# Patient Record
Sex: MALE | Race: WHITE | NOT HISPANIC OR LATINO | Employment: OTHER | URBAN - NONMETROPOLITAN AREA
[De-identification: names, ages, dates, MRNs, and addresses within clinical notes are randomized per-mention and may not be internally consistent; named-entity substitution may affect disease eponyms.]

---

## 2017-05-16 ENCOUNTER — OFFICE VISIT (OUTPATIENT)
Dept: CARDIOLOGY | Facility: CLINIC | Age: 82
End: 2017-05-16

## 2017-05-16 VITALS
WEIGHT: 201.2 LBS | HEIGHT: 69 IN | BODY MASS INDEX: 29.8 KG/M2 | HEART RATE: 62 BPM | DIASTOLIC BLOOD PRESSURE: 88 MMHG | SYSTOLIC BLOOD PRESSURE: 140 MMHG

## 2017-05-16 DIAGNOSIS — R01.1 MURMUR, CARDIAC: ICD-10-CM

## 2017-05-16 DIAGNOSIS — R07.9 CHEST PAIN, UNSPECIFIED TYPE: ICD-10-CM

## 2017-05-16 DIAGNOSIS — I25.119 CORONARY ARTERY DISEASE INVOLVING NATIVE CORONARY ARTERY OF NATIVE HEART WITH ANGINA PECTORIS (HCC): Primary | ICD-10-CM

## 2017-05-16 DIAGNOSIS — I10 ESSENTIAL HYPERTENSION: ICD-10-CM

## 2017-05-16 DIAGNOSIS — E78.2 MIXED HYPERLIPIDEMIA: ICD-10-CM

## 2017-05-16 PROCEDURE — 99214 OFFICE O/P EST MOD 30 MIN: CPT | Performed by: NURSE PRACTITIONER

## 2017-05-16 PROCEDURE — 93000 ELECTROCARDIOGRAM COMPLETE: CPT | Performed by: NURSE PRACTITIONER

## 2017-05-16 RX ORDER — ATORVASTATIN CALCIUM 40 MG/1
80 TABLET, FILM COATED ORAL DAILY
COMMUNITY
End: 2018-04-11 | Stop reason: DRUGHIGH

## 2017-05-24 ENCOUNTER — HOSPITAL ENCOUNTER (OUTPATIENT)
Dept: CARDIOLOGY | Facility: HOSPITAL | Age: 82
Discharge: HOME | End: 2017-05-24
Admitting: NURSE PRACTITIONER

## 2017-05-24 ENCOUNTER — APPOINTMENT (OUTPATIENT)
Dept: CARDIOLOGY | Facility: HOSPITAL | Age: 82
End: 2017-05-24

## 2017-05-24 VITALS
BODY MASS INDEX: 29.77 KG/M2 | DIASTOLIC BLOOD PRESSURE: 67 MMHG | WEIGHT: 201 LBS | SYSTOLIC BLOOD PRESSURE: 122 MMHG | HEIGHT: 69 IN

## 2017-05-24 DIAGNOSIS — I25.119 CORONARY ARTERY DISEASE INVOLVING NATIVE CORONARY ARTERY OF NATIVE HEART WITH ANGINA PECTORIS (HCC): ICD-10-CM

## 2017-05-24 DIAGNOSIS — R07.9 CHEST PAIN, UNSPECIFIED TYPE: ICD-10-CM

## 2017-05-24 PROCEDURE — 93306 TTE W/DOPPLER COMPLETE: CPT

## 2017-05-24 PROCEDURE — 93306 TTE W/DOPPLER COMPLETE: CPT | Performed by: INTERNAL MEDICINE

## 2017-05-25 LAB
BH CV ECHO MEAS - AO MAX PG (FULL): 24.5 MMHG
BH CV ECHO MEAS - AO MAX PG: 28.5 MMHG
BH CV ECHO MEAS - AO MEAN PG (FULL): 13 MMHG
BH CV ECHO MEAS - AO MEAN PG: 15 MMHG
BH CV ECHO MEAS - AO ROOT AREA: 8.6 CM^2
BH CV ECHO MEAS - AO ROOT DIAM: 3.3 CM
BH CV ECHO MEAS - AO V2 MAX: 267 CM/SEC
BH CV ECHO MEAS - AO V2 MEAN: 176 CM/SEC
BH CV ECHO MEAS - AO V2 VTI: 59.2 CM
BH CV ECHO MEAS - AVA(I,A): 1.2 CM^2
BH CV ECHO MEAS - AVA(I,D): 1.2 CM^2
BH CV ECHO MEAS - AVA(V,A): 1.2 CM^2
BH CV ECHO MEAS - AVA(V,D): 1.2 CM^2
BH CV ECHO MEAS - CONTRAST EF 4CH: 60.2 ML/M^2
BH CV ECHO MEAS - EDV(CUBED): 126.5 ML
BH CV ECHO MEAS - EDV(MOD-SP4): 86.2 ML
BH CV ECHO MEAS - EDV(TEICH): 119.3 ML
BH CV ECHO MEAS - EF(CUBED): 74.1 %
BH CV ECHO MEAS - EF(MOD-SP4): 60.2 %
BH CV ECHO MEAS - EF(TEICH): 65.7 %
BH CV ECHO MEAS - ESV(CUBED): 32.8 ML
BH CV ECHO MEAS - ESV(MOD-SP4): 34.3 ML
BH CV ECHO MEAS - ESV(TEICH): 41 ML
BH CV ECHO MEAS - FS: 36.3 %
BH CV ECHO MEAS - IVS/LVPW: 0.77
BH CV ECHO MEAS - IVSD: 0.96 CM
BH CV ECHO MEAS - LA DIMENSION: 4.1 CM
BH CV ECHO MEAS - LA/AO: 1.2
BH CV ECHO MEAS - LAT PEAK E' VEL: 4.8 CM/SEC
BH CV ECHO MEAS - LV MASS(C)D: 209.7 GRAMS
BH CV ECHO MEAS - LV MAX PG: 4 MMHG
BH CV ECHO MEAS - LV MEAN PG: 2 MMHG
BH CV ECHO MEAS - LV V1 MAX: 99.8 CM/SEC
BH CV ECHO MEAS - LV V1 MEAN: 63.8 CM/SEC
BH CV ECHO MEAS - LV V1 VTI: 21.7 CM
BH CV ECHO MEAS - LVIDD: 5 CM
BH CV ECHO MEAS - LVIDS: 3.2 CM
BH CV ECHO MEAS - LVLD AP4: 6.9 CM
BH CV ECHO MEAS - LVLS AP4: 5.9 CM
BH CV ECHO MEAS - LVOT AREA (M): 3.1 CM^2
BH CV ECHO MEAS - LVOT AREA: 3.1 CM^2
BH CV ECHO MEAS - LVOT DIAM: 2 CM
BH CV ECHO MEAS - LVPWD: 1.3 CM
BH CV ECHO MEAS - MV A MAX VEL: 107 CM/SEC
BH CV ECHO MEAS - MV DEC TIME: 0.27 SEC
BH CV ECHO MEAS - MV E MAX VEL: 117 CM/SEC
BH CV ECHO MEAS - MV E/A: 1.1
BH CV ECHO MEAS - RAP SYSTOLE: 5 MMHG
BH CV ECHO MEAS - RVSP: 33.5 MMHG
BH CV ECHO MEAS - SV(AO): 506.3 ML
BH CV ECHO MEAS - SV(CUBED): 93.7 ML
BH CV ECHO MEAS - SV(LVOT): 68.2 ML
BH CV ECHO MEAS - SV(MOD-SP4): 51.9 ML
BH CV ECHO MEAS - SV(TEICH): 78.4 ML
BH CV ECHO MEAS - TR MAX VEL: 267 CM/SEC
E/E' RATIO: 24
LEFT ATRIUM VOLUME: 131 CM3

## 2017-05-26 ENCOUNTER — TELEPHONE (OUTPATIENT)
Dept: CARDIOLOGY | Facility: CLINIC | Age: 82
End: 2017-05-26

## 2017-06-13 LAB
ALBUMIN SERPL-MCNC: 4.4 G/DL (ref 3.5–5.2)
ALP BLD-CCNC: 74 U/L (ref 40–130)
ALT SERPL-CCNC: 14 U/L (ref 5–41)
ANION GAP SERPL CALCULATED.3IONS-SCNC: 17 MMOL/L (ref 7–19)
AST SERPL-CCNC: 20 U/L (ref 5–40)
BACTERIA: NEGATIVE /HPF
BASOPHILS ABSOLUTE: 0 K/UL (ref 0–0.2)
BASOPHILS RELATIVE PERCENT: 0.5 % (ref 0–1)
BILIRUB SERPL-MCNC: 0.7 MG/DL (ref 0.2–1.2)
BILIRUBIN URINE: NEGATIVE
BLOOD, URINE: ABNORMAL
BUN BLDV-MCNC: 23 MG/DL (ref 8–23)
CALCIUM SERPL-MCNC: 9.5 MG/DL (ref 8.8–10.2)
CHLORIDE BLD-SCNC: 103 MMOL/L (ref 98–111)
CHOLESTEROL, TOTAL: 200 MG/DL (ref 160–199)
CLARITY: CLEAR
CO2: 23 MMOL/L (ref 22–29)
COLOR: YELLOW
CREAT SERPL-MCNC: 1.7 MG/DL (ref 0.5–1.2)
EOSINOPHILS ABSOLUTE: 0.2 K/UL (ref 0–0.6)
EOSINOPHILS RELATIVE PERCENT: 3.9 % (ref 0–5)
EPITHELIAL CELLS, UA: 0 /HPF (ref 0–5)
GFR NON-AFRICAN AMERICAN: 39
GLUCOSE BLD-MCNC: 90 MG/DL (ref 74–109)
GLUCOSE URINE: NEGATIVE MG/DL
HCT VFR BLD CALC: 45 % (ref 42–52)
HDLC SERPL-MCNC: 33 MG/DL (ref 55–121)
HEMOGLOBIN: 14.8 G/DL (ref 14–18)
HYALINE CASTS: 0 /HPF (ref 0–8)
KETONES, URINE: NEGATIVE MG/DL
LDL CHOLESTEROL CALCULATED: 127 MG/DL
LEUKOCYTE ESTERASE, URINE: NEGATIVE
LYMPHOCYTES ABSOLUTE: 2.6 K/UL (ref 1.1–4.5)
LYMPHOCYTES RELATIVE PERCENT: 42.2 % (ref 20–40)
MCH RBC QN AUTO: 29.7 PG (ref 27–31)
MCHC RBC AUTO-ENTMCNC: 32.9 G/DL (ref 33–37)
MCV RBC AUTO: 90.4 FL (ref 80–94)
MONOCYTES ABSOLUTE: 0.7 K/UL (ref 0–0.9)
MONOCYTES RELATIVE PERCENT: 10.9 % (ref 0–10)
NEUTROPHILS ABSOLUTE: 2.6 K/UL (ref 1.5–7.5)
NEUTROPHILS RELATIVE PERCENT: 42.3 % (ref 50–65)
NITRITE, URINE: NEGATIVE
PDW BLD-RTO: 13.2 % (ref 11.5–14.5)
PH UA: 5.5
PLATELET # BLD: 175 K/UL (ref 130–400)
PMV BLD AUTO: 10.5 FL (ref 9.4–12.4)
POTASSIUM SERPL-SCNC: 4.3 MMOL/L (ref 3.5–5)
PROSTATE SPECIFIC ANTIGEN: 1.45 NG/ML (ref 0–4)
PROTEIN UA: 30 MG/DL
RBC # BLD: 4.98 M/UL (ref 4.7–6.1)
RBC UA: 2 /HPF (ref 0–4)
SODIUM BLD-SCNC: 143 MMOL/L (ref 136–145)
SPECIFIC GRAVITY UA: 1.01
TOTAL PROTEIN: 7.7 G/DL (ref 6.6–8.7)
TRIGL SERPL-MCNC: 202 MG/DL (ref 150–199)
TSH SERPL DL<=0.05 MIU/L-ACNC: 2.6 UIU/ML (ref 0.27–4.2)
UROBILINOGEN, URINE: 0.2 E.U./DL
WBC # BLD: 6.1 K/UL (ref 4.8–10.8)
WBC UA: 0 /HPF (ref 0–5)

## 2017-06-13 RX ORDER — ATORVASTATIN CALCIUM 40 MG/1
40 TABLET, FILM COATED ORAL DAILY
COMMUNITY
End: 2017-06-14 | Stop reason: SDUPTHER

## 2017-06-13 RX ORDER — KRILL/OM-3/DHA/EPA/PHOSPHO/AST 1000-230MG
1 CAPSULE ORAL DAILY
COMMUNITY
End: 2018-01-22

## 2017-06-13 RX ORDER — ASCORBIC ACID 1000 MG
1 TABLET ORAL DAILY
COMMUNITY

## 2017-06-13 RX ORDER — ATENOLOL AND CHLORTHALIDONE TABLET 100; 25 MG/1; MG/1
0.5 TABLET ORAL DAILY
COMMUNITY
End: 2017-06-21 | Stop reason: SDUPTHER

## 2017-06-13 RX ORDER — CLOPIDOGREL BISULFATE 75 MG/1
75 TABLET ORAL DAILY
COMMUNITY
End: 2018-01-22

## 2017-06-13 RX ORDER — IRBESARTAN 300 MG/1
300 TABLET ORAL NIGHTLY
COMMUNITY
End: 2018-05-22 | Stop reason: SDUPTHER

## 2017-06-13 RX ORDER — NITROGLYCERIN 0.4 MG/1
1 TABLET SUBLINGUAL
COMMUNITY

## 2017-06-14 ENCOUNTER — OFFICE VISIT (OUTPATIENT)
Dept: INTERNAL MEDICINE | Age: 82
End: 2017-06-14
Payer: MEDICARE

## 2017-06-14 VITALS
WEIGHT: 197 LBS | DIASTOLIC BLOOD PRESSURE: 84 MMHG | HEART RATE: 59 BPM | BODY MASS INDEX: 29.18 KG/M2 | SYSTOLIC BLOOD PRESSURE: 120 MMHG | HEIGHT: 69 IN | OXYGEN SATURATION: 97 %

## 2017-06-14 DIAGNOSIS — E78.00 PURE HYPERCHOLESTEROLEMIA: ICD-10-CM

## 2017-06-14 DIAGNOSIS — Z23 NEED FOR SHINGLES VACCINE: ICD-10-CM

## 2017-06-14 DIAGNOSIS — I10 ESSENTIAL HYPERTENSION: Primary | ICD-10-CM

## 2017-06-14 DIAGNOSIS — N18.30 CHRONIC KIDNEY DISEASE, STAGE 3 (MODERATE): ICD-10-CM

## 2017-06-14 DIAGNOSIS — M17.0 PRIMARY OSTEOARTHRITIS OF BOTH KNEES: ICD-10-CM

## 2017-06-14 PROBLEM — N18.9 CHRONIC KIDNEY DISEASE: Status: ACTIVE | Noted: 2017-06-14

## 2017-06-14 PROCEDURE — 99214 OFFICE O/P EST MOD 30 MIN: CPT | Performed by: INTERNAL MEDICINE

## 2017-06-14 PROCEDURE — 1036F TOBACCO NON-USER: CPT | Performed by: INTERNAL MEDICINE

## 2017-06-14 PROCEDURE — 1123F ACP DISCUSS/DSCN MKR DOCD: CPT | Performed by: INTERNAL MEDICINE

## 2017-06-14 PROCEDURE — 4040F PNEUMOC VAC/ADMIN/RCVD: CPT | Performed by: INTERNAL MEDICINE

## 2017-06-14 PROCEDURE — G8427 DOCREV CUR MEDS BY ELIG CLIN: HCPCS | Performed by: INTERNAL MEDICINE

## 2017-06-14 PROCEDURE — G8419 CALC BMI OUT NRM PARAM NOF/U: HCPCS | Performed by: INTERNAL MEDICINE

## 2017-06-14 RX ORDER — ATORVASTATIN CALCIUM 80 MG/1
80 TABLET, FILM COATED ORAL DAILY
Qty: 90 TABLET | Refills: 3 | Status: SHIPPED | OUTPATIENT
Start: 2017-06-14 | End: 2018-05-22 | Stop reason: SDUPTHER

## 2017-06-14 ASSESSMENT — LIFESTYLE VARIABLES: HOW OFTEN DO YOU HAVE A DRINK CONTAINING ALCOHOL: 0

## 2017-06-14 ASSESSMENT — ENCOUNTER SYMPTOMS
RHINORRHEA: 0
TROUBLE SWALLOWING: 0
COUGH: 0
SHORTNESS OF BREATH: 0
SORE THROAT: 0
ABDOMINAL PAIN: 0
NAUSEA: 0

## 2017-06-14 ASSESSMENT — ANXIETY QUESTIONNAIRES: GAD7 TOTAL SCORE: 0

## 2017-06-14 ASSESSMENT — PATIENT HEALTH QUESTIONNAIRE - PHQ9: SUM OF ALL RESPONSES TO PHQ QUESTIONS 1-9: 0

## 2017-06-21 RX ORDER — ATENOLOL AND CHLORTHALIDONE TABLET 100; 25 MG/1; MG/1
0.5 TABLET ORAL DAILY
Qty: 30 TABLET | Refills: 2 | Status: SHIPPED | OUTPATIENT
Start: 2017-06-21 | End: 2018-01-22

## 2017-07-13 DIAGNOSIS — N18.30 CHRONIC KIDNEY DISEASE, STAGE 3 (MODERATE): ICD-10-CM

## 2017-07-13 DIAGNOSIS — E78.00 PURE HYPERCHOLESTEROLEMIA: ICD-10-CM

## 2017-07-13 LAB
ALBUMIN SERPL-MCNC: 4.3 G/DL (ref 3.5–5.2)
ALP BLD-CCNC: 65 U/L (ref 40–130)
ALT SERPL-CCNC: 17 U/L (ref 5–41)
ANION GAP SERPL CALCULATED.3IONS-SCNC: 18 MMOL/L (ref 7–19)
AST SERPL-CCNC: 24 U/L (ref 5–40)
BILIRUB SERPL-MCNC: 0.9 MG/DL (ref 0.2–1.2)
BUN BLDV-MCNC: 28 MG/DL (ref 8–23)
CALCIUM SERPL-MCNC: 9.4 MG/DL (ref 8.8–10.2)
CHLORIDE BLD-SCNC: 103 MMOL/L (ref 98–111)
CO2: 21 MMOL/L (ref 22–29)
CREAT SERPL-MCNC: 1.8 MG/DL (ref 0.5–1.2)
GFR NON-AFRICAN AMERICAN: 37
GLUCOSE BLD-MCNC: 94 MG/DL (ref 74–109)
HCT VFR BLD CALC: 41.8 % (ref 42–52)
HEMOGLOBIN: 13.9 G/DL (ref 14–18)
LDL CHOLESTEROL DIRECT: 94 MG/DL
MCH RBC QN AUTO: 30.4 PG (ref 27–31)
MCHC RBC AUTO-ENTMCNC: 33.3 G/DL (ref 33–37)
MCV RBC AUTO: 91.5 FL (ref 80–94)
PDW BLD-RTO: 13.7 % (ref 11.5–14.5)
PLATELET # BLD: 173 K/UL (ref 130–400)
PMV BLD AUTO: 10.8 FL (ref 9.4–12.4)
POTASSIUM SERPL-SCNC: 4.1 MMOL/L (ref 3.5–5)
RBC # BLD: 4.57 M/UL (ref 4.7–6.1)
SODIUM BLD-SCNC: 142 MMOL/L (ref 136–145)
TOTAL PROTEIN: 7.4 G/DL (ref 6.6–8.7)
WBC # BLD: 5.7 K/UL (ref 4.8–10.8)

## 2017-07-19 ENCOUNTER — OFFICE VISIT (OUTPATIENT)
Dept: INTERNAL MEDICINE | Age: 82
End: 2017-07-19
Payer: MEDICARE

## 2017-07-19 VITALS
BODY MASS INDEX: 29.62 KG/M2 | HEART RATE: 56 BPM | SYSTOLIC BLOOD PRESSURE: 136 MMHG | DIASTOLIC BLOOD PRESSURE: 84 MMHG | HEIGHT: 69 IN | WEIGHT: 200 LBS | TEMPERATURE: 98 F | OXYGEN SATURATION: 97 % | RESPIRATION RATE: 18 BRPM

## 2017-07-19 DIAGNOSIS — N18.30 CKD (CHRONIC KIDNEY DISEASE) STAGE 3, GFR 30-59 ML/MIN (HCC): Primary | ICD-10-CM

## 2017-07-19 DIAGNOSIS — E78.2 MIXED HYPERLIPIDEMIA: ICD-10-CM

## 2017-07-19 PROCEDURE — 4040F PNEUMOC VAC/ADMIN/RCVD: CPT | Performed by: NURSE PRACTITIONER

## 2017-07-19 PROCEDURE — G8419 CALC BMI OUT NRM PARAM NOF/U: HCPCS | Performed by: NURSE PRACTITIONER

## 2017-07-19 PROCEDURE — G8427 DOCREV CUR MEDS BY ELIG CLIN: HCPCS | Performed by: NURSE PRACTITIONER

## 2017-07-19 PROCEDURE — 1036F TOBACCO NON-USER: CPT | Performed by: NURSE PRACTITIONER

## 2017-07-19 PROCEDURE — 99213 OFFICE O/P EST LOW 20 MIN: CPT | Performed by: NURSE PRACTITIONER

## 2017-07-19 PROCEDURE — 1123F ACP DISCUSS/DSCN MKR DOCD: CPT | Performed by: NURSE PRACTITIONER

## 2017-07-19 ASSESSMENT — ENCOUNTER SYMPTOMS
WHEEZING: 0
COUGH: 0
CONSTIPATION: 0
ABDOMINAL DISTENTION: 0
EYE DISCHARGE: 0
VOMITING: 0
DIARRHEA: 0
TROUBLE SWALLOWING: 0
SHORTNESS OF BREATH: 0
COLOR CHANGE: 0
ROS SKIN COMMENTS: HISTORY OF MELANOMA
NAUSEA: 0
SORE THROAT: 0
ABDOMINAL PAIN: 0
BLOOD IN STOOL: 0
STRIDOR: 0
EYE ITCHING: 0
CHOKING: 0

## 2017-08-04 ENCOUNTER — HOSPITAL ENCOUNTER (OUTPATIENT)
Dept: ULTRASOUND IMAGING | Age: 82
Discharge: HOME | End: 2017-08-04
Payer: MEDICARE

## 2017-08-04 DIAGNOSIS — N18.30 CHRONIC KIDNEY DISEASE, STAGE III (MODERATE) (HCC): ICD-10-CM

## 2017-08-04 PROCEDURE — 76770 US EXAM ABDO BACK WALL COMP: CPT

## 2017-12-15 ENCOUNTER — OFFICE VISIT (OUTPATIENT)
Dept: INTERNAL MEDICINE | Age: 82
End: 2017-12-15
Payer: MEDICARE

## 2017-12-15 ENCOUNTER — TRANSCRIBE ORDERS (OUTPATIENT)
Dept: ADMINISTRATIVE | Facility: HOSPITAL | Age: 82
End: 2017-12-15

## 2017-12-15 VITALS
HEIGHT: 69 IN | HEART RATE: 58 BPM | DIASTOLIC BLOOD PRESSURE: 70 MMHG | SYSTOLIC BLOOD PRESSURE: 130 MMHG | TEMPERATURE: 97 F | OXYGEN SATURATION: 97 % | WEIGHT: 198 LBS | BODY MASS INDEX: 29.33 KG/M2

## 2017-12-15 DIAGNOSIS — R55 SYNCOPE, UNSPECIFIED SYNCOPE TYPE: Primary | ICD-10-CM

## 2017-12-15 DIAGNOSIS — R55 SYNCOPE AND COLLAPSE: Primary | ICD-10-CM

## 2017-12-15 DIAGNOSIS — I10 ESSENTIAL HYPERTENSION: ICD-10-CM

## 2017-12-15 DIAGNOSIS — R42 DIZZINESS: ICD-10-CM

## 2017-12-15 PROBLEM — R01.1 MURMUR, CARDIAC: Status: ACTIVE | Noted: 2017-05-16

## 2017-12-15 PROCEDURE — G8427 DOCREV CUR MEDS BY ELIG CLIN: HCPCS | Performed by: PHYSICIAN ASSISTANT

## 2017-12-15 PROCEDURE — 1123F ACP DISCUSS/DSCN MKR DOCD: CPT | Performed by: PHYSICIAN ASSISTANT

## 2017-12-15 PROCEDURE — 99213 OFFICE O/P EST LOW 20 MIN: CPT | Performed by: PHYSICIAN ASSISTANT

## 2017-12-15 PROCEDURE — 4040F PNEUMOC VAC/ADMIN/RCVD: CPT | Performed by: PHYSICIAN ASSISTANT

## 2017-12-15 PROCEDURE — G8484 FLU IMMUNIZE NO ADMIN: HCPCS | Performed by: PHYSICIAN ASSISTANT

## 2017-12-15 PROCEDURE — G8419 CALC BMI OUT NRM PARAM NOF/U: HCPCS | Performed by: PHYSICIAN ASSISTANT

## 2017-12-15 PROCEDURE — 1036F TOBACCO NON-USER: CPT | Performed by: PHYSICIAN ASSISTANT

## 2017-12-15 RX ORDER — ALLOPURINOL 100 MG/1
100 TABLET ORAL DAILY
COMMUNITY
Start: 2017-09-12 | End: 2022-06-01

## 2017-12-15 ASSESSMENT — ENCOUNTER SYMPTOMS
CONSTIPATION: 0
BACK PAIN: 0
EYE REDNESS: 0
RHINORRHEA: 0
ABDOMINAL PAIN: 0
DIARRHEA: 0
SINUS PRESSURE: 0
SORE THROAT: 0
PHOTOPHOBIA: 0
COLOR CHANGE: 0
EYE PAIN: 0
VOMITING: 0
NAUSEA: 0
WHEEZING: 0
SHORTNESS OF BREATH: 0
COUGH: 0
CHEST TIGHTNESS: 0

## 2017-12-15 NOTE — PROGRESS NOTES
Negative for speech difficulty, weakness, numbness and headaches. Hematological: Negative for adenopathy. Does not bruise/bleed easily. Psychiatric/Behavioral: Negative for confusion. The patient is not nervous/anxious. Current Outpatient Prescriptions   Medication Sig Dispense Refill    allopurinol (ZYLOPRIM) 100 MG tablet Take 100 mg by mouth daily      atenolol-chlorthalidone (TENORETIC) 100-25 MG per tablet Take 0.5 tablets by mouth daily 30 tablet 2    atorvastatin (LIPITOR) 80 MG tablet Take 1 tablet by mouth daily 90 tablet 3    aspirin 81 MG tablet Take 81 mg by mouth daily      clopidogrel (PLAVIX) 75 MG tablet Take 75 mg by mouth daily      Coenzyme Q10 (CO Q 10) 10 MG CAPS Take 1 capsule by mouth daily      irbesartan (AVAPRO) 300 MG tablet Take 300 mg by mouth nightly      NITROGLYCERIN SL Place 1 tablet under the tongue One under tongue as needed for chest pain.  Omega-3 Krill Oil 1000 MG CAPS Take 1 capsule by mouth daily       No current facility-administered medications for this visit. /70   Pulse 58   Temp 97 °F (36.1 °C)   Ht 5' 9\" (1.753 m)   Wt 198 lb (89.8 kg)   SpO2 97%   BMI 29.24 kg/m²     PHYSICAL EXAM  Physical Exam   Constitutional: He is oriented to person, place, and time. Vital signs are normal. He appears well-developed and well-nourished. HENT:   Head: Normocephalic and atraumatic. Right Ear: External ear normal.   Left Ear: External ear normal.   Nose: Nose normal.   Mouth/Throat: Oropharynx is clear and moist. No oropharyngeal exudate. Eyes: Pupils are equal, round, and reactive to light. Right eye exhibits no discharge. Left eye exhibits no discharge. Neck: Normal range of motion. No tracheal deviation present. Cardiovascular: Normal rate, regular rhythm and normal heart sounds. Exam reveals no gallop and no friction rub. No murmur heard. Pulmonary/Chest: Effort normal and breath sounds normal. No respiratory distress.  He has no wheezes. He has no rales. He exhibits no tenderness. Abdominal: Soft. There is no tenderness. There is no rebound and no guarding. Musculoskeletal: Normal range of motion. He exhibits no tenderness or deformity. Lymphadenopathy:     He has no cervical adenopathy. Neurological: He is alert and oriented to person, place, and time. He has normal reflexes. No cranial nerve deficit. Skin: Skin is warm, dry and intact. No lesion and no rash noted. No erythema. Psychiatric: He has a normal mood and affect. His mood appears not anxious. He does not exhibit a depressed mood. Nursing note and vitals reviewed. EKG: .  IL equals 104/254 MS,  MS, QT/QTc 532/518 MS, P/QRS/T axis 69/1/44°, heart rate 57 bpm.  No chest pain, no shortness of breath patient does have a first-degree a the block sinus rhythm compared to the EKG he had done on 16 May 2017 at [facility] he has deepening S waves in V4 and V5 and V6. Dr. Rachel Mendiola reviewed EKG also and we decided that we will do a Holter monitor and a stress test on the patient.     Adult Transthoracic Echo Complete5/24/2017  [facility]  Component Name Value Ref Range   IVSd 0.96 cm   LVIDd 5.0 cm   LVIDs 3.2 cm   LVPWd 1.3 cm   IVS/LVPW 0.77     FS 36.3 %   EDV(Teich) 119.3 ml   ESV(Teich) 41.0 ml   EF(Teich) 65.7 %   EDV(cubed) 126.5 ml   ESV(cubed) 32.8 ml   EF(cubed) 74.1 %   LV mass(C)d 209.7 grams    SV(Teich) 78.4 ml   SV(cubed) 93.7 ml   Ao root diam 3.3 cm   Ao root area 8.6 cm^2    LA dimension 4.1 cm   LA/Ao 1.2     LVOT diam 2.0 cm   LVOT area 3.1 cm^2    LVOT area(traced) 3.1 cm^2    LVLd ap4 6.9 cm   EDV(MOD-sp4) 86.2 ml   LVLs ap4 5.9 cm   ESV(MOD-sp4) 34.3 ml   EF(MOD-sp4) 60.2 %   SV(MOD-sp4) 51.9 ml   CONTRAST EF 4CH 60.2 ml/m^2    MV E max esther 117.0 cm/sec    MV A max esther 107.0 cm/sec    MV E/A 1.1     MV dec time 0.27 sec    Ao pk esther 267.0 cm/sec    Ao max PG 28.5 mmHg    Ao max PG (full) 24.5 mmHg    Ao V2 mean 176.0 cm/sec    Ao mean PG 15.0 mmHg    Ao mean PG (full) 13.0 mmHg    Ao V2 VTI 59.2 cm   EDOUARD(I,A) 1.2 cm^2    EDOUARD(I,D) 1.2 cm^2    EDOUARD(V,A) 1.2 cm^2    EDOUARD(V,D) 1.2 cm^2    LV V1 max PG 4.0 mmHg    LV V1 mean PG 2.0 mmHg    LV V1 max 99.8 cm/sec    LV V1 mean 63.8 cm/sec    LV V1 VTI 21.7 cm   SV(Ao) 506.3 ml   SV(LVOT) 68.2 ml   TR max gonzalo 267.0 cm/sec    RVSP(TR) 33.5 mmHg    RAP systole 5.0 mmHg    LA volume 131.0 cm3   E/E' ratio 24.0     Lat Peak E' Gonzalo 4.8 cm/sec    Result Narrative   · Normal LVEF (>55%). · Mild to moderate aortic valve stenosis is present. · Left ventricular wall thickness is consistent with borderline concentric   hypertrophy. · Mild mitral valve regurgitation is present  · Left ventricular diastolic dysfunction (grade II) consistent with   pseudonormalization. · Left atrial cavity size is severely dilated. · Normal size and function of RV. LEXISCAN MYOCARDIAL SPECT-    DATE OF TESTING- 5/11/2016    DATE OF ANALYSIS AND INTERPRETATION- 5/12/2016    ORDERING PROVIDER- Jayy Murray    INDICATION FOR TESTING- Coronary artery disease, chest pain. SUMMARY-  The patient underwent Lexiscan myocardial SPECT per protocol. Resting   dose 10.6 mCi of Technetium 99m Cardiolite and stress dose of 33.0 mCi   were administered. Resting heart rate of 64 bpm increased to 84 bpm   upon Lexiscan infusion. Resting heart rate of 146/86 mmHg increased to   167/89 mmHg. The patient did not experience any pain with Lexiscan   infusion. Baseline EKG demonstrated normal sinus rhythm. There is no   ischemic EKG changes with Lexiscan infusion. Raw images were reviewed   and did not demonstrate any areas of artifact or attenuation. There is   no significant movement. Perfusion images show normal myocardial   perfusion with no evidence of ischemia.  Gated images demonstrate normal   left ventricular systolic function with calculated ejection fraction of   greater than 65%.    CONCLUSION-  1. Normal myocardial perfusion with no evidence of ischemia. 2. Normal left ventricular systolic function with calculated ejection   fraction of greater than 65%.      Reading Radiologist- Laura Jennings       Releasing Radiologist- Laura Jennings       Released Date Time- 05/13/16 1004       - ABHIJIT MOLINA ASSESSMENT      ICD-10-CM ICD-9-CM    1. Syncope, unspecified syncope type R55 780.2 Echocardiogram stress test      Holter monitor   2. Essential hypertension I10 401.9    3. Dizziness R42 780.4        PLAN  Patient complaining of new onset dizziness and episodes of syncope over the last week and a half. Patient states his blood pressure has not been elevated and is been well controlled with current medications. With the EKG changes and this new onset syncope we will go ahead and try to get patient an echo stress test.  Patient states that he thinks his knee is good enough that he could do a exercise stress test now. We'll also do a Holter monitor to make sure he is not having arrhythmias that could be causing the syncope. Patient also has an appointment in 2 weeks at [facility] with cardiology. Discussed with patient if he has any more episodes of syncope or chest pain or shortness of breath that he needs to go to the emergency room. I think while he is having a syncopal episodes it would be good idea for him not to drive. Patient states that we find his wife is driving him around now. Patient will follow-up after he completes the Holter monitor and echocardiogram or sooner as needed. Orders Placed This Encounter   Procedures    Holter monitor    Echocardiogram stress test        Return if symptoms worsen or fail to improve. All questions answered. Patient voices understanding and agrees to plans along with risks and benefits of plan. Patient is instructed to continue prior medications, diet, and exercise plans as instructed.  Patient agrees to follow up as

## 2017-12-20 ENCOUNTER — HOSPITAL ENCOUNTER (OUTPATIENT)
Dept: CARDIOLOGY | Facility: HOSPITAL | Age: 82
Discharge: HOME | End: 2017-12-20
Admitting: PHYSICIAN ASSISTANT

## 2017-12-20 DIAGNOSIS — R55 SYNCOPE AND COLLAPSE: ICD-10-CM

## 2017-12-20 PROCEDURE — 93225 XTRNL ECG REC<48 HRS REC: CPT

## 2017-12-20 PROCEDURE — 93226 XTRNL ECG REC<48 HR SCAN A/R: CPT

## 2017-12-27 PROCEDURE — 93227 XTRNL ECG REC<48 HR R&I: CPT | Performed by: INTERNAL MEDICINE

## 2017-12-28 ENCOUNTER — OFFICE VISIT (OUTPATIENT)
Dept: CARDIOLOGY | Facility: CLINIC | Age: 82
End: 2017-12-28

## 2017-12-28 VITALS
DIASTOLIC BLOOD PRESSURE: 80 MMHG | BODY MASS INDEX: 29.92 KG/M2 | SYSTOLIC BLOOD PRESSURE: 142 MMHG | HEIGHT: 69 IN | HEART RATE: 72 BPM | WEIGHT: 202 LBS

## 2017-12-28 DIAGNOSIS — I25.700 ATHEROSCLEROSIS OF CABG W UNSTABLE ANGINA PECTORIS (HCC): ICD-10-CM

## 2017-12-28 DIAGNOSIS — R55 SYNCOPE, UNSPECIFIED SYNCOPE TYPE: ICD-10-CM

## 2017-12-28 DIAGNOSIS — I44.1 SECOND DEGREE HEART BLOCK: Primary | ICD-10-CM

## 2017-12-28 DIAGNOSIS — I35.0 NONRHEUMATIC AORTIC VALVE STENOSIS: ICD-10-CM

## 2017-12-28 DIAGNOSIS — I10 ESSENTIAL HYPERTENSION: ICD-10-CM

## 2017-12-28 PROCEDURE — 99215 OFFICE O/P EST HI 40 MIN: CPT | Performed by: INTERNAL MEDICINE

## 2017-12-28 PROCEDURE — 93000 ELECTROCARDIOGRAM COMPLETE: CPT | Performed by: INTERNAL MEDICINE

## 2017-12-28 RX ORDER — ALLOPURINOL 100 MG/1
100 TABLET ORAL DAILY
COMMUNITY
End: 2018-11-08

## 2017-12-28 NOTE — ASSESSMENT & PLAN NOTE
Surveillance echocardiography every 1-2 years, or sooner if symptoms change.  This would not be severe enough at this point to cause syncope.

## 2017-12-28 NOTE — ASSESSMENT & PLAN NOTE
Symptomatic in terms of presyncope and syncope.  I suspect this is purely an electrical phenomenon, and will need a pacemaker, though this could possible be ischemia-driven given his known CAD.  Given the extent of his coronary disease, and some symptoms concerning for anginal equivalent (exertional dyspnea), I would like to evaluate for ischemia first as a possible cause with a stress test.  If stress test is abnormal, we will proceed with cardiac catheterization.  If it is normal, we will proceed with pacemaker implantation.

## 2017-12-28 NOTE — PROGRESS NOTES
Subjective:     Encounter Date:12/28/2017      Patient ID: Mireille Magallanes is a 82 y.o. male.    Chief Complaint: light-headedness and syncope  History of Present Illness  Mr. Magallanes is a very pleasant 82-year-old male with extensive history of coronary artery disease, and including four-vessel CABG in 2001 with most recent PCI being a stent to the LAD in 2014, who comes in today complaining of lightheadedness and syncope.  For 2 months (duration), he has been having intermittent episodes of light-headedness. Associated with presyncope and syncope (x2).  Occurs almost every day.  No aggravating factors or alleviating factors identified. No associated palpitations. Recently wore Holter (results below).    ROS notable for exertional dyspnea - did not get stress test in May due to inability to exercise. Has not required NTG, and has had no chest pain since being seen here in May.   He does get out of breath going up and [place] a hill at his property, and this resolves within a few minutes of rest.    The following portions of the patient's history were reviewed and updated as appropriate: allergies, current medications, past family history, past medical history, past social history, past surgical history and problem list.    Review of Systems   Constitution: Negative for malaise/fatigue.   Cardiovascular: Positive for dyspnea on exertion, near-syncope and syncope. Negative for chest pain, claudication, leg swelling, orthopnea, palpitations and paroxysmal nocturnal dyspnea.   Respiratory: Negative for shortness of breath.    Hematologic/Lymphatic: Does not bruise/bleed easily.   Neurological: Positive for light-headedness.          Objective:      Vitals:    12/28/17 0923   BP: 142/80   Pulse: 72     Physical Exam   Constitutional: He is oriented to person, place, and time. He appears well-developed and well-nourished.   Neck: No JVD present.   Cardiovascular: Normal rate, regular rhythm and intact distal pulses.     Murmur heard.   Harsh midsystolic murmur is present with a grade of 2/6  at the upper right sternal border radiating to the neck  High-pitched blowing holosystolic murmur of grade 2/6 is also present at the apex.  Pulmonary/Chest: Effort normal and breath sounds normal.   Musculoskeletal: He exhibits no edema.   Neurological: He is alert and oriented to person, place, and time.   Skin: Skin is warm and dry.       Lab Review:         ECG 12 Lead  Date/Time: 12/28/2017 9:42 AM  Performed by: EDSON BEDOYA  Authorized by: EDSON BEDOYA   Rhythm: sinus rhythm  Conduction: 1st degree and non-specific intraventricular conduction delay  QRS axis: right  Clinical impression: abnormal ECG                Results for orders placed during the hospital encounter of 05/24/17   Adult Transthoracic Echo Complete    Narrative · Normal LVEF (>55%).  · Mild to moderate aortic valve stenosis is present.  · Left ventricular wall thickness is consistent with borderline concentric   hypertrophy.  · Mild mitral valve regurgitation is present  · Left ventricular diastolic dysfunction (grade II) consistent with   pseudonormalization.  · Left atrial cavity size is severely dilated.  · Normal size and function of RV.        OLD RECORDS REVIEWED (TO LEARN HIS CORONARY AND BYPASS GRAFT ANATOMY):  (FROM 2014 HOSPITALIZATION):  BRIEF HOSPITAL COURSE:  The patient is a 78-year-old white male with a history  of previous coronary artery disease, previous coronary bypass grafting in 2001.   He recently began to experience some chest pain, decreased exercise tolerance,  fatigue, and dyspnea.  He underwent a stress test that was abnormal and felt to  be significant for ischemia.  He underwent elective cardiac catheterization and  graft angiography on 04/11/2014 was found to have a right dominant system with  mild irregularities in the left main with 70% in the mid left anterior  descending artery and 40% segmental in the distal with occlusion of  the  diagonal.  The third had severe proximal stenosis and total occlusion after a  very small OM.  The right had mild irregularities.  His bypass graft  angiography revealed a sequential vein graft to the first and second obtuse  marginal that was patent and satisfactorily functioning.  A saphenous vein  graft to the diagonal that was also a patent and satisfactorily functioning,  but his LIMA to the LAD was nonfunctional.     He underwent a 3.0 x 12 mm primary drug-eluting stent to the mid left anterior  descending artery resulting in 0% final stenosis of his 70% lesion.      Assessment/Plan:     Problem List Items Addressed This Visit  (all new to me)       Cardiovascular and Mediastinum    Essential hypertension    Current Assessment & Plan     Hypertension is well controlled.  Continue current treatment regimen.  Blood pressure will be reassessed at the next regular appointment.         Nonrheumatic aortic valve stenosis    Overview     Mild-moderate on echo May '17         Current Assessment & Plan     Surveillance echocardiography every 1-2 years, or sooner if symptoms change.  This would not be severe enough at this point to cause syncope.         Syncope    Current Assessment & Plan     Advised patient not to operate a motor vehicle or any other equipment until this is resolved         Second degree heart block - Primary    Overview     Mobitz II (high grade AV block). Symptomatic episodes captured on Holter monitor         Current Assessment & Plan     Symptomatic in terms of presyncope and syncope.  I suspect this is purely an electrical phenomenon, and will need a pacemaker, though this could possible be ischemia-driven given his known CAD.  Given the extent of his coronary disease, and some symptoms concerning for anginal equivalent (exertional dyspnea), I would like to evaluate for ischemia first as a possible cause with a stress test.  If stress test is abnormal, we will proceed with cardiac  catheterization.  If it is normal, we will proceed with pacemaker implantation.         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  Further evaluation per orders - nuclear stress test with lexiscan.  Cardiac status will be reassessed on basis of stress test results.         Relevant Orders    Stress Test With Myocardial Perfusion (1 Day)        F/u TBD on basis of above tests/plans    MDM: high complexity (on basis of problem 'points' and risk level of conditions)    Kaushik Toledo MD  12/28/2017  11:35 AM

## 2017-12-28 NOTE — ASSESSMENT & PLAN NOTE
Coronary artery disease is potentially worsening, with exertional dyspnea (new) concerning for an anginal equivalent.  Further evaluation per orders - nuclear stress test with lexiscan.  Cardiac status will be reassessed on basis of stress test results.

## 2018-01-05 ENCOUNTER — HOSPITAL ENCOUNTER (OUTPATIENT)
Dept: CARDIOLOGY | Facility: HOSPITAL | Age: 83
Discharge: HOME | End: 2018-01-05
Attending: INTERNAL MEDICINE

## 2018-01-05 ENCOUNTER — HOSPITAL ENCOUNTER (INPATIENT)
Facility: HOSPITAL | Age: 83
LOS: 4 days | Discharge: HOME | End: 2018-01-09
Attending: INTERNAL MEDICINE | Admitting: INTERNAL MEDICINE

## 2018-01-05 VITALS
SYSTOLIC BLOOD PRESSURE: 157 MMHG | BODY MASS INDEX: 29.92 KG/M2 | DIASTOLIC BLOOD PRESSURE: 93 MMHG | HEART RATE: 48 BPM | HEIGHT: 69 IN | WEIGHT: 202 LBS

## 2018-01-05 DIAGNOSIS — I25.700 ATHEROSCLEROSIS OF CABG W UNSTABLE ANGINA PECTORIS (HCC): ICD-10-CM

## 2018-01-05 DIAGNOSIS — N18.9 CHRONIC KIDNEY DISEASE, UNSPECIFIED CKD STAGE: Primary | ICD-10-CM

## 2018-01-05 PROBLEM — I45.9 HEART BLOCK: Status: ACTIVE | Noted: 2018-01-05

## 2018-01-05 LAB
BH CV NUCLEAR PRIOR STUDY: 1
BH CV STRESS BP STAGE 1: NORMAL
BH CV STRESS COMMENTS STAGE 1: NORMAL
BH CV STRESS DOSE REGADENOSON STAGE 1: 0.4
BH CV STRESS DURATION MIN STAGE 1: 0
BH CV STRESS DURATION SEC STAGE 1: 10
BH CV STRESS HR STAGE 1: 36
BH CV STRESS PROTOCOL 1: NORMAL
BH CV STRESS RECOVERY BP: NORMAL MMHG
BH CV STRESS RECOVERY HR: 41 BPM
BH CV STRESS STAGE 1: 1
LV EF NUC BP: 59 %
MAXIMAL PREDICTED HEART RATE: 140 BPM
PERCENT MAX PREDICTED HR: 25.71 %
STRESS BASELINE BP: NORMAL MMHG
STRESS BASELINE HR: 48 BPM
STRESS PERCENT HR: 30 %
STRESS POST EXERCISE DUR SEC: 10 SEC
STRESS POST PEAK BP: NORMAL MMHG
STRESS POST PEAK HR: 36 BPM
STRESS TARGET HR: 119 BPM

## 2018-01-05 PROCEDURE — C1894 INTRO/SHEATH, NON-LASER: HCPCS | Performed by: INTERNAL MEDICINE

## 2018-01-05 PROCEDURE — 0 TECHNETIUM SESTAMIBI: Performed by: INTERNAL MEDICINE

## 2018-01-05 PROCEDURE — 33210 INSERT ELECTRD/PM CATH SNGL: CPT | Performed by: INTERNAL MEDICINE

## 2018-01-05 PROCEDURE — 76937 US GUIDE VASCULAR ACCESS: CPT | Performed by: INTERNAL MEDICINE

## 2018-01-05 PROCEDURE — A9500 TC99M SESTAMIBI: HCPCS | Performed by: INTERNAL MEDICINE

## 2018-01-05 PROCEDURE — 25010000002 REGADENOSON 0.4 MG/5ML SOLUTION: Performed by: INTERNAL MEDICINE

## 2018-01-05 PROCEDURE — 99223 1ST HOSP IP/OBS HIGH 75: CPT | Performed by: INTERNAL MEDICINE

## 2018-01-05 PROCEDURE — 78452 HT MUSCLE IMAGE SPECT MULT: CPT | Performed by: INTERNAL MEDICINE

## 2018-01-05 PROCEDURE — 93018 CV STRESS TEST I&R ONLY: CPT | Performed by: INTERNAL MEDICINE

## 2018-01-05 PROCEDURE — 93017 CV STRESS TEST TRACING ONLY: CPT

## 2018-01-05 PROCEDURE — 25010000002 ENOXAPARIN PER 10 MG: Performed by: INTERNAL MEDICINE

## 2018-01-05 PROCEDURE — 78452 HT MUSCLE IMAGE SPECT MULT: CPT

## 2018-01-05 RX ORDER — ACETAMINOPHEN 325 MG/1
650 TABLET ORAL EVERY 4 HOURS PRN
Status: DISCONTINUED | OUTPATIENT
Start: 2018-01-05 | End: 2018-01-09 | Stop reason: HOSPADM

## 2018-01-05 RX ORDER — IRBESARTAN 150 MG/1
300 TABLET ORAL DAILY
Status: DISCONTINUED | OUTPATIENT
Start: 2018-01-05 | End: 2018-01-09 | Stop reason: HOSPADM

## 2018-01-05 RX ORDER — SODIUM CHLORIDE 0.9 % (FLUSH) 0.9 %
1-10 SYRINGE (ML) INJECTION AS NEEDED
Status: DISCONTINUED | OUTPATIENT
Start: 2018-01-05 | End: 2018-01-05

## 2018-01-05 RX ORDER — NITROGLYCERIN 0.4 MG/1
0.4 TABLET SUBLINGUAL
Status: DISCONTINUED | OUTPATIENT
Start: 2018-01-05 | End: 2018-01-09 | Stop reason: HOSPADM

## 2018-01-05 RX ORDER — CLOPIDOGREL BISULFATE 75 MG/1
75 TABLET ORAL DAILY
Status: DISCONTINUED | OUTPATIENT
Start: 2018-01-05 | End: 2018-01-06

## 2018-01-05 RX ORDER — ALLOPURINOL 100 MG/1
100 TABLET ORAL DAILY
Status: DISCONTINUED | OUTPATIENT
Start: 2018-01-05 | End: 2018-01-09 | Stop reason: HOSPADM

## 2018-01-05 RX ORDER — LIDOCAINE HYDROCHLORIDE 20 MG/ML
INJECTION, SOLUTION INFILTRATION; PERINEURAL AS NEEDED
Status: DISCONTINUED | OUTPATIENT
Start: 2018-01-05 | End: 2018-01-05 | Stop reason: HOSPADM

## 2018-01-05 RX ORDER — ATORVASTATIN CALCIUM 40 MG/1
40 TABLET, FILM COATED ORAL NIGHTLY
Status: DISCONTINUED | OUTPATIENT
Start: 2018-01-05 | End: 2018-01-09 | Stop reason: HOSPADM

## 2018-01-05 RX ORDER — SODIUM CHLORIDE 0.9 % (FLUSH) 0.9 %
1-10 SYRINGE (ML) INJECTION AS NEEDED
Status: DISCONTINUED | OUTPATIENT
Start: 2018-01-05 | End: 2018-01-09 | Stop reason: HOSPADM

## 2018-01-05 RX ORDER — ASPIRIN 81 MG/1
81 TABLET ORAL DAILY
Status: DISCONTINUED | OUTPATIENT
Start: 2018-01-05 | End: 2018-01-09 | Stop reason: HOSPADM

## 2018-01-05 RX ADMIN — TECHNETIUM TC 99M SESTAMIBI 1 DOSE: 1 INJECTION INTRAVENOUS at 10:15

## 2018-01-05 RX ADMIN — ENOXAPARIN SODIUM 40 MG: 40 INJECTION SUBCUTANEOUS at 23:55

## 2018-01-05 RX ADMIN — TECHNETIUM TC 99M SESTAMIBI 1 DOSE: 1 INJECTION INTRAVENOUS at 08:47

## 2018-01-05 RX ADMIN — ALLOPURINOL 100 MG: 100 TABLET ORAL at 23:50

## 2018-01-05 RX ADMIN — REGADENOSON 0.4 MG: 0.08 INJECTION, SOLUTION INTRAVENOUS at 09:48

## 2018-01-05 RX ADMIN — IRBESARTAN 300 MG: 150 TABLET ORAL at 23:50

## 2018-01-06 LAB
ALBUMIN SERPL-MCNC: 4.1 G/DL (ref 3.5–5)
ALBUMIN/GLOB SERPL: 1.5 G/DL (ref 1.1–2.5)
ALP SERPL-CCNC: 66 U/L (ref 24–120)
ALT SERPL W P-5'-P-CCNC: 36 U/L (ref 0–54)
ANION GAP SERPL CALCULATED.3IONS-SCNC: 15 MMOL/L (ref 4–13)
AST SERPL-CCNC: 35 U/L (ref 7–45)
BILIRUB SERPL-MCNC: 1.2 MG/DL (ref 0.1–1)
BUN BLD-MCNC: 26 MG/DL (ref 5–21)
BUN/CREAT SERPL: 15.7 (ref 7–25)
CALCIUM SPEC-SCNC: 9.7 MG/DL (ref 8.4–10.4)
CHLORIDE SERPL-SCNC: 103 MMOL/L (ref 98–110)
CO2 SERPL-SCNC: 24 MMOL/L (ref 24–31)
CREAT BLD-MCNC: 1.66 MG/DL (ref 0.5–1.4)
DEPRECATED RDW RBC AUTO: 40.4 FL (ref 40–54)
ERYTHROCYTE [DISTWIDTH] IN BLOOD BY AUTOMATED COUNT: 13.1 % (ref 12–15)
GFR SERPL CREATININE-BSD FRML MDRD: 40 ML/MIN/1.73
GLOBULIN UR ELPH-MCNC: 2.8 GM/DL
GLUCOSE BLD-MCNC: 102 MG/DL (ref 70–100)
HCT VFR BLD AUTO: 43.5 % (ref 40–52)
HGB BLD-MCNC: 15.2 G/DL (ref 14–18)
INR PPP: 1.07 (ref 0.91–1.09)
MAGNESIUM SERPL-MCNC: 1.6 MG/DL (ref 1.4–2.2)
MCH RBC QN AUTO: 29.8 PG (ref 28–32)
MCHC RBC AUTO-ENTMCNC: 34.9 G/DL (ref 33–36)
MCV RBC AUTO: 85.3 FL (ref 82–95)
PLATELET # BLD AUTO: 173 10*3/MM3 (ref 130–400)
PMV BLD AUTO: 10.4 FL (ref 6–12)
POTASSIUM BLD-SCNC: 4.1 MMOL/L (ref 3.5–5.3)
PROT SERPL-MCNC: 6.9 G/DL (ref 6.3–8.7)
PROTHROMBIN TIME: 14.2 SECONDS (ref 11.9–14.6)
RBC # BLD AUTO: 5.1 10*6/MM3 (ref 4.8–5.9)
SODIUM BLD-SCNC: 142 MMOL/L (ref 135–145)
TSH SERPL DL<=0.05 MIU/L-ACNC: 1.51 MIU/ML (ref 0.47–4.68)
WBC NRBC COR # BLD: 7.56 10*3/MM3 (ref 4.8–10.8)

## 2018-01-06 PROCEDURE — 85027 COMPLETE CBC AUTOMATED: CPT | Performed by: INTERNAL MEDICINE

## 2018-01-06 PROCEDURE — 25010000002 ENOXAPARIN PER 10 MG: Performed by: INTERNAL MEDICINE

## 2018-01-06 PROCEDURE — 85610 PROTHROMBIN TIME: CPT | Performed by: INTERNAL MEDICINE

## 2018-01-06 PROCEDURE — 99232 SBSQ HOSP IP/OBS MODERATE 35: CPT | Performed by: INTERNAL MEDICINE

## 2018-01-06 PROCEDURE — 83735 ASSAY OF MAGNESIUM: CPT | Performed by: INTERNAL MEDICINE

## 2018-01-06 PROCEDURE — 84443 ASSAY THYROID STIM HORMONE: CPT | Performed by: INTERNAL MEDICINE

## 2018-01-06 PROCEDURE — 80053 COMPREHEN METABOLIC PANEL: CPT | Performed by: INTERNAL MEDICINE

## 2018-01-06 RX ADMIN — Medication 81 MG: at 08:30

## 2018-01-06 RX ADMIN — ENOXAPARIN SODIUM 40 MG: 40 INJECTION SUBCUTANEOUS at 20:21

## 2018-01-06 RX ADMIN — IRBESARTAN 300 MG: 150 TABLET ORAL at 08:29

## 2018-01-06 RX ADMIN — ALLOPURINOL 100 MG: 100 TABLET ORAL at 08:30

## 2018-01-06 RX ADMIN — DESMOPRESSIN ACETATE 40 MG: 0.2 TABLET ORAL at 20:21

## 2018-01-06 NOTE — PLAN OF CARE
Problem: Patient Care Overview (Adult)  Goal: Plan of Care Review  Outcome: Ongoing (interventions implemented as appropriate)   01/06/18 0531   Coping/Psychosocial Response Interventions   Plan Of Care Reviewed With patient;spouse;family   Patient Care Overview   Progress improving   Outcome Evaluation   Outcome Summary/Follow up Plan Pt sent to ICU from floor due to 6 second pauses, pt has been having episodes of syncope for a while due to this, was sent to cath lab shortly after arrival to ICU, temp pacer placed through L IJ, pacer rate 80, MA 10, no issues from pacer, SBP 110s-150s, pt has been laying flat, possible permanent pacer on Monday     Goal: Adult Individualization and Mutuality  Outcome: Ongoing (interventions implemented as appropriate)    Goal: Discharge Needs Assessment  Outcome: Ongoing (interventions implemented as appropriate)      Problem: Arrhythmia/Dysrhythmia (Symptomatic) (Adult)  Goal: Signs and Symptoms of Listed Potential Problems Will be Absent or Manageable (Arrhythmia/Dysrhythmia)  Outcome: Ongoing (interventions implemented as appropriate)      Problem: Cardiac Rhythm Management Device (Adult)  Goal: Signs and Symptoms of Listed Potential Problems Will be Absent or Manageable (Cardiac Rhythm Management Device)  Outcome: Ongoing (interventions implemented as appropriate)      Problem: Fall Risk (Adult)  Goal: Identify Related Risk Factors and Signs and Symptoms  Outcome: Outcome(s) achieved Date Met: 01/06/18    Goal: Absence of Falls  Outcome: Ongoing (interventions implemented as appropriate)

## 2018-01-06 NOTE — PLAN OF CARE
Problem: Patient Care Overview (Adult)  Goal: Plan of Care Review  Outcome: Ongoing (interventions implemented as appropriate)   01/06/18 1548   Coping/Psychosocial Response Interventions   Plan Of Care Reviewed With patient;spouse   Patient Care Overview   Progress no change   Outcome Evaluation   Outcome Summary/Follow up Plan pt remains 100% V-paced by venous pacer placed last night; rate of 80 MA of 10 and sensitivity of 0.2; plans are to still have permanent pacer on Monday; limitied movement due to irritation of the heart causing dysrhytmias; voiding well, adequate intake; no c/o pain no syncope or pauses noted     Goal: Adult Individualization and Mutuality  Outcome: Ongoing (interventions implemented as appropriate)    Goal: Discharge Needs Assessment  Outcome: Ongoing (interventions implemented as appropriate)      Problem: Arrhythmia/Dysrhythmia (Symptomatic) (Adult)  Goal: Signs and Symptoms of Listed Potential Problems Will be Absent or Manageable (Arrhythmia/Dysrhythmia)  Outcome: Ongoing (interventions implemented as appropriate)      Problem: Cardiac Rhythm Management Device (Adult)  Goal: Signs and Symptoms of Listed Potential Problems Will be Absent or Manageable (Cardiac Rhythm Management Device)  Outcome: Ongoing (interventions implemented as appropriate)      Problem: Fall Risk (Adult)  Goal: Absence of Falls  Outcome: Ongoing (interventions implemented as appropriate)      Problem: Skin Integrity Impairment, Risk/Actual (Adult)  Goal: Skin Integrity/Wound Healing  Outcome: Ongoing (interventions implemented as appropriate)

## 2018-01-06 NOTE — PROGRESS NOTES
"Chief Complaint: Presyncope/syncope    S: No acute events after temporary pacer last night.  No further presyncope or syncope.    Medications: Reviewed    Review of Systems: All pertinent negative and positives as noted above.  Otherwise, all systems reviewed and found to be negative.    Telemetry: Pacing spikes noted.  Occasional PVCs    O:  /98  Pulse 80  Temp 97.5 °F (36.4 °C) (Oral)   Resp 24  Ht 175.3 cm (69.02\")  Wt 88.9 kg (196 lb)  SpO2 94%  BMI 28.93 kg/m2    Temp:  [97.5 °F (36.4 °C)-97.7 °F (36.5 °C)] 97.5 °F (36.4 °C)  Heart Rate:  [48-85] 80  Resp:  [24] 24  BP: (111-164)/(80-99) 128/98    Gen: NAD, lying flat in bed  Neck: L IJ temp wire access c/d/i  CV: RRR  Pulm: CTAB  GI: s, nt, nd, +bs  Ext: no edema    Diagnostic Data:    Lab Results   Component Value Date    WBC 7.56 01/06/2018    HGB 15.2 01/06/2018    HCT 43.5 01/06/2018    MCV 85.3 01/06/2018     01/06/2018     Lab Results   Component Value Date    GLUCOSE 102 (H) 01/06/2018    CALCIUM 9.7 01/06/2018     01/06/2018    K 4.1 01/06/2018    CO2 24.0 01/06/2018     01/06/2018    BUN 26 (H) 01/06/2018    CREATININE 1.66 (H) 01/06/2018    EGFRIFNONA 40 (L) 01/06/2018    BCR 15.7 01/06/2018    ANIONGAP 15.0 (H) 01/06/2018     TSH 1.51  Mg 1.6    ASSESSMENT/PLAN:     1.  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  Syncope and presyncope related to problem #1  3.  Coronary artery disease, native artery, native heart, no angina  4.  Essential hypertension  5.  Mixed hyperlipidemia  6.  Nonrheumatic aortic valve stenosis     - Leave temp wire in place at this time. Still with pacing spike when pacer turned down to 40 bpm, indicating still with advanced AV block and need for pacing.  - Try to elevated head of bed slightly today and monitor rhythm.  - Otherwise, continue current medications.  - Plan for permanent pacemaker placement on Monday per Dr. Toledo.            "

## 2018-01-06 NOTE — H&P
Chief Complaint: Syncope    HPI: This is an 82-year-old white male with a history of coronary artery disease, status post previous coronary artery bypass grafting in 2001 and PCI in 2014, hypertension, hyperlipidemia, recently diagnosed symptomatic Mobitz 2 AV block with associated symptoms of presyncope and syncope who now presents on direct admit from home with recurrent presyncope/syncope.  The patient has recently been followed closely by Dr. Toledo, and the patient underwent a nuclear stress test today which showed no evidence of ischemia.  The patient then went home and called back saying that he was continuing to have presyncope/syncope therefore was recommended to be directly admitted.  Upon arrival, the patient was noted to have pauses in excess of 6 seconds with associated presyncopal symptoms.  Therefore, the patient was transferred to the ICU for further care.    Past Medical History:   Diagnosis Date   • Acute viral hepatitis A    • Carotid artery stenosis    • CKD (chronic kidney disease)    • Coagulopathy     Plavix   • Coagulopathy    • Coronary arteriosclerosis     stent ,? 4/2104, on plavix now.   • Diarrhea    • Dysphagia     Unspecified   • Family history of colon cancer    • Hematochezia     differential diagnosis discussed   • Hemorrhoids     Unspecified   • History of colonic polyps    • Hypercholesterolemia    • Hyperlipidemia    • Hypertension    • Left ventricular hypertrophy    • Melanoma of back    • Obesity    • Osteoarthritis    • Peripheral neuropathy    • Polyp of colon    • Stroke    • TIA (transient ischemic attack)    • Tobacco non-user      Past Surgical History:   Procedure Laterality Date   • APPENDECTOMY     • CARDIAC CATHETERIZATION     • CAROTID STENT     • COLON SURGERY  1999    for Ischemic Colitis   • COLONOSCOPY  05/07/2008    tubular adenoma ascending colon polyp - 3 yr   • COLONOSCOPY  08/19/2003    colon polyp @ 24 cm-see path - 5 yr   • COLONOSCOPY  08/11/2000    small  interanl hemorrhoid - 3-5 yr   • COLONOSCOPY  06/22/1998    (8)small colon polyps removed-see path, internal anal skin tag - 2-3 yr   • CORONARY ARTERY BYPASS GRAFT     • CORONARY STENT PLACEMENT      x1 2014 dr crane    • OTHER SURGICAL HISTORY      Lithotripsy   • OTHER SURGICAL HISTORY  06/2011    recall 3 yr.    • VASECTOMY       Current Facility-Administered Medications on File Prior to Encounter   Medication Dose Route Frequency Provider Last Rate Last Dose   • [COMPLETED] regadenoson (LEXISCAN) injection 0.4 mg  0.4 mg Intravenous Once Kaushik Toledo MD   0.4 mg at 01/05/18 0948   • [COMPLETED] technetium sestamibi (CARDIOLITE) injection 1 dose  1 dose Intravenous Once in imaging Kaushik Toledo MD   1 dose at 01/05/18 0847   • [COMPLETED] technetium sestamibi (CARDIOLITE) injection 1 dose  1 dose Intravenous Once in imaging Kaushik Toledo MD   1 dose at 01/05/18 1015     Current Outpatient Prescriptions on File Prior to Encounter   Medication Sig Dispense Refill   • allopurinol (ZYLOPRIM) 100 MG tablet Take 100 mg by mouth Daily.     • aspirin 81 MG EC tablet Take 81 mg by mouth daily.     • atenolol-chlorthalidone (TENORETIC) 100-25 MG per tablet Take 1 tablet by mouth daily. 1/2 oral daily     • atorvastatin (LIPITOR) 40 MG tablet Take 80 mg by mouth Daily.     • clopidogrel (PLAVIX) 75 MG tablet Take 75 mg by mouth daily.     • Coenzyme Q10 (COQ-10) 100 MG capsule Take  by mouth daily.     • irbesartan (AVAPRO) 300 MG tablet Take 300 mg by mouth Every Night.     • nitroglycerin (NITROSTAT) 0.4 MG SL tablet Place 0.4 mg under the tongue as needed.     • Omega-3 Fatty Acids (OMEGA 3 PO) Take  by mouth.       Allergies   Allergen Reactions   • Sulfa Antibiotics      Sulfa Drugs     Social History   Substance Use Topics   • Smoking status: Former Smoker     Quit date: 1999   • Smokeless tobacco: Never Used   • Alcohol use No     Family History   Problem Relation Age of Onset   • Colon cancer Father       "Diagnosed at 72 YO   • Other Sister      Polyps/Colon   • Other Brother      Polyps/Colon   • Stroke Mother      Review of Systems   Constitution: Positive for malaise/fatigue. Negative for chills, fever, night sweats and weight loss.   HENT: Negative for congestion and hearing loss.    Eyes: Negative for blurred vision and pain.   Cardiovascular: Positive for near-syncope and syncope. Negative for chest pain, claudication, dyspnea on exertion, irregular heartbeat, leg swelling, orthopnea, palpitations and paroxysmal nocturnal dyspnea.   Respiratory: Negative for cough, hemoptysis, shortness of breath and wheezing.    Endocrine: Negative for cold intolerance, heat intolerance, polydipsia and polyuria.   Hematologic/Lymphatic: Negative for adenopathy and bleeding problem. Does not bruise/bleed easily.   Skin: Negative for color change, poor wound healing and rash.   Musculoskeletal: Negative for arthritis, back pain, joint pain, joint swelling, myalgias and neck pain.   Gastrointestinal: Negative for abdominal pain, change in bowel habit, constipation, diarrhea, heartburn, hematochezia, melena, nausea and vomiting.   Genitourinary: Negative for bladder incontinence, dysuria, frequency, hematuria and nocturia.   Neurological: Positive for dizziness and light-headedness. Negative for focal weakness, headaches, loss of balance, numbness and seizures.   Psychiatric/Behavioral: Negative for altered mental status, memory loss and substance abuse.   Allergic/Immunologic: Negative for hives and persistent infections.     Physical Exam:    /88 (BP Location: Right arm, Patient Position: Sitting)  Pulse 85  Temp 97.7 °F (36.5 °C) (Temporal Artery )   Resp 24  Ht 175.3 cm (69.02\")  Wt 88.9 kg (196 lb)  SpO2 98%  BMI 28.93 kg/m2    Physical Exam   Constitutional: He is oriented to person, place, and time. Vital signs are normal. He appears well-developed and well-nourished. He is cooperative.  Non-toxic appearance. " No distress.   HENT:   Head: Normocephalic and atraumatic.   Right Ear: External ear normal.   Left Ear: External ear normal.   Nose: Nose normal.   Mouth/Throat: Uvula is midline, oropharynx is clear and moist and mucous membranes are normal. Mucous membranes are not pale, not dry and not cyanotic. No oropharyngeal exudate.   Eyes: EOM and lids are normal. Pupils are equal, round, and reactive to light.   Neck: Normal range of motion. Neck supple. No hepatojugular reflux and no JVD present. Carotid bruit is not present. No tracheal deviation and no edema present. No thyroid mass and no thyromegaly present.   Cardiovascular: Normal rate, regular rhythm, S1 normal, S2 normal, normal heart sounds, intact distal pulses and normal pulses.   No extrasystoles are present. PMI is not displaced.  Exam reveals no gallop and no friction rub.    No murmur heard.  Pulses:       Radial pulses are 2+ on the right side, and 2+ on the left side.        Femoral pulses are 2+ on the right side, and 2+ on the left side.       Dorsalis pedis pulses are 2+ on the right side, and 2+ on the left side.        Posterior tibial pulses are 2+ on the right side, and 2+ on the left side.   Pulmonary/Chest: Effort normal and breath sounds normal. No accessory muscle usage. No respiratory distress. He has no wheezes. He has no rales. He exhibits no tenderness.   Abdominal: Soft. Normal appearance and bowel sounds are normal. He exhibits no distension, no abdominal bruit and no pulsatile midline mass. There is no hepatosplenomegaly. There is no tenderness.   Musculoskeletal: Normal range of motion. He exhibits no edema, tenderness or deformity.   Lymphadenopathy:     He has no cervical adenopathy.   Neurological: He is oriented to person, place, and time. He has normal strength. No cranial nerve deficit.   Skin: Skin is warm, dry and intact. No rash noted. He is not diaphoretic. No cyanosis or erythema. Nails show no clubbing.   Psychiatric: He  has a normal mood and affect. His speech is normal and behavior is normal. Thought content normal.   Vitals reviewed.    Diagnostic Data:    No labs at this time    ASSESSMENT/PLAN:    1. 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 Syncope related to problem #1  3.  Coronary artery disease, native artery, native heart, no angina  4.  Essential hypertension  5.  Mixed hyperlipidemia  6.  Nonrheumatic aortic valve stenosis    - At this time, we will go ahead and place a temporary transvenous pacemaker wire as the patient has continued to have intermittent high degree AV block with associated presyncopal symptoms since his arrival here.  - Following this, the patient will be placed in the critical care unit for further monitoring.  - Hold beta blocker therapy.  - Otherwise, continue home medications with the exception of beta blocker therapy.  - Plan to make nothing by mouth after midnight on Sunday night for permit pacemaker insertion by Dr. Toledo on Monday morning.

## 2018-01-07 LAB
ANION GAP SERPL CALCULATED.3IONS-SCNC: 16 MMOL/L (ref 4–13)
BUN BLD-MCNC: 30 MG/DL (ref 5–21)
BUN/CREAT SERPL: 16.1 (ref 7–25)
CALCIUM SPEC-SCNC: 9.5 MG/DL (ref 8.4–10.4)
CHLORIDE SERPL-SCNC: 102 MMOL/L (ref 98–110)
CO2 SERPL-SCNC: 24 MMOL/L (ref 24–31)
CREAT BLD-MCNC: 1.86 MG/DL (ref 0.5–1.4)
GFR SERPL CREATININE-BSD FRML MDRD: 35 ML/MIN/1.73
GLUCOSE BLD-MCNC: 107 MG/DL (ref 70–100)
POTASSIUM BLD-SCNC: 4.6 MMOL/L (ref 3.5–5.3)
SODIUM BLD-SCNC: 142 MMOL/L (ref 135–145)

## 2018-01-07 PROCEDURE — 99232 SBSQ HOSP IP/OBS MODERATE 35: CPT | Performed by: INTERNAL MEDICINE

## 2018-01-07 PROCEDURE — 80048 BASIC METABOLIC PNL TOTAL CA: CPT | Performed by: INTERNAL MEDICINE

## 2018-01-07 RX ORDER — SODIUM CHLORIDE 9 MG/ML
75 INJECTION, SOLUTION INTRAVENOUS CONTINUOUS
Status: DISCONTINUED | OUTPATIENT
Start: 2018-01-07 | End: 2018-01-09 | Stop reason: HOSPADM

## 2018-01-07 RX ADMIN — Medication 81 MG: at 09:28

## 2018-01-07 RX ADMIN — SODIUM CHLORIDE 75 ML/HR: 9 INJECTION, SOLUTION INTRAVENOUS at 21:29

## 2018-01-07 RX ADMIN — DESMOPRESSIN ACETATE 40 MG: 0.2 TABLET ORAL at 21:28

## 2018-01-07 RX ADMIN — ALLOPURINOL 100 MG: 100 TABLET ORAL at 09:28

## 2018-01-07 RX ADMIN — IRBESARTAN 300 MG: 150 TABLET ORAL at 09:28

## 2018-01-07 RX ADMIN — SODIUM CHLORIDE 75 ML/HR: 9 INJECTION, SOLUTION INTRAVENOUS at 09:28

## 2018-01-07 NOTE — PLAN OF CARE
Problem: Patient Care Overview (Adult)  Goal: Plan of Care Review  Outcome: Ongoing (interventions implemented as appropriate)   01/07/18 0504   Coping/Psychosocial Response Interventions   Plan Of Care Reviewed With patient   Patient Care Overview   Progress no change   Outcome Evaluation   Outcome Summary/Follow up Plan remains 100% paced. no complaints of pain or syncope. movement limited d/t irritation of heart leading to dysrythmias. appears to have rested well overnight. will continue to monitor.     Goal: Adult Individualization and Mutuality  Outcome: Ongoing (interventions implemented as appropriate)    Goal: Discharge Needs Assessment  Outcome: Ongoing (interventions implemented as appropriate)      Problem: Arrhythmia/Dysrhythmia (Symptomatic) (Adult)  Goal: Signs and Symptoms of Listed Potential Problems Will be Absent or Manageable (Arrhythmia/Dysrhythmia)  Outcome: Ongoing (interventions implemented as appropriate)      Problem: Cardiac Rhythm Management Device (Adult)  Goal: Signs and Symptoms of Listed Potential Problems Will be Absent or Manageable (Cardiac Rhythm Management Device)  Outcome: Ongoing (interventions implemented as appropriate)      Problem: Fall Risk (Adult)  Goal: Absence of Falls  Outcome: Ongoing (interventions implemented as appropriate)      Problem: Skin Integrity Impairment, Risk/Actual (Adult)  Goal: Identify Related Risk Factors and Signs and Symptoms  Outcome: Ongoing (interventions implemented as appropriate)    Goal: Skin Integrity/Wound Healing  Outcome: Ongoing (interventions implemented as appropriate)

## 2018-01-07 NOTE — PLAN OF CARE
Problem: Patient Care Overview (Adult)  Goal: Plan of Care Review  Outcome: Ongoing (interventions implemented as appropriate)    Goal: Adult Individualization and Mutuality  Outcome: Ongoing (interventions implemented as appropriate)    Goal: Discharge Needs Assessment  Outcome: Ongoing (interventions implemented as appropriate)      Problem: Arrhythmia/Dysrhythmia (Symptomatic) (Adult)  Goal: Signs and Symptoms of Listed Potential Problems Will be Absent or Manageable (Arrhythmia/Dysrhythmia)  Outcome: Ongoing (interventions implemented as appropriate)  100% paced. No arrythmias noted    Problem: Cardiac Rhythm Management Device (Adult)  Goal: Signs and Symptoms of Listed Potential Problems Will be Absent or Manageable (Cardiac Rhythm Management Device)  Outcome: Ongoing (interventions implemented as appropriate)  Remains 100% paced at 80 bpm. No arrythmias noted this shift. Sitting with head up in bed with no ectopy noted. bp remains wdl   01/07/18 1744   Cardiac Rhythm Management Device   Problems Assessed (Cardiac Rhythm Management Device) all       Problem: Fall Risk (Adult)  Goal: Absence of Falls  Outcome: Ongoing (interventions implemented as appropriate)      Problem: Skin Integrity Impairment, Risk/Actual (Adult)  Goal: Identify Related Risk Factors and Signs and Symptoms  Outcome: Ongoing (interventions implemented as appropriate)    Goal: Skin Integrity/Wound Healing  Outcome: Ongoing (interventions implemented as appropriate)

## 2018-01-07 NOTE — PROGRESS NOTES
"Chief Complaint: Presyncope/syncope     S:  no problems noted throughout the day yesterday are overnight.  No further presyncope or syncope.  The patient is still requiring his temporary pacemaker, however even with pacer rate turned down to 30 bpm, the patient is still having pacing spikes and evidence of advanced AV block.     Medications: Reviewed     Review of Systems: All pertinent negative and positives as noted above.  Otherwise, all systems reviewed and found to be negative.     Telemetry:  ventricular paced at 80 bpm with occasional PVCs.     O:  /84  Pulse 80  Temp 98.2 °F (36.8 °C) (Axillary)   Resp 20  Ht 175.3 cm (69.02\")  Wt 89.7 kg (197 lb 11.2 oz)  SpO2 94%  BMI 29.18 kg/m2    Temp:  [98.2 °F (36.8 °C)-98.7 °F (37.1 °C)] 98.2 °F (36.8 °C)  Heart Rate:  [80-84] 80  Resp:  [14-20] 20  BP: ()/(68-99) 143/84     Gen: NAD, lying flat in bed  Neck: L IJ temp wire access c/d/i  CV: RRR  Pulm: CTAB  GI: s, nt, nd, +bs  Ext: no edema     Diagnostic Data:     Lab Results   Component Value Date    GLUCOSE 107 (H) 01/07/2018    CALCIUM 9.5 01/07/2018     01/07/2018    K 4.6 01/07/2018    CO2 24.0 01/07/2018     01/07/2018    BUN 30 (H) 01/07/2018    CREATININE 1.86 (H) 01/07/2018    EGFRIFNONA 35 (L) 01/07/2018    BCR 16.1 01/07/2018    ANIONGAP 16.0 (H) 01/07/2018     ASSESSMENT/PLAN:      1.  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  Syncope and presyncope related to problem #1  3.  Coronary artery disease, native artery, native heart, no angina  4.  Essential hypertension  5.  Mixed hyperlipidemia  6.  Nonrheumatic aortic valve stenosis      - Continue ICU level care with temporary pacemaker in place.    - It should be noted that even when the pacer was turned down to 30 bpm, this patient still required pacing and had evidence of Mobitz 2 AV block.  Therefore, would suspect that this patient is definitely in need of permanent " pacemaker placement.  This will be planned tomorrow by Dr. Toledo.    - We will leave the patient nothing by mouth after midnight.    - Recheck morning labs including CBC and BMP.   -  I will administer maintenance IV fluids as the patient's creatinine has increased somewhat over night and the patient has not had very good by mouth intake.    - Otherwise, continue current medications.

## 2018-01-08 ENCOUNTER — APPOINTMENT (OUTPATIENT)
Dept: GENERAL RADIOLOGY | Facility: HOSPITAL | Age: 83
End: 2018-01-08

## 2018-01-08 LAB
ANION GAP SERPL CALCULATED.3IONS-SCNC: 14 MMOL/L (ref 4–13)
BUN BLD-MCNC: 31 MG/DL (ref 5–21)
BUN/CREAT SERPL: 16.1 (ref 7–25)
CALCIUM SPEC-SCNC: 9.4 MG/DL (ref 8.4–10.4)
CHLORIDE SERPL-SCNC: 105 MMOL/L (ref 98–110)
CO2 SERPL-SCNC: 24 MMOL/L (ref 24–31)
CREAT BLD-MCNC: 1.92 MG/DL (ref 0.5–1.4)
DEPRECATED RDW RBC AUTO: 43 FL (ref 40–54)
ERYTHROCYTE [DISTWIDTH] IN BLOOD BY AUTOMATED COUNT: 13.3 % (ref 12–15)
GFR SERPL CREATININE-BSD FRML MDRD: 34 ML/MIN/1.73
GLUCOSE BLD-MCNC: 103 MG/DL (ref 70–100)
HCT VFR BLD AUTO: 45.1 % (ref 40–52)
HGB BLD-MCNC: 15.5 G/DL (ref 14–18)
MCH RBC QN AUTO: 30.2 PG (ref 28–32)
MCHC RBC AUTO-ENTMCNC: 34.4 G/DL (ref 33–36)
MCV RBC AUTO: 87.7 FL (ref 82–95)
PLATELET # BLD AUTO: 149 10*3/MM3 (ref 130–400)
PMV BLD AUTO: 11.6 FL (ref 6–12)
POTASSIUM BLD-SCNC: 4.6 MMOL/L (ref 3.5–5.3)
RBC # BLD AUTO: 5.14 10*6/MM3 (ref 4.8–5.9)
SODIUM BLD-SCNC: 143 MMOL/L (ref 135–145)
WBC NRBC COR # BLD: 9.41 10*3/MM3 (ref 4.8–10.8)

## 2018-01-08 PROCEDURE — C1785 PMKR, DUAL, RATE-RESP: HCPCS | Performed by: INTERNAL MEDICINE

## 2018-01-08 PROCEDURE — 0JH606Z INSERTION OF PACEMAKER, DUAL CHAMBER INTO CHEST SUBCUTANEOUS TISSUE AND FASCIA, OPEN APPROACH: ICD-10-PCS | Performed by: INTERNAL MEDICINE

## 2018-01-08 PROCEDURE — 02HK3JZ INSERTION OF PACEMAKER LEAD INTO RIGHT VENTRICLE, PERCUTANEOUS APPROACH: ICD-10-PCS | Performed by: INTERNAL MEDICINE

## 2018-01-08 PROCEDURE — 25010000002 FENTANYL CITRATE (PF) 100 MCG/2ML SOLUTION: Performed by: INTERNAL MEDICINE

## 2018-01-08 PROCEDURE — C1892 INTRO/SHEATH,FIXED,PEEL-AWAY: HCPCS | Performed by: INTERNAL MEDICINE

## 2018-01-08 PROCEDURE — S0260 H&P FOR SURGERY: HCPCS | Performed by: INTERNAL MEDICINE

## 2018-01-08 PROCEDURE — 36005 INJECTION EXT VENOGRAPHY: CPT | Performed by: INTERNAL MEDICINE

## 2018-01-08 PROCEDURE — 71045 X-RAY EXAM CHEST 1 VIEW: CPT

## 2018-01-08 PROCEDURE — C1898 LEAD, PMKR, OTHER THAN TRANS: HCPCS | Performed by: INTERNAL MEDICINE

## 2018-01-08 PROCEDURE — 33208 INSRT HEART PM ATRIAL & VENT: CPT | Performed by: INTERNAL MEDICINE

## 2018-01-08 PROCEDURE — 75820 VEIN X-RAY ARM/LEG: CPT | Performed by: INTERNAL MEDICINE

## 2018-01-08 PROCEDURE — 85027 COMPLETE CBC AUTOMATED: CPT | Performed by: INTERNAL MEDICINE

## 2018-01-08 PROCEDURE — 02H63JZ INSERTION OF PACEMAKER LEAD INTO RIGHT ATRIUM, PERCUTANEOUS APPROACH: ICD-10-PCS | Performed by: INTERNAL MEDICINE

## 2018-01-08 PROCEDURE — 25010000002 MIDAZOLAM PER 1 MG: Performed by: INTERNAL MEDICINE

## 2018-01-08 PROCEDURE — 25010000002 VANCOMYCIN 10 G RECONSTITUTED SOLUTION: Performed by: INTERNAL MEDICINE

## 2018-01-08 PROCEDURE — 85610 PROTHROMBIN TIME: CPT | Performed by: INTERNAL MEDICINE

## 2018-01-08 PROCEDURE — 0 IOPAMIDOL PER 1 ML: Performed by: INTERNAL MEDICINE

## 2018-01-08 PROCEDURE — 80048 BASIC METABOLIC PNL TOTAL CA: CPT | Performed by: INTERNAL MEDICINE

## 2018-01-08 DEVICE — IMPLANTABLE DEVICE: Type: IMPLANTABLE DEVICE | Status: FUNCTIONAL

## 2018-01-08 DEVICE — PACEMAKER
Type: IMPLANTABLE DEVICE | Status: FUNCTIONAL
Brand: ESSENTIO™ MRI EL DR

## 2018-01-08 RX ORDER — MIDAZOLAM HYDROCHLORIDE 1 MG/ML
INJECTION INTRAMUSCULAR; INTRAVENOUS AS NEEDED
Status: DISCONTINUED | OUTPATIENT
Start: 2018-01-08 | End: 2018-01-08 | Stop reason: HOSPADM

## 2018-01-08 RX ORDER — LIDOCAINE HYDROCHLORIDE 20 MG/ML
INJECTION, SOLUTION INFILTRATION; PERINEURAL AS NEEDED
Status: DISCONTINUED | OUTPATIENT
Start: 2018-01-08 | End: 2018-01-08 | Stop reason: HOSPADM

## 2018-01-08 RX ORDER — FENTANYL CITRATE 50 UG/ML
INJECTION, SOLUTION INTRAMUSCULAR; INTRAVENOUS AS NEEDED
Status: DISCONTINUED | OUTPATIENT
Start: 2018-01-08 | End: 2018-01-08 | Stop reason: HOSPADM

## 2018-01-08 RX ORDER — SODIUM CHLORIDE 0.9 % (FLUSH) 0.9 %
1-10 SYRINGE (ML) INJECTION AS NEEDED
Status: DISCONTINUED | OUTPATIENT
Start: 2018-01-08 | End: 2018-01-09 | Stop reason: HOSPADM

## 2018-01-08 RX ORDER — NEOMYCIN AND POLYMYXIN B SULFATES 40; 200000 MG/ML; [USP'U]/ML
SOLUTION IRRIGATION AS NEEDED
Status: DISCONTINUED | OUTPATIENT
Start: 2018-01-08 | End: 2018-01-08 | Stop reason: HOSPADM

## 2018-01-08 RX ADMIN — DESMOPRESSIN ACETATE 40 MG: 0.2 TABLET ORAL at 20:42

## 2018-01-08 RX ADMIN — SODIUM CHLORIDE 75 ML/HR: 9 INJECTION, SOLUTION INTRAVENOUS at 08:51

## 2018-01-08 RX ADMIN — SODIUM CHLORIDE 75 ML/HR: 9 INJECTION, SOLUTION INTRAVENOUS at 20:42

## 2018-01-08 RX ADMIN — VANCOMYCIN HYDROCHLORIDE 1500 MG: 100 INJECTION, POWDER, LYOPHILIZED, FOR SOLUTION INTRAVENOUS at 08:49

## 2018-01-08 NOTE — INTERVAL H&P NOTE
H&P updated. The patient was examined and the following changes are noted:  Patient remains pacer-dependent; when V rate on temporary wire turned down to 40 bpm, sinus rate greater than that is observed without any AV conduction (i.e. still 100% V-pacing)     Will proceed with permanent pacemaker. All risk/benefits/alternatives discussed with patient and family.

## 2018-01-08 NOTE — PAYOR COMM NOTE
"FROM: TORRES RAND  PHONE: [phone]  FAX: [phone]    [id number]    Koffi Dickey (82 y.o. Male)     Date of Birth Social Security Number Address Home Phone MRN    1935  [address]   [facility] [id number] [phone] [id number]    Yarsani Marital Status          Mandaeism        Admission Date Admission Type Admitting Provider Attending Provider Department, Room/Bed    18 Urgent Kaushik Toledo MD Rains, Martin G, MD [facility] INTENSIVE CARE, I004/    Discharge Date Discharge Disposition Discharge Destination                      Attending Provider: Kaushik Toledo MD     Allergies:  Sulfa Antibiotics    Isolation:  None   Infection:  None   Code Status:  FULL    Ht:  175.3 cm (69.02\")   Wt:  89.7 kg (197 lb 11.2 oz)    Admission Cmt:  None   Principal Problem:  None                Active Insurance as of 2018     Primary Coverage     Payor Plan Insurance Group Employer/Plan Group    MEDICARE MEDICARE A & B      Payor Plan Address Payor Plan Phone Number Effective From Effective To    [address] [phone] 10/1/2002     [place] [address]       Subscriber Name Subscriber Birth Date Member ID       KOFFI DICKEY 1935 [id number]           Secondary Coverage     Payor Plan Insurance Group Employer/Plan Group    [employer] COMMERCIAL AETNA  [facility] [id number]     Payor Plan Address Payor Plan Phone Number Effective From Effective To    [address] [phone] 2013     [place] [address]       Subscriber Name Subscriber Birth Date Member ID       KOFFI DICKEY 1935 [id number]                 Emergency Contacts      (Rel.) Home Phone Work Phone Mobile Phone    Sonya Dickey (Spouse) [phone] -- --               History & Physical      Luis E Courtney MD at 2018  7:16 PM          Chief Complaint: Syncope    HPI: This is an 82-year-old white male with a history of coronary artery disease, status post " previous coronary artery bypass grafting in 2001 and PCI in 2014, hypertension, hyperlipidemia, recently diagnosed symptomatic Mobitz 2 AV block with associated symptoms of presyncope and syncope who now presents on direct admit from home with recurrent presyncope/syncope.  The patient has recently been followed closely by Dr. Toledo, and the patient underwent a nuclear stress test today which showed no evidence of ischemia.  The patient then went home and called back saying that he was continuing to have presyncope/syncope therefore was recommended to be directly admitted.  Upon arrival, the patient was noted to have pauses in excess of 6 seconds with associated presyncopal symptoms.  Therefore, the patient was transferred to the ICU for further care.    Past Medical History:   Diagnosis Date   • Acute viral hepatitis A    • Carotid artery stenosis    • CKD (chronic kidney disease)    • Coagulopathy     Plavix   • Coagulopathy    • Coronary arteriosclerosis     stent ,? 4/2104, on plavix now.   • Diarrhea    • Dysphagia     Unspecified   • Family history of colon cancer    • Hematochezia     differential diagnosis discussed   • Hemorrhoids     Unspecified   • History of colonic polyps    • Hypercholesterolemia    • Hyperlipidemia    • Hypertension    • Left ventricular hypertrophy    • Melanoma of back    • Obesity    • Osteoarthritis    • Peripheral neuropathy    • Polyp of colon    • Stroke    • TIA (transient ischemic attack)    • Tobacco non-user      Past Surgical History:   Procedure Laterality Date   • APPENDECTOMY     • CARDIAC CATHETERIZATION     • CAROTID STENT     • COLON SURGERY  1999    for Ischemic Colitis   • COLONOSCOPY  05/07/2008    tubular adenoma ascending colon polyp - 3 yr   • COLONOSCOPY  08/19/2003    colon polyp @ 24 cm-see path - 5 yr   • COLONOSCOPY  08/11/2000    small interanl hemorrhoid - 3-5 yr   • COLONOSCOPY  06/22/1998    (8)small colon polyps removed-see path, internal anal skin  tag - 2-3 yr   • CORONARY ARTERY BYPASS GRAFT     • CORONARY STENT PLACEMENT      x1 2014 dr crane    • OTHER SURGICAL HISTORY      Lithotripsy   • OTHER SURGICAL HISTORY  06/2011    recall 3 yr.    • VASECTOMY       Current Facility-Administered Medications on File Prior to Encounter   Medication Dose Route Frequency Provider Last Rate Last Dose   • [COMPLETED] regadenoson (LEXISCAN) injection 0.4 mg  0.4 mg Intravenous Once Kaushik Toledo MD   0.4 mg at 01/05/18 0948   • [COMPLETED] technetium sestamibi (CARDIOLITE) injection 1 dose  1 dose Intravenous Once in imaging Kaushik Toledo MD   1 dose at 01/05/18 0847   • [COMPLETED] technetium sestamibi (CARDIOLITE) injection 1 dose  1 dose Intravenous Once in imaging Kaushik Toledo MD   1 dose at 01/05/18 1015     Current Outpatient Prescriptions on File Prior to Encounter   Medication Sig Dispense Refill   • allopurinol (ZYLOPRIM) 100 MG tablet Take 100 mg by mouth Daily.     • aspirin 81 MG EC tablet Take 81 mg by mouth daily.     • atenolol-chlorthalidone (TENORETIC) 100-25 MG per tablet Take 1 tablet by mouth daily. 1/2 oral daily     • atorvastatin (LIPITOR) 40 MG tablet Take 80 mg by mouth Daily.     • clopidogrel (PLAVIX) 75 MG tablet Take 75 mg by mouth daily.     • Coenzyme Q10 (COQ-10) 100 MG capsule Take  by mouth daily.     • irbesartan (AVAPRO) 300 MG tablet Take 300 mg by mouth Every Night.     • nitroglycerin (NITROSTAT) 0.4 MG SL tablet Place 0.4 mg under the tongue as needed.     • Omega-3 Fatty Acids (OMEGA 3 PO) Take  by mouth.       Allergies   Allergen Reactions   • Sulfa Antibiotics      Sulfa Drugs     Social History   Substance Use Topics   • Smoking status: Former Smoker     Quit date: 1999   • Smokeless tobacco: Never Used   • Alcohol use No     Family History   Problem Relation Age of Onset   • Colon cancer Father      Diagnosed at 72 YO   • Other Sister      Polyps/Colon   • Other Brother      Polyps/Colon   • Stroke Mother      Review of  "Systems   Constitution: Positive for malaise/fatigue. Negative for chills, fever, night sweats and weight loss.   HENT: Negative for congestion and hearing loss.    Eyes: Negative for blurred vision and pain.   Cardiovascular: Positive for near-syncope and syncope. Negative for chest pain, claudication, dyspnea on exertion, irregular heartbeat, leg swelling, orthopnea, palpitations and paroxysmal nocturnal dyspnea.   Respiratory: Negative for cough, hemoptysis, shortness of breath and wheezing.    Endocrine: Negative for cold intolerance, heat intolerance, polydipsia and polyuria.   Hematologic/Lymphatic: Negative for adenopathy and bleeding problem. Does not bruise/bleed easily.   Skin: Negative for color change, poor wound healing and rash.   Musculoskeletal: Negative for arthritis, back pain, joint pain, joint swelling, myalgias and neck pain.   Gastrointestinal: Negative for abdominal pain, change in bowel habit, constipation, diarrhea, heartburn, hematochezia, melena, nausea and vomiting.   Genitourinary: Negative for bladder incontinence, dysuria, frequency, hematuria and nocturia.   Neurological: Positive for dizziness and light-headedness. Negative for focal weakness, headaches, loss of balance, numbness and seizures.   Psychiatric/Behavioral: Negative for altered mental status, memory loss and substance abuse.   Allergic/Immunologic: Negative for hives and persistent infections.     Physical Exam:    /88 (BP Location: Right arm, Patient Position: Sitting)  Pulse 85  Temp 97.7 °F (36.5 °C) (Temporal Artery )   Resp 24  Ht 175.3 cm (69.02\")  Wt 88.9 kg (196 lb)  SpO2 98%  BMI 28.93 kg/m2    Physical Exam   Constitutional: He is oriented to person, place, and time. Vital signs are normal. He appears well-developed and well-nourished. He is cooperative.  Non-toxic appearance. No distress.   HENT:   Head: Normocephalic and atraumatic.   Right Ear: External ear normal.   Left Ear: External ear " normal.   Nose: Nose normal.   Mouth/Throat: Uvula is midline, oropharynx is clear and moist and mucous membranes are normal. Mucous membranes are not pale, not dry and not cyanotic. No oropharyngeal exudate.   Eyes: EOM and lids are normal. Pupils are equal, round, and reactive to light.   Neck: Normal range of motion. Neck supple. No hepatojugular reflux and no JVD present. Carotid bruit is not present. No tracheal deviation and no edema present. No thyroid mass and no thyromegaly present.   Cardiovascular: Normal rate, regular rhythm, S1 normal, S2 normal, normal heart sounds, intact distal pulses and normal pulses.   No extrasystoles are present. PMI is not displaced.  Exam reveals no gallop and no friction rub.    No murmur heard.  Pulses:       Radial pulses are 2+ on the right side, and 2+ on the left side.        Femoral pulses are 2+ on the right side, and 2+ on the left side.       Dorsalis pedis pulses are 2+ on the right side, and 2+ on the left side.        Posterior tibial pulses are 2+ on the right side, and 2+ on the left side.   Pulmonary/Chest: Effort normal and breath sounds normal. No accessory muscle usage. No respiratory distress. He has no wheezes. He has no rales. He exhibits no tenderness.   Abdominal: Soft. Normal appearance and bowel sounds are normal. He exhibits no distension, no abdominal bruit and no pulsatile midline mass. There is no hepatosplenomegaly. There is no tenderness.   Musculoskeletal: Normal range of motion. He exhibits no edema, tenderness or deformity.   Lymphadenopathy:     He has no cervical adenopathy.   Neurological: He is oriented to person, place, and time. He has normal strength. No cranial nerve deficit.   Skin: Skin is warm, dry and intact. No rash noted. He is not diaphoretic. No cyanosis or erythema. Nails show no clubbing.   Psychiatric: He has a normal mood and affect. His speech is normal and behavior is normal. Thought content normal.   Vitals  "reviewed.    Diagnostic Data:    No labs at this time    ASSESSMENT/PLAN:    1. 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 Syncope related to problem #1  3.  Coronary artery disease, native artery, native heart, no angina  4.  Essential hypertension  5.  Mixed hyperlipidemia  6.  Nonrheumatic aortic valve stenosis    - At this time, we will go ahead and place a temporary transvenous pacemaker wire as the patient has continued to have intermittent high degree AV block with associated presyncopal symptoms since his arrival here.  - Following this, the patient will be placed in the critical care unit for further monitoring.  - Hold beta blocker therapy.  - Otherwise, continue home medications with the exception of beta blocker therapy.  - Plan to make nothing by mouth after midnight on Sunday night for permit pacemaker insertion by Dr. Toledo on Monday morning.             Electronically signed by Luis E Courtney MD at 1/5/2018  7:22 PM      Luis E Courtney MD at 1/7/2018  8:45 AM          Chief Complaint: Presyncope/syncope     S:   no problems noted throughout the day yesterday are overnight.  No further presyncope or syncope.  The patient is still requiring his temporary pacemaker, however even with pacer rate turned down to 30 bpm, the patient is still having pacing spikes and evidence of advanced AV block.     Medications: Reviewed     Review of Systems: All pertinent negative and positives as noted above.  Otherwise, all systems reviewed and found to be negative.     Telemetry:   ventricular paced at 80 bpm with occasional PVCs.     O:  /84  Pulse 80  Temp 98.2 °F (36.8 °C) (Axillary)   Resp 20  Ht 175.3 cm (69.02\")  Wt 89.7 kg (197 lb 11.2 oz)  SpO2 94%  BMI 29.18 kg/m2    Temp:  [98.2 °F (36.8 °C)-98.7 °F (37.1 °C)] 98.2 °F (36.8 °C)  Heart Rate:  [80-84] 80  Resp:  [14-20] 20  BP: ()/(68-99) 143/84     Gen: NAD, lying flat in bed  Neck: L IJ temp wire access " c/d/i  CV: RRR  Pulm: CTAB  GI: s, nt, nd, +bs  Ext: no edema     Diagnostic Data:     Lab Results   Component Value Date    GLUCOSE 107 (H) 01/07/2018    CALCIUM 9.5 01/07/2018     01/07/2018    K 4.6 01/07/2018    CO2 24.0 01/07/2018     01/07/2018    BUN 30 (H) 01/07/2018    CREATININE 1.86 (H) 01/07/2018    EGFRIFNONA 35 (L) 01/07/2018    BCR 16.1 01/07/2018    ANIONGAP 16.0 (H) 01/07/2018     ASSESSMENT/PLAN:      1.  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  Syncope and presyncope related to problem #1  3.  Coronary artery disease, native artery, native heart, no angina  4.  Essential hypertension  5.  Mixed hyperlipidemia  6.  Nonrheumatic aortic valve stenosis      - Continue ICU level care with temporary pacemaker in place.    - It should be noted that even when the pacer was turned down to 30 bpm, this patient still required pacing and had evidence of Mobitz 2 AV block.  Therefore, would suspect that this patient is definitely in need of permanent pacemaker placement.  This will be planned tomorrow by Dr. Toledo.    - We will leave the patient nothing by mouth after midnight.    - Recheck morning labs including CBC and BMP.   -  I will administer maintenance IV fluids as the patient's creatinine has increased somewhat over night and the patient has not had very good by mouth intake.    - Otherwise, continue current medications.       Electronically signed by Luis E Courtney MD at 1/7/2018  8:48 AM      Kaushik Toledo MD at 1/8/2018  8:53 AM            H&P updated. The patient was examined and the following changes are noted:  Patient remains pacer-dependent; when V rate on temporary wire turned down to 40 bpm, sinus rate greater than that is observed without any AV conduction (i.e. still 100% V-pacing)     Will proceed with permanent pacemaker. All risk/benefits/alternatives discussed with patient and family.         Electronically signed by  "Kaushik Toledo MD at 1/8/2018  8:54 AM    Source Note             Chief Complaint: Presyncope/syncope     S:   no problems noted throughout the day yesterday are overnight.  No further presyncope or syncope.  The patient is still requiring his temporary pacemaker, however even with pacer rate turned down to 30 bpm, the patient is still having pacing spikes and evidence of advanced AV block.     Medications: Reviewed     Review of Systems: All pertinent negative and positives as noted above.  Otherwise, all systems reviewed and found to be negative.     Telemetry:   ventricular paced at 80 bpm with occasional PVCs.     O:  /84  Pulse 80  Temp 98.2 °F (36.8 °C) (Axillary)   Resp 20  Ht 175.3 cm (69.02\")  Wt 89.7 kg (197 lb 11.2 oz)  SpO2 94%  BMI 29.18 kg/m2    Temp:  [98.2 °F (36.8 °C)-98.7 °F (37.1 °C)] 98.2 °F (36.8 °C)  Heart Rate:  [80-84] 80  Resp:  [14-20] 20  BP: ()/(68-99) 143/84     Gen: NAD, lying flat in bed  Neck: L IJ temp wire access c/d/i  CV: RRR  Pulm: CTAB  GI: s, nt, nd, +bs  Ext: no edema     Diagnostic Data:     Lab Results   Component Value Date    GLUCOSE 107 (H) 01/07/2018    CALCIUM 9.5 01/07/2018     01/07/2018    K 4.6 01/07/2018    CO2 24.0 01/07/2018     01/07/2018    BUN 30 (H) 01/07/2018    CREATININE 1.86 (H) 01/07/2018    EGFRIFNONA 35 (L) 01/07/2018    BCR 16.1 01/07/2018    ANIONGAP 16.0 (H) 01/07/2018     ASSESSMENT/PLAN:      1.  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  Syncope and presyncope related to problem #1  3.  Coronary artery disease, native artery, native heart, no angina  4.  Essential hypertension  5.  Mixed hyperlipidemia  6.  Nonrheumatic aortic valve stenosis      - Continue ICU level care with temporary pacemaker in place.    - It should be noted that even when the pacer was turned down to 30 bpm, this patient still required pacing and had evidence of Mobitz 2 AV block.  Therefore, would " suspect that this patient is definitely in need of permanent pacemaker placement.  This will be planned tomorrow by Dr. Toledo.    - We will leave the patient nothing by mouth after midnight.    - Recheck morning labs including CBC and BMP.   -  I will administer maintenance IV fluids as the patient's creatinine has increased somewhat over night and the patient has not had very good by mouth intake.    - Otherwise, continue current medications.        Electronically signed by Luis E Courtney MD at 1/7/2018  8:48 AM              Vital Signs (last 72 hrs)       01/05 0700  -  01/06 0659 01/06 0700  -  01/07 0659 01/07 0700  -  01/08 0659 01/08 0700  -  01/08 0918   Most Recent    Temp (°F) 97.5 -  97.7    98.2 -  98.7    97.7 -  98.3       98.1 (36.7)    Heart Rate (!)48 -  85    80 -  84    80 -  84      80     80    Resp   24    14 -  20    17 -  19       17    /80 -  164/87    92/68 -  171/80    103/76 -  156/94      129/85     129/85    SpO2 (%) 93 -  98    92 -  99    91 -  96      94     94          Hospital Medications (all)       Dose Frequency Start End    acetaminophen (TYLENOL) tablet 650 mg 650 mg Every 4 Hours PRN 1/5/2018     Sig - Route: Take 2 tablets by mouth Every 4 (Four) Hours As Needed for Mild Pain . - Oral    allopurinol (ZYLOPRIM) tablet 100 mg 100 mg Daily 1/5/2018     Sig - Route: Take 1 tablet by mouth Daily. - Oral    aspirin EC tablet 81 mg 81 mg Daily 1/5/2018     Sig - Route: Take 1 tablet by mouth Daily. - Oral    atorvastatin (LIPITOR) tablet 40 mg 40 mg Nightly 1/5/2018     Sig - Route: Take 1 tablet by mouth Every Night. - Oral    irbesartan (AVAPRO) tablet 300 mg 300 mg Daily 1/5/2018     Sig - Route: Take 2 tablets by mouth Daily. - Oral    nitroglycerin (NITROSTAT) SL tablet 0.4 mg 0.4 mg Every 5 Minutes PRN 1/5/2018     Sig - Route: Place 1 tablet under the tongue Every 5 (Five) Minutes As Needed for Chest Pain. - Sublingual    sodium chloride 0.9 % flush 1-10 mL 1-10  mL As Needed 1/5/2018     Sig - Route: Infuse 1-10 mL into a venous catheter As Needed for Line Care. - Intravenous    sodium chloride 0.9 % infusion 75 mL/hr Continuous 1/7/2018     Sig - Route: Infuse 75 mL/hr into a venous catheter Continuous. - Intravenous    vancomycin 1500 mg/500 mL 0.9% NS IVPB ([facility]) 1,500 mg 60 Minutes Pre-Op 1/8/2018 1/8/2018    Sig - Route: Infuse 500 mL into a venous catheter 60 Minutes Prior to Surgery. - Intravenous    clopidogrel (PLAVIX) tablet 75 mg (Discontinued) 75 mg Daily 1/5/2018 1/6/2018    Sig - Route: Take 1 tablet by mouth Daily. - Oral    enoxaparin (LOVENOX) syringe 40 mg (Discontinued) 40 mg Every 24 Hours 1/5/2018 1/5/2018    Sig - Route: Inject 0.4 mL under the skin Daily. - Subcutaneous    Reason for Discontinue: Duplicate order    enoxaparin (LOVENOX) syringe 40 mg (Discontinued) 40 mg Every 24 Hours 1/5/2018 1/5/2018    Sig - Route: Inject 0.4 mL under the skin Daily. - Subcutaneous    Reason for Discontinue: Reorder    enoxaparin (LOVENOX) syringe 40 mg (Discontinued) 40 mg Nightly 1/5/2018 1/7/2018    Sig - Route: Inject 0.4 mL under the skin Every Night. - Subcutaneous    lidocaine (XYLOCAINE) 2% injection (Discontinued)  As Needed 1/5/2018 1/5/2018    Sig: As Needed.    Reason for Discontinue: Patient Discharge    sodium chloride 0.9 % flush 1-10 mL (Discontinued) 1-10 mL As Needed 1/5/2018 1/5/2018    Sig - Route: Infuse 1-10 mL into a venous catheter As Needed for Line Care. - Intravenous          Lab Results (last 72 hours)     Procedure Component Value Units Date/Time    CBC (No Diff) [[id number]]  (Normal) Collected:  01/06/18 0500    Specimen:  Blood Updated:  01/06/18 0545     WBC 7.56 10*3/mm3      RBC 5.10 10*6/mm3      Hemoglobin 15.2 g/dL      Hematocrit 43.5 %      MCV 85.3 fL      MCH 29.8 pg      MCHC 34.9 g/dL      RDW 13.1 %      RDW-SD 40.4 fl      MPV 10.4 fL      Platelets 173 10*3/mm3     Protime-INR [[id number]]  (Normal) Collected:  01/06/18  0501    Specimen:  Blood Updated:  01/06/18 0551     Protime 14.2 Seconds      INR 1.07    Comprehensive Metabolic Panel [[id number]]  (Abnormal) Collected:  01/06/18 0501    Specimen:  Blood Updated:  01/06/18 0601     Glucose 102 (H) mg/dL      BUN 26 (H) mg/dL      Creatinine 1.66 (H) mg/dL      Sodium 142 mmol/L      Potassium 4.1 mmol/L      Chloride 103 mmol/L      CO2 24.0 mmol/L      Calcium 9.7 mg/dL      Total Protein 6.9 g/dL      Albumin 4.10 g/dL      ALT (SGPT) 36 U/L      AST (SGOT) 35 U/L      Alkaline Phosphatase 66 U/L      Total Bilirubin 1.2 (H) mg/dL      eGFR Non African Amer 40 (L) mL/min/1.73      Globulin 2.8 gm/dL      A/G Ratio 1.5 g/dL      BUN/Creatinine Ratio 15.7     Anion Gap 15.0 (H) mmol/L     Narrative:       The MDRD GFR formula is only valid for adults with stable renal function between ages 18 and 70.    Magnesium [[id number]]  (Normal) Collected:  01/06/18 0501    Specimen:  Blood Updated:  01/06/18 0601     Magnesium 1.6 mg/dL     TSH [[id number]]  (Normal) Collected:  01/06/18 0501    Specimen:  Blood Updated:  01/06/18 0629     TSH 1.510 mIU/mL     Basic Metabolic Panel [[id number]]  (Abnormal) Collected:  01/07/18 0337    Specimen:  Blood Updated:  01/07/18 0438     Glucose 107 (H) mg/dL      BUN 30 (H) mg/dL      Creatinine 1.86 (H) mg/dL      Sodium 142 mmol/L      Potassium 4.6 mmol/L      Chloride 102 mmol/L      CO2 24.0 mmol/L      Calcium 9.5 mg/dL      eGFR Non African Amer 35 (L) mL/min/1.73      BUN/Creatinine Ratio 16.1     Anion Gap 16.0 (H) mmol/L     Narrative:       The MDRD GFR formula is only valid for adults with stable renal function between ages 18 and 70.    Basic Metabolic Panel [[id number]]  (Abnormal) Collected:  01/08/18 0233    Specimen:  Blood Updated:  01/08/18 0342     Glucose 103 (H) mg/dL      BUN 31 (H) mg/dL      Creatinine 1.92 (H) mg/dL      Sodium 143 mmol/L      Potassium 4.6 mmol/L      Chloride 105 mmol/L      CO2 24.0 mmol/L      Calcium  9.4 mg/dL      eGFR Non African Amer 34 (L) mL/min/1.73      BUN/Creatinine Ratio 16.1     Anion Gap 14.0 (H) mmol/L     Narrative:       The MDRD GFR formula is only valid for adults with stable renal function between ages 18 and 70.    CBC (No Diff) [[id number]]  (Normal) Collected:  01/08/18 0233    Specimen:  Blood Updated:  01/08/18 0359     WBC 9.41 10*3/mm3      RBC 5.14 10*6/mm3      Hemoglobin 15.5 g/dL      Hematocrit 45.1 %      MCV 87.7 fL      MCH 30.2 pg      MCHC 34.4 g/dL      RDW 13.3 %      RDW-SD 43.0 fl      MPV 11.6 fL      Platelets 149 10*3/mm3           Imaging Results (last 72 hours)     ** No results found for the last 72 hours. **        ECG/EMG Results (last 72 hours)     Procedure Component Value Units Date/Time    EP/CRM Study [[id number]] Resulted:  01/05/18 2004     Updated:  01/05/18 2029    Addenda:        Date of Procedure: 1/5/2017    Procedures Performed:  1.  Ultrasound-guided left internal jugular venous access  2.  Insertion of temporary transvenous pacemaker wire    Indication: Mobitz 2 heart block with resultant presyncope/syncope    Procedural Details: After informed consent was obtained, the patient was   brought to the cardiac catheterization lab and prepped and draped in usual   sterile fashion for right internal jugular venous access.  Timeout was   taken to confirm the correct patient and procedure.  No sedation was   administered.  Ultrasound guidance was used to try to identify the right   internal jugular vein but this internal jugular vein was unable to be   identified by ultrasound.  Therefore, the left neck was prepped and draped   in usual sterile fashion.  Using ultrasound guidance, the left internal   jugular vein was easily identifiable as easily compressible.  Lidocaine   was administered for local anesthesia.  After this, an 18-gauge needle was   used to puncture the right internal jugular vein under ultrasound   guidance.  After this a J-tipped guidewire was  advanced into the central   venous circulation and checked by fluoroscopy.  After this, the needle was   removed and a 5 Indonesian sheath was placed over the wire.  Through this   sheath, a 5 Indonesian balloon tipped catheter was advanced under fluoroscopic   guidance into the right ventricular apex.  The wire was then connected to   the pacemaker box.  The pacemaker wire showed good capture all the way   down to 0.2 mA.  At this time, the wire was left in place at approximately   35 cm, the sheath was sutured into place, a Biopatch was placed around the   sheath.  Pacing parameters were set with a heart rate of 50 and an output   of 10 mA.  A sterile dressing was then placed over the sheath and   pacemaker wire.  The patient was prepared X the cath lab when, on the   cardiac monitor, it appeared the patient had polymorphic ventricular   tachycardia and lost consciousness.  He received 1 shock of 200 J which   restored normal rhythm.  The patient came to quickly with no alteration in   mental status after this.  It was noted there were a considerable amount   of PVCs, which may have led to an R on T phenomenon causing the   polymorphic ventricular tachycardia.  Therefore, the pacemaker wire was   then manipulated slightly with less ectopy present afterwards.  The output   was left at 10 mA however the ventricular rate was set at 80 which seemed   to decrease the PVC burden.  Once again a sterile dressing was placed over   the access site and the wire.  The patient otherwise tolerated the   procedures well and there were no immediate complications.    Results:    Left internal jugular venous access: Ultrasound images showed a easily   compressible vessel in the left neck consistent with the left internal   jugular vein.  The needle was seen advancing into the jugular vein which   then returned nonpulsatile dark red venous blood flow.    Pacing parameters:  - Heart rate of 80 at 10 mA.    Impression:  #1.  Ronald Louise  second-degree heart block with resultant syncope and   presyncope  #2.  Successful placement of left internal jugular temporary pacemaker   wire as outlined above.    Plan:  - The patient will be transferred back to the ICU at this time in stable   condition.  - We will continue to monitor the patient throughout the weekend.  - Plans will be likely for permanent pacemaker placement on Monday by Dr. Toledo.  Signed:  01/05/18 2029 by Luis E Courtney MD    Narrative:       Date of Procedure: 1/5/2017    Procedures Performed:  1.  Ultrasound-guided left internal jugular venous access  2.  Insertion of temporary transvenous pacemaker wire    Indication: Mobitz 2 heart block with resultant presyncope/syncope    Procedural Details: After informed consent was obtained, the patient was   brought to the cardiac catheterization lab and prepped and draped in usual   sterile fashion for right internal jugular venous access.  Timeout was   taken to confirm the correct patient and procedure.  No sedation was   administered.  Ultrasound guidance was used to try to identify the right   internal jugular vein but this internal jugular vein was unable to be   identified by ultrasound.  Therefore, the left neck was prepped and draped   in usual sterile fashion.  Using ultrasound guidance, the left internal   jugular vein was easily identifiable as easily compressible.  Lidocaine   was administered for local anesthesia.  After this, an 18-gauge needle was   used to puncture the right internal jugular vein under ultrasound   guidance.  After this a J-tipped guidewire was advanced into the central   venous circulation and checked by fluoroscopy.  After this, the needle was   removed and a 5 [place] sheath was placed over the wire.  Through this   sheath, a 5 [place] balloon tipped catheter was advanced under fluoroscopic   guidance into the right ventricular apex.  The wire was then connected to   the pacemaker box.  The pacemaker  wire showed good capture all the way   down to 0.2 mA.  At this time, the wire was left in place at approximately   35 cm, the sheath was sutured into place, a Biopatch was placed around the   sheath.  Pacing parameters were set with a heart rate of 50 and an output   of 10 mA.  A sterile dressing was then placed over the sheath and   pacemaker wire.  The patient tolerated the procedures well.  There were no   bleeding complications.    Results:    Left internal jugular venous access: Ultrasound images showed a easily   compressible vessel in the left neck consistent with the left internal   jugular vein.  The needle was seen advancing into the jugular vein which   then returned nonpulsatile dark red venous blood flow.    Pacing parameters:  - Heart rate of 50 at 10 mA.    Impression:  #1.  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  Successful placement of left internal jugular temporary pacemaker   wire as outlined above.    Plan:  - The patient will be transferred back to the ICU at this time in stable   condition.  - We will continue to monitor the patient throughout the weekend.  - Plans will be likely for permanent pacemaker placement on Monday by Dr. Toledo.          Orders (last 72 hrs)     Start     Ordered    01/08/18 0833  Obtain Informed Consent  Once      01/08/18 0832 01/08/18 0833  Chlorhexidine Skin Prep  Once      01/08/18 0832 01/08/18 0833  Have Patient Void Prior to Procedure.  Once      01/08/18 0832 01/08/18 0833  Confirm NPO Status  Once      01/08/18 0832 01/08/18 0833  Insert Peripheral IV x2 - 20G - One in Each Arm  Once      01/08/18 0832    01/08/18 0833  No Telemetry Pads or Tape Over Implantation Site  Continuous      01/08/18 0832    01/08/18 0832  vancomycin 1500 mg/500 mL 0.9% NS IVPB ([facility])  60 Minutes Pre-Op      01/08/18 0832    01/08/18 0830  EP/CRM Study  Once      01/08/18 0829 01/08/18 0600  Basic Metabolic Panel  Morning  Draw      01/07/18 0733    01/08/18 0600  CBC (No Diff)  Morning Draw      01/07/18 0733    01/08/18 0001  NPO Diet  Diet Effective Midnight      01/07/18 0733    01/07/18 1952  Case Request EP Lab: Device Implant  Once      01/07/18 1954    01/07/18 1949  Place Sequential Compression Device  Once      01/07/18 1948    01/07/18 0815  sodium chloride 0.9 % infusion  Continuous      01/07/18 0733    01/07/18 0600  Basic Metabolic Panel  Morning Draw      01/06/18 0830    01/06/18 0832  Bed Rest  Until Discontinued      01/06/18 0831    01/06/18 0832  Pacemaker External - Parameters  Continuous     Comments:  Backup rate of 80, mA of 10.    01/06/18 0831    01/06/18 0831  Elevate HOB  Continuous      01/06/18 0831    01/06/18 0600  CBC (No Diff)  Morning Draw,   Status:  Canceled      01/05/18 2057 01/06/18 0600  Comprehensive Metabolic Panel  Morning Draw      01/05/18 2057 01/06/18 0600  TSH  Morning Draw      01/05/18 2057 01/06/18 0600  Protime-INR  Morning Draw      01/05/18 2057 01/06/18 0600  Basic Metabolic Panel  Morning Draw,   Status:  Canceled      01/05/18 1814 01/06/18 0600  CBC (No Diff)  Morning Draw      01/05/18 1814 01/06/18 0600  Magnesium  Morning Draw      01/05/18 1814    01/06/18 0000  Vital Signs  Every 4 Hours      01/05/18 2057 01/05/18 2230  enoxaparin (LOVENOX) syringe 40 mg  Nightly,   Status:  Discontinued      01/05/18 2145    01/05/18 2130  enoxaparin (LOVENOX) syringe 40 mg  Every 24 Hours,   Status:  Discontinued      01/05/18 2057 01/05/18 2100  Strict Intake and Output  Every Hour,   Status:  Canceled      01/05/18 2057 01/05/18 2100  atorvastatin (LIPITOR) tablet 40 mg  Nightly      01/05/18 1814    01/05/18 2058  Weigh Patient  Once,   Status:  Canceled      01/05/18 2057 01/05/18 2058  Oxygen Therapy-  Continuous,   Status:  Canceled      01/05/18 2057 01/05/18 2058  Insert Peripheral IV  Once      01/05/18 2057 01/05/18 2058  Saline Lock &  Maintain IV Access  Continuous      01/05/18 2057 01/05/18 2058  Inpatient Admission  Once      01/05/18 2057 01/05/18 2058  Full Code  Continuous      01/05/18 2057 01/05/18 2058  VTE Risk Assessment - Moderate Risk  Once      01/05/18 2057 01/05/18 2058  Mechanical VTE Prophylaxis Not Indicated: Moderate VTE Risk With Pharmacologic Prophylaxis  Once      01/05/18 2057 01/05/18 2058  Diet Regular; Cardiac  Diet Effective Now,   Status:  Canceled      01/05/18 2057 01/05/18 2058  Pacemaker External - Parameters  Continuous,   Status:  Canceled     Comments:  Backup rate of 50, mA of 10.    01/05/18 2057 01/05/18 2057  sodium chloride 0.9 % flush 1-10 mL  As Needed      01/05/18 2057 01/05/18 1939  lidocaine (XYLOCAINE) 2% injection  As Needed,   Status:  Discontinued      01/05/18 1939 01/05/18 1917  EP/CRM Study  Once      01/05/18 1916 01/05/18 1907  Place transcutaneous pacing pads on patient. Keep code cart in room. Set backup rate at 40.  Misc Nursing Order (Specify)  Once     Comments:  Place transcutaneous pacing pads on patient.  Keep code cart in room.  Set backup rate at 40.    01/05/18 1907    01/05/18 1900  Strict Intake and Output  Every Hour      01/05/18 1814 01/05/18 1845  irbesartan (AVAPRO) tablet 300 mg  Daily      01/05/18 1814    01/05/18 1845  aspirin EC tablet 81 mg  Daily      01/05/18 1814    01/05/18 1845  allopurinol (ZYLOPRIM) tablet 100 mg  Daily      01/05/18 1814    01/05/18 1845  enoxaparin (LOVENOX) syringe 40 mg  Every 24 Hours,   Status:  Discontinued      01/05/18 1814 01/05/18 1841  Transfer Patient  Once      01/05/18 1840    01/05/18 1830  clopidogrel (PLAVIX) tablet 75 mg  Daily,   Status:  Discontinued      01/05/18 1814    01/05/18 1815  Mechanical VTE Prophylaxis Not Indicated: Moderate VTE Risk With Pharmacologic Prophylaxis  Once      01/05/18 1814 01/05/18 1815  Reason For Not Ordering Aspirin  Once      01/05/18 1814    01/05/18  1815  Reason For Not Ordering Antithrombotic On Admission  Once      01/05/18 1814    01/05/18 1815  Reason for Not Ordering Beta-Blocker on Admission  Once      01/05/18 1814    01/05/18 1815  Reason For Not Ordering Statin on Admission  Once      01/05/18 1814    01/05/18 1814  Inpatient Admission  Once      01/05/18 1814    01/05/18 1814  Full Code  Continuous,   Status:  Canceled      01/05/18 1814    01/05/18 1814  Vital Signs Per hospital policy  Per Hospital Policy     Comments:  Every 15 min until stable then every 4 hours.    01/05/18 1814    01/05/18 1814  Cardiac Monitoring  Continuous      01/05/18 1814    01/05/18 1814  Activity as tolerated  Until Discontinued,   Status:  Canceled      01/05/18 1814    01/05/18 1814  Weigh patient  Once      01/05/18 1814    01/05/18 1814  Notify Physician (Specify Parameters) for unrelieved Chest Pain  Until Discontinued      01/05/18 1814    01/05/18 1814  Oxygen Therapy-  Continuous      01/05/18 1814    01/05/18 1814  Diet Regular; Cardiac  Diet Effective Now,   Status:  Canceled      01/05/18 1814    01/05/18 1814  Insert Peripheral IV  Once      01/05/18 1814    01/05/18 1814  Saline Lock & Maintain IV Access  Continuous,   Status:  Canceled      01/05/18 1814    01/05/18 1814  VTE Risk Assessment - Moderate Risk  Once      01/05/18 1814    01/05/18 1813  sodium chloride 0.9 % flush 1-10 mL  As Needed,   Status:  Discontinued      01/05/18 1814    01/05/18 1813  nitroglycerin (NITROSTAT) SL tablet 0.4 mg  Every 5 Minutes PRN      01/05/18 1814    01/05/18 1813  acetaminophen (TYLENOL) tablet 650 mg  Every 4 Hours PRN      01/05/18 1814    --  SCANNED - TELEMETRY        01/05/18 0000          Operative/Procedure Notes (last 72 hours) (Notes from 1/5/2018  9:18 AM through 1/8/2018  9:18 AM)     No notes of this type exist for this encounter.           Physician Progress Notes (last 72 hours) (Notes from 1/5/2018  9:18 AM through 1/8/2018  9:18 AM)      Luis E Irwin  "MD Sherwin at 1/6/2018  8:26 AM  Version 1 of 1         Chief Complaint: Presyncope/syncope    S: No acute events after temporary pacer last night.  No further presyncope or syncope.    Medications: Reviewed    Review of Systems: All pertinent negative and positives as noted above.  Otherwise, all systems reviewed and found to be negative.    Telemetry: Pacing spikes noted.  Occasional PVCs    O:  /98  Pulse 80  Temp 97.5 °F (36.4 °C) (Oral)   Resp 24  Ht 175.3 cm (69.02\")  Wt 88.9 kg (196 lb)  SpO2 94%  BMI 28.93 kg/m2    Temp:  [97.5 °F (36.4 °C)-97.7 °F (36.5 °C)] 97.5 °F (36.4 °C)  Heart Rate:  [48-85] 80  Resp:  [24] 24  BP: (111-164)/(80-99) 128/98    Gen: NAD, lying flat in bed  Neck: L IJ temp wire access c/d/i  CV: RRR  Pulm: CTAB  GI: s, nt, nd, +bs  Ext: no edema    Diagnostic Data:    Lab Results   Component Value Date    WBC 7.56 01/06/2018    HGB 15.2 01/06/2018    HCT 43.5 01/06/2018    MCV 85.3 01/06/2018     01/06/2018     Lab Results   Component Value Date    GLUCOSE 102 (H) 01/06/2018    CALCIUM 9.7 01/06/2018     01/06/2018    K 4.1 01/06/2018    CO2 24.0 01/06/2018     01/06/2018    BUN 26 (H) 01/06/2018    CREATININE 1.66 (H) 01/06/2018    EGFRIFNONA 40 (L) 01/06/2018    BCR 15.7 01/06/2018    ANIONGAP 15.0 (H) 01/06/2018     TSH 1.51  Mg 1.6    ASSESSMENT/PLAN:     1.  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  Syncope and presyncope related to problem #1  3.  Coronary artery disease, native artery, native heart, no angina  4.  Essential hypertension  5.  Mixed hyperlipidemia  6.  Nonrheumatic aortic valve stenosis     - Leave temp wire in place at this time. Still with pacing spike when pacer turned down to 40 bpm, indicating still with advanced AV block and need for pacing.  - Try to elevated head of bed slightly today and monitor rhythm.  - Otherwise, continue current medications.  - Plan for permanent pacemaker " "placement on Monday per Dr. Toledo.               Electronically signed by Luis E Courtney MD at 1/6/2018  8:31 AM      Luis E Courtney MD at 1/7/2018  8:45 AM  Version 1 of 1         Chief Complaint: Presyncope/syncope     S:   no problems noted throughout the day yesterday are overnight.  No further presyncope or syncope.  The patient is still requiring his temporary pacemaker, however even with pacer rate turned down to 30 bpm, the patient is still having pacing spikes and evidence of advanced AV block.     Medications: Reviewed     Review of Systems: All pertinent negative and positives as noted above.  Otherwise, all systems reviewed and found to be negative.     Telemetry:   ventricular paced at 80 bpm with occasional PVCs.     O:  /84  Pulse 80  Temp 98.2 °F (36.8 °C) (Axillary)   Resp 20  Ht 175.3 cm (69.02\")  Wt 89.7 kg (197 lb 11.2 oz)  SpO2 94%  BMI 29.18 kg/m2    Temp:  [98.2 °F (36.8 °C)-98.7 °F (37.1 °C)] 98.2 °F (36.8 °C)  Heart Rate:  [80-84] 80  Resp:  [14-20] 20  BP: ()/(68-99) 143/84     Gen: NAD, lying flat in bed  Neck: L IJ temp wire access c/d/i  CV: RRR  Pulm: CTAB  GI: s, nt, nd, +bs  Ext: no edema     Diagnostic Data:     Lab Results   Component Value Date    GLUCOSE 107 (H) 01/07/2018    CALCIUM 9.5 01/07/2018     01/07/2018    K 4.6 01/07/2018    CO2 24.0 01/07/2018     01/07/2018    BUN 30 (H) 01/07/2018    CREATININE 1.86 (H) 01/07/2018    EGFRIFNONA 35 (L) 01/07/2018    BCR 16.1 01/07/2018    ANIONGAP 16.0 (H) 01/07/2018     ASSESSMENT/PLAN:      1.  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  Syncope and presyncope related to problem #1  3.  Coronary artery disease, native artery, native heart, no angina  4.  Essential hypertension  5.  Mixed hyperlipidemia  6.  Nonrheumatic aortic valve stenosis      - Continue ICU level care with temporary pacemaker in place.    - It should be noted that even when " the pacer was turned down to 30 bpm, this patient still required pacing and had evidence of Mobitz 2 AV block.  Therefore, would suspect that this patient is definitely in need of permanent pacemaker placement.  This will be planned tomorrow by Dr. Toledo.    - We will leave the patient nothing by mouth after midnight.    - Recheck morning labs including CBC and BMP.   -  I will administer maintenance IV fluids as the patient's creatinine has increased somewhat over night and the patient has not had very good by mouth intake.    - Otherwise, continue current medications.       Electronically signed by Luis E Courtney MD at 2018  8:48 AM        Consult Notes (last 72 hours) (Notes from 2018  9:18 AM through 2018  9:18 AM)     No notes of this type exist for this encounter.          [facility] CATH LAB  [address] [place] [id number] [phone]             [facility] CATH LAB  [address] [place] [id number] [phone]      Mireille Magallanes   EP/CRM Study   Order# [id number]   Reading physician: Luis E Courtney MD Ordering physician: Luis E Courtney MD Study date: 18   Patient Information   Patient Name MRN Sex  (Age)   Mireille Magallanes [id number] Male 1935 (82 y.o.)   Admission Information   Admission Date/Time Discharge Date/Time Room/Bed   18  1807  I004/1   Physicians   Panel Physicians Referring Physician Case Authorizing Physician   Luis E Courtney MD (Primary)     Procedures   Temporary Pacemaker   Conclusion   Date of Procedure: 2017     Procedures Performed:  1.  Ultrasound-guided left internal jugular venous access  2.  Insertion of temporary transvenous pacemaker wire     Indication: Mobitz 2 heart block with resultant presyncope/syncope     Procedural Details: After informed consent was obtained, the patient was brought to the cardiac catheterization lab and prepped and draped in usual sterile  fashion for right internal jugular venous access.  Timeout was taken to confirm the correct patient and procedure.  No sedation was administered.  Ultrasound guidance was used to try to identify the right internal jugular vein but this internal jugular vein was unable to be identified by ultrasound.  Therefore, the left neck was prepped and draped in usual sterile fashion.  Using ultrasound guidance, the left internal jugular vein was easily identifiable as easily compressible.  Lidocaine was administered for local anesthesia.  After this, an 18-gauge needle was used to puncture the right internal jugular vein under ultrasound guidance.  After this a J-tipped guidewire was advanced into the central venous circulation and checked by fluoroscopy.  After this, the needle was removed and a 5 [place] sheath was placed over the wire.  Through this sheath, a 5 [place] balloon tipped catheter was advanced under fluoroscopic guidance into the right ventricular apex.  The wire was then connected to the pacemaker box.  The pacemaker wire showed good capture all the way down to 0.2 mA.  At this time, the wire was left in place at approximately 35 cm, the sheath was sutured into place, a Biopatch was placed around the sheath.  Pacing parameters were set with a heart rate of 50 and an output of 10 mA.  A sterile dressing was then placed over the sheath and pacemaker wire.  The patient was prepared X the cath lab when, on the cardiac monitor, it appeared the patient had polymorphic ventricular tachycardia and lost consciousness.  He received 1 shock of 200 J which restored normal rhythm.  The patient came to quickly with no alteration in mental status after this.  It was noted there were a considerable amount of PVCs, which may have led to an R on T phenomenon causing the polymorphic ventricular tachycardia.  Therefore, the pacemaker wire was then manipulated slightly with less ectopy present afterwards.  The output was left at 10  mA however the ventricular rate was set at 80 which seemed to decrease the PVC burden.  Once again a sterile dressing was placed over the access site and the wire.  The patient otherwise tolerated the procedures well and there were no immediate complications.     Results:     Left internal jugular venous access: Ultrasound images showed a easily compressible vessel in the left neck consistent with the left internal jugular vein.  The needle was seen advancing into the jugular vein which then returned nonpulsatile dark red venous blood flow.     Pacing parameters:  - Heart rate of 80 at 10 mA.     Impression:  #1.  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  Successful placement of left internal jugular temporary pacemaker wire as outlined above.     Plan:  - The patient will be transferred back to the ICU at this time in stable condition.  - We will continue to monitor the patient throughout the weekend.  - Plans will be likely for permanent pacemaker placement on Monday by Dr. Toledo.

## 2018-01-08 NOTE — PLAN OF CARE
Problem: Skin Integrity Impairment, Risk/Actual (Adult)  Goal: Identify Related Risk Factors and Signs and Symptoms  Outcome: Outcome(s) achieved Date Met: 01/08/18

## 2018-01-08 NOTE — PLAN OF CARE
Problem: Cardiac Rhythm Management Device (Adult)  Goal: Signs and Symptoms of Listed Potential Problems Will be Absent or Manageable (Cardiac Rhythm Management Device)  Outcome: Ongoing (interventions implemented as appropriate)   01/07/18 0504 01/07/18 1744   Cardiac Rhythm Management Device   Problems Assessed (Cardiac Rhythm Management Device) --  all   Problems Present (Cardiac Rhythm Management Device) syncope;dysrhythmia/arrhythmia;situational response --

## 2018-01-08 NOTE — PLAN OF CARE
Problem: Skin Integrity Impairment, Risk/Actual (Adult)  Goal: Skin Integrity/Wound Healing  Outcome: Ongoing (interventions implemented as appropriate)   01/08/18 1632   Skin Integrity Impairment, Risk/Actual (Adult)   Skin Integrity/Wound Healing achieves outcome

## 2018-01-08 NOTE — PLAN OF CARE
Problem: Patient Care Overview (Adult)  Goal: Plan of Care Review  Outcome: Ongoing (interventions implemented as appropriate)   01/08/18 8872   Coping/Psychosocial Response Interventions   Plan Of Care Reviewed With patient;family   Patient Care Overview   Progress improving   Outcome Evaluation   Outcome Summary/Follow up Plan patient went for a permanent pacemaker today to the left subclavian and is pacing 100%. incision is CDI with some small bruising. gauze and tegaderm dressing to the right jugular where transvenous pacer was removed

## 2018-01-08 NOTE — PROGRESS NOTES
Continued Stay Note   Misael     Patient Name: Mireille Magallanes  MRN: [id number]  Today's Date: 1/8/2018    Admit Date: 1/5/2018          Discharge Plan       01/08/18 1130    Case Management/Social Work Plan    Plan Patient currently out of unit for procedure.  Will screen when patient available.              Discharge Codes     None            GERALD Foley

## 2018-01-08 NOTE — PLAN OF CARE
Problem: Arrhythmia/Dysrhythmia (Symptomatic) (Adult)  Goal: Signs and Symptoms of Listed Potential Problems Will be Absent or Manageable (Arrhythmia/Dysrhythmia)  Outcome: Ongoing (interventions implemented as appropriate)   01/08/18 1631   Arrhythmia/Dysrhythmia (Symptomatic)   Problems Assessed (Arrhythmia/Dysrhythmia) all   Problems Present (Arrhythmia/Dysrhythmia) none

## 2018-01-09 ENCOUNTER — APPOINTMENT (OUTPATIENT)
Dept: GENERAL RADIOLOGY | Facility: HOSPITAL | Age: 83
End: 2018-01-09

## 2018-01-09 VITALS
RESPIRATION RATE: 18 BRPM | BODY MASS INDEX: 28.44 KG/M2 | TEMPERATURE: 98.1 F | DIASTOLIC BLOOD PRESSURE: 92 MMHG | HEIGHT: 69 IN | HEART RATE: 81 BPM | SYSTOLIC BLOOD PRESSURE: 147 MMHG | OXYGEN SATURATION: 96 % | WEIGHT: 192 LBS

## 2018-01-09 LAB
ANION GAP SERPL CALCULATED.3IONS-SCNC: 13 MMOL/L (ref 4–13)
BUN BLD-MCNC: 31 MG/DL (ref 5–21)
BUN/CREAT SERPL: 16.3 (ref 7–25)
CALCIUM SPEC-SCNC: 9.4 MG/DL (ref 8.4–10.4)
CHLORIDE SERPL-SCNC: 102 MMOL/L (ref 98–110)
CO2 SERPL-SCNC: 25 MMOL/L (ref 24–31)
CREAT BLD-MCNC: 1.9 MG/DL (ref 0.5–1.4)
DEPRECATED RDW RBC AUTO: 42.4 FL (ref 40–54)
ERYTHROCYTE [DISTWIDTH] IN BLOOD BY AUTOMATED COUNT: 13.2 % (ref 12–15)
GFR SERPL CREATININE-BSD FRML MDRD: 34 ML/MIN/1.73
GLUCOSE BLD-MCNC: 105 MG/DL (ref 70–100)
HCT VFR BLD AUTO: 44.7 % (ref 40–52)
HGB BLD-MCNC: 15 G/DL (ref 14–18)
INR PPP: 1.03 (ref 0.91–1.09)
MCH RBC QN AUTO: 29.4 PG (ref 28–32)
MCHC RBC AUTO-ENTMCNC: 33.6 G/DL (ref 33–36)
MCV RBC AUTO: 87.6 FL (ref 82–95)
PLATELET # BLD AUTO: 156 10*3/MM3 (ref 130–400)
PMV BLD AUTO: 10.8 FL (ref 6–12)
POTASSIUM BLD-SCNC: 4.3 MMOL/L (ref 3.5–5.3)
PROTHROMBIN TIME: 13.8 SECONDS (ref 11.9–14.6)
RBC # BLD AUTO: 5.1 10*6/MM3 (ref 4.8–5.9)
SODIUM BLD-SCNC: 140 MMOL/L (ref 135–145)
WBC NRBC COR # BLD: 9.68 10*3/MM3 (ref 4.8–10.8)

## 2018-01-09 PROCEDURE — 85027 COMPLETE CBC AUTOMATED: CPT | Performed by: INTERNAL MEDICINE

## 2018-01-09 PROCEDURE — 93010 ELECTROCARDIOGRAM REPORT: CPT | Performed by: INTERNAL MEDICINE

## 2018-01-09 PROCEDURE — 80048 BASIC METABOLIC PNL TOTAL CA: CPT | Performed by: INTERNAL MEDICINE

## 2018-01-09 PROCEDURE — 71046 X-RAY EXAM CHEST 2 VIEWS: CPT

## 2018-01-09 PROCEDURE — 99024 POSTOP FOLLOW-UP VISIT: CPT | Performed by: INTERNAL MEDICINE

## 2018-01-09 PROCEDURE — 99152 MOD SED SAME PHYS/QHP 5/>YRS: CPT | Performed by: INTERNAL MEDICINE

## 2018-01-09 PROCEDURE — 93005 ELECTROCARDIOGRAM TRACING: CPT | Performed by: INTERNAL MEDICINE

## 2018-01-09 RX ORDER — AMLODIPINE BESYLATE 5 MG/1
5 TABLET ORAL DAILY
Qty: 30 TABLET | Refills: 5 | Status: SHIPPED | OUTPATIENT
Start: 2018-01-09 | End: 2018-04-11 | Stop reason: SDUPTHER

## 2018-01-09 RX ADMIN — IRBESARTAN 300 MG: 150 TABLET ORAL at 08:07

## 2018-01-09 RX ADMIN — Medication 81 MG: at 08:07

## 2018-01-09 RX ADMIN — ALLOPURINOL 100 MG: 100 TABLET ORAL at 08:07

## 2018-01-09 NOTE — PLAN OF CARE
Problem: Patient Care Overview (Adult)  Goal: Plan of Care Review  Outcome: Outcome(s) achieved Date Met: 01/09/18    Goal: Adult Individualization and Mutuality  Outcome: Outcome(s) achieved Date Met: 01/09/18    Goal: Discharge Needs Assessment  Outcome: Outcome(s) achieved Date Met: 01/09/18      Problem: Arrhythmia/Dysrhythmia (Symptomatic) (Adult)  Goal: Signs and Symptoms of Listed Potential Problems Will be Absent or Manageable (Arrhythmia/Dysrhythmia)  Outcome: Outcome(s) achieved Date Met: 01/09/18      Problem: Cardiac Rhythm Management Device (Adult)  Goal: Signs and Symptoms of Listed Potential Problems Will be Absent or Manageable (Cardiac Rhythm Management Device)  Outcome: Outcome(s) achieved Date Met: 01/09/18      Problem: Fall Risk (Adult)  Goal: Absence of Falls  Outcome: Outcome(s) achieved Date Met: 01/09/18      Problem: Skin Integrity Impairment, Risk/Actual (Adult)  Goal: Skin Integrity/Wound Healing  Outcome: Outcome(s) achieved Date Met: 01/09/18

## 2018-01-09 NOTE — DISCHARGE SUMMARY
[facility] HEART GROUP DISCHARGE    Date of Discharge:  1/9/2018    Discharge Diagnosis: High grade AV block status-post pacemaker    Presenting Problem/History of Present Illness  Heart block [I45.9]  Syncope [R55]      Hospital Course  Patient is a 82 y.o. male with history of coronary artery disease, status-post coronary artery bypass grafting, chronic kidney disease, hypertension, hyperlipidemia who presented to [facility] as a direct admit on 1/5/18 with complaints of presyncope and syncope. He was noted on admission with pauses in excess of 6 seconds and high grade AV block associated with aforementioned symptoms.  His beta blocker was held and temporary transvenous pacemaker wire was placed. Despite cessation of rate controlling medications, the patient continued with high grade AV block with temp pacer rate decreased to 30 bpm. Subsequently, the patient had permanent dual chamber [employer] pacemaker placed yesterday (and temporary pacer removed). The patient tolerated the procedure well and no obvious complications were noted. Post-op labs are stable, post-op CXR shows pacemaker in appropriate position with no pneumothorax or other complication. The patient's pacemaker was interrogated this morning and showed appropriate functioning. Mr. Magallanes relates he feels well today and is ready for discharge. He denies further symptoms of pre-syncope or related. Of note, we will discontinue his atenolol for now. We will start him on Norvasc for continued blood pressure control. The patient is to follow-up with his primary care provider next week regarding this. He is also to have repeat BMP to ensure stable kidney function after fluoroscopy.  Routine post-op pacemaker instructions were provided the patient, as noted below.    Procedures Performed  Procedure(s):  Device Implant  01/08 1213 Note By: Kaushik Toledo MD    Consults:   Consults     No orders found from 12/7/2017 to 1/6/2018.          Ejection  Fraction  Lab Results   Component Value Date    EF 59 01/05/2018       Condition on Discharge:  Stable, no acute distress    Physical Exam at Discharge  Const: aaox3, no acute distress  Heart: 2/6 FIONA at [facility]RSB  Chest: Lungs CTAB  Abd: bsx4,nd,nt  Ext: no edema, pulses +2  Skin: pacer site healing well with no evidence of hematoma or infection   Vital Signs  Temp:  [98.1 °F (36.7 °C)-99.7 °F (37.6 °C)] 98.1 °F (36.7 °C)  Heart Rate:  [] 81  Resp:  [12-20] 18  BP: ()/(57-97) 147/92    Discharge Disposition  Home or Self CareHome    Discharge Medications   Mireille Magallanes   Home Medication Instructions ELVIA:[id number]    Printed on:01/09/18 1011   Medication Information                      allopurinol (ZYLOPRIM) 100 MG tablet  Take 100 mg by mouth Daily.             amLODIPine (NORVASC) 5 MG tablet  Take 1 tablet by mouth Daily.             aspirin 81 MG EC tablet  Take 81 mg by mouth daily.             atorvastatin (LIPITOR) 40 MG tablet  Take 80 mg by mouth Daily.             Coenzyme Q10 (COQ-10) 100 MG capsule  Take  by mouth daily.             irbesartan (AVAPRO) 300 MG tablet  Take 300 mg by mouth Every Night.             nitroglycerin (NITROSTAT) 0.4 MG SL tablet  Place 0.4 mg under the tongue as needed.             Omega-3 Fatty Acids (OMEGA 3 PO)  Take  by mouth.                 Discharge Diet: as below    Activity at Discharge: as below    Follow-up Appointments  As below  Additional Instructions for the Follow-ups that You Need to Schedule     Basic Metabolic Panel    Duane 15, 2018 (Approximate)    DR. MILES'S OFFICE (pt's PCP)   Order Comments:  DR. MILES'S OFFICE (pt's PCP)                      Test Results Pending at Discharge  None    Discharge Instructions  Activity: No heavy lifting for 2 weeks.  No driving for 2 weeks.  No heavy or strenuous pushing or pulling.  Do not submerge site underwater. Keep dry. If site gets wet in shower, pat dry.  Clean site with Chloraprep sponge on post-op  days 2 and 4 in a circular motion and discard the sponge (nursing to provide for discharge).  No lotions, powders, or topical ointments to site. Keep open to air and dry.  Call our office with any concerns or if you notice redness, swelling, drainage, warmth, or worsening pain at the site.    Medications: Take all medications as prescribed  Diet: Cardiac/renal diet  Follow-up: Follow-up with primary care provider in 1 week, pacer clinic 6 weeks, Dr. Toledo in 3 months.   Other: Check pacemaker site for signs of infection, including drainage, redness, tenderness, swelling, fever; call if any occur.      Marlee Alvarez PA-C  01/09/18  10:11 AM

## 2018-01-09 NOTE — PLAN OF CARE
Problem: Patient Care Overview (Adult)  Goal: Plan of Care Review  Outcome: Ongoing (interventions implemented as appropriate)   01/09/18 1177   Coping/Psychosocial Response Interventions   Plan Of Care Reviewed With patient;family   Patient Care Overview   Progress improving   Outcome Evaluation   Outcome Summary/Follow up Plan VSS. Patient was /  on tele. Left chest incision is CDI and BAILEY. Dermabond intact. No c/o pain all night. IV fluids continue at 75 ml/hr. Cont to monitor overall condition. Possible d/c home today if stable.        Problem: Arrhythmia/Dysrhythmia (Symptomatic) (Adult)  Goal: Signs and Symptoms of Listed Potential Problems Will be Absent or Manageable (Arrhythmia/Dysrhythmia)  Outcome: Ongoing (interventions implemented as appropriate)      Problem: Cardiac Rhythm Management Device (Adult)  Goal: Signs and Symptoms of Listed Potential Problems Will be Absent or Manageable (Cardiac Rhythm Management Device)  Outcome: Ongoing (interventions implemented as appropriate)      Problem: Fall Risk (Adult)  Goal: Absence of Falls  Outcome: Ongoing (interventions implemented as appropriate)      Problem: Skin Integrity Impairment, Risk/Actual (Adult)  Goal: Skin Integrity/Wound Healing  Outcome: Ongoing (interventions implemented as appropriate)

## 2018-01-09 NOTE — DISCHARGE INSTRUCTIONS
Activity: No heavy lifting for 2 weeks.  No driving for 2 weeks.  No heavy or strenuous pushing or pulling.  Do not submerge site underwater. Keep dry. If site gets wet in shower, pat dry.  Clean site with Chloraprep sponge on post-op days 2 and 4 in a circular motion and discard the sponge (nursing to provide for discharge).  No lotions, powders, or topical ointments to site. Keep open to air and dry.  Call our office with any concerns or if you notice redness, swelling, drainage, warmth, or worsening pain at the site.    Medications: Take all medications as prescribed  Diet: Cardiac/renal diet  Follow-up: Follow-up with primary care provider in 1 week, pacer clinic 6 weeks, Dr. Toledo in 3 months.   Other: Check pacemaker site for signs of infection, including drainage, redness, tenderness, swelling, fever; call if any occur.

## 2018-01-10 NOTE — PAYOR COMM NOTE
"DC HOME 1-9-18    [id number]  MEDICARE IS PRIMARY    PLEASE FAX OR CALL APPROVAL  UR PHONE    [phone]  Mireille Dickey (82 y.o. Male)     Date of Birth Social Security Number Address Home Phone MRN    1935  [address]   [facility] [address] [phone] [id number]    Anglican Marital Status          Rastafarian        Admission Date Admission Type Admitting Provider Attending Provider Department, Room/Bed    1/5/18 Urgent Kaushik Toledo MD  [facility] 4B, 408/1    Discharge Date Discharge Disposition Discharge Destination        1/9/2018 Home or Self Care             Attending Provider: (none)    Allergies:  Sulfa Antibiotics    Isolation:  None   Infection:  None   Code Status:  Prior    Ht:  175.3 cm (69\")   Wt:  87.1 kg (192 lb)    Admission Cmt:  None   Principal Problem:  None                Active Insurance as of 1/5/2018     Primary Coverage     Payor Plan Insurance Group Employer/Plan Group    MEDICARE MEDICARE A & B      Payor Plan Address Payor Plan Phone Number Effective From Effective To    [address] [phone] 10/1/2002     [place] [address]       Subscriber Name Subscriber Birth Date Member ID       MIREILLE DICKEY 1935 [id number]           Secondary Coverage     Payor Plan Insurance Group Employer/Plan Group    [employer] [place] [place] [id number]     Payor Plan Address Payor Plan Phone Number Effective From Effective To    [address] [address] [phone] 1/1/2013     [place] [address]       Subscriber Name Subscriber Birth Date Member ID       MIREILLE DICKEY 1935 [id number]                 Emergency Contacts      (Rel.) Home Phone Work Phone Mobile Phone    Sonya Dickey (Spouse) [phone] -- --               Discharge Summary      Kaushik Toledo MD at 1/9/2018  9:46 AM          [facility] DISCHARGE    Date of Discharge:  1/9/2018    Discharge Diagnosis: High grade AV block " status-post pacemaker    Presenting Problem/History of Present Illness  Heart block [I45.9]  Syncope [R55]      Hospital Course  Patient is a 82 y.o. male with history of coronary artery disease, status-post coronary artery bypass grafting, chronic kidney disease, hypertension, hyperlipidemia who presented to [facility] as a direct admit on 1/5/18 with complaints of presyncope and syncope. He was noted on admission with pauses in excess of 6 seconds and high grade AV block associated with aforementioned symptoms.  His beta blocker was held and temporary transvenous pacemaker wire was placed. Despite cessation of rate controlling medications, the patient continued with high grade AV block with temp pacer rate decreased to 30 bpm. Subsequently, the patient had permanent dual chamber [employer] pacemaker placed yesterday (and temporary pacer removed). The patient tolerated the procedure well and no obvious complications were noted. Post-op labs are stable, post-op CXR shows pacemaker in appropriate position with no pneumothorax or other complication. The patient's pacemaker was interrogated this morning and showed appropriate functioning. Mr. Magallanes relates he feels well today and is ready for discharge. He denies further symptoms of pre-syncope or related. Of note, we will discontinue his atenolol for now. We will start him on Norvasc for continued blood pressure control. The patient is to follow-up with his primary care provider next week regarding this. He is also to have repeat BMP to ensure stable kidney function after fluoroscopy.  Routine post-op pacemaker instructions were provided the patient, as noted below.    Procedures Performed  Procedure(s):  Device Implant  01/08 1213 Note By: Kaushik Toledo MD    Consults:   Consults     No orders found from 12/7/2017 to 1/6/2018.          Ejection Fraction  Lab Results   Component Value Date    EF 59 01/05/2018       Condition on Discharge:  Stable, no acute  distress    Physical Exam at Discharge  Const: aaox3, no acute distress  Heart: 2/6 FIONA at URSB  Chest: Lungs CTAB  Abd: bsx4,nd,nt  Ext: no edema, pulses +2  Skin: pacer site healing well with no evidence of hematoma or infection   Vital Signs  Temp:  [98.1 °F (36.7 °C)-99.7 °F (37.6 °C)] 98.1 °F (36.7 °C)  Heart Rate:  [] 81  Resp:  [12-20] 18  BP: ()/(57-97) 147/92    Discharge Disposition  Home or Self CareHome    Discharge Medications   Mireille Magallanes   Home Medication Instructions ELVIA:[id number]    Printed on:01/09/18 1011   Medication Information                      allopurinol (ZYLOPRIM) 100 MG tablet  Take 100 mg by mouth Daily.             amLODIPine (NORVASC) 5 MG tablet  Take 1 tablet by mouth Daily.             aspirin 81 MG EC tablet  Take 81 mg by mouth daily.             atorvastatin (LIPITOR) 40 MG tablet  Take 80 mg by mouth Daily.             Coenzyme Q10 (COQ-10) 100 MG capsule  Take  by mouth daily.             irbesartan (AVAPRO) 300 MG tablet  Take 300 mg by mouth Every Night.             nitroglycerin (NITROSTAT) 0.4 MG SL tablet  Place 0.4 mg under the tongue as needed.             Omega-3 Fatty Acids (OMEGA 3 PO)  Take  by mouth.                 Discharge Diet: as below    Activity at Discharge: as below    Follow-up Appointments  As below  Additional Instructions for the Follow-ups that You Need to Schedule     Basic Metabolic Panel    Duane 15, 2018 (Approximate)    DR. MILES'S OFFICE (pt's PCP)   Order Comments:  DR. MILES'S OFFICE (pt's PCP)                      Test Results Pending at Discharge  None    Discharge Instructions  Activity: No heavy lifting for 2 weeks.  No driving for 2 weeks.  No heavy or strenuous pushing or pulling.  Do not submerge site underwater. Keep dry. If site gets wet in shower, pat dry.  Clean site with Chloraprep sponge on post-op days 2 and 4 in a circular motion and discard the sponge (nursing to provide for discharge).  No lotions, powders,  or topical ointments to site. Keep open to air and dry.  Call our office with any concerns or if you notice redness, swelling, drainage, warmth, or worsening pain at the site.    Medications: Take all medications as prescribed  Diet: Cardiac/renal diet  Follow-up: Follow-up with primary care provider in 1 week, pacer clinic 6 weeks, Dr. Toledo in 3 months.   Other: Check pacemaker site for signs of infection, including drainage, redness, tenderness, swelling, fever; call if any occur.      Marlee Alvarez PA-C  01/09/18  10:11 AM                     Electronically signed by Kaushik Toledo MD at 1/9/2018  5:14 PM

## 2018-01-11 ENCOUNTER — TELEPHONE (OUTPATIENT)
Dept: INTERNAL MEDICINE | Age: 83
End: 2018-01-11

## 2018-01-11 DIAGNOSIS — I45.9 HEART BLOCK: Primary | ICD-10-CM

## 2018-01-11 DIAGNOSIS — I10 ESSENTIAL HYPERTENSION: Primary | ICD-10-CM

## 2018-01-11 RX ORDER — AMLODIPINE BESYLATE 5 MG/1
5 TABLET ORAL DAILY
COMMUNITY
End: 2018-05-22 | Stop reason: SDUPTHER

## 2018-01-22 ENCOUNTER — OFFICE VISIT (OUTPATIENT)
Dept: INTERNAL MEDICINE | Age: 83
End: 2018-01-22
Payer: MEDICARE

## 2018-01-22 VITALS
HEART RATE: 65 BPM | OXYGEN SATURATION: 98 % | DIASTOLIC BLOOD PRESSURE: 84 MMHG | BODY MASS INDEX: 29.92 KG/M2 | HEIGHT: 69 IN | WEIGHT: 202 LBS | SYSTOLIC BLOOD PRESSURE: 140 MMHG

## 2018-01-22 DIAGNOSIS — Z12.5 SCREENING FOR PROSTATE CANCER: ICD-10-CM

## 2018-01-22 DIAGNOSIS — I10 ESSENTIAL HYPERTENSION: ICD-10-CM

## 2018-01-22 DIAGNOSIS — E78.00 PURE HYPERCHOLESTEROLEMIA: ICD-10-CM

## 2018-01-22 DIAGNOSIS — R39.12 POOR URINARY STREAM: ICD-10-CM

## 2018-01-22 DIAGNOSIS — N18.30 STAGE 3 CHRONIC KIDNEY DISEASE (HCC): ICD-10-CM

## 2018-01-22 DIAGNOSIS — M17.0 PRIMARY OSTEOARTHRITIS OF BOTH KNEES: Primary | ICD-10-CM

## 2018-01-22 DIAGNOSIS — Z12.5 SCREENING FOR MALIGNANT NEOPLASM OF PROSTATE: ICD-10-CM

## 2018-01-22 DIAGNOSIS — I44.2 ATRIOVENTRICULAR BLOCK, COMPLETE (HCC): ICD-10-CM

## 2018-01-22 PROBLEM — I35.0 NONRHEUMATIC AORTIC VALVE STENOSIS: Status: ACTIVE | Noted: 2017-05-16

## 2018-01-22 PROBLEM — I25.700 CORONARY ARTERY DISEASE INVOLVING CORONARY BYPASS GRAFT WITH UNSTABLE ANGINA PECTORIS (HCC): Status: ACTIVE | Noted: 2017-12-28

## 2018-01-22 LAB
ANION GAP SERPL CALCULATED.3IONS-SCNC: 17 MMOL/L (ref 7–19)
BUN BLDV-MCNC: 20 MG/DL (ref 8–23)
CALCIUM SERPL-MCNC: 9.7 MG/DL (ref 8.8–10.2)
CHLORIDE BLD-SCNC: 100 MMOL/L (ref 98–111)
CO2: 24 MMOL/L (ref 22–29)
CREAT SERPL-MCNC: 1.8 MG/DL (ref 0.5–1.2)
GFR NON-AFRICAN AMERICAN: 36
GLUCOSE BLD-MCNC: 85 MG/DL (ref 74–109)
POTASSIUM SERPL-SCNC: 5.3 MMOL/L (ref 3.5–5)
SODIUM BLD-SCNC: 141 MMOL/L (ref 136–145)

## 2018-01-22 PROCEDURE — 1111F DSCHRG MED/CURRENT MED MERGE: CPT | Performed by: INTERNAL MEDICINE

## 2018-01-22 PROCEDURE — 99495 TRANSJ CARE MGMT MOD F2F 14D: CPT | Performed by: INTERNAL MEDICINE

## 2018-01-22 NOTE — PROGRESS NOTES
Post-Discharge Transitional Care Management Services      Viola Beltran   YOB: 1935    Date of Office Visit:  1/22/2018  Date of Hospital Admission: 1/5/18  Date of Hospital Discharge: 1/9/18      Care management risk score Rising risk (score 2-5) and Complex Care (Scores >=6): No Risk Score On File       Patient Active Problem List   Diagnosis    Essential hypertension    Chronic kidney disease    Pure hypercholesterolemia    Primary osteoarthritis of both knees    Murmur, cardiac    Atrioventricular block, complete (HCC)       Allergies   Allergen Reactions    Sulfa Antibiotics        Medications listed as ordered at the time of discharge from hospital  Allopurinol 100 continued, aspirin 81 continued, atorvastatin 40, 2 a day to make 80 mg per day, total Q10, irbesartan 300, TMG 0.4  with the addition of amlodipine 5 mg daily    Plavix 75 was discontinued, as well as atenolol/chlorthalidone 100/25     Medications marked \"taking\" at this time  Outpatient Prescriptions Marked as Taking for the 1/22/18 encounter (Office Visit) with Edy Fuentes MD   Medication Sig Dispense Refill    amLODIPine (NORVASC) 5 MG tablet Take 5 mg by mouth daily      allopurinol (ZYLOPRIM) 100 MG tablet Take 100 mg by mouth daily      atorvastatin (LIPITOR) 80 MG tablet Take 1 tablet by mouth daily 90 tablet 3    aspirin 81 MG tablet Take 81 mg by mouth daily      Coenzyme Q10 (CO Q 10) 10 MG CAPS Take 1 capsule by mouth daily      irbesartan (AVAPRO) 300 MG tablet Take 300 mg by mouth nightly      NITROGLYCERIN SL Place 1 tablet under the tongue One under tongue as needed for chest pain.           Medications patient taking as of now reconciled against medications ordered at time of hospital discharge As above, new medication amlodipine 5, discontinue medications include Plavix, and atenolol/chlorthalidone    Vitals:    01/22/18 1508 01/22/18 1514   BP: (!) 150/80 (!) 140/84   Pulse: 65    SpO2: 98% nicely, pacemaker insertion site is clean, right foot is now nontender. Physical Exam   Constitutional: He appears well-developed and well-nourished. HENT:   Head: Normocephalic and atraumatic. Eyes: Pupils are equal, round, and reactive to light. No scleral icterus. Neck: No JVD present. Cardiovascular: Regular rhythm. No murmur heard. Pulmonary/Chest: No respiratory distress. He exhibits no tenderness. Abdominal: He exhibits no mass. There is no tenderness. Musculoskeletal: He exhibits no edema or deformity. Lymphadenopathy:     He has no cervical adenopathy. Neurological: He is alert. No cranial nerve deficit. Skin: Skin is warm. No erythema. Assessment/Plan:  Bobby Reyes was seen today for follow-up from hospital.    Diagnoses and all orders for this visit:    Primary osteoarthritis of both knees  -     CBC; Future  -     Comprehensive Metabolic Panel; Future  -     Lipid Panel; Future  -     TSH without Reflex; Future  -     UA W/REFLEX CULTURE; Future    Pure hypercholesterolemia  -     CBC; Future  -     Comprehensive Metabolic Panel; Future  -     Lipid Panel; Future  -     TSH without Reflex; Future  -     UA W/REFLEX CULTURE; Future    Stage 3 chronic kidney disease  -     CBC; Future  -     Comprehensive Metabolic Panel; Future  -     Lipid Panel; Future  -     TSH without Reflex; Future  -     UA W/REFLEX CULTURE; Future    Essential hypertension  -     CBC; Future  -     Comprehensive Metabolic Panel; Future  -     Lipid Panel; Future  -     TSH without Reflex; Future  -     UA W/REFLEX CULTURE; Future    Atrioventricular block, complete (HCC)  -     CBC; Future  -     Comprehensive Metabolic Panel; Future  -     Lipid Panel; Future  -     TSH without Reflex; Future  -     UA W/REFLEX CULTURE; Future    Screening for prostate cancer  -     PSA; Future    Screening for malignant neoplasm of prostate  -     PSA; Future    Poor urinary stream   -     PSA;  Future

## 2018-03-12 ENCOUNTER — TELEPHONE (OUTPATIENT)
Dept: CARDIOLOGY | Facility: CLINIC | Age: 83
End: 2018-03-12

## 2018-03-12 NOTE — TELEPHONE ENCOUNTER
"Patient called RN and stated that he was up washing dishes in the kitchen and had an \"episode\".  Reports a dull pain that started in lower back and progressed up to neck/jaw.  Pain then started to radiate down LLE and was sharp in nature.  Patient denied shortness of breath or lightheadedness.  RN requested that patient send a remote pacemaker transmission.  RN received transmission and noted nothing of significance.  Patient also reported HTN, with most recent BP being 161/98 mmHg and highest this morning being 177/118 mmHg.  Pain subsided and BP has been improving over the course of the day (most recent 146/95 mmHg at 12:30).  RN discussed with GIANNI Patel, and GIANNI Rush.  Patient instructed to follow up with PCP about pain and HTN but that we could see him later in the week if PCP found that to be appropriate.  Patient also instructed to go to ED if another pain/HTN episode occurred.  Patient verbalized understanding.    "

## 2018-04-11 ENCOUNTER — OFFICE VISIT (OUTPATIENT)
Dept: CARDIOLOGY | Facility: CLINIC | Age: 83
End: 2018-04-11

## 2018-04-11 VITALS
HEIGHT: 69 IN | SYSTOLIC BLOOD PRESSURE: 170 MMHG | HEART RATE: 74 BPM | DIASTOLIC BLOOD PRESSURE: 86 MMHG | OXYGEN SATURATION: 98 % | BODY MASS INDEX: 30.21 KG/M2 | WEIGHT: 204 LBS

## 2018-04-11 DIAGNOSIS — I25.810 CORONARY ARTERY DISEASE INVOLVING CORONARY BYPASS GRAFT OF NATIVE HEART WITHOUT ANGINA PECTORIS: ICD-10-CM

## 2018-04-11 DIAGNOSIS — Z95.0 PACEMAKER: Primary | ICD-10-CM

## 2018-04-11 DIAGNOSIS — E78.2 MIXED HYPERLIPIDEMIA: ICD-10-CM

## 2018-04-11 DIAGNOSIS — I35.0 NONRHEUMATIC AORTIC VALVE STENOSIS: ICD-10-CM

## 2018-04-11 DIAGNOSIS — I10 ESSENTIAL HYPERTENSION: ICD-10-CM

## 2018-04-11 PROBLEM — R55 SYNCOPE: Status: RESOLVED | Noted: 2017-12-28 | Resolved: 2018-04-11

## 2018-04-11 PROBLEM — I25.700 ATHEROSCLEROSIS OF CABG W UNSTABLE ANGINA PECTORIS (HCC): Status: RESOLVED | Noted: 2017-12-28 | Resolved: 2018-04-11

## 2018-04-11 PROBLEM — I45.9 HEART BLOCK: Status: RESOLVED | Noted: 2018-01-05 | Resolved: 2018-04-11

## 2018-04-11 PROBLEM — I44.1 SECOND DEGREE HEART BLOCK: Status: RESOLVED | Noted: 2017-12-28 | Resolved: 2018-04-11

## 2018-04-11 PROCEDURE — 99214 OFFICE O/P EST MOD 30 MIN: CPT | Performed by: INTERNAL MEDICINE

## 2018-04-11 PROCEDURE — 93000 ELECTROCARDIOGRAM COMPLETE: CPT | Performed by: INTERNAL MEDICINE

## 2018-04-11 RX ORDER — ATORVASTATIN CALCIUM 80 MG/1
80 TABLET, FILM COATED ORAL DAILY
COMMUNITY
End: 2019-08-09 | Stop reason: HOSPADM

## 2018-04-11 RX ORDER — AMLODIPINE BESYLATE 5 MG/1
5 TABLET ORAL DAILY
Qty: 90 TABLET | Refills: 3 | Status: ON HOLD | OUTPATIENT
Start: 2018-04-11 | End: 2019-03-18 | Stop reason: SDUPTHER

## 2018-04-11 NOTE — ASSESSMENT & PLAN NOTE
Elevated today but has been out of Norvasc for 2 days.  Continue Avapro 300 and I renewed the prescription for Norvasc.  Monitor closely and call us back if SBP routinely >140 once back on Norvasc.

## 2018-04-11 NOTE — ASSESSMENT & PLAN NOTE
Stable; asymptomatic.  Continue aspirin 81 mg daily for life, as well as other risk factor modification.

## 2018-04-11 NOTE — ASSESSMENT & PLAN NOTE
LDL not available for my review. Goal LDL <70, so if this cannot be achieved on current statin therapy, please consider initiating PCSK9 inhibitor.

## 2018-04-11 NOTE — PROGRESS NOTES
Subjective:     Encounter Date:04/11/2018      Patient ID: Mireille Magallanes is a 82 y.o. male.    Chief Complaint: CAD, AF, htn f/u  82-year-old male initially referred to me in December 2017 with lightheadedness and syncope.  He also has a history of coronary disease including four-vessel CABG in 2001 and PCI to the LAD in 2014.  He was found to have high degree AV block, and underwent successful implantation of dual-chamber [employer] pacemaker on 1/8/18.  This is his first clinic follow-up since that time.      S/p PPM - doing well but having some shoulder soreness. 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  HTN - been running higher lately but ran out of norvasc 2 days ago so higher today. No associated symptoms.  CAD - two episodes of right-sided pain over last few months. No exertional symptoms, no associated dyspnea.    The following portions of the patient's history were reviewed and updated as appropriate: allergies, current medications, past family history, past medical history, past social history, past surgical history and problem list.    Review of Systems   Constitution: Negative for malaise/fatigue.   Cardiovascular: Negative for chest pain, claudication, dyspnea on exertion, leg swelling, near-syncope, orthopnea, palpitations, paroxysmal nocturnal dyspnea and syncope.   Respiratory: Negative for shortness of breath.    Hematologic/Lymphatic: Does not bruise/bleed easily.        Objective:      Vitals:    04/11/18 1345   BP: 170/86   Pulse: 74   SpO2: 98%     Physical Exam   Constitutional: He is oriented to person, place, and time. He appears well-developed and well-nourished.   Neck: No JVD present.   Cardiovascular: Normal rate, regular rhythm and intact distal pulses.    Murmur heard.   Harsh midsystolic murmur is present with a grade of 3/6  at the upper right sternal border radiating to the neck  Pulmonary/Chest: Effort normal and breath  sounds normal.   Musculoskeletal: He exhibits no edema.   Neurological: He is alert and oriented to person, place, and time.   Skin: Skin is warm and dry.       Lab Review:         ECG 12 Lead  Date/Time: 4/11/2018 6:42 PM  Performed by: EDSON BEDOYA  Authorized by: EDSON BEDOYA   Rhythm: paced  Clinical impression: abnormal ECG            Reviewed pacing interrogation from 2/20/18: Atrial pacing 9%, 87% V pacing.  10 ATR episodes that appeared to be A. fib with average ventricular rate , but longest duration was only 13 seconds.    Results for orders placed during the hospital encounter of 05/24/17   Adult Transthoracic Echo Complete    Narrative · Normal LVEF (>55%).  · Mild to moderate aortic valve stenosis is present.  · Left ventricular wall thickness is consistent with borderline concentric   hypertrophy.  · Mild mitral valve regurgitation is present  · Left ventricular diastolic dysfunction (grade II) consistent with   pseudonormalization.  · Left atrial cavity size is severely dilated.  · Normal size and function of RV.            Assessment/Plan:     Problem List Items Addressed This Visit        Cardiovascular and Mediastinum    Essential hypertension    Current Assessment & Plan     Elevated today but has been out of Norvasc for 2 days.  Continue Avapro 300 and I renewed the prescription for Norvasc.  Monitor closely and call us back if SBP routinely >140 once back on Norvasc.         Relevant Medications    amLODIPine (NORVASC) 5 MG tablet    Mixed hyperlipidemia    Current Assessment & Plan     LDL not available for my review. Goal LDL <70, so if this cannot be achieved on current statin therapy, please consider initiating PCSK9 inhibitor.             Relevant Medications    atorvastatin (LIPITOR) 80 MG tablet    Nonrheumatic aortic valve stenosis    Overview     Mild-moderate on echo May '17         Current Assessment & Plan     Repeat echo May '20         Relevant Medications    amLODIPine  (NORVASC) 5 MG tablet    Coronary artery disease involving coronary bypass graft of native heart without angina pectoris    Current Assessment & Plan     Stable; asymptomatic.  Continue aspirin 81 mg daily for life, as well as other risk factor modification.         Pacemaker - Primary    Overview     [employer] DC PPM implanted 1/8/18 for high degree AVB (was 2nd degree, Mobitz II then quickly progressed to 3rd degree with syncope)               F/u 6 months    Kaushik Toledo MD  04/11/2018  6:45 PM

## 2018-05-14 ENCOUNTER — TELEPHONE (OUTPATIENT)
Dept: CARDIOLOGY | Facility: CLINIC | Age: 83
End: 2018-05-14

## 2018-05-14 NOTE — TELEPHONE ENCOUNTER
RN called patient to see if he could come in office on 5/22/2018 to have a pacemaker feature (AV search--will turn off because v-paces > 75%) turned off by device rep.  Left message for call back.  If 5/22/2018 does not work, patient can come in at a time that is convenient for him.

## 2018-05-15 DIAGNOSIS — E78.00 PURE HYPERCHOLESTEROLEMIA: ICD-10-CM

## 2018-05-15 DIAGNOSIS — Z12.5 SCREENING FOR MALIGNANT NEOPLASM OF PROSTATE: ICD-10-CM

## 2018-05-15 DIAGNOSIS — Z12.5 SCREENING FOR PROSTATE CANCER: ICD-10-CM

## 2018-05-15 DIAGNOSIS — R39.12 POOR URINARY STREAM: ICD-10-CM

## 2018-05-15 DIAGNOSIS — I44.2 ATRIOVENTRICULAR BLOCK, COMPLETE (HCC): ICD-10-CM

## 2018-05-15 DIAGNOSIS — N18.30 STAGE 3 CHRONIC KIDNEY DISEASE (HCC): ICD-10-CM

## 2018-05-15 DIAGNOSIS — M17.0 PRIMARY OSTEOARTHRITIS OF BOTH KNEES: ICD-10-CM

## 2018-05-15 DIAGNOSIS — I10 ESSENTIAL HYPERTENSION: ICD-10-CM

## 2018-05-15 LAB
ALBUMIN SERPL-MCNC: 4.4 G/DL (ref 3.5–5.2)
ALP BLD-CCNC: 77 U/L (ref 40–130)
ALT SERPL-CCNC: 21 U/L (ref 5–41)
ANION GAP SERPL CALCULATED.3IONS-SCNC: 16 MMOL/L (ref 7–19)
AST SERPL-CCNC: 25 U/L (ref 5–40)
BACTERIA: NEGATIVE /HPF
BILIRUB SERPL-MCNC: 0.7 MG/DL (ref 0.2–1.2)
BILIRUBIN URINE: NEGATIVE
BLOOD, URINE: NEGATIVE
BUN BLDV-MCNC: 29 MG/DL (ref 8–23)
CALCIUM SERPL-MCNC: 9.6 MG/DL (ref 8.8–10.2)
CHLORIDE BLD-SCNC: 103 MMOL/L (ref 98–111)
CHOLESTEROL, TOTAL: 134 MG/DL (ref 160–199)
CLARITY: CLEAR
CO2: 22 MMOL/L (ref 22–29)
COLOR: YELLOW
CREAT SERPL-MCNC: 1.9 MG/DL (ref 0.5–1.2)
EPITHELIAL CELLS, UA: 0 /HPF (ref 0–5)
GFR NON-AFRICAN AMERICAN: 34
GLUCOSE BLD-MCNC: 96 MG/DL (ref 74–109)
GLUCOSE URINE: NEGATIVE MG/DL
HCT VFR BLD CALC: 41.2 % (ref 42–52)
HDLC SERPL-MCNC: 38 MG/DL (ref 55–121)
HEMOGLOBIN: 13.5 G/DL (ref 14–18)
HYALINE CASTS: 0 /HPF (ref 0–8)
KETONES, URINE: NEGATIVE MG/DL
LDL CHOLESTEROL CALCULATED: 80 MG/DL
LEUKOCYTE ESTERASE, URINE: NEGATIVE
MCH RBC QN AUTO: 29.6 PG (ref 27–31)
MCHC RBC AUTO-ENTMCNC: 32.8 G/DL (ref 33–37)
MCV RBC AUTO: 90.4 FL (ref 80–94)
NITRITE, URINE: NEGATIVE
PDW BLD-RTO: 13.7 % (ref 11.5–14.5)
PH UA: 6
PLATELET # BLD: 176 K/UL (ref 130–400)
PMV BLD AUTO: 10.6 FL (ref 9.4–12.4)
POTASSIUM SERPL-SCNC: 4.7 MMOL/L (ref 3.5–5)
PROSTATE SPECIFIC ANTIGEN: 2.2 NG/ML (ref 0–4)
PROTEIN UA: 30 MG/DL
RBC # BLD: 4.56 M/UL (ref 4.7–6.1)
RBC UA: 0 /HPF (ref 0–4)
SODIUM BLD-SCNC: 141 MMOL/L (ref 136–145)
SPECIFIC GRAVITY UA: 1.01
TOTAL PROTEIN: 7.2 G/DL (ref 6.6–8.7)
TRIGL SERPL-MCNC: 82 MG/DL (ref 0–149)
TSH SERPL DL<=0.05 MIU/L-ACNC: 2.42 UIU/ML (ref 0.27–4.2)
URINE REFLEX TO CULTURE: ABNORMAL
UROBILINOGEN, URINE: 0.2 E.U./DL
WBC # BLD: 6.3 K/UL (ref 4.8–10.8)
WBC UA: 1 /HPF (ref 0–5)

## 2018-05-16 NOTE — TELEPHONE ENCOUNTER
RN explained to patient that we would like to turn a feature OFF on his pacemaker.  It is not urgent, so RN was wondering if patient could come to clinic on 5/22/2018, when patient also has appointment with Dr. Camarillo.  Patient reports his other appointment is at 2:30 p.m. and he could have his device reprogrammed at 13:30.  Patient added to schedule and device rep notified.

## 2018-05-22 ENCOUNTER — CLINICAL SUPPORT NO REQUIREMENTS (OUTPATIENT)
Dept: CARDIOLOGY | Facility: CLINIC | Age: 83
End: 2018-05-22

## 2018-05-22 ENCOUNTER — OFFICE VISIT (OUTPATIENT)
Dept: INTERNAL MEDICINE | Age: 83
End: 2018-05-22
Payer: MEDICARE

## 2018-05-22 VITALS
BODY MASS INDEX: 30.21 KG/M2 | SYSTOLIC BLOOD PRESSURE: 136 MMHG | OXYGEN SATURATION: 96 % | DIASTOLIC BLOOD PRESSURE: 74 MMHG | HEIGHT: 69 IN | WEIGHT: 204 LBS | HEART RATE: 78 BPM

## 2018-05-22 DIAGNOSIS — I44.2 ATRIOVENTRICULAR BLOCK, COMPLETE (HCC): Primary | ICD-10-CM

## 2018-05-22 DIAGNOSIS — E78.00 PURE HYPERCHOLESTEROLEMIA: ICD-10-CM

## 2018-05-22 DIAGNOSIS — I10 ESSENTIAL HYPERTENSION: ICD-10-CM

## 2018-05-22 DIAGNOSIS — Z91.81 AT HIGH RISK FOR FALLS: ICD-10-CM

## 2018-05-22 DIAGNOSIS — Z95.0 PACEMAKER: Primary | ICD-10-CM

## 2018-05-22 DIAGNOSIS — N18.30 STAGE 3 CHRONIC KIDNEY DISEASE (HCC): ICD-10-CM

## 2018-05-22 DIAGNOSIS — I44.1 MOBITZ TYPE II BLOCK: ICD-10-CM

## 2018-05-22 PROCEDURE — G8598 ASA/ANTIPLAT THER USED: HCPCS | Performed by: INTERNAL MEDICINE

## 2018-05-22 PROCEDURE — G8427 DOCREV CUR MEDS BY ELIG CLIN: HCPCS | Performed by: INTERNAL MEDICINE

## 2018-05-22 PROCEDURE — G8417 CALC BMI ABV UP PARAM F/U: HCPCS | Performed by: INTERNAL MEDICINE

## 2018-05-22 PROCEDURE — 93288 INTERROG EVL PM/LDLS PM IP: CPT | Performed by: INTERNAL MEDICINE

## 2018-05-22 PROCEDURE — 1036F TOBACCO NON-USER: CPT | Performed by: INTERNAL MEDICINE

## 2018-05-22 PROCEDURE — 1123F ACP DISCUSS/DSCN MKR DOCD: CPT | Performed by: INTERNAL MEDICINE

## 2018-05-22 PROCEDURE — 4040F PNEUMOC VAC/ADMIN/RCVD: CPT | Performed by: INTERNAL MEDICINE

## 2018-05-22 PROCEDURE — 99214 OFFICE O/P EST MOD 30 MIN: CPT | Performed by: INTERNAL MEDICINE

## 2018-05-22 RX ORDER — ATORVASTATIN CALCIUM 80 MG/1
80 TABLET, FILM COATED ORAL DAILY
Qty: 90 TABLET | Refills: 3 | Status: SHIPPED | OUTPATIENT
Start: 2018-05-22 | End: 2019-07-24 | Stop reason: SDUPTHER

## 2018-05-22 RX ORDER — AMLODIPINE BESYLATE 5 MG/1
10 TABLET ORAL
COMMUNITY
Start: 2018-04-11 | End: 2022-06-01

## 2018-05-22 RX ORDER — ATORVASTATIN CALCIUM 80 MG/1
80 TABLET, FILM COATED ORAL
COMMUNITY
End: 2018-05-22 | Stop reason: SDUPTHER

## 2018-05-22 RX ORDER — IRBESARTAN 300 MG/1
300 TABLET ORAL NIGHTLY
Qty: 90 TABLET | Refills: 3 | Status: SHIPPED | OUTPATIENT
Start: 2018-05-22 | End: 2019-04-29 | Stop reason: SDUPTHER

## 2018-05-22 RX ORDER — FUROSEMIDE 20 MG/1
TABLET ORAL
Qty: 30 TABLET | Refills: 0 | Status: SHIPPED | OUTPATIENT
Start: 2018-05-22 | End: 2019-12-27 | Stop reason: ALTCHOICE

## 2018-05-22 ASSESSMENT — ENCOUNTER SYMPTOMS
NAUSEA: 0
COUGH: 0
ABDOMINAL PAIN: 0
SORE THROAT: 0
SHORTNESS OF BREATH: 0
TROUBLE SWALLOWING: 0
RHINORRHEA: 0

## 2018-06-05 DIAGNOSIS — R06.09 DYSPNEA ON EXERTION: Primary | ICD-10-CM

## 2018-06-05 DIAGNOSIS — I97.190: ICD-10-CM

## 2018-06-06 ENCOUNTER — TELEPHONE (OUTPATIENT)
Dept: CARDIOLOGY | Facility: CLINIC | Age: 83
End: 2018-06-06

## 2018-06-06 NOTE — TELEPHONE ENCOUNTER
Called patient to discuss ordered echo.  Left message with wife.  He will return my call later today.

## 2018-06-08 NOTE — TELEPHONE ENCOUNTER
Mago Werner MA, spoke with patient and told him that Dr. Toledo requested the echo based on recent pacemaker interrogation results.

## 2018-06-15 ENCOUNTER — HOSPITAL ENCOUNTER (OUTPATIENT)
Dept: CARDIOLOGY | Facility: HOSPITAL | Age: 83
Discharge: HOME | End: 2018-06-15
Attending: INTERNAL MEDICINE | Admitting: INTERNAL MEDICINE

## 2018-06-15 VITALS
SYSTOLIC BLOOD PRESSURE: 170 MMHG | WEIGHT: 204 LBS | DIASTOLIC BLOOD PRESSURE: 86 MMHG | HEIGHT: 69 IN | BODY MASS INDEX: 30.21 KG/M2

## 2018-06-15 DIAGNOSIS — I97.190: ICD-10-CM

## 2018-06-15 DIAGNOSIS — R06.09 DYSPNEA ON EXERTION: ICD-10-CM

## 2018-06-15 PROCEDURE — 93306 TTE W/DOPPLER COMPLETE: CPT | Performed by: INTERNAL MEDICINE

## 2018-06-15 PROCEDURE — 93306 TTE W/DOPPLER COMPLETE: CPT

## 2018-06-25 LAB
BH CV ECHO MEAS - AO MAX PG (FULL): 20.4 MMHG
BH CV ECHO MEAS - AO MAX PG: 23.2 MMHG
BH CV ECHO MEAS - AO MEAN PG (FULL): 13 MMHG
BH CV ECHO MEAS - AO MEAN PG: 14 MMHG
BH CV ECHO MEAS - AO ROOT AREA (BSA CORRECTED): 1.7
BH CV ECHO MEAS - AO ROOT AREA: 9.6 CM^2
BH CV ECHO MEAS - AO ROOT DIAM: 3.5 CM
BH CV ECHO MEAS - AO V2 MAX: 241 CM/SEC
BH CV ECHO MEAS - AO V2 MEAN: 173 CM/SEC
BH CV ECHO MEAS - AO V2 VTI: 52.1 CM
BH CV ECHO MEAS - AVA(I,A): 1.2 CM^2
BH CV ECHO MEAS - AVA(I,D): 1.2 CM^2
BH CV ECHO MEAS - AVA(V,A): 1.1 CM^2
BH CV ECHO MEAS - AVA(V,D): 1.1 CM^2
BH CV ECHO MEAS - BSA(HAYCOCK): 2.1 M^2
BH CV ECHO MEAS - BSA: 2.1 M^2
BH CV ECHO MEAS - BZI_BMI: 30.1 KILOGRAMS/M^2
BH CV ECHO MEAS - BZI_METRIC_HEIGHT: 175.3 CM
BH CV ECHO MEAS - BZI_METRIC_WEIGHT: 92.5 KG
BH CV ECHO MEAS - CONTRAST EF 4CH: 45 ML/M^2
BH CV ECHO MEAS - EDV(CUBED): 183.3 ML
BH CV ECHO MEAS - EDV(MOD-SP4): 78.6 ML
BH CV ECHO MEAS - EDV(TEICH): 158.8 ML
BH CV ECHO MEAS - EF(CUBED): 53.5 %
BH CV ECHO MEAS - EF(TEICH): 44.8 %
BH CV ECHO MEAS - ESV(CUBED): 85.2 ML
BH CV ECHO MEAS - ESV(MOD-SP4): 43.2 ML
BH CV ECHO MEAS - ESV(TEICH): 87.7 ML
BH CV ECHO MEAS - FS: 22.5 %
BH CV ECHO MEAS - IVS/LVPW: 1.1
BH CV ECHO MEAS - IVSD: 1.2 CM
BH CV ECHO MEAS - LA DIMENSION: 5 CM
BH CV ECHO MEAS - LA/AO: 1.4
BH CV ECHO MEAS - LAT PEAK E' VEL: 4.9 CM/SEC
BH CV ECHO MEAS - LV DIASTOLIC VOL/BSA (35-75): 37.7 ML/M^2
BH CV ECHO MEAS - LV MASS(C)D: 256.8 GRAMS
BH CV ECHO MEAS - LV MASS(C)DI: 123.3 GRAMS/M^2
BH CV ECHO MEAS - LV MAX PG: 2.8 MMHG
BH CV ECHO MEAS - LV MEAN PG: 1 MMHG
BH CV ECHO MEAS - LV SYSTOLIC VOL/BSA (12-30): 20.7 ML/M^2
BH CV ECHO MEAS - LV V1 MAX: 83.5 CM/SEC
BH CV ECHO MEAS - LV V1 MEAN: 52.3 CM/SEC
BH CV ECHO MEAS - LV V1 VTI: 20 CM
BH CV ECHO MEAS - LVIDD: 5.7 CM
BH CV ECHO MEAS - LVIDS: 4.4 CM
BH CV ECHO MEAS - LVLD AP4: 7.4 CM
BH CV ECHO MEAS - LVLS AP4: 7.1 CM
BH CV ECHO MEAS - LVOT AREA (M): 3.1 CM^2
BH CV ECHO MEAS - LVOT AREA: 3.1 CM^2
BH CV ECHO MEAS - LVOT DIAM: 2 CM
BH CV ECHO MEAS - LVPWD: 1 CM
BH CV ECHO MEAS - MR ALIAS VEL: 38 CM/SEC
BH CV ECHO MEAS - MR ERO: 0.07 CM^2
BH CV ECHO MEAS - MR FLOW RATE: 38.2 CM^3/SEC
BH CV ECHO MEAS - MR MAX PG: 129.5 MMHG
BH CV ECHO MEAS - MR MAX VEL: 569 CM/SEC
BH CV ECHO MEAS - MR MEAN PG: 81 MMHG
BH CV ECHO MEAS - MR MEAN VEL: 413 CM/SEC
BH CV ECHO MEAS - MR PISA RADIUS: 0.4 CM
BH CV ECHO MEAS - MR PISA: 1 CM^2
BH CV ECHO MEAS - MR VOLUME: 13.6 ML
BH CV ECHO MEAS - MR VTI: 203 CM
BH CV ECHO MEAS - MV A MAX VEL: 93.1 CM/SEC
BH CV ECHO MEAS - MV DEC SLOPE: 902 CM/SEC^2
BH CV ECHO MEAS - MV DEC TIME: 0.17 SEC
BH CV ECHO MEAS - MV E MAX VEL: 134 CM/SEC
BH CV ECHO MEAS - MV E/A: 1.4
BH CV ECHO MEAS - MV P1/2T MAX VEL: 141 CM/SEC
BH CV ECHO MEAS - MV P1/2T: 45.8 MSEC
BH CV ECHO MEAS - MVA P1/2T LCG: 1.6 CM^2
BH CV ECHO MEAS - MVA(P1/2T): 4.8 CM^2
BH CV ECHO MEAS - PI END-D VEL: 155 CM/SEC
BH CV ECHO MEAS - RAP SYSTOLE: 5 MMHG
BH CV ECHO MEAS - RVSP: 34.8 MMHG
BH CV ECHO MEAS - SI(AO): 240.6 ML/M^2
BH CV ECHO MEAS - SI(CUBED): 47.1 ML/M^2
BH CV ECHO MEAS - SI(LVOT): 30.2 ML/M^2
BH CV ECHO MEAS - SI(MOD-SP4): 17 ML/M^2
BH CV ECHO MEAS - SI(TEICH): 34.1 ML/M^2
BH CV ECHO MEAS - SV(AO): 501.3 ML
BH CV ECHO MEAS - SV(CUBED): 98.1 ML
BH CV ECHO MEAS - SV(LVOT): 62.8 ML
BH CV ECHO MEAS - SV(MOD-SP4): 35.4 ML
BH CV ECHO MEAS - SV(TEICH): 71.1 ML
BH CV ECHO MEAS - TR MAX VEL: 273 CM/SEC
LEFT ATRIUM VOLUME INDEX: 54.8 ML/M2
MAXIMAL PREDICTED HEART RATE: 140 BPM
STRESS TARGET HR: 119 BPM

## 2018-06-27 NOTE — PROGRESS NOTES
Patient has been notified and states that he told her about his low heart rate, and states that it has been running in the 40 but not making him feel bad. He said that he does sometime get SOB but never bad enough to affect his daily routine. And states he will keep his appointment in October unless something changes.

## 2018-11-08 ENCOUNTER — OFFICE VISIT (OUTPATIENT)
Dept: CARDIOLOGY | Facility: CLINIC | Age: 83
End: 2018-11-08

## 2018-11-08 VITALS
BODY MASS INDEX: 30.36 KG/M2 | HEIGHT: 69 IN | DIASTOLIC BLOOD PRESSURE: 100 MMHG | HEART RATE: 69 BPM | WEIGHT: 205 LBS | SYSTOLIC BLOOD PRESSURE: 182 MMHG

## 2018-11-08 DIAGNOSIS — I44.2 ATRIOVENTRICULAR BLOCK, COMPLETE (HCC): ICD-10-CM

## 2018-11-08 DIAGNOSIS — Z91.81 AT HIGH RISK FOR FALLS: ICD-10-CM

## 2018-11-08 DIAGNOSIS — I10 ESSENTIAL HYPERTENSION: ICD-10-CM

## 2018-11-08 DIAGNOSIS — E78.2 MIXED HYPERLIPIDEMIA: ICD-10-CM

## 2018-11-08 DIAGNOSIS — N18.30 STAGE 3 CHRONIC KIDNEY DISEASE (HCC): ICD-10-CM

## 2018-11-08 DIAGNOSIS — I35.0 NONRHEUMATIC AORTIC VALVE STENOSIS: ICD-10-CM

## 2018-11-08 DIAGNOSIS — Z95.0 PACEMAKER: ICD-10-CM

## 2018-11-08 DIAGNOSIS — I25.810 CORONARY ARTERY DISEASE INVOLVING CORONARY BYPASS GRAFT OF NATIVE HEART WITHOUT ANGINA PECTORIS: Primary | ICD-10-CM

## 2018-11-08 DIAGNOSIS — E78.00 PURE HYPERCHOLESTEROLEMIA: ICD-10-CM

## 2018-11-08 LAB
ALBUMIN SERPL-MCNC: 4.2 G/DL (ref 3.5–5.2)
ALP BLD-CCNC: 75 U/L (ref 40–130)
ALT SERPL-CCNC: 14 U/L (ref 5–41)
ANION GAP SERPL CALCULATED.3IONS-SCNC: 14 MMOL/L (ref 7–19)
AST SERPL-CCNC: 20 U/L (ref 5–40)
BILIRUB SERPL-MCNC: 0.7 MG/DL (ref 0.2–1.2)
BUN BLDV-MCNC: 25 MG/DL (ref 8–23)
CALCIUM SERPL-MCNC: 10 MG/DL (ref 8.8–10.2)
CHLORIDE BLD-SCNC: 103 MMOL/L (ref 98–111)
CO2: 24 MMOL/L (ref 22–29)
CREAT SERPL-MCNC: 1.8 MG/DL (ref 0.5–1.2)
GFR NON-AFRICAN AMERICAN: 36
GLUCOSE BLD-MCNC: 101 MG/DL (ref 74–109)
HCT VFR BLD CALC: 43.8 % (ref 42–52)
HEMOGLOBIN: 14.4 G/DL (ref 14–18)
LDL CHOLESTEROL DIRECT: 158 MG/DL
MCH RBC QN AUTO: 29.8 PG (ref 27–31)
MCHC RBC AUTO-ENTMCNC: 32.9 G/DL (ref 33–37)
MCV RBC AUTO: 90.7 FL (ref 80–94)
PDW BLD-RTO: 13.2 % (ref 11.5–14.5)
PLATELET # BLD: 185 K/UL (ref 130–400)
PMV BLD AUTO: 10.3 FL (ref 9.4–12.4)
POTASSIUM SERPL-SCNC: 5 MMOL/L (ref 3.5–5)
RBC # BLD: 4.83 M/UL (ref 4.7–6.1)
SODIUM BLD-SCNC: 141 MMOL/L (ref 136–145)
TOTAL PROTEIN: 7.5 G/DL (ref 6.6–8.7)
WBC # BLD: 6.4 K/UL (ref 4.8–10.8)

## 2018-11-08 PROCEDURE — 99214 OFFICE O/P EST MOD 30 MIN: CPT | Performed by: INTERNAL MEDICINE

## 2018-11-08 PROCEDURE — 93000 ELECTROCARDIOGRAM COMPLETE: CPT | Performed by: INTERNAL MEDICINE

## 2018-11-08 NOTE — PROGRESS NOTES
Subjective:     Encounter Date:11/08/2018      Patient ID: Mireille Magallanes is a 83 y.o. male.    Chief Complaint: routine f/u  83-year-old male initially referred to me in December 2017 with lightheadedness and syncope.  He also has a history of coronary disease including four-vessel CABG in 2001 and PCI to the LAD in 2014.  He was found to have high degree AV block, and underwent successful implantation of dual-chamber [employer] pacemaker on 1/8/18.      S/p PPM - doing well. 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  HTN - high today; patient states he was rushed today.  Checks daily at home; states 120s-130s/70s.  Takes norvasc 5 and avapro 300.  CAD - no chest pain. No exertional symptoms, no associated dyspnea.      The following portions of the patient's history were reviewed and updated as appropriate: allergies, current medications, past family history, past medical history, past social history, past surgical history and problem list.    Review of Systems   Constitution: Negative for malaise/fatigue.   Cardiovascular: Negative for chest pain, claudication, dyspnea on exertion, leg swelling, near-syncope, orthopnea, palpitations, paroxysmal nocturnal dyspnea and syncope.   Respiratory: Negative for shortness of breath.    Hematologic/Lymphatic: Does not bruise/bleed easily.        Objective:      Vitals:    11/08/18 1019   BP: (!) 182/100   Pulse: 69     Physical Exam   Constitutional: He is oriented to person, place, and time. He appears well-developed and well-nourished.   Neck: No JVD present.   Cardiovascular: Normal rate, regular rhythm and intact distal pulses.    Murmur heard.   Harsh midsystolic murmur is present with a grade of 2/6  at the upper right sternal border radiating to the neck  Pulmonary/Chest: Effort normal and breath sounds normal.   Musculoskeletal: He exhibits no edema.   Neurological: He is alert and oriented to person, place,  and time.   Skin: Skin is warm and dry.       Lab Review:         ECG 12 Lead  Date/Time: 11/8/2018 10:23 AM  Performed by: EDSON BEDOYA  Authorized by: EDSON BEDOYA   Comparison: compared with previous ECG from 4/11/2018  Similar to previous ECG  Rhythm: paced            Results for orders placed during the hospital encounter of 06/15/18   Adult Transthoracic Echo Complete W/ Cont if Necessary Per Protocol    Narrative · Left ventricular systolic function is low normal. EF 51-55%.  · Left ventricular wall thickness is consistent with mild concentric   hypertrophy.  · Mild to moderate aortic valve stenosis is present.  · Left ventricular diastolic dysfunction (grade II) consistent with   pseudonormalization.  · Mild mitral valve regurgitation is present  · Left atrial cavity size is severely dilated.  · Normal size and function of right ventricle.      ==============================================================================  OLD RECORDS REVIEWED:         []Manual[]Template  []Copied  Dual Chamber Pacemaker Evaluation Report  IN OFFICE INTERROGATION     May 22, 2018     Primary Cardiologist: Tre  : Guidant Model: [employer] MRI  Implant date: 01/08/2018     Reason for evaluation: brought in to turn of AV search because patient is > 75% v-paced.  Indication for pacemaker: Mobitz II Complete Heart Block     Measurements  Atrial sensing - P wave: 2.4 mV  Atrial threshold: 0.8V@ 0.4ms  Atrial lead impedance: 673 ohms  Ventricular sensing - R wave: PACED  Ventricular threshold: 0.6 V @ 0.4 ms  Ventricular lead impedance:   622 ohms      Diagnostic Data  Atrial paced: 8 %  Ventricular paced: 88 %     Episodes recorded since 02/20/2018:  10 ATR episodes recorded:  Longest duration approximately 26 seconds, average ventricular rates during episodes  bpm.  AT/AF burden <1%; total time in AT/AF 2.4 minutes.  Medications include ASA 81mg once daily.  1 NonSustV episode:  Duration approximately 15  seconds, average ventricular rate at onset 166 bpm.  Numerous PACs and PVCs noted on device counters.     Battery status: satisfactory, estimated 15 years remaining        Final Parameters  Mode:  DDDR  Lower rate: 60 bpm   Upper rate: 130 bpm  AV Delay: paced- 120-180 ms  Uvqscp-067-949 ms  Atrial - Amplitude: 1.5 V   Pulse width: 0.4 ms   Sensitivity: 0.25 mV     Ventricular - Amplitude: 2 V  Pulse width: 0.4 ms  Sensitivity: 1.5 mV    Changes made: AV search feature turned OFF.  Conclusions: normal pacemaker function, stable pacing and sensing thresholds and adequate battery reserve     Follow up: 6 months      Interrogation performed by Edwina Fischer RN, [place] Device Rep          Assessment/Plan:     Problem List Items Addressed This Visit        Cardiovascular and Mediastinum    Essential hypertension    Current Assessment & Plan     Elevated in office today but patient reports daily checks at home are all within acceptable range, therefore continue current doses of Avapro and Norvasc         Mixed hyperlipidemia    Current Assessment & Plan     Goal LDL less than 70; continue high potency statin and if goal cannot be achieved on this dose, consider addition of PCSK 9 inhibitor         Nonrheumatic aortic valve stenosis    Overview     Mild-moderate on echo May '18         Current Assessment & Plan     Surveillance echocardiography in 2020         Coronary artery disease involving coronary bypass graft of native heart without angina pectoris - Primary    Current Assessment & Plan     Stable; continue aspirin and statin         Pacemaker    Overview     [employer] DC PPM implanted 1/8/18 for high degree AVB (was 2nd degree, Mobitz II then quickly progressed to 3rd degree with syncope)         Current Assessment & Plan     Functioning normally             F/U 1 YEAR  Kaushik Toledo MD  11/08/2018  2:45 PM

## 2018-11-09 NOTE — ASSESSMENT & PLAN NOTE
Elevated in office today but patient reports daily checks at home are all within acceptable range, therefore continue current doses of Avapro and Norvasc

## 2018-11-19 ENCOUNTER — CLINICAL SUPPORT (OUTPATIENT)
Dept: CARDIOLOGY | Facility: CLINIC | Age: 83
End: 2018-11-19

## 2018-11-19 DIAGNOSIS — Z95.0 PACEMAKER: ICD-10-CM

## 2018-11-19 PROCEDURE — 93296 REM INTERROG EVL PM/IDS: CPT | Performed by: PHYSICIAN ASSISTANT

## 2018-11-19 PROCEDURE — 93294 REM INTERROG EVL PM/LDLS PM: CPT | Performed by: PHYSICIAN ASSISTANT

## 2018-11-20 ENCOUNTER — OFFICE VISIT (OUTPATIENT)
Dept: INTERNAL MEDICINE | Age: 83
End: 2018-11-20
Payer: MEDICARE

## 2018-11-20 VITALS
HEART RATE: 89 BPM | SYSTOLIC BLOOD PRESSURE: 132 MMHG | HEIGHT: 69 IN | OXYGEN SATURATION: 96 % | DIASTOLIC BLOOD PRESSURE: 80 MMHG | WEIGHT: 208.4 LBS | BODY MASS INDEX: 30.87 KG/M2 | TEMPERATURE: 97.2 F

## 2018-11-20 DIAGNOSIS — E78.00 PURE HYPERCHOLESTEROLEMIA: ICD-10-CM

## 2018-11-20 DIAGNOSIS — I10 ESSENTIAL HYPERTENSION: ICD-10-CM

## 2018-11-20 DIAGNOSIS — Z12.5 SCREENING FOR PROSTATE CANCER: ICD-10-CM

## 2018-11-20 DIAGNOSIS — Z00.00 ROUTINE GENERAL MEDICAL EXAMINATION AT A HEALTH CARE FACILITY: Primary | ICD-10-CM

## 2018-11-20 DIAGNOSIS — M17.0 PRIMARY OSTEOARTHRITIS OF BOTH KNEES: ICD-10-CM

## 2018-11-20 DIAGNOSIS — R05.9 COUGH: ICD-10-CM

## 2018-11-20 DIAGNOSIS — I25.700 CORONARY ARTERY DISEASE INVOLVING CORONARY BYPASS GRAFT OF NATIVE HEART WITH UNSTABLE ANGINA PECTORIS (HCC): ICD-10-CM

## 2018-11-20 DIAGNOSIS — J06.9 UPPER RESPIRATORY TRACT INFECTION, UNSPECIFIED TYPE: ICD-10-CM

## 2018-11-20 DIAGNOSIS — N18.30 STAGE 3 CHRONIC KIDNEY DISEASE (HCC): ICD-10-CM

## 2018-11-20 PROCEDURE — G8427 DOCREV CUR MEDS BY ELIG CLIN: HCPCS | Performed by: PHYSICIAN ASSISTANT

## 2018-11-20 PROCEDURE — G8417 CALC BMI ABV UP PARAM F/U: HCPCS | Performed by: PHYSICIAN ASSISTANT

## 2018-11-20 PROCEDURE — 1101F PT FALLS ASSESS-DOCD LE1/YR: CPT | Performed by: PHYSICIAN ASSISTANT

## 2018-11-20 PROCEDURE — 1123F ACP DISCUSS/DSCN MKR DOCD: CPT | Performed by: PHYSICIAN ASSISTANT

## 2018-11-20 PROCEDURE — 4040F PNEUMOC VAC/ADMIN/RCVD: CPT | Performed by: PHYSICIAN ASSISTANT

## 2018-11-20 PROCEDURE — G8598 ASA/ANTIPLAT THER USED: HCPCS | Performed by: PHYSICIAN ASSISTANT

## 2018-11-20 PROCEDURE — 1036F TOBACCO NON-USER: CPT | Performed by: PHYSICIAN ASSISTANT

## 2018-11-20 PROCEDURE — G8482 FLU IMMUNIZE ORDER/ADMIN: HCPCS | Performed by: PHYSICIAN ASSISTANT

## 2018-11-20 PROCEDURE — 99214 OFFICE O/P EST MOD 30 MIN: CPT | Performed by: PHYSICIAN ASSISTANT

## 2018-11-20 RX ORDER — AZITHROMYCIN 250 MG/1
TABLET, FILM COATED ORAL
Qty: 6 TABLET | Refills: 0 | Status: SHIPPED | OUTPATIENT
Start: 2018-11-20 | End: 2019-12-27 | Stop reason: ALTCHOICE

## 2018-11-20 RX ORDER — METHYLPREDNISOLONE 4 MG/1
TABLET ORAL
Qty: 1 KIT | Refills: 0 | Status: SHIPPED | OUTPATIENT
Start: 2018-11-20 | End: 2018-11-26

## 2018-11-20 ASSESSMENT — PATIENT HEALTH QUESTIONNAIRE - PHQ9
SUM OF ALL RESPONSES TO PHQ QUESTIONS 1-9: 0
2. FEELING DOWN, DEPRESSED OR HOPELESS: 0
SUM OF ALL RESPONSES TO PHQ QUESTIONS 1-9: 0
1. LITTLE INTEREST OR PLEASURE IN DOING THINGS: 0
SUM OF ALL RESPONSES TO PHQ9 QUESTIONS 1 & 2: 0

## 2018-11-20 ASSESSMENT — ENCOUNTER SYMPTOMS
EYE REDNESS: 0
COUGH: 1
NAUSEA: 0
CONSTIPATION: 0
EYE PAIN: 0
WHEEZING: 0
CHEST TIGHTNESS: 0
SORE THROAT: 0
PHOTOPHOBIA: 0
BACK PAIN: 0
VOMITING: 0
SINUS PRESSURE: 0
SHORTNESS OF BREATH: 0
COLOR CHANGE: 0
DIARRHEA: 0
ABDOMINAL PAIN: 0
RHINORRHEA: 1

## 2018-11-20 NOTE — PROGRESS NOTES
sinus pressure, sneezing and sore throat. Eyes: Negative for photophobia, pain and redness. Respiratory: Positive for cough. Negative for chest tightness, shortness of breath and wheezing. Cardiovascular: Negative for chest pain, palpitations and leg swelling. Gastrointestinal: Negative for abdominal pain, constipation, diarrhea, nausea and vomiting. Genitourinary: Negative for dysuria, frequency, hematuria and urgency. Musculoskeletal: Negative for arthralgias, back pain, gait problem, joint swelling and myalgias. Skin: Negative for color change, pallor and wound. Neurological: Negative for dizziness, speech difficulty, weakness, light-headedness, numbness and headaches. Hematological: Negative for adenopathy. Does not bruise/bleed easily. Psychiatric/Behavioral: Negative for confusion. The patient is not nervous/anxious. Current Outpatient Prescriptions   Medication Sig Dispense Refill    azithromycin (ZITHROMAX) 250 MG tablet 500mg on day 1 followed by 250mg on days 2 - 5 6 tablet 0    methylPREDNISolone (MEDROL DOSEPACK) 4 MG tablet Take by mouth. 1 kit 0    amLODIPine (NORVASC) 5 MG tablet Take 5 mg by mouth      atorvastatin (LIPITOR) 80 MG tablet Take 1 tablet by mouth daily 90 tablet 3    irbesartan (AVAPRO) 300 MG tablet Take 1 tablet by mouth nightly 90 tablet 3    furosemide (LASIX) 20 MG tablet Take 1 tablet 2, 3 times a week as needed for swelling of ankles 30 tablet 0    aspirin 81 MG tablet Take 81 mg by mouth daily      Coenzyme Q10 (CO Q 10) 10 MG CAPS Take 1 capsule by mouth daily      nitroGLYCERIN (NITROSTAT) 0.4 MG SL tablet Place 1 tablet under the tongue One under tongue as needed for chest pain.  allopurinol (ZYLOPRIM) 100 MG tablet Take 100 mg by mouth daily       No current facility-administered medications for this visit.         /80   Pulse 89   Temp 97.2 °F (36.2 °C)   Ht 5' 9\" (1.753 m)   Wt 208 lb 6.4 oz (94.5 kg)   SpO2 96%

## 2019-02-20 ENCOUNTER — CLINICAL SUPPORT NO REQUIREMENTS (OUTPATIENT)
Dept: CARDIOLOGY | Facility: CLINIC | Age: 84
End: 2019-02-20

## 2019-02-20 DIAGNOSIS — Z95.0 PACEMAKER: Primary | ICD-10-CM

## 2019-02-20 DIAGNOSIS — I44.2 CHB (COMPLETE HEART BLOCK) (HCC): ICD-10-CM

## 2019-02-20 PROCEDURE — 93280 PM DEVICE PROGR EVAL DUAL: CPT | Performed by: INTERNAL MEDICINE

## 2019-02-20 NOTE — PROGRESS NOTES
Dual Chamber Pacemaker Evaluation Report  IN OFFICE INTERROGATION    February 20, 2019    Primary Cardiologist: Tre  : Guidant Model: ESSENTIO MRI EL L131  Implant date: 1/8/2018    Reason for evaluation: routine  Indication for pacemaker: complete heart block    Measurements  Atrial sensing - P wave: 4.1 mV  Atrial threshold: 0.9V@ 0.4ms  Atrial lead impedance: 603 ohms  Ventricular sensing - R wave: >25 mV  Ventricular threshold: 0.6 V @ 0.4 ms  Ventricular lead impedance:   621 ohms     Diagnostic Data  Atrial paced: 4 %  Ventricular paced: 91 %    Episodes recorded since 11/19/2018:  1 NS-VT episode, duration 9 seconds, rate 180 bpm.  Paroxysmal atrial tachycardia noted: longest duration 9 seconds.  Patient is not anticoagulated.  PMT episodes appear to be sinus tachycardia at max tracking rate of 130 bpm.  No retrograde conduction reproduced during testing.      Battery status: satisfactory, estimated 15 years remaining      Final Parameters  Mode:  DDDR  Lower rate: 60 bpm   Upper rate: 130 bpm  AV Delay: paced- 120-180 ms  Vmfari-864-230 ms  Atrial - Amplitude: 1.8 V   Pulse width: 0.4 ms   Sensitivity: 0.25 mV     Ventricular - Amplitude: 2 V  Pulse width: 0.4 ms  Sensitivity: 1.5 mV    Changes made: Normal ana atrial amplitude changed to 1.8V based on threshold testing.  Conclusions: normal pacemaker function, stable pacing and sensing thresholds and adequate battery reserve    Follow up: Every 3 months via latitude, annually in office

## 2019-03-06 ENCOUNTER — OFFICE VISIT (OUTPATIENT)
Dept: CARDIOLOGY | Facility: CLINIC | Age: 84
End: 2019-03-06

## 2019-03-06 VITALS
HEART RATE: 79 BPM | HEIGHT: 69 IN | BODY MASS INDEX: 29.77 KG/M2 | WEIGHT: 201 LBS | DIASTOLIC BLOOD PRESSURE: 82 MMHG | OXYGEN SATURATION: 98 % | SYSTOLIC BLOOD PRESSURE: 140 MMHG | RESPIRATION RATE: 18 BRPM

## 2019-03-06 DIAGNOSIS — I35.0 NONRHEUMATIC AORTIC VALVE STENOSIS: ICD-10-CM

## 2019-03-06 DIAGNOSIS — R06.09 DOE (DYSPNEA ON EXERTION): Primary | ICD-10-CM

## 2019-03-06 DIAGNOSIS — Z95.0 PACEMAKER: ICD-10-CM

## 2019-03-06 DIAGNOSIS — E78.2 MIXED HYPERLIPIDEMIA: ICD-10-CM

## 2019-03-06 DIAGNOSIS — I25.119 CORONARY ARTERY DISEASE INVOLVING NATIVE CORONARY ARTERY OF NATIVE HEART WITH ANGINA PECTORIS (HCC): ICD-10-CM

## 2019-03-06 DIAGNOSIS — I10 ESSENTIAL HYPERTENSION: ICD-10-CM

## 2019-03-06 DIAGNOSIS — R07.89 OTHER CHEST PAIN: ICD-10-CM

## 2019-03-06 DIAGNOSIS — E66.3 OVERWEIGHT (BMI 25.0-29.9): ICD-10-CM

## 2019-03-06 PROCEDURE — 93000 ELECTROCARDIOGRAM COMPLETE: CPT | Performed by: NURSE PRACTITIONER

## 2019-03-06 PROCEDURE — 99214 OFFICE O/P EST MOD 30 MIN: CPT | Performed by: NURSE PRACTITIONER

## 2019-03-06 RX ORDER — METOPROLOL SUCCINATE 25 MG/1
25 TABLET, EXTENDED RELEASE ORAL DAILY
Qty: 30 TABLET | Refills: 11 | Status: SHIPPED | OUTPATIENT
Start: 2019-03-06 | End: 2019-08-28 | Stop reason: ALTCHOICE

## 2019-03-06 NOTE — H&P (VIEW-ONLY)
Subjective:     Encounter Date:03/06/2019      Patient ID: Mireille Magallanes is a 83 y.o. male has hx of CAD and CABG with PCI to LAD in 2014 CABG in 2001.  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 He also has HTN, CKD, and HLD.  He presents today with complaints of recent chest pain and shortness of breath.     Chief Complaint:Shortness of breath and chest pain  History of Present Illness    Chest pain 2-3 minutes about 3 weeks ago he was sitting still at onset.  It was not intense. Pain in the center of his chest. Resolved on its own. No associated shortness of breath.      Now complains of weight gain 7 pounds in 2 months.     Had pressure in his chest for about 3 days that he described as soreness. The day prior he had been leaning over his car and didn't know if his chest was sore from leaning on the car.  He was concerned about the pressure. Worse with coughing or movement.  That occurred about 1 week ago. He has not used NTG of recent and he denies knowing a hx of GERD.     Shortness of breath. This is new. This is intermittent. It comes and goes. [place] and has a bit of a grade to walk to his shop behind his house. Walking to and from has been more taxing on him over the past month or so. He states it occurs off and on, but worse than before that time.     HTN: he monitors this at home. Usually runs controlled in the 130/70s has had recent high BP in the 150s (about 4-5 days ago). This am was 120s.    The following portions of the patient's history were reviewed and updated as appropriate: allergies, current medications, past family history, past medical history, past social history and past surgical history.     Allergies   Allergen Reactions   • Sulfa Antibiotics Rash     Sulfa Drugs     Current Outpatient Medications:   •  amLODIPine (NORVASC) 5 MG tablet, Take 1 tablet by mouth Daily., Disp: 90 tablet, Rfl: 3  •  aspirin 81 MG EC tablet, Take 81 mg by mouth daily.,  Disp: , Rfl:   •  atorvastatin (LIPITOR) 80 MG tablet, Take 80 mg by mouth Daily., Disp: , Rfl:   •  Coenzyme Q10 (COQ-10) 100 MG capsule, Take  by mouth daily., Disp: , Rfl:   •  irbesartan (AVAPRO) 300 MG tablet, Take 300 mg by mouth Every Night., Disp: , Rfl:   •  nitroglycerin (NITROSTAT) 0.4 MG SL tablet, Place 0.4 mg under the tongue as needed., Disp: , Rfl:   •  metoprolol succinate XL (TOPROL-XL) 25 MG 24 hr tablet, Take 1 tablet by mouth Daily., Disp: 30 tablet, Rfl: 11    Past Medical History:   Diagnosis Date   • Acute viral hepatitis A    • Carotid artery stenosis    • CKD (chronic kidney disease)    • Coagulopathy (CMS/HCC)     Plavix   • Coagulopathy (CMS/HCC)    • Coronary arteriosclerosis     stent ,? 4/2104, on plavix now.   • Diarrhea    • Dysphagia     Unspecified   • Family history of colon cancer    • Hematochezia     differential diagnosis discussed   • Hemorrhoids     Unspecified   • History of colonic polyps    • Hypercholesterolemia    • Hyperlipidemia    • Hypertension    • Left ventricular hypertrophy    • Melanoma of back (CMS/HCC)    • Obesity    • Osteoarthritis    • Peripheral neuropathy    • Polyp of colon    • Stroke (CMS/HCC)    • TIA (transient ischemic attack)    • Tobacco non-user      Family History   Problem Relation Age of Onset   • Colon cancer Father         Diagnosed at 72 YO   • Other Sister         Polyps/Colon   • Other Brother         Polyps/Colon   • Stroke Mother      Social History     Socioeconomic History   • Marital status:      Spouse name: Not on file   • Number of children: Not on file   • Years of education: Not on file   • Highest education level: Not on file   Social Needs   • Financial resource strain: Not on file   • Food insecurity - worry: Not on file   • Food insecurity - inability: Not on file   • Transportation needs - medical: Not on file   • Transportation needs - non-medical: Not on file   Occupational History   • Not on file   Tobacco Use  "  • Smoking status: Former Smoker     Last attempt to quit:      Years since quittin.1   • Smokeless tobacco: Never Used   Substance and Sexual Activity   • Alcohol use: No   • Drug use: No   • Sexual activity: Defer   Other Topics Concern   • Not on file   Social History Narrative   • Not on file     Past Surgical History:   Procedure Laterality Date   • APPENDECTOMY     • CARDIAC CATHETERIZATION     • CARDIAC ELECTROPHYSIOLOGY PROCEDURE N/A 2018    Procedure: Temporary Pacemaker;  Surgeon: Luis E Courtney MD;  Location:  [facility]ASIVE [facility];  Service:    • CARDIAC ELECTROPHYSIOLOGY PROCEDURE N/A 2018    Procedure: Device Implant;  Surgeon: Kaushik Toledo MD;  Location:  [facility]IVE LOCATION;  Service:    • CAROTID STENT     • COLON SURGERY      for Ischemic Colitis   • COLONOSCOPY  2008    tubular adenoma ascending colon polyp - 3 yr   • COLONOSCOPY  2003    colon polyp @ 24 cm-see path - 5 yr   • COLONOSCOPY  2000    small interanl hemorrhoid - 3-5 yr   • COLONOSCOPY  1998    (8)small colon polyps removed-see path, internal anal skin tag - 2-3 yr   • CORONARY ARTERY BYPASS GRAFT     • CORONARY STENT PLACEMENT      x1  dr crane    • OTHER SURGICAL HISTORY      Lithotripsy   • OTHER SURGICAL HISTORY  2011    recall 3 yr.    • VASECTOMY       ROS      ECG 12 Lead  Date/Time: 3/6/2019 4:11 PM  Performed by: Brisa Saleem APRN  Authorized by: Brisa Slaeem APRN   Comparison: compared with previous ECG from 2018  Similar to previous ECG  Rhythm: paced  Rate: normal  BPM: 79  ST Segments: ST segments normal  T Waves: T waves normal  Pacin% capture        /82 (BP Location: Right arm, Patient Position: Sitting, Cuff Size: Adult)   Pulse 79   Resp 18   Ht 175.3 cm (69\")   Wt 91.2 kg (201 lb)   SpO2 98%   BMI 29.68 kg/m²        Objective:     Physical Exam   Constitutional: He is oriented to person, place, and time. He " appears well-developed and well-nourished. No distress.   HENT:   Head: Normocephalic and atraumatic.   Nose: Nose normal.   Mouth/Throat: Oropharynx is clear and moist. No oropharyngeal exudate.   Eyes: Conjunctivae are normal.   Neck: Normal range of motion. Neck supple.   Cardiovascular: Normal rate and regular rhythm.   Murmur heard.  Pulmonary/Chest: Effort normal and breath sounds normal. No respiratory distress. He has no wheezes. He has no rales.   Abdominal: Soft. Normal appearance. He exhibits no distension. There is no tenderness.   Musculoskeletal: He exhibits no edema.   Neurological: He is alert and oriented to person, place, and time.   Skin: Skin is warm, dry and intact. No rash noted. He is not diaphoretic. No erythema. No pallor.   Psychiatric: He has a normal mood and affect. His behavior is normal.   Vitals reviewed.      Lab Review: reviewed previous stress testing and echo  Interpretation Summary   01.05.2018  · Impressions are consistent with a low risk study.  · Myocardial perfusion imaging indicates a normal myocardial perfusion study with no evidence of ischemia.  · Left ventricular ejection fraction is normal (Calculated EF = 59%).     Interpretation Summary   Echo 06.25.18  · Left ventricular systolic function is low normal. EF 51-55%.  · Left ventricular wall thickness is consistent with mild concentric hypertrophy.  · Mild to moderate aortic valve stenosis is present.  · Left ventricular diastolic dysfunction (grade II) consistent with pseudonormalization.  · Mild mitral valve regurgitation is present  · Left atrial cavity size is severely dilated.  · Normal size and function of right ventricle.           Assessment:          Diagnosis Plan   1. OLMEDO (dyspnea on exertion)  Adult Stress Echo W/ Cont or Stress Agent if Necessary Per Protocol   2. Other chest pain  Adult Stress Echo W/ Cont or Stress Agent if Necessary Per Protocol   3. Coronary artery disease involving native coronary  artery of native heart with angina pectoris (CMS/HCC)  Adult Stress Echo W/ Cont or Stress Agent if Necessary Per Protocol    Lipid Panel   4. Nonrheumatic aortic valve stenosis  Adult Stress Echo W/ Cont or Stress Agent if Necessary Per Protocol   5. Essential hypertension     6. Mixed hyperlipidemia  Lipid Panel   7. Overweight (BMI 25.0-29.9)     8. Pacemaker            Plan:       - OLMEDO: hx of CAD. Low risk stress (nucelar perfusion scan)in Jan 2018.  Recent chest pain. Known AS as well. Will check pre stress echo pictures for worsening AS. And rule out cp and olmedo with DSE.     - CP: several recent episodes of chest pain as noted above. Has had nuclear in past 2 years will check Check DSE.     - CAD: has hx of CABG and PCI to LAD in 2014.     - AS: known AS with last echo Mild to moderate aortic valve stenosis is present. Consider complete echo in near future.    - HTN: recently elevated usually well controlled.     - HLD: recheck lipid panel previous LDL not to gaol of <70    - overweight: BMI 29.7. Diet and exercise. Discussed with patient. He has plans on new diet changes.    - Pacemaker: checks of recent have shown low burden of AF and AT as well as frequent PAC and PVC. Had been on atenolol but was stopped after presentation with Mobitz II and CHB with syncope. Now s/p pacemaker. Start Toprol 25 mg daily.     Lipid, stress, and start Toprol. Follow up in 1 month to discuss results.

## 2019-03-06 NOTE — PROGRESS NOTES
Subjective:     Encounter Date:03/06/2019      Patient ID: Mireille Magallanes is a 83 y.o. male has hx of CAD and CABG with PCI to LAD in 2014 CABG in 2001.  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 He also has HTN, CKD, and HLD.  He presents today with complaints of recent chest pain and shortness of breath.     Chief Complaint:Shortness of breath and chest pain  History of Present Illness    Chest pain 2-3 minutes about 3 weeks ago he was sitting still at onset.  It was not intense. Pain in the center of his chest. Resolved on its own. No associated shortness of breath.      Now complains of weight gain 7 pounds in 2 months.     Had pressure in his chest for about 3 days that he described as soreness. The day prior he had been leaning over his car and didn't know if his chest was sore from leaning on the car.  He was concerned about the pressure. Worse with coughing or movement.  That occurred about 1 week ago. He has not used NTG of recent and he denies knowing a hx of GERD.     Shortness of breath. This is new. This is intermittent. It comes and goes. [place] and has a bit of a grade to walk to his shop behind his house. Walking to and from has been more taxing on him over the past month or so. He states it occurs off and on, but worse than before that time.     HTN: he monitors this at home. Usually runs controlled in the 130/70s has had recent high BP in the 150s (about 4-5 days ago). This am was 120s.    The following portions of the patient's history were reviewed and updated as appropriate: allergies, current medications, past family history, past medical history, past social history and past surgical history.     Allergies   Allergen Reactions   • Sulfa Antibiotics Rash     Sulfa Drugs     Current Outpatient Medications:   •  amLODIPine (NORVASC) 5 MG tablet, Take 1 tablet by mouth Daily., Disp: 90 tablet, Rfl: 3  •  aspirin 81 MG EC tablet, Take 81 mg by mouth daily.,  Disp: , Rfl:   •  atorvastatin (LIPITOR) 80 MG tablet, Take 80 mg by mouth Daily., Disp: , Rfl:   •  Coenzyme Q10 (COQ-10) 100 MG capsule, Take  by mouth daily., Disp: , Rfl:   •  irbesartan (AVAPRO) 300 MG tablet, Take 300 mg by mouth Every Night., Disp: , Rfl:   •  nitroglycerin (NITROSTAT) 0.4 MG SL tablet, Place 0.4 mg under the tongue as needed., Disp: , Rfl:   •  metoprolol succinate XL (TOPROL-XL) 25 MG 24 hr tablet, Take 1 tablet by mouth Daily., Disp: 30 tablet, Rfl: 11    Past Medical History:   Diagnosis Date   • Acute viral hepatitis A    • Carotid artery stenosis    • CKD (chronic kidney disease)    • Coagulopathy (CMS/HCC)     Plavix   • Coagulopathy (CMS/HCC)    • Coronary arteriosclerosis     stent ,? 4/2104, on plavix now.   • Diarrhea    • Dysphagia     Unspecified   • Family history of colon cancer    • Hematochezia     differential diagnosis discussed   • Hemorrhoids     Unspecified   • History of colonic polyps    • Hypercholesterolemia    • Hyperlipidemia    • Hypertension    • Left ventricular hypertrophy    • Melanoma of back (CMS/HCC)    • Obesity    • Osteoarthritis    • Peripheral neuropathy    • Polyp of colon    • Stroke (CMS/HCC)    • TIA (transient ischemic attack)    • Tobacco non-user      Family History   Problem Relation Age of Onset   • Colon cancer Father         Diagnosed at 72 YO   • Other Sister         Polyps/Colon   • Other Brother         Polyps/Colon   • Stroke Mother      Social History     Socioeconomic History   • Marital status:      Spouse name: Not on file   • Number of children: Not on file   • Years of education: Not on file   • Highest education level: Not on file   Social Needs   • Financial resource strain: Not on file   • Food insecurity - worry: Not on file   • Food insecurity - inability: Not on file   • Transportation needs - medical: Not on file   • Transportation needs - non-medical: Not on file   Occupational History   • Not on file   Tobacco Use  "  • Smoking status: Former Smoker     Last attempt to quit:      Years since quittin.1   • Smokeless tobacco: Never Used   Substance and Sexual Activity   • Alcohol use: No   • Drug use: No   • Sexual activity: Defer   Other Topics Concern   • Not on file   Social History Narrative   • Not on file     Past Surgical History:   Procedure Laterality Date   • APPENDECTOMY     • CARDIAC CATHETERIZATION     • CARDIAC ELECTROPHYSIOLOGY PROCEDURE N/A 2018    Procedure: Temporary Pacemaker;  Surgeon: Luis E Courtney MD;  Location:  [facility];  Service:    • CARDIAC ELECTROPHYSIOLOGY PROCEDURE N/A 2018    Procedure: Device Implant;  Surgeon: Kaushik Toledo MD;  Location:  [facility];  Service:    • CAROTID STENT     • COLON SURGERY      for Ischemic Colitis   • COLONOSCOPY  2008    tubular adenoma ascending colon polyp - 3 yr   • COLONOSCOPY  2003    colon polyp @ 24 cm-see path - 5 yr   • COLONOSCOPY  2000    small interanl hemorrhoid - 3-5 yr   • COLONOSCOPY  1998    (8)small colon polyps removed-see path, internal anal skin tag - 2-3 yr   • CORONARY ARTERY BYPASS GRAFT     • CORONARY STENT PLACEMENT      x1  dr crane    • OTHER SURGICAL HISTORY      Lithotripsy   • OTHER SURGICAL HISTORY  2011    recall 3 yr.    • VASECTOMY       ROS      ECG 12 Lead  Date/Time: 3/6/2019 4:11 PM  Performed by: Brisa Saleem APRN  Authorized by: Brisa Saleem APRN   Comparison: compared with previous ECG from 2018  Similar to previous ECG  Rhythm: paced  Rate: normal  BPM: 79  ST Segments: ST segments normal  T Waves: T waves normal  Pacin% capture        /82 (BP Location: Right arm, Patient Position: Sitting, Cuff Size: Adult)   Pulse 79   Resp 18   Ht 175.3 cm (69\")   Wt 91.2 kg (201 lb)   SpO2 98%   BMI 29.68 kg/m²        Objective:     Physical Exam   Constitutional: He is oriented to person, place, and time. He " appears well-developed and well-nourished. No distress.   HENT:   Head: Normocephalic and atraumatic.   Nose: Nose normal.   Mouth/Throat: Oropharynx is clear and moist. No oropharyngeal exudate.   Eyes: Conjunctivae are normal.   Neck: Normal range of motion. Neck supple.   Cardiovascular: Normal rate and regular rhythm.   Murmur heard.  Pulmonary/Chest: Effort normal and breath sounds normal. No respiratory distress. He has no wheezes. He has no rales.   Abdominal: Soft. Normal appearance. He exhibits no distension. There is no tenderness.   Musculoskeletal: He exhibits no edema.   Neurological: He is alert and oriented to person, place, and time.   Skin: Skin is warm, dry and intact. No rash noted. He is not diaphoretic. No erythema. No pallor.   Psychiatric: He has a normal mood and affect. His behavior is normal.   Vitals reviewed.      Lab Review: reviewed previous stress testing and echo  Interpretation Summary   01.05.2018  · Impressions are consistent with a low risk study.  · Myocardial perfusion imaging indicates a normal myocardial perfusion study with no evidence of ischemia.  · Left ventricular ejection fraction is normal (Calculated EF = 59%).     Interpretation Summary   Echo 06.25.18  · Left ventricular systolic function is low normal. EF 51-55%.  · Left ventricular wall thickness is consistent with mild concentric hypertrophy.  · Mild to moderate aortic valve stenosis is present.  · Left ventricular diastolic dysfunction (grade II) consistent with pseudonormalization.  · Mild mitral valve regurgitation is present  · Left atrial cavity size is severely dilated.  · Normal size and function of right ventricle.           Assessment:          Diagnosis Plan   1. OLMEDO (dyspnea on exertion)  Adult Stress Echo W/ Cont or Stress Agent if Necessary Per Protocol   2. Other chest pain  Adult Stress Echo W/ Cont or Stress Agent if Necessary Per Protocol   3. Coronary artery disease involving native coronary  artery of native heart with angina pectoris (CMS/HCC)  Adult Stress Echo W/ Cont or Stress Agent if Necessary Per Protocol    Lipid Panel   4. Nonrheumatic aortic valve stenosis  Adult Stress Echo W/ Cont or Stress Agent if Necessary Per Protocol   5. Essential hypertension     6. Mixed hyperlipidemia  Lipid Panel   7. Overweight (BMI 25.0-29.9)     8. Pacemaker            Plan:       - OLMEDO: hx of CAD. Low risk stress (nucelar perfusion scan)in Jan 2018.  Recent chest pain. Known AS as well. Will check pre stress echo pictures for worsening AS. And rule out cp and olmedo with DSE.     - CP: several recent episodes of chest pain as noted above. Has had nuclear in past 2 years will check Check DSE.     - CAD: has hx of CABG and PCI to LAD in 2014.     - AS: known AS with last echo Mild to moderate aortic valve stenosis is present. Consider complete echo in near future.    - HTN: recently elevated usually well controlled.     - HLD: recheck lipid panel previous LDL not to gaol of <70    - overweight: BMI 29.7. Diet and exercise. Discussed with patient. He has plans on new diet changes.    - Pacemaker: checks of recent have shown low burden of AF and AT as well as frequent PAC and PVC. Had been on atenolol but was stopped after presentation with Mobitz II and CHB with syncope. Now s/p pacemaker. Start Toprol 25 mg daily.     Lipid, stress, and start Toprol. Follow up in 1 month to discuss results.

## 2019-03-12 ENCOUNTER — HOSPITAL ENCOUNTER (OUTPATIENT)
Dept: CARDIOLOGY | Facility: HOSPITAL | Age: 84
Discharge: HOME | End: 2019-03-12
Admitting: NURSE PRACTITIONER

## 2019-03-12 VITALS
BODY MASS INDEX: 29.78 KG/M2 | SYSTOLIC BLOOD PRESSURE: 126 MMHG | HEART RATE: 68 BPM | WEIGHT: 201.06 LBS | HEIGHT: 69 IN | DIASTOLIC BLOOD PRESSURE: 96 MMHG

## 2019-03-12 DIAGNOSIS — I35.0 NONRHEUMATIC AORTIC VALVE STENOSIS: ICD-10-CM

## 2019-03-12 DIAGNOSIS — R07.89 OTHER CHEST PAIN: ICD-10-CM

## 2019-03-12 DIAGNOSIS — R06.09 DOE (DYSPNEA ON EXERTION): ICD-10-CM

## 2019-03-12 DIAGNOSIS — I25.119 CORONARY ARTERY DISEASE INVOLVING NATIVE CORONARY ARTERY OF NATIVE HEART WITH ANGINA PECTORIS (HCC): ICD-10-CM

## 2019-03-12 LAB
BH CV STRESS BP STAGE 1: NORMAL
BH CV STRESS BP STAGE 2: NORMAL
BH CV STRESS BP STAGE 3: NORMAL
BH CV STRESS DOSE DOBUTAMINE STAGE 1: 10
BH CV STRESS DOSE DOBUTAMINE STAGE 2: 20
BH CV STRESS DOSE DOBUTAMINE STAGE 3: 30
BH CV STRESS DURATION MIN STAGE 1: 3
BH CV STRESS DURATION MIN STAGE 2: 3
BH CV STRESS DURATION MIN STAGE 3: 2
BH CV STRESS DURATION SEC STAGE 1: 0
BH CV STRESS DURATION SEC STAGE 2: 0
BH CV STRESS DURATION SEC STAGE 3: 29
BH CV STRESS HR STAGE 1: 75
BH CV STRESS HR STAGE 2: 81
BH CV STRESS HR STAGE 3: 120
BH CV STRESS PROTOCOL 1: NORMAL
BH CV STRESS RECOVERY BP: NORMAL MMHG
BH CV STRESS RECOVERY HR: 78 BPM
BH CV STRESS STAGE 1: 1
BH CV STRESS STAGE 2: 2
BH CV STRESS STAGE 3: 3
MAXIMAL PREDICTED HEART RATE: 139 BPM
PERCENT MAX PREDICTED HR: 86.33 %
STRESS BASELINE BP: NORMAL MMHG
STRESS BASELINE HR: 68 BPM
STRESS PERCENT HR: 102 %
STRESS POST EXERCISE DUR MIN: 8 MIN
STRESS POST EXERCISE DUR SEC: 29 SEC
STRESS POST PEAK BP: NORMAL MMHG
STRESS POST PEAK HR: 120 BPM
STRESS TARGET HR: 118 BPM

## 2019-03-12 PROCEDURE — 25010000002 DOBUTAMINE PER 250 MG: Performed by: INTERNAL MEDICINE

## 2019-03-12 PROCEDURE — 93350 STRESS TTE ONLY: CPT

## 2019-03-12 PROCEDURE — 25010000002 PERFLUTREN 6.52 MG/ML SUSPENSION: Performed by: INTERNAL MEDICINE

## 2019-03-12 PROCEDURE — 93352 ADMIN ECG CONTRAST AGENT: CPT | Performed by: INTERNAL MEDICINE

## 2019-03-12 PROCEDURE — 93350 STRESS TTE ONLY: CPT | Performed by: INTERNAL MEDICINE

## 2019-03-12 PROCEDURE — 93017 CV STRESS TEST TRACING ONLY: CPT

## 2019-03-12 PROCEDURE — 93018 CV STRESS TEST I&R ONLY: CPT | Performed by: INTERNAL MEDICINE

## 2019-03-12 RX ORDER — METOPROLOL TARTRATE 5 MG/5ML
5 INJECTION INTRAVENOUS ONCE
Status: COMPLETED | OUTPATIENT
Start: 2019-03-12 | End: 2019-03-12

## 2019-03-12 RX ORDER — DOBUTAMINE HYDROCHLORIDE 100 MG/100ML
10-50 INJECTION INTRAVENOUS
Status: DISCONTINUED | OUTPATIENT
Start: 2019-03-12 | End: 2019-03-13 | Stop reason: HOSPADM

## 2019-03-12 RX ADMIN — DOBUTAMINE HYDROCHLORIDE 10 MCG/KG/MIN: 100 INJECTION INTRAVENOUS at 10:02

## 2019-03-12 RX ADMIN — METOPROLOL TARTRATE 5 MG: 1 INJECTION, SOLUTION INTRAVENOUS at 10:47

## 2019-03-12 RX ADMIN — PERFLUTREN 8.48 MG: 6.52 INJECTION, SUSPENSION INTRAVENOUS at 10:02

## 2019-03-15 ENCOUNTER — TELEPHONE (OUTPATIENT)
Dept: CARDIOLOGY | Facility: CLINIC | Age: 84
End: 2019-03-15

## 2019-03-15 ENCOUNTER — PREP FOR SURGERY (OUTPATIENT)
Dept: OTHER | Facility: HOSPITAL | Age: 84
End: 2019-03-15

## 2019-03-15 DIAGNOSIS — R94.39 ABNORMAL STRESS ECHO: Primary | ICD-10-CM

## 2019-03-15 RX ORDER — SODIUM CHLORIDE 0.9 % (FLUSH) 0.9 %
3-10 SYRINGE (ML) INJECTION AS NEEDED
Status: CANCELLED | OUTPATIENT
Start: 2019-03-15

## 2019-03-15 RX ORDER — SODIUM CHLORIDE 0.9 % (FLUSH) 0.9 %
3 SYRINGE (ML) INJECTION EVERY 12 HOURS SCHEDULED
Status: CANCELLED | OUTPATIENT
Start: 2019-03-15

## 2019-03-15 NOTE — TELEPHONE ENCOUNTER
Patient calling in stating that he is willing to do heart cath on Monday with Dr Toledo.     Spoke to Brisa PAREKH last night and she advised to ask you to place this order due to her and Tre being out.

## 2019-03-18 ENCOUNTER — HOSPITAL ENCOUNTER (OUTPATIENT)
Facility: HOSPITAL | Age: 84
Discharge: HOME | End: 2019-03-18
Attending: INTERNAL MEDICINE | Admitting: INTERNAL MEDICINE

## 2019-03-18 VITALS
BODY MASS INDEX: 31.44 KG/M2 | OXYGEN SATURATION: 100 % | HEART RATE: 80 BPM | TEMPERATURE: 98.1 F | WEIGHT: 212.25 LBS | RESPIRATION RATE: 18 BRPM | SYSTOLIC BLOOD PRESSURE: 167 MMHG | HEIGHT: 69 IN | DIASTOLIC BLOOD PRESSURE: 97 MMHG

## 2019-03-18 DIAGNOSIS — R94.39 ABNORMAL STRESS ECHO: ICD-10-CM

## 2019-03-18 LAB
ANION GAP SERPL CALCULATED.3IONS-SCNC: 10 MMOL/L (ref 4–13)
BASOPHILS # BLD AUTO: 0.03 10*3/MM3 (ref 0–0.2)
BASOPHILS NFR BLD AUTO: 0.5 % (ref 0–2)
BUN BLD-MCNC: 18 MG/DL (ref 5–21)
BUN/CREAT SERPL: 10.3 (ref 7–25)
CALCIUM SPEC-SCNC: 9.5 MG/DL (ref 8.4–10.4)
CHLORIDE SERPL-SCNC: 104 MMOL/L (ref 98–110)
CO2 SERPL-SCNC: 26 MMOL/L (ref 24–31)
CREAT BLD-MCNC: 1.74 MG/DL (ref 0.5–1.4)
DEPRECATED RDW RBC AUTO: 40.9 FL (ref 40–54)
EOSINOPHIL # BLD AUTO: 0.15 10*3/MM3 (ref 0–0.7)
EOSINOPHIL NFR BLD AUTO: 2.3 % (ref 0–4)
ERYTHROCYTE [DISTWIDTH] IN BLOOD BY AUTOMATED COUNT: 13.2 % (ref 12–15)
GFR SERPL CREATININE-BSD FRML MDRD: 38 ML/MIN/1.73
GLUCOSE BLD-MCNC: 97 MG/DL (ref 70–100)
HCT VFR BLD AUTO: 40.1 % (ref 40–52)
HGB BLD-MCNC: 13.6 G/DL (ref 14–18)
IMM GRANULOCYTES # BLD AUTO: 0.01 10*3/MM3 (ref 0–0.05)
IMM GRANULOCYTES NFR BLD AUTO: 0.2 % (ref 0–5)
INR PPP: 1.01 (ref 0.91–1.09)
LYMPHOCYTES # BLD AUTO: 2.02 10*3/MM3 (ref 0.72–4.86)
LYMPHOCYTES NFR BLD AUTO: 30.7 % (ref 15–45)
MCH RBC QN AUTO: 29.1 PG (ref 28–32)
MCHC RBC AUTO-ENTMCNC: 33.9 G/DL (ref 33–36)
MCV RBC AUTO: 85.7 FL (ref 82–95)
MONOCYTES # BLD AUTO: 0.58 10*3/MM3 (ref 0.19–1.3)
MONOCYTES NFR BLD AUTO: 8.8 % (ref 4–12)
NEUTROPHILS # BLD AUTO: 3.78 10*3/MM3 (ref 1.87–8.4)
NEUTROPHILS NFR BLD AUTO: 57.5 % (ref 39–78)
NRBC BLD AUTO-RTO: 0 /100 WBC (ref 0–0)
PLATELET # BLD AUTO: 188 10*3/MM3 (ref 130–400)
PMV BLD AUTO: 10.4 FL (ref 6–12)
POTASSIUM BLD-SCNC: 4.5 MMOL/L (ref 3.5–5.3)
PROTHROMBIN TIME: 13.6 SECONDS (ref 11.9–14.6)
RBC # BLD AUTO: 4.68 10*6/MM3 (ref 4.8–5.9)
SODIUM BLD-SCNC: 140 MMOL/L (ref 135–145)
WBC NRBC COR # BLD: 6.57 10*3/MM3 (ref 4.8–10.8)

## 2019-03-18 PROCEDURE — 93459 L HRT ART/GRFT ANGIO: CPT | Performed by: INTERNAL MEDICINE

## 2019-03-18 PROCEDURE — 93455 CORONARY ART/GRFT ANGIO S&I: CPT | Performed by: INTERNAL MEDICINE

## 2019-03-18 PROCEDURE — 80048 BASIC METABOLIC PNL TOTAL CA: CPT | Performed by: NURSE PRACTITIONER

## 2019-03-18 PROCEDURE — 85610 PROTHROMBIN TIME: CPT | Performed by: NURSE PRACTITIONER

## 2019-03-18 PROCEDURE — 93571 IV DOP VEL&/PRESS C FLO 1ST: CPT | Performed by: INTERNAL MEDICINE

## 2019-03-18 PROCEDURE — C1894 INTRO/SHEATH, NON-LASER: HCPCS | Performed by: INTERNAL MEDICINE

## 2019-03-18 PROCEDURE — C1760 CLOSURE DEV, VASC: HCPCS | Performed by: INTERNAL MEDICINE

## 2019-03-18 PROCEDURE — 25010000002 HEPARIN (PORCINE) PER 1000 UNITS: Performed by: INTERNAL MEDICINE

## 2019-03-18 PROCEDURE — A9270 NON-COVERED ITEM OR SERVICE: HCPCS

## 2019-03-18 PROCEDURE — C1769 GUIDE WIRE: HCPCS | Performed by: INTERNAL MEDICINE

## 2019-03-18 PROCEDURE — 85025 COMPLETE CBC W/AUTO DIFF WBC: CPT | Performed by: NURSE PRACTITIONER

## 2019-03-18 PROCEDURE — A9270 NON-COVERED ITEM OR SERVICE: HCPCS | Performed by: INTERNAL MEDICINE

## 2019-03-18 PROCEDURE — 25010000002 FENTANYL CITRATE (PF) 100 MCG/2ML SOLUTION: Performed by: INTERNAL MEDICINE

## 2019-03-18 PROCEDURE — C1887 CATHETER, GUIDING: HCPCS | Performed by: INTERNAL MEDICINE

## 2019-03-18 PROCEDURE — 63710000001 ASPIRIN 81 MG CHEWABLE TABLET

## 2019-03-18 PROCEDURE — 25010000002 MIDAZOLAM PER 1 MG: Performed by: INTERNAL MEDICINE

## 2019-03-18 PROCEDURE — 25010000002 ADENOSINE (DIAGNOSTIC) PER 30 MG: Performed by: INTERNAL MEDICINE

## 2019-03-18 PROCEDURE — 99152 MOD SED SAME PHYS/QHP 5/>YRS: CPT | Performed by: INTERNAL MEDICINE

## 2019-03-18 PROCEDURE — 25010000002 HYDRALAZINE PER 20 MG: Performed by: INTERNAL MEDICINE

## 2019-03-18 PROCEDURE — 63710000001 TICAGRELOR 90 MG TABLET: Performed by: INTERNAL MEDICINE

## 2019-03-18 PROCEDURE — 85347 COAGULATION TIME ACTIVATED: CPT

## 2019-03-18 PROCEDURE — 25010000002 DIPHENHYDRAMINE PER 50 MG: Performed by: INTERNAL MEDICINE

## 2019-03-18 PROCEDURE — 25010000002 IOPAMIDOL 61 % SOLUTION: Performed by: INTERNAL MEDICINE

## 2019-03-18 RX ORDER — MIDAZOLAM HYDROCHLORIDE 1 MG/ML
INJECTION INTRAMUSCULAR; INTRAVENOUS AS NEEDED
Status: DISCONTINUED | OUTPATIENT
Start: 2019-03-18 | End: 2019-03-18 | Stop reason: HOSPADM

## 2019-03-18 RX ORDER — HEPARIN SODIUM 5000 [USP'U]/ML
INJECTION, SOLUTION INTRAVENOUS; SUBCUTANEOUS AS NEEDED
Status: DISCONTINUED | OUTPATIENT
Start: 2019-03-18 | End: 2019-03-18 | Stop reason: HOSPADM

## 2019-03-18 RX ORDER — HYDRALAZINE HYDROCHLORIDE 20 MG/ML
INJECTION INTRAMUSCULAR; INTRAVENOUS AS NEEDED
Status: DISCONTINUED | OUTPATIENT
Start: 2019-03-18 | End: 2019-03-18 | Stop reason: HOSPADM

## 2019-03-18 RX ORDER — NITROGLYCERIN 5 MG/ML
INJECTION, SOLUTION INTRAVENOUS AS NEEDED
Status: DISCONTINUED | OUTPATIENT
Start: 2019-03-18 | End: 2019-03-18 | Stop reason: HOSPADM

## 2019-03-18 RX ORDER — SODIUM CHLORIDE 0.9 % (FLUSH) 0.9 %
3 SYRINGE (ML) INJECTION EVERY 12 HOURS SCHEDULED
Status: DISCONTINUED | OUTPATIENT
Start: 2019-03-18 | End: 2019-03-18 | Stop reason: HOSPADM

## 2019-03-18 RX ORDER — SODIUM CHLORIDE 9 MG/ML
100 INJECTION, SOLUTION INTRAVENOUS CONTINUOUS
Status: DISPENSED | OUTPATIENT
Start: 2019-03-18 | End: 2019-03-18

## 2019-03-18 RX ORDER — ASPIRIN 81 MG/1
TABLET, CHEWABLE ORAL
Status: COMPLETED
Start: 2019-03-18 | End: 2019-03-18

## 2019-03-18 RX ORDER — SODIUM CHLORIDE 0.9 % (FLUSH) 0.9 %
3-10 SYRINGE (ML) INJECTION AS NEEDED
Status: DISCONTINUED | OUTPATIENT
Start: 2019-03-18 | End: 2019-03-18 | Stop reason: HOSPADM

## 2019-03-18 RX ORDER — LIDOCAINE HYDROCHLORIDE 20 MG/ML
INJECTION, SOLUTION INFILTRATION; PERINEURAL AS NEEDED
Status: DISCONTINUED | OUTPATIENT
Start: 2019-03-18 | End: 2019-03-18 | Stop reason: HOSPADM

## 2019-03-18 RX ORDER — DIPHENHYDRAMINE HYDROCHLORIDE 50 MG/ML
INJECTION INTRAMUSCULAR; INTRAVENOUS AS NEEDED
Status: DISCONTINUED | OUTPATIENT
Start: 2019-03-18 | End: 2019-03-18 | Stop reason: HOSPADM

## 2019-03-18 RX ORDER — ADENOSINE 3 MG/ML
INJECTION, SOLUTION INTRAVENOUS CONTINUOUS PRN
Status: DISCONTINUED | OUTPATIENT
Start: 2019-03-18 | End: 2019-03-18 | Stop reason: HOSPADM

## 2019-03-18 RX ORDER — HEPARIN SODIUM 1000 [USP'U]/ML
INJECTION, SOLUTION INTRAVENOUS; SUBCUTANEOUS AS NEEDED
Status: DISCONTINUED | OUTPATIENT
Start: 2019-03-18 | End: 2019-03-18 | Stop reason: HOSPADM

## 2019-03-18 RX ORDER — FENTANYL CITRATE 50 UG/ML
INJECTION, SOLUTION INTRAMUSCULAR; INTRAVENOUS AS NEEDED
Status: DISCONTINUED | OUTPATIENT
Start: 2019-03-18 | End: 2019-03-18 | Stop reason: HOSPADM

## 2019-03-18 RX ORDER — AMLODIPINE BESYLATE 10 MG/1
10 TABLET ORAL DAILY
Qty: 30 TABLET | Refills: 11 | Status: SHIPPED | OUTPATIENT
Start: 2019-03-18 | End: 2020-06-19

## 2019-03-18 RX ORDER — ASPIRIN 81 MG/1
243 TABLET, CHEWABLE ORAL ONCE
Status: COMPLETED | OUTPATIENT
Start: 2019-03-18 | End: 2019-03-18

## 2019-03-18 RX ADMIN — ASPIRIN 243 MG: 81 TABLET, CHEWABLE ORAL at 08:15

## 2019-03-18 RX ADMIN — TICAGRELOR 180 MG: 90 TABLET ORAL at 08:14

## 2019-03-18 RX ADMIN — ASPIRIN 81 MG 243 MG: 81 TABLET ORAL at 08:15

## 2019-03-18 NOTE — INTERVAL H&P NOTE
H&P reviewed. The patient was examined and there are no changes to the H&P.       I discussed cardiac catheterization, the procedure, risks (including bleeding, infection, vascular damage [including minor oozing, bruising, bleeding, and up to and including the need for vascular surgery], contrast reaction, renal failure, respiratory failure, heart attack, stroke, arrhythmia and even death), benefits, and alternatives and the patient has voiced understanding and is willing to proceed.

## 2019-03-19 LAB — ACT BLD: 175 SECONDS (ref 82–152)

## 2019-03-27 ENCOUNTER — OFFICE VISIT (OUTPATIENT)
Dept: CARDIOLOGY | Facility: CLINIC | Age: 84
End: 2019-03-27

## 2019-03-27 VITALS
RESPIRATION RATE: 18 BRPM | HEART RATE: 78 BPM | BODY MASS INDEX: 30.66 KG/M2 | DIASTOLIC BLOOD PRESSURE: 70 MMHG | SYSTOLIC BLOOD PRESSURE: 120 MMHG | WEIGHT: 207 LBS | OXYGEN SATURATION: 98 % | HEIGHT: 69 IN

## 2019-03-27 DIAGNOSIS — E78.2 MIXED HYPERLIPIDEMIA: ICD-10-CM

## 2019-03-27 DIAGNOSIS — I25.810 CORONARY ARTERY DISEASE INVOLVING CORONARY BYPASS GRAFT OF NATIVE HEART WITHOUT ANGINA PECTORIS: ICD-10-CM

## 2019-03-27 DIAGNOSIS — I10 ESSENTIAL HYPERTENSION: Primary | ICD-10-CM

## 2019-03-27 DIAGNOSIS — I35.0 NONRHEUMATIC AORTIC VALVE STENOSIS: ICD-10-CM

## 2019-03-27 PROCEDURE — 93000 ELECTROCARDIOGRAM COMPLETE: CPT | Performed by: NURSE PRACTITIONER

## 2019-03-27 PROCEDURE — 99214 OFFICE O/P EST MOD 30 MIN: CPT | Performed by: NURSE PRACTITIONER

## 2019-03-27 NOTE — PROGRESS NOTES
Subjective:     Encounter Date:03/27/2019      Patient ID: Mireille Magallanes is a 83 y.o. male has hx of CAD and CABG with PCI to LAD in 2014 CABG in 2001.  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 He also has HTN, CKD, and HLD.  He presents today with complaints of recent chest pain and shortness of breath.     Chief Complaint:Shortness of breath and chest pain  Coronary Artery Disease   Presents for follow-up visit. Pertinent negatives include no chest pain, chest pressure, chest tightness, dizziness, leg swelling, palpitations or shortness of breath. The symptoms have been stable. Compliance with diet is good. Compliance with exercise is good. Compliance with medications is good.   Hypertension   This is a chronic problem. The current episode started more than 1 year ago. The problem has been rapidly improving since onset. The problem is controlled. Pertinent negatives include no anxiety, chest pain, headaches, malaise/fatigue, orthopnea, palpitations, peripheral edema or shortness of breath.     He has been feeling well since his heart cath. He was treated for high blood pressure post cath as he had high /100s during the case.  He is taking his amlodipine in the am and has had good result. No complaints of edema. No complaints of chest pain. Dyspnea - he notes - is better. He has started a diet. He is trying to lose 30 pounds. He states that his anxiety about his complaints is improved now that he has had cardiac cath.     The following portions of the patient's history were reviewed and updated as appropriate: allergies, current medications, past family history, past medical history, past social history and past surgical history.     Allergies   Allergen Reactions   • Sulfa Antibiotics Rash     Sulfa Drugs     Current Outpatient Medications:   •  amLODIPine (NORVASC) 10 MG tablet, Take 1 tablet by mouth Daily., Disp: 30 tablet, Rfl: 11  •  aspirin 81 MG EC tablet, Take 81 mg by mouth daily.,  Disp: , Rfl:   •  atorvastatin (LIPITOR) 80 MG tablet, Take 80 mg by mouth Daily., Disp: , Rfl:   •  Coenzyme Q10 (COQ-10) 100 MG capsule, Take  by mouth daily., Disp: , Rfl:   •  irbesartan (AVAPRO) 300 MG tablet, Take 300 mg by mouth Every Night., Disp: , Rfl:   •  metoprolol succinate XL (TOPROL-XL) 25 MG 24 hr tablet, Take 1 tablet by mouth Daily., Disp: 30 tablet, Rfl: 11  •  nitroglycerin (NITROSTAT) 0.4 MG SL tablet, Place 0.4 mg under the tongue as needed., Disp: , Rfl:     Past Medical History:   Diagnosis Date   • Acute viral hepatitis A    • Carotid artery stenosis    • CKD (chronic kidney disease)    • Coagulopathy (CMS/HCC)     Plavix   • Coagulopathy (CMS/HCC)    • Coronary arteriosclerosis     stent ,? 2104, on plavix now.   • Diarrhea    • Dysphagia     Unspecified   • Family history of colon cancer    • Hematochezia     differential diagnosis discussed   • Hemorrhoids     Unspecified   • History of colonic polyps    • Hypercholesterolemia    • Hyperlipidemia    • Hypertension    • Left ventricular hypertrophy    • Melanoma of back (CMS/HCC)    • Obesity    • Osteoarthritis    • Peripheral neuropathy    • Polyp of colon    • Stroke (CMS/HCC)    • TIA (transient ischemic attack)    • Tobacco non-user      Family History   Problem Relation Age of Onset   • Colon cancer Father         Diagnosed at 74 YO   • Other Sister         Polyps/Colon   • Other Brother         Polyps/Colon   • Stroke Mother      Social History     Socioeconomic History   • Marital status:      Spouse name: Not on file   • Number of children: Not on file   • Years of education: Not on file   • Highest education level: Not on file   Tobacco Use   • Smoking status: Former Smoker     Last attempt to quit:      Years since quittin.2   • Smokeless tobacco: Never Used   Substance and Sexual Activity   • Alcohol use: No   • Drug use: No   • Sexual activity: Defer     Past Surgical History:   Procedure Laterality Date   •  APPENDECTOMY     • CARDIAC CATHETERIZATION     • CARDIAC CATHETERIZATION N/A 3/18/2019    Procedure: Left Heart Cath;  Surgeon: Kaushik Toledo MD;  Location:  [facility];  Service: Cardiology   • CARDIAC ELECTROPHYSIOLOGY PROCEDURE N/A 1/5/2018    Procedure: Temporary Pacemaker;  Surgeon: Luis E Courtney MD;  Location:  [facility];  Service:    • CARDIAC ELECTROPHYSIOLOGY PROCEDURE N/A 1/8/2018    Procedure: Device Implant;  Surgeon: Kaushik Toledo MD;  Location:  [facility];  Service:    • CAROTID STENT     • COLON SURGERY  1999    for Ischemic Colitis   • COLONOSCOPY  05/07/2008    tubular adenoma ascending colon polyp - 3 yr   • COLONOSCOPY  08/19/2003    colon polyp @ 24 cm-see path - 5 yr   • COLONOSCOPY  08/11/2000    small interanl hemorrhoid - 3-5 yr   • COLONOSCOPY  06/22/1998    (8)small colon polyps removed-see path, internal anal skin tag - 2-3 yr   • CORONARY ARTERY BYPASS GRAFT     • CORONARY STENT PLACEMENT      x1 2014 dr crane    • OTHER SURGICAL HISTORY      Lithotripsy   • OTHER SURGICAL HISTORY  06/2011    recall 3 yr.    • VASECTOMY       Review of Systems   Constitution: Negative for chills, diaphoresis, fever and malaise/fatigue.   Cardiovascular: Negative for chest pain, dyspnea on exertion, leg swelling, orthopnea and palpitations.   Respiratory: Negative for chest tightness, shortness of breath and wheezing.    Hematologic/Lymphatic: Positive for bleeding problem.   Gastrointestinal: Negative for bloating, abdominal pain, melena and nausea.   Neurological: Negative for dizziness, headaches and light-headedness.   Psychiatric/Behavioral: The patient is not nervous/anxious.          ECG 12 Lead  Date/Time: 3/27/2019 3:00 PM  Performed by: Brisa Saleem APRN  Authorized by: Brisa Saleem APRN   Comparison: compared with previous ECG from 3/6/2019  Similar to previous ECG  Rhythm: sinus rhythm and paced  Rate: normal  BPM:  "78  ST Segments: ST segments normal  QRS axis: left  Pacin% capture  Clinical impression: abnormal EKG          /70 (BP Location: Right arm, Patient Position: Sitting, Cuff Size: Adult)   Pulse 78   Resp 18   Ht 175.3 cm (69\")   Wt 93.9 kg (207 lb)   SpO2 98%   BMI 30.57 kg/m²        Objective:     Physical Exam   Constitutional: He is oriented to person, place, and time. He appears well-developed and well-nourished. No distress.   HENT:   Head: Normocephalic and atraumatic.   Nose: Nose normal.   Mouth/Throat: Oropharynx is clear and moist. No oropharyngeal exudate.   Eyes: Conjunctivae are normal.   Neck: Normal range of motion. Neck supple.   Cardiovascular: Normal rate and regular rhythm.   Murmur heard.  Pulmonary/Chest: Effort normal and breath sounds normal. No respiratory distress. He has no wheezes. He has no rales.   Abdominal: Soft. Normal appearance. He exhibits no distension. There is no tenderness.   Musculoskeletal: He exhibits no edema.   Neurological: He is alert and oriented to person, place, and time.   Skin: Skin is warm, dry and intact. No rash noted. He is not diaphoretic. No erythema. No pallor.   Psychiatric: He has a normal mood and affect. His behavior is normal.   Vitals reviewed.      Lab Review:   Cardiac Catherization Impression:2019  1.  Physiologically insignificant stenosis of the proximal left main coronary artery (FFR 0.87).  Patent stent in the mid LAD with an occluded diagonal branch, plus occlusions of OM1 and OM 2.    2.  Saphenous vein grafts to OM1->OM2, and to diagonal branch, are widely patent.        Assessment:          Diagnosis Plan   1. Essential hypertension     2. Coronary artery disease involving coronary bypass graft of native heart without angina pectoris     3. Mixed hyperlipidemia     4. Nonrheumatic aortic valve stenosis            Plan:       - Improved with addition of amlodipine. He is feeling well since the addition of this. He has " had no chest pain, swelling, shortness of breath. Continue.     - CAD: no chest pain, pressure. Dyspnea has improved. Cath was 03.18.2019 FFR to LAD 0.87. Medical management for HTN.     - HLD: stable. On statin    - AS: mild to moderate per echo 06.15.2018    Follow up in 6 months, sooner if needed .

## 2019-03-27 NOTE — PATIENT INSTRUCTIONS
Coronary Artery Disease, Male  Coronary artery disease (CAD) is a condition in which the arteries that lead to the heart (coronary arteries) become narrow or blocked. The narrowing or blockage can lead to decreased blood flow to the heart. Prolonged reduced blood flow can cause a heart attack (myocardial infarction or MI). This condition may also be called coronary heart disease.  Because CAD is the leading cause of death in men, it is important to understand what causes this condition and how it is treated.  What are the causes?  CAD is most often caused by atherosclerosis. This is the buildup of fat and cholesterol (plaque) on the inside of the arteries. Over time, the plaque may narrow or block the artery, reducing blood flow to the heart. Plaque can also become weak and break off within a coronary artery and cause a sudden blockage. Other less common causes of CAD include:  · An embolism or blood clot in a coronary artery.  · A tearing of the artery (spontaneous coronary artery dissection).  · An aneurysm.  · Inflammation (vasculitis) in the artery wall.    What increases the risk?  The following factors may make you more likely to develop this condition:  · Age. Men over age 45 are at a greater risk of CAD.  · Family history of CAD.  · Gender. Men often develop CAD earlier in life than women.  · High blood pressure (hypertension).  · Diabetes.  · High cholesterol levels.  · Tobacco use.  · Excessive alcohol use.  · Lack of exercise.  · A diet high in saturated and trans fats, such as fried food and processed meat.    Other possible risk factors include:  · High stress levels.  · Depression.  · Obesity.  · Sleep apnea.    What are the signs or symptoms?  Many people do not have any symptoms during the early stages of CAD. As the condition progresses, symptoms may include:  · Chest pain (angina). The pain can:  ? Feel like a crushing or squeezing, or a tightness, pressure, fullness, or heaviness in the  chest.  ? Last more than a few minutes or can stop and recur. The pain tends to get worse with exercise or stress and to fade with rest.  · Pain in the arms, neck, jaw, or back.  · Unexplained heartburn or indigestion.  · Shortness of breath.  · Nausea or vomiting.  · Sudden light-headedness.  · Sudden cold sweats.  · Fluttering or fast heartbeat (palpitations).    How is this diagnosed?  This condition is diagnosed based on:  · Your family and medical history.  · A physical exam.  · Tests, including:  ? A test to check the electrical signals in your heart (electrocardiogram).  ? Exercise stress test. This looks for signs of blockage when the heart is stressed with exercise, such as running on a treadmill.  ? Pharmacologic stress test. This test looks for signs of blockage when the heart is being stressed with a medicine.  ? Blood tests.  ? Coronary angiogram. This is a procedure to look at the coronary arteries to see if there is any blockage. During this test, a dye is injected into your arteries so they appear on an X-ray.  ? A test that uses sound waves to take a picture of your heart (echocardiogram).  ? Chest X-ray.    How is this treated?  This condition may be treated by:  · Healthy lifestyle changes to reduce risk factors.  · Medicines such as:  ? Antiplatelet medicines and blood-thinning medicines, such as aspirin. These help to prevent blood clots.  ? Nitroglycerin.  ? Blood pressure medicines.  ? Cholesterol-lowering medicine.  · Coronary angioplasty and stenting. During this procedure, a thin, flexible tube is inserted through a blood vessel and into a blocked artery. A balloon or similar device on the end of the tube is inflated to open up the artery. In some cases, a small, mesh tube (stent) is inserted into the artery to keep it open.  · Coronary artery bypass surgery. During this surgery, veins or arteries from other parts of the body are used to create a bypass around the blockage and allow blood  to reach your heart.    Follow these instructions at home:  Medicines  · Take over-the-counter and prescription medicines only as told by your health care provider.  · Do not take the following medicines unless your health care provider approves:  ? NSAIDs, such as ibuprofen, naproxen, or celecoxib.  ? Vitamin supplements that contain vitamin A, vitamin E, or both.  Lifestyle  · Follow an exercise program approved by your health care provider. Aim for 150 minutes of moderate exercise or 75 minutes of vigorous exercise each week.  · Maintain a healthy weight or lose weight as approved by your health care provider.  · Rest when you are tired.  · Learn to manage stress or try to limit your stress. Ask your health care provider for suggestions if you need help.  · Get screened for depression and seek treatment, if needed.  · Do not use any products that contain nicotine or tobacco, such as cigarettes and e-cigarettes. If you need help quitting, ask your health care provider.  · Do not use illegal drugs.  Eating and drinking  · Follow a heart-healthy diet. A dietitian can help educate you about healthy food options and changes. In general, eat plenty of fruits and vegetables, lean meats, and whole grains.  · Avoid foods high in:  ? Sugar.  ? Salt (sodium).  ? Saturated fat, such as processed or fatty meat.  ? Trans fat, such as fried foods.  · Use healthy cooking methods such as roasting, grilling, broiling, baking, poaching, steaming, or stir-frying.  · If you drink alcohol, and your health care provider approves, limit your alcohol intake to no more than 2 drinks per day. One drink equals 12 ounces of beer, 5 ounces of wine, or 1½ ounces of hard liquor.  General instructions  · Manage any other health conditions, such as hypertension and diabetes. These conditions affect your heart.  · Your health care provider may ask you to monitor your blood pressure. Ideally, your blood pressure should be below 130/80.  · Keep all  "follow-up visits as told by your health care provider. This is important.  Get help right away if:  · You have pain in your chest, neck, arm, jaw, stomach, or back that:  ? Lasts more than a few minutes.  ? Is recurring.  ? Is not relieved by taking medicine under your tongue (sublingualnitroglycerin).  · You have too much (profuse) sweating without cause.  · You have unexplained:  ? Heartburn or indigestion.  ? Shortness of breath or difficulty breathing.  ? Fluttering or fast heartbeat (palpitations).  ? Nausea or vomiting.  ? Fatigue.  ? Feelings of nervousness or anxiety.  ? Weakness.  ? Diarrhea.  · You have sudden light-headedness or dizziness.  · You faint.  · You feel like hurting yourself or think about taking your own life.  These symptoms may represent a serious problem that is an emergency. Do not wait to see if the symptoms will go away. Get medical help right away. Call your local emergency services (911 in the [place] Do not drive yourself to the hospital.  Summary  · Coronary artery disease (CAD) is a process in which the arteries that lead to the heart (coronary arteries) become narrow or blocked. The narrowing or blockage can lead to a heart attack.  · Many people do not have any symptoms during the early stages of CAD. This is called \"silent CAD.\"  · CAD can be treated with lifestyle changes, medicines, surgery, or a combination of these treatments.  This information is not intended to replace advice given to you by your health care provider. Make sure you discuss any questions you have with your health care provider.  Document Released: 07/15/2015 Document Revised: 12/08/2017 Document Reviewed: 12/08/2017  [employer] Interactive Patient Education © 2019 [employer]  BMI for Adults  Body mass index (BMI) is a number that is calculated from a person's weight and height. In most adults, the number is used to find how much of an adult's weight is made up of fat. BMI is not as accurate as a direct " measure of body fat.  How is BMI calculated?  BMI is calculated by dividing weight in kilograms by height in meters squared. It can also be calculated by dividing weight in pounds by height in inches squared, then multiplying the resulting number by 703. Charts are available to help you find your BMI quickly and easily without doing this calculation.  How is BMI interpreted?  Health care professionals use BMI charts to identify whether an adult is underweight, at a normal weight, or overweight based on the following guidelines:  · Underweight: BMI less than 18.5.  · Normal weight: BMI between 18.5 and 24.9.  · Overweight: BMI between 25 and 29.9.  · Obese: BMI of 30 and above.    BMI is usually interpreted the same for males and females.  Weight includes both fat and muscle, so someone with a muscular build, such as an athlete, may have a BMI that is higher than 24.9. In cases like these, BMI may not accurately depict body fat. To determine if excess body fat is the cause of a BMI of 25 or higher, further assessments may need to be done by a health care provider.  Why is BMI a useful tool?  BMI is used to identify a possible weight problem that may be related to a medical problem or may increase the risk for medical problems. BMI can also be used to promote changes to reach a healthy weight.  This information is not intended to replace advice given to you by your health care provider. Make sure you discuss any questions you have with your health care provider.  Document Released: 08/29/2005 Document Revised: 04/27/2017 Document Reviewed: 05/15/2015  [employer] Interactive Patient Education © 2018 [employer]

## 2019-04-29 ENCOUNTER — TELEPHONE (OUTPATIENT)
Dept: INTERNAL MEDICINE | Age: 84
End: 2019-04-29

## 2019-04-29 DIAGNOSIS — I10 ESSENTIAL HYPERTENSION: ICD-10-CM

## 2019-04-29 DIAGNOSIS — I44.2 ATRIOVENTRICULAR BLOCK, COMPLETE (HCC): ICD-10-CM

## 2019-04-29 DIAGNOSIS — E78.00 PURE HYPERCHOLESTEROLEMIA: ICD-10-CM

## 2019-04-29 DIAGNOSIS — N18.30 STAGE 3 CHRONIC KIDNEY DISEASE (HCC): ICD-10-CM

## 2019-04-29 DIAGNOSIS — Z91.81 AT HIGH RISK FOR FALLS: ICD-10-CM

## 2019-04-29 NOTE — TELEPHONE ENCOUNTER
Pt called to get a refill on the following medication which has been pended for you:    Requested Prescriptions     Pending Prescriptions Disp Refills    irbesartan (AVAPRO) 300 MG tablet 90 tablet 3     Sig: Take 1 tablet by mouth nightly     Next appt: 5/22/19  Last appt: 11/20/18

## 2019-04-30 RX ORDER — IRBESARTAN 300 MG/1
300 TABLET ORAL NIGHTLY
Qty: 90 TABLET | Refills: 3 | Status: SHIPPED | OUTPATIENT
Start: 2019-04-30 | End: 2020-08-13

## 2019-05-28 ENCOUNTER — CLINICAL SUPPORT (OUTPATIENT)
Dept: CARDIOLOGY | Facility: CLINIC | Age: 84
End: 2019-05-28

## 2019-05-28 DIAGNOSIS — Z95.0 PACEMAKER: ICD-10-CM

## 2019-05-28 PROCEDURE — 93296 REM INTERROG EVL PM/IDS: CPT | Performed by: PHYSICIAN ASSISTANT

## 2019-05-28 PROCEDURE — 93294 REM INTERROG EVL PM/LDLS PM: CPT | Performed by: PHYSICIAN ASSISTANT

## 2019-06-20 DIAGNOSIS — E78.00 PURE HYPERCHOLESTEROLEMIA: ICD-10-CM

## 2019-06-20 DIAGNOSIS — N18.30 STAGE 3 CHRONIC KIDNEY DISEASE (HCC): ICD-10-CM

## 2019-06-20 DIAGNOSIS — Z12.5 SCREENING FOR PROSTATE CANCER: ICD-10-CM

## 2019-06-20 DIAGNOSIS — I10 ESSENTIAL HYPERTENSION: ICD-10-CM

## 2019-06-20 LAB
ALBUMIN SERPL-MCNC: 4.8 G/DL (ref 3.5–5.2)
ALP BLD-CCNC: 75 U/L (ref 40–130)
ALT SERPL-CCNC: 24 U/L (ref 5–41)
ANION GAP SERPL CALCULATED.3IONS-SCNC: 15 MMOL/L (ref 7–19)
AST SERPL-CCNC: 28 U/L (ref 5–40)
BASOPHILS ABSOLUTE: 0 K/UL (ref 0–0.2)
BASOPHILS RELATIVE PERCENT: 0.6 % (ref 0–1)
BILIRUB SERPL-MCNC: 0.5 MG/DL (ref 0.2–1.2)
BUN BLDV-MCNC: 29 MG/DL (ref 8–23)
CALCIUM SERPL-MCNC: 9.7 MG/DL (ref 8.8–10.2)
CHLORIDE BLD-SCNC: 104 MMOL/L (ref 98–111)
CHOLESTEROL, TOTAL: 194 MG/DL (ref 160–199)
CO2: 22 MMOL/L (ref 22–29)
CREAT SERPL-MCNC: 1.6 MG/DL (ref 0.5–1.2)
EOSINOPHILS ABSOLUTE: 0.2 K/UL (ref 0–0.6)
EOSINOPHILS RELATIVE PERCENT: 2.8 % (ref 0–5)
GFR NON-AFRICAN AMERICAN: 42
GLUCOSE BLD-MCNC: 96 MG/DL (ref 74–109)
HCT VFR BLD CALC: 43.1 % (ref 42–52)
HDLC SERPL-MCNC: 43 MG/DL (ref 55–121)
HEMOGLOBIN: 13.9 G/DL (ref 14–18)
LDL CHOLESTEROL CALCULATED: 128 MG/DL
LYMPHOCYTES ABSOLUTE: 1.9 K/UL (ref 1.1–4.5)
LYMPHOCYTES RELATIVE PERCENT: 35.7 % (ref 20–40)
MCH RBC QN AUTO: 29.9 PG (ref 27–31)
MCHC RBC AUTO-ENTMCNC: 32.3 G/DL (ref 33–37)
MCV RBC AUTO: 92.7 FL (ref 80–94)
MONOCYTES ABSOLUTE: 0.5 K/UL (ref 0–0.9)
MONOCYTES RELATIVE PERCENT: 9 % (ref 0–10)
NEUTROPHILS ABSOLUTE: 2.8 K/UL (ref 1.5–7.5)
NEUTROPHILS RELATIVE PERCENT: 51.7 % (ref 50–65)
PDW BLD-RTO: 13.2 % (ref 11.5–14.5)
PLATELET # BLD: 186 K/UL (ref 130–400)
PMV BLD AUTO: 10.5 FL (ref 9.4–12.4)
POTASSIUM SERPL-SCNC: 4.6 MMOL/L (ref 3.5–5)
PROSTATE SPECIFIC ANTIGEN: 2.07 NG/ML (ref 0–4)
RBC # BLD: 4.65 M/UL (ref 4.7–6.1)
SODIUM BLD-SCNC: 141 MMOL/L (ref 136–145)
TOTAL PROTEIN: 7.7 G/DL (ref 6.6–8.7)
TRIGL SERPL-MCNC: 117 MG/DL (ref 0–149)
WBC # BLD: 5.4 K/UL (ref 4.8–10.8)

## 2019-06-27 ENCOUNTER — OFFICE VISIT (OUTPATIENT)
Dept: INTERNAL MEDICINE | Age: 84
End: 2019-06-27
Payer: MEDICARE

## 2019-06-27 VITALS
SYSTOLIC BLOOD PRESSURE: 122 MMHG | BODY MASS INDEX: 27.91 KG/M2 | TEMPERATURE: 96.9 F | HEIGHT: 69 IN | HEART RATE: 68 BPM | OXYGEN SATURATION: 97 % | WEIGHT: 188.4 LBS | DIASTOLIC BLOOD PRESSURE: 80 MMHG

## 2019-06-27 DIAGNOSIS — N18.30 STAGE 3 CHRONIC KIDNEY DISEASE (HCC): ICD-10-CM

## 2019-06-27 DIAGNOSIS — Z00.00 ROUTINE GENERAL MEDICAL EXAMINATION AT A HEALTH CARE FACILITY: Primary | ICD-10-CM

## 2019-06-27 DIAGNOSIS — N18.30 CHRONIC KIDNEY DISEASE, STAGE III (MODERATE) (HCC): ICD-10-CM

## 2019-06-27 DIAGNOSIS — I25.700 CORONARY ARTERY DISEASE INVOLVING CORONARY BYPASS GRAFT OF NATIVE HEART WITH UNSTABLE ANGINA PECTORIS (HCC): ICD-10-CM

## 2019-06-27 DIAGNOSIS — E78.00 PURE HYPERCHOLESTEROLEMIA: ICD-10-CM

## 2019-06-27 DIAGNOSIS — I44.2 ATRIOVENTRICULAR BLOCK, COMPLETE (HCC): ICD-10-CM

## 2019-06-27 DIAGNOSIS — I10 ESSENTIAL HYPERTENSION: ICD-10-CM

## 2019-06-27 PROCEDURE — 99214 OFFICE O/P EST MOD 30 MIN: CPT | Performed by: PHYSICIAN ASSISTANT

## 2019-06-27 PROCEDURE — 1036F TOBACCO NON-USER: CPT | Performed by: PHYSICIAN ASSISTANT

## 2019-06-27 PROCEDURE — G8417 CALC BMI ABV UP PARAM F/U: HCPCS | Performed by: PHYSICIAN ASSISTANT

## 2019-06-27 PROCEDURE — G8598 ASA/ANTIPLAT THER USED: HCPCS | Performed by: PHYSICIAN ASSISTANT

## 2019-06-27 PROCEDURE — 1123F ACP DISCUSS/DSCN MKR DOCD: CPT | Performed by: PHYSICIAN ASSISTANT

## 2019-06-27 PROCEDURE — 4040F PNEUMOC VAC/ADMIN/RCVD: CPT | Performed by: PHYSICIAN ASSISTANT

## 2019-06-27 PROCEDURE — G8427 DOCREV CUR MEDS BY ELIG CLIN: HCPCS | Performed by: PHYSICIAN ASSISTANT

## 2019-06-27 ASSESSMENT — PATIENT HEALTH QUESTIONNAIRE - PHQ9
1. LITTLE INTEREST OR PLEASURE IN DOING THINGS: 0
SUM OF ALL RESPONSES TO PHQ9 QUESTIONS 1 & 2: 0
SUM OF ALL RESPONSES TO PHQ QUESTIONS 1-9: 0
2. FEELING DOWN, DEPRESSED OR HOPELESS: 0
SUM OF ALL RESPONSES TO PHQ QUESTIONS 1-9: 0

## 2019-06-27 ASSESSMENT — ENCOUNTER SYMPTOMS
RHINORRHEA: 0
BACK PAIN: 0
COLOR CHANGE: 0
SINUS PRESSURE: 0
SHORTNESS OF BREATH: 0
EYE REDNESS: 0
SORE THROAT: 0
NAUSEA: 0
COUGH: 0
VOMITING: 0
CHEST TIGHTNESS: 0
CONSTIPATION: 0
WHEEZING: 0
ABDOMINAL PAIN: 0
EYE PAIN: 0
PHOTOPHOBIA: 0
DIARRHEA: 0

## 2019-06-27 NOTE — PROGRESS NOTES
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 ([facility] [address] 12/28/2017     Resolved Ambulatory Problems     Diagnosis Date Noted    No Resolved Ambulatory Problems     Past Medical History:   Diagnosis Date    Adenomatous colon polyp     Aortic stenosis     Arthritis, degenerative     AV block, 2nd degree     CAD (coronary artery disease)     [facility], [facility]     Hearing loss     Hyperlipidemia     Hypertension     Left ventricular hypertrophy     Melanoma of back ([facility] [address]     Neuropathy     Obesity        Past Medical History:   Diagnosis Date    Adenomatous colon polyp     Aortic stenosis     Arthritis, degenerative     AV block, 2nd degree     12/17, Holter monitor, high degree second-degree A-V block, 7 second pauses, read by Dr. Graciela Hogan, PHP    CAD (coronary artery disease)     Chronic kidney disease, stage III (moderate) ([facility] [address]     Hearing loss     Hyperlipidemia     Hypertension     Left ventricular hypertrophy     [facility]     Neuropathy     idiopathic peripheral neuropathy    Obesity        Prior to Visit Medications    Medication Sig Taking? Authorizing Provider   irbesartan (AVAPRO) 300 MG tablet Take 1 tablet by mouth nightly Yes ALLEN Kamara   amLODIPine (NORVASC) 5 MG tablet Take 5 mg by mouth Yes Historical Provider, MD   atorvastatin (LIPITOR) 80 MG tablet Take 1 tablet by mouth daily Yes Eddie Blanco MD   aspirin 81 MG tablet Take 81 mg by mouth daily Yes Historical Provider, MD   Coenzyme Q10 (CO Q 10) 10 MG CAPS Take 1 capsule by mouth daily Yes Historical Provider, MD   nitroGLYCERIN (NITROSTAT) 0.4 MG SL tablet Place 1 tablet under the tongue One under tongue as needed for chest pain.  Yes Historical Provider, MD   azithromycin Negative for activity change, appetite change, fatigue and unexpected weight change. HENT: Negative for ear pain, postnasal drip, rhinorrhea, sinus pressure, sneezing and sore throat. Eyes: Negative for photophobia, pain and redness. Respiratory: Negative for cough, chest tightness, shortness of breath and wheezing. Cardiovascular: Negative for chest pain, palpitations and leg swelling. Gastrointestinal: Negative for abdominal pain, constipation, diarrhea, nausea and vomiting. Genitourinary: Negative for dysuria, frequency, hematuria and urgency. Musculoskeletal: Negative for arthralgias, back pain, gait problem, joint swelling and myalgias. Skin: Negative for color change, pallor and wound. Neurological: Negative for dizziness, speech difficulty, weakness, light-headedness, numbness and headaches. Hematological: Negative for adenopathy. Does not bruise/bleed easily. Psychiatric/Behavioral: Negative for confusion. The patient is not nervous/anxious. Current Outpatient Medications   Medication Sig Dispense Refill    irbesartan (AVAPRO) 300 MG tablet Take 1 tablet by mouth nightly 90 tablet 3    amLODIPine (NORVASC) 5 MG tablet Take 5 mg by mouth      atorvastatin (LIPITOR) 80 MG tablet Take 1 tablet by mouth daily 90 tablet 3    aspirin 81 MG tablet Take 81 mg by mouth daily      Coenzyme Q10 (CO Q 10) 10 MG CAPS Take 1 capsule by mouth daily      nitroGLYCERIN (NITROSTAT) 0.4 MG SL tablet Place 1 tablet under the tongue One under tongue as needed for chest pain.  azithromycin (ZITHROMAX) 250 MG tablet 500mg on day 1 followed by 250mg on days 2 - 5 6 tablet 0    furosemide (LASIX) 20 MG tablet Take 1 tablet 2, 3 times a week as needed for swelling of ankles 30 tablet 0    allopurinol (ZYLOPRIM) 100 MG tablet Take 100 mg by mouth daily       No current facility-administered medications for this visit.         /80   Pulse 68   Temp 96.9 °F (36.1 °C)   Ht 5' 9\" Results   Component Value Date    CHOL 194 06/20/2019    CHOL 134 (L) 05/15/2018    CHOL 200 (H) 06/13/2017     Lab Results   Component Value Date    TRIG 117 06/20/2019    TRIG 82 05/15/2018    TRIG 202 (H) 06/13/2017     Lab Results   Component Value Date    HDL 43 (L) 06/20/2019    HDL 38 (L) 05/15/2018    HDL 33 (L) 06/13/2017     Lab Results   Component Value Date    LDLCALC 128 06/20/2019    LDLCALC 80 05/15/2018    [address] 127 06/13/2017     No results found for: LABVLDL, VLDL  No results found for: [facility]  Lab Results   Component Value Date    TSH 2.420 05/15/2018     6/20/2019 11:12 AM - Ata Cristina Incoming Lab Results From [employer] [facility]     Component Value Ref Range & Units Status Collected Lab   PSA 2.07  0.00 - 4.00 ng/mL Final 06/20/2019  9:47 AM  - [facility]    1. Routine general medical examination at a health care facility Z00.00    2. Atrioventricular block, complete (HCC) I44.2    3. Chronic kidney disease, stage III (moderate) (HCC) N18.3    4. Pure hypercholesterolemia E78.00 Lipid Panel   5. Stage 3 chronic kidney disease (HCC) N18.3 Comprehensive Metabolic Panel   6. Essential hypertension I10 CBC Auto Differential   7. Coronary artery disease involving coronary bypass graft of native heart with unstable angina pectoris ([facility] [address] I25.700        PLAN  Patient seems to be doing pretty well overall. Patient has pacemaker for complete AV block patient does not really have any chest pain shortness of breath palpitations at this time. Patient's chronic kidney disease seems to be stable if not improved. Patient has a follow-up appointment with Dr. Sarah Birch office on 5 July. Patient's cholesterol looks like it is doing okay on the Lipitor 80 mg daily. Patient's blood pressure seems fairly well controlled on current medication regimen continue as directed. Patient's coronary artery disease seems to be stable at this time.   Patient recently had a

## 2019-07-23 DIAGNOSIS — Z91.81 AT HIGH RISK FOR FALLS: ICD-10-CM

## 2019-07-23 DIAGNOSIS — N18.30 STAGE 3 CHRONIC KIDNEY DISEASE (HCC): ICD-10-CM

## 2019-07-23 DIAGNOSIS — E78.00 PURE HYPERCHOLESTEROLEMIA: ICD-10-CM

## 2019-07-23 DIAGNOSIS — I44.2 ATRIOVENTRICULAR BLOCK, COMPLETE (HCC): ICD-10-CM

## 2019-07-23 DIAGNOSIS — I10 ESSENTIAL HYPERTENSION: ICD-10-CM

## 2019-07-24 RX ORDER — ATORVASTATIN CALCIUM 80 MG/1
80 TABLET, FILM COATED ORAL DAILY
Qty: 90 TABLET | Refills: 3 | Status: SHIPPED | OUTPATIENT
Start: 2019-07-24 | End: 2019-12-27 | Stop reason: ALTCHOICE

## 2019-08-08 ENCOUNTER — APPOINTMENT (OUTPATIENT)
Dept: CT IMAGING | Facility: HOSPITAL | Age: 84
End: 2019-08-08

## 2019-08-08 ENCOUNTER — TELEPHONE (OUTPATIENT)
Dept: CARDIOLOGY | Facility: CLINIC | Age: 84
End: 2019-08-08

## 2019-08-08 ENCOUNTER — HOSPITAL ENCOUNTER (INPATIENT)
Facility: HOSPITAL | Age: 84
LOS: 1 days | Discharge: HOME | End: 2019-08-09
Attending: EMERGENCY MEDICINE | Admitting: FAMILY MEDICINE

## 2019-08-08 ENCOUNTER — APPOINTMENT (OUTPATIENT)
Dept: MRI IMAGING | Facility: HOSPITAL | Age: 84
End: 2019-08-08

## 2019-08-08 ENCOUNTER — APPOINTMENT (OUTPATIENT)
Dept: GENERAL RADIOLOGY | Facility: HOSPITAL | Age: 84
End: 2019-08-08

## 2019-08-08 ENCOUNTER — APPOINTMENT (OUTPATIENT)
Dept: ULTRASOUND IMAGING | Facility: HOSPITAL | Age: 84
End: 2019-08-08

## 2019-08-08 ENCOUNTER — APPOINTMENT (OUTPATIENT)
Dept: CARDIOLOGY | Facility: HOSPITAL | Age: 84
End: 2019-08-08

## 2019-08-08 DIAGNOSIS — I10 CHRONIC HYPERTENSION: ICD-10-CM

## 2019-08-08 DIAGNOSIS — R13.14 PHARYNGOESOPHAGEAL DYSPHAGIA: ICD-10-CM

## 2019-08-08 DIAGNOSIS — N18.9 CHRONIC RENAL IMPAIRMENT, UNSPECIFIED CKD STAGE: ICD-10-CM

## 2019-08-08 DIAGNOSIS — G45.9 TIA (TRANSIENT ISCHEMIC ATTACK): Primary | ICD-10-CM

## 2019-08-08 PROBLEM — I65.29 CAROTID ARTERY STENOSIS: Status: ACTIVE | Noted: 2019-08-08

## 2019-08-08 PROBLEM — E78.5 HYPERLIPIDEMIA: Status: ACTIVE | Noted: 2017-05-16

## 2019-08-08 LAB
ANION GAP SERPL CALCULATED.3IONS-SCNC: 9 MMOL/L (ref 4–13)
APTT PPP: 33.2 SECONDS (ref 24.1–35)
BASOPHILS # BLD AUTO: 0.03 10*3/MM3 (ref 0–0.2)
BASOPHILS NFR BLD AUTO: 0.4 % (ref 0–1.5)
BH CV ECHO MEAS - AO MAX PG (FULL): 34.2 MMHG
BH CV ECHO MEAS - AO MAX PG: 37.2 MMHG
BH CV ECHO MEAS - AO MEAN PG (FULL): 19.8 MMHG
BH CV ECHO MEAS - AO MEAN PG: 20.8 MMHG
BH CV ECHO MEAS - AO ROOT AREA (BSA CORRECTED): 1.5
BH CV ECHO MEAS - AO ROOT AREA: 7.1 CM^2
BH CV ECHO MEAS - AO ROOT DIAM: 3 CM
BH CV ECHO MEAS - AO V2 MAX: 305 CM/SEC
BH CV ECHO MEAS - AO V2 MEAN: 218.8 CM/SEC
BH CV ECHO MEAS - AO V2 VTI: 68.2 CM
BH CV ECHO MEAS - AVA(I,A): 1.2 CM^2
BH CV ECHO MEAS - AVA(I,D): 1.2 CM^2
BH CV ECHO MEAS - AVA(V,A): 1.3 CM^2
BH CV ECHO MEAS - AVA(V,D): 1.3 CM^2
BH CV ECHO MEAS - BSA(HAYCOCK): 2.1 M^2
BH CV ECHO MEAS - BSA: 2 M^2
BH CV ECHO MEAS - BZI_BMI: 28.6 KILOGRAMS/M^2
BH CV ECHO MEAS - BZI_METRIC_HEIGHT: 175.3 CM
BH CV ECHO MEAS - BZI_METRIC_WEIGHT: 88 KG
BH CV ECHO MEAS - EDV(CUBED): 158.3 ML
BH CV ECHO MEAS - EDV(MOD-SP4): 104 ML
BH CV ECHO MEAS - EDV(TEICH): 141.9 ML
BH CV ECHO MEAS - EF(CUBED): 67 %
BH CV ECHO MEAS - EF(MOD-SP4): 50.7 %
BH CV ECHO MEAS - EF(TEICH): 58 %
BH CV ECHO MEAS - ESV(CUBED): 52.3 ML
BH CV ECHO MEAS - ESV(MOD-SP4): 51.3 ML
BH CV ECHO MEAS - ESV(TEICH): 59.6 ML
BH CV ECHO MEAS - FS: 30.9 %
BH CV ECHO MEAS - IVS/LVPW: 1.2
BH CV ECHO MEAS - IVSD: 1.2 CM
BH CV ECHO MEAS - LA DIMENSION: 3.7 CM
BH CV ECHO MEAS - LA/AO: 1.2
BH CV ECHO MEAS - LAT PEAK E' VEL: 6.2 CM/SEC
BH CV ECHO MEAS - LV DIASTOLIC VOL/BSA (35-75): 51 ML/M^2
BH CV ECHO MEAS - LV MASS(C)D: 235.1 GRAMS
BH CV ECHO MEAS - LV MASS(C)DI: 115.3 GRAMS/M^2
BH CV ECHO MEAS - LV MAX PG: 3 MMHG
BH CV ECHO MEAS - LV MEAN PG: 1 MMHG
BH CV ECHO MEAS - LV SYSTOLIC VOL/BSA (12-30): 25.2 ML/M^2
BH CV ECHO MEAS - LV V1 MAX: 86.7 CM/SEC
BH CV ECHO MEAS - LV V1 MEAN: 53.5 CM/SEC
BH CV ECHO MEAS - LV V1 VTI: 17.9 CM
BH CV ECHO MEAS - LVIDD: 5.4 CM
BH CV ECHO MEAS - LVIDS: 3.7 CM
BH CV ECHO MEAS - LVLD AP4: 8 CM
BH CV ECHO MEAS - LVLS AP4: 7.5 CM
BH CV ECHO MEAS - LVOT AREA (M): 4.5 CM^2
BH CV ECHO MEAS - LVOT AREA: 4.5 CM^2
BH CV ECHO MEAS - LVOT DIAM: 2.4 CM
BH CV ECHO MEAS - LVPWD: 0.98 CM
BH CV ECHO MEAS - MED PEAK E' VEL: 5.22 CM/SEC
BH CV ECHO MEAS - MR MAX PG: 121.4 MMHG
BH CV ECHO MEAS - MR MAX VEL: 551 CM/SEC
BH CV ECHO MEAS - MR MEAN PG: 71 MMHG
BH CV ECHO MEAS - MR MEAN VEL: 391 CM/SEC
BH CV ECHO MEAS - MR VTI: 205 CM
BH CV ECHO MEAS - MV A MAX VEL: 108 CM/SEC
BH CV ECHO MEAS - MV DEC SLOPE: 456 CM/SEC^2
BH CV ECHO MEAS - MV DEC TIME: 0.27 SEC
BH CV ECHO MEAS - MV E MAX VEL: 126 CM/SEC
BH CV ECHO MEAS - MV E/A: 1.2
BH CV ECHO MEAS - MV MAX PG: 8.4 MMHG
BH CV ECHO MEAS - MV MEAN PG: 5 MMHG
BH CV ECHO MEAS - MV P1/2T MAX VEL: 144 CM/SEC
BH CV ECHO MEAS - MV P1/2T: 92.5 MSEC
BH CV ECHO MEAS - MV V2 MAX: 145 CM/SEC
BH CV ECHO MEAS - MV V2 MEAN: 114 CM/SEC
BH CV ECHO MEAS - MV V2 VTI: 42.4 CM
BH CV ECHO MEAS - MVA P1/2T LCG: 1.5 CM^2
BH CV ECHO MEAS - MVA(P1/2T): 2.4 CM^2
BH CV ECHO MEAS - MVA(VTI): 1.9 CM^2
BH CV ECHO MEAS - PA MAX PG: 1.4 MMHG
BH CV ECHO MEAS - PA V2 MAX: 59.2 CM/SEC
BH CV ECHO MEAS - PI END-D VEL: 115 CM/SEC
BH CV ECHO MEAS - RAP SYSTOLE: 5 MMHG
BH CV ECHO MEAS - RVSP: 33.7 MMHG
BH CV ECHO MEAS - SI(AO): 236.3 ML/M^2
BH CV ECHO MEAS - SI(CUBED): 52 ML/M^2
BH CV ECHO MEAS - SI(LVOT): 39.7 ML/M^2
BH CV ECHO MEAS - SI(MOD-SP4): 25.8 ML/M^2
BH CV ECHO MEAS - SI(TEICH): 40.3 ML/M^2
BH CV ECHO MEAS - SV(AO): 481.8 ML
BH CV ECHO MEAS - SV(CUBED): 106 ML
BH CV ECHO MEAS - SV(LVOT): 81 ML
BH CV ECHO MEAS - SV(MOD-SP4): 52.7 ML
BH CV ECHO MEAS - SV(TEICH): 82.3 ML
BH CV ECHO MEAS - TR MAX VEL: 268 CM/SEC
BH CV ECHO MEASUREMENTS AVERAGE E/E' RATIO: 22.07
BUN BLD-MCNC: 23 MG/DL (ref 5–21)
BUN/CREAT SERPL: 13.8 (ref 7–25)
CALCIUM SPEC-SCNC: 9.4 MG/DL (ref 8.4–10.4)
CHLORIDE SERPL-SCNC: 108 MMOL/L (ref 98–110)
CK SERPL-CCNC: 141 U/L (ref 0–203)
CO2 SERPL-SCNC: 25 MMOL/L (ref 24–31)
CREAT BLD-MCNC: 1.67 MG/DL (ref 0.5–1.4)
DEPRECATED RDW RBC AUTO: 43.6 FL (ref 37–54)
EOSINOPHIL # BLD AUTO: 0.23 10*3/MM3 (ref 0–0.4)
EOSINOPHIL NFR BLD AUTO: 3.4 % (ref 0.3–6.2)
ERYTHROCYTE [DISTWIDTH] IN BLOOD BY AUTOMATED COUNT: 13.6 % (ref 12.3–15.4)
GFR SERPL CREATININE-BSD FRML MDRD: 40 ML/MIN/1.73
GLUCOSE BLD-MCNC: 101 MG/DL (ref 70–100)
HCT VFR BLD AUTO: 38.5 % (ref 37.5–51)
HGB BLD-MCNC: 13.1 G/DL (ref 13–17.7)
HOLD SPECIMEN: NORMAL
HOLD SPECIMEN: NORMAL
IMM GRANULOCYTES # BLD AUTO: 0.02 10*3/MM3 (ref 0–0.05)
IMM GRANULOCYTES NFR BLD AUTO: 0.3 % (ref 0–0.5)
INR PPP: 0.96 (ref 0.91–1.09)
LEFT ATRIUM VOLUME INDEX: 43.9 ML/M2
LEFT ATRIUM VOLUME: 89.6 CM3
LYMPHOCYTES # BLD AUTO: 2.13 10*3/MM3 (ref 0.7–3.1)
LYMPHOCYTES NFR BLD AUTO: 31.6 % (ref 19.6–45.3)
MAXIMAL PREDICTED HEART RATE: 139 BPM
MCH RBC QN AUTO: 29.7 PG (ref 26.6–33)
MCHC RBC AUTO-ENTMCNC: 34 G/DL (ref 31.5–35.7)
MCV RBC AUTO: 87.3 FL (ref 79–97)
MONOCYTES # BLD AUTO: 0.65 10*3/MM3 (ref 0.1–0.9)
MONOCYTES NFR BLD AUTO: 9.6 % (ref 5–12)
NEUTROPHILS # BLD AUTO: 3.68 10*3/MM3 (ref 1.7–7)
NEUTROPHILS NFR BLD AUTO: 54.7 % (ref 42.7–76)
NRBC BLD AUTO-RTO: 0 /100 WBC (ref 0–0.2)
PLATELET # BLD AUTO: 176 10*3/MM3 (ref 140–450)
PMV BLD AUTO: 10.3 FL (ref 6–12)
POTASSIUM BLD-SCNC: 4.3 MMOL/L (ref 3.5–5.3)
PROTHROMBIN TIME: 13.1 SECONDS (ref 11.9–14.6)
RBC # BLD AUTO: 4.41 10*6/MM3 (ref 4.14–5.8)
SODIUM BLD-SCNC: 142 MMOL/L (ref 135–145)
STRESS TARGET HR: 118 BPM
TROPONIN I SERPL-MCNC: 0.02 NG/ML (ref 0–0.03)
WBC NRBC COR # BLD: 6.74 10*3/MM3 (ref 3.4–10.8)
WHOLE BLOOD HOLD SPECIMEN: NORMAL
WHOLE BLOOD HOLD SPECIMEN: NORMAL

## 2019-08-08 PROCEDURE — 93306 TTE W/DOPPLER COMPLETE: CPT

## 2019-08-08 PROCEDURE — 85730 THROMBOPLASTIN TIME PARTIAL: CPT | Performed by: EMERGENCY MEDICINE

## 2019-08-08 PROCEDURE — 70496 CT ANGIOGRAPHY HEAD: CPT

## 2019-08-08 PROCEDURE — 80048 BASIC METABOLIC PNL TOTAL CA: CPT | Performed by: EMERGENCY MEDICINE

## 2019-08-08 PROCEDURE — 0 IOPAMIDOL PER 1 ML: Performed by: FAMILY MEDICINE

## 2019-08-08 PROCEDURE — 99223 1ST HOSP IP/OBS HIGH 75: CPT | Performed by: PSYCHIATRY & NEUROLOGY

## 2019-08-08 PROCEDURE — 70498 CT ANGIOGRAPHY NECK: CPT

## 2019-08-08 PROCEDURE — 85610 PROTHROMBIN TIME: CPT | Performed by: EMERGENCY MEDICINE

## 2019-08-08 PROCEDURE — 93005 ELECTROCARDIOGRAM TRACING: CPT | Performed by: EMERGENCY MEDICINE

## 2019-08-08 PROCEDURE — 71045 X-RAY EXAM CHEST 1 VIEW: CPT

## 2019-08-08 PROCEDURE — 25010000002 PERFLUTREN 6.52 MG/ML SUSPENSION: Performed by: FAMILY MEDICINE

## 2019-08-08 PROCEDURE — 84484 ASSAY OF TROPONIN QUANT: CPT | Performed by: EMERGENCY MEDICINE

## 2019-08-08 PROCEDURE — 93880 EXTRACRANIAL BILAT STUDY: CPT

## 2019-08-08 PROCEDURE — 93010 ELECTROCARDIOGRAM REPORT: CPT | Performed by: INTERNAL MEDICINE

## 2019-08-08 PROCEDURE — 93880 EXTRACRANIAL BILAT STUDY: CPT | Performed by: SURGERY

## 2019-08-08 PROCEDURE — 97165 OT EVAL LOW COMPLEX 30 MIN: CPT

## 2019-08-08 PROCEDURE — 93306 TTE W/DOPPLER COMPLETE: CPT | Performed by: INTERNAL MEDICINE

## 2019-08-08 PROCEDURE — 70450 CT HEAD/BRAIN W/O DYE: CPT

## 2019-08-08 PROCEDURE — 82550 ASSAY OF CK (CPK): CPT | Performed by: EMERGENCY MEDICINE

## 2019-08-08 PROCEDURE — 92610 EVALUATE SWALLOWING FUNCTION: CPT | Performed by: SPEECH-LANGUAGE PATHOLOGIST

## 2019-08-08 PROCEDURE — 70551 MRI BRAIN STEM W/O DYE: CPT

## 2019-08-08 PROCEDURE — 85025 COMPLETE CBC W/AUTO DIFF WBC: CPT | Performed by: EMERGENCY MEDICINE

## 2019-08-08 PROCEDURE — 99284 EMERGENCY DEPT VISIT MOD MDM: CPT

## 2019-08-08 RX ORDER — NITROGLYCERIN 0.4 MG/1
0.4 TABLET SUBLINGUAL
Status: DISCONTINUED | OUTPATIENT
Start: 2019-08-08 | End: 2019-08-09 | Stop reason: HOSPADM

## 2019-08-08 RX ORDER — SODIUM CHLORIDE 9 MG/ML
75 INJECTION, SOLUTION INTRAVENOUS CONTINUOUS
Status: DISCONTINUED | OUTPATIENT
Start: 2019-08-08 | End: 2019-08-09 | Stop reason: HOSPADM

## 2019-08-08 RX ORDER — LOSARTAN POTASSIUM 50 MG/1
100 TABLET ORAL
Status: DISCONTINUED | OUTPATIENT
Start: 2019-08-08 | End: 2019-08-09 | Stop reason: HOSPADM

## 2019-08-08 RX ORDER — SODIUM CHLORIDE 0.9 % (FLUSH) 0.9 %
3-10 SYRINGE (ML) INJECTION AS NEEDED
Status: DISCONTINUED | OUTPATIENT
Start: 2019-08-08 | End: 2019-08-09 | Stop reason: HOSPADM

## 2019-08-08 RX ORDER — ONDANSETRON 2 MG/ML
4 INJECTION INTRAMUSCULAR; INTRAVENOUS EVERY 6 HOURS PRN
Status: DISCONTINUED | OUTPATIENT
Start: 2019-08-08 | End: 2019-08-09 | Stop reason: HOSPADM

## 2019-08-08 RX ORDER — SODIUM CHLORIDE 0.9 % (FLUSH) 0.9 %
3 SYRINGE (ML) INJECTION EVERY 12 HOURS SCHEDULED
Status: DISCONTINUED | OUTPATIENT
Start: 2019-08-08 | End: 2019-08-09 | Stop reason: HOSPADM

## 2019-08-08 RX ORDER — ATORVASTATIN CALCIUM 40 MG/1
80 TABLET, FILM COATED ORAL NIGHTLY
Status: DISCONTINUED | OUTPATIENT
Start: 2019-08-08 | End: 2019-08-09 | Stop reason: HOSPADM

## 2019-08-08 RX ORDER — SODIUM CHLORIDE 0.9 % (FLUSH) 0.9 %
10 SYRINGE (ML) INJECTION AS NEEDED
Status: DISCONTINUED | OUTPATIENT
Start: 2019-08-08 | End: 2019-08-08

## 2019-08-08 RX ORDER — AMLODIPINE BESYLATE 10 MG/1
10 TABLET ORAL DAILY
Status: DISCONTINUED | OUTPATIENT
Start: 2019-08-08 | End: 2019-08-09 | Stop reason: HOSPADM

## 2019-08-08 RX ORDER — ASPIRIN 81 MG/1
81 TABLET ORAL DAILY
Status: DISCONTINUED | OUTPATIENT
Start: 2019-08-08 | End: 2019-08-09 | Stop reason: HOSPADM

## 2019-08-08 RX ADMIN — LOSARTAN POTASSIUM 100 MG: 50 TABLET, FILM COATED ORAL at 11:57

## 2019-08-08 RX ADMIN — PERFLUTREN 9.78 MG: 6.52 INJECTION, SUSPENSION INTRAVENOUS at 13:56

## 2019-08-08 RX ADMIN — SODIUM CHLORIDE 75 ML/HR: 9 INJECTION, SOLUTION INTRAVENOUS at 22:35

## 2019-08-08 RX ADMIN — ASPIRIN 81 MG: 81 TABLET ORAL at 11:57

## 2019-08-08 RX ADMIN — DESMOPRESSIN ACETATE 80 MG: 0.2 TABLET ORAL at 22:34

## 2019-08-08 RX ADMIN — AMLODIPINE BESYLATE 10 MG: 10 TABLET ORAL at 11:57

## 2019-08-08 RX ADMIN — IOPAMIDOL 102.6 ML: 755 INJECTION, SOLUTION INTRAVENOUS at 20:22

## 2019-08-08 NOTE — TELEPHONE ENCOUNTER
Per radiology, patient is needing a MRI today.  RN will obtain signed cardiology order form and will fax to radiology when complete.

## 2019-08-08 NOTE — PLAN OF CARE
Problem: Patient Care Overview  Goal: Plan of Care Review  Outcome: Ongoing (interventions implemented as appropriate)   08/08/19 1657   Coping/Psychosocial   Plan of Care Reviewed With patient   Plan of Care Review   Progress no change   OTHER   Outcome Summary from ER- c/o left eye pain and left arm weakness, patient states NO n/t, able to CASILLAS equally but states they can still feel weakness in the left arm, family at bedside, BP trending down, telem on shows V-pacing in the 70's, scds on, std by asst to [facility], slight facial droop noticed, will continue to monitor and notify MD of any changes       Problem: Stroke (Ischemic) (Adult)  Goal: Signs and Symptoms of Listed Potential Problems Will be Absent, Minimized or Managed (Stroke)  Outcome: Ongoing (interventions implemented as appropriate)   08/08/19 1657   Goal/Outcome Evaluation   Problems Assessed (Stroke (Ischemic)) motor/sensory impairment

## 2019-08-08 NOTE — THERAPY DISCHARGE NOTE
Acute Care - Occupational Therapy Initial Eval/Discharge  [facility]     Patient Name: Mireille Magallanes  : 1935  MRN: [id number]  Today's Date: 2019  Onset of Illness/Injury or Date of Surgery: 19  Date of Referral to OT: 19  Referring Physician: Dr. Bernstein      Admit Date: 2019       ICD-10-CM ICD-9-CM   1. TIA (transient ischemic attack) G45.9 435.9   2. Chronic hypertension I10 401.9   3. Chronic renal impairment, unspecified CKD stage N18.9 585.9   4. Pharyngoesophageal dysphagia R13.14 787.24     Patient Active Problem List   Diagnosis   • HTN (hypertension)   • Hyperlipidemia   • Nonrheumatic aortic valve stenosis   • Coronary artery disease involving coronary bypass graft of native heart without angina pectoris   • Pacemaker   • OLMEDO (dyspnea on exertion)   • Other chest pain   • Coronary artery disease involving native coronary artery of native heart with angina pectoris (CMS/HCC)   • Overweight (BMI 25.0-29.9)   • Abnormal stress echo   • TIA (transient ischemic attack)   • Carotid artery stenosis     Past Medical History:   Diagnosis Date   • Acute viral hepatitis A    • Carotid artery stenosis    • CKD (chronic kidney disease)    • Coagulopathy (CMS/HCC)     Plavix   • Coagulopathy (CMS/HCC)    • Coronary arteriosclerosis     stent ,? 2104, on plavix now.   • Diarrhea    • Dysphagia     Unspecified   • Family history of colon cancer    • Hematochezia     differential diagnosis discussed   • Hemorrhoids     Unspecified   • History of colonic polyps    • Hypercholesterolemia    • Hyperlipidemia    • Hypertension    • Left ventricular hypertrophy    • Melanoma of back (CMS/HCC)    • Obesity    • Osteoarthritis    • Peripheral neuropathy    • Polyp of colon    • Stroke (CMS/HCC)    • TIA (transient ischemic attack)    • Tobacco non-user      Past Surgical History:   Procedure Laterality Date   • APPENDECTOMY     • CARDIAC CATHETERIZATION     • CARDIAC CATHETERIZATION N/A 3/18/2019     Procedure: Left Heart Cath;  Surgeon: Kaushik Toledo MD;  Location:  [facility];  Service: Cardiology   • CARDIAC ELECTROPHYSIOLOGY PROCEDURE N/A 1/5/2018    Procedure: Temporary Pacemaker;  Surgeon: Luis E Courtney MD;  Location:  [facility];  Service:    • CARDIAC ELECTROPHYSIOLOGY PROCEDURE N/A 1/8/2018    Procedure: Device Implant;  Surgeon: Kaushik Toledo MD;  Location:  [facility]ATH [facility];  Service:    • CAROTID STENT     • COLON SURGERY  1999    for Ischemic Colitis   • COLONOSCOPY  05/07/2008    tubular adenoma ascending colon polyp - 3 yr   • COLONOSCOPY  08/19/2003    colon polyp @ 24 cm-see path - 5 yr   • COLONOSCOPY  08/11/2000    small interanl hemorrhoid - 3-5 yr   • COLONOSCOPY  06/22/1998    (8)small colon polyps removed-see path, internal anal skin tag - 2-3 yr   • CORONARY ARTERY BYPASS GRAFT     • CORONARY STENT PLACEMENT      x1 2014 dr crane    • OTHER SURGICAL HISTORY      Lithotripsy   • OTHER SURGICAL HISTORY  06/2011    recall 3 yr.    • VASECTOMY            OT ASSESSMENT FLOWSHEET (last 12 hours)      Occupational Therapy Evaluation     Row Name 08/08/19 1010                   OT Evaluation Time/Intention    Subjective Information  no complaints  -CS        Document Type  evaluation  -CS        Patient Effort  good  -CS           General Information    Patient Profile Reviewed?  yes  -CS        Onset of Illness/Injury or Date of Surgery  08/08/19  -CS        Referring Physician  Dr. Bernstein  -CS        Patient Observations  alert;cooperative;agree to therapy  -CS        Patient/Family Observations  daughter and spouse present  -CS        General Observations of Patient  pt frost in bed, no distress  -CS        Prior Level of Function  independent:;all household mobility;community mobility;ADL's;dressing;bathing;yard work  -CS        Equipment Currently Used at Home  none  -CS        Pertinent History of Current Functional Problem   Pt presents with LUE weakness, difficulty speaking, L facial droop, and L eye pain.  DX; TIA  -CS        Existing Precautions/Restrictions  no known precautions/restrictions  -CS        Risks Reviewed  patient and family:;LOB;nausea/vomiting;dizziness;increased discomfort  -CS        Benefits Reviewed  patient and family:;improve function;increase independence;increase strength;increase balance;decrease pain  -CS        Barriers to Rehab  none identified  -CS           Relationship/Environment    Lives With  spouse  -CS           Resource/Environmental Concerns    Current Living Arrangements  home/apartment/condo  -CS           Home Main Entrance    Number of Stairs, Main Entrance  ten  -CS        Stair Railings, Main Entrance  none  -CS        Stairs Comment, Main Entrance  stairs are over a long distance, 1-2 steps and then a landing is available  -CS           Cognitive Assessment/Interventions    Additional Documentation  Cognitive Assessment/Intervention (Group)  -CS           Cognitive Assessment/Intervention- PT/OT    Affect/Mental Status (Cognitive)  WNL  -CS        Orientation Status (Cognition)  oriented x 4  -CS        Follows Commands (Cognition)  WNL  -CS        Cognitive Function (Cognitive)  WNL  -CS        Personal Safety Interventions  fall prevention program maintained;nonskid shoes/slippers when out of bed;supervised activity  -CS           Bed Mobility Assessment/Treatment    Bed Mobility Assessment/Treatment  supine-sit  -CS        Supine-Sit [place] (Bed Mobility)  independent  -CS           Functional Mobility    Functional Mobility- Ind. Level  independent  -CS        Functional Mobility- Comment  walking in hallway  -CS           Transfer Assessment/Treatment    Transfer Assessment/Treatment  sit-stand transfer;stand-sit transfer  -CS           Sit-Stand Transfer    Sit-Stand [place] (Transfers)  independent  -CS           Stand-Sit Transfer    Stand-Sit [place] (Transfers)   independent  -CS           ADL Assessment/Intervention    BADL Assessment/Intervention  lower body dressing  -CS           Lower Body Dressing Assessment/Training    Lower Body Dressing [place] Level  don;shoes/slippers;independent  -CS        Lower Body Dressing Position  edge of bed sitting  -           General ROM    GENERAL ROM COMMENTS  BUE AROM WFL  -           MMT (Manual Muscle Testing)    General MMT Comments  BUE strength: 5/5  -           Motor Assessment/Interventions    Additional Documentation  Balance (Group);Fine Motor Testing & Training (Group);Gross Motor Coordination (Group)  -           Gross Motor Coordination    Gross Motor Impairments  finger to nose  -        Gross Motor Skill, Impairments Detail  Finger to nose and LORI [facility]  -           Balance    Balance  static sitting balance;static standing balance;dynamic sitting balance;dynamic standing balance  -CS           Static Sitting Balance    Level of [place] (Unsupported Sitting, Static Balance)  independent  -CS           Dynamic Sitting Balance    Level of [place], Reaches Outside Midline (Sitting, Dynamic Balance)  independent  -CS           Static Standing Balance    Level of [place] (Supported Standing, Static Balance)  independent  -CS           Dynamic Standing Balance    Level of [place], Reaches Outside Midline (Standing, Dynamic Balance)  independent  -           Fine Motor Testing & Training    Comment, Fine Motor Coordination  BUE opposition and management of ADL item [facility]  -           Sensory Assessment/Intervention    Sensory General Assessment  no sensation deficits identified  -CS           Positioning and Restraints    Pre-Treatment Position  in bed  -CS        Post Treatment Position  chair  -CS        In Chair  sitting;call light within reach;encouraged to call for assist;with family/caregiver  -           Pain Scale: Numbers Pre/Post-Treatment    Pain Scale: Numbers, Pretreatment   0/10 - no pain  -CS        Pain Scale: Numbers, Post-Treatment  0/10 - no pain  -CS           Plan of Care Review    Plan of Care Reviewed With  patient;spouse  -CS           Clinical Impression (OT)    Date of Referral to OT  08/08/19  -CS        OT Diagnosis  Impaired ADLs and functional mobility  -CS        Patient/Family Goals Statement (OT Eval)  to go home  -CS        Criteria for Skilled Therapeutic Interventions Met (OT Eval)  no;no problems identified which require skilled intervention;current level of function same as previous level of function  -CS        Therapy Frequency (OT Eval)  evaluation only  -CS        Care Plan Review (OT)  evaluation/treatment results reviewed  -CS        Care Plan Review, Other Participant (OT Eval)  spouse  -CS        Anticipated Discharge Disposition (OT)  home  -CS           Living Environment    Home Accessibility  stairs to enter home  -CS          User Key  (r) = Recorded By, (t) = Taken By, (c) = Cosigned By    Initials Name Effective Dates    CS Kendra Doherty S, OTR/L, CNT 04/02/19 -               OT Recommendation and Plan  Outcome Summary/Treatment Plan (OT)  Anticipated Discharge Disposition (OT): home with assist  Therapy Frequency (OT Eval): evaluation only  Plan of Care Review  Plan of Care Reviewed With: patient, spouse  Plan of Care Reviewed With: patient, spouse  Outcome Summary: OT evaluation completed.  Pt demo no further need for skilled OT services.  Pt is independent with all functional mobility and ADLs and this time with no noted balance, coordination, or strength deficits.  OT will sign off at this time with recommendation for pt to discharge home with his spouse.     Rehab Goal Summary     Row Name 08/08/19 0944             Swallow Goals (SLP)    Oral Nutrition/Hydration Goal Selection (SLP)  oral nutrition/hydration, SLP goal 1  -MM         Oral Nutrition/Hydration Goal 1 (SLP)    Oral Nutrition/Hydration Goal 1, SLP  LTG: Patient will tolerate LRD  without s/s of aspiration.  -MM      Time Frame (Oral Nutrition/Hydration Goal 1, SLP)  by discharge  -MM      Barriers (Oral Nutrition/Hydration Goal 1, SLP)  n/a  -MM      Progress/Outcomes (Oral Nutrition/Hydration Goal 1, SLP)  goal ongoing  -MM        User Key  (r) = Recorded By, (t) = Taken By, (c) = Cosigned By    Initials Name Provider Type Discipline    Jade Lynne MS CCC-SLP Speech and Language Pathologist SLP          Outcome Measures     Row Name 08/08/19 1010             How much help from another is currently needed...    Putting on and taking off regular lower body clothing?  4  -CS      Bathing (including washing, rinsing, and drying)  4  -CS      Toileting (which includes using toilet bed pan or urinal)  4  -CS      Putting on and taking off regular upper body clothing  4  -CS      Taking care of personal grooming (such as brushing teeth)  4  -CS      Eating meals  4  -CS      AM-PAC 6 Clicks Score (OT)  24  -CS         Functional Assessment    Outcome Measure Options  AM-PAC 6 Clicks Daily Activity (OT)  -CS        User Key  (r) = Recorded By, (t) = Taken By, (c) = Cosigned By    Initials Name Provider Type    CS Kendra Doherty OTR/L, KHLOE Occupational Therapist          Time Calculation:   Time Calculation- OT     Row Name 08/08/19 1146             Time Calculation- OT    OT Start Time  1010  -CS      OT Stop Time  1049  -CS      OT Time Calculation (min)  39 min  -CS      OT Received On  08/08/19  -CS        User Key  (r) = Recorded By, (t) = Taken By, (c) = Cosigned By    Initials Name Provider Type    Kendra Munoz OTR/L, KHLOE Occupational Therapist        Therapy Suggested Charges     Code   Minutes Charges    None           Therapy Charges for Today     Code Description Service Date Service Provider Modifiers Qty    [id number] HC OT EVAL LOW COMPLEXITY 3 8/8/2019 Kendra Doherty OTR/L, CNT GO 1               OT Discharge Summary  Anticipated Discharge Disposition (OT):  home with assist  Reason for Discharge: At baseline function, Independent  Outcomes Achieved: Other(1x OT eval)  Discharge Destination: Home with assist    Kendra Doherty, OTR/L, CNT  8/8/2019

## 2019-08-08 NOTE — H&P
[facility] Medicine Services  HISTORY AND PHYSICAL    Date of Admission: 8/8/2019  Primary Care Physician: Jw Leos PA-C    Subjective     Chief Complaint: TIA    History of Present Illness  Patient is 83-year-old  male arrives for evaluation of possible stroke.  At 630 last night, patient has symptoms of left arm weakness, difficulty speaking, left-sided facial droop, and left eye pain.  Patient ignore the symptoms initially and it was improving with the left arm numbness and weakness.  Slurred speech also improved.  Patient decided come to ER at 430 this morning to be reevaluated.  Pt currently most of symptoms is resolved.  Patient has a history of CVA in the past.  History of pacemaker placement Dr. Jansen.   Patient denies any chest pain or shortness of breath.  Patient denies any nausea vomiting diarrhea.  Patient denies any fever night sweats or chills.  Patient in no acute distress.    Patient has a past medical history of hepatitis A, carotid artery stenosis, chronic kidney disease, coagulopathy, coronary artery disease, diarrhea, dysphagia, history of colon cancer, GI bleed, hemorrhoids, colon polyp, hyperlipidemia, hypertension, left ventricular hypertrophy, melanoma of the back, obesity, osteoarthritis, peripheral neuropathy, colon polyp, CVA, TIA.    Review of Systems   Constitutional: Positive for activity change and appetite change. Negative for chills and fever.   HENT: Negative for hearing loss, nosebleeds, tinnitus and trouble swallowing.    Eyes: Negative for visual disturbance.   Respiratory: Negative for cough, chest tightness, shortness of breath and wheezing.    Cardiovascular: Negative for chest pain, palpitations and leg swelling.   Gastrointestinal: Negative for abdominal distention, abdominal pain, blood in stool, constipation, diarrhea, nausea and vomiting.   Endocrine: Negative for cold intolerance, heat intolerance, polydipsia,  polyphagia and polyuria.   Genitourinary: Negative for decreased urine volume, difficulty urinating, dysuria, flank pain, frequency and hematuria.   Musculoskeletal: Positive for arthralgias, gait problem and myalgias. Negative for joint swelling.   Skin: Negative for rash.   Allergic/Immunologic: Negative for immunocompromised state.   Neurological: Negative for dizziness, syncope, weakness, light-headedness and headaches.   Hematological: Negative for adenopathy. Does not bruise/bleed easily.   Psychiatric/Behavioral: Negative for confusion and sleep disturbance. The patient is not nervous/anxious.         Otherwise complete ROS reviewed and negative except as mentioned in the HPI.      Past Medical History:   Past Medical History:   Diagnosis Date   • Acute viral hepatitis A    • Carotid artery stenosis    • CKD (chronic kidney disease)    • Coagulopathy (CMS/HCC)     Plavix   • Coagulopathy (CMS/HCC)    • Coronary arteriosclerosis     stent ,? 4/2104, on plavix now.   • Diarrhea    • Dysphagia     Unspecified   • Family history of colon cancer    • Hematochezia     differential diagnosis discussed   • Hemorrhoids     Unspecified   • History of colonic polyps    • Hypercholesterolemia    • Hyperlipidemia    • Hypertension    • Left ventricular hypertrophy    • Melanoma of back (CMS/HCC)    • Obesity    • Osteoarthritis    • Peripheral neuropathy    • Polyp of colon    • Stroke (CMS/HCC)    • TIA (transient ischemic attack)    • Tobacco non-user        Past Surgical History:  Past Surgical History:   Procedure Laterality Date   • APPENDECTOMY     • CARDIAC CATHETERIZATION     • CARDIAC CATHETERIZATION N/A 3/18/2019    Procedure: Left Heart Cath;  Surgeon: Kaushik Toledo MD;  Location:  [facility] CATH INVASIVE LOCATION;  Service: Cardiology   • CARDIAC ELECTROPHYSIOLOGY PROCEDURE N/A 1/5/2018    Procedure: Temporary Pacemaker;  Surgeon: Luis E Courtney MD;  Location:  [facility] CATH [facility] LOC[facility];  Service:     • CARDIAC ELECTROPHYSIOLOGY PROCEDURE N/A 1/8/2018    Procedure: Device Implant;  Surgeon: Kaushik Toledo MD;  Location:  [facility] CATH INVASIVE LOCATION;  Service:    • CAROTID STENT     • COLON SURGERY  1999    for Ischemic Colitis   • COLONOSCOPY  05/07/2008    tubular adenoma ascending colon polyp - 3 yr   • COLONOSCOPY  08/19/2003    colon polyp @ 24 cm-see path - 5 yr   • COLONOSCOPY  08/11/2000    small interanl hemorrhoid - 3-5 yr   • COLONOSCOPY  06/22/1998    (8)small colon polyps removed-see path, internal anal skin tag - 2-3 yr   • CORONARY ARTERY BYPASS GRAFT     • CORONARY STENT PLACEMENT      x1 2014 dr crane    • OTHER SURGICAL HISTORY      Lithotripsy   • OTHER SURGICAL HISTORY  06/2011    recall 3 yr.    • VASECTOMY         Family History: family history includes Colon cancer in his father; Other in his brother and sister; Stroke in his mother.    Social History:  reports that he quit smoking about 20 years ago. He has never used smokeless tobacco. He reports that he does not drink alcohol or use drugs.    Medications:  Prior to Admission medications    Medication Sig Start Date End Date Taking? Authorizing Provider   amLODIPine (NORVASC) 10 MG tablet Take 1 tablet by mouth Daily. 3/18/19  Yes Kaushik Toledo MD   aspirin 81 MG EC tablet Take 81 mg by mouth daily.   Yes Provider, MD Terell   atorvastatin (LIPITOR) 80 MG tablet Take 80 mg by mouth Daily.   Yes Provider, MD Terell   Coenzyme Q10 (COQ-10) 100 MG capsule Take  by mouth daily.   Yes Provider, MD Terell   irbesartan (AVAPRO) 300 MG tablet Take 300 mg by mouth Every Night.   Yes Provider, MD Terell   nitroglycerin (NITROSTAT) 0.4 MG SL tablet Place 0.4 mg under the tongue as needed.   Yes Provider, Historical, MD   metoprolol succinate XL (TOPROL-XL) 25 MG 24 hr tablet Take 1 tablet by mouth Daily. 3/6/19   Brisa Saleem, APRN     Allergies:  Allergies   Allergen Reactions   • Sulfa Antibiotics Rash     Sulfa Drugs  "      Objective     Vital Signs: /88   Pulse 69   Temp 97.8 °F (36.6 °C) (Oral)   Resp 18   Ht 175.3 cm (69\")   Wt 88 kg (194 lb)   SpO2 97%   BMI 28.65 kg/m²   Physical Exam   Constitutional: He is oriented to person, place, and time. He appears well-developed.   HENT:   Head: Normocephalic and atraumatic.   Eyes: Conjunctivae and EOM are normal. Pupils are equal, round, and reactive to light.   Neck: Neck supple. No JVD present. No thyromegaly present.   Cardiovascular: Normal rate, regular rhythm, normal heart sounds and intact distal pulses. Exam reveals no gallop and no friction rub.   No murmur heard.  Pulmonary/Chest: Effort normal and breath sounds normal. No respiratory distress. He has no wheezes. He has no rales. He exhibits no tenderness.   Diminished breath sound bilateral, clear.   Abdominal: Soft. Bowel sounds are normal. He exhibits no distension. There is no tenderness. There is no rebound and no guarding.   Musculoskeletal: Normal range of motion. He exhibits no edema, tenderness or deformity.   Lymphadenopathy:     He has no cervical adenopathy.   Neurological: He is alert and oriented to person, place, and time. He displays normal reflexes. No cranial nerve deficit. He exhibits normal muscle tone.   Strength is 5 out of 5 symmetrical.  Negative pronator drift.  Negative finger touch.  No sign of focal deficit at this time   Skin: Skin is warm and dry. Capillary refill takes 2 to 3 seconds. No rash noted.   Psychiatric: He has a normal mood and affect. His behavior is normal. Judgment and thought content normal.   Nursing note and vitals reviewed.          Results Reviewed:    Lab Results (last 24 hours)     Procedure Component Value Units Date/Time    Troponin [[id number]]  (Normal) Collected:  08/08/19 0554    Specimen:  Blood Updated:  08/08/19 0701     Troponin I 0.023 ng/mL     [place] Draw [[id number]] Collected:  08/08/19 0554    Specimen:  Blood Updated:  08/08/19 0701    " Narrative:       The following orders were created for panel order [place] Draw.  Procedure                               Abnormality         Status                     ---------                               -----------         ------                     Light Blue Top[[id number]]                                   Final result               Green Top (Gel)[[id number]]                                  Final result               Lavender Top[[id number]]                                     Final result               Red Top[[id number]]                                          Final result                 Please view results for these tests on the individual orders.    Light Blue Top [[id number]] Collected:  08/08/19 0554    Specimen:  Blood Updated:  08/08/19 0701     Extra Tube hold for add-on     Comment: Auto resulted       Green Top (Gel) [[id number]] Collected:  08/08/19 0554    Specimen:  Blood Updated:  08/08/19 0701     Extra Tube Hold for add-ons.     Comment: Auto resulted.       Lavender Top [[id number]] Collected:  08/08/19 0554    Specimen:  Blood Updated:  08/08/19 0701     Extra Tube hold for add-on     Comment: Auto resulted       Red Top [[id number]] Collected:  08/08/19 0554    Specimen:  Blood Updated:  08/08/19 0701     Extra Tube Hold for add-ons.     Comment: Auto resulted.       Protime-INR [[id number]]  (Normal) Collected:  08/08/19 0554    Specimen:  Blood Updated:  08/08/19 0654     Protime 13.1 Seconds      INR 0.96    aPTT [[id number]]  (Normal) Collected:  08/08/19 0554    Specimen:  Blood Updated:  08/08/19 0654     PTT 33.2 seconds     Basic Metabolic Panel [[id number]]  (Abnormal) Collected:  08/08/19 0554    Specimen:  Blood Updated:  08/08/19 0649     Glucose 101 mg/dL      BUN 23 mg/dL      Creatinine 1.67 mg/dL      Sodium 142 mmol/L      Potassium 4.3 mmol/L      Chloride 108 mmol/L      CO2 25.0 mmol/L      Calcium 9.4 mg/dL      eGFR Non African Amer 40 mL/min/1.73      BUN/Creatinine Ratio  13.8     Anion Gap 9.0 mmol/L     Narrative:       GFR Normal >60  Chronic Kidney Disease <60  Kidney Failure <15    CK [[id number]]  (Normal) Collected:  08/08/19 0554    Specimen:  Blood Updated:  08/08/19 0649     Creatine Kinase 141 U/L     CBC & Differential [[id number]] Collected:  08/08/19 0554    Specimen:  Blood Updated:  08/08/19 0648    Narrative:       The following orders were created for panel order CBC & Differential.  Procedure                               Abnormality         Status                     ---------                               -----------         ------                     CBC Auto Differential[[id number]]        Normal              Final result                 Please view results for these tests on the individual orders.    CBC Auto Differential [[id number]]  (Normal) Collected:  08/08/19 0554    Specimen:  Blood Updated:  08/08/19 0648     WBC 6.74 10*3/mm3      RBC 4.41 10*6/mm3      Hemoglobin 13.1 g/dL      Hematocrit 38.5 %      MCV 87.3 fL      MCH 29.7 pg      MCHC 34.0 g/dL      RDW 13.6 %      RDW-SD 43.6 fl      MPV 10.3 fL      Platelets 176 10*3/mm3      Neutrophil % 54.7 %      Lymphocyte % 31.6 %      Monocyte % 9.6 %      Eosinophil % 3.4 %      Basophil % 0.4 %      Immature Grans % 0.3 %      Neutrophils, Absolute 3.68 10*3/mm3      Lymphocytes, Absolute 2.13 10*3/mm3      Monocytes, Absolute 0.65 10*3/mm3      Eosinophils, Absolute 0.23 10*3/mm3      Basophils, Absolute 0.03 10*3/mm3      Immature Grans, Absolute 0.02 10*3/mm3      nRBC 0.0 /100 WBC            Radiology Data:    Imaging Results (last 24 hours)     Procedure Component Value Units Date/Time    CT Head Without Contrast Stroke Protocol [[id number]] Collected:  08/08/19 0804     Updated:  08/08/19 0809    Narrative:       EXAMINATION: CT HEAD WO CONTRAST STROKE PROTOCOL-      8/8/2019 7:49 AM CDT     HISTORY: Weakness and dizziness. Stroke symptoms.     In order to have a CT radiation dose as low as  reasonably achievable  Automated Exposure Control was utilized for adjustment of the mA and/or  KV according to patient size.     DLP in mGycm= 786.     No skull abnormality.  Mucosal thickening with near complete opacification of the left side of  the sphenoid sinus.  Clear mastoid air cells.     Atrophy is not excessive.  Ventricle size is normal.     There is white matter hypodensity compatible with small vessel disease  in the periventricular region.     There is no cortical infarct or hemorrhage.     Summary:  1. No acute intracranial abnormality is seen.                                   This report was finalized on 08/08/2019 08:06 by Dr. Franco Christy MD.    XR Chest 1 View [[id number]] Collected:  08/08/19 0754     Updated:  08/08/19 0758    Narrative:       EXAMINATION: XR CHEST 1 VW-     8/8/2019 7:30 AM CDT     HISTORY: Stroke symptoms.     One view chest x-ray compared with 01/19/2018.     Heart size is within normal limits.  CABG changes with aortic arch calcification.  Unchanged left cardiac pacer.     Fully expanded lungs with no pneumonia, pneumothorax, or congestive  failure.     Summary:  1. No acute lung disease.     This report was finalized on 08/08/2019 07:54 by Dr. Franco Christy MD.          I have personally reviewed and interpreted the radiology studies and ECG obtained at time of admission.     Assessment / Plan      Assessment & Plan  Active Hospital Problems    Diagnosis   • TIA (transient ischemic attack)   • Carotid artery stenosis   • HTN (hypertension)   • Hyperlipidemia     Plans  TIA.  TIA protocol.  Consult neurology.  CT scan head shows no acute intracranial abnormality.    CAD/hypertension/hyperlipidemia.  History of pacemaker placement.  Continue Norvasc, aspirin, Lipitor, Avapro.  Nitro as needed.  Continue Q10.  Chest ray shows no acute changes.    Chronic kidney disease.  Will follow.    Code Status: full code     I discussed the patient's findings and my recommendations  with: patient    Estimated length of stay: 1-3 days.     Ismael Bernstein MD   08/08/19   9:02 AM

## 2019-08-08 NOTE — ED NOTES
"PATIENT STATES LAST NIGHT AROUND 1830 HIS LEFT ARM FELT WEAK, HAD PAIN IN HIS LEFT EYE, AND FELT LIKE HIS WORDS WERE \"NOT RIGHT\"  STATES HE CAME IN THIS MORNING BECAUSE HIS BLOOD PRESSURE KEPT GOING UP.      Loretta Cool RN  08/08/19 0600    "

## 2019-08-08 NOTE — CONSULTS
Neurology Consult Note    Patient:  Mireille Magallanes   YOB: 1935  MRN:  [id number]  Date of Admission:  8/8/2019  5:42 AM    Date: 8/8/2019    Referring Provider: Ismael Bernstein MD  Reason for Consultation: Stroke      History of present illness:     This is a 83 y.o. right handed male.with H/O coagulopathy, hyperlipidemia, hypertension, carotid artery disease, CAD, evaluated for stroke    Patient noted dizziness and off balance sensation Monday that is 3 days ago which continued through Tuesday but lightened up a bit..  He still played golf but noted he had balance issues and symptoms improved by late Tuesday.  Last night he noted the left eye pain and that his left hand was weaker than the right. hand  He noted subtle speech problems  No vision loss, facial weakness, hemisensory loss or any other part of his body weak.     While at home he took his Irbesartan and then his amlodipine.     Past Medical History:   Diagnosis Date   • Acute viral hepatitis A    • Carotid artery stenosis    • CKD (chronic kidney disease)    • Coagulopathy (CMS/HCC)     Plavix   • Coagulopathy (CMS/HCC)    • Coronary arteriosclerosis     stent ,? 4/2104, on plavix now.   • Diarrhea    • Dysphagia     Unspecified   • Family history of colon cancer    • Hematochezia     differential diagnosis discussed   • Hemorrhoids     Unspecified   • History of colonic polyps    • Hypercholesterolemia    • Hyperlipidemia    • Hypertension    • Left ventricular hypertrophy    • Melanoma of back (CMS/HCC)    • Obesity    • Osteoarthritis    • Peripheral neuropathy    • Polyp of colon    • Stroke (CMS/HCC)    • TIA (transient ischemic attack)    • Tobacco non-user        Past Surgical History:   Procedure Laterality Date   • APPENDECTOMY     • CARDIAC CATHETERIZATION     • CARDIAC CATHETERIZATION N/A 3/18/2019    Procedure: Left Heart Cath;  Surgeon: Kaushik Toledo MD;  Location:  [facility];  Service: Cardiology    • CARDIAC ELECTROPHYSIOLOGY PROCEDURE N/A 1/5/2018    Procedure: Temporary Pacemaker;  Surgeon: Luis E Courtney MD;  Location:  [facility];  Service:    • CARDIAC ELECTROPHYSIOLOGY PROCEDURE N/A 1/8/2018    Procedure: Device Implant;  Surgeon: Kaushik Toledo MD;  Location:  [facility];  Service:    • CAROTID STENT     • COLON SURGERY  1999    for Ischemic Colitis   • COLONOSCOPY  05/07/2008    tubular adenoma ascending colon polyp - 3 yr   • COLONOSCOPY  08/19/2003    colon polyp @ 24 cm-see path - 5 yr   • COLONOSCOPY  08/11/2000    small interanl hemorrhoid - 3-5 yr   • COLONOSCOPY  06/22/1998    (8)small colon polyps removed-see path, internal anal skin tag - 2-3 yr   • CORONARY ARTERY BYPASS GRAFT     • CORONARY STENT PLACEMENT      x1 2014 dr crane    • OTHER SURGICAL HISTORY      Lithotripsy   • OTHER SURGICAL HISTORY  06/2011    recall 3 yr.    • VASECTOMY         Prior to Admission medications    Medication Sig Start Date End Date Taking? Authorizing Provider   amLODIPine (NORVASC) 10 MG tablet Take 1 tablet by mouth Daily. 3/18/19  Yes Kaushik Toledo MD   aspirin 81 MG EC tablet Take 81 mg by mouth daily.   Yes Provider, MD Terell   atorvastatin (LIPITOR) 80 MG tablet Take 80 mg by mouth Daily.   Yes Provider, MD Terell   Coenzyme Q10 (COQ-10) 100 MG capsule Take  by mouth daily.   Yes ProviderTerell MD   irbesartan (AVAPRO) 300 MG tablet Take 300 mg by mouth Every Night.   Yes Provider, MD Terell   metoprolol succinate XL (TOPROL-XL) 25 MG 24 hr tablet Take 1 tablet by mouth Daily. 3/6/19  Yes Brisa Saleem APRN   nitroglycerin (NITROSTAT) 0.4 MG SL tablet Place 0.4 mg under the tongue as needed.   Yes Provider, MD Terell       Hospital scheduled medications:     amLODIPine 10 mg Oral Daily   aspirin 81 mg Oral Daily   atorvastatin 80 mg Oral Nightly   losartan 100 mg Oral Q24H   sodium chloride 3 mL Intravenous Q12H     Hospital  PRN medications:  nitroglycerin  •  ondansetron  •  sodium chloride    Allergies   Allergen Reactions   • Sulfa Antibiotics Rash     Sulfa Drugs       Social History     Socioeconomic History   • Marital status:      Spouse name: Not on file   • Number of children: Not on file   • Years of education: Not on file   • Highest education level: Not on file   Tobacco Use   • Smoking status: Former Smoker     Last attempt to quit:      Years since quittin.6   • Smokeless tobacco: Never Used   Substance and Sexual Activity   • Alcohol use: No   • Drug use: No   • Sexual activity: Defer     Family History   Problem Relation Age of Onset   • Colon cancer Father         Diagnosed at 72 YO   • Other Sister         Polyps/Colon   • Other Brother         Polyps/Colon   • Stroke Mother        Review of Systems  A 14 point review of systems was reviewed and was negative except for resolving left eye pain arm weakness    Vital Signs   Temp:  [96.3 °F (35.7 °C)-97.8 °F (36.6 °C)] 96.3 °F (35.7 °C)  Heart Rate:  [64-84] 78  Resp:  [16-20] 16  BP: (128-169)/(79-91) 128/79    General Exam:  Head:  Normal cephalic, atraumatic  HEENT:  Neck supple  Fundoscopic Exam:  No signs of disc edema  CVS:  Regular rate and rhythm.  No murmurs  Carotid Examination:  No bruits  Lungs:  Clear to auscultation  Abdomen:  Non-tender, Non-distended  Extremities:  No signs of peripheral edema  Skin:  No rashes    Neurologic Exam:    Mental Status:    -Awake, Alert, Oriented X 3  -No word finding difficulties  -No aphasia  -Very subtle dysarthria  -Follows simple and complex commands    CN II:  Visual fields full.  Pupils equally reactive to light  CN III, IV, VI:  Extraocular Muscles full with no signs of nystagmus  CN V:  Facial sensory is symmetric   CN VII:  Facial motor symmetric  CN VIII:  Gross hearing intact bilaterally  CN IX/X:  Palate elevates symmetrically  CN XI:  Shoulder shrug symmetric  CN XII:  Tongue is midline on  protrusion    Motor: (strength out of 5:  1= minimal movement, 2 = movement in plane of gravity, 3 = movement against gravity, 4 = movement against some resistance, 5 = full strength)    -Right Upper Ext: Proximal: 5 Distal: 5  -Left Upper Ext: Proximal: 5 Distal: 5    -Right Lower Ext: Proximal: 5 Distal: 5  -Left Lower Ext: Proximal: 5 Distal: 5    DTR:  -Right   Bicep: 2+ Triceps: 2+ Brachioradialis: 2+   Patella: 2+ Ankle: 2+ Neg Babinski  -Left   Bicep: 2+ Triceps: 2+ Brachioradialis: 2+   Patella: 2+ Ankle: 2+ Neg Babinski    Sensory:  -Intact to light touch, pinprick, temperature, pain, and proprioception    Coordination:  -Finger to nose intact  -Heel to shin intact  -No ataxia    Gait  -Mild to moderate broad based gait. Turns are unsteady  -ambulates unassisted      Results Review:  Lab Results (last 7 days)     Procedure Component Value Units Date/Time    Troponin [[id number]]  (Normal) Collected:  08/08/19 0554    Specimen:  Blood Updated:  08/08/19 0701     Troponin I 0.023 ng/mL     [place] Draw [[id number]] Collected:  08/08/19 0554    Specimen:  Blood Updated:  08/08/19 0701    Narrative:       The following orders were created for panel order [place] Draw.  Procedure                               Abnormality         Status                     ---------                               -----------         ------                     Light Blue Top[[id number]]                                   Final result               Green Top (Gel)[[id number]]                                  Final result               Lavender Top[[id number]]                                     Final result               Red Top[[id number]]                                          Final result                 Please view results for these tests on the individual orders.    Light Blue Top [[id number]] Collected:  08/08/19 0554    Specimen:  Blood Updated:  08/08/19 0701     Extra Tube hold for add-on     Comment: Auto resulted       Green  Top (Gel) [[id number]] Collected:  08/08/19 0554    Specimen:  Blood Updated:  08/08/19 0701     Extra Tube Hold for add-ons.     Comment: Auto resulted.       Lavender Top [[id number]] Collected:  08/08/19 0554    Specimen:  Blood Updated:  08/08/19 0701     Extra Tube hold for add-on     Comment: Auto resulted       Red Top [[id number]] Collected:  08/08/19 0554    Specimen:  Blood Updated:  08/08/19 0701     Extra Tube Hold for add-ons.     Comment: Auto resulted.       Protime-INR [[id number]]  (Normal) Collected:  08/08/19 0554    Specimen:  Blood Updated:  08/08/19 0654     Protime 13.1 Seconds      INR 0.96    aPTT [[id number]]  (Normal) Collected:  08/08/19 0554    Specimen:  Blood Updated:  08/08/19 0654     PTT 33.2 seconds     Basic Metabolic Panel [[id number]]  (Abnormal) Collected:  08/08/19 0554    Specimen:  Blood Updated:  08/08/19 0649     Glucose 101 mg/dL      BUN 23 mg/dL      Creatinine 1.67 mg/dL      Sodium 142 mmol/L      Potassium 4.3 mmol/L      Chloride 108 mmol/L      CO2 25.0 mmol/L      Calcium 9.4 mg/dL      eGFR Non African Amer 40 mL/min/1.73      BUN/Creatinine Ratio 13.8     Anion Gap 9.0 mmol/L     Narrative:       GFR Normal >60  Chronic Kidney Disease <60  Kidney Failure <15    CK [[id number]]  (Normal) Collected:  08/08/19 0554    Specimen:  Blood Updated:  08/08/19 0649     Creatine Kinase 141 U/L     CBC & Differential [[id number]] Collected:  08/08/19 0554    Specimen:  Blood Updated:  08/08/19 0648    Narrative:       The following orders were created for panel order CBC & Differential.  Procedure                               Abnormality         Status                     ---------                               -----------         ------                     CBC Auto Differential[[id number]]        Normal              Final result                 Please view results for these tests on the individual orders.    CBC Auto Differential [[id number]]  (Normal) Collected:  08/08/19 0554     Specimen:  Blood Updated:  08/08/19 0648     WBC 6.74 10*3/mm3      RBC 4.41 10*6/mm3      Hemoglobin 13.1 g/dL      Hematocrit 38.5 %      MCV 87.3 fL      MCH 29.7 pg      MCHC 34.0 g/dL      RDW 13.6 %      RDW-SD 43.6 fl      MPV 10.3 fL      Platelets 176 10*3/mm3      Neutrophil % 54.7 %      Lymphocyte % 31.6 %      Monocyte % 9.6 %      Eosinophil % 3.4 %      Basophil % 0.4 %      Immature Grans % 0.3 %      Neutrophils, Absolute 3.68 10*3/mm3      Lymphocytes, Absolute 2.13 10*3/mm3      Monocytes, Absolute 0.65 10*3/mm3      Eosinophils, Absolute 0.23 10*3/mm3      Basophils, Absolute 0.03 10*3/mm3      Immature Grans, Absolute 0.02 10*3/mm3      nRBC 0.0 /100 WBC           .  Imaging Results (last 24 hours)     Procedure Component Value Units Date/Time    MRI Brain Without Contrast [[id number]] Collected:  08/08/19 1443     Updated:  08/08/19 1450    Narrative:       EXAM: MR BRAIN WITHOUT IV CONTRAST 08/08/2019     COMPARISON: Head CT dated 08/08/2019      HISTORY: 83 years-old Male. Stroke      TECHNIQUE:   Routine pulse sequences of the brain were obtained without IV contrast.      REPORT:     Linear focus of diffusion restriction involving the left superior  cerebellar hemisphere, with associated abnormal T2/STIR signal is  consistent with acute infarct. There is no hemorrhagic conversion.     Mild to moderate generalized cerebral volume loss. Confluent T2/FLAIR  abnormal signal in the bilateral periventricular and deep white matter,  nonspecific, but favored to reflect moderate chronic microvascular  changes.     There is no suspicious extra-axial fluid collection. No midline shift.  No acute hydrocephalus. The sella, parasellar region, brainstem  demonstrate no acute finding.     The intraorbital structures demonstrate no acute finding.     The flow-voids of [place] are maintained centrally.     Mastoid air cells are clear. The paranasal sinuses are free of  obstructive mucosal disease.              Impression:       1.  Acute infarct involving the left ICA territory. No hemorrhage.  2.  Moderate chronic microvascular changes.  This report was finalized on 08/08/2019 14:47 by Dr. Hattie De La Torre MD.    US Carotid Bilateral [[id number]] Collected:  08/08/19 1432     Updated:  08/08/19 1436    Narrative:       History: Carotid occlusive disease       Impression:       Impression:  1. There is 50-69% stenosis of the right internal carotid artery.  2. There is less than 50% stenosis of the left internal carotid artery.  3. Antegrade flow is demonstrated in bilateral vertebral arteries.     Comments: Bilateral carotid vertebral arterial duplex scan was  performed.     Grayscale imaging shows intimal thickening and calcified elements at the  carotid bifurcation. The right internal carotid artery peak systolic  velocity is 125 cm/sec. The end-diastolic velocity is 35.4 cm/sec. The  right ICA/CCA ratio is approximately 1.51 . These findings correlate  with 50-69% stenosis of the right internal carotid artery.     Grayscale imaging shows intimal thickening and calcified elements at the  carotid bifurcation. The left internal carotid artery peak systolic  velocity is 103 cm/sec. The end-diastolic velocity is 22.0 cm/sec. The  left ICA/CCA ratio is approximately 1.18 . These findings correlate with  less than 50% stenosis of the left internal carotid artery.     Antegrade flow is demonstrated in bilateral vertebral arteries.     This report was finalized on 08/08/2019 14:33 by Dr. Olvin Virk MD.    CT Head Without Contrast Stroke Protocol [[id number]] Collected:  08/08/19 0804     Updated:  08/08/19 0809    Narrative:       EXAMINATION: CT HEAD WO CONTRAST STROKE PROTOCOL-      8/8/2019 7:49 AM CDT     HISTORY: Weakness and dizziness. Stroke symptoms.     In order to have a CT radiation dose as low as reasonably achievable  Automated Exposure Control was utilized for adjustment of the mA and/or  KV according  to patient size.     DLP in mGycm= 786.     No skull abnormality.  Mucosal thickening with near complete opacification of the left side of  the sphenoid sinus.  Clear mastoid air cells.     Atrophy is not excessive.  Ventricle size is normal.     There is white matter hypodensity compatible with small vessel disease  in the periventricular region.     There is no cortical infarct or hemorrhage.     Summary:  1. No acute intracranial abnormality is seen.                                   This report was finalized on 08/08/2019 08:06 by Dr. Franco Christy MD.    XR Chest 1 View [[id number]] Collected:  08/08/19 0754     Updated:  08/08/19 0758    Narrative:       EXAMINATION: XR CHEST 1 VW-     8/8/2019 7:30 AM CDT     HISTORY: Stroke symptoms.     One view chest x-ray compared with 01/19/2018.     Heart size is within normal limits.  CABG changes with aortic arch calcification.  Unchanged left cardiac pacer.     Fully expanded lungs with no pneumonia, pneumothorax, or congestive  failure.     Summary:  1. No acute lung disease.     This report was finalized on 08/08/2019 07:54 by Dr. Franco Christy MD.              Telemetry  V pacing at 78   75 to 89      Results for orders placed during the hospital encounter of 08/08/19   Adult Transthoracic Echo Complete W/ Cont if Necessary Per Protocol (With Agitated Saline)    Narrative · Left ventricular systolic function is low normal. EF 51-55%.  · Moderate aortic valve stenosis is present.  · Left ventricular wall thickness is consistent with mild septal   asymmetric hypertrophy.  · Left ventricular diastolic dysfunction (grade II) consistent with   pseudonormalization.  · Left atrial cavity size is severely dilated.  · Mild mitral valve stenosis is present  · Normal size and function of right ventricle.          Impression    1. Acute left Cerebellar stroke--  2. Cerebral microvascular disease  3. No TPA as he was well outside the TPA Window by the time he arrived in the ED.    4. Fall risk due to his unsteady gait  5. Renal disease.  6. Hypertension  7. Severely dilated  left atria  8. Closely monitor  heart    Plan  · CT angiogram of head and neck   · IV hydration due to kidney functions and dye load  · Aspirin  · Echo  · Telemetry  · Lipitor      I discussed the patients findings and my recommendations with patient, family, nursing staff and Dr Amandeep Lynn MD  08/08/19  6:31 PM

## 2019-08-08 NOTE — THERAPY EVALUATION
Acute Care - Speech Language Pathology   Swallow Initial Evaluation [facility]     Patient Name: Mireille Magallanes  : 1935  MRN: [id number]  Today's Date: 2019               Admit Date: 2019  Clinical bedside swallow evaluation completed. Patient is alert and cooperative with family at bedside. Admitted for possible CVA. Medical hx is remarkable for dysphagia. When patient is asked about this he explains that he has had his esophagus dilated and occasionally gets choked on foods. He tells me that this results in some coughing spells. He tells me when he eats slow and takes smaller bites/sips this seems to help. Oral motor assessment was unremarkable. The patient completed a full range of consistencies except for mechanical soft. No overt s/s were observed. He does indep. take several seconds in between each consecutive sip/bite. A functional rotary chew was observed given regular solid trial. SLP educated patient on s/s of aspiration and if increased difficulty with feeling like foods are getting stuck/choking, he may benefit from GI consult for esophageal dilation on an outpatient basis. SLP will follow up to ensure diet tolerance and that pharyngeal swallowing abilities are not affected at this time.     SLP recommends:   1) Regular diet   2) Thin liquids   3) Meds whole with thin liquids   4) Standard swallow precautions and oral care.   Jade Lugo MS CCC-SLP 2019 10:17 AM      Visit Dx:     ICD-10-CM ICD-9-CM   1. TIA (transient ischemic attack) G45.9 435.9   2. Chronic hypertension I10 401.9   3. Chronic renal impairment, unspecified CKD stage N18.9 585.9   4. Pharyngoesophageal dysphagia R13.14 787.24     Patient Active Problem List   Diagnosis   • HTN (hypertension)   • Hyperlipidemia   • Nonrheumatic aortic valve stenosis   • Coronary artery disease involving coronary bypass graft of native heart without angina pectoris   • Pacemaker   • OLMEDO (dyspnea on exertion)   • Other chest  pain   • Coronary artery disease involving native coronary artery of native heart with angina pectoris (CMS/HCC)   • Overweight (BMI 25.0-29.9)   • Abnormal stress echo   • TIA (transient ischemic attack)   • Carotid artery stenosis     Past Medical History:   Diagnosis Date   • Acute viral hepatitis A    • Carotid artery stenosis    • CKD (chronic kidney disease)    • Coagulopathy (CMS/HCC)     Plavix   • Coagulopathy (CMS/HCC)    • Coronary arteriosclerosis     stent ,? 4/2104, on plavix now.   • Diarrhea    • Dysphagia     Unspecified   • Family history of colon cancer    • Hematochezia     differential diagnosis discussed   • Hemorrhoids     Unspecified   • History of colonic polyps    • Hypercholesterolemia    • Hyperlipidemia    • Hypertension    • Left ventricular hypertrophy    • Melanoma of back (CMS/HCC)    • Obesity    • Osteoarthritis    • Peripheral neuropathy    • Polyp of colon    • Stroke (CMS/HCC)    • TIA (transient ischemic attack)    • Tobacco non-user      Past Surgical History:   Procedure Laterality Date   • APPENDECTOMY     • CARDIAC CATHETERIZATION     • CARDIAC CATHETERIZATION N/A 3/18/2019    Procedure: Left Heart Cath;  Surgeon: Kaushik Toledo MD;  Location:  [facility] CATH [facility];  Service: Cardiology   • CARDIAC ELECTROPHYSIOLOGY PROCEDURE N/A 1/5/2018    Procedure: Temporary Pacemaker;  Surgeon: Luis E Courtney MD;  Location:  [facility];  Service:    • CARDIAC ELECTROPHYSIOLOGY PROCEDURE N/A 1/8/2018    Procedure: Device Implant;  Surgeon: Kaushik Toledo MD;  Location:  [facility] CATH [facility]VAS[facility];  Service:    • CAROTID STENT     • COLON SURGERY  1999    for Ischemic Colitis   • COLONOSCOPY  05/07/2008    tubular adenoma ascending colon polyp - 3 yr   • COLONOSCOPY  08/19/2003    colon polyp @ 24 cm-see path - 5 yr   • COLONOSCOPY  08/11/2000    small interanl hemorrhoid - 3-5 yr   • COLONOSCOPY  06/22/1998    (8)small colon polyps removed-see  path, internal anal skin tag - 2-3 yr   • CORONARY ARTERY BYPASS GRAFT     • CORONARY STENT PLACEMENT      x1 2014 dr crane    • OTHER SURGICAL HISTORY      Lithotripsy   • OTHER SURGICAL HISTORY  06/2011    recall 3 yr.    • VASECTOMY          SWALLOW EVALUATION (last 72 hours)      SLP Adult Swallow Evaluation     Row Name 08/08/19 0925                   Rehab Evaluation    Document Type  evaluation  -MM        Subjective Information  no complaints  -MM        Patient Observations  alert;cooperative;agree to therapy  -MM        Patient/Family Observations  Daughters and s/o present   -MM        Patient Effort  good  -MM        Symptoms Noted During/After Treatment  none  -MM           General Information    Patient Profile Reviewed  yes  -MM        Pertinent History Of Current Problem  Possible CVA, hx of CVA, hep A, carotid artery stenosis, CKD, CAD, colon cancer, GI bleed, dysphagia, hx of esophageal dilation, Head CT with no acute findings, CXR with no acute lung disease  -MM        Current Method of Nutrition  NPO  -MM        Precautions/Limitations, Vision  WFL with corrective lenses;for purposes of eval  -MM        Precautions/Limitations, Hearing  hearing aid, bilaterally;WFL;for purposes of eval  -MM        Prior Level of Function-Communication  WFL  -MM        Prior Level of Function-Swallowing  esophageal concerns;no diet consistency restrictions  -MM        Plans/Goals Discussed with  patient;spouse/S.O.;family;other (see comments);agreed upon RN Clarissa  -MM        Barriers to Rehab  none identified  -MM        Patient's Goals for Discharge  return to PO diet  -MM        Family Goals for Discharge  patient able to return to PO diet  -MM           Pain Assessment    Additional Documentation  Pain Scale: Numbers Pre/Post-Treatment (Group)  -MM           Pain Scale: Numbers Pre/Post-Treatment    Pain Scale: Numbers, Pretreatment  0/10 - no pain  -MM        Pain Scale: Numbers, Post-Treatment  0/10 - no pain   -MM           Oral Motor and Function    Dentition Assessment  upper dentures/partial in place;lower dentures/partial in place  -MM        Secretion Management  WNL/WFL  -MM        Mucosal Quality  moist, healthy  -MM        Volitional Swallow  WFL  -MM        Volitional Cough  WFL  -MM           Oral Musculature and Cranial Nerve Assessment    Oral Motor General Assessment  WFL  -MM           General Eating/Swallowing Observations    Respiratory Support Currently in Use  room air  -MM        Eating/Swallowing Skills  self-fed  -MM        Positioning During Eating  upright in bed  -MM        Utensils Used  spoon;straw  -MM        Consistencies Trialed  regular textures;honey-thick liquids;nectar/syrup-thick liquids;thin liquids;pureed  -MM           Respiratory    Respiratory Status  WFL  -MM           Clinical Swallow Eval    Oral Prep Phase  WFL  -MM        Oral Transit  WFL  -MM        Oral Residue  WFL  -MM        Pharyngeal Phase  no overt signs/symptoms of pharyngeal impairment  -MM        Esophageal Phase  unremarkable  -MM        Clinical Swallow Evaluation Summary  Clinical bedside swallow evaluation completed. Patient is alert and cooperative with family at bedside. Admitted for possible CVA. Medical hx is remarkable for dysphagia. When patient is asked about this he explains that he has had his esophagus dilated and occasionally gets choked on foods. He tells me that this results in some coughing spells. He tells me when he eats slow and takes smaller bites/sips this seems to help. Oral motor assessment was unremarkable. The patient completed a full range of consistencies except for mechanical soft. No overt s/s were observed. He does indep. take several seconds in between each consecutive sip/bite. A functional rotary chew was observed given regular solid trial. SLP educated patient on s/s of aspiration and if increased difficulty with feeling like foods are getting stuck/choking, he may benefit from GI  consult for esophageal dilation on an outpatient basis. SLP will follow up to ensure diet tolerance and that pharyngeal swallowing abilities are not affected at this time. SLP recommends: 1) Regular diet 2) Thin liquids 3) Meds whole with thin liquids 4) Standard swallow precautions and oral care.   -MM           Clinical Impression    SLP Swallowing Diagnosis  functional oral phase;esophageal dysfunction suspected esophageal impairment d/t hx   -MM        Functional Impact  risk of aspiration/pneumonia  -MM        Rehab Potential/Prognosis, Swallowing  good, to achieve stated therapy goals  -MM        Swallow Criteria for Skilled Therapeutic Interventions Met  demonstrates skilled criteria  -MM           Recommendations    Therapy Frequency (Swallow)  2 days per week  -MM        Predicted Duration Therapy Intervention (Days)  until discharge  -MM        SLP Diet Recommendation  regular textures;thin liquids  -MM        Recommended Precautions and Strategies  upright posture during/after eating;small bites of food and sips of liquid;alternate between small bites of food and sips of liquid  -MM        SLP Rec. for Method of Medication Administration  meds whole;with thin liquids;as tolerated  -MM        Monitor for Signs of Aspiration  yes;notify SLP if any concerns;cough;gurgly voice;throat clearing;pneumonia;right lower lobe infiltrates  -MM        Anticipated Dischage Disposition  home  -MM           Swallow Goals (SLP)    Oral Nutrition/Hydration Goal Selection (SLP)  oral nutrition/hydration, SLP goal 1  -MM           Oral Nutrition/Hydration Goal 1 (SLP)    Oral Nutrition/Hydration Goal 1, SLP  LTG: Patient will tolerate LRD without s/s of aspiration.  -MM        Time Frame (Oral Nutrition/Hydration Goal 1, SLP)  by discharge  -MM        Barriers (Oral Nutrition/Hydration Goal 1, SLP)  n/a  -MM        Progress/Outcomes (Oral Nutrition/Hydration Goal 1, SLP)  goal ongoing  -MM          User Key  (r) = Recorded  By, (t) = Taken By, (c) = Cosigned By    Initials Name Effective Dates    AGUSTÍN Parish Jade KYLIE, MS CCC-SLP 05/24/19 -           EDUCATION  The patient has been educated in the following areas:   Dysphagia (Swallowing Impairment) Oral Care/Hydration.    SLP Recommendation and Plan  SLP Swallowing Diagnosis: functional oral phase, esophageal dysfunction(suspected esophageal impairment d/t hx )  SLP Diet Recommendation: regular textures, thin liquids  Recommended Precautions and Strategies: upright posture during/after eating, small bites of food and sips of liquid, alternate between small bites of food and sips of liquid  SLP Rec. for Method of Medication Administration: meds whole, with thin liquids, as tolerated     Monitor for Signs of Aspiration: yes, notify SLP if any concerns, cough, gurgly voice, throat clearing, pneumonia, right lower lobe infiltrates     Swallow Criteria for Skilled Therapeutic Interventions Met: demonstrates skilled criteria  Anticipated Dischage Disposition: home  Rehab Potential/Prognosis, Swallowing: good, to achieve stated therapy goals  Therapy Frequency (Swallow): 2 days per week  Predicted Duration Therapy Intervention (Days): until discharge       Plan of Care Reviewed With: patient, spouse, daughter, other (see comments)(KATHY Romo)  Plan of Care Review  Plan of Care Reviewed With: patient, spouse, daughter, other (see comments)(KATHY Romo)  Progress: no change(Initial Evaluation )  Outcome Summary: Clinical bedside swallow evaluation completed. Patient is alert and cooperative with family at bedside. Admitted for possible CVA. Medical hx is remarkable for dysphagia. When patient is asked about this he explains that he has had his esophagus dilated and occasionally gets choked on foods. He tells me that this results in some coughing spells. He tells me when he eats slow and takes smaller bites/sips this seems to help. Oral motor assessment was unremarkable. The patient completed a full  range of consistencies except for mechanical soft. No overt s/s were observed. He does indep. take several seconds in between each consecutive sip/bite. A functional rotary chew was observed given regular solid trial. SLP educated patient on s/s of aspiration and if increased difficulty with feeling like foods are getting stuck/choking, he may benefit from GI consult for esophageal dilation on an outpatient basis. SLP will follow up to ensure diet tolerance and that pharyngeal swallowing abilities are not affected at this time. SLP recommends: 1) Regular diet 2) Thin liquids 3) Meds whole with thin liquids 4) Standard swallow precautions and oral care.     SLP GOALS     Row Name 08/08/19 0925             Oral Nutrition/Hydration Goal 1 (SLP)    Oral Nutrition/Hydration Goal 1, SLP  LTG: Patient will tolerate LRD without s/s of aspiration.  -MM      Time Frame (Oral Nutrition/Hydration Goal 1, SLP)  by discharge  -MM      Barriers (Oral Nutrition/Hydration Goal 1, SLP)  n/a  -MM      Progress/Outcomes (Oral Nutrition/Hydration Goal 1, SLP)  goal ongoing  -MM        User Key  (r) = Recorded By, (t) = Taken By, (c) = Cosigned By    Initials Name Provider Type    Jade Lynne MS CCC-SLP Speech and Language Pathologist             Time Calculation:   Time Calculation- SLP     Row Name 08/08/19 1016             Time Calculation- SLP    SLP Start Time  0925  -MM      SLP Stop Time  1020  -MM      SLP Time Calculation (min)  55 min  -MM      SLP Received On  08/08/19  -MM      SLP Goal Re-Cert Due Date  08/18/19  -MM        User Key  (r) = Recorded By, (t) = Taken By, (c) = Cosigned By    Initials Name Provider Type    Jade Lynne MS CCC-SLP Speech and Language Pathologist          Therapy Charges for Today     Code Description Service Date Service Provider Modifiers Qty    [id number] HC ST EVAL ORAL PHARYNG SWALLOW 4 8/8/2019 Jade Lugo MS CCC-SLP GN 1               Jade Lugo MS  CCC-SLP  8/8/2019

## 2019-08-08 NOTE — PLAN OF CARE
Problem: Patient Care Overview  Goal: Plan of Care Review  Outcome: Ongoing (interventions implemented as appropriate)   08/08/19 1686   Coping/Psychosocial   Plan of Care Reviewed With patient;spouse   Plan of Care Review   Progress improving   OTHER   Outcome Summary OT evaluation completed. Pt demo no further need for skilled OT services. Pt is independent with all functional mobility and ADLs and this time with no noted balance, coordination, or strength deficits. OT will sign off at this time with recommendation for pt to discharge home with his spouse.

## 2019-08-08 NOTE — ED PROVIDER NOTES
"Subjective   84 y/o right handed male arrives for evaluation what he states is a \"mild stroke\" that began around 1800 8/7/2019 in which he noted left arm weakness, difficulty with word finding and a \"funny feeling\" to his left face. He ignored these symptoms and did note that they did improve however, he became concerned secondary to his BP remaining elevated thus arrives here for evaluation. He notes a history of \"mild strokes\" in the past without defects (x2). He notes further he has a history of AICD ([place]) and at one time was on anticoauglation but was taken off by cardiology after his AICD. He denies any CP or sob, falls, trauma, fevers, chills, numbness, tingling, loss of sensation, vision or hearing changes, ataxia or other issues. He arrives in [facility].         Family, social and past history reviewed as below, prior documentation of H and Ps and other documentation are reviewed:    Past Medical History:  No date: Acute viral hepatitis A  No date: Carotid artery stenosis  No date: CKD (chronic kidney disease)  No date: Coagulopathy (CMS/[facility])      Comment:  Plavix  No date: Coagulopathy (CMS/[facility])  No date: Coronary arteriosclerosis      Comment:  stent ,? 4/2104, on plavix now.  No date: Diarrhea  No date: Dysphagia      Comment:  Unspecified  No date: Family history of colon cancer  No date: Hematochezia      Comment:  differential diagnosis discussed  No date: Hemorrhoids      Comment:  Unspecified  No date: History of colonic polyps  No date: Hypercholesterolemia  No date: Hyperlipidemia  No date: Hypertension  No date: Left ventricular hypertrophy  No date: Melanoma of back (CMS/[facility])  No date: Obesity  No date: Osteoarthritis  No date: Peripheral neuropathy  No date: Polyp of colon  No date: Stroke (CMS/[facility])  No date: TIA (transient ischemic attack)  No date: Tobacco non-user    Past Surgical History:  No date: APPENDECTOMY  No date: CARDIAC CATHETERIZATION  3/18/2019: CARDIAC CATHETERIZATION; N/A      " Comment:  Procedure: Left Heart Cath;  Surgeon: Kaushik Toledo MD;  Location:  [facility]ATH [facility];  Service:                Cardiology  2018: CARDIAC ELECTROPHYSIOLOGY PROCEDURE; N/A      Comment:  Procedure: Temporary Pacemaker;  Surgeon: Luis E Courtney MD;  Location:  [facility];                 Service:   2018: CARDIAC ELECTROPHYSIOLOGY PROCEDURE; N/A      Comment:  Procedure: Device Implant;  Surgeon: Kaushik Toledo MD;               Location:  [facility];  Service:   No date: CAROTID STENT  : COLON SURGERY      Comment:  for Ischemic Colitis  2008: COLONOSCOPY      Comment:  tubular adenoma ascending colon polyp - 3 yr  2003: COLONOSCOPY      Comment:  colon polyp @ 24 cm-see path - 5 yr  2000: COLONOSCOPY      Comment:  small interanl hemorrhoid - 3-5 yr  1998: COLONOSCOPY      Comment:  (8)small colon polyps removed-see path, internal anal                skin tag - 2-3 yr  No date: CORONARY ARTERY BYPASS GRAFT  No date: CORONARY STENT PLACEMENT      Comment:  x1  dr crane   No date: OTHER SURGICAL HISTORY      Comment:  Lithotripsy  2011: OTHER SURGICAL HISTORY      Comment:  recall 3 yr.   No date: VASECTOMY    Social History    Socioeconomic History      Marital status:       Spouse name: Not on file      Number of children: Not on file      Years of education: Not on file      Highest education level: Not on file    Tobacco Use      Smoking status: Former Smoker        Quit date:         Years since quittin.6      Smokeless tobacco: Never Used    Substance and Sexual Activity      Alcohol use: No      Drug use: No      Sexual activity: Defer      Family history: reviewed and noncontributory             Review of Systems   All other systems reviewed and are negative.      Past Medical History:   Diagnosis Date   • Acute viral hepatitis A    • Carotid artery  stenosis    • CKD (chronic kidney disease)    • Coagulopathy (CMS/HCC)     Plavix   • Coagulopathy (CMS/HCC)    • Coronary arteriosclerosis     stent ,? 4/2104, on plavix now.   • Diarrhea    • Dysphagia     Unspecified   • Family history of colon cancer    • Hematochezia     differential diagnosis discussed   • Hemorrhoids     Unspecified   • History of colonic polyps    • Hypercholesterolemia    • Hyperlipidemia    • Hypertension    • Left ventricular hypertrophy    • Melanoma of back (CMS/HCC)    • Obesity    • Osteoarthritis    • Peripheral neuropathy    • Polyp of colon    • Stroke (CMS/HCC)    • TIA (transient ischemic attack)    • Tobacco non-user        Allergies   Allergen Reactions   • Sulfa Antibiotics Rash     Sulfa Drugs       Past Surgical History:   Procedure Laterality Date   • APPENDECTOMY     • CARDIAC CATHETERIZATION     • CARDIAC CATHETERIZATION N/A 3/18/2019    Procedure: Left Heart Cath;  Surgeon: Kaushik Toledo MD;  Location:  [facility];  Service: Cardiology   • CARDIAC ELECTROPHYSIOLOGY PROCEDURE N/A 1/5/2018    Procedure: Temporary Pacemaker;  Surgeon: Luis E Courtney MD;  Location:  [facility];  Service:    • CARDIAC ELECTROPHYSIOLOGY PROCEDURE N/A 1/8/2018    Procedure: Device Implant;  Surgeon: Kaushik Toledo MD;  Location:  [facility]ATH [facility] LOC[facility];  Service:    • CAROTID STENT     • COLON SURGERY  1999    for Ischemic Colitis   • COLONOSCOPY  05/07/2008    tubular adenoma ascending colon polyp - 3 yr   • COLONOSCOPY  08/19/2003    colon polyp @ 24 cm-see path - 5 yr   • COLONOSCOPY  08/11/2000    small interanl hemorrhoid - 3-5 yr   • COLONOSCOPY  06/22/1998    (8)small colon polyps removed-see path, internal anal skin tag - 2-3 yr   • CORONARY ARTERY BYPASS GRAFT     • CORONARY STENT PLACEMENT      x1 2014 dr crane    • OTHER SURGICAL HISTORY      Lithotripsy   • OTHER SURGICAL HISTORY  06/2011    recall 3 yr.    • VASECTOMY          Family History   Problem Relation Age of Onset   • Colon cancer Father         Diagnosed at 74 YO   • Other Sister         Polyps/Colon   • Other Brother         Polyps/Colon   • Stroke Mother        Social History     Socioeconomic History   • Marital status:      Spouse name: Not on file   • Number of children: Not on file   • Years of education: Not on file   • Highest education level: Not on file   Tobacco Use   • Smoking status: Former Smoker     Last attempt to quit:      Years since quittin.6   • Smokeless tobacco: Never Used   Substance and Sexual Activity   • Alcohol use: No   • Drug use: No   • Sexual activity: Defer           Objective   Physical Exam   Constitutional: He is oriented to person, place, and time. He appears well-developed and well-nourished.   HENT:   Head: Normocephalic.   Nose: Nose normal.   Eyes: Conjunctivae and EOM are normal. Pupils are equal, round, and reactive to light.   Neck: Normal range of motion. Neck supple.   Cardiovascular: Normal rate, regular rhythm, normal heart sounds and intact distal pulses. Exam reveals no gallop and no friction rub.   No murmur heard.  Pulmonary/Chest: Effort normal and breath sounds normal. No stridor. No respiratory distress. He has no wheezes. He has no rales. He exhibits no tenderness.   Abdominal: Soft. Bowel sounds are normal.   Musculoskeletal: Normal range of motion. He exhibits no edema, tenderness or deformity.   Neurological: He is alert and oriented to person, place, and time. He displays normal reflexes. No cranial nerve deficit or sensory deficit. He exhibits normal muscle tone. Coordination normal.   No appreciated weakness, negative drift, normal speech and mentation. Sensation is intact.    Skin: Skin is warm. Capillary refill takes less than 2 seconds.   Psychiatric: He has a normal mood and affect.   Vitals reviewed.      Procedures           ED Course        EKG shows paced rhythm at 84    Patient well  outside of TPA windows. He did arrive at shift change thus endorsed to Dr. Toledo at shift change.     CT Head Without Contrast Stroke Protocol   Final Result      XR Chest 1 View   Final Result        Labs Reviewed   BASIC METABOLIC PANEL - Abnormal; Notable for the following components:       Result Value    Glucose 101 (*)     BUN 23 (*)     Creatinine 1.67 (*)     eGFR Non  Amer 40 (*)     All other components within normal limits    Narrative:     GFR Normal >60  Chronic Kidney Disease <60  Kidney Failure <15   PROTIME-INR - Normal   APTT - Normal   CK - Normal   TROPONIN (IN-HOUSE) - Normal   CBC WITH AUTO DIFFERENTIAL - Normal   RAINBOW DRAW    Narrative:     The following orders were created for panel order [place] Draw.  Procedure                               Abnormality         Status                     ---------                               -----------         ------                     Light Blue Top[[id number]]                                   Final result               Green Top (Gel)[[id number]]                                  Final result               Lavender Top[[id number]]                                     Final result               Red Top[[id number]]                                          Final result                 Please view results for these tests on the individual orders.   LIGHT BLUE TOP   GREEN TOP   LAVENDER TOP   RED TOP   CBC AND DIFFERENTIAL    Narrative:     The following orders were created for panel order CBC & Differential.  Procedure                               Abnormality         Status                     ---------                               -----------         ------                     CBC Auto Differential[[id number]]        Normal              Final result                 Please view results for these tests on the individual orders.     Lab Results (last 24 hours)     Procedure Component Value Units Date/Time    CBC & Differential [[id number]] Collected:   08/08/19 0554    Specimen:  Blood Updated:  08/08/19 0648    Narrative:       The following orders were created for panel order CBC & Differential.  Procedure                               Abnormality         Status                     ---------                               -----------         ------                     CBC Auto Differential[[id number]]        Normal              Final result                 Please view results for these tests on the individual orders.    Basic Metabolic Panel [[id number]]  (Abnormal) Collected:  08/08/19 0554    Specimen:  Blood Updated:  08/08/19 0649     Glucose 101 mg/dL      BUN 23 mg/dL      Creatinine 1.67 mg/dL      Sodium 142 mmol/L      Potassium 4.3 mmol/L      Chloride 108 mmol/L      CO2 25.0 mmol/L      Calcium 9.4 mg/dL      eGFR Non African Amer 40 mL/min/1.73      BUN/Creatinine Ratio 13.8     Anion Gap 9.0 mmol/L     Narrative:       GFR Normal >60  Chronic Kidney Disease <60  Kidney Failure <15    Protime-INR [[id number]]  (Normal) Collected:  08/08/19 0554    Specimen:  Blood Updated:  08/08/19 0654     Protime 13.1 Seconds      INR 0.96    aPTT [[id number]]  (Normal) Collected:  08/08/19 0554    Specimen:  Blood Updated:  08/08/19 0654     PTT 33.2 seconds     CK [[id number]]  (Normal) Collected:  08/08/19 0554    Specimen:  Blood Updated:  08/08/19 0649     Creatine Kinase 141 U/L     Troponin [[id number]]  (Normal) Collected:  08/08/19 0554    Specimen:  Blood Updated:  08/08/19 0701     Troponin I 0.023 ng/mL     CBC Auto Differential [[id number]]  (Normal) Collected:  08/08/19 0554    Specimen:  Blood Updated:  08/08/19 0648     WBC 6.74 10*3/mm3      RBC 4.41 10*6/mm3      Hemoglobin 13.1 g/dL      Hematocrit 38.5 %      MCV 87.3 fL      MCH 29.7 pg      MCHC 34.0 g/dL      RDW 13.6 %      RDW-SD 43.6 fl      MPV 10.3 fL      Platelets 176 10*3/mm3      Neutrophil % 54.7 %      Lymphocyte % 31.6 %      Monocyte % 9.6 %      Eosinophil % 3.4 %      Basophil % 0.4  %      Immature Grans % 0.3 %      Neutrophils, Absolute 3.68 10*3/mm3      Lymphocytes, Absolute 2.13 10*3/mm3      Monocytes, Absolute 0.65 10*3/mm3      Eosinophils, Absolute 0.23 10*3/mm3      Basophils, Absolute 0.03 10*3/mm3      Immature Grans, Absolute 0.02 10*3/mm3      nRBC 0.0 /100 WBC         Labs showed elevated creatinine which was baseline for the patient.  CT scan of the head and chest x-ray showed no acute findings.  Work-up consistent with a TIA.  Patient was then admitted to the hospitalist service for further work-up and treatment.            NIHSS (NIH Stroke Scale/Score) reviewed and/or performed as part of the patient evaluation and treatment planning process.  The result associated with this review/performance is: 0       MDM      Final diagnoses:   TIA (transient ischemic attack)   Chronic hypertension   Chronic renal impairment, unspecified CKD stage            Norma Toledo MD  08/08/19 9721

## 2019-08-08 NOTE — PROGRESS NOTES
Discharge Planning Assessment  [facility]     Patient Name: Mireille Magallanes  MRN: [id number]  Today's Date: 8/8/2019    Admit Date: 8/8/2019    Discharge Needs Assessment     Row Name 08/08/19 1626       Living Environment    Lives With  spouse    Current Living Arrangements  home/apartment/condo    Primary Care Provided by  self    Provides Primary Care For  no one    Family Caregiver if Needed  child(eleuterio), adult;spouse    Quality of Family Relationships  helpful;involved;supportive    Able to Return to Prior Arrangements  yes    Living Arrangement Comments  PT PLANS TO RETURN HOME AT [place] WITH SPOUSE       Resource/Environmental Concerns    Resource/Environmental Concerns  none    Transportation Concerns  car, none       Transition Planning    Patient/Family Anticipates Transition to  home with family    Patient/Family Anticipated Services at Transition  none    Transportation Anticipated  family or friend will provide       Discharge Needs Assessment    Readmission Within the Last 30 Days  no previous admission in last 30 days    Concerns to be Addressed  no discharge needs identified;denies needs/concerns at this time    Equipment Currently Used at Home  none    Anticipated Changes Related to Illness  none    Equipment Needed After Discharge  none    Discharge Coordination/Progress  PT LIVES AT HOME WITH HIS SPOUSE AND PLANS TO DC HOME WHEN READY. PT STATES HE IS INDEPENDENT AND DOES NOT USE ANY DME OR HOME SERVCIES. PT STATES HE WILL NOT NEED HH/DME AT DC. PT HAS RX COVERAGE AND PCP.         Discharge Plan     Row Name 08/08/19 1627       Plan    Plan  HOME WITH SPOUSE WITH NO DC NEEDS    Patient/Family in Agreement with Plan  yes        Destination      No service coordination in this encounter.      Durable Medical Equipment      No service coordination in this encounter.      Dialysis/Infusion      No service coordination in this encounter.      Home Medical Care      No service coordination in this encounter.       Therapy      No service coordination in this encounter.      Community Resources      No service coordination in this encounter.          Demographic Summary    No documentation.       Functional Status    No documentation.       Psychosocial    No documentation.       Abuse/Neglect    No documentation.       Legal    No documentation.       Substance Abuse    No documentation.       Patient Forms    No documentation.           STEPHANIE Schneider

## 2019-08-09 VITALS
OXYGEN SATURATION: 99 % | WEIGHT: 194 LBS | TEMPERATURE: 97.7 F | RESPIRATION RATE: 16 BRPM | DIASTOLIC BLOOD PRESSURE: 80 MMHG | BODY MASS INDEX: 28.73 KG/M2 | HEART RATE: 82 BPM | SYSTOLIC BLOOD PRESSURE: 132 MMHG | HEIGHT: 69 IN

## 2019-08-09 PROBLEM — I63.9 ACUTE CEREBROVASCULAR ACCIDENT (CVA) OF CEREBELLUM: Status: ACTIVE | Noted: 2019-08-09

## 2019-08-09 LAB
ALBUMIN SERPL-MCNC: 4.1 G/DL (ref 3.5–5)
ALBUMIN/GLOB SERPL: 1.3 G/DL (ref 1.1–2.5)
ALP SERPL-CCNC: 84 U/L (ref 24–120)
ALT SERPL W P-5'-P-CCNC: 29 U/L (ref 0–54)
ANION GAP SERPL CALCULATED.3IONS-SCNC: 9 MMOL/L (ref 4–13)
ARTICHOKE IGE QN: 74 MG/DL (ref 0–99)
AST SERPL-CCNC: 33 U/L (ref 7–45)
BILIRUB SERPL-MCNC: 0.6 MG/DL (ref 0.1–1)
BUN BLD-MCNC: 25 MG/DL (ref 5–21)
BUN/CREAT SERPL: 13.3 (ref 7–25)
CALCIUM SPEC-SCNC: 9.1 MG/DL (ref 8.4–10.4)
CHLORIDE SERPL-SCNC: 105 MMOL/L (ref 98–110)
CHOLEST SERPL-MCNC: 126 MG/DL (ref 130–200)
CO2 SERPL-SCNC: 24 MMOL/L (ref 24–31)
CREAT BLD-MCNC: 1.88 MG/DL (ref 0.5–1.4)
DEPRECATED RDW RBC AUTO: 43.6 FL (ref 37–54)
ERYTHROCYTE [DISTWIDTH] IN BLOOD BY AUTOMATED COUNT: 13.4 % (ref 12.3–15.4)
GFR SERPL CREATININE-BSD FRML MDRD: 35 ML/MIN/1.73
GLOBULIN UR ELPH-MCNC: 3.2 GM/DL
GLUCOSE BLD-MCNC: 97 MG/DL (ref 70–100)
HBA1C MFR BLD: 5.5 % (ref 4.8–5.9)
HCT VFR BLD AUTO: 38.8 % (ref 37.5–51)
HDLC SERPL-MCNC: 29 MG/DL
HGB BLD-MCNC: 12.9 G/DL (ref 13–17.7)
LDLC/HDLC SERPL: 2.54 {RATIO}
MCH RBC QN AUTO: 29.3 PG (ref 26.6–33)
MCHC RBC AUTO-ENTMCNC: 33.2 G/DL (ref 31.5–35.7)
MCV RBC AUTO: 88 FL (ref 79–97)
PLATELET # BLD AUTO: 196 10*3/MM3 (ref 140–450)
PMV BLD AUTO: 10.7 FL (ref 6–12)
POTASSIUM BLD-SCNC: 4.8 MMOL/L (ref 3.5–5.3)
PROT SERPL-MCNC: 7.3 G/DL (ref 6.3–8.7)
RBC # BLD AUTO: 4.41 10*6/MM3 (ref 4.14–5.8)
SODIUM BLD-SCNC: 138 MMOL/L (ref 135–145)
TRIGL SERPL-MCNC: 117 MG/DL (ref 0–149)
TSH SERPL DL<=0.05 MIU/L-ACNC: 3.58 MIU/ML (ref 0.47–4.68)
WBC NRBC COR # BLD: 6.18 10*3/MM3 (ref 3.4–10.8)

## 2019-08-09 PROCEDURE — 92526 ORAL FUNCTION THERAPY: CPT | Performed by: SPEECH-LANGUAGE PATHOLOGIST

## 2019-08-09 PROCEDURE — 80061 LIPID PANEL: CPT | Performed by: FAMILY MEDICINE

## 2019-08-09 PROCEDURE — 83036 HEMOGLOBIN GLYCOSYLATED A1C: CPT | Performed by: FAMILY MEDICINE

## 2019-08-09 PROCEDURE — 84443 ASSAY THYROID STIM HORMONE: CPT | Performed by: FAMILY MEDICINE

## 2019-08-09 PROCEDURE — 97161 PT EVAL LOW COMPLEX 20 MIN: CPT

## 2019-08-09 PROCEDURE — 80053 COMPREHEN METABOLIC PANEL: CPT | Performed by: FAMILY MEDICINE

## 2019-08-09 PROCEDURE — 85027 COMPLETE CBC AUTOMATED: CPT | Performed by: FAMILY MEDICINE

## 2019-08-09 RX ORDER — CLOPIDOGREL BISULFATE 75 MG/1
75 TABLET ORAL DAILY
Qty: 30 TABLET | Refills: 2 | Status: SHIPPED | OUTPATIENT
Start: 2019-08-09 | End: 2019-11-06 | Stop reason: SDUPTHER

## 2019-08-09 RX ORDER — ATORVASTATIN CALCIUM 40 MG/1
40 TABLET, FILM COATED ORAL DAILY
Qty: 30 TABLET | Refills: 2 | Status: ON HOLD | OUTPATIENT
Start: 2019-08-09 | End: 2020-02-14

## 2019-08-09 RX ADMIN — LOSARTAN POTASSIUM 100 MG: 50 TABLET, FILM COATED ORAL at 08:58

## 2019-08-09 RX ADMIN — AMLODIPINE BESYLATE 10 MG: 10 TABLET ORAL at 08:59

## 2019-08-09 RX ADMIN — ASPIRIN 81 MG: 81 TABLET ORAL at 08:58

## 2019-08-09 NOTE — THERAPY DISCHARGE NOTE
Acute Care - Physical Therapy Initial Eval/Discharge  [facility]     Patient Name: Mireille Magallanes  : 1935  MRN: [id number]  Today's Date: 2019   Onset of Illness/Injury or Date of Surgery: 19  Date of Referral to PT: 19  Referring Physician: Dr. Bersntein      Admit Date: 2019    Visit Dx:    ICD-10-CM ICD-9-CM   1. TIA (transient ischemic attack) G45.9 435.9   2. Chronic hypertension I10 401.9   3. Chronic renal impairment, unspecified CKD stage N18.9 585.9   4. Pharyngoesophageal dysphagia R13.14 787.24     Patient Active Problem List   Diagnosis   • HTN (hypertension)   • Hyperlipidemia   • Nonrheumatic aortic valve stenosis   • Coronary artery disease involving coronary bypass graft of native heart without angina pectoris   • Pacemaker   • OLMEDO (dyspnea on exertion)   • Other chest pain   • Coronary artery disease involving native coronary artery of native heart with angina pectoris (CMS/HCC)   • Overweight (BMI 25.0-29.9)   • Abnormal stress echo   • TIA (transient ischemic attack)   • Carotid artery stenosis     Past Medical History:   Diagnosis Date   • Acute viral hepatitis A    • Carotid artery stenosis    • CKD (chronic kidney disease)    • Coagulopathy (CMS/HCC)     Plavix   • Coagulopathy (CMS/HCC)    • Coronary arteriosclerosis     stent ,? 2104, on plavix now.   • Diarrhea    • Dysphagia     Unspecified   • Family history of colon cancer    • Hematochezia     differential diagnosis discussed   • Hemorrhoids     Unspecified   • History of colonic polyps    • Hypercholesterolemia    • Hyperlipidemia    • Hypertension    • Left ventricular hypertrophy    • Melanoma of back (CMS/HCC)    • Obesity    • Osteoarthritis    • Peripheral neuropathy    • Polyp of colon    • Stroke (CMS/HCC)    • TIA (transient ischemic attack)    • Tobacco non-user      Past Surgical History:   Procedure Laterality Date   • APPENDECTOMY     • CARDIAC CATHETERIZATION     • CARDIAC CATHETERIZATION N/A  3/18/2019    Procedure: Left Heart Cath;  Surgeon: Kaushik Toledo MD;  Location:  [facility] CATH INVASIVE LOC[facility];  Service: Cardiology   • CARDIAC ELECTROPHYSIOLOGY PROCEDURE N/A 1/5/2018    Procedure: Temporary Pacemaker;  Surgeon: Luis E Courtney MD;  Location:  [facility]ATH [facility];  Service:    • CARDIAC ELECTROPHYSIOLOGY PROCEDURE N/A 1/8/2018    Procedure: Device Implant;  Surgeon: Kaushik Toledo MD;  Location:  [facility] CATH INV[facility]IVE LOC[facility];  Service:    • CAROTID STENT     • COLON SURGERY  1999    for Ischemic Colitis   • COLONOSCOPY  05/07/2008    tubular adenoma ascending colon polyp - 3 yr   • COLONOSCOPY  08/19/2003    colon polyp @ 24 cm-see path - 5 yr   • COLONOSCOPY  08/11/2000    small interanl hemorrhoid - 3-5 yr   • COLONOSCOPY  06/22/1998    (8)small colon polyps removed-see path, internal anal skin tag - 2-3 yr   • CORONARY ARTERY BYPASS GRAFT     • CORONARY STENT PLACEMENT      x1 2014 dr crane    • OTHER SURGICAL HISTORY      Lithotripsy   • OTHER SURGICAL HISTORY  06/2011    recall 3 yr.    • VASECTOMY            PT ASSESSMENT (last 12 hours)      Physical Therapy Evaluation     Row Name 08/09/19 1102          PT Evaluation Time/Intention    Subjective Information  no complaints  -BRENDAN     Document Type  discharge evaluation/summary  -BRENDAN     Mode of Treatment  physical therapy  -BRENDAN     Patient Effort  excellent  -BRENDAN     Row Name 08/09/19 1102          General Information    Patient Profile Reviewed?  yes  -BRENDAN     Onset of Illness/Injury or Date of Surgery  08/08/19  -BRENDAN     Referring Physician  Dr. Bernstein  -BRENDAN     Patient Observations  alert;cooperative;agree to therapy  -BRENDAN     Patient/Family Observations  family x 2 in room  -BRENDAN     General Observations of Patient  fowlers in bed, alert  -BRENDAN     Prior Level of Function  independent:;all household mobility;community mobility;ADL's;dressing;bathing;yard work  -BRENDAN     Equipment Currently Used at Home  none  -BRENDAN     Pertinent  History of Current Functional Problem  Pt presents with LUE weakness, difficulty speaking, L facial droop, and L eye pain.  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   -BRENDAN     Existing Precautions/Restrictions  fall  -BRENDAN     Risks Reviewed  patient:;spouse/S.O.:;family:;LOB;nausea/vomiting;dizziness;increased discomfort;change in vital signs;lines disloged  -BRENDAN     Benefits Reviewed  patient:;spouse/S.O.:;family:;improve function;increase independence;increase strength;increase balance;decrease pain;increase knowledge;improve skin integrity  -BRENDAN     Barriers to Rehab  none identified  -BRENDAN     Row Name 08/09/19 1102          Relationship/Environment    Lives With  spouse  -BRENDAN     [facility] Name 08/09/19 1102          Resource/Environmental Concerns    Current Living Arrangements  home/apartment/condo  -BRENDAN     Row Name 08/09/19 1102          Living Environment    Home Accessibility  stairs to enter home  -BRENDAN     Row Name 08/09/19 1102          Home Main Entrance    Number of Stairs, Main Entrance  ten  -BRENDAN     Stair Railings, Main Entrance  none  -BRENDAN     Row Name 08/09/19 1102          Cognitive Assessment/Interventions    Additional Documentation  Cognitive Assessment/Intervention (Group)  -BRENDAN     Row Name 08/09/19 1102          Cognitive Assessment/Intervention- PT/OT    Affect/Mental Status (Cognitive)  WFL  -BRENDAN     Orientation Status (Cognition)  oriented x 4  -BRENDAN     Follows Commands (Cognition)  WFL  -BRENDAN     Personal Safety Interventions  fall prevention program maintained;nonskid shoes/slippers when out of bed;supervised activity  -BRENDAN     Row Name 08/09/19 1102          Bed Mobility Assessment/Treatment    Bed Mobility Assessment/Treatment  supine-sit  -BRENDAN     Supine-Sit [place] (Bed Mobility)  independent  -BRENDAN     Assistive Device (Bed Mobility)  head of bed elevated  -BRENDAN     Row Name 08/09/19 1102          Transfer Assessment/Treatment    Transfer Assessment/Treatment  sit-stand transfer;stand-sit  transfer  -BRENDAN     Sit-Stand [place] (Transfers)  independent  -BRENDAN     Stand-Sit [place] (Transfers)  independent  -BRENDAN     [facility] Name 08/09/19 1102          Gait/Stairs Assessment/Training    Gait/Stairs Assessment/Training  gait/ambulation independence;distance ambulated;gait pattern;gait deviations  -BRENDAN     [place] Level (Gait)  independent;conditional independence  -BRENDAN     Distance in Feet (Gait)  300  -BRENDAN     Pattern (Gait)  swing-through  -BRENDAN     Comment (Gait/Stairs)  pt reports he is at his baseline mobility  -BRENDAN     [facility] Name 08/09/19 1102          General ROM    GENERAL ROM COMMENTS  B LE AROM WFL  -[facility] Name 08/09/19 1102          MMT (Manual Muscle Testing)    General MMT Comments  B LE 5/5  -[facility] Name 08/09/19 1102          Motor Assessment/Intervention    Additional Documentation  Balance (Group)  -[facility] Name 08/09/19 1102          Balance    Balance  static sitting balance;static standing balance  -BRENDAN     Row Name 08/09/19 1102          Static Sitting Balance    Level of [place] (Unsupported Sitting, Static Balance)  independent  -BRENDAN     Sitting Position (Unsupported Sitting, Static Balance)  sitting on edge of bed  -[facility] Name 08/09/19 1102          Static Standing Balance    Level of [place] (Supported Standing, Static Balance)  independent;conditional independence  -BRENDAN     Row Name 08/09/19 1102          Sensory Assessment/Intervention    Sensory General Assessment  no sensation deficits identified  -BRENDAN     Row Name 08/09/19 1102          Pain Assessment    Additional Documentation  Pain Scale: Numbers Pre/Post-Treatment (Group)  -BRENDAN     Row Name 08/09/19 1102          Pain Scale: Numbers Pre/Post-Treatment    Pain Scale: Numbers, Pretreatment  0/10 - no pain  -BRENDAN     Row Name 08/09/19 1102          Health Promotion    Additional Documentation  Coping (Group);Plan of Care Review (Group)  -BRENDAN     Row Name 08/09/19 1102          Coping    Observed Emotional  State  accepting;cooperative  -BRENDAN     Row Name 08/09/19 1102          Plan of Care Review    Plan of Care Reviewed With  patient;spouse;family  -BRENDAN     Row Name 08/09/19 1102          Physical Therapy Clinical Impression    Date of Referral to PT  08/08/19  -BRENDAN     Patient/Family Goals Statement (PT Clinical Impression)  go home  -BRENDAN     Criteria for Skilled Interventions Met (PT Clinical Impression)  no;current level of function same as previous level of function  -BRENDAN     Care Plan Review (PT)  evaluation/treatment results reviewed;risks/benefits reviewed;current/potential barriers reviewed;patient/other agree to care plan  -BRENDAN     Care Plan Review, Other Participant (PT Clinical Impression)  family;spouse  -BRENDAN     Row Name 08/09/19 1102          Positioning and Restraints    Pre-Treatment Position  in bed  -BRENDAN     Post Treatment Position  bed  -BRENDAN     In Bed  notified nsg;sitting EOB;call light within reach;encouraged to call for assist;with family/caregiver  -BRENDAN     Row Name 08/09/19 1102          Physical Therapy Discharge Summary    Additional Documentation  Discharge Summary, PT Eval (Group)  -BRENDAN     Row Name 08/09/19 1102          Discharge Summary, PT Eval    Reason for Discharge (PT Discharge Summary)  no further needs identified  -BRENDAN     Outcomes Achieved Upon Discharge (PT Discharge Summary)  evaluation only  -BRENDAN       User Key  (r) = Recorded By, (t) = Taken By, (c) = Cosigned By    Initials Name Provider Type    Estevan Diamond, PT DPT Physical Therapist          Physical Therapy Education     Title: PT OT SLP Therapies (Resolved)     Topic: Physical Therapy (Resolved)     Point: Mobility training (Resolved)     Learning Progress Summary           Patient Acceptance, EJUDE DU by BRENDAN at 8/9/2019 11:02 AM    Comment:  Educated pt/family x 2 on benefits of activity, gait safety, 1 time PT eval                               User Key     Initials Effective Dates Name Provider Type Discipline    BRENDAN  08/02/16 -  Estevan Huizar, PT DPT Physical Therapist PT                PT Recommendation and Plan  Anticipated Discharge Disposition (PT): home, home with assist  Therapy Frequency (PT Clinical Impression): evaluation only  Outcome Summary/Treatment Plan (PT)  Anticipated Discharge Disposition (PT): home, home with assist  Reason for Discharge (PT Discharge Summary): no further needs identified  Plan of Care Reviewed With: patient, spouse, family  Outcome Summary: PT eval completed. He presents alert and oriented. He reports that he is back to his baseline mobility and he had no further PT needs, questions, or concerns. He was able to ambulate ~ 300 ft with no LOB or sway. PT to sign off, I anticipate he will d.c home with family.     Outcome Measures     Row Name 08/09/19 1102 08/08/19 1010          How much help from another person do you currently need...    Turning from your back to your side while in flat bed without using bedrails?  4  -BRENDAN  --     Moving from lying on back to sitting on the side of a flat bed without bedrails?  4  -BRENDAN  --     Moving to and from a bed to a chair (including a wheelchair)?  4  -BRENDAN  --     Standing up from a chair using your arms (e.g., wheelchair, bedside chair)?  4  -BRENDAN  --     Climbing 3-5 steps with a railing?  3  -BRENDAN  --     To walk in hospital room?  4  -BRENDAN  --     AM-PAC 6 Clicks Score (PT)  23  -BRENDAN  --        How much help from another is currently needed...    Putting on and taking off regular lower body clothing?  --  4  -CS     Bathing (including washing, rinsing, and drying)  --  4  -CS     Toileting (which includes using toilet bed pan or urinal)  --  4  -CS     Putting on and taking off regular upper body clothing  --  4  -CS     Taking care of personal grooming (such as brushing teeth)  --  4  -CS     Eating meals  --  4  -CS     AM-PAC 6 Clicks Score (OT)  --  24  -CS        Functional Assessment    Outcome Measure Options  AM-PAC 6 Clicks Basic Mobility (PT)   -BRENDAN  AM-PAC 6 Clicks Daily Activity (OT)  -CS       User Key  (r) = Recorded By, (t) = Taken By, (c) = Cosigned By    Initials Name Provider Type    Estevan Diamond, PT DPT Physical Therapist    CS Kendra Doherty S, OTR/L, CNT Occupational Therapist           Time Calculation:   PT Charges     Row Name 08/09/19 1159             Time Calculation    Start Time  1102  -BRENDAN      Stop Time  1126  -BRENDAN      Time Calculation (min)  24 min  -BRENDAN      PT Received On  08/09/19  -BRENDAN        User Key  (r) = Recorded By, (t) = Taken By, (c) = Cosigned By    Initials Name Provider Type    Estevan Diamond, PT DPT Physical Therapist        Therapy Charges for Today     Code Description Service Date Service Provider Modifiers Qty    17015799608 HC PT EVAL LOW COMPLEXITY 2 8/9/2019 Estevan Huizar PT DPT GP 1          PT G-Codes  Outcome Measure Options: AM-PAC 6 Clicks Basic Mobility (PT)  AM-PAC 6 Clicks Score (PT): 23  AM-PAC 6 Clicks Score (OT): 24    PT Discharge Summary  Anticipated Discharge Disposition (PT): home, home with assist    Estevan Huizar, PT DPT  8/9/2019

## 2019-08-09 NOTE — PLAN OF CARE
Problem: Patient Care Overview  Goal: Plan of Care Review  Outcome: Ongoing (interventions implemented as appropriate)   08/09/19 0279   Coping/Psychosocial   Plan of Care Reviewed With patient   Plan of Care Review   Progress no change   OTHER   Outcome Summary Denies numbness and tingling, no pain.  equal, CASILLAS. Pt has a slight droop with tic in his L eye. No neuro changes. BP and VSS. Cont to monitor.        Problem: Stroke (Ischemic) (Adult)  Goal: Signs and Symptoms of Listed Potential Problems Will be Absent, Minimized or Managed (Stroke)  Outcome: Ongoing (interventions implemented as appropriate)

## 2019-08-09 NOTE — THERAPY DISCHARGE NOTE
Acute Care - Speech Language Pathology   Swallow Treatment Note/Discharge   [facility]     Patient Name: Mireille Magallanes  : 1935  MRN: [id number]  Today's Date: 2019  Onset of Illness/Injury or Date of Surgery: 19     Referring Physician: Dr. Bernstein      Admit Date: 2019  Diet toleration completed. Patient sitting on edge of bed eating lunch when SLP arrived. Patient was observed with lunch tray. No overt s/s of aspiration was observed. Patient does not report any issues with swallowing. He tells me that his speech has returned to baseline; therefore, full speech evaluation is not warranted at this time. Patient OK to continue regular diet with thin liquids. Reconsult SLP if concerns arise. SLP to d/c patient at this time.   Jade Lugo, MS CCC-SLP 2019 12:47 PM    Visit Dx:      ICD-10-CM ICD-9-CM   1. TIA (transient ischemic attack) G45.9 435.9   2. Chronic hypertension I10 401.9   3. Chronic renal impairment, unspecified CKD stage N18.9 585.9   4. Pharyngoesophageal dysphagia R13.14 787.24     Patient Active Problem List   Diagnosis   • HTN (hypertension)   • Hyperlipidemia   • Nonrheumatic aortic valve stenosis   • Coronary artery disease involving coronary bypass graft of native heart without angina pectoris   • Pacemaker   • OLMEDO (dyspnea on exertion)   • Other chest pain   • Coronary artery disease involving native coronary artery of native heart with angina pectoris (CMS/HCC)   • Overweight (BMI 25.0-29.9)   • Abnormal stress echo   • TIA (transient ischemic attack)   • Carotid artery stenosis       Therapy Treatment  Rehabilitation Treatment Summary     Row Name 19 1238             Treatment Time/Intention    Discipline  speech language pathologist  -MM      Document Type  therapy note (daily note)  -MM      Subjective Information  no complaints  -MM      Mode of Treatment  individual therapy;speech-language pathology  -MM      Patient/Family Observations  Wife and  daughter present  -MM      Therapy Frequency (Swallow)  2 days per week  -MM      Patient Effort  excellent  -MM      Recorded by [MM] Jade Lugo MS CCC-SLP 08/09/19 1242      Row Name 08/09/19 1238             Pain Assessment    Additional Documentation  Pain Scale: Numbers Pre/Post-Treatment (Group)  -MM      Recorded by [MM] Jade Lugo MS CCC-SLP 08/09/19 1242      Row Name 08/09/19 1238             Pain Scale: Numbers Pre/Post-Treatment    Pain Scale: Numbers, Pretreatment  0/10 - no pain  -MM      Pain Scale: Numbers, Post-Treatment  0/10 - no pain  -MM      Recorded by [MM] Jade Lugo MS CCC-SLP 08/09/19 1242      Row Name 08/09/19 1238             Outcome Summary/Treatment Plan (SLP)    Daily Summary of Progress (SLP)  progress toward functional goals as expected  -MM      Barriers to Overall Progress (SLP)  none  -MM      Plan for Continued Treatment (SLP)  Continue regular diet and thin liquids. Speech baseline.  -MM      Anticipated Dischage Disposition  home  -MM      Reason for Discharge  all goals and outcomes met, no further needs identified  -MM      Recorded by [MM] Jade Lugo, MS CCC-SLP 08/09/19 1242        User Key  (r) = Recorded By, (t) = Taken By, (c) = Cosigned By    Initials Name Effective Dates Discipline    MM Jade Lugo, MS CCC-SLP 05/24/19 -  SLP        Outcome Summary  Outcome Summary/Treatment Plan (SLP)  Daily Summary of Progress (SLP): progress toward functional goals as expected (08/09/19 1238 : Jade Lugo, MS CCC-SLP)  Barriers to Overall Progress (SLP): none (08/09/19 1238 : Jade Lugo, MS CCC-SLP)  Plan for Continued Treatment (SLP): Continue regular diet and thin liquids. Speech baseline. (08/09/19 1238 : Jade Lugo, MS CCC-SLP)  Anticipated Dischage Disposition: home (08/09/19 1246 : Jaed Lugo, MS CCC-SLP)  Reason for Discharge: all goals and outcomes met, no further needs identified (08/09/19  1246 : Jade Lugo MS CCC-SLP)  SLP GOALS     Row Name 08/09/19 1238 08/08/19 0925          Oral Nutrition/Hydration Goal 1 (SLP)    Oral Nutrition/Hydration Goal 1, SLP  LTG: Patient will tolerate LRD without s/s of aspiration.  -MM  LTG: Patient will tolerate LRD without s/s of aspiration.  -MM     Time Frame (Oral Nutrition/Hydration Goal 1, SLP)  by discharge  -MM  by discharge  -MM     Barriers (Oral Nutrition/Hydration Goal 1, SLP)  n/a  -MM  n/a  -MM     Progress/Outcomes (Oral Nutrition/Hydration Goal 1, SLP)  goal met  -MM  goal ongoing  -MM       User Key  (r) = Recorded By, (t) = Taken By, (c) = Cosigned By    Initials Name Provider Type    Jade Lynne MS CCC-SLP Speech and Language Pathologist          EDUCATION  The patient has been educated in the following areas:   Dysphagia (Swallowing Impairment) Oral Care/Hydration.    SLP Recommendation and Plan  Daily Summary of Progress (SLP): progress toward functional goals as expected  Barriers to Overall Progress (SLP): none  Plan for Continued Treatment (SLP): Continue regular diet and thin liquids. Speech baseline.  Anticipated Dischage Disposition: home        Reason for Discharge: all goals and outcomes met, no further needs identified           Time Calculation:   Time Calculation- SLP     Row Name 08/09/19 1246             Time Calculation- SLP    SLP Start Time  1238  -MM      SLP Stop Time  1248  -MM      SLP Time Calculation (min)  10 min  -MM      SLP Received On  08/09/19  -MM        User Key  (r) = Recorded By, (t) = Taken By, (c) = Cosigned By    Initials Name Provider Type    Jade Lynne MS CCC-SLP Speech and Language Pathologist          Therapy Charges for Today     Code Description Service Date Service Provider Modifiers Qty    34890324206 HC ST EVAL ORAL PHARYNG SWALLOW 4 8/8/2019 Jade Lugo MS CCC-SLP GN 1    44244213468 HC ST TREATMENT SWALLOW 1 8/9/2019 Jdae Lugo MS CCC-SLP GN 1                SLP Discharge Summary  Anticipated Dischage Disposition: home  Reason for Discharge: all goals and outcomes met, no further needs identified  Progress Toward Achieving Short/long Term Goals: all goals met within established timelines  Discharge Destination: other (see comments)(Patient remains in acute care)    Jade Lugo MS CCC-SLP  8/9/2019

## 2019-08-09 NOTE — PLAN OF CARE
Problem: Patient Care Overview  Goal: Plan of Care Review  Outcome: Ongoing (interventions implemented as appropriate)   08/09/19 1102   Coping/Psychosocial   Plan of Care Reviewed With patient;spouse;family   OTHER   Outcome Summary PT eval completed. He presents alert and oriented. He reports that he is back to his baseline mobility and he had no further PT needs, questions, or concerns. He was able to ambulate ~ 300 ft with no LOB or sway. PT to sign off, I anticipate he will d.c home with family.

## 2019-08-09 NOTE — PLAN OF CARE
Problem: Patient Care Overview  Goal: Plan of Care Review  Outcome: Ongoing (interventions implemented as appropriate)   08/09/19 2323   Coping/Psychosocial   Plan of Care Reviewed With patient;spouse;daughter;other (see comments)  (KATHY Romo)   Plan of Care Review   Progress improving   OTHER   Outcome Summary Diet toleration completed. Patient sitting on edge of bed eating lunch when SLP arrived. Patient was observed with lunch tray. No overt s/s of aspiration was observed. Patient does not report any issues with swallowing. He tells me that his speech has returned to baseline; therefore, full speech evaluation is not warranted at this time. Patient OK to continue regular diet with thin liquids. Reconsult SLP if concerns arise. SLP to d/c patient at this time.

## 2019-08-09 NOTE — PROGRESS NOTES
Continued Stay Note   [facility]     Patient Name: Mireille Magallanes  MRN: [id number]  Today's Date: 8/9/2019    Admit Date: 8/8/2019    Discharge Plan     Row Name 08/09/19 1403       Plan    Plan Comments  Pt plans on discharging home with his wife when medically stable. Pt denies any needs or concerns at this time. SW will follow and assist with any discharge needs that may arise.         Discharge Codes    No documentation.             Yana Martinez

## 2019-08-09 NOTE — PAYOR COMM NOTE
"Mireille Dickey (83 y.o. Male) [phone]   [facility] phone   Fax        Date of Birth Social Security Number Address Home Phone MRN    1935  [address]   [facility] [address] [phone] [id number]    Sikhism Marital Status          Anabaptism        Admission Date Admission Type Admitting Provider Attending Provider Department, Room/Bed    8/8/19 Emergency Ismael Bernstein MD Truong, Khai C, MD [facility] 3A, 336/1    Discharge Date Discharge Disposition Discharge Destination                       Attending Provider:  Ismael Bernstein MD    Allergies:  Sulfa Antibiotics    Isolation:  None   Infection:  None   Code Status:  CPR    Ht:  175.3 cm (69\")   Wt:  88 kg (194 lb)    Admission Cmt:  None   Principal Problem:  None                Active Insurance as of 8/8/2019     Primary Coverage     Payor Plan Insurance Group Employer/Plan Group    MEDICARE MEDICARE A & B      Payor Plan Address Payor Plan Phone Number Payor Plan Fax Number Effective Dates    [address] [phone]  10/1/2002 - None Entered    [facility] [id number]       Subscriber Name Subscriber Birth Date Member ID       MIREILLE DICKEY 1935 [id number]           Secondary Coverage     Payor Plan Insurance Group Employer/Plan Group    [employer] [place] [id number]     Payor Plan Address Payor Plan Phone Number Payor Plan Fax Number Effective Dates    [address] [address] [phone]  1/1/2013 - None Entered    [facility] [address]       Subscriber Name Subscriber Birth Date Member ID       MIREILLE DICKEY 1935 [id number]                 Emergency Contacts      (Rel.) Home Phone Work Phone Mobile Phone    Sonya Dickey (Spouse) [phone] -- --               History & Physical      Ismael Bernstein MD at 8/8/2019  8:43 AM              [facility] Medicine Services  HISTORY AND PHYSICAL    Date of " Admission: 8/8/2019  Primary Care Physician: Jw Leos PA-C    Subjective     Chief Complaint: TIA    History of Present Illness  Patient is 83-year-old  male arrives for evaluation of possible stroke.  At 630 last night, patient has symptoms of left arm weakness, difficulty speaking, left-sided facial droop, and left eye pain.  Patient ignore the symptoms initially and it was improving with the left arm numbness and weakness.  Slurred speech also improved.  Patient decided come to ER at 430 this morning to be reevaluated.  Pt currently most of symptoms is resolved.  Patient has a history of CVA in the past.  History of pacemaker placement Dr. Jansen.   Patient denies any chest pain or shortness of breath.  Patient denies any nausea vomiting diarrhea.  Patient denies any fever night sweats or chills.  Patient in no acute distress.    Patient has a past medical history of hepatitis A, carotid artery stenosis, chronic kidney disease, coagulopathy, coronary artery disease, diarrhea, dysphagia, history of colon cancer, GI bleed, hemorrhoids, colon polyp, hyperlipidemia, hypertension, left ventricular hypertrophy, melanoma of the back, obesity, osteoarthritis, peripheral neuropathy, colon polyp, CVA, TIA.    Review of Systems   Constitutional: Positive for activity change and appetite change. Negative for chills and fever.   HENT: Negative for hearing loss, nosebleeds, tinnitus and trouble swallowing.    Eyes: Negative for visual disturbance.   Respiratory: Negative for cough, chest tightness, shortness of breath and wheezing.    Cardiovascular: Negative for chest pain, palpitations and leg swelling.   Gastrointestinal: Negative for abdominal distention, abdominal pain, blood in stool, constipation, diarrhea, nausea and vomiting.   Endocrine: Negative for cold intolerance, heat intolerance, polydipsia, polyphagia and polyuria.   Genitourinary: Negative for decreased urine volume, difficulty urinating,  dysuria, flank pain, frequency and hematuria.   Musculoskeletal: Positive for arthralgias, gait problem and myalgias. Negative for joint swelling.   Skin: Negative for rash.   Allergic/Immunologic: Negative for immunocompromised state.   Neurological: Negative for dizziness, syncope, weakness, light-headedness and headaches.   Hematological: Negative for adenopathy. Does not bruise/bleed easily.   Psychiatric/Behavioral: Negative for confusion and sleep disturbance. The patient is not nervous/anxious.         Otherwise complete ROS reviewed and negative except as mentioned in the HPI.      Past Medical History:   Past Medical History:   Diagnosis Date   • Acute viral hepatitis A    • Carotid artery stenosis    • CKD (chronic kidney disease)    • Coagulopathy (CMS/HCC)     Plavix   • Coagulopathy (CMS/HCC)    • Coronary arteriosclerosis     stent ,? 4/2104, on plavix now.   • Diarrhea    • Dysphagia     Unspecified   • Family history of colon cancer    • Hematochezia     differential diagnosis discussed   • Hemorrhoids     Unspecified   • History of colonic polyps    • Hypercholesterolemia    • Hyperlipidemia    • Hypertension    • Left ventricular hypertrophy    • Melanoma of back (CMS/HCC)    • Obesity    • Osteoarthritis    • Peripheral neuropathy    • Polyp of colon    • Stroke (CMS/HCC)    • TIA (transient ischemic attack)    • Tobacco non-user        Past Surgical History:  Past Surgical History:   Procedure Laterality Date   • APPENDECTOMY     • CARDIAC CATHETERIZATION     • CARDIAC CATHETERIZATION N/A 3/18/2019    Procedure: Left Heart Cath;  Surgeon: Kaushik Toledo MD;  Location:  [facility]ATH [facility];  Service: Cardiology   • CARDIAC ELECTROPHYSIOLOGY PROCEDURE N/A 1/5/2018    Procedure: Temporary Pacemaker;  Surgeon: Luis E Courtney MD;  Location:  [facility] CATH [facility];  Service:    • CARDIAC ELECTROPHYSIOLOGY PROCEDURE N/A 1/8/2018    Procedure: Device Implant;  Surgeon: Kaushik CERDA  MD Tre;  Location:  [facility] CATH INVASIVE LOC[facility];  Service:    • CAROTID STENT     • COLON SURGERY  1999    for Ischemic Colitis   • COLONOSCOPY  05/07/2008    tubular adenoma ascending colon polyp - 3 yr   • COLONOSCOPY  08/19/2003    colon polyp @ 24 cm-see path - 5 yr   • COLONOSCOPY  08/11/2000    small interanl hemorrhoid - 3-5 yr   • COLONOSCOPY  06/22/1998    (8)small colon polyps removed-see path, internal anal skin tag - 2-3 yr   • CORONARY ARTERY BYPASS GRAFT     • CORONARY STENT PLACEMENT      x1 2014 dr crane    • OTHER SURGICAL HISTORY      Lithotripsy   • OTHER SURGICAL HISTORY  06/2011    recall 3 yr.    • VASECTOMY         Family History: family history includes Colon cancer in his father; Other in his brother and sister; Stroke in his mother.    Social History:  reports that he quit smoking about 20 years ago. He has never used smokeless tobacco. He reports that he does not drink alcohol or use drugs.    Medications:  Prior to Admission medications    Medication Sig Start Date End Date Taking? Authorizing Provider   amLODIPine (NORVASC) 10 MG tablet Take 1 tablet by mouth Daily. 3/18/19  Yes Kaushik Toledo MD   aspirin 81 MG EC tablet Take 81 mg by mouth daily.   Yes Provider, MD Terell   atorvastatin (LIPITOR) 80 MG tablet Take 80 mg by mouth Daily.   Yes Provider, MD Terell   Coenzyme Q10 (COQ-10) 100 MG capsule Take  by mouth daily.   Yes ProviderTerell MD   irbesartan (AVAPRO) 300 MG tablet Take 300 mg by mouth Every Night.   Yes ProviderTerell MD   nitroglycerin (NITROSTAT) 0.4 MG SL tablet Place 0.4 mg under the tongue as needed.   Yes ProviderTerell MD   metoprolol succinate XL (TOPROL-XL) 25 MG 24 hr tablet Take 1 tablet by mouth Daily. 3/6/19   Brisa Saleem, GIANNI     Allergies:  Allergies   Allergen Reactions   • Sulfa Antibiotics Rash     Sulfa Drugs       Objective     Vital Signs: /88   Pulse 69   Temp 97.8 °F (36.6 °C) (Oral)   Resp 18    "Ht 175.3 cm (69\")   Wt 88 kg (194 lb)   SpO2 97%   BMI 28.65 kg/m²    Physical Exam   Constitutional: He is oriented to person, place, and time. He appears well-developed.   HENT:   Head: Normocephalic and atraumatic.   Eyes: Conjunctivae and EOM are normal. Pupils are equal, round, and reactive to light.   Neck: Neck supple. No JVD present. No thyromegaly present.   Cardiovascular: Normal rate, regular rhythm, normal heart sounds and intact distal pulses. Exam reveals no gallop and no friction rub.   No murmur heard.  Pulmonary/Chest: Effort normal and breath sounds normal. No respiratory distress. He has no wheezes. He has no rales. He exhibits no tenderness.   Diminished breath sound bilateral, clear.   Abdominal: Soft. Bowel sounds are normal. He exhibits no distension. There is no tenderness. There is no rebound and no guarding.   Musculoskeletal: Normal range of motion. He exhibits no edema, tenderness or deformity.   Lymphadenopathy:     He has no cervical adenopathy.   Neurological: He is alert and oriented to person, place, and time. He displays normal reflexes. No cranial nerve deficit. He exhibits normal muscle tone.   Strength is 5 out of 5 symmetrical.  Negative pronator drift.  Negative finger touch.  No sign of focal deficit at this time   Skin: Skin is warm and dry. Capillary refill takes 2 to 3 seconds. No rash noted.   Psychiatric: He has a normal mood and affect. His behavior is normal. Judgment and thought content normal.   Nursing note and vitals reviewed.          Results Reviewed:    Lab Results (last 24 hours)     Procedure Component Value Units Date/Time    Troponin [[id number]]  (Normal) Collected:  08/08/19 0554    Specimen:  Blood Updated:  08/08/19 0701     Troponin I 0.023 ng/mL     [place] Draw [[id number]] Collected:  08/08/19 0554    Specimen:  Blood Updated:  08/08/19 0701    Narrative:       The following orders were created for panel order [place] Draw.  Procedure                 "               Abnormality         Status                     ---------                               -----------         ------                     Light Blue Top[[id number]]                                   Final result               Green Top (Gel)[[id number]]                                  Final result               Lavender Top[[id number]]                                     Final result               Red Top[[id number]]                                          Final result                 Please view results for these tests on the individual orders.    Light Blue Top [[id number]] Collected:  08/08/19 0554    Specimen:  Blood Updated:  08/08/19 0701     Extra Tube hold for add-on     Comment: Auto resulted       Green Top (Gel) [[id number]] Collected:  08/08/19 0554    Specimen:  Blood Updated:  08/08/19 0701     Extra Tube Hold for add-ons.     Comment: Auto resulted.       Lavender Top [[id number]] Collected:  08/08/19 0554    Specimen:  Blood Updated:  08/08/19 0701     Extra Tube hold for add-on     Comment: Auto resulted       Red Top [[id number]] Collected:  08/08/19 0554    Specimen:  Blood Updated:  08/08/19 0701     Extra Tube Hold for add-ons.     Comment: Auto resulted.       Protime-INR [[id number]]  (Normal) Collected:  08/08/19 0554    Specimen:  Blood Updated:  08/08/19 0654     Protime 13.1 Seconds      INR 0.96    aPTT [[id number]]  (Normal) Collected:  08/08/19 0554    Specimen:  Blood Updated:  08/08/19 0654     PTT 33.2 seconds     Basic Metabolic Panel [[id number]]  (Abnormal) Collected:  08/08/19 0554    Specimen:  Blood Updated:  08/08/19 0649     Glucose 101 mg/dL      BUN 23 mg/dL      Creatinine 1.67 mg/dL      Sodium 142 mmol/L      Potassium 4.3 mmol/L      Chloride 108 mmol/L      CO2 25.0 mmol/L      Calcium 9.4 mg/dL      eGFR Non African Amer 40 mL/min/1.73      BUN/Creatinine Ratio 13.8     Anion Gap 9.0 mmol/L     Narrative:       GFR Normal >60  Chronic Kidney Disease <60  Kidney  Failure <15    CK [[id number]]  (Normal) Collected:  08/08/19 0554    Specimen:  Blood Updated:  08/08/19 0649     Creatine Kinase 141 U/L     CBC & Differential [[id number]] Collected:  08/08/19 0554    Specimen:  Blood Updated:  08/08/19 0648    Narrative:       The following orders were created for panel order CBC & Differential.  Procedure                               Abnormality         Status                     ---------                               -----------         ------                     CBC Auto Differential[[id number]]        Normal              Final result                 Please view results for these tests on the individual orders.    CBC Auto Differential [[id number]]  (Normal) Collected:  08/08/19 0554    Specimen:  Blood Updated:  08/08/19 0648     WBC 6.74 10*3/mm3      RBC 4.41 10*6/mm3      Hemoglobin 13.1 g/dL      Hematocrit 38.5 %      MCV 87.3 fL      MCH 29.7 pg      MCHC 34.0 g/dL      RDW 13.6 %      RDW-SD 43.6 fl      MPV 10.3 fL      Platelets 176 10*3/mm3      Neutrophil % 54.7 %      Lymphocyte % 31.6 %      Monocyte % 9.6 %      Eosinophil % 3.4 %      Basophil % 0.4 %      Immature Grans % 0.3 %      Neutrophils, Absolute 3.68 10*3/mm3      Lymphocytes, Absolute 2.13 10*3/mm3      Monocytes, Absolute 0.65 10*3/mm3      Eosinophils, Absolute 0.23 10*3/mm3      Basophils, Absolute 0.03 10*3/mm3      Immature Grans, Absolute 0.02 10*3/mm3      nRBC 0.0 /100 WBC            Radiology Data:    Imaging Results (last 24 hours)     Procedure Component Value Units Date/Time    CT Head Without Contrast Stroke Protocol [[id number]] Collected:  08/08/19 0804     Updated:  08/08/19 0809    Narrative:       EXAMINATION: CT HEAD WO CONTRAST STROKE PROTOCOL-      8/8/2019 7:49 AM CDT     HISTORY: Weakness and dizziness. Stroke symptoms.     In order to have a CT radiation dose as low as reasonably achievable  Automated Exposure Control was utilized for adjustment of the mA and/or  KV according to  patient size.     DLP in mGycm= 786.     No skull abnormality.  Mucosal thickening with near complete opacification of the left side of  the sphenoid sinus.  Clear mastoid air cells.     Atrophy is not excessive.  Ventricle size is normal.     There is white matter hypodensity compatible with small vessel disease  in the periventricular region.     There is no cortical infarct or hemorrhage.     Summary:  1. No acute intracranial abnormality is seen.                                   This report was finalized on 08/08/2019 08:06 by Dr. Franco Christy MD.    XR Chest 1 View [[id number]] Collected:  08/08/19 0754     Updated:  08/08/19 0758    Narrative:       EXAMINATION: XR CHEST 1 VW-     8/8/2019 7:30 AM CDT     HISTORY: Stroke symptoms.     One view chest x-ray compared with 01/19/2018.     Heart size is within normal limits.  CABG changes with aortic arch calcification.  Unchanged left cardiac pacer.     Fully expanded lungs with no pneumonia, pneumothorax, or congestive  failure.     Summary:  1. No acute lung disease.     This report was finalized on 08/08/2019 07:54 by Dr. Franco Christy MD.          I have personally reviewed and interpreted the radiology studies and ECG obtained at time of admission.     Assessment / Plan      Assessment & Plan  Active Hospital Problems    Diagnosis   • TIA (transient ischemic attack)   • Carotid artery stenosis   • HTN (hypertension)   • Hyperlipidemia     Plans  TIA.  TIA protocol.  Consult neurology.  CT scan head shows no acute intracranial abnormality.    CAD/hypertension/hyperlipidemia.  History of pacemaker placement.  Continue Norvasc, aspirin, Lipitor, Avapro.  Nitro as needed.  Continue Q10.  Chest ray shows no acute changes.    Chronic kidney disease.  Will follow.    Code Status: full code     I discussed the patient's findings and my recommendations with: patient    Estimated length of stay: 1-3 days.     Ismael Bernstein MD   08/08/19   9:02  "AM              Electronically signed by Ismael Bernstein MD at 8/8/2019  9:17 AM          Emergency Department Notes      Loretta Cool RN at 8/8/2019  5:57 AM        PATIENT STATES LAST NIGHT AROUND 1830 HIS LEFT ARM FELT WEAK, HAD PAIN IN HIS LEFT EYE, AND FELT LIKE HIS WORDS WERE \"NOT RIGHT\"  STATES HE CAME IN THIS MORNING BECAUSE HIS BLOOD PRESSURE KEPT GOING UP.      Loretta Cool RN  08/08/19 0600      Electronically signed by Loretta Cool RN at 8/8/2019  6:00 AM     Norma Toledo MD at 8/8/2019  7:03 AM          Subjective   82 y/o right handed male arrives for evaluation what he states is a \"mild stroke\" that began around 1800 8/7/2019 in which he noted left arm weakness, difficulty with word finding and a \"funny feeling\" to his left face. He ignored these symptoms and did note that they did improve however, he became concerned secondary to his BP remaining elevated thus arrives here for evaluation. He notes a history of \"mild strokes\" in the past without defects (x2). He notes further he has a history of AICD (Tre) and at one time was on anticoauglation but was taken off by cardiology after his AICD. He denies any CP or sob, falls, trauma, fevers, chills, numbness, tingling, loss of sensation, vision or hearing changes, ataxia or other issues. He arrives in NAD.         Family, social and past history reviewed as below, prior documentation of H and Ps and other documentation are reviewed:    Past Medical History:  No date: Acute viral hepatitis A  No date: Carotid artery stenosis  No date: CKD (chronic kidney disease)  No date: Coagulopathy (CMS/HCC)      Comment:  Plavix  No date: Coagulopathy (CMS/HCC)  No date: Coronary arteriosclerosis      Comment:  stent ,? 4/2104, on plavix now.  No date: Diarrhea  No date: Dysphagia      Comment:  Unspecified  No date: Family history of colon cancer  No date: Hematochezia      Comment:  differential diagnosis discussed  No date: Hemorrhoids      " Comment:  Unspecified  No date: History of colonic polyps  No date: Hypercholesterolemia  No date: Hyperlipidemia  No date: Hypertension  No date: Left ventricular hypertrophy  No date: Melanoma of back (CMS/HCC)  No date: Obesity  No date: Osteoarthritis  No date: Peripheral neuropathy  No date: Polyp of colon  No date: Stroke (CMS/HCC)  No date: TIA (transient ischemic attack)  No date: Tobacco non-user    Past Surgical History:  No date: APPENDECTOMY  No date: CARDIAC CATHETERIZATION  3/18/2019: CARDIAC CATHETERIZATION; N/A      Comment:  Procedure: Left Heart Cath;  Surgeon: Kaushik Toledo MD;  Location:  [facility];  Service:                Cardiology  1/5/2018: CARDIAC ELECTROPHYSIOLOGY PROCEDURE; N/A      Comment:  Procedure: Temporary Pacemaker;  Surgeon: Luis E Courtney MD;  Location:  [facility] CATH [facility];                 Service:   1/8/2018: CARDIAC ELECTROPHYSIOLOGY PROCEDURE; N/A      Comment:  Procedure: Device Implant;  Surgeon: Kaushik Toledo MD;               Location:  [facility] CATH INVAS[facility] LOCATION;  Service:   No date: CAROTID STENT  1999: COLON SURGERY      Comment:  for Ischemic Colitis  05/07/2008: COLONOSCOPY      Comment:  tubular adenoma ascending colon polyp - 3 yr  08/19/2003: COLONOSCOPY      Comment:  colon polyp @ 24 cm-see path - 5 yr  08/11/2000: COLONOSCOPY      Comment:  small interanl hemorrhoid - 3-5 yr  06/22/1998: COLONOSCOPY      Comment:  (8)small colon polyps removed-see path, internal anal                skin tag - 2-3 yr  No date: CORONARY ARTERY BYPASS GRAFT  No date: CORONARY STENT PLACEMENT      Comment:  x1 2014 dr crane   No date: OTHER SURGICAL HISTORY      Comment:  Lithotripsy  06/2011: OTHER SURGICAL HISTORY      Comment:  recall 3 yr.   No date: VASECTOMY    Social History    Socioeconomic History      Marital status:       Spouse name: Not on file      Number of children: Not on file       Years of education: Not on file      Highest education level: Not on file    Tobacco Use      Smoking status: Former Smoker        Quit date:         Years since quittin.6      Smokeless tobacco: Never Used    Substance and Sexual Activity      Alcohol use: No      Drug use: No      Sexual activity: Defer      Family history: reviewed and noncontributory             Review of Systems   All other systems reviewed and are negative.      Past Medical History:   Diagnosis Date   • Acute viral hepatitis A    • Carotid artery stenosis    • CKD (chronic kidney disease)    • Coagulopathy (CMS/HCC)     Plavix   • Coagulopathy (CMS/HCC)    • Coronary arteriosclerosis     stent ,? 2104, on plavix now.   • Diarrhea    • Dysphagia     Unspecified   • Family history of colon cancer    • Hematochezia     differential diagnosis discussed   • Hemorrhoids     Unspecified   • History of colonic polyps    • Hypercholesterolemia    • Hyperlipidemia    • Hypertension    • Left ventricular hypertrophy    • Melanoma of back (CMS/HCC)    • Obesity    • Osteoarthritis    • Peripheral neuropathy    • Polyp of colon    • Stroke (CMS/HCC)    • TIA (transient ischemic attack)    • Tobacco non-user        Allergies   Allergen Reactions   • Sulfa Antibiotics Rash     Sulfa Drugs       Past Surgical History:   Procedure Laterality Date   • APPENDECTOMY     • CARDIAC CATHETERIZATION     • CARDIAC CATHETERIZATION N/A 3/18/2019    Procedure: Left Heart Cath;  Surgeon: Kaushik Toledo MD;  Location:  [facility];  Service: Cardiology   • CARDIAC ELECTROPHYSIOLOGY PROCEDURE N/A 2018    Procedure: Temporary Pacemaker;  Surgeon: Luis E Courtney MD;  Location:  [facility] CATH [facility] LOC[facility];  Service:    • CARDIAC ELECTROPHYSIOLOGY PROCEDURE N/A 2018    Procedure: Device Implant;  Surgeon: Kaushik Toledo MD;  Location:  [facility] CATH IN[facility] LOC[facility];  Service:    • CAROTID STENT     • COLON SURGERY      for  Ischemic Colitis   • COLONOSCOPY  2008    tubular adenoma ascending colon polyp - 3 yr   • COLONOSCOPY  2003    colon polyp @ 24 cm-see path - 5 yr   • COLONOSCOPY  2000    small interanl hemorrhoid - 3-5 yr   • COLONOSCOPY  1998    (8)small colon polyps removed-see path, internal anal skin tag - 2-3 yr   • CORONARY ARTERY BYPASS GRAFT     • CORONARY STENT PLACEMENT      x1 2014 dr crane    • OTHER SURGICAL HISTORY      Lithotripsy   • OTHER SURGICAL HISTORY  2011    recall 3 yr.    • VASECTOMY         Family History   Problem Relation Age of Onset   • Colon cancer Father         Diagnosed at 72 YO   • Other Sister         Polyps/Colon   • Other Brother         Polyps/Colon   • Stroke Mother        Social History     Socioeconomic History   • Marital status:      Spouse name: Not on file   • Number of children: Not on file   • Years of education: Not on file   • Highest education level: Not on file   Tobacco Use   • Smoking status: Former Smoker     Last attempt to quit:      Years since quittin.6   • Smokeless tobacco: Never Used   Substance and Sexual Activity   • Alcohol use: No   • Drug use: No   • Sexual activity: Defer           Objective   Physical Exam   Constitutional: He is oriented to person, place, and time. He appears well-developed and well-nourished.   HENT:   Head: Normocephalic.   Nose: Nose normal.   Eyes: Conjunctivae and EOM are normal. Pupils are equal, round, and reactive to light.   Neck: Normal range of motion. Neck supple.   Cardiovascular: Normal rate, regular rhythm, normal heart sounds and intact distal pulses. Exam reveals no gallop and no friction rub.   No murmur heard.  Pulmonary/Chest: Effort normal and breath sounds normal. No stridor. No respiratory distress. He has no wheezes. He has no rales. He exhibits no tenderness.   Abdominal: Soft. Bowel sounds are normal.   Musculoskeletal: Normal range of motion. He exhibits no edema, tenderness  or deformity.   Neurological: He is alert and oriented to person, place, and time. He displays normal reflexes. No cranial nerve deficit or sensory deficit. He exhibits normal muscle tone. Coordination normal.   No appreciated weakness, negative drift, normal speech and mentation. Sensation is intact.    Skin: Skin is warm. Capillary refill takes less than 2 seconds.   Psychiatric: He has a normal mood and affect.   Vitals reviewed.      Procedures          ED Course        EKG shows paced rhythm at 84    Patient well outside of TPA windows. He did arrive at shift change thus endorsed to Dr. Toledo at shift change.     CT Head Without Contrast Stroke Protocol   Final Result      XR Chest 1 View   Final Result        Labs Reviewed   BASIC METABOLIC PANEL - Abnormal; Notable for the following components:       Result Value    Glucose 101 (*)     BUN 23 (*)     Creatinine 1.67 (*)     eGFR Non  Amer 40 (*)     All other components within normal limits    Narrative:     GFR Normal >60  Chronic Kidney Disease <60  Kidney Failure <15   PROTIME-INR - Normal   APTT - Normal   CK - Normal   TROPONIN (IN-HOUSE) - Normal   CBC WITH AUTO DIFFERENTIAL - Normal   RAINBOW DRAW    Narrative:     The following orders were created for panel order [place] Draw.  Procedure                               Abnormality         Status                     ---------                               -----------         ------                     Light Blue Top[[id number]]                                   Final result               Green Top (Gel)[[id number]]                                  Final result               Lavender Top[[id number]]                                     Final result               Red Top[[id number]]                                          Final result                 Please view results for these tests on the individual orders.   LIGHT BLUE TOP   GREEN TOP   LAVENDER TOP   RED TOP   CBC AND DIFFERENTIAL    Narrative:      The following orders were created for panel order CBC & Differential.  Procedure                               Abnormality         Status                     ---------                               -----------         ------                     CBC Auto Differential[[id number]]        Normal              Final result                 Please view results for these tests on the individual orders.     Lab Results (last 24 hours)     Procedure Component Value Units Date/Time    CBC & Differential [[id number]] Collected:  08/08/19 0554    Specimen:  Blood Updated:  08/08/19 0648    Narrative:       The following orders were created for panel order CBC & Differential.  Procedure                               Abnormality         Status                     ---------                               -----------         ------                     CBC Auto Differential[[id number]]        Normal              Final result                 Please view results for these tests on the individual orders.    Basic Metabolic Panel [[id number]]  (Abnormal) Collected:  08/08/19 0554    Specimen:  Blood Updated:  08/08/19 0649     Glucose 101 mg/dL      BUN 23 mg/dL      Creatinine 1.67 mg/dL      Sodium 142 mmol/L      Potassium 4.3 mmol/L      Chloride 108 mmol/L      CO2 25.0 mmol/L      Calcium 9.4 mg/dL      eGFR Non African Amer 40 mL/min/1.73      BUN/Creatinine Ratio 13.8     Anion Gap 9.0 mmol/L     Narrative:       GFR Normal >60  Chronic Kidney Disease <60  Kidney Failure <15    Protime-INR [[id number]]  (Normal) Collected:  08/08/19 0554    Specimen:  Blood Updated:  08/08/19 0654     Protime 13.1 Seconds      INR 0.96    aPTT [[id number]]  (Normal) Collected:  08/08/19 0554    Specimen:  Blood Updated:  08/08/19 0654     PTT 33.2 seconds     CK [[id number]]  (Normal) Collected:  08/08/19 0554    Specimen:  Blood Updated:  08/08/19 0649     Creatine Kinase 141 U/L     Troponin [[id number]]  (Normal) Collected:  08/08/19 0554     Specimen:  Blood Updated:  08/08/19 0701     Troponin I 0.023 ng/mL     CBC Auto Differential [[id number]]  (Normal) Collected:  08/08/19 0554    Specimen:  Blood Updated:  08/08/19 0648     WBC 6.74 10*3/mm3      RBC 4.41 10*6/mm3      Hemoglobin 13.1 g/dL      Hematocrit 38.5 %      MCV 87.3 fL      MCH 29.7 pg      MCHC 34.0 g/dL      RDW 13.6 %      RDW-SD 43.6 fl      MPV 10.3 fL      Platelets 176 10*3/mm3      Neutrophil % 54.7 %      Lymphocyte % 31.6 %      Monocyte % 9.6 %      Eosinophil % 3.4 %      Basophil % 0.4 %      Immature Grans % 0.3 %      Neutrophils, Absolute 3.68 10*3/mm3      Lymphocytes, Absolute 2.13 10*3/mm3      Monocytes, Absolute 0.65 10*3/mm3      Eosinophils, Absolute 0.23 10*3/mm3      Basophils, Absolute 0.03 10*3/mm3      Immature Grans, Absolute 0.02 10*3/mm3      nRBC 0.0 /100 WBC         Labs showed elevated creatinine which was baseline for the patient.  CT scan of the head and chest x-ray showed no acute findings.  Work-up consistent with a TIA.  Patient was then admitted to the hospitalist service for further work-up and treatment.            NIHSS (NIH Stroke Scale/Score) reviewed and/or performed as part of the patient evaluation and treatment planning process.  The result associated with this review/performance is: 0       MDM      Final diagnoses:   TIA (transient ischemic attack)   Chronic hypertension   Chronic renal impairment, unspecified CKD stage            Norma Toledo MD  08/08/19 0871      Electronically signed by Norma Toledo MD at 8/8/2019  8:41 AM       Hospital Medications (active)       Dose Frequency Start End    amLODIPine (NORVASC) tablet 10 mg 10 mg Daily 8/8/2019     Sig - Route: Take 1 tablet by mouth Daily. - Oral    aspirin EC tablet 81 mg 81 mg Daily 8/8/2019     Sig - Route: Take 1 tablet by mouth Daily. - Oral    atorvastatin (LIPITOR) tablet 80 mg 80 mg Nightly 8/8/2019     Sig - Route: Take 2 tablets by mouth Every Night. - Oral     iopamidol (ISOVUE-370) 76 % injection 200 mL 200 mL Once in Imaging 8/8/2019 8/8/2019    Sig - Route: Infuse 200 mL into a venous catheter Once. - Intravenous    losartan (COZAAR) tablet 100 mg 100 mg Every 24 Hours Scheduled 8/8/2019     Sig - Route: Take 2 tablets by mouth Daily. - Oral    nitroglycerin (NITROSTAT) SL tablet 0.4 mg 0.4 mg Every 5 Minutes PRN 8/8/2019     Sig - Route: Place 1 tablet under the tongue Every 5 (Five) Minutes As Needed for Chest Pain. - Sublingual    ondansetron (ZOFRAN) injection 4 mg 4 mg Every 6 Hours PRN 8/8/2019     Sig - Route: Infuse 2 mL into a venous catheter Every 6 (Six) Hours As Needed for Nausea or Vomiting. - Intravenous    perflutren (DEFINITY) lipid microspheres injection 9.78 mg 1.5 mL Once 8/8/2019 8/8/2019    Sig - Route: Infuse 1.5 mL into a venous catheter 1 (One) Time. - Intravenous    sodium chloride 0.9 % flush 3 mL 3 mL Every 12 Hours Scheduled 8/8/2019     Sig - Route: Infuse 3 mL into a venous catheter Every 12 (Twelve) Hours. - Intravenous    sodium chloride 0.9 % flush 3-10 mL 3-10 mL As Needed 8/8/2019     Sig - Route: Infuse 3-10 mL into a venous catheter As Needed for Line Care. - Intravenous    sodium chloride 0.9 % infusion 75 mL/hr Continuous 8/8/2019     Sig - Route: Infuse 75 mL/hr into a venous catheter Continuous. - Intravenous        8/8  Nurse note :  from ER- c/o left eye pain and left arm weakness, patient states NO n/t, able to CASILLAS equally but states they can still feel weakness in the left arm, family at bedside, BP trending down, telem on shows V-pacing in the 70's, scds on, std by asst to [facility], slight facial droop noticed, will continue to monitor and notify MD of any changes     Consult Notes (last 48 hours) (Notes from 8/7/2019 10:52 AM through 8/9/2019 10:52 AM)      Sheryl Frazier MD at 8/8/2019  6:31 PM      Consult Orders    1. Inpatient Neurology Consult Stroke [[id number]] ordered by Ismael Bernstein MD at 08/08/19 0912                     Neurology Consult Note    Patient:  Mireille Magallanes   YOB: 1935  MRN:  [id number]  Date of Admission:  8/8/2019  5:42 AM    Date: 8/8/2019    Referring Provider: Ismael Bernstein MD  Reason for Consultation: Stroke      History of present illness:     This is a 83 y.o. right handed male.with H/O coagulopathy, hyperlipidemia, hypertension, carotid artery disease, CAD, evaluated for stroke    Patient noted dizziness and off balance sensation Monday that is 3 days ago which continued through Tuesday but lightened up a bit..  He still played golf but noted he had balance issues and symptoms improved by late Tuesday.  Last night he noted the left eye pain and that his left hand was weaker than the right. hand  He noted subtle speech problems  No vision loss, facial weakness, hemisensory loss or any other part of his body weak.     While at home he took his Irbesartan and then his amlodipine.     Past Medical History:   Diagnosis Date   • Acute viral hepatitis A    • Carotid artery stenosis    • CKD (chronic kidney disease)    • Coagulopathy (CMS/HCC)     Plavix   • Coagulopathy (CMS/HCC)    • Coronary arteriosclerosis     stent ,? 4/2104, on plavix now.   • Diarrhea    • Dysphagia     Unspecified   • Family history of colon cancer    • Hematochezia     differential diagnosis discussed   • Hemorrhoids     Unspecified   • History of colonic polyps    • Hypercholesterolemia    • Hyperlipidemia    • Hypertension    • Left ventricular hypertrophy    • Melanoma of back (CMS/HCC)    • Obesity    • Osteoarthritis    • Peripheral neuropathy    • Polyp of colon    • Stroke (CMS/HCC)    • TIA (transient ischemic attack)    • Tobacco non-user        Past Surgical History:   Procedure Laterality Date   • APPENDECTOMY     • CARDIAC CATHETERIZATION     • CARDIAC CATHETERIZATION N/A 3/18/2019    Procedure: Left Heart Cath;  Surgeon: Kaushik Toledo MD;  Location:  [facility];  Service:  Cardiology   • CARDIAC ELECTROPHYSIOLOGY PROCEDURE N/A 1/5/2018    Procedure: Temporary Pacemaker;  Surgeon: Luis E Courtney MD;  Location:  [facility];  Service:    • CARDIAC ELECTROPHYSIOLOGY PROCEDURE N/A 1/8/2018    Procedure: Device Implant;  Surgeon: Kaushik Toledo MD;  Location:  [facility]ATH [facility];  Service:    • CAROTID STENT     • COLON SURGERY  1999    for Ischemic Colitis   • COLONOSCOPY  05/07/2008    tubular adenoma ascending colon polyp - 3 yr   • COLONOSCOPY  08/19/2003    colon polyp @ 24 cm-see path - 5 yr   • COLONOSCOPY  08/11/2000    small interanl hemorrhoid - 3-5 yr   • COLONOSCOPY  06/22/1998    (8)small colon polyps removed-see path, internal anal skin tag - 2-3 yr   • CORONARY ARTERY BYPASS GRAFT     • CORONARY STENT PLACEMENT      x1 2014 dr crane    • OTHER SURGICAL HISTORY      Lithotripsy   • OTHER SURGICAL HISTORY  06/2011    recall 3 yr.    • VASECTOMY         Prior to Admission medications    Medication Sig Start Date End Date Taking? Authorizing Provider   amLODIPine (NORVASC) 10 MG tablet Take 1 tablet by mouth Daily. 3/18/19  Yes Kaushik Toledo MD   aspirin 81 MG EC tablet Take 81 mg by mouth daily.   Yes Provider, MD Terell   atorvastatin (LIPITOR) 80 MG tablet Take 80 mg by mouth Daily.   Yes Provider, MD Terell   Coenzyme Q10 (COQ-10) 100 MG capsule Take  by mouth daily.   Yes Provider, MD Terell   irbesartan (AVAPRO) 300 MG tablet Take 300 mg by mouth Every Night.   Yes Provider, MD Terell   metoprolol succinate XL (TOPROL-XL) 25 MG 24 hr tablet Take 1 tablet by mouth Daily. 3/6/19  Yes Brisa Saleem APRN   nitroglycerin (NITROSTAT) 0.4 MG SL tablet Place 0.4 mg under the tongue as needed.   Yes ProviderTerell MD       Hospital scheduled medications:     amLODIPine 10 mg Oral Daily   aspirin 81 mg Oral Daily   atorvastatin 80 mg Oral Nightly   losartan 100 mg Oral Q24H   sodium chloride 3 mL Intravenous Q12H      Hospital PRN medications:  nitroglycerin  •  ondansetron  •  sodium chloride    Allergies   Allergen Reactions   • Sulfa Antibiotics Rash     Sulfa Drugs       Social History     Socioeconomic History   • Marital status:      Spouse name: Not on file   • Number of children: Not on file   • Years of education: Not on file   • Highest education level: Not on file   Tobacco Use   • Smoking status: Former Smoker     Last attempt to quit:      Years since quittin.6   • Smokeless tobacco: Never Used   Substance and Sexual Activity   • Alcohol use: No   • Drug use: No   • Sexual activity: Defer     Family History   Problem Relation Age of Onset   • Colon cancer Father         Diagnosed at 74 YO   • Other Sister         Polyps/Colon   • Other Brother         Polyps/Colon   • Stroke Mother        Review of Systems  A 14 point review of systems was reviewed and was negative except for resolving left eye pain arm weakness    Vital Signs   Temp:  [96.3 °F (35.7 °C)-97.8 °F (36.6 °C)] 96.3 °F (35.7 °C)  Heart Rate:  [64-84] 78  Resp:  [16-20] 16  BP: (128-169)/(79-91) 128/79    General Exam:  Head:  Normal cephalic, atraumatic  HEENT:  Neck supple  Fundoscopic Exam:  No signs of disc edema  CVS:  Regular rate and rhythm.  No murmurs  Carotid Examination:  No bruits  Lungs:  Clear to auscultation  Abdomen:  Non-tender, Non-distended  Extremities:  No signs of peripheral edema  Skin:  No rashes    Neurologic Exam:    Mental Status:    -Awake, Alert, Oriented X 3  -No word finding difficulties  -No aphasia  -Very subtle dysarthria  -Follows simple and complex commands    CN II:  Visual fields full.  Pupils equally reactive to light  CN III, IV, VI:  Extraocular Muscles full with no signs of nystagmus  CN V:  Facial sensory is symmetric   CN VII:  Facial motor symmetric  CN VIII:  Gross hearing intact bilaterally  CN IX/X:  Palate elevates symmetrically  CN XI:  Shoulder shrug symmetric  CN XII:  Tongue is  midline on protrusion    Motor: (strength out of 5:  1= minimal movement, 2 = movement in plane of gravity, 3 = movement against gravity, 4 = movement against some resistance, 5 = full strength)    -Right Upper Ext: Proximal: 5 Distal: 5  -Left Upper Ext: Proximal: 5 Distal: 5    -Right Lower Ext: Proximal: 5 Distal: 5  -Left Lower Ext: Proximal: 5 Distal: 5    DTR:  -Right   Bicep: 2+ Triceps: 2+ Brachioradialis: 2+   Patella: 2+ Ankle: 2+ Neg Babinski  -Left   Bicep: 2+ Triceps: 2+ Brachioradialis: 2+   Patella: 2+ Ankle: 2+ Neg Babinski    Sensory:  -Intact to light touch, pinprick, temperature, pain, and proprioception    Coordination:  -Finger to nose intact  -Heel to shin intact  -No ataxia    Gait  -Mild to moderate broad based gait. Turns are unsteady  -ambulates unassisted      Results Review:  Lab Results (last 7 days)     Procedure Component Value Units Date/Time    Troponin [[id number]]  (Normal) Collected:  08/08/19 0554    Specimen:  Blood Updated:  08/08/19 0701     Troponin I 0.023 ng/mL     [place] Draw [[id number]] Collected:  08/08/19 0554    Specimen:  Blood Updated:  08/08/19 0701    Narrative:       The following orders were created for panel order [place] Draw.  Procedure                               Abnormality         Status                     ---------                               -----------         ------                     Light Blue Top[[id number]]                                   Final result               Green Top (Gel)[[id number]]                                  Final result               Lavender Top[[id number]]                                     Final result               Red Top[[id number]]                                          Final result                 Please view results for these tests on the individual orders.    Light Blue Top [[id number]] Collected:  08/08/19 0554    Specimen:  Blood Updated:  08/08/19 0701     Extra Tube hold for add-on     Comment: Auto resulted        Green Top (Gel) [[id number]] Collected:  08/08/19 0554    Specimen:  Blood Updated:  08/08/19 0701     Extra Tube Hold for add-ons.     Comment: Auto resulted.       Lavender Top [[id number]] Collected:  08/08/19 0554    Specimen:  Blood Updated:  08/08/19 0701     Extra Tube hold for add-on     Comment: Auto resulted       Red Top [[id number]] Collected:  08/08/19 0554    Specimen:  Blood Updated:  08/08/19 0701     Extra Tube Hold for add-ons.     Comment: Auto resulted.       Protime-INR [[id number]]  (Normal) Collected:  08/08/19 0554    Specimen:  Blood Updated:  08/08/19 0654     Protime 13.1 Seconds      INR 0.96    aPTT [[id number]]  (Normal) Collected:  08/08/19 0554    Specimen:  Blood Updated:  08/08/19 0654     PTT 33.2 seconds     Basic Metabolic Panel [[id number]]  (Abnormal) Collected:  08/08/19 0554    Specimen:  Blood Updated:  08/08/19 0649     Glucose 101 mg/dL      BUN 23 mg/dL      Creatinine 1.67 mg/dL      Sodium 142 mmol/L      Potassium 4.3 mmol/L      Chloride 108 mmol/L      CO2 25.0 mmol/L      Calcium 9.4 mg/dL      eGFR Non African Amer 40 mL/min/1.73      BUN/Creatinine Ratio 13.8     Anion Gap 9.0 mmol/L     Narrative:       GFR Normal >60  Chronic Kidney Disease <60  Kidney Failure <15    CK [[id number]]  (Normal) Collected:  08/08/19 0554    Specimen:  Blood Updated:  08/08/19 0649     Creatine Kinase 141 U/L     CBC & Differential [[id number]] Collected:  08/08/19 0554    Specimen:  Blood Updated:  08/08/19 0648    Narrative:       The following orders were created for panel order CBC & Differential.  Procedure                               Abnormality         Status                     ---------                               -----------         ------                     CBC Auto Differential[[id number]]        Normal              Final result                 Please view results for these tests on the individual orders.    CBC Auto Differential [[id number]]  (Normal) Collected:  08/08/19  0554    Specimen:  Blood Updated:  08/08/19 0648     WBC 6.74 10*3/mm3      RBC 4.41 10*6/mm3      Hemoglobin 13.1 g/dL      Hematocrit 38.5 %      MCV 87.3 fL      MCH 29.7 pg      MCHC 34.0 g/dL      RDW 13.6 %      RDW-SD 43.6 fl      MPV 10.3 fL      Platelets 176 10*3/mm3      Neutrophil % 54.7 %      Lymphocyte % 31.6 %      Monocyte % 9.6 %      Eosinophil % 3.4 %      Basophil % 0.4 %      Immature Grans % 0.3 %      Neutrophils, Absolute 3.68 10*3/mm3      Lymphocytes, Absolute 2.13 10*3/mm3      Monocytes, Absolute 0.65 10*3/mm3      Eosinophils, Absolute 0.23 10*3/mm3      Basophils, Absolute 0.03 10*3/mm3      Immature Grans, Absolute 0.02 10*3/mm3      nRBC 0.0 /100 WBC           .  Imaging Results (last 24 hours)     Procedure Component Value Units Date/Time    MRI Brain Without Contrast [[id number]] Collected:  08/08/19 1443     Updated:  08/08/19 1450    Narrative:       EXAM: MR BRAIN WITHOUT IV CONTRAST 08/08/2019     COMPARISON: Head CT dated 08/08/2019      HISTORY: 83 years-old Male. Stroke      TECHNIQUE:   Routine pulse sequences of the brain were obtained without IV contrast.      REPORT:     Linear focus of diffusion restriction involving the left superior  cerebellar hemisphere, with associated abnormal T2/STIR signal is  consistent with acute infarct. There is no hemorrhagic conversion.     Mild to moderate generalized cerebral volume loss. Confluent T2/FLAIR  abnormal signal in the bilateral periventricular and deep white matter,  nonspecific, but favored to reflect moderate chronic microvascular  changes.     There is no suspicious extra-axial fluid collection. No midline shift.  No acute hydrocephalus. The sella, parasellar region, brainstem  demonstrate no acute finding.     The intraorbital structures demonstrate no acute finding.     The flow-voids of [place] are maintained centrally.     Mastoid air cells are clear. The paranasal sinuses are free of  obstructive mucosal  disease.             Impression:       1.  Acute infarct involving the left ICA territory. No hemorrhage.  2.  Moderate chronic microvascular changes.  This report was finalized on 08/08/2019 14:47 by Dr. Hattie De La Torre MD.    US Carotid Bilateral [[id number]] Collected:  08/08/19 1432     Updated:  08/08/19 1436    Narrative:       History: Carotid occlusive disease       Impression:       Impression:  1. There is 50-69% stenosis of the right internal carotid artery.  2. There is less than 50% stenosis of the left internal carotid artery.  3. Antegrade flow is demonstrated in bilateral vertebral arteries.     Comments: Bilateral carotid vertebral arterial duplex scan was  performed.     Grayscale imaging shows intimal thickening and calcified elements at the  carotid bifurcation. The right internal carotid artery peak systolic  velocity is 125 cm/sec. The end-diastolic velocity is 35.4 cm/sec. The  right ICA/CCA ratio is approximately 1.51 . These findings correlate  with 50-69% stenosis of the right internal carotid artery.     Grayscale imaging shows intimal thickening and calcified elements at the  carotid bifurcation. The left internal carotid artery peak systolic  velocity is 103 cm/sec. The end-diastolic velocity is 22.0 cm/sec. The  left ICA/CCA ratio is approximately 1.18 . These findings correlate with  less than 50% stenosis of the left internal carotid artery.     Antegrade flow is demonstrated in bilateral vertebral arteries.     This report was finalized on 08/08/2019 14:33 by Dr. Olvin Virk MD.    CT Head Without Contrast Stroke Protocol [[id number]] Collected:  08/08/19 0804     Updated:  08/08/19 0809    Narrative:       EXAMINATION: CT HEAD WO CONTRAST STROKE PROTOCOL-      8/8/2019 7:49 AM CDT     HISTORY: Weakness and dizziness. Stroke symptoms.     In order to have a CT radiation dose as low as reasonably achievable  Automated Exposure Control was utilized for adjustment of the mA and/or  KV  according to patient size.     DLP in mGycm= 786.     No skull abnormality.  Mucosal thickening with near complete opacification of the left side of  the sphenoid sinus.  Clear mastoid air cells.     Atrophy is not excessive.  Ventricle size is normal.     There is white matter hypodensity compatible with small vessel disease  in the periventricular region.     There is no cortical infarct or hemorrhage.     Summary:  1. No acute intracranial abnormality is seen.                                   This report was finalized on 08/08/2019 08:06 by Dr. Franco Christy MD.    XR Chest 1 View [[id number]] Collected:  08/08/19 0754     Updated:  08/08/19 0758    Narrative:       EXAMINATION: XR CHEST 1 VW-     8/8/2019 7:30 AM CDT     HISTORY: Stroke symptoms.     One view chest x-ray compared with 01/19/2018.     Heart size is within normal limits.  CABG changes with aortic arch calcification.  Unchanged left cardiac pacer.     Fully expanded lungs with no pneumonia, pneumothorax, or congestive  failure.     Summary:  1. No acute lung disease.     This report was finalized on 08/08/2019 07:54 by Dr. Franco Christy MD.              Telemetry  V pacing at 78   75 to 89      Results for orders placed during the hospital encounter of 08/08/19   Adult Transthoracic Echo Complete W/ Cont if Necessary Per Protocol (With Agitated Saline)    Narrative · Left ventricular systolic function is low normal. EF 51-55%.  · Moderate aortic valve stenosis is present.  · Left ventricular wall thickness is consistent with mild septal   asymmetric hypertrophy.  · Left ventricular diastolic dysfunction (grade II) consistent with   pseudonormalization.  · Left atrial cavity size is severely dilated.  · Mild mitral valve stenosis is present  · Normal size and function of right ventricle.          Impression    1. Acute left Cerebellar stroke--  2. Cerebral microvascular disease  3. No TPA as he was well outside the TPA Window by the time he arrived  in the ED.   4. Fall risk due to his unsteady gait  5. Renal disease.  6. Hypertension  7. Severely dilated  left atria  8. Closely monitor  heart    Plan  · CT angiogram of head and neck   · IV hydration due to kidney functions and dye load  · Aspirin  · Echo  · Telemetry  · Lipitor      I discussed the patients findings and my recommendations with patient, family, nursing staff and Dr Amandeep BUI. Alicia Lynn MD  08/08/19  6:31 PM      Electronically signed by Sheryl Frazier MD at 8/8/2019  7:37 PM

## 2019-08-09 NOTE — DISCHARGE SUMMARY
[facility]  DISCHARGE SUMMARY       Date of Admission: 8/8/2019  Date of Discharge:  8/9/2019  Primary Care Physician: Jw Leos PA-C    Presenting Problem/History of Present Illness:  TIA (transient ischemic attack) [G45.9]  TIA (transient ischemic attack) [G45.9]  TIA (transient ischemic attack) [G45.9]     Final Discharge Diagnoses:  Active Hospital Problems    Diagnosis   • Acute cerebrovascular accident (CVA) of cerebellum (CMS/HCC)   • Carotid artery stenosis   • HTN (hypertension)   • Hyperlipidemia       Consults: Neurology    Pertinent Test Results:   Interpretation Summary echocardiogram    · Left ventricular systolic function is low normal. EF 51-55%.  · Moderate aortic valve stenosis is present.  · Left ventricular wall thickness is consistent with mild septal asymmetric hypertrophy.  · Left ventricular diastolic dysfunction (grade II) consistent with pseudonormalization.  · Left atrial cavity size is severely dilated.  · Mild mitral valve stenosis is present  · Normal size and function of right ventricle.     IMPRESSION: CT angios of the head  1.. Left vertebral artery is dominant. There is mild stenosis within the  proximal intracranial aspect of both vertebral arteries. The majority of  flow from the right vertebral artery suspect terminates in the posterior  inferior cerebellar artery with a rudimentary connection to the left  vertebral to form the basilar.  2. The basilar artery is somewhat tortuous but is of normal caliber. The  posterior cerebral arteries and superior cerebellar arteries are normal  in caliber.  3. There is mild calcific plaquing involving the cavernous and  supraclinoid aspect of both ICAs without evidence of rate limiting  stenosis. The anterior and middle cerebral arteries are normal in  distribution and appearance without evidence of focal steno-occlusive  disease or discrete berry aneurysm.  4. The dural venous  sinuses are unremarkable.    IMPRESSION: CT angiogram of the neck.  1.. There is calcific plaquing involving both carotid bifurcations. This  is more advanced on the right than on the left with an approximate 60%  cross-sectional narrowing noted at the very proximal aspect of the right  ICA. The more distal ICAs are widely patent.  2. There is calcific plaquing involving the origin of both vertebral  arteries with mild associated stenosis. Left vertebral artery is  dominant. The majority of flow from the right vertebral artery I suspect  terminates in the posterior inferior cerebellar artery although there is  a rudimentary connection to the left vertebral to form the basilar.  There is mild plaquing involving the intracranial aspect of the left  vertebral artery with mild associated stenosis.  3. There is a soft tissue nodule noted within the right piriform sinus.  This appears to emanate from the right epiglottis and median epiglottic  fold. Direct visualization of this is recommended. Neoplasia is not  excluded. There is no evidence of enlarged cervical lymphadenopathy.  4. There is some mild calcific plaquing at the origin of the great  vessels.  5. Chronic appearing sinus disease of the sphenoid sinus. There is also  a small mucous retention cyst or polyp within the left maxillary sinus.    Summary: CTA of the head.  1. No acute intracranial abnormality is seen.    IMPRESSION: MRI of the brain.  1.  Acute infarct involving the left ICA territory. No hemorrhage.  2.  Moderate chronic microvascular changes.    Chief Complaint on Day of Discharge: none    History of Present Illness on Day of Discharge:   Patient is 83-year-old  male arrives for evaluation of possible stroke.  At 630 last night, patient has symptoms of left arm weakness, difficulty speaking, left-sided facial droop, and left eye pain.  Patient ignore the symptoms initially and it was improving with the left arm numbness and weakness.   Slurred speech also improved.  Patient decided come to ER at 430 this morning to be reevaluated.  Pt currently most of symptoms is resolved.  Patient has a history of CVA in the past.  History of pacemaker placement Dr. Jansen.   Patient denies any chest pain or shortness of breath.  Patient denies any nausea vomiting diarrhea.  Patient denies any fever night sweats or chills.  Patient in no acute distress.     Patient has a past medical history of hepatitis A, carotid artery stenosis, chronic kidney disease, coagulopathy, coronary artery disease, diarrhea, dysphagia, history of colon cancer, GI bleed, hemorrhoids, colon polyp, hyperlipidemia, hypertension, left ventricular hypertrophy, melanoma of the back, obesity, osteoarthritis, peripheral neuropathy, colon polyp, CVA, TIA.    Hospital Course:  The patient is a 83 y.o. male who presented to [facility] with acute CVA/sinus nodule/CAD.      Acute CVA.  TIA protocol.  Consult neurology.  CT scan head shows no acute intracranial abnormality.  CT angiogram of the head- mild stenosis within the proximal intracranial aspect of  both vertebral arteries-majority of flow from the right vertebral artery suspect terminates in the posterior inferior cerebellar artery with a rudimentary connection to the left vertebral to form the basilar.  CT angiogram of the neck- calcified plaquing involving both carotid bifurcations- more advanced on the right than the left with approximately 60% cross-sectional narrowing noted- right ICA- more distal ICA are widely patent, calcified plaquing involving both vertebral arteries with mild associated stenosis, majority of flow from the right vertebral artery I suspect terminates in the posterior inferior cerebellar artery although there is a rudimentary connection to the left vertebral to form the basilar- there is mild plaquing involving the intracranial aspect of the left vertebral artery with mild associated stenosis, soft  "tissue nodule noted within the right piriform sinus - direct visualization of this is recommended.  MRI of the brain- acute infarct involving left ICA territory, no hemorrhage, moderate chronic microvascular changes.  Carotid duplex- there is 50-69% stenosis of the right internal carotid artery, there is less than 50% stenosis of the left internal carotid artery, antegrade flow is demonstrated in bilateral vertebral arteries.  Recommendation from neurology- patient will be started on Plavix, keep well-hydrated free fluid secondary to IV dye, continue Lipitor.    Soft tissue nodule noted within the right piriform sinus- we will schedule outpatient ENT evaluation.  Direct visualization is recommended.    CAD/hypertension/hyperlipidemia/CHF diastolic/mitral valve stenosis.  History of pacemaker placement.  Patient to continue Norvasc, aspirin, Lipitor, Avapro.  Nitro as needed.  Continue Q10.  Chest ray shows no acute changes.  Echocardiogram-ejection fraction 51%, moderate aortic valve stenosis, left ventricular wall thickening, diastolic dysfunction grade 2, left atrial cavity size is severely dilated, mild mitral valve stenosis.    Chronic kidney disease.  Keep well-hydrated fluids.     Vital signs stable, afebrile.  Follow-up with neurology in 4 weeks.  Follow-up with PCP in 1 week.  Follow-up with ENT in 2 weeks.    Condition on Discharge:  stable    Physical Exam on Discharge:  /70 (BP Location: Right arm, Patient Position: Sitting)   Pulse 84   Temp 97.7 °F (36.5 °C) (Oral)   Resp 16   Ht 175.3 cm (69\")   Wt 88 kg (194 lb)   SpO2 99%   BMI 28.65 kg/m²   Physical Exam   Constitutional: He is oriented to person, place, and time. He appears well-developed and well-nourished.   HENT:   Head: Normocephalic and atraumatic.   Eyes: Conjunctivae and EOM are normal. Pupils are equal, round, and reactive to light.   Neck: Neck supple. No JVD present. No thyromegaly present.   Cardiovascular: Normal rate, " regular rhythm, normal heart sounds and intact distal pulses. Exam reveals no gallop and no friction rub.   No murmur heard.  Pulmonary/Chest: Effort normal and breath sounds normal. No respiratory distress. He has no wheezes. He has no rales. He exhibits no tenderness.   Abdominal: Soft. Bowel sounds are normal. He exhibits no distension. There is no tenderness. There is no rebound and no guarding.   Musculoskeletal: Normal range of motion. He exhibits no edema, tenderness or deformity.   Lymphadenopathy:     He has no cervical adenopathy.   Neurological: He is alert and oriented to person, place, and time. He displays normal reflexes. No cranial nerve deficit. He exhibits normal muscle tone.   Skin: Skin is warm and dry. No rash noted.   Psychiatric: He has a normal mood and affect. His behavior is normal. Judgment and thought content normal.   Nursing note and vitals reviewed.        Discharge Disposition: Discharge home with family.      Discharge Medications:     Discharge Medications      New Medications      Instructions Start Date   clopidogrel 75 MG tablet  Commonly known as:  PLAVIX   75 mg, Oral, Daily         Changes to Medications      Instructions Start Date   atorvastatin 40 MG tablet  Commonly known as:  LIPITOR  What changed:    · medication strength  · how much to take   40 mg, Oral, Daily         Continue These Medications      Instructions Start Date   amLODIPine 10 MG tablet  Commonly known as:  NORVASC   10 mg, Oral, Daily      CoQ-10 100 MG capsule   Oral, Daily      irbesartan 300 MG tablet  Commonly known as:  AVAPRO   300 mg, Oral, Nightly      metoprolol succinate XL 25 MG 24 hr tablet  Commonly known as:  TOPROL-XL   25 mg, Oral, Daily      nitroglycerin 0.4 MG SL tablet  Commonly known as:  NITROSTAT   0.4 mg, Sublingual, As Needed         Stop These Medications    aspirin 81 MG EC tablet            Discharge Diet:   Diet Instructions     Advance Diet As Tolerated            Activity at  Discharge:   Activity Instructions     Activity as Tolerated            Discharge Care Plan/Instructions: Discharge home with family.    Follow-up Appointments:   Future Appointments   Date Time Provider Department Center   10/1/2019  2:30 PM Kaushik Toledo MD [facility]   2/25/2020 10:00 AM [facility]   Follow-up with PCP 1 week.  Follow-up with neurology in 4 weeks.  Follow-up with ENT in 2 weeks.    Ismael Bernstein MD  08/09/19  4:22 PM    Time: Greater than 30 minutes.

## 2019-08-09 NOTE — NURSING NOTE
NEUROSCIENCE COORDINATOR NOTE       HPI:   Mireille Magallanes is a 83 y.o.  male who was admitted on 8/8/2019 for complaints of dizziness, loss of balance, left eye pain and left hand weakness.   Last known well was 8-5 and onset was 8-5-19.  Initial NIHSS score was 1.  The patient was not a candidate for thrombolytic therapy due to Symptom onset >4.5 hours LKW >4.5 hours.    Stroke risk factors include: carotid stenosis, hyperlipidemia and hypertension.      The patient was admitted to: telemetry bed  Stroke order set utilized: TIA/Ischemic Stroke Admission (Without Thrombolytic Therapy)  Consults include: neurology, speech therapy, OT, PT or case management    Prior stroke history:   None    Past Medical History:   Diagnosis Date   • Acute viral hepatitis A    • Carotid artery stenosis    • CKD (chronic kidney disease)    • Coagulopathy (CMS/HCC)     Plavix   • Coagulopathy (CMS/HCC)    • Coronary arteriosclerosis     stent ,? 4/2104, on plavix now.   • Diarrhea    • Dysphagia     Unspecified   • Family history of colon cancer    • Hematochezia     differential diagnosis discussed   • Hemorrhoids     Unspecified   • History of colonic polyps    • Hypercholesterolemia    • Hyperlipidemia    • Hypertension    • Left ventricular hypertrophy    • Melanoma of back (CMS/HCC)    • Obesity    • Osteoarthritis    • Peripheral neuropathy    • Polyp of colon    • Stroke (CMS/HCC)    • TIA (transient ischemic attack)    • Tobacco non-user      Past Surgical History:   Procedure Laterality Date   • APPENDECTOMY     • CARDIAC CATHETERIZATION     • CARDIAC CATHETERIZATION N/A 3/18/2019    Procedure: Left Heart Cath;  Surgeon: Kaushik Toledo MD;  Location:  [facility] CATH INVASIVE LOCATION;  Service: Cardiology   • CARDIAC ELECTROPHYSIOLOGY PROCEDURE N/A 1/5/2018    Procedure: Temporary Pacemaker;  Surgeon: Luis E Courtney MD;  Location:  [facility] CATH INVASIVE LOCATION;  Service:    • CARDIAC ELECTROPHYSIOLOGY PROCEDURE N/A  2018    Procedure: Device Implant;  Surgeon: Kaushik Toledo MD;  Location: [facility] CATH INVASIVE LOC[facility];  Service:    • CAROTID STENT     • COLON SURGERY      for Ischemic Colitis   • COLONOSCOPY  2008    tubular adenoma ascending colon polyp - 3 yr   • COLONOSCOPY  2003    colon polyp @ 24 cm-see path - 5 yr   • COLONOSCOPY  2000    small interanl hemorrhoid - 3-5 yr   • COLONOSCOPY  1998    (8)small colon polyps removed-see path, internal anal skin tag - 2-3 yr   • CORONARY ARTERY BYPASS GRAFT     • CORONARY STENT PLACEMENT      x1  dr crane    • OTHER SURGICAL HISTORY      Lithotripsy   • OTHER SURGICAL HISTORY  2011    recall 3 yr.    • VASECTOMY       Social History     Socioeconomic History   • Marital status:      Spouse name: Not on file   • Number of children: Not on file   • Years of education: Not on file   • Highest education level: Not on file   Tobacco Use   • Smoking status: Former Smoker     Last attempt to quit:      Years since quittin.6   • Smokeless tobacco: Never Used   Substance and Sexual Activity   • Alcohol use: No   • Drug use: No   • Sexual activity: Defer        Home Medications:    Medications Prior to Admission   Medication Sig Dispense Refill Last Dose   • amLODIPine (NORVASC) 10 MG tablet Take 1 tablet by mouth Daily. 30 tablet 11 2019 at Unknown time   • aspirin 81 MG EC tablet Take 81 mg by mouth daily.   2019 at Unknown time   • atorvastatin (LIPITOR) 80 MG tablet Take 80 mg by mouth Daily.   2019 at Unknown time   • Coenzyme Q10 (COQ-10) 100 MG capsule Take  by mouth daily.   2019 at Unknown time   • irbesartan (AVAPRO) 300 MG tablet Take 300 mg by mouth Every Night.   2019 at Unknown time   • metoprolol succinate XL (TOPROL-XL) 25 MG 24 hr tablet Take 1 tablet by mouth Daily. 30 tablet 11 2019 at Unknown time   • nitroglycerin (NITROSTAT) 0.4 MG SL tablet Place 0.4 mg under the tongue as needed.    8/7/2019 at Unknown time       Current Medications:     Current Facility-Administered Medications   Medication Dose Route Frequency Provider Last Rate Last Dose   • amLODIPine (NORVASC) tablet 10 mg  10 mg Oral Daily Ismael Bernstein MD   10 mg at 08/09/19 0859   • aspirin EC tablet 81 mg  81 mg Oral Daily Ismael Bernstein MD   81 mg at 08/09/19 0858   • atorvastatin (LIPITOR) tablet 80 mg  80 mg Oral Nightly Ismael Bernstein MD   80 mg at 08/08/19 2234   • losartan (COZAAR) tablet 100 mg  100 mg Oral Q24H Ismael Bernstein MD   100 mg at 08/09/19 0858   • nitroglycerin (NITROSTAT) SL tablet 0.4 mg  0.4 mg Sublingual Q5 Min PRN Ismael Bernstein MD       • ondansetron (ZOFRAN) injection 4 mg  4 mg Intravenous Q6H PRN Ismael Bernstein MD       • sodium chloride 0.9 % flush 3 mL  3 mL Intravenous Q12H Ismael Bernstein MD       • sodium chloride 0.9 % flush 3-10 mL  3-10 mL Intravenous PRN Ismael Bernstein MD       • sodium chloride 0.9 % infusion  75 mL/hr Intravenous Continuous Vargas Sheryl Lynn MD 75 mL/hr at 08/08/19 2235 75 mL/hr at 08/08/19 2235       Vital signs in last 24 hours:  Temp:  [96.3 °F (35.7 °C)-97.9 °F (36.6 °C)] 97.7 °F (36.5 °C)  Heart Rate:  [64-80] 80  Resp:  [16] 16  BP: (123-149)/(73-80) 135/73      DIAGNOSTIC TESTING       Laboratory Results:  Results from last 7 days   Lab Units 08/09/19  0546   CHOLESTEROL mg/dL 126*   HDL CHOL mg/dL 29*   LDL CHOL mg/dL 74   TRIGLYCERIDES mg/dL 117     Results from last 7 days   Lab Units 08/09/19  0546   HEMOGLOBIN A1C % 5.5       Results from last 7 days   Lab Units 08/09/19  0546 08/08/19  0554   HEMOGLOBIN g/dL 12.9* 13.1   HEMATOCRIT % 38.8 38.5   PLATELETS 10*3/mm3 196 176     Results from last 7 days   Lab Units 08/08/19  0554   INR  0.96   APTT seconds 33.2           Radiology Results:    Images:    Imaging Results (last 72 hours)     Procedure Component Value Units Date/Time    CT Angiogram Head With & Without Contrast [[id number]] Collected:  08/08/19  2242     Updated:  08/08/19 2252    Narrative:       EXAMINATION: CT angiogram of the head with contrast dated 08/20/2019     HISTORY: Stroke.     DOSE: 442 mGycm. Automated exposure control was utilized to diminish  patient radiation dose..     FINDINGS: Multiple contiguous axial images are obtained through the  [place] at 1 mm intervals following intravenous contrast  administration with reformatted images obtained in the sagittal and  coronal projections from the original data set as well as MIPS.     The left vertebral artery is dominant. There is focal stenosis of the  right vertebral artery in its very proximal intracranial aspect. There  is also calcific plaquing and mild stenosis of the intracranial aspect  of the left vertebral artery. The majority of flow from the right  vertebral artery I suspect terminates in the posterior inferior  cerebellar artery although there is a rudimentary connection of the  right vertebral artery distally to the left vertebral to form the  basilar artery.     The basilar artery is somewhat tortuous. The superior cerebellar and  both posterior cerebral arteries are widely patent and normal in  caliber.     Both distal ICAs are widely patent. There is calcific plaquing involving  the cavernous and supraclinoid aspect of both ICAs without evidence of  rate limiting stenosis. The anterior and middle cerebral arteries are  normal in appearance. No evidence of focal steno-occlusive disease or  discrete berry aneurysm.     The dural venous sinuses are unremarkable.       Impression:       1.. Left vertebral artery is dominant. There is mild stenosis within the  proximal intracranial aspect of both vertebral arteries. The majority of  flow from the right vertebral artery suspect terminates in the posterior  inferior cerebellar artery with a rudimentary connection to the left  vertebral to form the basilar.  2. The basilar artery is somewhat tortuous but is of normal caliber.  The  posterior cerebral arteries and superior cerebellar arteries are normal  in caliber.  3. There is mild calcific plaquing involving the cavernous and  supraclinoid aspect of both ICAs without evidence of rate limiting  stenosis. The anterior and middle cerebral arteries are normal in  distribution and appearance without evidence of focal steno-occlusive  disease or discrete berry aneurysm.  4. The dural venous sinuses are unremarkable.  This report was finalized on 08/08/2019 22:49 by Dr. Alfred Coates MD.    CT Angiogram Neck With & Without Contrast [[id number]] Collected:  08/08/19 2229     Updated:  08/08/19 2245    Narrative:       EXAMINATION: CT angiogram of the neck with contrast 08/08/2019     DOSE: 442 mGycm. Automated exposure control was utilized to diminish  patient radiation dose..     HISTORY: Stroke.     FINDINGS: Multiple contiguous axial images are obtained from the aortic  arch through the skull base at 1 mm intervals following intravenous  contrast infusion. Reformatted images are obtained in the sagittal and  coronal projections from the original data set. MIPS are also obtained.     There is mild scarring within the lung apices with the lung apices are  otherwise clear. The thyroid gland is homogeneous in density. The level  of the true and false cords is unremarkable. There is a 9 mm soft tissue  nodule associated with the epiglottis on the right and resulting in  asymmetric aeration of the right piriform sinus. Direct visualization of  this lesion is recommended. Neoplasia is not excluded. There is no  evidence of cervical lymphadenopathy. The oropharynx and nasopharynx are  unremarkable. There is mucoperiosteal thickening of the left sphenoid  sinus. There is some associated hyperostosis suggesting chronicity. The  remainder of the paranasal sinuses and mastoid air cells are clear.     There is atheromatous calcification of the aortic arch. There is  calcific plaquing at the origin of  the great vessels without evidence of  rate limiting stenosis. Calcific plaquing and associated stenosis is  noted at the origin of both vertebral arteries. The proximal left  vertebral artery is tortuous. Left vertebral artery is dominant. The  extracranial aspect of the vertebral arteries are otherwise widely  patent. The majority of flow from the right vertebral artery I feel  terminates in the posterior inferior cerebellar artery with only a  rudimentary connection with the left vertebral to form the basilar.  There is some mild calcific plaquing and mild associated stenosis within  the proximal intracranial aspect of the left vertebral artery.     There is mild plaquing at the origin of the left common carotid artery  without evidence of rate limiting stenosis. The left common carotid  artery is otherwise unremarkable. At the level of the left carotid  bifurcation there is extensive calcific plaquing involving the carotid  bulb extending into the proximal aspect of the left ICA. This results in  an approximate 35% cross-sectional stenosis at the level of the left  carotid bulb. The distal left ICA is widely patent.     Examination of the right carotid artery demonstrates mild plaquing at  the origin of the innominate. Right common carotid artery is widely  patent to the level the carotid bifurcation. At the carotid bifurcation  there is extensive mixed plaquing involving the carotid bulb and  extending into the proximal aspect of the ICA. Within the proximal  aspect of the ICA there is moderate stenosis with a 60% cross-sectional  narrowing. The more distal right ICA is widely patent.       Impression:       1.. There is calcific plaquing involving both carotid bifurcations. This  is more advanced on the right than on the left with an approximate 60%  cross-sectional narrowing noted at the very proximal aspect of the right  ICA. The more distal ICAs are widely patent.  2. There is calcific plaquing involving  the origin of both vertebral  arteries with mild associated stenosis. Left vertebral artery is  dominant. The majority of flow from the right vertebral artery I suspect  terminates in the posterior inferior cerebellar artery although there is  a rudimentary connection to the left vertebral to form the basilar.  There is mild plaquing involving the intracranial aspect of the left  vertebral artery with mild associated stenosis.  3. There is a soft tissue nodule noted within the right piriform sinus.  This appears to emanate from the right epiglottis and median epiglottic  fold. Direct visualization of this is recommended. Neoplasia is not  excluded. There is no evidence of enlarged cervical lymphadenopathy.  4. There is some mild calcific plaquing at the origin of the great  vessels.  5. Chronic appearing sinus disease of the sphenoid sinus. There is also  a small mucous retention cyst or polyp within the left maxillary sinus.  This report was finalized on 08/08/2019 22:42 by Dr. Alfred Coates MD.    MRI Brain Without Contrast [[id number]] Collected:  08/08/19 1443     Updated:  08/08/19 1450    Narrative:       EXAM: MR BRAIN WITHOUT IV CONTRAST 08/08/2019     COMPARISON: Head CT dated 08/08/2019      HISTORY: 83 years-old Male. Stroke      TECHNIQUE:   Routine pulse sequences of the brain were obtained without IV contrast.      REPORT:     Linear focus of diffusion restriction involving the left superior  cerebellar hemisphere, with associated abnormal T2/STIR signal is  consistent with acute infarct. There is no hemorrhagic conversion.     Mild to moderate generalized cerebral volume loss. Confluent T2/FLAIR  abnormal signal in the bilateral periventricular and deep white matter,  nonspecific, but favored to reflect moderate chronic microvascular  changes.     There is no suspicious extra-axial fluid collection. No midline shift.  No acute hydrocephalus. The sella, parasellar region, brainstem  demonstrate no  acute finding.     The intraorbital structures demonstrate no acute finding.     The flow-voids of [place] are maintained centrally.     Mastoid air cells are clear. The paranasal sinuses are free of  obstructive mucosal disease.             Impression:       1.  Acute infarct involving the left ICA territory. No hemorrhage.  2.  Moderate chronic microvascular changes.  This report was finalized on 08/08/2019 14:47 by Dr. Hattie De La Torre MD.    US Carotid Bilateral [[id number]] Collected:  08/08/19 1432     Updated:  08/08/19 1436    Narrative:       History: Carotid occlusive disease       Impression:       Impression:  1. There is 50-69% stenosis of the right internal carotid artery.  2. There is less than 50% stenosis of the left internal carotid artery.  3. Antegrade flow is demonstrated in bilateral vertebral arteries.     Comments: Bilateral carotid vertebral arterial duplex scan was  performed.     Grayscale imaging shows intimal thickening and calcified elements at the  carotid bifurcation. The right internal carotid artery peak systolic  velocity is 125 cm/sec. The end-diastolic velocity is 35.4 cm/sec. The  right ICA/CCA ratio is approximately 1.51 . These findings correlate  with 50-69% stenosis of the right internal carotid artery.     Grayscale imaging shows intimal thickening and calcified elements at the  carotid bifurcation. The left internal carotid artery peak systolic  velocity is 103 cm/sec. The end-diastolic velocity is 22.0 cm/sec. The  left ICA/CCA ratio is approximately 1.18 . These findings correlate with  less than 50% stenosis of the left internal carotid artery.     Antegrade flow is demonstrated in bilateral vertebral arteries.     This report was finalized on 08/08/2019 14:33 by Dr. Olvin Virk MD.    CT Head Without Contrast Stroke Protocol [[id number]] Collected:  08/08/19 0804     Updated:  08/08/19 0809    Narrative:       EXAMINATION: CT HEAD WO CONTRAST STROKE  PROTOCOL-      8/8/2019 7:49 AM CDT     HISTORY: Weakness and dizziness. Stroke symptoms.     In order to have a CT radiation dose as low as reasonably achievable  Automated Exposure Control was utilized for adjustment of the mA and/or  KV according to patient size.     DLP in mGycm= 786.     No skull abnormality.  Mucosal thickening with near complete opacification of the left side of  the sphenoid sinus.  Clear mastoid air cells.     Atrophy is not excessive.  Ventricle size is normal.     There is white matter hypodensity compatible with small vessel disease  in the periventricular region.     There is no cortical infarct or hemorrhage.     Summary:  1. No acute intracranial abnormality is seen.                                   This report was finalized on 08/08/2019 08:06 by Dr. Franco Christy MD.    XR Chest 1 View [[id number]] Collected:  08/08/19 0754     Updated:  08/08/19 0758    Narrative:       EXAMINATION: XR CHEST 1 VW-     8/8/2019 7:30 AM CDT     HISTORY: Stroke symptoms.     One view chest x-ray compared with 01/19/2018.     Heart size is within normal limits.  CABG changes with aortic arch calcification.  Unchanged left cardiac pacer.     Fully expanded lungs with no pneumonia, pneumothorax, or congestive  failure.     Summary:  1. No acute lung disease.     This report was finalized on 08/08/2019 07:54 by Dr. Franco Christy MD.              LAST DOCUMENTED NIHSS:      1a. Level of Consciousness: 0-->Alert, keenly responsive  1b. LOC Questions: 0-->Answers both questions correctly  1c. LOC Commands: 0-->Performs both tasks correctly     3. Visual: 0-->No visual loss  4. Facial Palsy: 1-->Minor paralysis (flattened nasolabial fold, asymmetry on smiling)  5a. Motor Arm, Left: 0-->No drift, limb holds 90 (or 45) degrees for full 10 secs  5b. Motor Arm, Right: 0-->No drift, limb holds 90 (or 45) degrees for full 10 secs  6a. Motor Leg, Left: 0-->No drift, leg holds 30 degree position for full 5 secs  6b.  Motor Leg, Right: 0-->No drift, leg holds 30 degree position for full 5 secs  7. Limb Ataxia: 0-->Absent  8. Sensory: 0-->Normal, no sensory loss  9. Best Language: 0-->No aphasia, normal  10. Dysarthria: 0-->Normal  11. Extinction and Inattention (formerly Neglect): 0-->No abnormality    Total (NIH Stroke Scale): 1      Chart reviewed for stroke metrics.      Plan of care discussed with the patient and family at bedside and all questions answered.  Discussed individual stroke risk factors carotid stenosis, hyperlipidemia and hypertension.  We also discussed  the importance of medication compliance in preventing future strokes.  Stroke signs and symptoms were reviewed with the patient including F.A.S.T., visual disturbances, dizziness/loss of balance, and severe headache. We discussed the importance of calling 911 if symptoms of TIA/Stroke occur once discharged and the patient voices their understanding. Patient provided with my contact information in the event of questions or needs. I will continue to follow.         Nick Rogers RN NRT  Neuroscience Coordinator

## 2019-08-10 ENCOUNTER — READMISSION MANAGEMENT (OUTPATIENT)
Dept: CALL CENTER | Facility: HOSPITAL | Age: 84
End: 2019-08-10

## 2019-08-10 NOTE — OUTREACH NOTE
Prep Survey      Responses   Facility patient discharged from?  [facility]   Is patient eligible?  Yes   Discharge diagnosis  Acute cerebrovascular accident (CVA) of cerebellum    Does the patient have one of the following disease processes/diagnoses(primary or secondary)?  Stroke (TIA)   Does the patient have Home health ordered?  No   Is there a DME ordered?  No   Medication alerts for this patient  Plavix    Prep survey completed?  Yes          Yvette Milligan RN

## 2019-08-12 ENCOUNTER — TELEPHONE (OUTPATIENT)
Dept: INTERNAL MEDICINE | Age: 84
End: 2019-08-12

## 2019-08-12 ENCOUNTER — READMISSION MANAGEMENT (OUTPATIENT)
Dept: CALL CENTER | Facility: HOSPITAL | Age: 84
End: 2019-08-12

## 2019-08-12 NOTE — TELEPHONE ENCOUNTER
[facility] Transitions Initial Follow Up Call    Outreach made within 2 business days of discharge: Yes    Patient: Leodan White Patient : 1935   MRN: [id number]  Reason for Admission: Admitted 19 for TIA   Discharge Date: 19   Discharge Diagnosis:   Acute cerebrovascular accident of cerebellum  Carotid artery stenosis  Hypertension  Hyperlipidemia     Spoke with: patient     Discharge department/facility: [facility]      TCM Interactive Patient Contact:  Was patient able to fill all prescriptions: Yes  Was patient instructed to bring all medications to the follow-up visit: Yes  Is patient taking all medications as directed in the discharge summary? Yes  Does patient understand their discharge instructions: Yes  Does patient have questions or concerns that need addressed prior to 7-14 day follow up office visit: no    I spoke with Mr. Landon Resendiz. He is home and doing much better. He states he has almost completely recovered from the TIA. He does have some weakness in his left arm, but states his vision and speech have returned. He was started on new medication and is taking them as directed. He states he has more testing this week for a nodule they found incidentally on his sinus. He will follow up with Clotilde Lamb on Friday.      Scheduled appointment with PCP within 7-14 days    Follow Up  Future Appointments   Date Time Provider Adalberto Ricardo   2019 11:00 AM ALLEN Blandon [facility]   2019  2:00 PM ALLEN Blandon [address] [place] [address]

## 2019-08-12 NOTE — OUTREACH NOTE
Stroke Week 1 Survey      Responses   Facility patient discharged from?  [facility]   Does the patient have one of the following disease processes/diagnoses(primary or secondary)?  Stroke (TIA)   Is there a successful TCM telephone encounter documented?  No   Week 1 attempt successful?  No   Unsuccessful attempts  Attempt 1          Adrianna Rdz, RN

## 2019-08-13 ENCOUNTER — READMISSION MANAGEMENT (OUTPATIENT)
Dept: CALL CENTER | Facility: HOSPITAL | Age: 84
End: 2019-08-13

## 2019-08-13 NOTE — OUTREACH NOTE
Stroke Week 1 Survey      Responses   Facility patient discharged from?  [facility]   Does the patient have one of the following disease processes/diagnoses(primary or secondary)?  Stroke (TIA)   Is there a successful TCM telephone encounter documented?  Yes          Phu Duff RN

## 2019-08-16 ENCOUNTER — READMISSION MANAGEMENT (OUTPATIENT)
Dept: CALL CENTER | Facility: HOSPITAL | Age: 84
End: 2019-08-16

## 2019-08-16 ENCOUNTER — OFFICE VISIT (OUTPATIENT)
Dept: INTERNAL MEDICINE | Age: 84
End: 2019-08-16
Payer: MEDICARE

## 2019-08-16 VITALS
HEART RATE: 70 BPM | WEIGHT: 194.6 LBS | SYSTOLIC BLOOD PRESSURE: 124 MMHG | TEMPERATURE: 96.2 F | OXYGEN SATURATION: 98 % | DIASTOLIC BLOOD PRESSURE: 80 MMHG | BODY MASS INDEX: 28.82 KG/M2 | HEIGHT: 69 IN

## 2019-08-16 DIAGNOSIS — I44.2 ATRIOVENTRICULAR BLOCK, COMPLETE (HCC): ICD-10-CM

## 2019-08-16 DIAGNOSIS — G45.9 TIA (TRANSIENT ISCHEMIC ATTACK): Primary | ICD-10-CM

## 2019-08-16 DIAGNOSIS — I25.700 CORONARY ARTERY DISEASE INVOLVING CORONARY BYPASS GRAFT OF NATIVE HEART WITH UNSTABLE ANGINA PECTORIS (HCC): ICD-10-CM

## 2019-08-16 DIAGNOSIS — N18.30 STAGE 3 CHRONIC KIDNEY DISEASE (HCC): ICD-10-CM

## 2019-08-16 DIAGNOSIS — E78.00 PURE HYPERCHOLESTEROLEMIA: ICD-10-CM

## 2019-08-16 DIAGNOSIS — I63.9 CEREBROVASCULAR ACCIDENT (CVA), UNSPECIFIED MECHANISM (HCC): ICD-10-CM

## 2019-08-16 DIAGNOSIS — I10 ESSENTIAL HYPERTENSION: ICD-10-CM

## 2019-08-16 PROCEDURE — 99214 OFFICE O/P EST MOD 30 MIN: CPT | Performed by: PHYSICIAN ASSISTANT

## 2019-08-16 PROCEDURE — G8598 ASA/ANTIPLAT THER USED: HCPCS | Performed by: PHYSICIAN ASSISTANT

## 2019-08-16 PROCEDURE — G8427 DOCREV CUR MEDS BY ELIG CLIN: HCPCS | Performed by: PHYSICIAN ASSISTANT

## 2019-08-16 PROCEDURE — G8417 CALC BMI ABV UP PARAM F/U: HCPCS | Performed by: PHYSICIAN ASSISTANT

## 2019-08-16 PROCEDURE — 1036F TOBACCO NON-USER: CPT | Performed by: PHYSICIAN ASSISTANT

## 2019-08-16 PROCEDURE — 4040F PNEUMOC VAC/ADMIN/RCVD: CPT | Performed by: PHYSICIAN ASSISTANT

## 2019-08-16 PROCEDURE — 1111F DSCHRG MED/CURRENT MED MERGE: CPT | Performed by: PHYSICIAN ASSISTANT

## 2019-08-16 PROCEDURE — 1123F ACP DISCUSS/DSCN MKR DOCD: CPT | Performed by: PHYSICIAN ASSISTANT

## 2019-08-16 RX ORDER — ATORVASTATIN CALCIUM 40 MG/1
40 TABLET, FILM COATED ORAL DAILY
Refills: 2 | COMMUNITY
Start: 2019-08-09 | End: 2020-12-23

## 2019-08-16 RX ORDER — CLOPIDOGREL BISULFATE 75 MG/1
75 TABLET ORAL DAILY
Refills: 2 | COMMUNITY
Start: 2019-08-09 | End: 2022-06-01

## 2019-08-16 ASSESSMENT — ENCOUNTER SYMPTOMS
SINUS PRESSURE: 0
CHEST TIGHTNESS: 0
RHINORRHEA: 0
COLOR CHANGE: 0
VOMITING: 0
BACK PAIN: 0
SORE THROAT: 0
CONSTIPATION: 0
ABDOMINAL PAIN: 0
NAUSEA: 0
EYE REDNESS: 0
COUGH: 0
WHEEZING: 0
PHOTOPHOBIA: 0
DIARRHEA: 0
EYE PAIN: 0
SHORTNESS OF BREATH: 0

## 2019-08-16 NOTE — PROGRESS NOTES
sinus.  This report was finalized on 08/08/2019 22:42 by Dr. Maggie Moe MD.       Component Name Value Ref Range   BSA 2.0 m^2   IVSd 1.2 cm   LVIDd 5.4 cm   LVIDs 3.7 cm   LVPWd 0.98 cm   IVS/LVPW 1.2     FS 30.9 %   EDV(Teich) 141.9 ml   ESV(Teich) 59.6 ml   EF(Teich) 58.0 %   EDV(cubed) 158.3 ml   ESV(cubed) 52.3 ml   EF(cubed) 67.0 %   LV mass(C)d 235.1 grams   LV mass(C)dI 115.3 grams/m^2   SV(Teich) 82.3 ml   SI(Teich) 40.3 ml/m^2   SV(cubed) 106.0 ml   SI(cubed) 52.0 ml/m^2   Ao root diam 3.0 cm   Ao root area 7.1 cm^2   LA dimension 3.7 cm   LA/Ao 1.2     LVOT diam 2.4 cm   LVOT area 4.5 cm^2   LVOT area(traced) 4.5 cm^2   LVLd ap4 8.0 cm   EDV(MOD-sp4) 104.0 ml   LVLs ap4 7.5 cm   ESV(MOD-sp4) 51.3 ml   EF(MOD-sp4) 50.7 %   SV(MOD-sp4) 52.7 ml   SI(MOD-sp4) 25.8 ml/m^2   Ao root area (BSA corrected) 1.5     LV Barrera Vol (BSA corrected) 51.0 ml/m^2   LV Sys Vol (BSA corrected) 25.2 ml/m^2   MV E max esther 126.0 cm/sec   MV A max esther 108.0 cm/sec   MV E/A 1.2     MV V2 max 145.0 cm/sec   MV max PG 8.4 mmHg   MV V2 mean 114.0 cm/sec   MV mean PG 5.0 mmHg   MV V2 VTI 42.4 cm   MVA(VTI) 1.9 cm^2   MV P1/2t max esther 144.0 cm/sec   MV P1/2t 92.5 msec   MVA(P1/2t) 2.4 cm^2   MV dec slope 456.0 cm/sec^2   MV dec time 0.27 sec   Ao pk esther 305.0 cm/sec   Ao max PG 37.2 mmHg   Ao max PG (full) 34.2 mmHg   Ao V2 mean 218.8 cm/sec   Ao mean PG 20.8 mmHg   Ao mean PG (full) 19.8 mmHg   Ao V2 VTI 68.2 cm   EDOUARD(I,A) 1.2 cm^2   EDOUARD(I,D) 1.2 cm^2   EDOUARD(V,A) 1.3 cm^2   EDOUARD(V,D) 1.3 cm^2   LV V1 max PG 3.0 mmHg   LV V1 mean PG 1.0 mmHg   LV V1 max 86.7 cm/sec   LV V1 mean 53.5 cm/sec   LV V1 VTI 17.9 cm   MR max esther 551.0 cm/sec   MR max .4 mmHg   MR mean esther 391.0 cm/sec   MR mean PG 71.0 mmHg   MR .0 cm   SV(Ao) 481.8 ml   SI(Ao) 236.3 ml/m^2   SV(LVOT) 81.0 ml   SI(LVOT) 39.7 ml/m^2   PA V2 max 59.2 cm/sec   PA max PG 1.4 mmHg   PI end-d esther 115.0 cm/sec   TR max esther 268.0 cm/sec   RVSP(TR) 33.7 mmHg   RAP shows intimal thickening and calcified elements at the  carotid bifurcation. The right internal carotid artery peak systolic  velocity is 894 cm/sec. The end-diastolic velocity is 53.8 cm/sec. The  right ICA/CCA ratio is approximately 1.51 . These findings correlate  with 50-69% stenosis of the right internal carotid artery. Grayscale imaging shows intimal thickening and calcified elements at the  carotid bifurcation. The left internal carotid artery peak systolic  velocity is 738 cm/sec. The end-diastolic velocity is 17.7 cm/sec. The  left ICA/CCA ratio is approximately 1.18 . These findings correlate with  less than 50% stenosis of the left internal carotid artery. Antegrade flow is demonstrated in bilateral vertebral arteries. This report was finalized on 08/08/2019 14:33 by Dr. Palak Sheppard MD.           ASSESSMENT      ICD-10-CM    1. TIA (transient ischemic attack) G45.9 NV DISCHARGE MEDS RECONCILED W/ CURRENT OUTPATIENT MED LIST   2. Atrioventricular block, complete (HCC) I44.2    3. Stage 3 chronic kidney disease ([facility]) N18.3    4. Pure hypercholesterolemia E78.00 atorvastatin (LIPITOR) 40 MG tablet   5. Essential hypertension I10    6. 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 Cerebrovascular accident (CVA), unspecified mechanism ([facility] [address] I63.9        PLAN  Patient seems to be doing fine now. Patient has no residual effects. Patient's blood pressure and cholesterol fairly well controlled. Patient will see your nose and throat for the nodule that was noted on the CT scan in the piriform sinus. Patient will see neurology in September and Dr. Aron Teran on October 1. Patient continue all medication as directed.   I discussed with patient that when he is having stroke type symptoms he probably needs to go to the emergency room sooner than later because they can do intervention within the first

## 2019-08-16 NOTE — OUTREACH NOTE
Stroke Week 2 Survey      Responses   Facility patient discharged from?  [place]   Does the patient have one of the following disease processes/diagnoses(primary or secondary)?  Stroke (TIA)   Week 2 attempt successful?  Yes   Call start time  1608   Call end time  1613   Discharge diagnosis  Acute cerebrovascular accident (CVA) of cerebellum    Meds reviewed with patient/caregiver?  Yes   Is the patient having any side effects they believe may be caused by any medication additions or changes?  No   Does the patient have all medications ordered at discharge?  Yes   Is the patient taking all medications as directed (includes completed medication regime)?  Yes   Does the patient have a primary care provider?   Yes   Does the patient have an appointment with their PCP within 7 days of discharge?  Yes   Has the patient kept scheduled appointments due by today?  Yes   Has home health visited the patient within 72 hours of discharge?  N/A   Does the patient require any assistance with activities of daily living such as eating, bathing, dressing, walking, etc.?  No   Does the patient have any residual symptoms from stroke/TIA?  Yes   Does the patient understand the diet ordered at discharge?  Yes   Did the patient receive a copy of their discharge instructions?  Yes   Nursing interventions  Reviewed instructions with patient   What is the patient's perception of their health status since discharge?  Improving   Nursing interventions  Nurse provided patient education   Is the patient able to teach back FAST for Stroke?  Yes   Is the patient/caregiver able to teach back the risk factors for a stroke?  High Cholesterol, Physical inactivity and obesity, High blood pressure-goal below 120/80   Is the patient/caregiver able to teach back signs and symptoms related to disease process for when to call PCP?  Yes   Is the patient/caregiver able to teach back signs and symptoms related to disease process for when to call 911?  Yes    Week 2 call completed?  Yes          Phu Duff RN

## 2019-08-23 ENCOUNTER — READMISSION MANAGEMENT (OUTPATIENT)
Dept: CALL CENTER | Facility: HOSPITAL | Age: 84
End: 2019-08-23

## 2019-08-23 NOTE — OUTREACH NOTE
Stroke Week 3 Survey      Responses   Facility patient discharged from?  [place]   Does the patient have one of the following disease processes/diagnoses(primary or secondary)?  Stroke (TIA)   Week 3 attempt successful?  Yes   Call start time  1705   Call end time  1707   Discharge diagnosis  Acute cerebrovascular accident (CVA) of cerebellum    Meds reviewed with patient/caregiver?  Yes   Is the patient having any side effects they believe may be caused by any medication additions or changes?  No   Does the patient have all medications ordered at discharge?  Yes   Is the patient taking all medications as directed (includes completed medication regime)?  Yes   Does the patient have a primary care provider?   Yes   Does the patient have an appointment with their PCP within 7 days of discharge?  Yes   Has the patient kept scheduled appointments due by today?  Yes   Has home health visited the patient within 72 hours of discharge?  N/A   Does the patient require any assistance with activities of daily living such as eating, bathing, dressing, walking, etc.?  No   Does the patient have any residual symptoms from stroke/TIA?  No   Does the patient understand the diet ordered at discharge?  Yes   Did the patient receive a copy of their discharge instructions?  Yes   Nursing interventions  Reviewed instructions with patient   What is the patient's perception of their health status since discharge?  Returned to baseline/stable   Nursing interventions  Nurse provided patient education   Is the patient able to teach back FAST for Stroke?  Yes   Is the patient/caregiver able to teach back the risk factors for a stroke?  High Cholesterol, Physical inactivity and obesity, High blood pressure-goal below 120/80   Is the patient/caregiver able to teach back signs and symptoms related to disease process for when to call PCP?  Yes   Is the patient/caregiver able to teach back signs and symptoms related to disease process for when to  call 911?  Yes   Week 3 call completed?  Yes   Wrap up additional comments  Doing well. Knows what to watch for. Has his appt.          Phu Duff RN

## 2019-08-26 PROBLEM — Z79.01 ANTICOAGULATED: Status: ACTIVE | Noted: 2019-08-26

## 2019-08-26 NOTE — PROGRESS NOTES
Shadi Rivero MD     Chief Complaint   Patient presents with   • Sinus Problem     Chronic sinus drainage and sinus nodule.        History of Present Illness   Mireille Magallanes is a  83 y.o.  male who presents for evaluation of an abnormal radiographic study. He had a recent CT  of the head that showed left-sided sphenoid sinusitis. He has had problems with postnasal drip and thick mucus.  This is been on and off for many years.  The patient was recently admitted to the hospital and discharged on August 9, 2019 with acute TIA symptoms.  On the work-up, he was found to have mucosal thickening in the left sphenoid sinus.  He has a history of carotid artery stenosis and is on anticoagulation.  He was not put on an antibiotic afterwards but was referred for ENT evaluation.      Review of Systems  Reviewed per patient intake note    Past History:  Past Medical History:   Diagnosis Date   • Acute viral hepatitis A    • Carotid artery stenosis    • CKD (chronic kidney disease)    • Coagulopathy (CMS/HCC)     Plavix   • Coagulopathy (CMS/HCC)    • Coronary arteriosclerosis     stent ,? 4/2104, on plavix now.   • Diarrhea    • Dysphagia     Unspecified   • Family history of colon cancer    • Hematochezia     differential diagnosis discussed   • Hemorrhoids     Unspecified   • History of colonic polyps    • Hypercholesterolemia    • Hyperlipidemia    • Hypertension    • Left ventricular hypertrophy    • Melanoma of back (CMS/HCC)    • Obesity    • Osteoarthritis    • Peripheral neuropathy    • Polyp of colon    • Stroke (CMS/HCC)    • TIA (transient ischemic attack)    • Tobacco non-user      Past Surgical History:   Procedure Laterality Date   • APPENDECTOMY     • CARDIAC CATHETERIZATION     • CARDIAC CATHETERIZATION N/A 3/18/2019    Procedure: Left Heart Cath;  Surgeon: Kaushik Toledo MD;  Location: [facility] CATH INVASIVE LOCATION;  Service: Cardiology   • CARDIAC ELECTROPHYSIOLOGY PROCEDURE N/A 1/5/2018     Procedure: Temporary Pacemaker;  Surgeon: Luis E Courtney MD;  Location:  [facility] CATH INVASIVE LOCATION;  Service:    • CARDIAC ELECTROPHYSIOLOGY PROCEDURE N/A 2018    Procedure: Device Implant;  Surgeon: Kaushik Toledo MD;  Location:  [facility] CATH INVASIVE LOCATION;  Service:    • CAROTID STENT     • COLON SURGERY      for Ischemic Colitis   • COLONOSCOPY  2008    tubular adenoma ascending colon polyp - 3 yr   • COLONOSCOPY  2003    colon polyp @ 24 cm-see path - 5 yr   • COLONOSCOPY  2000    small interanl hemorrhoid - 3-5 yr   • COLONOSCOPY  1998    (8)small colon polyps removed-see path, internal anal skin tag - 2-3 yr   • CORONARY ARTERY BYPASS GRAFT     • CORONARY STENT PLACEMENT      x1  dr crane    • OTHER SURGICAL HISTORY      Lithotripsy   • OTHER SURGICAL HISTORY  2011    recall 3 yr.    • VASECTOMY       Family History   Problem Relation Age of Onset   • Colon cancer Father         Diagnosed at 72 YO   • Other Sister         Polyps/Colon   • Other Brother         Polyps/Colon   • Stroke Mother      Social History     Tobacco Use   • Smoking status: Former Smoker     Last attempt to quit:      Years since quittin.6   • Smokeless tobacco: Never Used   Substance Use Topics   • Alcohol use: No   • Drug use: No     Outpatient Medications Marked as Taking for the 19 encounter (Office Visit) with Shadi Rivero MD   Medication Sig Dispense Refill   • amLODIPine (NORVASC) 10 MG tablet Take 1 tablet by mouth Daily. 30 tablet 11   • atorvastatin (LIPITOR) 40 MG tablet Take 1 tablet by mouth Daily. 30 tablet 2   • clopidogrel (PLAVIX) 75 MG tablet Take 1 tablet by mouth Daily. 30 tablet 2   • Coenzyme Q10 (COQ-10) 100 MG capsule Take  by mouth daily.     • irbesartan (AVAPRO) 300 MG tablet Take 300 mg by mouth Every Night.     • nitroglycerin (NITROSTAT) 0.4 MG SL tablet Place 0.4 mg under the tongue as needed.       Allergies:  Sulfa antibiotics         Vital Signs:   Temp:  [98 °F (36.7 °C)] 98 °F (36.7 °C)  Heart Rate:  [75] 75  Resp:  [18] 18  BP: (128)/(88) 128/88    Physical Exam:   CONSTITUTIONAL: well nourished, well-developed, alert, oriented, in no acute distress   COMMUNICATION AND VOICE: able to communicate normally, normal voice quality  HEAD: normocephalic, no lesions, atraumatic, no tenderness, no masses   FACE: appearance normal, no lesions, no tenderness, no deformities, facial motion symmetric  SALIVARY GLANDS: parotid glands with no tenderness, no swelling, no masses, submandibular glands with normal size, nontender  EYES: ocular motility normal, eyelids normal, orbits normal, no proptosis, conjunctiva normal , pupils equal, round  HEARING: response to conversational voice normal bilaterally   EXTERNAL EARS: auricles without lesions  EXTERNAL EAR CANALS: normal ear canals without stenosis or significant cerumen  TYMPANIC MEMBRANES: tympanic membrane appearance normal, no lesions, no perforation, normal mobility, no fluid  EXTERNAL NOSE: structure normal, no tenderness on palpation, no nasal discharge, no lesions, no evidence of trauma, nostrils patent  INTRANASAL EXAM: nasal mucosa with mucosal congestion and erythema, nasal septum without overt anterior deviation  NASOPHARYNX: nasopharyngeal mucosa, adenoids within normal limits  LIPS: structure normal, no tenderness on palpation, no lesions, no evidence of trauma  TEETH: dentition within normal limits for age  GUMS: gingivae healthy  ORAL MUCOSA: oral mucosa normal, no mucosal lesions  FLOOR OF MOUTH: Warthin's duct patent, mucosa normal  TONGUE: lingual mucosa normal without lesions, normal tongue mobility  PALATE: soft and hard palates with normal mucosa and structure  OROPHARYNX: oropharyngeal mucosa normal, tonsil fossa normal in appearance  HYPOPHARYNX: hypopharyngeal mucosa normal  LARYNX: epiglottis and arytenoid cartilage within normal limits, vocal cord mucosa normal with normal  mobility   NECK: neck appearance normal, no masses or tenderness  THYROID: no overt thyromegaly, no tenderness, nodules or mass present on palpation, position midline   LYMPH NODES: no lymphadenopathy  CHEST/RESPIRATORY: respiratory effort normal, normal breath sounds  CARDIOVASCULAR: rate and rhythm normal, extremities without cyanosis or edema, no overt jugulovenous distension present  NEUROLOGIC/PSYCHIATRIC: oriented appropriately for age, mood normal, affect appropriate, cranial nerves intact grossly unless specifically mentioned above     Procedure Note    Pre-operative Diagnosis: Chronic sinusitis    Post-operative Diagnosis: same    Anesthesia: topical 4% tetracaine and oxymetazoline mix    Endoscopy Type:  Nasal Endoscopy    Procedure Details:    After topical anesthesia and decongestion, the patient was placed in the sitting position.  An endoscope was passed along the left nasal floor to the nasopharynx.  It was then passed into the region of the middle meatus, middle turbinate, and the sphenoethmoid region. An identical procedure was performed on the right side.  The following findings were noted as stated below:    Findings: There was a nasal septal spur off to the left-hand side.  There is no evidence of polyps.  There is purulent drainage from the left sphenoethmoidal recess    Condition:  Stable.  Patient tolerated procedure well.    Complications:  None    RESULTS REVIEW:    The CT scan of the head from his previous admission was reviewed.  There was a left-sided sphenoid sinusitis with calcifications suggestive of fungal sinusitis.  There was only minimal inflammation of the ethmoids.           Assessment   1. Chronic sphenoidal sinusitis    2. Anticoagulated    3. TIA (transient ischemic attack)    4. Coronary artery disease involving native coronary artery of native heart with angina pectoris (CMS/HCC)    5. Bilateral carotid artery stenosis        Plan    He has not been on any antibiotics.   First step will be to place him on an antibiotic and see how he does.  If he is not able to get better, we will need to consider sphenoidotomy in light of his peripheral vascular disease and anticoagulation state.  For the best response, use your nasal sprays every day without skipping doses. It may take several weeks before the full effect is acheived.   New Medications Ordered This Visit   Medications   • fluticasone (FLONASE) 50 MCG/ACT nasal spray     Si sprays into the nostril(s) as directed by provider Daily for 30 days. Administer 2 sprays in each nostril for each dose.     Dispense:  1 bottle     Refill:  6   • amoxicillin (AMOXIL) 500 MG capsule     Sig: Take 1 capsule by mouth 3 (Three) Times a Day for 10 days.     Dispense:  30 capsule     Refill:  1          Return in about 2 months (around 10/28/2019).    Shadi Rivero MD  19  10:36 AM

## 2019-08-28 ENCOUNTER — OFFICE VISIT (OUTPATIENT)
Dept: OTOLARYNGOLOGY | Facility: CLINIC | Age: 84
End: 2019-08-28

## 2019-08-28 VITALS
TEMPERATURE: 98 F | RESPIRATION RATE: 18 BRPM | OXYGEN SATURATION: 98 % | SYSTOLIC BLOOD PRESSURE: 128 MMHG | HEIGHT: 69 IN | BODY MASS INDEX: 28.79 KG/M2 | HEART RATE: 75 BPM | DIASTOLIC BLOOD PRESSURE: 88 MMHG | WEIGHT: 194.4 LBS

## 2019-08-28 DIAGNOSIS — G45.9 TIA (TRANSIENT ISCHEMIC ATTACK): ICD-10-CM

## 2019-08-28 DIAGNOSIS — J32.3 CHRONIC SPHENOIDAL SINUSITIS: Primary | ICD-10-CM

## 2019-08-28 DIAGNOSIS — Z79.01 ANTICOAGULATED: ICD-10-CM

## 2019-08-28 DIAGNOSIS — I25.119 CORONARY ARTERY DISEASE INVOLVING NATIVE CORONARY ARTERY OF NATIVE HEART WITH ANGINA PECTORIS (HCC): ICD-10-CM

## 2019-08-28 DIAGNOSIS — I65.23 BILATERAL CAROTID ARTERY STENOSIS: ICD-10-CM

## 2019-08-28 PROCEDURE — 31231 NASAL ENDOSCOPY DX: CPT | Performed by: OTOLARYNGOLOGY

## 2019-08-28 PROCEDURE — 99203 OFFICE O/P NEW LOW 30 MIN: CPT | Performed by: OTOLARYNGOLOGY

## 2019-08-28 RX ORDER — AMOXICILLIN 500 MG/1
500 CAPSULE ORAL 3 TIMES DAILY
Qty: 30 CAPSULE | Refills: 1 | Status: SHIPPED | OUTPATIENT
Start: 2019-08-28 | End: 2019-09-07

## 2019-08-28 RX ORDER — FLUTICASONE PROPIONATE 50 MCG
2 SPRAY, SUSPENSION (ML) NASAL DAILY
Qty: 1 BOTTLE | Refills: 6 | Status: SHIPPED | OUTPATIENT
Start: 2019-08-28 | End: 2019-09-27

## 2019-08-28 NOTE — PATIENT INSTRUCTIONS
Use Afrin spray, neosynephrine or other decongestant spray for 3 days. After using, use a [place] pot or Neilmed Sinus Rinse or similar nasal irrigation device. Do not use the decongestant spray for more than 3 days or you may develop a rebound rhinitis/ nasal congestion. Make sure you boil the water before mixing the saline and let the mix cool to a comfortable temperature before using.

## 2019-08-30 ENCOUNTER — READMISSION MANAGEMENT (OUTPATIENT)
Dept: CALL CENTER | Facility: HOSPITAL | Age: 84
End: 2019-08-30

## 2019-08-30 NOTE — OUTREACH NOTE
Stroke Week 4 Survey      Responses   Facility patient discharged from?  [facility]   Does the patient have one of the following disease processes/diagnoses(primary or secondary)?  Stroke (TIA)   Week 4 attempt successful?  No          Lily Mendez RN

## 2019-09-13 ENCOUNTER — OFFICE VISIT (OUTPATIENT)
Dept: NEUROLOGY | Facility: CLINIC | Age: 84
End: 2019-09-13

## 2019-09-13 VITALS
SYSTOLIC BLOOD PRESSURE: 130 MMHG | BODY MASS INDEX: 28.73 KG/M2 | RESPIRATION RATE: 18 BRPM | WEIGHT: 194 LBS | DIASTOLIC BLOOD PRESSURE: 72 MMHG | HEART RATE: 76 BPM | HEIGHT: 69 IN

## 2019-09-13 DIAGNOSIS — I63.9 CEREBROVASCULAR ACCIDENT (CVA), UNSPECIFIED MECHANISM (HCC): Primary | ICD-10-CM

## 2019-09-13 DIAGNOSIS — I65.21 STENOSIS OF RIGHT CAROTID ARTERY: ICD-10-CM

## 2019-09-13 PROCEDURE — 99215 OFFICE O/P EST HI 40 MIN: CPT | Performed by: PSYCHIATRY & NEUROLOGY

## 2019-09-13 NOTE — PROGRESS NOTES
"Jeanne Magallanes, 1935, is a male who is being seen today for   Chief Complaint   Patient presents with   • Stroke     TIA       HISTORY OF PRESENT ILLNESS: Extended follow-up.  Patient was in the hospital on 8/8/2019 with history of speech difficulty, left facial and arm weakness with left eye pain/headache which started the day before.  Patient on MRI of the brain had evidence of acute left superior cerebellar hemisphere CVA.  Patient had CTA head and neck that showed calcific plaque 60% on the right internal carotid artery with some plaque on the left not as significant.  There is plaque in the cavernous sinus bilaterally and plaque at the origin of the vertebrals bilaterally but not thought to be significant.  There is plaque in the aorta.  Patient had a soft tissue nodule right piriform sinus and right epiglottis is being followed by ENT.  Patient had evidence of chronic sinus disease.  Patient is on \"a sinus flush\".  Patient has on echocardiogram moderate aortic valve stenosis with mild mitral valve stenosis and septal asymmetric hypertrophy with left atrium dilated.  Patient is followed by Dr. Jansen per cardiology will be following the patient's Plavix.  This was a change after the stroke from aspirin to Plavix.  Patient has chronic kidney disease is followed by kidney specialist.  Last creatinine was 1.88 and BUN 25.  Hemoglobin was borderline low.  Patient is to get further blood work soon as possible through his PCP.  Patient says he is still having some mild discoordination with his left upper extremity.  Patient is right-handed    REVIEW OF SYSTEMS:   GENERAL: Blood pressure today 130/72 right arm sitting and standing with pulse 76.  PULMONARY: Patient denies any breathing difficulties or sleep apnea  CVS: As above  GASTROINTESTINAL: No acute GI distress  GENITOURINARY: No acute  distress  GYN: Not applicable  MUSCULOSKELETAL: Patient says he has arthritis in his knees  HEENT: No " acute vision abnormality.  Patient wears hearing aids bilaterally  ENDOCRINE: No acute endocrine symptoms and patient not diabetic  PSYCHIATRIC: No acute psychiatric symptoms  HEMATOLOGY: As above  SKIN: No acute skin changes  Family history reviewed and otherwise noncontributory except for patient's mother with history of stroke  Social history: Patient denies smoking or drug or alcohol use    PHYSICAL EXAMINATION:    GENERAL: No acute distress  CRANIUM: Normocephalic/atraumatic  HEENT:       EYES: EOMs full without nystagmus.  Pupils equal round reactive to light.  No acute fundic abnormalities.  Fields full to confrontation.       EARS: Tympanic membranes normal and hears tuning fork with hearing aids in bilaterally.       THROAT: No swallowing difficulty.  No acute oropharynx abnormalities       NECK: No bruit/no lymphadenopathy but there is radiation of the cardiac murmur into the carotids  CHEST: Cardiac murmur along left sternal border but no other acute cardiopulmonary abnormalities by auscultation  ABDOMEN: Nondistended  EXTREMITIES: Pulses symmetrical  NEURO: Patient alert and follows commands without difficulty  SPEECH: Normal    CRANIAL NERVES: Motor/sensory about the face normal symmetric    MOTOR STRENGTH: Motor strength upper and lower extremities grossly intact.  There is some fine motor discoordination with repetitive finger tapping on the left  STATION AND GAIT: Gait normal/Romberg negative  CEREBELLAR: Finger-nose and heel-to-shin normal  SENSORY: Some decrease in pin and vibration distal proximal lower extremities ankles bilaterally but otherwise normal pin and vibration throughout  REFLEXES: Absent ankle jerks bilaterally are present knee jerks and biceps with toes equivocal and no clonus      ASSESSMENT AND PLAN: Patient with history of CVA as above.  Patient with carotid stenosis needs follow-up with vascular surgery.  Patient to get to emergency room immediately if stroke symptoms occur.  I  spent 40 minutes with this patient with 30 minutes counseling.Patient's Body mass index is 28.65 kg/m². BMI is above normal parameters. Recommendations include: referral to primary care.      Mireille was seen today for stroke.    Diagnoses and all orders for this visit:    Cerebrovascular accident (CVA), unspecified mechanism (CMS/HCC)  -     P2Y12 Platelet Inhibition (PPI); Future  -     Magnesium; Future  -     Sedimentation Rate; Future  -     Vitamin B12; Future  -     Folate; Future  -     CBC & Differential; Future  -     Comprehensive Metabolic Panel; Future    Stenosis of right carotid artery  -     Ambulatory Referral to Vascular Surgery        Dictated utilizing Dragon voice recognition software

## 2019-09-13 NOTE — PATIENT INSTRUCTIONS
Patient to get with PCP about weight and diet control.  Patient to get to emergency room immediately if stroke symptoms occur.  Patient to check blood pressures twice daily sitting and standing and keep diary for PCP

## 2019-09-30 DIAGNOSIS — I63.9 CEREBROVASCULAR ACCIDENT (CVA), UNSPECIFIED MECHANISM (HCC): ICD-10-CM

## 2019-09-30 LAB
ALBUMIN SERPL-MCNC: 4.4 G/DL (ref 3.5–5.2)
ALP BLD-CCNC: 64 U/L (ref 40–130)
ALT SERPL-CCNC: 26 U/L (ref 5–41)
ANION GAP SERPL CALCULATED.3IONS-SCNC: 9 MMOL/L (ref 7–19)
AST SERPL-CCNC: 33 U/L (ref 5–40)
BASOPHILS ABSOLUTE: 0 K/UL (ref 0–0.2)
BASOPHILS RELATIVE PERCENT: 0.5 % (ref 0–1)
BILIRUB SERPL-MCNC: 0.6 MG/DL (ref 0.2–1.2)
BUN BLDV-MCNC: 23 MG/DL (ref 8–23)
CALCIUM SERPL-MCNC: 9.3 MG/DL (ref 8.8–10.2)
CHLORIDE BLD-SCNC: 108 MMOL/L (ref 98–111)
CO2: 25 MMOL/L (ref 22–29)
CREAT SERPL-MCNC: 1.9 MG/DL (ref 0.5–1.2)
EOSINOPHILS ABSOLUTE: 0.1 K/UL (ref 0–0.6)
EOSINOPHILS RELATIVE PERCENT: 2 % (ref 0–5)
FOLATE: >20 NG/ML (ref 4.5–32.2)
GFR NON-AFRICAN AMERICAN: 34
GLUCOSE BLD-MCNC: 90 MG/DL (ref 74–109)
HCT VFR BLD CALC: 37.5 % (ref 42–52)
HEMOGLOBIN: 12.2 G/DL (ref 14–18)
IMMATURE GRANULOCYTES #: 0 K/UL
LYMPHOCYTES ABSOLUTE: 1.3 K/UL (ref 1.1–4.5)
LYMPHOCYTES RELATIVE PERCENT: 24.2 % (ref 20–40)
MAGNESIUM: 2 MG/DL (ref 1.6–2.4)
MCH RBC QN AUTO: 29.8 PG (ref 27–31)
MCHC RBC AUTO-ENTMCNC: 32.5 G/DL (ref 33–37)
MCV RBC AUTO: 91.7 FL (ref 80–94)
MONOCYTES ABSOLUTE: 0.5 K/UL (ref 0–0.9)
MONOCYTES RELATIVE PERCENT: 8.7 % (ref 0–10)
NEUTROPHILS ABSOLUTE: 3.5 K/UL (ref 1.5–7.5)
NEUTROPHILS RELATIVE PERCENT: 64.2 % (ref 50–65)
P2Y12 RESULT: 179 PRU (ref 194–418)
PDW BLD-RTO: 13.5 % (ref 11.5–14.5)
PLATELET # BLD: 166 K/UL (ref 130–400)
PMV BLD AUTO: 10.1 FL (ref 9.4–12.4)
POTASSIUM SERPL-SCNC: 4.9 MMOL/L (ref 3.5–5)
RBC # BLD: 4.09 M/UL (ref 4.7–6.1)
SEDIMENTATION RATE, ERYTHROCYTE: 11 MM/HR (ref 0–15)
SODIUM BLD-SCNC: 142 MMOL/L (ref 136–145)
TOTAL PROTEIN: 7 G/DL (ref 6.6–8.7)
VITAMIN B-12: 779 PG/ML (ref 211–946)
WBC # BLD: 5.5 K/UL (ref 4.8–10.8)

## 2019-10-23 ENCOUNTER — TELEPHONE (OUTPATIENT)
Dept: VASCULAR SURGERY | Facility: CLINIC | Age: 84
End: 2019-10-23

## 2019-10-23 NOTE — TELEPHONE ENCOUNTER
I called the patient to remind him of his appt tomorrow with Dr. Woo.  The patient was aware and confirmed.

## 2019-10-24 ENCOUNTER — OFFICE VISIT (OUTPATIENT)
Dept: VASCULAR SURGERY | Facility: CLINIC | Age: 84
End: 2019-10-24

## 2019-10-24 VITALS
DIASTOLIC BLOOD PRESSURE: 82 MMHG | HEIGHT: 69 IN | WEIGHT: 199 LBS | HEART RATE: 68 BPM | SYSTOLIC BLOOD PRESSURE: 134 MMHG | BODY MASS INDEX: 29.47 KG/M2 | OXYGEN SATURATION: 99 %

## 2019-10-24 DIAGNOSIS — I63.9 ACUTE CEREBROVASCULAR ACCIDENT (CVA) OF CEREBELLUM (HCC): ICD-10-CM

## 2019-10-24 DIAGNOSIS — I65.23 BILATERAL CAROTID ARTERY STENOSIS: Primary | ICD-10-CM

## 2019-10-24 DIAGNOSIS — E78.2 MIXED HYPERLIPIDEMIA: ICD-10-CM

## 2019-10-24 DIAGNOSIS — I10 ESSENTIAL HYPERTENSION: ICD-10-CM

## 2019-10-24 PROCEDURE — 99204 OFFICE O/P NEW MOD 45 MIN: CPT | Performed by: SURGERY

## 2019-10-24 NOTE — PATIENT INSTRUCTIONS
Carotid Endarterectomy    A carotid endarterectomy is a surgery to remove a blockage in the carotid arteries. The carotid arteries are the large blood vessels on both sides of the neck that supply blood to the brain. Carotid artery disease, also called carotid artery stenosis, is the narrowing or blockage of one or both carotid arteries. Carotid artery disease is usually caused by atherosclerosis, which is a buildup of fat and plaques in the arteries. Some buildup of plaques normally occurs with aging. The plaques may partially or totally block blood flow or cause a clot to form in the carotid arteries. This may cause a stroke.  Tell a health care provider about:  · Any allergies you have.  · All medicines you are taking, including vitamins, herbs, eye drops, creams, and over-the-counter medicines.  · Any problems you or family members have had with anesthetic medicines.  · Any blood disorders you have.  · Any surgeries you have had.  · Any medical conditions you have, including diabetes, kidney problems, and infections.  · Whether you are pregnant or may be pregnant.  What are the risks?  Generally, this is a safe procedure. However, problems may occur, including:  · Infection.  · Bleeding.  · Blood clots.  · Allergic reactions to medicines.  · Damage to nerves near the carotid arteries. This can cause a hoarse voice or weakness of muscles your face.  · Stroke.  · Seizures.  · Heart attack (myocardial infarction).  · Narrowing of the opened blood vessel (re-stenosis) . This may require another surgery.  What happens before the procedure?  Medicines  · Ask your health care provider about:  ? Changing or stopping your regular medicines. This is especially important if you are taking diabetes medicines or blood thinners.  ? Taking medicines such as aspirin and ibuprofen. These medicines can thin your blood. Do not take these medicines before your procedure if your health care provider instructs you not to.  · You may  be given antibiotic medicine to help prevent infection.  Staying hydrated  Follow instructions from your health care provider about hydration, which may include:  · Up to 2 hours before the procedure - you may continue to drink clear liquids, such as water, clear fruit juice, black coffee, and plain tea.  Eating and drinking restrictions  Follow instructions from your health care provider about eating and drinking, which may include:  · 8 hours before the procedure - stop eating heavy meals or foods such as meat, fried foods, or fatty foods.  · 6 hours before the procedure - stop eating light meals or foods, such as toast or cereal.  · 6 hours before the procedure - stop drinking milk or drinks that contain milk.  · 2 hours before the procedure - stop drinking clear liquids.  General instructions  · Do not use any products that contain nicotine or tobacco, such as cigarettes and e-cigarettes. These can delay healing after surgery. If you need help quitting, ask your health care provider.  · You may need to have blood tests, a test to check heart rhythm (electrocardiogram), or a test to check blood flow (angiogram).  · Plan to have someone take you home from the hospital or clinic.  · Ask your health care provider how your surgical site will be marked or identified.  · You may be asked to shower with a germ-killing soap.  What happens during the procedure?  · To lower your risk of infection:  ? Your health care team will wash or sanitize their hands.  ? Your skin will be washed with soap.  ? Hair may be removed from the surgical area.  · An IV tube will be inserted into one of your veins.  · You will be given one or more of the following:  ? A medicine to help you relax (sedative).  ? A medicine to make you fall asleep (general anesthetic).  · The surgeon will make a small incision in your neck to expose the carotid artery.  · A tube may be inserted into the carotid artery above and below the blockage. This tube will  temporarily allow blood to flow around the blockage during the surgery.  · An incision will be made in the carotid artery at the location of the blockage, then the blockage will be removed. In some cases, a section of the carotid artery may be removed and a graft patch may be used to repair the artery.  · The carotid artery will be closed with stitches (sutures).  · If a tube was inserted into the artery to allow blood flow around the blockage during surgery, the tube will be removed. With the tube removed, blood flow to the brain will be restored through the carotid artery.  · The incision in the neck will be closed with sutures.  · A bandage (dressing) will be placed over your incision.  The procedure may vary among health care providers and hospitals.  What happens after the procedure?  · Your blood pressure, heart rate, breathing rate, and blood oxygen level will be monitored until the medicines you were given have worn off.  · You may have some pain or an ache in your neck for a couple weeks. This is normal.  · Do not drive for 24 hours if you were given a sedative.  Summary  · A carotid endarterectomy is a surgery to remove a blockage in the carotid arteries.  · The carotid arteries are the large blood vessels on both sides of the neck that supply blood to the brain.  · Before the procedure, ask your health care provider about changing or stopping your regular medicines.  · Follow instructions from your health care provider about eating and drinking before the procedure.  · After the procedure, do not drive for 24 hours if you were given a sedative.  This information is not intended to replace advice given to you by your health care provider. Make sure you discuss any questions you have with your health care provider.  Document Released: 08/20/2014 Document Revised: 11/06/2017 Document Reviewed: 11/06/2017  Elsevier Interactive Patient Education © 2019 [employer]

## 2019-10-24 NOTE — PROGRESS NOTES
10/24/2019      Shadi Rodriguez MD  [address]  [address]  [place] [address]    Mireille Magallanes  1935    Chief Complaint   Patient presents with   • Establish Care     Right carotid stenosis with recent stroke.  Pt came to the ER on 8/8/19 and had carotid testing and CTA of the head and neck.  Pt denies any recent stroke like symptoms.  Dr. Rodriguez referring.        Dear Shadi Rodriguez MD    HPI  I had the pleasure of seeing your patient Mireille Magallanes in the office today.  Thank you kindly for this consultation.  As you recall, Mireille Magallanes is a 84 y.o.  male who you are currently following for stroke.  He was recently hospitalized with a stroke.  This was a left superior cerebellar stroke.  He did have left facial and arm weakness that started with left eye pain/headache beginning the day before.  He was seen by cardiology in the hospital as well for moderate aortic valve stenosis with mild mitral valve stenosis and septal asymmetric hypertrophy with left atrial dilation.  He is on Plavix and Lipitor.  He did have workup including a CTA of the neck which I did review in office.        Past Medical History:   Diagnosis Date   • Acute viral hepatitis A    • Carotid artery stenosis    • CKD (chronic kidney disease)    • Coagulopathy (CMS/HCC)     Plavix   • Coagulopathy (CMS/HCC)    • Coronary arteriosclerosis     stent ,? 4/2104, on plavix now.   • Diarrhea    • Dysphagia     Unspecified   • Family history of colon cancer    • Hematochezia     differential diagnosis discussed   • Hemorrhoids     Unspecified   • History of colonic polyps    • Hypercholesterolemia    • Hyperlipidemia    • Hypertension    • Left ventricular hypertrophy    • Melanoma of back (CMS/HCC)    • Obesity    • Osteoarthritis    • Peripheral neuropathy    • Polyp of colon    • Stroke (CMS/HCC)    • TIA (transient ischemic attack)    • Tobacco non-user        Past Surgical History:   Procedure Laterality Date   •  APPENDECTOMY     • CARDIAC CATHETERIZATION     • CARDIAC CATHETERIZATION N/A 3/18/2019    Procedure: Left Heart Cath;  Surgeon: Kaushik Toledo MD;  Location:  [facility];  Service: Cardiology   • CARDIAC ELECTROPHYSIOLOGY PROCEDURE N/A 2018    Procedure: Temporary Pacemaker;  Surgeon: Luis E Courtney MD;  Location:  [facility];  Service:    • CARDIAC ELECTROPHYSIOLOGY PROCEDURE N/A 2018    Procedure: Device Implant;  Surgeon: Kaushik Toledo MD;  Location:  [facility];  Service:    • CAROTID STENT     • COLON SURGERY      for Ischemic Colitis   • COLONOSCOPY  2008    tubular adenoma ascending colon polyp - 3 yr   • COLONOSCOPY  2003    colon polyp @ 24 cm-see path - 5 yr   • COLONOSCOPY  2000    small interanl hemorrhoid - 3-5 yr   • COLONOSCOPY  1998    (8)small colon polyps removed-see path, internal anal skin tag - 2-3 yr   • CORONARY ARTERY BYPASS GRAFT     • CORONARY STENT PLACEMENT      x1  dr crane    • OTHER SURGICAL HISTORY      Lithotripsy   • OTHER SURGICAL HISTORY  2011    recall 3 yr.    • VASECTOMY         Family History   Problem Relation Age of Onset   • Colon cancer Father         Diagnosed at 72 YO   • Other Sister         Polyps/Colon   • Other Brother         Polyps/Colon   • Stroke Mother        Social History     Socioeconomic History   • Marital status:      Spouse name: Not on file   • Number of children: Not on file   • Years of education: Not on file   • Highest education level: Not on file   Tobacco Use   • Smoking status: Former Smoker     Last attempt to quit:      Years since quittin.8   • Smokeless tobacco: Never Used   Substance and Sexual Activity   • Alcohol use: No   • Drug use: No   • Sexual activity: Defer       Allergies   Allergen Reactions   • Sulfa Antibiotics Rash     Sulfa Drugs         Current Outpatient Medications:   •  amLODIPine (NORVASC) 10 MG  "tablet, Take 1 tablet by mouth Daily., Disp: 30 tablet, Rfl: 11  •  atorvastatin (LIPITOR) 40 MG tablet, Take 1 tablet by mouth Daily., Disp: 30 tablet, Rfl: 2  •  clopidogrel (PLAVIX) 75 MG tablet, Take 1 tablet by mouth Daily., Disp: 30 tablet, Rfl: 2  •  Coenzyme Q10 (COQ-10) 100 MG capsule, Take  by mouth daily., Disp: , Rfl:   •  irbesartan (AVAPRO) 300 MG tablet, Take 300 mg by mouth Every Night., Disp: , Rfl:   •  nitroglycerin (NITROSTAT) 0.4 MG SL tablet, Place 0.4 mg under the tongue as needed., Disp: , Rfl:       Review of Systems   Constitutional: Negative.    HENT: Negative.    Eyes: Negative.    Respiratory: Negative.    Cardiovascular: Negative.    Gastrointestinal: Negative.    Endocrine: Negative.    Genitourinary: Negative.    Musculoskeletal: Negative.    Skin: Negative.    Allergic/Immunologic: Negative.    Neurological: Negative.    Hematological: Negative.    Psychiatric/Behavioral: Negative.    All other systems reviewed and are negative.    /82   Pulse 68   Ht 175.3 cm (69\")   Wt 90.3 kg (199 lb)   SpO2 99%   BMI 29.39 kg/m²     Physical Exam   Constitutional: He is oriented to person, place, and time. He appears well-developed and well-nourished.   HENT:   Head: Normocephalic and atraumatic.   Eyes: Pupils are equal, round, and reactive to light. No scleral icterus.   Neck: Neck supple. No JVD present. Carotid bruit is not present. No thyromegaly present.   Cardiovascular: Normal rate and regular rhythm.   Murmur heard.  Pulses:       Carotid pulses are 2+ on the right side, and 2+ on the left side.       Femoral pulses are 2+ on the right side, and 2+ on the left side.       Popliteal pulses are 2+ on the right side, and 2+ on the left side.        Dorsalis pedis pulses are 2+ on the right side, and 2+ on the left side.        Posterior tibial pulses are 2+ on the right side, and 2+ on the left side.   Pulmonary/Chest: Effort normal and breath sounds normal.   Abdominal: Soft. " Bowel sounds are normal. He exhibits no distension, no abdominal bruit and no mass. There is no hepatosplenomegaly. There is no tenderness.   Musculoskeletal: Normal range of motion. He exhibits no edema.   Lymphadenopathy:     He has no cervical adenopathy.   Neurological: He is alert and oriented to person, place, and time. He has normal strength. No cranial nerve deficit or sensory deficit.   Skin: Skin is warm, dry and intact.   Psychiatric: He has a normal mood and affect.   Nursing note and vitals reviewed.    Diagnostic data:  EXAMINATION: CT angiogram of the neck with contrast 08/08/2019     DOSE: 442 mGycm. Automated exposure control was utilized to diminish  patient radiation dose..     HISTORY: Stroke.     FINDINGS: Multiple contiguous axial images are obtained from the aortic  arch through the skull base at 1 mm intervals following intravenous  contrast infusion. Reformatted images are obtained in the sagittal and  coronal projections from the original data set. MIPS are also obtained.     There is mild scarring within the lung apices with the lung apices are  otherwise clear. The thyroid gland is homogeneous in density. The level  of the true and false cords is unremarkable. There is a 9 mm soft tissue  nodule associated with the epiglottis on the right and resulting in  asymmetric aeration of the right piriform sinus. Direct visualization of  this lesion is recommended. Neoplasia is not excluded. There is no  evidence of cervical lymphadenopathy. The oropharynx and nasopharynx are  unremarkable. There is mucoperiosteal thickening of the left sphenoid  sinus. There is some associated hyperostosis suggesting chronicity. The  remainder of the paranasal sinuses and mastoid air cells are clear.     There is atheromatous calcification of the aortic arch. There is  calcific plaquing at the origin of the great vessels without evidence of  rate limiting stenosis. Calcific plaquing and associated stenosis  is  noted at the origin of both vertebral arteries. The proximal left  vertebral artery is tortuous. Left vertebral artery is dominant. The  extracranial aspect of the vertebral arteries are otherwise widely  patent. The majority of flow from the right vertebral artery I feel  terminates in the posterior inferior cerebellar artery with only a  rudimentary connection with the left vertebral to form the basilar.  There is some mild calcific plaquing and mild associated stenosis within  the proximal intracranial aspect of the left vertebral artery.     There is mild plaquing at the origin of the left common carotid artery  without evidence of rate limiting stenosis. The left common carotid  artery is otherwise unremarkable. At the level of the left carotid  bifurcation there is extensive calcific plaquing involving the carotid  bulb extending into the proximal aspect of the left ICA. This results in  an approximate 35% cross-sectional stenosis at the level of the left  carotid bulb. The distal left ICA is widely patent.     Examination of the right carotid artery demonstrates mild plaquing at  the origin of the innominate. Right common carotid artery is widely  patent to the level the carotid bifurcation. At the carotid bifurcation  there is extensive mixed plaquing involving the carotid bulb and  extending into the proximal aspect of the ICA. Within the proximal  aspect of the ICA there is moderate stenosis with a 60% cross-sectional  narrowing. The more distal right ICA is widely patent.     IMPRESSION:  1.. There is calcific plaquing involving both carotid bifurcations. This  is more advanced on the right than on the left with an approximate 60%  cross-sectional narrowing noted at the very proximal aspect of the right  ICA. The more distal ICAs are widely patent.  2. There is calcific plaquing involving the origin of both vertebral  arteries with mild associated stenosis. Left vertebral artery is  dominant. The  majority of flow from the right vertebral artery I suspect  terminates in the posterior inferior cerebellar artery although there is  a rudimentary connection to the left vertebral to form the basilar.  There is mild plaquing involving the intracranial aspect of the left  vertebral artery with mild associated stenosis.  3. There is a soft tissue nodule noted within the right piriform sinus.  This appears to emanate from the right epiglottis and median epiglottic  fold. Direct visualization of this is recommended. Neoplasia is not  excluded. There is no evidence of enlarged cervical lymphadenopathy.  4. There is some mild calcific plaquing at the origin of the great  vessels.  5. Chronic appearing sinus disease of the sphenoid sinus. There is also  a small mucous retention cyst or polyp within the left maxillary sinus.  This report was finalized on 08/08/2019 22:42 by Dr. Alfred Coates MD.      Patient Active Problem List   Diagnosis   • HTN (hypertension)   • Hyperlipidemia   • Nonrheumatic aortic valve stenosis   • Coronary artery disease involving coronary bypass graft of native heart without angina pectoris   • Pacemaker   • OLMEDO (dyspnea on exertion)   • Other chest pain   • Coronary artery disease involving native coronary artery of native heart with angina pectoris (CMS/HCC)   • Overweight (BMI 25.0-29.9)   • Abnormal stress echo   • TIA (transient ischemic attack)   • Carotid artery stenosis   • Acute cerebrovascular accident (CVA) of cerebellum (CMS/HCC)   • Anticoagulated        Diagnosis Plan   1. Bilateral carotid artery stenosis     2. Essential hypertension     3. Mixed hyperlipidemia     4. Acute cerebrovascular accident (CVA) of cerebellum (CMS/HCC)         Plan: After thoroughly evaluating Mireille Magallanes, I believe the best course of action is to proceed with a right carotid endarterectomy for stroke risk reduction.  The patient has approximately 85% stenosis on the right side with ulcerated  plaque present.  The left side stenosis is about 75% narrowed.  We had a lengthy discussion regarding his carotid occlusive disease and the location of the stroke.  He currently would like to think about it and will let me know if he wants to proceed. Risks of carotid endarterectomy include, but are not limited to, bleeding, infection, vessel damage, nerve damage, MI, stroke, and death.  The patient understands these risks and would like to proceed with the procedure. I did discuss vascular risk factors as they pertain to the progression of vascular disease including controlling hypertension and hyperlipidemia.  These risk factors are currently stable. The patient is to continue taking their medications as previously discussed.   This was all discussed in full with complete understanding.  Thank you for allowing me to participate in the care of your patient.  Please do not hesitate to call with any questions or concerns.  We will keep you aware of any further encounters with Mireille Magallanes.        Sincerely yours,         DO Deric Jimenez Robert A, PA-C

## 2019-10-28 NOTE — PROGRESS NOTES
Shadi Rivero MD     Chief Complaint   Patient presents with   • Follow-up        History of Present Illness  Mireille Magallanes is a  84 y.o. male who is here for follow up.  He was last seen in the office on August 20, 2019 for sinusitis found on head scan.  We treated with antibiotics and he returns for follow-up today.  He has had improvement of his complaints. The patient has not had complaints of: nasal congestion, drainage, facial swelling, facial pain or sinusitis.      Review of Systems  Reviewed per patient intake note    Past History:  Past medical and surgical history, family history and social history reviewed and updated when appropriate.  Current medications and allergies reviewed and updated when appropriate.  Allergies:  Sulfa antibiotics        Vital Signs:   Temp:  [98.2 °F (36.8 °C)] 98.2 °F (36.8 °C)  Heart Rate:  [80] 80  BP: (148)/(76) 148/76    Physical Exam:  CONSTITUTIONAL: well nourished, well-developed, alert, oriented, in no acute distress   COMMUNICATION AND VOICE: able to communicate normally, normal voice quality  HEAD: normocephalic, no lesions, atraumatic, no tenderness, no masses   FACE: appearance normal, no lesions, no tenderness, no deformities, facial motion symmetric  EYES: ocular motility normal, eyelids normal, orbits normal, no proptosis, conjunctiva normal , pupils equal, round  HEARING: response to conversational voice normal bilaterally   EXTERNAL EARS: auricles without lesions  EXTERNAL NOSE: structure normal, no tenderness on palpation, no nasal discharge, no lesions, no evidence of trauma, nostrils patent  INTRANASAL EXAM: nasal mucosa with mucosal congestion and erythema, nasal septum without overt anterior deviation  LIPS: structure normal, no tenderness on palpation, no lesions, no evidence of trauma  NECK: neck appearance normal  LYMPH NODES: no lymphadenopathy  CHEST/RESPIRATORY: respiratory effort normal  CARDIOVASCULAR: extremities without cyanosis or  edema, no overt jugulovenous distension present  NEUROLOGIC/PSYCHIATRIC: oriented appropriately for age, mood normal, affect appropriate, cranial nerves intact grossly unless specifically mentioned above          Assessment   1. Chronic sphenoidal sinusitis    2. Anticoagulated        Plan    Patient Instructions   Continue current management plan.           Return if symptoms worsen or fail to improve.    Shadi Rivero MD  10/30/19  1:30 PM

## 2019-10-30 ENCOUNTER — OFFICE VISIT (OUTPATIENT)
Dept: OTOLARYNGOLOGY | Facility: CLINIC | Age: 84
End: 2019-10-30

## 2019-10-30 VITALS
DIASTOLIC BLOOD PRESSURE: 76 MMHG | WEIGHT: 200 LBS | HEIGHT: 69 IN | HEART RATE: 80 BPM | SYSTOLIC BLOOD PRESSURE: 148 MMHG | TEMPERATURE: 98.2 F | BODY MASS INDEX: 29.62 KG/M2

## 2019-10-30 DIAGNOSIS — J32.3 CHRONIC SPHENOIDAL SINUSITIS: Primary | ICD-10-CM

## 2019-10-30 DIAGNOSIS — Z79.01 ANTICOAGULATED: ICD-10-CM

## 2019-10-30 PROCEDURE — 99213 OFFICE O/P EST LOW 20 MIN: CPT | Performed by: OTOLARYNGOLOGY

## 2019-10-30 NOTE — PROGRESS NOTES
Rosemarie Velasquez LPN   Patient Intake Note    Review of Systems  Review of Systems   Constitutional: Negative for chills, fatigue and fever.   HENT:        See HPI   Respiratory: Negative for cough, choking and shortness of breath.    Gastrointestinal: Negative for diarrhea, nausea and vomiting.   Neurological: Negative for dizziness, light-headedness, numbness and headaches.   Psychiatric/Behavioral: Negative for sleep disturbance.       QUALITY MEASURES    Tobacco Use: Screening and Cessation Intervention  Social History    Tobacco Use      Smoking status: Former Smoker        Quit date:         Years since quittin.8      Smokeless tobacco: Never Used        Rosemarie Velasquez LPN  10/30/2019  1:23 PM

## 2019-11-06 ENCOUNTER — OFFICE VISIT (OUTPATIENT)
Dept: CARDIOLOGY | Facility: CLINIC | Age: 84
End: 2019-11-06

## 2019-11-06 VITALS
SYSTOLIC BLOOD PRESSURE: 130 MMHG | BODY MASS INDEX: 29.62 KG/M2 | DIASTOLIC BLOOD PRESSURE: 80 MMHG | HEIGHT: 69 IN | WEIGHT: 200 LBS | HEART RATE: 71 BPM

## 2019-11-06 DIAGNOSIS — Z95.0 PACEMAKER: ICD-10-CM

## 2019-11-06 DIAGNOSIS — E78.2 MIXED HYPERLIPIDEMIA: ICD-10-CM

## 2019-11-06 DIAGNOSIS — I25.810 CORONARY ARTERY DISEASE INVOLVING CORONARY BYPASS GRAFT OF NATIVE HEART WITHOUT ANGINA PECTORIS: Primary | ICD-10-CM

## 2019-11-06 DIAGNOSIS — I10 ESSENTIAL HYPERTENSION: ICD-10-CM

## 2019-11-06 DIAGNOSIS — Z01.810 PREOP CARDIOVASCULAR EXAM: ICD-10-CM

## 2019-11-06 DIAGNOSIS — I35.0 NONRHEUMATIC AORTIC VALVE STENOSIS: ICD-10-CM

## 2019-11-06 DIAGNOSIS — G45.9 TIA (TRANSIENT ISCHEMIC ATTACK): ICD-10-CM

## 2019-11-06 PROBLEM — R94.39 ABNORMAL STRESS ECHO: Status: RESOLVED | Noted: 2019-03-15 | Resolved: 2019-11-06

## 2019-11-06 PROCEDURE — 93000 ELECTROCARDIOGRAM COMPLETE: CPT | Performed by: INTERNAL MEDICINE

## 2019-11-06 PROCEDURE — 99214 OFFICE O/P EST MOD 30 MIN: CPT | Performed by: INTERNAL MEDICINE

## 2019-11-06 RX ORDER — CLOPIDOGREL BISULFATE 75 MG/1
75 TABLET ORAL DAILY
Qty: 30 TABLET | Refills: 11 | Status: SHIPPED | OUTPATIENT
Start: 2019-11-06 | End: 2020-09-04 | Stop reason: SDUPTHER

## 2019-11-06 NOTE — PROGRESS NOTES
Subjective:     Encounter Date: 11/06/19      Patient ID: Mireille Magallanes is a 84 y.o. male.    Chief Complaint: routine f/u  84-year-old male initially referred to me in December 2017 with lightheadedness and syncope.  He also has a history of coronary disease including four-vessel CABG in 2001 and PCI to the LAD in 2014.  He was found to have high degree AV block, and underwent successful implantation of dual-chamber [employer] pacemaker on 1/8/18.  I last saw him in the office in November 2018, which point he was doing well.  However, he was then seen back in this office 3/6/2019 by GIANNI Rush, for worsening dyspnea and chest pain.  Given his history of CAD, she ordered a stress test which cannot exclude inferior ischemia.  Therefore I performed cardiac catheterization on 3/18/2019, with results noted below.  In short, there no need for intervention at that time.  It was noted that his blood pressure was extremely elevated even while sedated on the table through the case (200/100).  Therefore I added amlodipine to his antihypertensive regimen, and when he was seen back in the office a few weeks later on 3/27/2019 by GIANNI Rush, he reported symptoms and significant the improved.  He returns today for routine follow-up, and also seeks preoperative risk assessment for an upcoming surgery with Dr. Woo.    S/p PPM - doing well. 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  HTN - high today; patient states he was rushed today.  Checks daily at home; states 120s-130s/70s.  Takes norvasc 5 and avapro 300.  CAD - No exertional symptoms, no associated dyspnea.  Had one episode of CP requiring SL NTG once since last visit.    Preop risk assessment: From the Revised Cardiac Risk Index, the patient does have a history of ischemic heart disease and cerebrovascular disease, but does not have congestive heart failure, insulin-dependent diabetes, or renal  "failure (Cr >2).  Currently, he can do >4 METs (can walk two blocks and go up a flight of stairs without dyspnea or angina). Regarding the possibility of \"active cardiac conditions,\" he does not have chest pain to indicate possibility of acute coronary syndrome, has no signs or symptoms of decompensated heart failure (worsening dyspnea, orthopnea, PND, or lower extremity edema), no palpitations or syncope to suggest an uncontrolled arrhythmia, or symptoms to suggest a severe valvular disease.    Currently playing 18 holes of golf three times of week.        The following portions of the patient's history were reviewed and updated as appropriate: allergies, current medications, past family history, past medical history, past social history, past surgical history and problem list.    Review of Systems   Constitution: Negative for malaise/fatigue.   Cardiovascular: Negative for chest pain, claudication, dyspnea on exertion, leg swelling, near-syncope, orthopnea, palpitations, paroxysmal nocturnal dyspnea and syncope.   Respiratory: Negative for shortness of breath.    Hematologic/Lymphatic: Does not bruise/bleed easily.        Objective:      Vitals:    11/06/19 1429   BP: 130/80   Pulse: 71     Physical Exam   Constitutional: He is oriented to person, place, and time. He appears well-developed and well-nourished.   Neck: No JVD present.   Cardiovascular: Normal rate, regular rhythm and intact distal pulses.   Murmur heard.   Harsh midsystolic murmur is present with a grade of 2/6 at the upper right sternal border radiating to the neck.  Pulmonary/Chest: Effort normal and breath sounds normal.   Musculoskeletal: He exhibits no edema.   Neurological: He is alert and oriented to person, place, and time.   Skin: Skin is warm and dry.       Lab Review:         ECG 12 Lead  Date/Time: 11/6/2019 6:39 PM  Performed by: aKushik Toledo MD  Authorized by: Kaushik Toledo MD   Comparison: compared with previous ECG from " 8/8/2019  Comparison to previous ECG: Probable limb lead reversal on current tracing; interpretation assumes no reversal.  If no reversal currently, compared to prior ECG, axis has shifted rightward.  T wave inversions are now present in inferior leads.  Rhythm comments: a-sensed, v-paced  BPM: 70  QRS axis: right    Clinical impression: abnormal EKG            Results for orders placed during the hospital encounter of 08/08/19   Adult Transthoracic Echo Complete W/ Cont if Necessary Per Protocol (With Agitated Saline)    Narrative · Left ventricular systolic function is low normal. EF 51-55%.  · Moderate aortic valve stenosis is present.  · Left ventricular wall thickness is consistent with mild septal   asymmetric hypertrophy.  · Left ventricular diastolic dysfunction (grade II) consistent with   pseudonormalization.  · Left atrial cavity size is severely dilated.  · Mild mitral valve stenosis is present  · Normal size and function of right ventricle.      ==============================================================================  OLD RECORDS REVIEWED:   CATH REPORT 3/18/19:      LAST PPM INTERROGATION:            Assessment/Plan:     Problem List Items Addressed This Visit        Cardiovascular and Mediastinum    HTN (hypertension)    Current Assessment & Plan     Greatly improved; continue norvasc 10 and avapro 300         Hyperlipidemia    Nonrheumatic aortic valve stenosis    Overview     moderate on echo Aug '19         Current Assessment & Plan     Stable         Relevant Medications    clopidogrel (PLAVIX) 75 MG tablet    Coronary artery disease involving coronary bypass graft of native heart without angina pectoris - Primary    Current Assessment & Plan     Stable; continue antiplatelet therapy with Plavix (was switched from aspirin to this after TIA).  Continue high intensity statin.         Pacemaker    Overview     [employer] DC PPM implanted 1/8/18 for high degree AVB (was 2nd degree, Ronald  II then quickly progressed to 3rd degree with syncope)         Current Assessment & Plan     Stable; continue routine monitoring both remotely from home and in the office         TIA (transient ischemic attack)       Other    Preop cardiovascular exam    Current Assessment & Plan     By Revised Cardiac Risk index, this patient is intermediate risk for MACCE with CEA.  Since he has no active cardiac conditions (acute coronary syndrome, decompensated heart failure, severe valvular disease, uncontrolled arrhythmia), this risk is non-modifiable. Therefore, proceed with surgery as planned with following changes to medical regimen:  -continue statin               F/u 6 months    Kaushik Toledo MD  11/06/19  6:41 PM

## 2019-11-06 NOTE — ASSESSMENT & PLAN NOTE
By Revised Cardiac Risk index, this patient is intermediate risk for MACCE with CEA.  Since he has no active cardiac conditions (acute coronary syndrome, decompensated heart failure, severe valvular disease, uncontrolled arrhythmia), this risk is non-modifiable. Therefore, proceed with surgery as planned with following changes to medical regimen:  -continue statin

## 2019-11-07 ENCOUNTER — TELEPHONE (OUTPATIENT)
Dept: VASCULAR SURGERY | Facility: CLINIC | Age: 84
End: 2019-11-07

## 2019-11-07 NOTE — ASSESSMENT & PLAN NOTE
Stable; continue antiplatelet therapy with Plavix (was switched from aspirin to this after TIA).  Continue high intensity statin.

## 2019-11-07 NOTE — TELEPHONE ENCOUNTER
Patient called and stated that he did not wish to have a CEA at this time.  He did want to make a 6 month fu with testing.  Patient appointment was scheduled and mailed.  A request for his testing was put in.  Advised patient to be sure to let us know if he decided that he did want to have the procedure.

## 2019-11-11 ENCOUNTER — TELEPHONE (OUTPATIENT)
Dept: VASCULAR SURGERY | Facility: CLINIC | Age: 84
End: 2019-11-11

## 2019-11-11 NOTE — TELEPHONE ENCOUNTER
Spoke with patient and again and advised that I had put in testing for patient to have done when he comes in May.  Again advised of stroke symptoms and that if he notices any of these then he needs to go directly to the ED.  Patient expressed understanding for all that was discussed.

## 2019-11-25 ENCOUNTER — CLINICAL SUPPORT (OUTPATIENT)
Dept: CARDIOLOGY | Facility: CLINIC | Age: 84
End: 2019-11-25

## 2019-11-25 DIAGNOSIS — Z95.0 PACEMAKER: ICD-10-CM

## 2019-11-25 PROCEDURE — 93296 REM INTERROG EVL PM/IDS: CPT | Performed by: PHYSICIAN ASSISTANT

## 2019-11-25 PROCEDURE — 93294 REM INTERROG EVL PM/LDLS PM: CPT | Performed by: PHYSICIAN ASSISTANT

## 2019-12-06 ENCOUNTER — PREP FOR SURGERY (OUTPATIENT)
Dept: OTHER | Facility: HOSPITAL | Age: 84
End: 2019-12-06

## 2019-12-06 DIAGNOSIS — Z79.02 ENCOUNTER FOR MONITORING ANTIPLATELET THERAPY: ICD-10-CM

## 2019-12-06 DIAGNOSIS — Z01.818 PREOP TESTING: ICD-10-CM

## 2019-12-06 DIAGNOSIS — Z51.81 ENCOUNTER FOR MONITORING ANTIPLATELET THERAPY: ICD-10-CM

## 2019-12-06 DIAGNOSIS — I65.21 STENOSIS OF RIGHT CAROTID ARTERY: Primary | ICD-10-CM

## 2019-12-06 RX ORDER — BUPIVACAINE HCL/0.9 % NACL/PF 0.1 %
2 PLASTIC BAG, INJECTION (ML) EPIDURAL ONCE
Status: CANCELLED | OUTPATIENT
Start: 2019-12-06 | End: 2019-12-06

## 2019-12-09 ENCOUNTER — TELEPHONE (OUTPATIENT)
Dept: VASCULAR SURGERY | Facility: CLINIC | Age: 84
End: 2019-12-09

## 2019-12-09 PROBLEM — I65.21 STENOSIS OF RIGHT CAROTID ARTERY: Status: ACTIVE | Noted: 2019-12-09

## 2019-12-09 PROBLEM — Z01.818 PREOP TESTING: Status: ACTIVE | Noted: 2019-12-09

## 2019-12-09 NOTE — TELEPHONE ENCOUNTER
Left message for patient to return my call.  Patient had previously indicated that he did not want to have his carotid procedure.  Left message requesting that patient call me back to confirm that he still does not want to have the procedure.  Left all contact information for patient to return my call.

## 2019-12-11 ENCOUNTER — TELEPHONE (OUTPATIENT)
Dept: VASCULAR SURGERY | Facility: CLINIC | Age: 84
End: 2019-12-11

## 2019-12-18 DIAGNOSIS — N18.30 STAGE 3 CHRONIC KIDNEY DISEASE (HCC): ICD-10-CM

## 2019-12-18 DIAGNOSIS — E78.00 PURE HYPERCHOLESTEROLEMIA: ICD-10-CM

## 2019-12-18 DIAGNOSIS — I10 ESSENTIAL HYPERTENSION: ICD-10-CM

## 2019-12-18 LAB
ALBUMIN SERPL-MCNC: 4.6 G/DL (ref 3.5–5.2)
ALP BLD-CCNC: 68 U/L (ref 40–130)
ALT SERPL-CCNC: 20 U/L (ref 5–41)
ANION GAP SERPL CALCULATED.3IONS-SCNC: 17 MMOL/L (ref 7–19)
AST SERPL-CCNC: 25 U/L (ref 5–40)
BACTERIA: NEGATIVE /HPF
BASOPHILS ABSOLUTE: 0 K/UL (ref 0–0.2)
BASOPHILS RELATIVE PERCENT: 0.5 % (ref 0–1)
BILIRUB SERPL-MCNC: 0.6 MG/DL (ref 0.2–1.2)
BILIRUBIN URINE: NEGATIVE
BLOOD, URINE: NEGATIVE
BUN BLDV-MCNC: 27 MG/DL (ref 8–23)
CALCIUM SERPL-MCNC: 9.8 MG/DL (ref 8.8–10.2)
CHLORIDE BLD-SCNC: 100 MMOL/L (ref 98–111)
CHOLESTEROL, TOTAL: 134 MG/DL (ref 160–199)
CLARITY: CLEAR
CO2: 23 MMOL/L (ref 22–29)
COLOR: YELLOW
CREAT SERPL-MCNC: 2 MG/DL (ref 0.5–1.2)
CREATININE URINE: 63.4 MG/DL (ref 4.2–622)
EOSINOPHILS ABSOLUTE: 0.2 K/UL (ref 0–0.6)
EOSINOPHILS RELATIVE PERCENT: 2.5 % (ref 0–5)
EPITHELIAL CELLS, UA: 0 /HPF (ref 0–5)
GFR NON-AFRICAN AMERICAN: 32
GLUCOSE BLD-MCNC: 91 MG/DL (ref 74–109)
GLUCOSE URINE: NEGATIVE MG/DL
HCT VFR BLD CALC: 43.9 % (ref 42–52)
HDLC SERPL-MCNC: 40 MG/DL (ref 55–121)
HEMOGLOBIN: 14 G/DL (ref 14–18)
HYALINE CASTS: 0 /HPF (ref 0–8)
IMMATURE GRANULOCYTES #: 0 K/UL
KETONES, URINE: NEGATIVE MG/DL
LDL CHOLESTEROL CALCULATED: 78 MG/DL
LEUKOCYTE ESTERASE, URINE: NEGATIVE
LYMPHOCYTES ABSOLUTE: 2.1 K/UL (ref 1.1–4.5)
LYMPHOCYTES RELATIVE PERCENT: 35.1 % (ref 20–40)
MAGNESIUM: 2 MG/DL (ref 1.6–2.4)
MCH RBC QN AUTO: 29.5 PG (ref 27–31)
MCHC RBC AUTO-ENTMCNC: 31.9 G/DL (ref 33–37)
MCV RBC AUTO: 92.4 FL (ref 80–94)
MONOCYTES ABSOLUTE: 0.5 K/UL (ref 0–0.9)
MONOCYTES RELATIVE PERCENT: 7.8 % (ref 0–10)
NEUTROPHILS ABSOLUTE: 3.2 K/UL (ref 1.5–7.5)
NEUTROPHILS RELATIVE PERCENT: 53.9 % (ref 50–65)
NITRITE, URINE: NEGATIVE
PARATHYROID HORMONE INTACT: 76.6 PG/ML (ref 15–65)
PDW BLD-RTO: 13 % (ref 11.5–14.5)
PH UA: 6 (ref 5–8)
PHOSPHORUS: 3.3 MG/DL (ref 2.5–4.5)
PLATELET # BLD: 195 K/UL (ref 130–400)
PMV BLD AUTO: 10.6 FL (ref 9.4–12.4)
POTASSIUM SERPL-SCNC: 4.9 MMOL/L (ref 3.5–5)
PROTEIN PROTEIN: 111 MG/DL (ref 15–45)
PROTEIN UA: 100 MG/DL
RBC # BLD: 4.75 M/UL (ref 4.7–6.1)
RBC UA: 1 /HPF (ref 0–4)
SODIUM BLD-SCNC: 140 MMOL/L (ref 136–145)
SPECIFIC GRAVITY UA: 1.01 (ref 1–1.03)
TOTAL PROTEIN: 7.5 G/DL (ref 6.6–8.7)
TRIGL SERPL-MCNC: 81 MG/DL (ref 0–149)
URIC ACID, SERUM: 8.3 MG/DL (ref 3.4–7)
UROBILINOGEN, URINE: 0.2 E.U./DL
VITAMIN D 25-HYDROXY: 50.8 NG/ML
WBC # BLD: 5.9 K/UL (ref 4.8–10.8)
WBC UA: 1 /HPF (ref 0–5)

## 2019-12-19 ENCOUNTER — TELEPHONE (OUTPATIENT)
Dept: INTERNAL MEDICINE | Age: 84
End: 2019-12-19

## 2019-12-27 ENCOUNTER — OFFICE VISIT (OUTPATIENT)
Dept: INTERNAL MEDICINE | Age: 84
End: 2019-12-27
Payer: MEDICARE

## 2019-12-27 VITALS
DIASTOLIC BLOOD PRESSURE: 78 MMHG | OXYGEN SATURATION: 97 % | WEIGHT: 205 LBS | HEART RATE: 82 BPM | BODY MASS INDEX: 30.36 KG/M2 | HEIGHT: 69 IN | SYSTOLIC BLOOD PRESSURE: 132 MMHG

## 2019-12-27 DIAGNOSIS — Z91.81 AT HIGH RISK FOR FALLS: ICD-10-CM

## 2019-12-27 DIAGNOSIS — Z00.00 ROUTINE GENERAL MEDICAL EXAMINATION AT A HEALTH CARE FACILITY: Primary | ICD-10-CM

## 2019-12-27 DIAGNOSIS — E78.00 PURE HYPERCHOLESTEROLEMIA: ICD-10-CM

## 2019-12-27 DIAGNOSIS — I25.700 CORONARY ARTERY DISEASE INVOLVING CORONARY BYPASS GRAFT OF NATIVE HEART WITH UNSTABLE ANGINA PECTORIS (HCC): ICD-10-CM

## 2019-12-27 DIAGNOSIS — I10 ESSENTIAL HYPERTENSION: ICD-10-CM

## 2019-12-27 DIAGNOSIS — N18.30 STAGE 3 CHRONIC KIDNEY DISEASE (HCC): ICD-10-CM

## 2019-12-27 DIAGNOSIS — G45.9 TIA (TRANSIENT ISCHEMIC ATTACK): ICD-10-CM

## 2019-12-27 DIAGNOSIS — I44.2 ATRIOVENTRICULAR BLOCK, COMPLETE (HCC): ICD-10-CM

## 2019-12-27 PROCEDURE — G0439 PPPS, SUBSEQ VISIT: HCPCS | Performed by: PHYSICIAN ASSISTANT

## 2019-12-27 PROCEDURE — G8482 FLU IMMUNIZE ORDER/ADMIN: HCPCS | Performed by: PHYSICIAN ASSISTANT

## 2019-12-27 PROCEDURE — G8598 ASA/ANTIPLAT THER USED: HCPCS | Performed by: PHYSICIAN ASSISTANT

## 2019-12-27 PROCEDURE — 4040F PNEUMOC VAC/ADMIN/RCVD: CPT | Performed by: PHYSICIAN ASSISTANT

## 2019-12-27 PROCEDURE — 1123F ACP DISCUSS/DSCN MKR DOCD: CPT | Performed by: PHYSICIAN ASSISTANT

## 2019-12-27 ASSESSMENT — PATIENT HEALTH QUESTIONNAIRE - PHQ9
SUM OF ALL RESPONSES TO PHQ QUESTIONS 1-9: 0
SUM OF ALL RESPONSES TO PHQ QUESTIONS 1-9: 0

## 2019-12-27 ASSESSMENT — LIFESTYLE VARIABLES: HOW OFTEN DO YOU HAVE A DRINK CONTAINING ALCOHOL: 0

## 2020-01-28 ENCOUNTER — CLINICAL SUPPORT NO REQUIREMENTS (OUTPATIENT)
Dept: CARDIOLOGY | Facility: CLINIC | Age: 85
End: 2020-01-28

## 2020-01-28 DIAGNOSIS — I44.2 CHB (COMPLETE HEART BLOCK) (HCC): ICD-10-CM

## 2020-01-28 DIAGNOSIS — Z95.0 PACEMAKER: Primary | ICD-10-CM

## 2020-01-28 PROCEDURE — 93280 PM DEVICE PROGR EVAL DUAL: CPT | Performed by: INTERNAL MEDICINE

## 2020-02-05 ENCOUNTER — TELEPHONE (OUTPATIENT)
Dept: VASCULAR SURGERY | Facility: CLINIC | Age: 85
End: 2020-02-05

## 2020-02-05 NOTE — TELEPHONE ENCOUNTER
Spoke with Mr Magallanes reminding him of his appointment for Thursday, February 6th, 2020 at 215 pm with Dr Woo. Mr Magallanes confirmed he would be here.

## 2020-02-06 ENCOUNTER — OFFICE VISIT (OUTPATIENT)
Dept: VASCULAR SURGERY | Facility: CLINIC | Age: 85
End: 2020-02-06

## 2020-02-06 VITALS
BODY MASS INDEX: 30.36 KG/M2 | OXYGEN SATURATION: 98 % | HEART RATE: 84 BPM | WEIGHT: 205 LBS | SYSTOLIC BLOOD PRESSURE: 142 MMHG | HEIGHT: 69 IN | DIASTOLIC BLOOD PRESSURE: 88 MMHG

## 2020-02-06 DIAGNOSIS — I65.23 BILATERAL CAROTID ARTERY STENOSIS: Primary | ICD-10-CM

## 2020-02-06 DIAGNOSIS — E78.2 MIXED HYPERLIPIDEMIA: ICD-10-CM

## 2020-02-06 DIAGNOSIS — I10 ESSENTIAL HYPERTENSION: ICD-10-CM

## 2020-02-06 PROCEDURE — 99214 OFFICE O/P EST MOD 30 MIN: CPT | Performed by: NURSE PRACTITIONER

## 2020-02-06 NOTE — PROGRESS NOTES
"2/6/2020         Mireille Magallanes  1935    Chief Complaint   Patient presents with   • Follow-up     Patient here to Discuss CEA. Patient was scheduled for Right Carotid Endarterectomy on 12/20/19. Patient denies any stroke like symptoms.    • other     Patient states was going to have in December but wanted a little more time.        Dear Shadi Rodriguez MD    HPI  I had the pleasure of seeing your patient Mireille Magallanes in the office today.  As you recall, Mireille Magallanes is a 84 y.o.  male who you are currently following for stroke.  He was recently hospitalized with a stroke.  This was a left superior cerebellar stroke.  He did have left facial and arm weakness that started with left eye pain/headache beginning the day before.  He was seen by cardiology in the hospital as well for moderate aortic valve stenosis with mild mitral valve stenosis and septal asymmetric hypertrophy with left atrial dilation.  He is on Plavix and Lipitor.  He did have workup including a CTA of the neck showing significant bilateral carotid artery stenosis.  It was recommended at his previous visit that he undergo a right carotid endarterectomy for stroke risk reduction.  He did undergo cardiology clearance but at that point had decided to hold off from surgery.  He has now decided he would like to proceed with surgery and is here to discuss.      Review of Systems   Constitutional: Negative.    HENT: Negative.    Eyes: Negative.    Respiratory: Negative.    Cardiovascular: Negative.    Gastrointestinal: Negative.    Endocrine: Negative.    Genitourinary: Negative.    Musculoskeletal: Negative.    Skin: Negative.    Allergic/Immunologic: Negative.    Neurological: Negative.    Hematological: Negative.    Psychiatric/Behavioral: Negative.    All other systems reviewed and are negative.    /88 (BP Location: Left arm, Patient Position: Sitting, Cuff Size: Adult)   Pulse 84   Ht 175.3 cm (69\")   Wt 93 kg (205 lb)   SpO2 " 98%   BMI 30.27 kg/m²     Physical Exam   Constitutional: He is oriented to person, place, and time. He appears well-developed and well-nourished.   HENT:   Head: Normocephalic and atraumatic.   Eyes: Pupils are equal, round, and reactive to light. No scleral icterus.   Neck: Neck supple. No JVD present. Carotid bruit is not present. No thyromegaly present.   Cardiovascular: Normal rate and regular rhythm.   Murmur heard.  Pulses:       Carotid pulses are 2+ on the right side, and 2+ on the left side.       Femoral pulses are 2+ on the right side, and 2+ on the left side.       Popliteal pulses are 2+ on the right side, and 2+ on the left side.        Dorsalis pedis pulses are 2+ on the right side, and 2+ on the left side.        Posterior tibial pulses are 2+ on the right side, and 2+ on the left side.   Pulmonary/Chest: Effort normal and breath sounds normal.   Abdominal: Soft. Bowel sounds are normal. He exhibits no distension, no abdominal bruit and no mass. There is no hepatosplenomegaly. There is no tenderness.   Musculoskeletal: Normal range of motion. He exhibits no edema.   Lymphadenopathy:     He has no cervical adenopathy.   Neurological: He is alert and oriented to person, place, and time. He has normal strength. No cranial nerve deficit or sensory deficit.   Skin: Skin is warm, dry and intact.   Psychiatric: He has a normal mood and affect.   Nursing note and vitals reviewed.    Diagnostic data:  EXAMINATION: CT angiogram of the neck with contrast 08/08/2019     DOSE: 442 mGycm. Automated exposure control was utilized to diminish  patient radiation dose..     HISTORY: Stroke.     FINDINGS: Multiple contiguous axial images are obtained from the aortic  arch through the skull base at 1 mm intervals following intravenous  contrast infusion. Reformatted images are obtained in the sagittal and  coronal projections from the original data set. MIPS are also obtained.     There is mild scarring within the lung  apices with the lung apices are  otherwise clear. The thyroid gland is homogeneous in density. The level  of the true and false cords is unremarkable. There is a 9 mm soft tissue  nodule associated with the epiglottis on the right and resulting in  asymmetric aeration of the right piriform sinus. Direct visualization of  this lesion is recommended. Neoplasia is not excluded. There is no  evidence of cervical lymphadenopathy. The oropharynx and nasopharynx are  unremarkable. There is mucoperiosteal thickening of the left sphenoid  sinus. There is some associated hyperostosis suggesting chronicity. The  remainder of the paranasal sinuses and mastoid air cells are clear.     There is atheromatous calcification of the aortic arch. There is  calcific plaquing at the origin of the great vessels without evidence of  rate limiting stenosis. Calcific plaquing and associated stenosis is  noted at the origin of both vertebral arteries. The proximal left  vertebral artery is tortuous. Left vertebral artery is dominant. The  extracranial aspect of the vertebral arteries are otherwise widely  patent. The majority of flow from the right vertebral artery I feel  terminates in the posterior inferior cerebellar artery with only a  rudimentary connection with the left vertebral to form the basilar.  There is some mild calcific plaquing and mild associated stenosis within  the proximal intracranial aspect of the left vertebral artery.     There is mild plaquing at the origin of the left common carotid artery  without evidence of rate limiting stenosis. The left common carotid  artery is otherwise unremarkable. At the level of the left carotid  bifurcation there is extensive calcific plaquing involving the carotid  bulb extending into the proximal aspect of the left ICA. This results in  an approximate 35% cross-sectional stenosis at the level of the left  carotid bulb. The distal left ICA is widely patent.     Examination of the right  carotid artery demonstrates mild plaquing at  the origin of the innominate. Right common carotid artery is widely  patent to the level the carotid bifurcation. At the carotid bifurcation  there is extensive mixed plaquing involving the carotid bulb and  extending into the proximal aspect of the ICA. Within the proximal  aspect of the ICA there is moderate stenosis with a 60% cross-sectional  narrowing. The more distal right ICA is widely patent.     IMPRESSION:  1.. There is calcific plaquing involving both carotid bifurcations. This  is more advanced on the right than on the left with an approximate 60%  cross-sectional narrowing noted at the very proximal aspect of the right  ICA. The more distal ICAs are widely patent.  2. There is calcific plaquing involving the origin of both vertebral  arteries with mild associated stenosis. Left vertebral artery is  dominant. The majority of flow from the right vertebral artery I suspect  terminates in the posterior inferior cerebellar artery although there is  a rudimentary connection to the left vertebral to form the basilar.  There is mild plaquing involving the intracranial aspect of the left  vertebral artery with mild associated stenosis.  3. There is a soft tissue nodule noted within the right piriform sinus.  This appears to emanate from the right epiglottis and median epiglottic  fold. Direct visualization of this is recommended. Neoplasia is not  excluded. There is no evidence of enlarged cervical lymphadenopathy.  4. There is some mild calcific plaquing at the origin of the great  vessels.  5. Chronic appearing sinus disease of the sphenoid sinus. There is also  a small mucous retention cyst or polyp within the left maxillary sinus.  This report was finalized on 08/08/2019 22:42 by Dr. Alfred Coates MD.      Patient Active Problem List   Diagnosis   • HTN (hypertension)   • Hyperlipidemia   • Nonrheumatic aortic valve stenosis   • Coronary artery disease  involving coronary bypass graft of native heart without angina pectoris   • Pacemaker   • OLMEDO (dyspnea on exertion)   • Other chest pain   • Coronary artery disease involving native coronary artery of native heart with angina pectoris (CMS/HCC)   • Overweight (BMI 25.0-29.9)   • TIA (transient ischemic attack)   • Carotid artery stenosis   • Acute cerebrovascular accident (CVA) of cerebellum (CMS/HCC)   • Anticoagulated   • Preop cardiovascular exam   • Stenosis of right carotid artery   • Preop testing        Diagnosis Plan   1. Bilateral carotid artery stenosis     2. Essential hypertension     3. Mixed hyperlipidemia         Plan: After thoroughly evaluating Mireille KYLIE Magallanes, I believe the best course of action is to proceed with a right carotid endarterectomy for stroke risk reduction.  The patient has approximately 85% stenosis on the right side with ulcerated plaque present.  The left side stenosis is about 75% narrowed.  We did have discussion regarding discrepancies between radiology reading and Dr. Woo reviewing his CTA.  I did review images with him in office today confirming why we recommended proceeding with surgery.   Risks of carotid endarterectomy include, but are not limited to, bleeding, infection, vessel damage, nerve damage, MI, stroke, and death.  The patient understands these risks and would like to proceed with the procedure. I did discuss vascular risk factors as they pertain to the progression of vascular disease including controlling hypertension and hyperlipidemia, which are currently stable.  We have already received cardiac clearance.  The patient is to continue taking their medications as previously discussed.   This was all discussed in full with complete understanding.  Thank you for allowing me to participate in the care of your patient.  Please do not hesitate to call with any questions or concerns.  We will keep you aware of any further encounters with Mireille KYLIE Magallanes.         Sincerely yours,         Berna Recinos, GIANNI Leos, Jw BUI PA-C

## 2020-02-12 ENCOUNTER — TELEPHONE (OUTPATIENT)
Dept: VASCULAR SURGERY | Facility: CLINIC | Age: 85
End: 2020-02-12

## 2020-02-12 PROBLEM — I65.23 BILATERAL CAROTID ARTERY STENOSIS: Status: ACTIVE | Noted: 2020-02-12

## 2020-02-12 NOTE — TELEPHONE ENCOUNTER
Spoke with patient and advised of upcoming procedure.  Patient pre work is scheduled for the same day as his procedure.  Patient procedure is scheduled for 2/14/2020 at 515 am.  Patient advised of location time and prep.  Patient expressed understanding for all that was discussed.

## 2020-02-14 ENCOUNTER — ANESTHESIA (OUTPATIENT)
Dept: PERIOP | Facility: HOSPITAL | Age: 85
End: 2020-02-14

## 2020-02-14 ENCOUNTER — ANESTHESIA EVENT (OUTPATIENT)
Dept: PERIOP | Facility: HOSPITAL | Age: 85
End: 2020-02-14

## 2020-02-14 ENCOUNTER — APPOINTMENT (OUTPATIENT)
Dept: ULTRASOUND IMAGING | Facility: HOSPITAL | Age: 85
End: 2020-02-14

## 2020-02-14 ENCOUNTER — HOSPITAL ENCOUNTER (INPATIENT)
Facility: HOSPITAL | Age: 85
LOS: 1 days | Discharge: HOME | End: 2020-02-15
Attending: SURGERY | Admitting: SURGERY

## 2020-02-14 DIAGNOSIS — I65.23 BILATERAL CAROTID ARTERY STENOSIS: ICD-10-CM

## 2020-02-14 PROBLEM — I65.29 CAROTID STENOSIS: Status: ACTIVE | Noted: 2020-02-14

## 2020-02-14 LAB
ABO GROUP BLD: NORMAL
ANION GAP SERPL CALCULATED.3IONS-SCNC: 14 MMOL/L (ref 5–15)
APTT PPP: 30 SECONDS (ref 24.1–35)
BLD GP AB SCN SERPL QL: NEGATIVE
BUN BLD-MCNC: 29 MG/DL (ref 8–23)
BUN/CREAT SERPL: 15.1 (ref 7–25)
CALCIUM SPEC-SCNC: 9.6 MG/DL (ref 8.6–10.5)
CHLORIDE SERPL-SCNC: 106 MMOL/L (ref 98–107)
CO2 SERPL-SCNC: 21 MMOL/L (ref 22–29)
CREAT BLD-MCNC: 1.92 MG/DL (ref 0.76–1.27)
DEPRECATED RDW RBC AUTO: 41 FL (ref 37–54)
ERYTHROCYTE [DISTWIDTH] IN BLOOD BY AUTOMATED COUNT: 13.2 % (ref 12.3–15.4)
GFR SERPL CREATININE-BSD FRML MDRD: 34 ML/MIN/1.73
GLUCOSE BLD-MCNC: 98 MG/DL (ref 65–99)
HCT VFR BLD AUTO: 40.1 % (ref 37.5–51)
HGB BLD-MCNC: 13.3 G/DL (ref 13–17.7)
INR PPP: 1.02 (ref 0.91–1.09)
MCH RBC QN AUTO: 28.7 PG (ref 26.6–33)
MCHC RBC AUTO-ENTMCNC: 33.2 G/DL (ref 31.5–35.7)
MCV RBC AUTO: 86.6 FL (ref 79–97)
PLATELET # BLD AUTO: 164 10*3/MM3 (ref 140–450)
PMV BLD AUTO: 10.4 FL (ref 6–12)
POTASSIUM BLD-SCNC: 4.4 MMOL/L (ref 3.5–5.2)
PROTHROMBIN TIME: 13.7 SECONDS (ref 11.9–14.6)
RBC # BLD AUTO: 4.63 10*6/MM3 (ref 4.14–5.8)
RH BLD: NEGATIVE
SODIUM BLD-SCNC: 141 MMOL/L (ref 136–145)
T&S EXPIRATION DATE: NORMAL
WBC NRBC COR # BLD: 6.87 10*3/MM3 (ref 3.4–10.8)

## 2020-02-14 PROCEDURE — 25010000002 ONDANSETRON PER 1 MG: Performed by: ANESTHESIOLOGY

## 2020-02-14 PROCEDURE — 25010000002 PHENYLEPHRINE 10 MG/ML SOLUTION 5 ML VIAL: Performed by: NURSE ANESTHETIST, CERTIFIED REGISTERED

## 2020-02-14 PROCEDURE — 25010000002 FENTANYL CITRATE (PF) 100 MCG/2ML SOLUTION: Performed by: ANESTHESIOLOGY

## 2020-02-14 PROCEDURE — 80048 BASIC METABOLIC PNL TOTAL CA: CPT | Performed by: SURGERY

## 2020-02-14 PROCEDURE — 86900 BLOOD TYPING SEROLOGIC ABO: CPT | Performed by: NURSE PRACTITIONER

## 2020-02-14 PROCEDURE — 85610 PROTHROMBIN TIME: CPT | Performed by: SURGERY

## 2020-02-14 PROCEDURE — 35301 RECHANNELING OF ARTERY: CPT | Performed by: SURGERY

## 2020-02-14 PROCEDURE — 25010000002 NEOSTIGMINE 10 MG/10ML SOLUTION 10 ML VIAL: Performed by: NURSE ANESTHETIST, CERTIFIED REGISTERED

## 2020-02-14 PROCEDURE — 25010000002 HEPARIN (PORCINE) PER 1000 UNITS: Performed by: NURSE ANESTHETIST, CERTIFIED REGISTERED

## 2020-02-14 PROCEDURE — 93882 EXTRACRANIAL UNI/LTD STUDY: CPT

## 2020-02-14 PROCEDURE — 85730 THROMBOPLASTIN TIME PARTIAL: CPT | Performed by: SURGERY

## 2020-02-14 PROCEDURE — 25010000002 PROPOFOL 10 MG/ML EMULSION: Performed by: NURSE ANESTHETIST, CERTIFIED REGISTERED

## 2020-02-14 PROCEDURE — 25010000002 CEFAZOLIN PER 500 MG: Performed by: SURGERY

## 2020-02-14 PROCEDURE — C1768 GRAFT, VASCULAR: HCPCS | Performed by: SURGERY

## 2020-02-14 PROCEDURE — 88311 DECALCIFY TISSUE: CPT | Performed by: SURGERY

## 2020-02-14 PROCEDURE — 25010000002 ONDANSETRON PER 1 MG: Performed by: NURSE ANESTHETIST, CERTIFIED REGISTERED

## 2020-02-14 PROCEDURE — 03UK0KZ SUPPLEMENT RIGHT INTERNAL CAROTID ARTERY WITH NONAUTOLOGOUS TISSUE SUBSTITUTE, OPEN APPROACH: ICD-10-PCS | Performed by: SURGERY

## 2020-02-14 PROCEDURE — 85027 COMPLETE CBC AUTOMATED: CPT | Performed by: SURGERY

## 2020-02-14 PROCEDURE — 93882 EXTRACRANIAL UNI/LTD STUDY: CPT | Performed by: SURGERY

## 2020-02-14 PROCEDURE — 86901 BLOOD TYPING SEROLOGIC RH(D): CPT | Performed by: NURSE PRACTITIONER

## 2020-02-14 PROCEDURE — 25010000003 LIDOCAINE 1 % SOLUTION: Performed by: SURGERY

## 2020-02-14 PROCEDURE — 25010000002 HEPARIN (PORCINE) PER 1000 UNITS: Performed by: SURGERY

## 2020-02-14 PROCEDURE — 88304 TISSUE EXAM BY PATHOLOGIST: CPT | Performed by: SURGERY

## 2020-02-14 PROCEDURE — 25010000002 DEXAMETHASONE PER 1 MG: Performed by: ANESTHESIOLOGY

## 2020-02-14 PROCEDURE — 03CK0ZZ EXTIRPATION OF MATTER FROM RIGHT INTERNAL CAROTID ARTERY, OPEN APPROACH: ICD-10-PCS | Performed by: SURGERY

## 2020-02-14 PROCEDURE — 86850 RBC ANTIBODY SCREEN: CPT | Performed by: NURSE PRACTITIONER

## 2020-02-14 DEVICE — PTCH VASC XENOSURE BIO 0.8X8CM: Type: IMPLANTABLE DEVICE | Site: CAROTID | Status: FUNCTIONAL

## 2020-02-14 RX ORDER — SODIUM CHLORIDE 0.9 % (FLUSH) 0.9 %
10 SYRINGE (ML) INJECTION AS NEEDED
Status: DISCONTINUED | OUTPATIENT
Start: 2020-02-14 | End: 2020-02-15 | Stop reason: HOSPADM

## 2020-02-14 RX ORDER — ONDANSETRON 2 MG/ML
4 INJECTION INTRAMUSCULAR; INTRAVENOUS EVERY 6 HOURS PRN
Status: DISCONTINUED | OUTPATIENT
Start: 2020-02-14 | End: 2020-02-15 | Stop reason: HOSPADM

## 2020-02-14 RX ORDER — NICARDIPINE HYDROCHLORIDE 2.5 MG/ML
INJECTION INTRAVENOUS AS NEEDED
Status: DISCONTINUED | OUTPATIENT
Start: 2020-02-14 | End: 2020-02-14 | Stop reason: SURG

## 2020-02-14 RX ORDER — FENTANYL CITRATE 50 UG/ML
25 INJECTION, SOLUTION INTRAMUSCULAR; INTRAVENOUS AS NEEDED
Status: DISCONTINUED | OUTPATIENT
Start: 2020-02-14 | End: 2020-02-14 | Stop reason: HOSPADM

## 2020-02-14 RX ORDER — LIDOCAINE HYDROCHLORIDE 10 MG/ML
INJECTION, SOLUTION INFILTRATION; PERINEURAL AS NEEDED
Status: DISCONTINUED | OUTPATIENT
Start: 2020-02-14 | End: 2020-02-14 | Stop reason: HOSPADM

## 2020-02-14 RX ORDER — BUPIVACAINE HCL/0.9 % NACL/PF 0.1 %
2 PLASTIC BAG, INJECTION (ML) EPIDURAL ONCE
Status: COMPLETED | OUTPATIENT
Start: 2020-02-14 | End: 2020-02-14

## 2020-02-14 RX ORDER — FLUMAZENIL 0.1 MG/ML
0.2 INJECTION INTRAVENOUS AS NEEDED
Status: DISCONTINUED | OUTPATIENT
Start: 2020-02-14 | End: 2020-02-14 | Stop reason: HOSPADM

## 2020-02-14 RX ORDER — LOSARTAN POTASSIUM 25 MG/1
100 TABLET ORAL
Status: DISCONTINUED | OUTPATIENT
Start: 2020-02-15 | End: 2020-02-15 | Stop reason: HOSPADM

## 2020-02-14 RX ORDER — LIDOCAINE HYDROCHLORIDE 40 MG/ML
SOLUTION TOPICAL AS NEEDED
Status: DISCONTINUED | OUTPATIENT
Start: 2020-02-14 | End: 2020-02-14 | Stop reason: SURG

## 2020-02-14 RX ORDER — PROPOFOL 10 MG/ML
VIAL (ML) INTRAVENOUS AS NEEDED
Status: DISCONTINUED | OUTPATIENT
Start: 2020-02-14 | End: 2020-02-14 | Stop reason: SURG

## 2020-02-14 RX ORDER — HYDROMORPHONE HYDROCHLORIDE 1 MG/ML
0.25 INJECTION, SOLUTION INTRAMUSCULAR; INTRAVENOUS; SUBCUTANEOUS
Status: DISCONTINUED | OUTPATIENT
Start: 2020-02-14 | End: 2020-02-14 | Stop reason: HOSPADM

## 2020-02-14 RX ORDER — CLOPIDOGREL BISULFATE 75 MG/1
75 TABLET ORAL DAILY
Status: DISCONTINUED | OUTPATIENT
Start: 2020-02-14 | End: 2020-02-15 | Stop reason: HOSPADM

## 2020-02-14 RX ORDER — GLYCOPYRROLATE 0.2 MG/ML
INJECTION INTRAMUSCULAR; INTRAVENOUS AS NEEDED
Status: DISCONTINUED | OUTPATIENT
Start: 2020-02-14 | End: 2020-02-14 | Stop reason: SURG

## 2020-02-14 RX ORDER — BUPIVACAINE HYDROCHLORIDE 5 MG/ML
INJECTION, SOLUTION PERINEURAL AS NEEDED
Status: DISCONTINUED | OUTPATIENT
Start: 2020-02-14 | End: 2020-02-14 | Stop reason: HOSPADM

## 2020-02-14 RX ORDER — LABETALOL HYDROCHLORIDE 5 MG/ML
20 INJECTION, SOLUTION INTRAVENOUS
Status: DISCONTINUED | OUTPATIENT
Start: 2020-02-14 | End: 2020-02-15 | Stop reason: HOSPADM

## 2020-02-14 RX ORDER — LIDOCAINE HYDROCHLORIDE 20 MG/ML
INJECTION, SOLUTION INFILTRATION; PERINEURAL AS NEEDED
Status: DISCONTINUED | OUTPATIENT
Start: 2020-02-14 | End: 2020-02-14 | Stop reason: SURG

## 2020-02-14 RX ORDER — SODIUM CHLORIDE 0.9 % (FLUSH) 0.9 %
3 SYRINGE (ML) INJECTION EVERY 12 HOURS SCHEDULED
Status: DISCONTINUED | OUTPATIENT
Start: 2020-02-14 | End: 2020-02-14 | Stop reason: HOSPADM

## 2020-02-14 RX ORDER — IPRATROPIUM BROMIDE AND ALBUTEROL SULFATE 2.5; .5 MG/3ML; MG/3ML
3 SOLUTION RESPIRATORY (INHALATION) ONCE AS NEEDED
Status: DISCONTINUED | OUTPATIENT
Start: 2020-02-14 | End: 2020-02-14 | Stop reason: HOSPADM

## 2020-02-14 RX ORDER — ROCURONIUM BROMIDE 10 MG/ML
INJECTION, SOLUTION INTRAVENOUS AS NEEDED
Status: DISCONTINUED | OUTPATIENT
Start: 2020-02-14 | End: 2020-02-14 | Stop reason: SURG

## 2020-02-14 RX ORDER — HYDROCODONE BITARTRATE AND ACETAMINOPHEN 5; 325 MG/1; MG/1
1 TABLET ORAL EVERY 4 HOURS PRN
Status: DISCONTINUED | OUTPATIENT
Start: 2020-02-14 | End: 2020-02-15 | Stop reason: HOSPADM

## 2020-02-14 RX ORDER — SODIUM CHLORIDE, SODIUM LACTATE, POTASSIUM CHLORIDE, CALCIUM CHLORIDE 600; 310; 30; 20 MG/100ML; MG/100ML; MG/100ML; MG/100ML
100 INJECTION, SOLUTION INTRAVENOUS CONTINUOUS
Status: DISCONTINUED | OUTPATIENT
Start: 2020-02-14 | End: 2020-02-14 | Stop reason: HOSPADM

## 2020-02-14 RX ORDER — ACETAMINOPHEN 325 MG/1
650 TABLET ORAL EVERY 4 HOURS PRN
Status: DISCONTINUED | OUTPATIENT
Start: 2020-02-14 | End: 2020-02-15 | Stop reason: HOSPADM

## 2020-02-14 RX ORDER — LABETALOL HYDROCHLORIDE 5 MG/ML
5 INJECTION, SOLUTION INTRAVENOUS
Status: DISCONTINUED | OUTPATIENT
Start: 2020-02-14 | End: 2020-02-14 | Stop reason: HOSPADM

## 2020-02-14 RX ORDER — NITROGLYCERIN 0.4 MG/1
0.4 TABLET SUBLINGUAL
Status: DISCONTINUED | OUTPATIENT
Start: 2020-02-14 | End: 2020-02-15 | Stop reason: HOSPADM

## 2020-02-14 RX ORDER — NALOXONE HCL 0.4 MG/ML
0.4 VIAL (ML) INJECTION
Status: DISCONTINUED | OUTPATIENT
Start: 2020-02-14 | End: 2020-02-15 | Stop reason: HOSPADM

## 2020-02-14 RX ORDER — LIDOCAINE HYDROCHLORIDE 10 MG/ML
0.5 INJECTION, SOLUTION EPIDURAL; INFILTRATION; INTRACAUDAL; PERINEURAL ONCE AS NEEDED
Status: DISCONTINUED | OUTPATIENT
Start: 2020-02-14 | End: 2020-02-14 | Stop reason: HOSPADM

## 2020-02-14 RX ORDER — MORPHINE SULFATE 2 MG/ML
2 INJECTION, SOLUTION INTRAMUSCULAR; INTRAVENOUS EVERY 4 HOURS PRN
Status: DISCONTINUED | OUTPATIENT
Start: 2020-02-14 | End: 2020-02-15 | Stop reason: HOSPADM

## 2020-02-14 RX ORDER — HEPARIN SODIUM 1000 [USP'U]/ML
INJECTION, SOLUTION INTRAVENOUS; SUBCUTANEOUS AS NEEDED
Status: DISCONTINUED | OUTPATIENT
Start: 2020-02-14 | End: 2020-02-14 | Stop reason: SURG

## 2020-02-14 RX ORDER — ATORVASTATIN CALCIUM 80 MG/1
80 TABLET, FILM COATED ORAL DAILY
COMMUNITY
End: 2021-05-17 | Stop reason: SDUPTHER

## 2020-02-14 RX ORDER — NEOSTIGMINE METHYLSULFATE 1 MG/ML
INJECTION, SOLUTION INTRAVENOUS AS NEEDED
Status: DISCONTINUED | OUTPATIENT
Start: 2020-02-14 | End: 2020-02-14 | Stop reason: SURG

## 2020-02-14 RX ORDER — DEXAMETHASONE SODIUM PHOSPHATE 4 MG/ML
4 INJECTION, SOLUTION INTRA-ARTICULAR; INTRALESIONAL; INTRAMUSCULAR; INTRAVENOUS; SOFT TISSUE ONCE AS NEEDED
Status: COMPLETED | OUTPATIENT
Start: 2020-02-14 | End: 2020-02-14

## 2020-02-14 RX ORDER — SODIUM CHLORIDE 0.9 % (FLUSH) 0.9 %
3 SYRINGE (ML) INJECTION AS NEEDED
Status: DISCONTINUED | OUTPATIENT
Start: 2020-02-14 | End: 2020-02-14 | Stop reason: HOSPADM

## 2020-02-14 RX ORDER — ONDANSETRON 4 MG/1
4 TABLET, FILM COATED ORAL EVERY 6 HOURS PRN
Status: DISCONTINUED | OUTPATIENT
Start: 2020-02-14 | End: 2020-02-15 | Stop reason: HOSPADM

## 2020-02-14 RX ORDER — SODIUM CHLORIDE, SODIUM LACTATE, POTASSIUM CHLORIDE, CALCIUM CHLORIDE 600; 310; 30; 20 MG/100ML; MG/100ML; MG/100ML; MG/100ML
1000 INJECTION, SOLUTION INTRAVENOUS CONTINUOUS
Status: DISCONTINUED | OUTPATIENT
Start: 2020-02-14 | End: 2020-02-14 | Stop reason: HOSPADM

## 2020-02-14 RX ORDER — ONDANSETRON 2 MG/ML
4 INJECTION INTRAMUSCULAR; INTRAVENOUS AS NEEDED
Status: DISCONTINUED | OUTPATIENT
Start: 2020-02-14 | End: 2020-02-14 | Stop reason: HOSPADM

## 2020-02-14 RX ORDER — AMLODIPINE BESYLATE 10 MG/1
10 TABLET ORAL DAILY
Status: DISCONTINUED | OUTPATIENT
Start: 2020-02-15 | End: 2020-02-15 | Stop reason: HOSPADM

## 2020-02-14 RX ORDER — SODIUM CHLORIDE 9 MG/ML
75 INJECTION, SOLUTION INTRAVENOUS CONTINUOUS
Status: DISCONTINUED | OUTPATIENT
Start: 2020-02-14 | End: 2020-02-15 | Stop reason: HOSPADM

## 2020-02-14 RX ORDER — ONDANSETRON 2 MG/ML
INJECTION INTRAMUSCULAR; INTRAVENOUS AS NEEDED
Status: DISCONTINUED | OUTPATIENT
Start: 2020-02-14 | End: 2020-02-14 | Stop reason: SURG

## 2020-02-14 RX ORDER — SODIUM CHLORIDE 0.9 % (FLUSH) 0.9 %
3-10 SYRINGE (ML) INJECTION AS NEEDED
Status: DISCONTINUED | OUTPATIENT
Start: 2020-02-14 | End: 2020-02-14 | Stop reason: HOSPADM

## 2020-02-14 RX ORDER — HYDRALAZINE HYDROCHLORIDE 20 MG/ML
5 INJECTION INTRAMUSCULAR; INTRAVENOUS
Status: DISCONTINUED | OUTPATIENT
Start: 2020-02-14 | End: 2020-02-14 | Stop reason: HOSPADM

## 2020-02-14 RX ORDER — ATORVASTATIN CALCIUM 40 MG/1
40 TABLET, FILM COATED ORAL DAILY
Status: DISCONTINUED | OUTPATIENT
Start: 2020-02-15 | End: 2020-02-15 | Stop reason: HOSPADM

## 2020-02-14 RX ORDER — NALOXONE HCL 0.4 MG/ML
0.04 VIAL (ML) INJECTION AS NEEDED
Status: DISCONTINUED | OUTPATIENT
Start: 2020-02-14 | End: 2020-02-14 | Stop reason: HOSPADM

## 2020-02-14 RX ORDER — SODIUM CHLORIDE, SODIUM LACTATE, POTASSIUM CHLORIDE, CALCIUM CHLORIDE 600; 310; 30; 20 MG/100ML; MG/100ML; MG/100ML; MG/100ML
20 INJECTION, SOLUTION INTRAVENOUS CONTINUOUS
Status: DISCONTINUED | OUTPATIENT
Start: 2020-02-14 | End: 2020-02-14 | Stop reason: HOSPADM

## 2020-02-14 RX ORDER — SODIUM CHLORIDE 0.9 % (FLUSH) 0.9 %
3 SYRINGE (ML) INJECTION EVERY 12 HOURS SCHEDULED
Status: DISCONTINUED | OUTPATIENT
Start: 2020-02-14 | End: 2020-02-15 | Stop reason: HOSPADM

## 2020-02-14 RX ORDER — BUPIVACAINE HCL/0.9 % NACL/PF 0.1 %
2 PLASTIC BAG, INJECTION (ML) EPIDURAL EVERY 8 HOURS
Status: COMPLETED | OUTPATIENT
Start: 2020-02-14 | End: 2020-02-15

## 2020-02-14 RX ORDER — OXYCODONE HYDROCHLORIDE AND ACETAMINOPHEN 5; 325 MG/1; MG/1
1 TABLET ORAL ONCE AS NEEDED
Status: COMPLETED | OUTPATIENT
Start: 2020-02-14 | End: 2020-02-14

## 2020-02-14 RX ADMIN — ONDANSETRON HYDROCHLORIDE 4 MG: 2 SOLUTION INTRAMUSCULAR; INTRAVENOUS at 14:24

## 2020-02-14 RX ADMIN — ONDANSETRON HYDROCHLORIDE 4 MG: 2 SOLUTION INTRAMUSCULAR; INTRAVENOUS at 13:58

## 2020-02-14 RX ADMIN — FENTANYL CITRATE 25 MCG: 50 INJECTION, SOLUTION INTRAMUSCULAR; INTRAVENOUS at 14:25

## 2020-02-14 RX ADMIN — SODIUM CHLORIDE, PRESERVATIVE FREE 3 ML: 5 INJECTION INTRAVENOUS at 20:11

## 2020-02-14 RX ADMIN — Medication 2 G: at 12:21

## 2020-02-14 RX ADMIN — NEOSTIGMINE METHYLSULFATE 3 MG: 1 INJECTION INTRAVENOUS at 13:58

## 2020-02-14 RX ADMIN — ROCURONIUM BROMIDE 50 MG: 10 INJECTION INTRAVENOUS at 12:16

## 2020-02-14 RX ADMIN — NICARDIPINE HYDROCHLORIDE 500 MCG: 25 INJECTION INTRAVENOUS at 13:34

## 2020-02-14 RX ADMIN — FENTANYL CITRATE 25 MCG: 50 INJECTION, SOLUTION INTRAMUSCULAR; INTRAVENOUS at 14:27

## 2020-02-14 RX ADMIN — SODIUM CHLORIDE 2 G: 9 INJECTION, SOLUTION INTRAVENOUS at 20:12

## 2020-02-14 RX ADMIN — SODIUM CHLORIDE 75 ML/HR: 9 INJECTION, SOLUTION INTRAVENOUS at 18:00

## 2020-02-14 RX ADMIN — NICARDIPINE HYDROCHLORIDE 500 MCG: 25 INJECTION INTRAVENOUS at 14:02

## 2020-02-14 RX ADMIN — PHENYLEPHRINE HYDROCHLORIDE 0.1 MCG/KG/MIN: 10 INJECTION INTRAVENOUS at 12:21

## 2020-02-14 RX ADMIN — GLYCOPYRROLATE 0.4 MG: 0.2 INJECTION, SOLUTION INTRAMUSCULAR; INTRAVENOUS at 13:58

## 2020-02-14 RX ADMIN — DEXAMETHASONE SODIUM PHOSPHATE 4 MG: 4 INJECTION, SOLUTION INTRAMUSCULAR; INTRAVENOUS at 11:40

## 2020-02-14 RX ADMIN — CLOPIDOGREL 75 MG: 75 TABLET, FILM COATED ORAL at 18:00

## 2020-02-14 RX ADMIN — OXYCODONE HYDROCHLORIDE AND ACETAMINOPHEN 1 TABLET: 5; 325 TABLET ORAL at 15:03

## 2020-02-14 RX ADMIN — SODIUM CHLORIDE, POTASSIUM CHLORIDE, SODIUM LACTATE AND CALCIUM CHLORIDE 100 ML/HR: 600; 310; 30; 20 INJECTION, SOLUTION INTRAVENOUS at 15:53

## 2020-02-14 RX ADMIN — SODIUM CHLORIDE, POTASSIUM CHLORIDE, SODIUM LACTATE AND CALCIUM CHLORIDE 1000 ML: 600; 310; 30; 20 INJECTION, SOLUTION INTRAVENOUS at 07:43

## 2020-02-14 RX ADMIN — LIDOCAINE HYDROCHLORIDE 1 EACH: 40 SOLUTION TOPICAL at 12:16

## 2020-02-14 RX ADMIN — LIDOCAINE HYDROCHLORIDE 80 MG: 20 INJECTION, SOLUTION INFILTRATION; PERINEURAL at 12:16

## 2020-02-14 RX ADMIN — PROPOFOL 150 MG: 10 INJECTION, EMULSION INTRAVENOUS at 12:16

## 2020-02-14 RX ADMIN — HYDROCODONE BITARTRATE AND ACETAMINOPHEN 1 TABLET: 5; 325 TABLET ORAL at 21:54

## 2020-02-14 RX ADMIN — REMIFENTANIL HYDROCHLORIDE 0.2 MCG/KG/MIN: 1 INJECTION, POWDER, LYOPHILIZED, FOR SOLUTION INTRAVENOUS at 12:16

## 2020-02-14 RX ADMIN — HEPARIN SODIUM 7000 UNITS: 1000 INJECTION, SOLUTION INTRAVENOUS; SUBCUTANEOUS at 12:49

## 2020-02-14 RX ADMIN — SODIUM CHLORIDE, POTASSIUM CHLORIDE, SODIUM LACTATE AND CALCIUM CHLORIDE 20 ML/HR: 600; 310; 30; 20 INJECTION, SOLUTION INTRAVENOUS at 07:43

## 2020-02-14 NOTE — ANESTHESIA PROCEDURE NOTES
Airway  Urgency: elective    Date/Time: 2/14/2020 12:16 PM  Airway not difficult    General Information and Staff    Patient location during procedure: OR  CRNA: Shadi Melendez CRNA    Indications and Patient Condition  Indications for airway management: airway protection    Preoxygenated: yes  MILS maintained throughout  Mask difficulty assessment: 1 - vent by mask    Final Airway Details  Final airway type: endotracheal airway      Successful airway: ETT  Cuffed: yes   Successful intubation technique: direct laryngoscopy  Facilitating devices/methods: intubating stylet  Endotracheal tube insertion site: oral  Blade: Melina  Blade size: 4  ETT size (mm): 7.5  Cormack-Lehane Classification: grade IIa - partial view of glottis  Placement verified by: chest auscultation and capnometry   Cuff volume (mL): 8  Measured from: lips  ETT/EBT  to lips (cm): 21  Number of attempts at approach: 1  Assessment: lips, teeth, and gum same as pre-op and atraumatic intubation

## 2020-02-14 NOTE — NURSING NOTE
Pt to PACU at 1408.  Cardiac monitor showed  paced rhythm.  KATHY Rodriguez from ER Interrogated pacemaker and called [employer] rep.  [employer] rep faxed results and called to say pacemaker working properly.  Pt is pacing 70's now.

## 2020-02-14 NOTE — ANESTHESIA PROCEDURE NOTES
Arterial Line    Pre-sedation assessment completed: 2/14/2020 11:30 AM    Patient reassessed immediately prior to procedure    Patient location during procedure: holding area  Start time: 2/14/2020 11:35 AM  Stop Time:2/14/2020 11:38 AM       Line placed for ABGs/Labs/ISTAT and hemodynamic monitoring.  Performed By   Anesthesiologist: Shayne Quintanilla MD  Preanesthetic Checklist  Completed: patient identified, site marked, surgical consent, pre-op evaluation, timeout performed, IV checked, risks and benefits discussed and monitors and equipment checked  Arterial Line Prep   Sterile Tech: gloves  Prep: ChloraPrep  Patient monitoring: continuous pulse oximetry  Arterial Line Procedure   Laterality:left  Location:  radial artery  Catheter size: 20 G   Guidance: ultrasound guided  PROCEDURE NOTE/ULTRASOUND INTERPRETATION.  Using ultrasound guidance the potential vascular sites for insertion of the catheter were visualized to determine the patency of the vessel to be used for vascular access.  After selecting the appropriate site for insertion, the needle was visualized under ultrasound being inserted into the radial artery, followed by ultrasound confirmation of wire and catheter placement. There were no abnormalities seen on ultrasound; an image was taken; and the patient tolerated the procedure with no complications.   Number of attempts: 1  Successful placement: yes  Post Assessment   Dressing Type: occlusive dressing applied, secured with tape and wrist guard applied.   Complications no  Circ/Move/Sens Assessment: normal and unchanged.   Patient Tolerance: patient tolerated the procedure well with no apparent complications

## 2020-02-14 NOTE — OP NOTE
Mireille Magallanes  2/14/2020     PREOPERATIVE DIAGNOSIS: Bilateral carotid artery stenosis [I65.23]     POSTOPERATIVE DIAGNOSIS: Post-Op Diagnosis Codes:     * Bilateral carotid artery stenosis [I65.23]     PROCEDURE PERFORMED:   1.  Right carotid endarterectomy with bovine patch angioplasty  2.  Continuous EEG monitoring  3.  Completion arterial duplex     SURGEON: Martín Woo DO   Assistant: Olvin Virk MD     ANESTHESIA: General.    PREPARATION: Routine.    STAFF: Circulator: Vaughn Jiang RN; Shayna Miller RN  Scrub Person: Naima Ambrocio; Mei Stevens  Vendor Representative: Paige Azar  Assistant: Brittaney Martinez    Estimated Blood Loss: 50 mL    SPECIMENS: Carotid plaque    COMPLICATIONS: None    INDICATIONS: Mireille Magallanes is a 84 y.o. male who we are currently following for stroke.  He was recently hospitalized with a stroke.  This was a left superior cerebellar stroke.  He did have left facial and arm weakness that started with left eye pain/headache beginning the day before.  He was seen by cardiology in the hospital as well for moderate aortic valve stenosis with mild mitral valve stenosis and septal asymmetric hypertrophy with left atrial dilation.  He is on Plavix and Lipitor.  He did have workup including a CTA of the neck showing significant bilateral carotid artery stenosis.  It was recommended at his previous visit that he undergo a right carotid endarterectomy for stroke risk reduction.  He did undergo cardiology clearance but at that point had decided to hold off from surgery.  He has now decided he would like to proceed with surgery and is here to discuss. The indications, risks, and possible complications of the procedure were explained to the patient, who voiced understanding and wished to proceed with surgery.     PROCEDURE IN DETAIL:   The patient was taken to the operating room and placed on the operating table in a supine position. After general anesthesia was  obtained, the right neck and chest was prepped and draped in a sterile manner.  A longitudinal incision was then made along the anterior border the sternocleidomastoid muscle.  Careful dissection was made down through the subcutaneous tissues using the Bovie cautery to ensure hemostasis.  Any crossing veins were ligated with 3-0 silk suture and hemoclips.  The sternocleidomastoid muscle was retracted laterally.  The carotid sheath was entered over the common carotid artery.  The Metzenbaum scissors were used to free up the common carotid artery from its local attachments.  A large vessel loop was placed for proximal vascular control.  Dissection was then carried out distally along the carotid artery encountering the facial vein which was ligated with 2-0 silk suture.  This gave rise to the bifurcation.  The superior thyroid artery was identified and encircled with a 2-0 silk suture for vascular control.  The external carotid artery was dissected free and a large vessel loop was placed for vascular control.  The very distal internal carotid artery was carefully dissected free and a small vessel loop was placed for vascular control.  At this point full exposure was established.  The ansa cervicalis, hypoglossal, vagus nerves were all identified.  The patient was given 7000 units of intravenous heparin.  After 3 minutes the distal internal carotid artery was test clamped.  After 2 minutes there were no EEG monitor changes.  The common and external were then clamped in succession.  Using 11 blade an arteriotomy was made in the common carotid artery.  It was extended up along the distal internal carotid artery with the Barreto scissors.  There was a significant amount of heavy plaque burden in the proximal internal carotid artery.  Using the [place] elevator the standard endarterectomy was performed removing all the plaque burden.  Heparinized saline was used to irrigate the wound bed and all loose debris was picked clean.   The bovine pericardial patch was brought to the field and starting distally on the internal carotid artery the patch anastomosis was performed with a 6-0 Prolene in a running fashion.  Both sutures were brought down to the midline.  The patch was then appropriately fashioned proximally.  Starting with 5-0 Prolene the patch anastomosis was continued in a running fashion to meet in the midline.  Prior to completion of the patch anastomosis the appropriate flushing maneuvers were performed and anastomosis was completed.  Flow was reestablished.  Hemostasis was observed.  A completion arterial duplex was performed which showed adequate flow velocities in the common, internal, and external carotid arteries.  There was no evidence of flap formation, debris, or thrombosis.  At this point I felt the result was excellent and no further intervention was warranted.  A separate stab incision was made on the inferior part of the neck and a 15 Maltese round MORE drain was placed.  It was secured to skin with a 2-0 nylon.  Gelfoam with thrombin was used to ensure hemostasis.  Antibiotic saline was used to irrigate the wound bed.  The platysma muscle was then reapproximated using a 3-0 Vicryl in a running fashion.  The skin was then anesthetized using 18 mL of 0.5% Marcaine plain.  The skin was then reapproximated using a 4-0 Monocryl in a subcuticular fashion.  The wound was then cleaned.   Sterile dressings were applied. The patient tolerated the procedure well. Sponge and needle counts were correct. The patient was then awakened and extubated in the operating room and taken to the recovery room in good condition.The patient awoke from anesthesia neurologically intact and there were no EEG monitor changes throughout.      Martín Woo,   Date: 2/14/2020 Time: 1:55 PM    CC:MD Deric Spangler Robert A, PA-C

## 2020-02-14 NOTE — H&P
Mireille Magallanes  [id number]  [id number]  [facility] OR/NONE  Elizabethking Martín GUILLEDO  2/14/2020    CC: Carotid stenosis    HPI: I had the pleasure of seeing your patient Mireille Magallanes in the office today.  As you recall, Mireille Magallanes is a 84 y.o.  male who you are currently following for stroke.  He was recently hospitalized with a stroke.  This was a left superior cerebellar stroke.  He did have left facial and arm weakness that started with left eye pain/headache beginning the day before.  He was seen by cardiology in the hospital as well for moderate aortic valve stenosis with mild mitral valve stenosis and septal asymmetric hypertrophy with left atrial dilation.  He is on Plavix and Lipitor.  He did have workup including a CTA of the neck showing significant bilateral carotid artery stenosis.  It was recommended at his previous visit that he undergo a right carotid endarterectomy for stroke risk reduction.  He did undergo cardiology clearance but at that point had decided to hold off from surgery.  He has now decided he would like to proceed with surgery and is here to discuss.    Past Medical History:   Diagnosis Date   • Acute viral hepatitis A    • Aortic valve stenosis    • Carotid artery stenosis    • Carotid artery stenosis     Right   • CKD (chronic kidney disease)    • Coagulopathy (CMS/HCC)     Plavix   • Coagulopathy (CMS/HCC)    • Coronary arteriosclerosis     stent ,? 4/2104, on plavix now.   • Diarrhea    • Dysphagia     Unspecified   • Family history of colon cancer    • Hematochezia     differential diagnosis discussed   • Hemorrhoids     Unspecified   • History of cardiac pacemaker 01/08/2018    Asterisk   • History of colonic polyps    • Hypercholesterolemia    • Hyperlipidemia    • Hypertension    • Left ventricular hypertrophy    • Melanoma of back (CMS/HCC)    • Obesity    • Osteoarthritis    • Peripheral neuropathy    • Polyp of colon    • Stroke (CMS/HCC)    • TIA (transient ischemic  attack)    • Tobacco non-user        Past Surgical History:   Procedure Laterality Date   • APPENDECTOMY     • CARDIAC CATHETERIZATION     • CARDIAC CATHETERIZATION N/A 3/18/2019    Procedure: Left Heart Cath;  Surgeon: Kaushik Toledo MD;  Location:  [facility] CATH [facility];  Service: Cardiology   • CARDIAC ELECTROPHYSIOLOGY PROCEDURE N/A 2018    Procedure: Temporary Pacemaker;  Surgeon: Luis E Courtney MD;  Location:  [facility] CATH [facility];  Service:    • CARDIAC ELECTROPHYSIOLOGY PROCEDURE N/A 2018    Procedure: Device Implant;  Surgeon: Kaushik Toledo MD;  Location:  [facility] CATH INVASIVE LOCATION;  Service:    • CAROTID STENT     • COLON SURGERY      for Ischemic Colitis   • COLONOSCOPY  2008    tubular adenoma ascending colon polyp - 3 yr   • COLONOSCOPY  2003    colon polyp @ 24 cm-see path - 5 yr   • COLONOSCOPY  2000    small interanl hemorrhoid - 3-5 yr   • COLONOSCOPY  1998    (8)small colon polyps removed-see path, internal anal skin tag - 2-3 yr   • CORONARY ARTERY BYPASS GRAFT     • CORONARY STENT PLACEMENT      x1  dr crane    • OTHER SURGICAL HISTORY      Lithotripsy   • OTHER SURGICAL HISTORY  2011    recall 3 yr.    • PACEMAKER IMPLANTATION  2018   • VASECTOMY         Family History   Problem Relation Age of Onset   • Colon cancer Father         Diagnosed at 74 YO   • Other Sister         Polyps/Colon   • Other Brother         Polyps/Colon   • Stroke Mother        Social History     Socioeconomic History   • Marital status:      Spouse name: Not on file   • Number of children: Not on file   • Years of education: Not on file   • Highest education level: Not on file   Tobacco Use   • Smoking status: Former Smoker     Types: Cigarettes     Last attempt to quit:      Years since quittin.1   • Smokeless tobacco: Never Used   Substance and Sexual Activity   • Alcohol use: No   • Drug use: No   • Sexual activity: Defer      "      Allergies   Allergen Reactions   • Sulfa Antibiotics Rash     Sulfa Drugs       Medications Prior to Admission   Medication Sig Dispense Refill Last Dose   • amLODIPine (NORVASC) 10 MG tablet Take 1 tablet by mouth Daily. 30 tablet 11 2/14/2020 at 0330   • atorvastatin (LIPITOR) 40 MG tablet Take 1 tablet by mouth Daily. 30 tablet 2 2/14/2020 at 0330   • clopidogrel (PLAVIX) 75 MG tablet Take 1 tablet by mouth Daily. 30 tablet 11 2/13/2020 at 0700   • Coenzyme Q10 (COQ-10) 100 MG capsule Take  by mouth daily.   2/13/2020 at 0700   • irbesartan (AVAPRO) 300 MG tablet Take 300 mg by mouth Every Night.   2/14/2020 at 0330   • nitroglycerin (NITROSTAT) 0.4 MG SL tablet Place 0.4 mg under the tongue as needed.   More than a month at Unknown time       Review of Systems   Constitutional: Negative.    HENT: Negative.    Eyes: Negative.    Respiratory: Negative.    Cardiovascular: Negative.    Gastrointestinal: Negative.    Endocrine: Negative.    Genitourinary: Negative.    Musculoskeletal: Negative.    Skin: Negative.    Allergic/Immunologic: Negative.    Neurological: Negative.    Hematological: Negative.    Psychiatric/Behavioral: Negative.    All other systems reviewed and are negative.      /89 (BP Location: Left arm, Patient Position: Sitting)   Pulse 76   Temp 97.6 °F (36.4 °C) (Temporal)   Resp 16   Ht 174 cm (68.5\")   Wt 92.7 kg (204 lb 5.9 oz)   SpO2 97%   BMI 30.62 kg/m²   Physical Exam  Constitutional: He is oriented to person, place, and time. He appears well-developed and well-nourished.   HENT:   Head: Normocephalic and atraumatic.   Eyes: Pupils are equal, round, and reactive to light. No scleral icterus.   Neck: Neck supple. No JVD present. Carotid bruit is not present. No thyromegaly present.   Cardiovascular: Normal rate and regular rhythm.   Murmur heard.  Pulses:       Carotid pulses are 2+ on the right side, and 2+ on the left side.       Femoral pulses are 2+ on the right side, and " 2+ on the left side.       Popliteal pulses are 2+ on the right side, and 2+ on the left side.        Dorsalis pedis pulses are 2+ on the right side, and 2+ on the left side.        Posterior tibial pulses are 2+ on the right side, and 2+ on the left side.   Pulmonary/Chest: Effort normal and breath sounds normal.   Abdominal: Soft. Bowel sounds are normal. He exhibits no distension, no abdominal bruit and no mass. There is no hepatosplenomegaly. There is no tenderness.   Musculoskeletal: Normal range of motion. He exhibits no edema.   Lymphadenopathy:     He has no cervical adenopathy.   Neurological: He is alert and oriented to person, place, and time. He has normal strength. No cranial nerve deficit or sensory deficit.   Skin: Skin is warm, dry and intact.   Psychiatric: He has a normal mood and affect.   Nursing note and vitals reviewed.  Lab Results (last 7 days)     Procedure Component Value Units Date/Time    Basic Metabolic Panel [[id number]]  (Abnormal) Collected:  02/14/20 0728    Specimen:  Blood Updated:  02/14/20 0804     Glucose 98 mg/dL      BUN 29 mg/dL      Creatinine 1.92 mg/dL      Sodium 141 mmol/L      Potassium 4.4 mmol/L      Chloride 106 mmol/L      CO2 21.0 mmol/L      Calcium 9.6 mg/dL      eGFR Non African Amer 34 mL/min/1.73      BUN/Creatinine Ratio 15.1     Anion Gap 14.0 mmol/L     Narrative:       GFR Normal >60  Chronic Kidney Disease <60  Kidney Failure <15      Protime-INR [[id number]]  (Normal) Collected:  02/14/20 0731    Specimen:  Blood Updated:  02/14/20 0800     Protime 13.7 Seconds      INR 1.02    CBC (No Diff) [[id number]]  (Normal) Collected:  02/14/20 0732    Specimen:  Blood Updated:  02/14/20 0752     WBC 6.87 10*3/mm3      RBC 4.63 10*6/mm3      Hemoglobin 13.3 g/dL      Hematocrit 40.1 %      MCV 86.6 fL      MCH 28.7 pg      MCHC 33.2 g/dL      RDW 13.2 %      RDW-SD 41.0 fl      MPV 10.4 fL      Platelets 164 10*3/mm3           Imaging Results (Last 7 Days)     ** No  results found for the last 168 hours. **          Impression:  1. Bilateral carotid artery stenosis          Plan:  After thoroughly evaluating Mireille Magallanes, I believe the best course of action is to proceed with a right carotid endarterectomy for stroke risk reduction.  The patient has approximately 85% stenosis on the right side with ulcerated plaque present.  The left side stenosis is about 75% narrowed.  We did have discussion regarding discrepancies between radiology reading and Dr. Woo reviewing his CTA.  I did review images with him in office today confirming why we recommended proceeding with surgery.   Risks of carotid endarterectomy include, but are not limited to, bleeding, infection, vessel damage, nerve damage, MI, stroke, and death.  The patient understands these risks and would like to proceed with the procedure. I did discuss vascular risk factors as they pertain to the progression of vascular disease including controlling hypertension and hyperlipidemia, which are currently stable.  We have already received cardiac clearance.  The patient is to continue taking their medications as previously discussed.   This was all discussed in full with complete understanding.  Thank you for allowing me to participate in the care of your patient.  Please do not hesitate to call with any questions or concerns.  We will keep you aware of any further encounters with Mireille Magallanes.    Martín Woo,   2/14/2020  9:52 AM

## 2020-02-14 NOTE — ANESTHESIA PREPROCEDURE EVALUATION
Anesthesia Evaluation     Patient summary reviewed   no history of anesthetic complications:  NPO Solid Status: > 8 hours  NPO Liquid Status: > 4 hours           Airway   Mallampati: III  TM distance: >3 FB  Neck ROM: full  Dental    (+) edentulous, upper dentures and lower dentures    Pulmonary     breath sounds clear to auscultation  (+) a smoker (quit 1999) Former,   (-) asthma, recent URI, sleep apnea  Cardiovascular - normal exam  Exercise tolerance: good (4-7 METS)    ECG reviewed  Rhythm: regular  Rate: normal    (+) pacemaker (Guidant, interrogated 1/28/20, V-paced 92%), hypertension, valvular problems/murmurs AS, CAD, CABG (2001), cardiac stents (2014) hyperlipidemia,  carotid artery disease  (-) past MI, angina    ROS comment: TTE 8/8/19 - ·Left ventricular systolic function is low normal. EF 51-55%.  ·Moderate aortic valve stenosis is present.  ·Left ventricular wall thickness is consistent with mild septal asymmetric hypertrophy.  ·Left ventricular diastolic dysfunction (grade II) consistent with pseudonormalization.  ·Left atrial cavity size is severely dilated.  ·Mild mitral valve stenosis is present  ·Normal size and function of right ventricle.    Cath 3/18/19 - 1.  Physiologically insignificant stenosis of the proximal left main coronary artery (FFR 0.87).  Patent stent in the mid LAD with an occluded diagonal branch, plus occlusions of OM1 and OM 2.    2.  Saphenous vein grafts to OM1->OM2, and to diagonal branch, are widely patent.    Stress Echo 3/12/19 - Cannot exclude inferior ischemia based on wall motion response to dobutamine infusion.    Neuro/Psych  (+) TIA, CVA (x3, last one 8/2019, no residual weakness),     (-) seizures  GI/Hepatic/Renal/Endo    (+) obesity,   renal disease CRI,   (-) GERD, liver disease, diabetes, no thyroid disorder    Musculoskeletal     Abdominal    Substance History      OB/GYN          Other                      Anesthesia Plan    ASA 3     general      intravenous induction     Anesthetic plan, all risks, benefits, and alternatives have been provided, discussed and informed consent has been obtained with: patient.

## 2020-02-15 VITALS
OXYGEN SATURATION: 94 % | RESPIRATION RATE: 16 BRPM | TEMPERATURE: 97.9 F | WEIGHT: 204.37 LBS | DIASTOLIC BLOOD PRESSURE: 95 MMHG | HEIGHT: 69 IN | SYSTOLIC BLOOD PRESSURE: 160 MMHG | BODY MASS INDEX: 30.27 KG/M2 | HEART RATE: 82 BPM

## 2020-02-15 PROCEDURE — 25010000002 CEFAZOLIN PER 500 MG: Performed by: SURGERY

## 2020-02-15 PROCEDURE — 99024 POSTOP FOLLOW-UP VISIT: CPT | Performed by: SURGERY

## 2020-02-15 RX ADMIN — AMLODIPINE BESYLATE 10 MG: 10 TABLET ORAL at 08:10

## 2020-02-15 RX ADMIN — ATORVASTATIN CALCIUM 40 MG: 40 TABLET, FILM COATED ORAL at 08:10

## 2020-02-15 RX ADMIN — LOSARTAN POTASSIUM 100 MG: 25 TABLET, FILM COATED ORAL at 08:10

## 2020-02-15 RX ADMIN — SODIUM CHLORIDE 2 G: 9 INJECTION, SOLUTION INTRAVENOUS at 04:10

## 2020-02-15 NOTE — DISCHARGE SUMMARY
Date of Discharge:  2/15/2020    Discharge Diagnosis: Right carotid stenosis    Presenting Problem/History of Present Illness  Bilateral carotid artery stenosis [I65.23]  Bilateral carotid artery stenosis [I65.23]  Carotid stenosis [I65.29]       Hospital Course   Mireille Magallanes is a 84 y.o.  male who you are currently following for stroke.  He was recently hospitalized with a stroke.  This was a left superior cerebellar stroke.  He did have left facial and arm weakness that started with left eye pain/headache beginning the day before.  He was seen by cardiology in the hospital as well for moderate aortic valve stenosis with mild mitral valve stenosis and septal asymmetric hypertrophy with left atrial dilation.  He is on Plavix and Lipitor.  He did have workup including a CTA of the neck showing significant bilateral carotid artery stenosis.  It was recommended at his previous visit that he undergo a right carotid endarterectomy for stroke risk reduction.  He did undergo cardiology clearance but at that point had decided to hold off from surgery.  He presented yesterday for right carotid endarterectomy for stroke risk reduction.  His surgery went well and was transferred to  for continued monitoring and care.  Throughout his stay here he is done quite well and remains neurologically intact.  On postop day 1 his MORE drain was removed at the bedside.  He is stable for discharge home.  I will see him back in 2 weeks time.  Written as well as verbal instructions were given to the patient with complete understanding.    Procedures Performed  Procedure(s):  RIGHT CAROTID ENDARTERECTOMY WITH EEG       Consults:   Consults     No orders found for last 30 day(s).            Condition on Discharge: Good    Discharge Medications     Discharge Medications      Continue These Medications      Instructions Start Date   amLODIPine 10 MG tablet  Commonly known as:  NORVASC   10 mg, Oral, Daily      atorvastatin 80 MG  tablet  Commonly known as:  LIPITOR   80 mg, Oral, Daily      clopidogrel 75 MG tablet  Commonly known as:  PLAVIX   75 mg, Oral, Daily      CoQ-10 100 MG capsule   1 capsule, Oral, Daily      irbesartan 300 MG tablet  Commonly known as:  AVAPRO   300 mg, Oral, Nightly      nitroglycerin 0.4 MG SL tablet  Commonly known as:  NITROSTAT   0.4 mg, Sublingual, As Needed             Discharge Diet:   Diet Instructions     Advance Diet As Tolerated            Activity at Discharge:   Activity Instructions     Bathing Restrictions      Type of Restriction:  Bathing    Bathing Restrictions:  Other    Explain Bathing Restrictions:  ok to shower starting tomorrow    Driving Restrictions      Type of Restriction:  Driving    Driving Restrictions:  No Driving (Time Limited)    Length:  1 Week    Gradually Increase Activity Until at Pre-Hospitalization Level      Lifting Restrictions      Type of Restriction:  Lifting    Lifting Restrictions:  Lifting Restriction (Indicate Limit)    Weight Limit (Pounds):  10    Length of Lifting Restriction:  1 week    Work Restrictions      Type of Restriction:  Work    May Return to Work:  In 1 Week    With / Without Restrictions:  Without Restrictions          Follow-up Appointments  Future Appointments   Date Time Provider Department Center   2/28/2020  2:00 PM Shadi Rodriguez MD [facility] None   5/6/2020  1:15 PM Berna Recinos APRN [facility] None   5/6/2020  2:30 PM Kaushik Toledo MD [facility]   2/2/2021 11:00 AM [facility]ER [facility]     Additional Instructions for the Follow-ups that You Need to Schedule     Discharge Follow-up with Specified Provider: Chilo; 2 Weeks   As directed      To:  Chilo    Follow Up:  2 Weeks                Over 30 minutes was spent with face-to-face interaction, arranging discharge, and chart review.    Martín Woo DO  02/15/20  1:33 PM

## 2020-02-15 NOTE — PROGRESS NOTES
Discharge Planning Assessment  [facility]     Patient Name: Mireille Magallanes  MRN: [id number]  Today's Date: 2/15/2020    Admit Date: 2/14/2020    Discharge Needs Assessment     Row Name 02/15/20 1151       Living Environment    Lives With  spouse    Current Living Arrangements  home/apartment/condo    Primary Care Provided by  self    Provides Primary Care For  no one    Family Caregiver if Needed  spouse    Quality of Family Relationships  helpful;involved;supportive    Able to Return to Prior Arrangements  yes       Resource/Environmental Concerns    Resource/Environmental Concerns  none    Transportation Concerns  car, none       Transition Planning    Patient/Family Anticipates Transition to  home with family    Patient/Family Anticipated Services at Transition  none    Transportation Anticipated  family or friend will provide       Discharge Needs Assessment    Readmission Within the Last 30 Days  no previous admission in last 30 days    Concerns to be Addressed  no discharge needs identified;denies needs/concerns at this time    Equipment Currently Used at Home  walker, rolling;cane, straight    Anticipated Changes Related to Illness  none    Equipment Needed After Discharge  none    Discharge Coordination/Progress  Pt has RX coverage and a PCP. Pt plans on discharging home with his wife when medically stable. Pt denies any needs or concerns at this time and states that he is independent. SW will follow and assist with any discharge needs that may arise.         Discharge Plan    No documentation.       Destination      Coordination has not been started for this encounter.      Durable Medical Equipment      Coordination has not been started for this encounter.      Dialysis/Infusion      Coordination has not been started for this encounter.      Home Medical Care      Coordination has not been started for this encounter.      Therapy      Coordination has not been started for this encounter.      [facility]  Resources      Coordination has not been started for this encounter.          Demographic Summary    No documentation.       Functional Status    No documentation.       Psychosocial    No documentation.       Abuse/Neglect    No documentation.       Legal    No documentation.       Substance Abuse    No documentation.       Patient Forms    No documentation.           Yana Martinez

## 2020-02-15 NOTE — PLAN OF CARE
Problem: Patient Care Overview  Goal: Plan of Care Review  Outcome: Ongoing (interventions implemented as appropriate)  Flowsheets (Taken 2/15/2020 0256)  Progress: improving  Plan of Care Reviewed With: patient; spouse  Outcome Summary: R carotid, g/t, MORE w/ minimal output, site soft. Dressing, intact, dried drainage. Neuro checks intact. Pt up ad christine, ambulates often. Voiding w/out difficulty. IVF, IV abx as ordered. V paced, tele in place. Pt c/o soreness to site, throbbing, PRN pain med admin x1 w/ effective results. Cont to monitor.

## 2020-02-15 NOTE — PLAN OF CARE
Problem: Patient Care Overview  Goal: Plan of Care Review  Outcome: Ongoing (interventions implemented as appropriate)  Flowsheets (Taken 2/14/2020 1940)  Outcome Summary: pt admitted to floor from surgery, right carotid with g/t MORE x1, neuros wnl, denies pain/nausea, voiding per urinal, cont. ivf and iv abx, cont tele v paced 80's, will cont to monitor

## 2020-02-17 ENCOUNTER — DOCUMENTATION (OUTPATIENT)
Dept: CARDIOLOGY | Facility: CLINIC | Age: 85
End: 2020-02-17

## 2020-02-17 DIAGNOSIS — Z95.0 PACEMAKER: Primary | ICD-10-CM

## 2020-02-17 NOTE — ANESTHESIA POSTPROCEDURE EVALUATION
"Patient: Mireille Magallanes    Procedure Summary     Date:  02/14/20 Room / Location:  [facility] OR  /  PAD HYBRID OR 12    Anesthesia Start:  1211 Anesthesia Stop:  1417    Procedure:  RIGHT CAROTID ENDARTERECTOMY WITH EEG (Right Neck) Diagnosis:       Bilateral carotid artery stenosis      (Bilateral carotid artery stenosis [I65.23])    Surgeon:  Martín Woo DO Provider:  Shadi Melendez CRNA    Anesthesia Type:  general ASA Status:  3          Anesthesia Type: general    Vitals  Vitals Value Taken Time   /76 2/14/2020  3:45 PM   Temp 98.2 °F (36.8 °C) 2/14/2020  3:45 PM   Pulse 81 2/14/2020  3:50 PM   Resp 16 2/14/2020  3:45 PM   SpO2 100 % 2/14/2020  3:50 PM   Vitals shown include unvalidated device data.        Post Anesthesia Care and Evaluation    Patient location during evaluation: PACU  Patient participation: complete - patient participated  Level of consciousness: awake and alert  Pain management: adequate  Airway patency: patent  Anesthetic complications: No anesthetic complications    Cardiovascular status: acceptable  Respiratory status: acceptable  Hydration status: acceptable    Comments: Blood pressure 160/95, pulse 82, temperature 97.9 °F (36.6 °C), temperature source Oral, resp. rate 16, height 174 cm (68.5\"), weight 92.7 kg (204 lb 5.9 oz), SpO2 94 %.    Pt discharged from PACU based on nica score >8      "

## 2020-02-18 ENCOUNTER — TELEPHONE (OUTPATIENT)
Dept: INTERNAL MEDICINE | Age: 85
End: 2020-02-18

## 2020-02-18 LAB
CYTO UR: NORMAL
LAB AP CASE REPORT: NORMAL
PATH REPORT.FINAL DX SPEC: NORMAL
PATH REPORT.GROSS SPEC: NORMAL

## 2020-02-18 NOTE — TELEPHONE ENCOUNTER
[facility] Transitions Initial Follow Up Call    Outreach made within 2 business days of discharge: Yes    Patient: Praful Mcwilliams Patient : 1935   MRN: [id number]  Reason for Admission: Patient was admitted on 2020 for planned carotid surgery. Discharge Diagnosis: Right carotid stenosis    Presenting Problem/History of Present Illness  Bilateral carotid artery stenosis [I65.23]  Bilateral carotid artery stenosis [I65.23]  Carotid stenosis [I65.29]     Discharge Date:February 15, 2020     Spoke with: patient's wife. Discharge department/facility: [place], [place].    [place] [facility] Patient Contact:  Was patient able to fill all prescriptions: No:  No new Rx given. Was patient instructed to bring all medications to the follow-up visit: Yes  Is patient taking all medications as directed in the discharge summary? Yes  Does patient understand their discharge instructions: Yes  Does patient have questions or concerns that need addressed prior to 7-14 day follow up office visit: no  Per wife patient is doing very well. No pain medications needed since getting home. Bowels and bladder working OK. Appetite is good. Slept OK. Patient to bring all medications to HFU for review and will contact office with any concerns.     Scheduled appointment with PCP within 7-14 days    Follow Up  Future Appointments   Date Time Provider Adalberto Ricardo   2020  1:30 PM ALLEN Thomas   2020  1:00 PM ALLEN Thomas PATRICK Durham LPN

## 2020-02-20 ENCOUNTER — OFFICE VISIT (OUTPATIENT)
Dept: INTERNAL MEDICINE | Age: 85
End: 2020-02-20
Payer: MEDICARE

## 2020-02-20 VITALS
BODY MASS INDEX: 30.21 KG/M2 | SYSTOLIC BLOOD PRESSURE: 130 MMHG | HEART RATE: 73 BPM | TEMPERATURE: 96.6 F | OXYGEN SATURATION: 98 % | HEIGHT: 69 IN | WEIGHT: 204 LBS | DIASTOLIC BLOOD PRESSURE: 80 MMHG

## 2020-02-20 PROCEDURE — 1111F DSCHRG MED/CURRENT MED MERGE: CPT | Performed by: PHYSICIAN ASSISTANT

## 2020-02-20 PROCEDURE — 99496 TRANSJ CARE MGMT HIGH F2F 7D: CPT | Performed by: PHYSICIAN ASSISTANT

## 2020-02-20 ASSESSMENT — ENCOUNTER SYMPTOMS
BACK PAIN: 0
WHEEZING: 0
VOMITING: 0
EYE PAIN: 0
EYE REDNESS: 0
COUGH: 0
SORE THROAT: 0
COLOR CHANGE: 0
CHEST TIGHTNESS: 0
PHOTOPHOBIA: 0
SINUS PRESSURE: 0
CONSTIPATION: 0
DIARRHEA: 0
SHORTNESS OF BREATH: 0
NAUSEA: 0
ABDOMINAL PAIN: 0
RHINORRHEA: 0

## 2020-02-20 ASSESSMENT — PATIENT HEALTH QUESTIONNAIRE - PHQ9
1. LITTLE INTEREST OR PLEASURE IN DOING THINGS: 0
SUM OF ALL RESPONSES TO PHQ QUESTIONS 1-9: 0
SUM OF ALL RESPONSES TO PHQ9 QUESTIONS 1 & 2: 0
SUM OF ALL RESPONSES TO PHQ QUESTIONS 1-9: 0
2. FEELING DOWN, DEPRESSED OR HOPELESS: 0

## 2020-02-20 NOTE — PROGRESS NOTES
Post-Discharge Transitional Care Management Services or Hospital Follow Up      Hunter Howard   YOB: 1935    Date of Office Visit:  2/20/2020  Date of Hospital Admission:  02/14/20  Date of Hospital Discharge:  02/15/20  Readmission Risk Score(high >=14%. Medium >=10%):No data recorded    Care management risk score Rising risk (score 2-5) and Complex Care (Scores >=6): 5     Non face to face  following discharge, date last encounter closed (first attempt may have been earlier): 2/18/2020 11:13 AM 2/18/2020 11:13 AM    Call initiated 2 business days of discharge: Yes     Patient Active Problem List   Diagnosis    Essential hypertension    Chronic kidney disease    Pure hypercholesterolemia    Primary osteoarthritis of both knees    Murmur, cardiac    Atrioventricular block, complete ([address]    Nonrheumatic aortic valve stenosis    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 ([address]       Allergies   Allergen Reactions    Sulfa Antibiotics        Medications listed as ordered at the time of discharge from hospital   Marisel Apar[facility] [address] Medication Instructions ADE:    Printed on:02/20/20 6365   Medication Information                      allopurinol (ZYLOPRIM) 100 MG tablet  Take 100 mg by mouth daily             amLODIPine (NORVASC) 5 MG tablet  Take 10 mg by mouth              aspirin 81 MG tablet  Take 81 mg by mouth daily             atorvastatin (LIPITOR) 40 MG tablet  Take 40 mg by mouth daily             clopidogrel (PLAVIX) 75 MG tablet  Take 75 mg by mouth daily             Coenzyme Q10 (CO Q 10) 10 MG CAPS  Take 1 capsule by mouth daily             irbesartan (AVAPRO) 300 MG tablet  Take 1 tablet by mouth nightly             nitroGLYCERIN (NITROSTAT) 0.4 MG SL tablet  Place 1 tablet under the tongue One under tongue as needed for chest pain.                    Medications marked \"taking\" at this time  Outpatient Medications Marked as Taking for the 2/20/20 encounter (Office Visit) with ALLEN Braun   Medication Sig Dispense Refill    clopidogrel (PLAVIX) 75 MG tablet Take 75 mg by mouth daily  2    atorvastatin (LIPITOR) 40 MG tablet Take 40 mg by mouth daily  2    irbesartan (AVAPRO) 300 MG tablet Take 1 tablet by mouth nightly 90 tablet 3    amLODIPine (NORVASC) 5 MG tablet Take 10 mg by mouth       Coenzyme Q10 (CO Q 10) 10 MG CAPS Take 1 capsule by mouth daily      nitroGLYCERIN (NITROSTAT) 0.4 MG SL tablet Place 1 tablet under the tongue One under tongue as needed for chest pain. Medications patient taking as of now reconciled against medications ordered at time of hospital discharge: Yes    Chief Complaint   Patient presents with    Follow-Up from Hospital       HPI patient was admitted to [facility] on 14 February 2020 and was discharged on 15 February 2020. Patient had recently been hospitalized with a stroke was noted a left superior cerebellar stroke patient was having some left-sided facial and arm weakness. During that admission patient was seen by cardiology and was noted to have some moderate aortic valve stenosis and mild mitral valve stenosis and some septal asymmetric hypertrophy with left atrial dilation. Patient is currently taking Plavix and Lipitor and as part of the work-up when he was in the hospital he had a CTA of the neck which showed some significant bilateral carotid artery stenosis. It was recommended the patient undergo a right carotid endarterectomy for stroke risk reduction. Patient did not have any complications from the surgery and and his MOLLY drain was removed and patient was felt stable to discharge home. Patient has follow-up appointment with Dr. Barbie Chaidez on 28 February 2020. Inpatient course: Discharge summary reviewed- see chart. Interval history/Current status: Patient states doing pretty well overall. Patient states eating and drinking normally.   Patient states not really had any pain from the surgery. Patient still has the tape over the incision no obvious swelling or discharge or bleeding or warmth to the touch. Patient states sleeping okay. Patient states does have some knee pain and arthritis in the knees and patient needs another handicap placard because he is is expiring. Patient denies any chest pain or shortness of breath or headache or dizziness. Review of Systems   Constitutional: Negative for activity change, appetite change, fatigue and unexpected weight change. HENT: Negative for congestion, ear pain, postnasal drip, rhinorrhea, sinus pressure, sneezing and sore throat. Eyes: Negative for photophobia, pain and redness. Respiratory: Negative for cough, chest tightness, shortness of breath and wheezing. Cardiovascular: Negative for chest pain, palpitations and leg swelling. Gastrointestinal: Negative for abdominal pain, constipation, diarrhea, nausea and vomiting. Genitourinary: Negative for dysuria, frequency, hematuria and urgency. Musculoskeletal: Positive for arthralgias. Negative for back pain, gait problem, joint swelling and myalgias. Knee Pain. Skin: Negative for color change, pallor and wound. Patient has a linear incision on the right side of his neck. Looks like it is healing well no obvious signs of infection. Neurological: Negative for dizziness, speech difficulty, weakness, light-headedness, numbness and headaches. Hematological: Negative for adenopathy. Does not bruise/bleed easily. Psychiatric/Behavioral: Negative for confusion. The patient is not nervous/anxious. Vitals:    02/20/20 1334   BP: 130/80   Pulse: 73   Temp: 96.6 °F (35.9 °C)   SpO2: 98%   Weight: 204 lb (92.5 kg)   Height: 5' 9\" (1.753 m)     Body mass index is 30.13 kg/m².    Wt Readings from Last 3 Encounters:   02/20/20 204 lb (92.5 kg)   12/27/19 205 lb (93 kg)   08/16/19 194 lb 9.6 oz (88.3 kg)     BP Readings from Last 3 cerebellar artery although there is  a rudimentary connection to the left vertebral to form the basilar. There is mild plaquing involving the intracranial aspect of the left  vertebral artery with mild associated stenosis. 3. There is a soft tissue nodule noted within the right piriform sinus. This appears to emanate from the right epiglottis and median epiglottic  fold. Direct visualization of this is recommended. Neoplasia is not  excluded. There is no evidence of enlarged cervical lymphadenopathy. 4. There is some mild calcific plaquing at the origin of the great  vessels. 5. Chronic appearing sinus disease of the sphenoid sinus. There is also  a small mucous retention cyst or polyp within the left maxillary sinus. This report was finalized on 08/08/2019 22:42 by Dr. Oneil Cabrera MD.   Result Narrative   EXAMINATION: CT angiogram of the neck with contrast 08/08/2019    DOSE: 442 mGycm. Automated exposure control was utilized to diminish  patient radiation dose. [place] HISTORY: Stroke. FINDINGS: Multiple contiguous axial images are obtained from the aortic  arch through the skull base at 1 mm intervals following intravenous  contrast infusion. Reformatted images are obtained in the sagittal and  coronal projections from the original data set. MIPS are also obtained. There is mild scarring within the lung apices with the lung apices are  otherwise clear. The thyroid gland is homogeneous in density. The level  of the true and false cords is unremarkable. There is a 9 mm soft tissue  nodule associated with the epiglottis on the right and resulting in  asymmetric aeration of the right piriform sinus. Direct visualization of  this lesion is recommended. Neoplasia is not excluded. There is no  evidence of cervical lymphadenopathy. The oropharynx and nasopharynx are  unremarkable. There is mucoperiosteal thickening of the left sphenoid  sinus. There is some associated hyperostosis suggesting chronicity. The  remainder of the paranasal sinuses and mastoid air cells are clear. There is atheromatous calcification of the aortic arch. There is  calcific plaquing at the origin of the great vessels without evidence of  rate limiting stenosis. Calcific plaquing and associated stenosis is  noted at the origin of both vertebral arteries. The proximal left  vertebral artery is tortuous. Left vertebral artery is dominant. The  extracranial aspect of the vertebral arteries are otherwise widely  patent. The majority of flow from the right vertebral artery I feel  terminates in the posterior inferior cerebellar artery with only a  rudimentary connection with the left vertebral to form the basilar. There is some mild calcific plaquing and mild associated stenosis within  the proximal intracranial aspect of the left vertebral artery. There is mild plaquing at the origin of the left common carotid artery  without evidence of rate limiting stenosis. The left common carotid  artery is otherwise unremarkable. At the level of the left carotid  bifurcation there is extensive calcific plaquing involving the carotid  bulb extending into the proximal aspect of the left ICA. This results in  an approximate 35% cross-sectional stenosis at the level of the left  carotid bulb. The distal left ICA is widely patent. Examination of the right carotid artery demonstrates mild plaquing at  the origin of the innominate. Right common carotid artery is widely  patent to the level the carotid bifurcation. At the carotid bifurcation  there is extensive mixed plaquing involving the carotid bulb and  extending into the proximal aspect of the ICA. Within the proximal  aspect of the ICA there is moderate stenosis with a 60% cross-sectional  narrowing. The more distal right ICA is widely patent.    Other Result Information   Interface, Rad Results [place] [facility] - 08/08/2019  9:45 PM CDT  EXAMINATION: CT angiogram of the neck with contrast 08/08/2019    DOSE: 442 mGycm. Automated exposure control was utilized to diminish  patient radiation dose. Latonya Alexandre HISTORY: Stroke. FINDINGS: Multiple contiguous axial images are obtained from the aortic  arch through the skull base at 1 mm intervals following intravenous  contrast infusion. Reformatted images are obtained in the sagittal and  coronal projections from the original data set. MIPS are also obtained. There is mild scarring within the lung apices with the lung apices are  otherwise clear. The thyroid gland is homogeneous in density. The level  of the true and false cords is unremarkable. There is a 9 mm soft tissue  nodule associated with the epiglottis on the right and resulting in  asymmetric aeration of the right piriform sinus. Direct visualization of  this lesion is recommended. Neoplasia is not excluded. There is no  evidence of cervical lymphadenopathy. The oropharynx and nasopharynx are  unremarkable. There is mucoperiosteal thickening of the left sphenoid  sinus. There is some associated hyperostosis suggesting chronicity. The  remainder of the paranasal sinuses and mastoid air cells are clear. There is atheromatous calcification of the aortic arch. There is  calcific plaquing at the origin of the great vessels without evidence of  rate limiting stenosis. Calcific plaquing and associated stenosis is  noted at the origin of both vertebral arteries. The proximal left  vertebral artery is tortuous. Left vertebral artery is dominant. The  extracranial aspect of the vertebral arteries are otherwise widely  patent. The majority of flow from the right vertebral artery I feel  terminates in the posterior inferior cerebellar artery with only a  rudimentary connection with the left vertebral to form the basilar. There is some mild calcific plaquing and mild associated stenosis within  the proximal intracranial aspect of the left vertebral artery.     There is mild plaquing at the origin of the left common carotid artery  without evidence of rate limiting stenosis. The left common carotid  artery is otherwise unremarkable. At the level of the left carotid  bifurcation there is extensive calcific plaquing involving the carotid  bulb extending into the proximal aspect of the left ICA. This results in  an approximate 35% cross-sectional stenosis at the level of the left  carotid bulb. The distal left ICA is widely patent. Examination of the right carotid artery demonstrates mild plaquing at  the origin of the innominate. Right common carotid artery is widely  patent to the level the carotid bifurcation. At the carotid bifurcation  there is extensive mixed plaquing involving the carotid bulb and  extending into the proximal aspect of the ICA. Within the proximal  aspect of the ICA there is moderate stenosis with a 60% cross-sectional  narrowing. The more distal right ICA is widely patent. IMPRESSION:  1.. There is calcific plaquing involving both carotid bifurcations. This  is more advanced on the right than on the left with an approximate 60%  cross-sectional narrowing noted at the very proximal aspect of the right  ICA. The more distal ICAs are widely patent. 2. There is calcific plaquing involving the origin of both vertebral  arteries with mild associated stenosis. Left vertebral artery is  dominant. The majority of flow from the right vertebral artery I suspect  terminates in the posterior inferior cerebellar artery although there is  a rudimentary connection to the left vertebral to form the basilar. There is mild plaquing involving the intracranial aspect of the left  vertebral artery with mild associated stenosis. 3. There is a soft tissue nodule noted within the right piriform sinus. This appears to emanate from the right epiglottis and median epiglottic  fold. Direct visualization of this is recommended. Neoplasia is not  excluded. There is no evidence of enlarged cervical lymphadenopathy.   4. Dr. Sharonda Vizcarra MD.   Other Result Information   This result has an attachment that is not available. Result Narrative   History: Carotid occlusive disease   Other Result Information   Interface, Rad Results [place] [facility] - 08/08/2019  1:36 PM CDT  History: Carotid occlusive disease    IMPRESSION:  Impression:  1. There is 50-69% stenosis of the right internal carotid artery. 2. There is less than 50% stenosis of the left internal carotid artery. 3. Antegrade flow is demonstrated in bilateral vertebral arteries. Comments: Bilateral carotid vertebral arterial duplex scan was  performed. Grayscale imaging shows intimal thickening and calcified elements at the  carotid bifurcation. The right internal carotid artery peak systolic  velocity is 125 cm/sec. The end-diastolic velocity is 09.3 cm/sec. The  right ICA/CCA ratio is approximately 1.51 . These findings correlate  with 50-69% stenosis of the right internal carotid artery. Grayscale imaging shows intimal thickening and calcified elements at the  carotid bifurcation. The left internal carotid artery peak systolic  velocity is 948 cm/sec. The end-diastolic velocity is 38.0 cm/sec. The  left ICA/CCA ratio is approximately 1.18 . These findings correlate with  less than 50% stenosis of the left internal carotid artery. Antegrade flow is demonstrated in bilateral vertebral arteries. This report was finalized on 08/08/2019 14:33 by Dr. Sharonda Vizcarra MD.         Assessment/Plan:  1. Stenosis of right carotid artery  Patient is status post carotid endarterectomy. Patient states doing very well. Patient sees Dr. Karen Law again on the 28th February 2020.  - MT DISCHARGE MEDS RECONCILED W/ CURRENT OUTPATIENT MED LIST    2. Essential hypertension  Is to be stable on current medication regiment. Patient was not started on any new medications when he was in the hospital.    3. History of stroke  Patient is doing well.   No residual weakness from the stroke. 4. Pure hypercholesterolemia  Continue medications as directed. 5. TIA (transient ischemic attack)  No problems. 6. Primary osteoarthritis of both knees  Completed paperwork for handicap placard. 7. Nonrheumatic aortic valve stenosis  Patient continue to follow-up cardiology as directed. Any problems at this time. 8. S/P carotid endarterectomy  Patient doing well status post surgery. Patient has follow-up appointment with 28 February 2020.         Medical Decision Making: high complexity

## 2020-02-27 ENCOUNTER — TELEPHONE (OUTPATIENT)
Dept: VASCULAR SURGERY | Facility: CLINIC | Age: 85
End: 2020-02-27

## 2020-02-28 ENCOUNTER — OFFICE VISIT (OUTPATIENT)
Dept: VASCULAR SURGERY | Facility: CLINIC | Age: 85
End: 2020-02-28

## 2020-02-28 VITALS
BODY MASS INDEX: 30.21 KG/M2 | WEIGHT: 204 LBS | SYSTOLIC BLOOD PRESSURE: 140 MMHG | OXYGEN SATURATION: 97 % | HEART RATE: 80 BPM | DIASTOLIC BLOOD PRESSURE: 86 MMHG | HEIGHT: 69 IN

## 2020-02-28 DIAGNOSIS — I65.23 BILATERAL CAROTID ARTERY STENOSIS: Primary | ICD-10-CM

## 2020-02-28 PROCEDURE — 99024 POSTOP FOLLOW-UP VISIT: CPT | Performed by: NURSE PRACTITIONER

## 2020-02-29 NOTE — PROGRESS NOTES
"2/28/2020         Mireille Magallanes  1935    Chief Complaint   Patient presents with   • Follow-up     2 Week Post-Op Follow Up For RIGHT CAROTID ENDARTERECTOMY WITH EEG. Patient denies any stroke like symptoms.    • other     Patient does state still has some numbness in the incision site and under the chin area.        Dear No ref. provider found    HPI  I had the pleasure of seeing your patient Mireille Magallanes in the office today.  As you recall, Mireille Magallanes is a 84 y.o.  male who you are currently following for stroke.  He was recently hospitalized with a stroke.  This was a left superior cerebellar stroke.  He did have left facial and arm weakness that started with left eye pain/headache beginning the day before.  He was seen by cardiology in the hospital as well for moderate aortic valve stenosis with mild mitral valve stenosis and septal asymmetric hypertrophy with left atrial dilation.  He is on Plavix and Lipitor.  He did undergo right carotid endarterectomy on 2/14/2020 and is now back for follow-up.  His right neck incision has healed.  He does have some numbness along the incision site and under the chin.  He is neurologically intact.    Review of Systems   Constitutional: Negative.    HENT: Negative.    Eyes: Negative.    Respiratory: Negative.    Cardiovascular: Negative.    Gastrointestinal: Negative.    Endocrine: Negative.    Genitourinary: Negative.    Musculoskeletal: Negative.    Skin: Negative.    Allergic/Immunologic: Negative.    Neurological: Negative.    Hematological: Negative.    Psychiatric/Behavioral: Negative.    All other systems reviewed and are negative.    /86 (BP Location: Left arm, Patient Position: Sitting, Cuff Size: Adult)   Pulse 80   Ht 175.3 cm (69\")   Wt 92.5 kg (204 lb)   SpO2 97%   BMI 30.13 kg/m²     Physical Exam   Constitutional: He is oriented to person, place, and time. He appears well-developed and well-nourished.   HENT:   Head: Normocephalic and " atraumatic.   Eyes: Pupils are equal, round, and reactive to light. No scleral icterus.   Neck: Neck supple. No JVD present. Carotid bruit is not present. No thyromegaly present.   Right neck incision healed   Cardiovascular: Normal rate and regular rhythm.   Murmur heard.  Pulses:       Carotid pulses are 2+ on the right side, and 2+ on the left side.       Femoral pulses are 2+ on the right side, and 2+ on the left side.       Popliteal pulses are 2+ on the right side, and 2+ on the left side.        Dorsalis pedis pulses are 2+ on the right side, and 2+ on the left side.        Posterior tibial pulses are 2+ on the right side, and 2+ on the left side.   Pulmonary/Chest: Effort normal and breath sounds normal.   Abdominal: Soft. Bowel sounds are normal. He exhibits no distension, no abdominal bruit and no mass. There is no hepatosplenomegaly. There is no tenderness.   Musculoskeletal: Normal range of motion. He exhibits no edema.   Lymphadenopathy:     He has no cervical adenopathy.   Neurological: He is alert and oriented to person, place, and time. He has normal strength. No cranial nerve deficit or sensory deficit.   Skin: Skin is warm, dry and intact.   Psychiatric: He has a normal mood and affect. His behavior is normal. Judgment and thought content normal.   Nursing note and vitals reviewed.      Patient Active Problem List   Diagnosis   • HTN (hypertension)   • Hyperlipidemia   • Nonrheumatic aortic valve stenosis   • Coronary artery disease involving coronary bypass graft of native heart without angina pectoris   • Pacemaker   • OLMEDO (dyspnea on exertion)   • Other chest pain   • Coronary artery disease involving native coronary artery of native heart with angina pectoris (CMS/HCC)   • Overweight (BMI 25.0-29.9)   • TIA (transient ischemic attack)   • Carotid artery stenosis   • Acute cerebrovascular accident (CVA) of cerebellum (CMS/HCC)   • Anticoagulated   • Preop cardiovascular exam   • Stenosis of  right carotid artery   • Preop testing   • Bilateral carotid artery stenosis   • Carotid stenosis        Diagnosis Plan   1. Bilateral carotid artery stenosis  US Carotid Bilateral       Plan: After thoroughly evaluating Mireille Magallanes, I am pleased to report he is doing well status post right carotid endarterectomy.  His right neck incision has healed.  He is free to resume normal activity at this time.  Previously his CTA as reviewed by Dr. Woo showed about 75% left carotid stenosis.  We will need to continue to monitor.  We will see him back in 6 months with repeat noninvasive testing for continued surveillance, including a carotid duplex.   I did discuss vascular risk factors as they pertain to the progression of vascular disease including controlling hypertension and hyperlipidemia, which are currently stable.  We have already received cardiac clearance.  The patient is to continue taking their medications as previously discussed.   This was all discussed in full with complete understanding.  Thank you for allowing me to participate in the care of your patient.  Please do not hesitate to call with any questions or concerns.  We will keep you aware of any further encounters with Mireille Magallanes.        Sincerely yours,         Berna Recinos, GIANNI Leos, Jw BUI PA-C

## 2020-04-02 ENCOUNTER — TELEPHONE (OUTPATIENT)
Dept: VASCULAR SURGERY | Facility: CLINIC | Age: 85
End: 2020-04-02

## 2020-05-19 ENCOUNTER — TELEPHONE (OUTPATIENT)
Dept: VASCULAR SURGERY | Facility: CLINIC | Age: 85
End: 2020-05-19

## 2020-05-19 NOTE — TELEPHONE ENCOUNTER
Patient called and said that he got an automated call in reference to a Dr. De La Torre.  Advised that we do not have a  Here by that name and confirmed his upcoming testing and appointment with Berna on 7/1/2020

## 2020-06-19 RX ORDER — AMLODIPINE BESYLATE 10 MG/1
TABLET ORAL
Qty: 90 TABLET | Refills: 4 | Status: SHIPPED | OUTPATIENT
Start: 2020-06-19 | End: 2021-10-25

## 2020-06-30 ENCOUNTER — TELEPHONE (OUTPATIENT)
Dept: VASCULAR SURGERY | Facility: CLINIC | Age: 85
End: 2020-06-30

## 2020-06-30 NOTE — TELEPHONE ENCOUNTER
Spoke with Mr Magallanes reminding him of his appointments for Wednesday, July 1st, 2020. Reminded [facility] 730 am for testing and follow up afterwards at 9 am with Berna ARCHER. Mr Magallanes confirmed he would be here.

## 2020-07-01 ENCOUNTER — OFFICE VISIT (OUTPATIENT)
Dept: INTERNAL MEDICINE | Age: 85
End: 2020-07-01
Payer: MEDICARE

## 2020-07-01 ENCOUNTER — HOSPITAL ENCOUNTER (OUTPATIENT)
Dept: ULTRASOUND IMAGING | Facility: HOSPITAL | Age: 85
Discharge: HOME | End: 2020-07-01
Admitting: NURSE PRACTITIONER

## 2020-07-01 ENCOUNTER — OFFICE VISIT (OUTPATIENT)
Dept: VASCULAR SURGERY | Facility: CLINIC | Age: 85
End: 2020-07-01

## 2020-07-01 VITALS
OXYGEN SATURATION: 97 % | WEIGHT: 200 LBS | BODY MASS INDEX: 29.62 KG/M2 | SYSTOLIC BLOOD PRESSURE: 126 MMHG | HEART RATE: 78 BPM | DIASTOLIC BLOOD PRESSURE: 72 MMHG | HEIGHT: 69 IN

## 2020-07-01 VITALS
BODY MASS INDEX: 29.71 KG/M2 | SYSTOLIC BLOOD PRESSURE: 130 MMHG | WEIGHT: 200.6 LBS | DIASTOLIC BLOOD PRESSURE: 80 MMHG | HEART RATE: 72 BPM | HEIGHT: 69 IN | OXYGEN SATURATION: 97 %

## 2020-07-01 DIAGNOSIS — I10 ESSENTIAL HYPERTENSION: ICD-10-CM

## 2020-07-01 DIAGNOSIS — I65.23 BILATERAL CAROTID ARTERY STENOSIS: Primary | ICD-10-CM

## 2020-07-01 DIAGNOSIS — E78.2 MIXED HYPERLIPIDEMIA: ICD-10-CM

## 2020-07-01 DIAGNOSIS — I65.23 BILATERAL CAROTID ARTERY STENOSIS: ICD-10-CM

## 2020-07-01 PROCEDURE — 1123F ACP DISCUSS/DSCN MKR DOCD: CPT | Performed by: PHYSICIAN ASSISTANT

## 2020-07-01 PROCEDURE — 99214 OFFICE O/P EST MOD 30 MIN: CPT | Performed by: NURSE PRACTITIONER

## 2020-07-01 PROCEDURE — 99214 OFFICE O/P EST MOD 30 MIN: CPT | Performed by: PHYSICIAN ASSISTANT

## 2020-07-01 PROCEDURE — 93880 EXTRACRANIAL BILAT STUDY: CPT

## 2020-07-01 PROCEDURE — 4040F PNEUMOC VAC/ADMIN/RCVD: CPT | Performed by: PHYSICIAN ASSISTANT

## 2020-07-01 PROCEDURE — G8417 CALC BMI ABV UP PARAM F/U: HCPCS | Performed by: PHYSICIAN ASSISTANT

## 2020-07-01 PROCEDURE — 93880 EXTRACRANIAL BILAT STUDY: CPT | Performed by: SURGERY

## 2020-07-01 PROCEDURE — 1036F TOBACCO NON-USER: CPT | Performed by: PHYSICIAN ASSISTANT

## 2020-07-01 PROCEDURE — G8427 DOCREV CUR MEDS BY ELIG CLIN: HCPCS | Performed by: PHYSICIAN ASSISTANT

## 2020-07-01 RX ORDER — FUROSEMIDE 40 MG/1
40 TABLET ORAL DAILY PRN
COMMUNITY
Start: 2020-06-08 | End: 2022-05-27 | Stop reason: HOSPADM

## 2020-07-01 NOTE — PROGRESS NOTES
[facility] INTERNAL MEDICINE  [address] [address]  [address] [address]  Dept: [phone]  Dept Fax: [phone]: [phone]      [facility] INTERNAL MEDICINE  OFFICE NOTE      Chief Complaint   Patient presents with    6 Month Follow-Up       Cathleen Thrasher is a 82y.o. year old male who is seen for 6-month follow-up for chronic conditions history of pacemaker, hypertension, hyperlipidemia, chronic kidney disease, history of CABG. Patient's blood pressure seems to be stable at this time. Patient's current medication regiment seems to be adequate. Patient denies any chest pain, shortness of breath, palpitations. Patient states compliant with taking medication. Patient continues on Avapro 300 mg daily along with Norvasc 5 mg daily. Patient states does see Dr. Betty Wolfe patient has a history of stent a pacemaker for complete AV block last March. Patient had positive stress test at that time with a cardiac cath they did not see any blockages. Patient's hyperlipidemia seems to be fairly well controlled on current medication regimen continue as directed. Patient denies any side effects from lipid lowering medication. Patient states trying to properly eat and exercise as directed. Patient continues on 80 mg Lipitor daily. Patient continues to follow-up with Dr. Morgan Screen for chronic kidney disease patient states was told he is in stage IV chronic kidney disease. Patient did not have labs done today but he had a Agrawal's office so he is going to try to get them to send the results over here the patient is going to go ahead and get a PSA and a cholesterol panel fasting in the next couple days will call patient with any abnormal results. Patient did have some carotid stenosis worse on the right side and patient had carotid endarterectomy on the right side. Patient had ultrasound and has less than 50% bilaterally now.   Patient was seen by  Stefano. Patient is seen by Dr. Mc north at [facility] for colonoscopies. Patient's last colonoscopy was 2016 I am sure that Dr. Izaiah Lieberman will call him when he is due to follow-up I cannot find any date for patient to follow-up in [facility] his records. Patient denies any other issues at this time. Active Ambulatory Problems     Diagnosis Date Noted    Essential hypertension 06/14/2017    Chronic kidney disease 06/14/2017    Pure hypercholesterolemia 06/14/2017    Primary osteoarthritis of both knees 06/14/2017    Murmur, cardiac 05/16/2017    Atrioventricular block, complete ([address] 01/22/2018    Nonrheumatic aortic valve stenosis 05/16/2017    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 ([address] 12/28/2017     Resolved Ambulatory Problems     Diagnosis Date Noted    No Resolved Ambulatory Problems     Past Medical History:   Diagnosis Date    Adenomatous colon polyp     Aortic stenosis     Arthritis, degenerative     AV block, 2nd degree     CAD (coronary artery disease)     Chronic kidney disease, stage III (moderate) ([facility])     Hearing loss     Hyperlipidemia     Hypertension     Left ventricular hypertrophy     Melanoma of back ([address]     Neuropathy     Obesity        Past Medical History:   Diagnosis Date    Adenomatous colon polyp     Aortic stenosis     Arthritis, degenerative     AV block, 2nd degree     12/17, Holter monitor, high degree second-degree A-V block, 7 second pauses, read by Dr. See [facility]    CAD (coronary artery disease)     Chronic kidney disease, stage III (moderate) ([address]     Hearing loss     Hyperlipidemia     Hypertension     Left ventricular hypertrophy     Melanoma of back ([address]     Neuropathy     idiopathic peripheral neuropathy    Obesity        Prior to Visit Medications    Medication Sig Taking?  Authorizing Provider   clopidogrel (PLAVIX) 75 MG tablet Take 75 mg by mouth daily Yes Historical Provider, MD   atorvastatin (LIPITOR) 40 MG tablet Take 40 mg by mouth daily Yes Historical Provider, MD   irbesartan (AVAPRO) 300 MG tablet Take 1 tablet by mouth nightly Yes ALLEN Engle   amLODIPine (NORVASC) 5 MG tablet Take 10 mg by mouth  Yes Historical Provider, MD   allopurinol (ZYLOPRIM) 100 MG tablet Take 100 mg by mouth daily Yes Historical Provider, MD   aspirin 81 MG tablet Take 81 mg by mouth daily Yes Historical Provider, MD   Coenzyme Q10 (CO Q 10) 10 MG CAPS Take 1 capsule by mouth daily Yes Historical Provider, MD   nitroGLYCERIN (NITROSTAT) 0.4 MG SL tablet Place 1 tablet under the tongue One under tongue as needed for chest pain. Yes Historical Provider, MD       Past Surgical History:   Procedure Laterality Date    COLONOSCOPY  09/06/2016    PACEMAKER PLACEMENT  01/05/2018       History reviewed. No pertinent family history.     Allergies   Allergen Reactions    Sulfa Antibiotics        Social History     Socioeconomic History    Marital status:      Spouse name: Not on file    Number of children: Not on file    Years of education: Not on file    Highest education level: Not on file   Occupational History    Not on file   Social Needs    Financial resource strain: Not on file    Food insecurity     Worry: Not on file     Inability: Not on file    Transportation needs     Medical: Not on file     Non-medical: Not on file   Tobacco Use    Smoking status: Former Smoker    Smokeless tobacco: Never Used   Substance and Sexual Activity    Alcohol use: No    Drug use: No    Sexual activity: Not on file   Lifestyle    Physical activity     Days per week: Not on file     Minutes per session: Not on file    Stress: Not on file   Relationships    Social connections     Talks on phone: Not on file     Gets together: Not on file     Attends Sabianism service: Not on file     Active member of club or organization: Not on file     Attends meetings of clubs or organizations: Not on file     Relationship status: Not on file    Intimate partner violence     Fear of current or ex partner: Not on file     Emotionally abused: Not on file     Physically abused: Not on file     Forced sexual activity: Not on file   Other Topics Concern    Not on file   Social History Narrative    Not on file       Review of Systems  Review of Systems   Constitutional: Positive for fatigue. Negative for activity change, appetite change and unexpected weight change. HENT: Negative for ear pain, postnasal drip, rhinorrhea, sinus pressure, sneezing and sore throat. Eyes: Negative for photophobia, pain and redness. Respiratory: Negative for cough, chest tightness, shortness of breath and wheezing. Cardiovascular: Negative for chest pain, palpitations and leg swelling. Gastrointestinal: Negative for abdominal pain, constipation, diarrhea, nausea and vomiting. Genitourinary: Negative for dysuria, frequency, hematuria and urgency. Musculoskeletal: Negative for arthralgias, back pain, gait problem, joint swelling and myalgias. Skin: Negative for color change, pallor and wound. Neurological: Negative for dizziness, speech difficulty, weakness, light-headedness, numbness and headaches. Hematological: Negative for adenopathy. Does not bruise/bleed easily. Psychiatric/Behavioral: Negative for confusion. The patient is not nervous/anxious.             Current Outpatient Medications   Medication Sig Dispense Refill    clopidogrel (PLAVIX) 75 MG tablet Take 75 mg by mouth daily  2    atorvastatin (LIPITOR) 40 MG tablet Take 40 mg by mouth daily  2    irbesartan (AVAPRO) 300 MG tablet Take 1 tablet by mouth nightly 90 tablet 3    amLODIPine (NORVASC) 5 MG tablet Take 10 mg by mouth       allopurinol (ZYLOPRIM) 100 MG tablet Take 100 mg by mouth daily      aspirin 81 MG tablet Take 81 mg by mouth daily      Coenzyme Q10 (CO Q 10) 10 MG CAPS Take 1 capsule by mouth daily      nitroGLYCERIN (NITROSTAT) 0.4 MG SL tablet Place 1 tablet under the tongue One under tongue as needed for chest pain. No current facility-administered medications for this visit. /80   Pulse 72   Ht 5' 9\" (1.753 m)   Wt 200 lb 9.6 oz (91 kg)   SpO2 97%   BMI 29.62 kg/m²     PHYSICAL EXAM  Physical Exam  Vitals signs and nursing note reviewed. Constitutional:       Appearance: He is well-developed. HENT:      Head: Normocephalic and atraumatic. Right Ear: External ear normal.      Left Ear: External ear normal.      Nose: Nose normal.   Eyes:      General:         Right eye: No discharge. Left eye: No discharge. Pupils: Pupils are equal, round, and reactive to light. Neck:      Musculoskeletal: Normal range of motion. Trachea: No tracheal deviation. Cardiovascular:      Rate and Rhythm: Normal rate and regular rhythm. Heart sounds: Normal heart sounds. No murmur. No friction rub. No gallop. Pulmonary:      Effort: Pulmonary effort is normal. No respiratory distress. Breath sounds: Normal breath sounds. No wheezing or rales. Chest:      Chest wall: No tenderness. Abdominal:      Palpations: Abdomen is soft. Tenderness: There is no abdominal tenderness. There is no guarding or rebound. Musculoskeletal: Normal range of motion. General: No tenderness or deformity. Lymphadenopathy:      Cervical: No cervical adenopathy. Skin:     General: Skin is warm and dry. Findings: No erythema, lesion or rash. Neurological:      Mental Status: He is alert. Psychiatric:         Mood and Affect: Mood normal. Mood is not anxious or depressed. ASSESSMENT      ICD-10-CM    1. Routine general medical examination at a health care facility Z00.00    2. Pure hypercholesterolemia E78.00 Lipid Panel     Comprehensive Metabolic Panel     Lipid Panel   3. Screening for prostate cancer Z12.5 PSA Screening   4.  Atrioventricular block, complete if condition becomes emergent. Additional Instructions: As always, patient is advised to bring in medication bottles in order to correctly reconcile with our current list.      Derik Hitchcock PA-C    EMR Dragon/transcription disclaimer: Much of this encounter note is electronic transcription/translation of spoken language to printed text. The electronictranslation of spoken language may be erroneous, or at times, nonsensical words or phrases may be inadvertently transcribed.  Although I have reviewed the note for such errors, some may still exist.

## 2020-07-01 NOTE — PROGRESS NOTES
"7/1/2020     Jw Leos PA-C  [address] [place] [address] [address]  [facility] [address]        Mireille Magallanes  1935    Chief Complaint   Patient presents with   • Follow-up     6 Month Follow Up For Bilateral Carotid Artery Stenosis. Test [id number] US pad carotid bilateral. Patient denies any stroke like symptoms.        Dear Jw Leos PA-C   HPI  I had the pleasure of seeing your patient Mireille Magallanes in the office today.  As you recall, Mireille Magallanes is a 84 y.o.  male who we are following for carotid occlusive disease.  He is followed by neurology for stroke stroke status post left superior cerebellar stroke.  He did have left facial and arm weakness that started with left eye pain/headache beginning the day before.  He is maintained on Plavix and Lipitor.  He did undergo a right carotid endarterectomy on 2/14/2020.  He denies any further strokelike symptoms.  He did have noninvasive testing performed today, which I did review in office.    Review of Systems   Constitutional: Negative.    HENT: Negative.    Eyes: Negative.    Respiratory: Negative.    Cardiovascular: Negative.    Gastrointestinal: Negative.    Endocrine: Negative.    Genitourinary: Negative.    Musculoskeletal: Negative.    Skin: Negative.    Allergic/Immunologic: Negative.    Neurological: Negative.    Hematological: Negative.    Psychiatric/Behavioral: Negative.    All other systems reviewed and are negative.    /72 (BP Location: Right arm, Patient Position: Sitting, Cuff Size: Adult)   Pulse 78   Ht 175.3 cm (69\")   Wt 90.7 kg (200 lb)   SpO2 97%   BMI 29.53 kg/m²     Physical Exam   Constitutional: He is oriented to person, place, and time. Vital signs are normal. He appears well-developed and well-nourished. He is cooperative.   HENT:   Head: Normocephalic and atraumatic.   Eyes: Pupils are equal, round, and reactive to light. No scleral icterus.   Neck: Normal range of motion. Neck supple. No JVD present. " Carotid bruit is not present. No thyromegaly present.   Cardiovascular: Normal rate, regular rhythm and intact distal pulses.   Murmur heard.  Pulses:       Carotid pulses are 2+ on the right side, and 2+ on the left side.       Femoral pulses are 2+ on the right side, and 2+ on the left side.       Popliteal pulses are 2+ on the right side, and 2+ on the left side.        Dorsalis pedis pulses are 2+ on the right side, and 2+ on the left side.        Posterior tibial pulses are 2+ on the right side, and 2+ on the left side.   Pulmonary/Chest: Effort normal and breath sounds normal.   Abdominal: Soft. Bowel sounds are normal. He exhibits no distension, no abdominal bruit and no mass. There is no hepatosplenomegaly. There is no tenderness.   Musculoskeletal: Normal range of motion. He exhibits no edema.   Lymphadenopathy:     He has no cervical adenopathy.   Neurological: He is alert and oriented to person, place, and time. He has normal strength. No cranial nerve deficit or sensory deficit.   Skin: Skin is warm, dry and intact.   Psychiatric: He has a normal mood and affect. His behavior is normal. Judgment and thought content normal.   Nursing note and vitals reviewed.    Diagnostic data:  Noninvasive testing including a carotid duplex shows less than 50% carotid stenosis bilaterally with bilateral antegrade vertebral flow.    Patient Active Problem List   Diagnosis   • HTN (hypertension)   • Hyperlipidemia   • Nonrheumatic aortic valve stenosis   • Coronary artery disease involving coronary bypass graft of native heart without angina pectoris   • Pacemaker   • OLMEDO (dyspnea on exertion)   • Other chest pain   • Coronary artery disease involving native coronary artery of native heart with angina pectoris (CMS/[facility])   • Overweight (BMI 25.0-29.9)   • TIA (transient ischemic attack)   • Carotid artery stenosis   • Acute cerebrovascular accident (CVA) of cerebellum (CMS/[facility])   • Anticoagulated   • Preop cardiovascular  exam   • Stenosis of right carotid artery   • Preop testing   • Bilateral carotid artery stenosis   • Carotid stenosis        Diagnosis Plan   1. Bilateral carotid artery stenosis  US Carotid Bilateral   2. Essential hypertension  US Carotid Bilateral   3. Mixed hyperlipidemia  US Carotid Bilateral       Plan: After thoroughly evaluating Mireille Magallanes, I believe the best course of action is to remain conservative from vascular surgery standpoint.  I did review his testing which shows less than 50% carotid stenosis bilaterally.  Previously his CTA was reviewed by Dr. ferraro which showed about 75% left carotid stenosis which we will continue to monitor.  He is doing well and denies any strokelike symptoms.  I will see him back in 6 months with repeat noninvasive testing for continued surveillance, including a carotid duplex.  I did discuss vascular risk factors as they pertain to the progression of vascular disease including controlling hypertension and hyperlipidemia.  His blood pressure is controlled on his current medication regimen.  He is maintained on Lipitor for his hyperlipidemia.    The patient is to continue taking their medications as previously discussed.   This was all discussed in full with complete understanding.  Thank you for allowing me to participate in the care of your patient.  Please do not hesitate to call with any questions or concerns.  We will keep you aware of any further encounters with Mireille Magallanes.        Sincerely yours,         Berna Recinos, GIANNI Leos, Jw BUI PA-C

## 2020-07-02 ASSESSMENT — ENCOUNTER SYMPTOMS
ABDOMINAL PAIN: 0
CONSTIPATION: 0
BACK PAIN: 0
WHEEZING: 0
COUGH: 0
SINUS PRESSURE: 0
COLOR CHANGE: 0
SORE THROAT: 0
CHEST TIGHTNESS: 0
DIARRHEA: 0
PHOTOPHOBIA: 0
RHINORRHEA: 0
EYE REDNESS: 0
EYE PAIN: 0
NAUSEA: 0
SHORTNESS OF BREATH: 0
VOMITING: 0

## 2020-07-09 DIAGNOSIS — Z12.5 SCREENING FOR PROSTATE CANCER: ICD-10-CM

## 2020-07-09 DIAGNOSIS — I10 ESSENTIAL HYPERTENSION: ICD-10-CM

## 2020-07-09 DIAGNOSIS — E78.00 PURE HYPERCHOLESTEROLEMIA: ICD-10-CM

## 2020-07-09 LAB
ALBUMIN SERPL-MCNC: 4.6 G/DL (ref 3.5–5.2)
ALP BLD-CCNC: 78 U/L (ref 40–130)
ALT SERPL-CCNC: 18 U/L (ref 5–41)
ANION GAP SERPL CALCULATED.3IONS-SCNC: 14 MMOL/L (ref 7–19)
AST SERPL-CCNC: 26 U/L (ref 5–40)
BASOPHILS ABSOLUTE: 0 K/UL (ref 0–0.2)
BASOPHILS RELATIVE PERCENT: 0.6 % (ref 0–1)
BILIRUB SERPL-MCNC: 0.6 MG/DL (ref 0.2–1.2)
BUN BLDV-MCNC: 27 MG/DL (ref 8–23)
CALCIUM SERPL-MCNC: 9.7 MG/DL (ref 8.8–10.2)
CHLORIDE BLD-SCNC: 106 MMOL/L (ref 98–111)
CHOLESTEROL, TOTAL: 131 MG/DL (ref 160–199)
CO2: 19 MMOL/L (ref 22–29)
CREAT SERPL-MCNC: 1.8 MG/DL (ref 0.5–1.2)
EOSINOPHILS ABSOLUTE: 0.2 K/UL (ref 0–0.6)
EOSINOPHILS RELATIVE PERCENT: 2.3 % (ref 0–5)
GFR NON-AFRICAN AMERICAN: 36
GLUCOSE BLD-MCNC: 98 MG/DL (ref 74–109)
HCT VFR BLD CALC: 42.2 % (ref 42–52)
HDLC SERPL-MCNC: 38 MG/DL (ref 55–121)
HEMOGLOBIN: 13.8 G/DL (ref 14–18)
IMMATURE GRANULOCYTES #: 0 K/UL
LDL CHOLESTEROL CALCULATED: 68 MG/DL
LYMPHOCYTES ABSOLUTE: 2.1 K/UL (ref 1.1–4.5)
LYMPHOCYTES RELATIVE PERCENT: 31.7 % (ref 20–40)
MCH RBC QN AUTO: 29.6 PG (ref 27–31)
MCHC RBC AUTO-ENTMCNC: 32.7 G/DL (ref 33–37)
MCV RBC AUTO: 90.6 FL (ref 80–94)
MONOCYTES ABSOLUTE: 0.6 K/UL (ref 0–0.9)
MONOCYTES RELATIVE PERCENT: 8.6 % (ref 0–10)
NEUTROPHILS ABSOLUTE: 3.7 K/UL (ref 1.5–7.5)
NEUTROPHILS RELATIVE PERCENT: 56.5 % (ref 50–65)
PDW BLD-RTO: 13.2 % (ref 11.5–14.5)
PLATELET # BLD: 187 K/UL (ref 130–400)
PMV BLD AUTO: 11 FL (ref 9.4–12.4)
POTASSIUM SERPL-SCNC: 5.1 MMOL/L (ref 3.5–5)
PROSTATE SPECIFIC ANTIGEN: 1.74 NG/ML (ref 0–4)
RBC # BLD: 4.66 M/UL (ref 4.7–6.1)
SODIUM BLD-SCNC: 139 MMOL/L (ref 136–145)
TOTAL PROTEIN: 7.5 G/DL (ref 6.6–8.7)
TRIGL SERPL-MCNC: 127 MG/DL (ref 0–149)
WBC # BLD: 6.5 K/UL (ref 4.8–10.8)

## 2020-08-13 RX ORDER — IRBESARTAN 300 MG/1
300 TABLET ORAL NIGHTLY
Qty: 90 TABLET | Refills: 1 | Status: SHIPPED | OUTPATIENT
Start: 2020-08-13 | End: 2021-08-05

## 2020-08-13 NOTE — TELEPHONE ENCOUNTER
Raquel Negrete called requesting a refill of the below medication which has been pended for you:     Requested Prescriptions     Pending Prescriptions Disp Refills    irbesartan (AVAPRO) 300 MG tablet [Pharmacy Med Name: Irbesartan 300 MG Oral Tablet] 90 tablet 0     Sig: Take 1 tablet by mouth nightly       Last Appointment Date: 7/1/2020  Next Appointment Date: 1/5/2021    Allergies   Allergen Reactions    Sulfa Antibiotics

## 2020-09-04 ENCOUNTER — TELEPHONE (OUTPATIENT)
Dept: CARDIOLOGY | Facility: CLINIC | Age: 85
End: 2020-09-04

## 2020-09-04 RX ORDER — CLOPIDOGREL BISULFATE 75 MG/1
75 TABLET ORAL DAILY
Qty: 90 TABLET | Refills: 3 | Status: SHIPPED | OUTPATIENT
Start: 2020-09-04 | End: 2021-06-25 | Stop reason: ALTCHOICE

## 2020-09-04 RX ORDER — CLOPIDOGREL BISULFATE 75 MG/1
75 TABLET ORAL DAILY
Qty: 30 TABLET | Refills: 11 | Status: CANCELLED | OUTPATIENT
Start: 2020-09-04

## 2020-09-04 RX ORDER — CLOPIDOGREL BISULFATE 75 MG/1
75 TABLET ORAL DAILY
Qty: 90 TABLET | Refills: 3 | Status: CANCELLED | OUTPATIENT
Start: 2020-09-04

## 2020-09-04 NOTE — TELEPHONE ENCOUNTER
"I am not sure what they are talking about that we \"refused to refill it.\"  Maybe it had to do with me not being the original prescriber, I think he was switched to this by someone else because of a TIA.  However, it looks as though I did renew the prescription last November.  So he should have refills remaining up until this November.  And yes, I would state that since he has had a TIA and some nonobstructive coronary disease he should remain on some antiplatelet therapy, in his case Plavix, indefinitely.  "

## 2020-09-04 NOTE — TELEPHONE ENCOUNTER
This pt called to let us know that this his pharmacy [facility] told him that we refused to refill his Plavix. I do not see any med refill request or call about this med until now. The pt wants to know if he needs to continue Plavix?

## 2020-12-23 RX ORDER — ATORVASTATIN CALCIUM 80 MG/1
TABLET, FILM COATED ORAL
Qty: 90 TABLET | Refills: 0 | Status: SHIPPED | OUTPATIENT
Start: 2020-12-23

## 2020-12-31 DIAGNOSIS — E78.00 PURE HYPERCHOLESTEROLEMIA: ICD-10-CM

## 2020-12-31 DIAGNOSIS — I10 ESSENTIAL HYPERTENSION: ICD-10-CM

## 2020-12-31 LAB
ALBUMIN SERPL-MCNC: 4.4 G/DL (ref 3.5–5.2)
ALP BLD-CCNC: 77 U/L (ref 40–130)
ALT SERPL-CCNC: 22 U/L (ref 5–41)
ANION GAP SERPL CALCULATED.3IONS-SCNC: 12 MMOL/L (ref 7–19)
AST SERPL-CCNC: 23 U/L (ref 5–40)
BASOPHILS ABSOLUTE: 0.1 K/UL (ref 0–0.2)
BASOPHILS RELATIVE PERCENT: 0.7 % (ref 0–1)
BILIRUB SERPL-MCNC: 0.7 MG/DL (ref 0.2–1.2)
BUN BLDV-MCNC: 26 MG/DL (ref 8–23)
CALCIUM SERPL-MCNC: 9.8 MG/DL (ref 8.8–10.2)
CHLORIDE BLD-SCNC: 105 MMOL/L (ref 98–111)
CHOLESTEROL, TOTAL: 156 MG/DL (ref 160–199)
CO2: 23 MMOL/L (ref 22–29)
CREAT SERPL-MCNC: 1.9 MG/DL (ref 0.5–1.2)
EOSINOPHILS ABSOLUTE: 0.2 K/UL (ref 0–0.6)
EOSINOPHILS RELATIVE PERCENT: 2.2 % (ref 0–5)
GFR AFRICAN AMERICAN: 41
GFR NON-AFRICAN AMERICAN: 34
GLUCOSE BLD-MCNC: 97 MG/DL (ref 74–109)
HCT VFR BLD CALC: 45.7 % (ref 42–52)
HDLC SERPL-MCNC: 45 MG/DL (ref 55–121)
HEMOGLOBIN: 14.4 G/DL (ref 14–18)
IMMATURE GRANULOCYTES #: 0 K/UL
LDL CHOLESTEROL CALCULATED: 81 MG/DL
LYMPHOCYTES ABSOLUTE: 2.1 K/UL (ref 1.1–4.5)
LYMPHOCYTES RELATIVE PERCENT: 31.5 % (ref 20–40)
MCH RBC QN AUTO: 28.9 PG (ref 27–31)
MCHC RBC AUTO-ENTMCNC: 31.5 G/DL (ref 33–37)
MCV RBC AUTO: 91.6 FL (ref 80–94)
MONOCYTES ABSOLUTE: 0.6 K/UL (ref 0–0.9)
MONOCYTES RELATIVE PERCENT: 8.5 % (ref 0–10)
NEUTROPHILS ABSOLUTE: 3.8 K/UL (ref 1.5–7.5)
NEUTROPHILS RELATIVE PERCENT: 56.8 % (ref 50–65)
PDW BLD-RTO: 13.8 % (ref 11.5–14.5)
PLATELET # BLD: 179 K/UL (ref 130–400)
PMV BLD AUTO: 10.3 FL (ref 9.4–12.4)
POTASSIUM SERPL-SCNC: 4.8 MMOL/L (ref 3.5–5)
RBC # BLD: 4.99 M/UL (ref 4.7–6.1)
SODIUM BLD-SCNC: 140 MMOL/L (ref 136–145)
TOTAL PROTEIN: 7.6 G/DL (ref 6.6–8.7)
TRIGL SERPL-MCNC: 149 MG/DL (ref 0–149)
WBC # BLD: 6.7 K/UL (ref 4.8–10.8)

## 2021-01-05 ENCOUNTER — APPOINTMENT (OUTPATIENT)
Dept: ULTRASOUND IMAGING | Facility: HOSPITAL | Age: 86
End: 2021-01-05

## 2021-01-05 ENCOUNTER — OFFICE VISIT (OUTPATIENT)
Dept: INTERNAL MEDICINE | Age: 86
End: 2021-01-05
Payer: MEDICARE

## 2021-01-05 VITALS
SYSTOLIC BLOOD PRESSURE: 130 MMHG | DIASTOLIC BLOOD PRESSURE: 80 MMHG | HEIGHT: 69 IN | HEART RATE: 70 BPM | OXYGEN SATURATION: 97 % | BODY MASS INDEX: 29.47 KG/M2 | WEIGHT: 199 LBS

## 2021-01-05 DIAGNOSIS — I25.700 CORONARY ARTERY DISEASE INVOLVING CORONARY BYPASS GRAFT OF NATIVE HEART WITH UNSTABLE ANGINA PECTORIS (HCC): ICD-10-CM

## 2021-01-05 DIAGNOSIS — N18.32 STAGE 3B CHRONIC KIDNEY DISEASE (HCC): ICD-10-CM

## 2021-01-05 DIAGNOSIS — I44.2 ATRIOVENTRICULAR BLOCK, COMPLETE (HCC): ICD-10-CM

## 2021-01-05 DIAGNOSIS — I10 ESSENTIAL HYPERTENSION: ICD-10-CM

## 2021-01-05 DIAGNOSIS — Z98.890 S/P CAROTID ENDARTERECTOMY: ICD-10-CM

## 2021-01-05 DIAGNOSIS — Z00.00 ROUTINE GENERAL MEDICAL EXAMINATION AT A HEALTH CARE FACILITY: Primary | ICD-10-CM

## 2021-01-05 DIAGNOSIS — E78.00 PURE HYPERCHOLESTEROLEMIA: ICD-10-CM

## 2021-01-05 DIAGNOSIS — M17.0 PRIMARY OSTEOARTHRITIS OF BOTH KNEES: ICD-10-CM

## 2021-01-05 PROCEDURE — G0439 PPPS, SUBSEQ VISIT: HCPCS | Performed by: PHYSICIAN ASSISTANT

## 2021-01-05 PROCEDURE — 1123F ACP DISCUSS/DSCN MKR DOCD: CPT | Performed by: PHYSICIAN ASSISTANT

## 2021-01-05 PROCEDURE — 4040F PNEUMOC VAC/ADMIN/RCVD: CPT | Performed by: PHYSICIAN ASSISTANT

## 2021-01-05 PROCEDURE — G8482 FLU IMMUNIZE ORDER/ADMIN: HCPCS | Performed by: PHYSICIAN ASSISTANT

## 2021-01-05 ASSESSMENT — PATIENT HEALTH QUESTIONNAIRE - PHQ9
SUM OF ALL RESPONSES TO PHQ QUESTIONS 1-9: 0
SUM OF ALL RESPONSES TO PHQ QUESTIONS 1-9: 0
1. LITTLE INTEREST OR PLEASURE IN DOING THINGS: 0
SUM OF ALL RESPONSES TO PHQ QUESTIONS 1-9: 0

## 2021-01-05 NOTE — PATIENT INSTRUCTIONS
Personalized Preventive Plan for Viri Mosley - 1/5/2021  Medicare offers a range of preventive health benefits. Some of the tests and screenings are paid in full while other may be subject to a deductible, co-insurance, and/or copay. Some of these benefits include a comprehensive review of your medical history including lifestyle, illnesses that may run in your family, and various assessments and screenings as appropriate. After reviewing your medical record and screening and assessments performed today your provider may have ordered immunizations, labs, imaging, and/or referrals for you. A list of these orders (if applicable) as well as your Preventive Care list are included within your After Visit Summary for your review. Other Preventive Recommendations:    · A preventive eye exam performed by an eye specialist is recommended every 1-2 years to screen for glaucoma; cataracts, macular degeneration, and other eye disorders. · A preventive dental visit is recommended every 6 months. · Try to get at least 150 minutes of exercise per week or 10,000 steps per day on a pedometer . · Order or download the FREE \"Exercise & Physical Activity: Your Everyday Guide\" from [employer]. Call 7-432.349.4919 or search [employer] online. · You need 2827-8252 mg of calcium and 0849-4343 IU of vitamin D per day. It is possible to meet your calcium requirement with diet alone, but a vitamin D supplement is usually necessary to meet this goal.  · When exposed to the sun, use a sunscreen that protects against both UVA and UVB radiation with an SPF of 30 or greater. Reapply every 2 to 3 hours or after sweating, drying off with a towel, or swimming. · Always wear a seat belt when traveling in a car. Always wear a helmet when riding a bicycle or motorcycle.

## 2021-01-05 NOTE — PROGRESS NOTES
Medicare Annual Wellness Visit  Name: Patrick Celis Date: 2021   MRN: [id number] Sex: Male   Age: 82 y.o. Ethnicity: Non-/Non    : 1935 Race: Carson Vázquez is here for Medicare AWV    Screenings for behavioral, psychosocial and functional/safety risks, and cognitive dysfunction are all negative except as indicated below. These results, as well as other patient data from the [address] form, are documented in Flowsheets linked to this Encounter. Patient here for annual Medicare wellness visit and 6-month follow-up for chronic conditions history of pacemaker, hypertension, hyperlipidemia, chronic kidney disease, history of CABG. Patient's blood pressure seems to be stable at this time. Patient's current medication regiment seems to be adequate. Patient denies any chest pain, shortness of breath, palpitations. Patient states compliant with taking medication. Patient continues on Avapro 300 mg daily along with Norvasc 5 mg daily. Patient continues to follow-up with Dr. Araceli Goldstein has a history of stent and pacemaker. 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 Patient had a positive stress test and had cardiac cath done but they did not see any blockages. Patient has follow-up appointment this year with Dr. Araceli Goldstein. Patient's hyperlipidemia seems to be fairly well controlled on current medication regimen continue as directed. Patient denies any side effects from lipid lowering medication. Patient states trying to properly eat and exercise as directed. Patient continues on Lipitor 80 mg daily. Patient continues to follow-up with Dr. Marina Fajardo office for chronic kidney disease it seems to be stable at this time. Patient needs to make sure he is drinking plenty water. Patient has history of some cardiac stenosis worse on the right than the left patient did have a carotid endarterectomy on the right side. Patient had ultrasound less than 50% bilaterally now. Patient continues to follow-up with Dr. Asim Em. Patient had colonoscopy in 2016 with Dr. Peg Freeman and the patient cannot remember when he was post follow-up he thinks probably 5 years. Patient will contact Dr. Candi Odonnell in early May contact him whenever he is due again. Patient denies any other issues at this time. Allergies   Allergen Reactions    Sulfa Antibiotics          Prior to Visit Medications    Medication Sig Taking? Authorizing Provider   atorvastatin (LIPITOR) 80 MG tablet Take 1 tablet by mouth once daily Yes ALLEN Herman   irbesartan (AVAPRO) 300 MG tablet Take 1 tablet by mouth nightly Yes ALLEN Herman   clopidogrel (PLAVIX) 75 MG tablet Take 75 mg by mouth daily Yes Historical Provider, MD   amLODIPine (NORVASC) 5 MG tablet Take 10 mg by mouth  Yes Historical Provider, MD   allopurinol (ZYLOPRIM) 100 MG tablet Take 100 mg by mouth daily Yes Historical Provider, MD   aspirin 81 MG tablet Take 81 mg by mouth daily Yes Historical Provider, MD   Coenzyme Q10 (CO Q 10) 10 MG CAPS Take 1 capsule by mouth daily Yes Historical Provider, MD   nitroGLYCERIN (NITROSTAT) 0.4 MG SL tablet Place 1 tablet under the tongue One under tongue as needed for chest pain.  Yes Historical Provider, MD         Past Medical History:   Diagnosis Date    Adenomatous colon polyp     Aortic stenosis     Arthritis, degenerative     AV block, 2nd degree     12/17, Holter monitor, high degree second-degree A-V block, 7 second pauses, read by Dr. Polo Sunshine, [facility]    CAD (coronary artery disease)     Chronic kidney disease, stage III (moderate)     Hearing loss     Hyperlipidemia     Hypertension     Left ventricular hypertrophy     Melanoma of back ([facility] [address]     Neuropathy     idiopathic peripheral neuropathy Abdomen: soft, non-tender, non-distended, normal bowel sounds, no masses or organomegaly  Extremities: no cyanosis, clubbing or edema  Musculoskeletal: normal range of motion, no joint swelling, deformity or tenderness  Neurologic: gait, coordination and speech normal    Patient's complete Health Risk Assessment and screening values have been reviewed and are found in Flowsheets. The following problems were reviewed today and where indicated follow up appointments were made and/or referrals ordered. Positive Risk Factor Screenings with Interventions:            General Health and ACP:  General  In general, how would you say your health is?: Fair  In the past 7 days, have you experienced any of the following? New or Increased Pain, New or Increased Fatigue, Loneliness, Social Isolation, Stress or Anger?: None of These  Do you get the social and emotional support that you need?: Yes  Do you have a Living Will?: Yes  Advance Directives     Power of [employer] Will ACP-Advance Directive ACP-Power of     Not on File Not on File Not on File Not on File      General Health Risk Interventions:  · Poor self-assessment of health status: patient declines any further evaluation/treatment for this issue    Health Habits/Nutrition:  Health Habits/Nutrition  Do you exercise for at least 20 minutes 2-3 times per week?: Yes  Have you lost any weight without trying in the past 3 months?: No  Do you eat fewer than 2 meals per day?: No  Have you seen a dentist within the past year?: (!) No  Body mass index: (!) 29.38  Health Habits/Nutrition Interventions:  · pt has dentures.     Hearing/Vision:  No exam data present  Hearing/Vision  Do you or your family notice any trouble with your hearing?: (!) Yes  Do you have difficulty driving, watching TV, or doing any of your daily activities because of your eyesight?: (!) Yes  Have you had an eye exam within the past year?: Yes  Hearing/Vision Interventions: · Hearing concerns:  pt has hearing aid. · Vision concerns:  patient encouraged to make appointment with his/her eye specialist      Personalized Preventive Plan   Current Health Maintenance Status  Immunization History   Administered Date(s) Administered    Influenza Vaccine, unspecified formulation 12/01/2016    Influenza, High Dose (Fluzone 65 yrs and older) 09/22/2017, 10/09/2018, 10/03/2019, 09/01/2020    Influenza, Triv, inactivated, subunit, adjuvanted, IM (Fluad 65 yrs and older) 09/27/2019    Pneumococcal Conjugate 13-valent (Altamese Neth) 05/08/2015, 10/09/2018    Pneumococcal Polysaccharide ([id number]) 01/10/2007, 12/07/2016, 10/03/2019    Tdap (Boostrix, Adacel) 08/05/2014        Health Maintenance   Topic Date Due    Shingles Vaccine (1 of 2) 10/02/1987    Annual Wellness Visit (AWV)  06/19/2019    Lipid screen  12/31/2021    Potassium monitoring  12/31/2021    Creatinine monitoring  12/31/2021    DTaP/Tdap/Td vaccine (2 - Td) 08/05/2024    Flu vaccine  Completed    Pneumococcal 65+ years Vaccine  Completed    Hepatitis A vaccine  Aged Out    Hepatitis B vaccine  Aged Out    Hib vaccine  Aged Out    Meningococcal (ACWY) vaccine  Aged Out     Recommendations for [email or web address] Due: see orders and patient instructions/AVS.  .   Recommended screening schedule for the next 5-10 years is provided to the patient in written form: see Patient Instructions/AVS.  Lab Results   Component Value Date     12/31/2020    K 4.8 12/31/2020     12/31/2020    CO2 23 12/31/2020    BUN 26 (H) 12/31/2020    CREATININE 1.9 (H) 12/31/2020    GLUCOSE 97 12/31/2020    CALCIUM 9.8 12/31/2020    PROT 7.6 12/31/2020    LABALBU 4.4 12/31/2020    BILITOT 0.7 12/31/2020    ALKPHOS 77 12/31/2020    AST 23 12/31/2020    ALT 22 12/31/2020    LABGLOM 34 (A) 12/31/2020    GFRAA 41 (L) 12/31/2020       Lab Results   Component Value Date    WBC 6.7 12/31/2020    HGB 14.4 12/31/2020    HCT 45.7 12/31/2020 Seems to be stable at this time. Patient can take some Tylenol arthritis as needed. Patient will follow back up with me in approximately 6 months with labs in 1 year for annual Medicare wellness visit. Patient follow-up sooner as needed. All questions answered. Patient voices understanding and agrees to plans along with risks and benefits of plan. Patient is instructed to continue prior medications, diet, and exercise plans as instructed. Patient agrees to follow up as instructions, sooner if needed, or to go to ER if condition becomes emergent.

## 2021-01-29 ENCOUNTER — IMMUNIZATION (OUTPATIENT)
Age: 86
End: 2021-01-29
Payer: MEDICARE

## 2021-01-29 PROCEDURE — 91300 COVID-19, PFIZER VACCINE 30MCG/0.3ML DOSE: CPT | Performed by: FAMILY MEDICINE

## 2021-01-29 PROCEDURE — 0001A COVID-19, PFIZER VACCINE 30MCG/0.3ML DOSE: CPT | Performed by: FAMILY MEDICINE

## 2021-02-02 ENCOUNTER — TELEPHONE (OUTPATIENT)
Dept: VASCULAR SURGERY | Facility: CLINIC | Age: 86
End: 2021-02-02

## 2021-02-02 ENCOUNTER — TELEPHONE (OUTPATIENT)
Dept: CARDIOLOGY | Facility: CLINIC | Age: 86
End: 2021-02-02

## 2021-02-02 NOTE — TELEPHONE ENCOUNTER
Spoke with Mr Magallanes reminding him of his appointment for Wednesday, February 3rd, 2021. Rem[facility] for 1 o'clock testing and follow up afterwards at 2 pm with Berna ARCHER. Mr Magallanes confirmed he would be here.

## 2021-02-03 ENCOUNTER — OFFICE VISIT (OUTPATIENT)
Dept: CARDIOLOGY | Facility: CLINIC | Age: 86
End: 2021-02-03

## 2021-02-03 ENCOUNTER — HOSPITAL ENCOUNTER (OUTPATIENT)
Dept: ULTRASOUND IMAGING | Facility: HOSPITAL | Age: 86
Discharge: HOME | End: 2021-02-03
Admitting: NURSE PRACTITIONER

## 2021-02-03 ENCOUNTER — OFFICE VISIT (OUTPATIENT)
Dept: VASCULAR SURGERY | Facility: CLINIC | Age: 86
End: 2021-02-03

## 2021-02-03 ENCOUNTER — CLINICAL SUPPORT NO REQUIREMENTS (OUTPATIENT)
Dept: CARDIOLOGY | Facility: CLINIC | Age: 86
End: 2021-02-03

## 2021-02-03 VITALS
WEIGHT: 202 LBS | DIASTOLIC BLOOD PRESSURE: 86 MMHG | HEART RATE: 80 BPM | SYSTOLIC BLOOD PRESSURE: 134 MMHG | OXYGEN SATURATION: 98 % | HEIGHT: 69 IN | BODY MASS INDEX: 29.92 KG/M2

## 2021-02-03 VITALS
OXYGEN SATURATION: 98 % | SYSTOLIC BLOOD PRESSURE: 140 MMHG | DIASTOLIC BLOOD PRESSURE: 90 MMHG | HEIGHT: 69 IN | BODY MASS INDEX: 29.47 KG/M2 | HEART RATE: 74 BPM | WEIGHT: 199 LBS

## 2021-02-03 DIAGNOSIS — I25.810 CORONARY ARTERY DISEASE INVOLVING CORONARY BYPASS GRAFT OF NATIVE HEART WITHOUT ANGINA PECTORIS: ICD-10-CM

## 2021-02-03 DIAGNOSIS — I65.23 BILATERAL CAROTID ARTERY STENOSIS: ICD-10-CM

## 2021-02-03 DIAGNOSIS — E78.2 MIXED HYPERLIPIDEMIA: ICD-10-CM

## 2021-02-03 DIAGNOSIS — Z95.0 PACEMAKER: ICD-10-CM

## 2021-02-03 DIAGNOSIS — I44.2 COMPLETE HEART BLOCK (HCC): ICD-10-CM

## 2021-02-03 DIAGNOSIS — I10 ESSENTIAL HYPERTENSION: ICD-10-CM

## 2021-02-03 DIAGNOSIS — I05.0 MITRAL VALVE STENOSIS, UNSPECIFIED ETIOLOGY: ICD-10-CM

## 2021-02-03 DIAGNOSIS — I65.21 STENOSIS OF RIGHT CAROTID ARTERY: ICD-10-CM

## 2021-02-03 DIAGNOSIS — Z86.73 HISTORY OF STROKE: ICD-10-CM

## 2021-02-03 DIAGNOSIS — I65.23 BILATERAL CAROTID ARTERY STENOSIS: Primary | ICD-10-CM

## 2021-02-03 DIAGNOSIS — N18.32 STAGE 3B CHRONIC KIDNEY DISEASE (HCC): ICD-10-CM

## 2021-02-03 DIAGNOSIS — R06.09 DYSPNEA ON EXERTION: ICD-10-CM

## 2021-02-03 DIAGNOSIS — I35.0 AORTIC STENOSIS, MODERATE: Primary | ICD-10-CM

## 2021-02-03 PROCEDURE — 93880 EXTRACRANIAL BILAT STUDY: CPT | Performed by: SURGERY

## 2021-02-03 PROCEDURE — 99214 OFFICE O/P EST MOD 30 MIN: CPT | Performed by: NURSE PRACTITIONER

## 2021-02-03 PROCEDURE — 93288 INTERROG EVL PM/LDLS PM IP: CPT | Performed by: PHYSICIAN ASSISTANT

## 2021-02-03 PROCEDURE — 93000 ELECTROCARDIOGRAM COMPLETE: CPT | Performed by: NURSE PRACTITIONER

## 2021-02-03 PROCEDURE — 93880 EXTRACRANIAL BILAT STUDY: CPT

## 2021-02-03 RX ORDER — ASPIRIN 81 MG/1
81 TABLET ORAL DAILY
COMMUNITY
End: 2021-03-22

## 2021-02-03 NOTE — PROGRESS NOTES
Subjective:     Encounter Date:02/03/2021      Patient ID: Mireille Magallanes is a 85 y.o. male.    Chief Complaint: Follow-up CAD, aortic stenosis, complete heart block with pacemaker in place    The patient was initially referred to Dr. Toledo in December 2017 with lightheadedness and syncope.  He also has a history of CAD and had four-vessel CABG in 2001 and PCI to the LAD in 2014.  He was found to have high degree AV block, and underwent successful implantation of a dual-chamber pacemaker in January 2018.  At his visit in March 2019 he was seen for worsening shortness of breath and chest pain.  A stress test was ordered which could not exclude inferior ischemia.  Cardiac cath in March 2019 results are below.  There was no need for intervention at that time.  It was noted that his blood pressure was extremely elevated while sedated on the table.  Therefore, amlodipine was added to his antihypertensive regimen and he was seen back in the office a few weeks later and symptoms had improved significantly.    He followed up with Dr. Toledo in November 2019 and was seeking preoperative risk assessment for upcoming right carotid endarterectomy per Dr. Woo.  Blood pressure was well controlled per home readings on amlodipine and Avapro.  He reported he had had one episode of chest pain requiring nitroglycerin since his previous visit.  He was playing 18 holes of golf 3 times per week.  He was determined to be intermediate risk for MACE with surgery, but the risk was not modifiable.  No changes were made at that visit.  Ultimately, he underwent right carotid endarterectomy in February 2020.    Today the patient reports his primary complaint is gradually worsening shortness of breath with exertion over the past 3 to 4 months.  He is unsure if this is due to inactivity and gradual weight gain due to diet choices.  He states he has gained around 8 pounds in 5 to 6 months.  He reports on 2 occasions he has had some  substernal chest discomfort with the dyspnea on exertion.  However, he states the chest pain episodes lasted around 10 seconds.  Symptoms are relieved quickly with rest.  He has not had to take any sublingual nitroglycerin.  He reports Dr. Martínez, who follows him for CKD (he states he has been diagnosed with stage IV, but current labs reflect stage III), prescribed him some Lasix to take on an as-needed basis for lower extremity edema about 6 months ago.  He states he may take the Lasix about once per week at most, when his legs swell.  He denies any sudden weight gains.  He denies orthopnea, PND, palpitations, syncope or presyncope.  He tells me his blood pressure at home is 120s to 130 systolic.      The following portions of the patient's history were reviewed and updated as appropriate: allergies, current medications, past family history, past medical history, past social history, past surgical history and problem list.    Review of Systems   Constitution: Negative for malaise/fatigue.   Cardiovascular: Positive for chest pain, dyspnea on exertion and leg swelling. Negative for claudication, near-syncope, orthopnea, palpitations, paroxysmal nocturnal dyspnea and syncope.   Respiratory: Positive for shortness of breath. Negative for cough.    Hematologic/Lymphatic: Does not bruise/bleed easily.   Musculoskeletal: Negative for falls.   Gastrointestinal: Negative for bloating.   Neurological: Positive for dizziness (occasional with position changes ). Negative for light-headedness and weakness.       Allergies   Allergen Reactions   • Sulfa Antibiotics Rash     Sulfa Drugs       Current Outpatient Medications:   •  amLODIPine (NORVASC) 10 MG tablet, Take 1 tablet by mouth once daily, Disp: 90 tablet, Rfl: 4  •  atorvastatin (LIPITOR) 80 MG tablet, Take 80 mg by mouth Daily., Disp: , Rfl:   •  clopidogrel (Plavix) 75 MG tablet, Take 1 tablet by mouth Daily., Disp: 90 tablet, Rfl: 3  •  Coenzyme Q10 (COQ-10) 100  "MG capsule, Take 1 capsule by mouth Daily., Disp: , Rfl:   •  furosemide (LASIX) 40 MG tablet, Take 40 mg by mouth Daily As Needed., Disp: , Rfl:   •  irbesartan (AVAPRO) 300 MG tablet, Take 300 mg by mouth Every Night., Disp: , Rfl:   •  nitroglycerin (NITROSTAT) 0.4 MG SL tablet, Place 0.4 mg under the tongue as needed., Disp: , Rfl:          Objective:    /90   Pulse 74   Ht 175.3 cm (69\")   Wt 90.3 kg (199 lb)   SpO2 98%   BMI 29.39 kg/m²        Vitals signs and nursing note reviewed.   Constitutional:       General: Not in acute distress.     Appearance: Well-developed and not in distress. Not diaphoretic.   Neck:      Vascular: No JVD.   Pulmonary:      Effort: Pulmonary effort is normal. No respiratory distress.      Breath sounds: Normal breath sounds.   Cardiovascular:      Normal rate. Regular rhythm.      Murmurs: There is a grade 2/6 systolic murmur.   Edema:     Peripheral edema (1-2+ BLE edema ) present.  Abdominal:      Tenderness: There is no abdominal tenderness.   Skin:     General: Skin is warm and dry.   Neurological:      Mental Status: Alert and oriented to person, place, and time.         Lab Review:   Lab Results   Component Value Date    GLUCOSE 97 12/31/2020    BUN 26 (H) 12/31/2020    CREATININE 1.9 (H) 12/31/2020    EGFRIFNONA 34 (A) 12/31/2020    EGFRIFAFRI 41 (L) 12/31/2020    BCR 15.1 02/14/2020    K 4.8 12/31/2020    CO2 23 12/31/2020    CALCIUM 9.8 12/31/2020    ALBUMIN 4.4 12/31/2020    AST 23 12/31/2020    ALT 22 12/31/2020     Lab Results   Component Value Date    WBC 6.7 12/31/2020    HGB 14.4 12/31/2020    HCT 45.7 12/31/2020    MCV 91.6 12/31/2020     12/31/2020     Lab Results   Component Value Date    CHOL 126 (L) 08/09/2019    CHLPL 156 (L) 12/31/2020    TRIG 149 12/31/2020    HDL 45 (L) 12/31/2020    LDL 81 12/31/2020             ECG 12 Lead    Date/Time: 2/3/2021 11:11 AM  Performed by: Zena Sykes APRN  Authorized by: Zena Sykes APRN   Comparison: " compared with previous ECG from 11/6/2019  Similar to previous ECG  Comparison to previous ECG: V paced with intermittent A pacing as well.  Of note, some of his atrial pacing spikes appear to be in the middle of his P waves-I have discussed this with Edwina Fischer RN with [facility].  She is going to interrogate his device again when he comes for his echo in March.  Based on what she has reviewed on his interrogation today, the device appears to be functioning normally.  Rhythm: sinus rhythm and paced  BPM: 74              Results for orders placed during the hospital encounter of 08/08/19   Adult Transthoracic Echo Complete W/ Cont if Necessary Per Protocol (With Agitated Saline)    Narrative · Left ventricular systolic function is low normal. EF 51-55%.  · Moderate aortic valve stenosis is present.  · Left ventricular wall thickness is consistent with mild septal   asymmetric hypertrophy.  · Left ventricular diastolic dysfunction (grade II) consistent with   pseudonormalization.  · Left atrial cavity size is severely dilated.  · Mild mitral valve stenosis is present  · Normal size and function of right ventricle.        3/2019 cath -   Findings:     Selective coronary angiography:   Dominance: right  Left Main coronary artery: Arises normally from the left cusp and bifurcates.  Mildly calcified throughout with an eccentric 30-40% stenosis the ostium/proximal portion.  Left anterior descending artery: Moderate size vessel arising normally from the distal left main that is mildly calcified throughout its course.  It supplies one high diagonal branch and there is a stent in the midportion which is widely patent.  A second diagonal branch is 100% occluded and fills via a vein graft.  There are minor luminal irregularities of the mid to distal LAD.  LIMA is known to be nonfunctioning from prior catheterizations.   Left circumflex: Heavily calcified vessel arising normally from the distal left main is 100%  "occluded in its midportion with AUBREY 0 flow distally.  Obtuse marginal branches fill via saphenous vein graft and have minor luminal irregularities.  Right coronary artery: Heavily calcified vessel arising normally from the right cusp that is dominant for posterior circulation, supplying the PDA and posterior lateral branch.  There is diffuse mild disease with an eccentric 40% stenosis in the mid vessel.     Bypass graft angiography  LIMA to LAD: Known to be nonfunctioning from prior catheterizations  SVG, sequential to OM1 and OM 2: Widely patent at the ostium, body, and anastomoses.  SVG to diagonal: Small graft but is widely patent with no evidence of disease at the ostium, body, or anastomosis.     Fractional flow reserve assessment: Intermediate lesion noted at the ostium of the left main coronary artery.  6 Turkmen JL 4.5 guide 6Fr JL 3.5 and 4 did not properly engage) was advanced into the ascending aorta and the FFR wire equalized prior to engagement.  Left main coronary artery was then engaged to allow advancement of the .014\" SJM FFR wire to the distal LAD, and intracoronary nitroglycerin was administered to achieve maximal epicardial vasodilation.   Intravenous adenosine was administered at 140 mcg/kg/m.  Guide was confirmed to be dis-engaged (ie in the aortic cusp). After 2 minutes, lowest pressure recording difference was 0.87 (not physiologically significant).  Wire was removed and final angiography was performed to confirm the wire perforation or other complication.     Impression:  1.  Physiologically insignificant stenosis of the proximal left main coronary artery (FFR 0.87).  Patent stent in the mid LAD with an occluded diagonal branch, plus occlusions of OM1 and OM 2.    2.  Saphenous vein grafts to OM1->OM2, and to diagonal branch, are widely patent.     Plan:   1.  4-6 hours bedrest then discharge home later today   2.  Intensify anti-hypertensive regimen (patient's BP was ~200/100 while " comfortably sedated throughout much of the case)\  3.  Continue ASA 81mg daily for life     Kaushik Toledo MD    Assessment:      Problem List Items Addressed This Visit        Other    HTN (hypertension)    Hyperlipidemia    Coronary artery disease involving coronary bypass graft of native heart without angina pectoris    Pacemaker    Overview     [employer] DC PPM implanted 1/8/18 for high degree AVB (was 2nd degree, Mobitz II then quickly progressed to 3rd degree with syncope)         Stenosis of right carotid artery    Overview     Added automatically from request for surgery [id number]         History of stroke    Stage 3b chronic kidney disease (CMS/[facility])      Other Visit Diagnoses     Aortic stenosis, moderate    -  Primary    Relevant Orders    Adult Transthoracic Echo Complete w/ Color, Spectral and Contrast if necessary per protocol    Mitral valve stenosis, unspecified etiology        Relevant Orders    Adult Transthoracic Echo Complete w/ Color, Spectral and Contrast if necessary per protocol    Dyspnea on exertion        Relevant Orders    Adult Transthoracic Echo Complete w/ Color, Spectral and Contrast if necessary per protocol          Plan:     1.  Coronary artery disease: Established problem, stable.   He reports a couple of episodes of exertional chest discomfort of very brief duration since his last visit.  Episodes lasted around 10 seconds and were associated with dyspnea on exertion.  -Continue Plavix 75 mg daily indefinitely (was transitioned by neurology from aspirin to Plavix after previous stroke)  -Continue high intensity statin-I am increasing this from 40 mg nightly to 80 mg nightly today as his LDL is not quite to goal  -Continue CCB, ARB  -Has not had to use as needed sublingual nitroglycerin since last visit  -Echo is being checked due to his history of valvular heart disease and dyspnea on exertion-he may ultimately need an ischemic evaluation depending on echo findings and  course of symptoms.    2.  Valvular heart disease: Established problem, further work-up planned.  He had moderate aortic stenosis and mild mitral stenosis per August 2019 echo.  -Repeat echo now due to worsening dyspnea on exertion, and development of peripheral edema over the past few months  -Continue to use Lasix as prescribed by nephrology for significant edema, shortness of breath/orthopnea/PND, weight gain of greater than 2 pounds overnight or 5 pounds in 2 days.  Patient states he uses this about once per week for lower extremity edema.  No sudden weight gains.  The only shortness of breath he describes is exertional.  -We reviewed the signs and symptoms of CHF and what to report.  -Low-sodium heart healthy diet  -Weigh daily    3.  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  Interrogation today reveals 97% ventricular pacing, 9% atrial pacing, normal device function and satisfactory battery life.  Atrial fibrillation burden was noted to be less than 1% with longest episode lasting 14 seconds (therefore, not anticoagulated).  As noted in the notes under today's EKG interrogation, Edwina Fischer RN with [employer] is rechecking the patient's device in March due to today's EKG revealing some atrial pacing spikes in the middle of his P waves.  He appears to be asymptomatic to this.  Additionally, an echo is being checked for his dyspnea (which is likely unrelated).    4.  Essential hypertension: Established problem, stable.  Systolic blood pressure was around 180 when he first arrived today.  He reports his systolic blood pressures are 120s to 130s at home.  -Continue current doses of Norvasc and Avapro  -Continue to monitor closely and call here or PCP with persistent elevations-now that he has a pacemaker, would consider adding Coreg next if an additional agent is needed    5.  Mixed hyperlipidemia: Established problem, not quite to goal.  LDL 81.  Although  "his list reflects that he is taking 80 mg nightly, he reports he has actually been taking 40 mg nightly for several months (he reports this was reduced for no other reason then \"it was controlled\").   -Increase atorvastatin from 40 mg nightly to 80 mg nightly (pt states he has this dose at home).  We discussed the goal LDL of less than 70 and the importance of good cholesterol control in the setting of CAD, carotid artery disease and history of stroke.    Follow-up with Dr. Toledo in 2 to 3 months, sooner with new or worsening symptoms.  Further recommendations to follow after echo results are back.               "

## 2021-02-15 NOTE — PROGRESS NOTES
Dual Chamber Pacemaker Evaluation Report  Clinic Interrogtion    February 15, 2021    Primary Cardiologist: Tre  : Guidant Model: [employer] MRI EL L131  Implant date: 1/8/18    Reason for evaluation: routine  Indication for pacemaker: complete heart block    Measurements  Atrial sensing - P wave: 2.5 mV  Atrial threshold: 0.8 V@ 0.4 ms  Atrial lead impedance: 720 ohms  Ventricular sensing - R wave: n/r  Ventricular threshold: 0.8 V @ 0.4 ms  Ventricular lead impedance:   589 ohms     Diagnostic Data  Atrial paced: 9 %  Ventricular paced: 97 %  Other: Few episodes of AF noted.  Longest is 14 sec.  V rates are controlled.  Battery status: satisfactory         Final Parameters  Mode:  DDDR  Lower rate: 60 bpm   Upper rate: 130 bpm  AV Delay: paced- 120-180 ms  [id number] ms  Atrial - Amplitude: 2.4 V   Pulse width: 0.4 ms   Sensitivity: 0.25 mV     Ventricular - Amplitude: 2.0 V  Pulse width: 0.4 ms  Sensitivity: 1.5 mV    Changes made: none  Conclusions: normal pacemaker function    Follow up: 3 months via [employer], annually in office

## 2021-03-03 ENCOUNTER — CLINICAL SUPPORT NO REQUIREMENTS (OUTPATIENT)
Dept: CARDIOLOGY | Facility: CLINIC | Age: 86
End: 2021-03-03

## 2021-03-03 ENCOUNTER — HOSPITAL ENCOUNTER (OUTPATIENT)
Dept: CARDIOLOGY | Facility: HOSPITAL | Age: 86
Discharge: HOME | End: 2021-03-03
Admitting: INTERNAL MEDICINE

## 2021-03-03 VITALS
DIASTOLIC BLOOD PRESSURE: 99 MMHG | SYSTOLIC BLOOD PRESSURE: 149 MMHG | WEIGHT: 196 LBS | HEIGHT: 69 IN | BODY MASS INDEX: 29.03 KG/M2

## 2021-03-03 DIAGNOSIS — Z95.0 PACEMAKER: Primary | ICD-10-CM

## 2021-03-03 DIAGNOSIS — I44.2 CHB (COMPLETE HEART BLOCK) (HCC): ICD-10-CM

## 2021-03-03 PROCEDURE — 93306 TTE W/DOPPLER COMPLETE: CPT

## 2021-03-03 PROCEDURE — 93306 TTE W/DOPPLER COMPLETE: CPT | Performed by: INTERNAL MEDICINE

## 2021-03-03 PROCEDURE — 25010000002 PERFLUTREN 6.52 MG/ML SUSPENSION: Performed by: INTERNAL MEDICINE

## 2021-03-03 RX ADMIN — PERFLUTREN 9.78 MG: 6.52 INJECTION, SUSPENSION INTRAVENOUS at 13:36

## 2021-03-03 NOTE — PROGRESS NOTES
Dual Chamber Pacemaker Evaluation Report  CLINIC BY DEVICE REP    March 3, 2021    Primary Cardiologist: Tre  : Guidant Model: [employer] MRI EL L131  Implant date: 1/8/18    Reason for evaluation: provider requested  Indication for pacemaker: complete heart block    Measurements  Atrial sensing - P wave: 2.3 mV  Atrial threshold: 1.2V@ 0.4ms  Atrial lead impedance: 699 ohms  Ventricular sensing - R wave: 12.1 mV  Ventricular threshold: 0.7 V @ 0.4 ms  Ventricular lead impedance:   578 ohms     Diagnostic Data  Atrial paced: 8 %  Ventricular paced: 100 %  Other: Serial P waves 1.9-3 mV  Battery status: satisfactory   12.5 years      Final Parameters  Mode:  DDDR  Lower rate: 60 bpm   Upper rate: 130 bpm  AV Delay: paced- 120-180 ms  [id number] ms  Atrial - Amplitude: 2.4 V   Pulse width: 0.4 ms   Sensitivity: 0.25 mV     Ventricular - Amplitude: 2.0 V  Pulse width: 0.4 ms  Sensitivity: 1.5 mV    Changes made: None  Conclusions: normal pacemaker function, stable pacing and sensing thresholds and adequate battery reserve    Follow up: Normal routine

## 2021-03-07 LAB
AORTIC DIMENSIONLESS INDEX: 0.25 (DI)
BH CV ECHO MEAS - AO MAX PG (FULL): 42.8 MMHG
BH CV ECHO MEAS - AO MAX PG: 47.6 MMHG
BH CV ECHO MEAS - AO MEAN PG (FULL): 28 MMHG
BH CV ECHO MEAS - AO MEAN PG: 30 MMHG
BH CV ECHO MEAS - AO ROOT AREA (BSA CORRECTED): 1.7
BH CV ECHO MEAS - AO ROOT AREA: 9.1 CM^2
BH CV ECHO MEAS - AO ROOT DIAM: 3.4 CM
BH CV ECHO MEAS - AO V2 MAX: 345 CM/SEC
BH CV ECHO MEAS - AO V2 MEAN: 262.3 CM/SEC
BH CV ECHO MEAS - AO V2 VTI: 81.4 CM
BH CV ECHO MEAS - AVA(I,A): 1.4 CM^2
BH CV ECHO MEAS - AVA(I,D): 1 CM^2
BH CV ECHO MEAS - AVA(V,A): 1.6 CM^2
BH CV ECHO MEAS - AVA(V,D): 1.6 CM^2
BH CV ECHO MEAS - BSA(HAYCOCK): 2.1 M^2
BH CV ECHO MEAS - BSA: 2 M^2
BH CV ECHO MEAS - BZI_BMI: 28.9 KILOGRAMS/M^2
BH CV ECHO MEAS - BZI_METRIC_HEIGHT: 175.3 CM
BH CV ECHO MEAS - BZI_METRIC_WEIGHT: 88.9 KG
BH CV ECHO MEAS - EDV(CUBED): 164.6 ML
BH CV ECHO MEAS - EDV(MOD-SP4): 148 ML
BH CV ECHO MEAS - EDV(TEICH): 146.2 ML
BH CV ECHO MEAS - EF(CUBED): 77.6 %
BH CV ECHO MEAS - EF(MOD-SP4): 62.9 %
BH CV ECHO MEAS - EF(TEICH): 69.1 %
BH CV ECHO MEAS - ESV(CUBED): 36.9 ML
BH CV ECHO MEAS - ESV(MOD-SP4): 54.9 ML
BH CV ECHO MEAS - ESV(TEICH): 45.1 ML
BH CV ECHO MEAS - FS: 39.2 %
BH CV ECHO MEAS - IVS/LVPW: 1.4
BH CV ECHO MEAS - IVSD: 1.2 CM
BH CV ECHO MEAS - LA DIMENSION: 4.3 CM
BH CV ECHO MEAS - LA/AO: 1.3
BH CV ECHO MEAS - LAT PEAK E' VEL: 5.2 CM/SEC
BH CV ECHO MEAS - LV DIASTOLIC VOL/BSA (35-75): 72.3 ML/M^2
BH CV ECHO MEAS - LV MASS(C)D: 225.2 GRAMS
BH CV ECHO MEAS - LV MASS(C)DI: 109.9 GRAMS/M^2
BH CV ECHO MEAS - LV MAX PG: 4.9 MMHG
BH CV ECHO MEAS - LV MEAN PG: 2 MMHG
BH CV ECHO MEAS - LV SYSTOLIC VOL/BSA (12-30): 26.8 ML/M^2
BH CV ECHO MEAS - LV V1 MAX: 110 CM/SEC
BH CV ECHO MEAS - LV V1 MEAN: 69.5 CM/SEC
BH CV ECHO MEAS - LV V1 VTI: 21 CM
BH CV ECHO MEAS - LVIDD: 5.5 CM
BH CV ECHO MEAS - LVIDS: 3.3 CM
BH CV ECHO MEAS - LVLD AP4: 8.6 CM
BH CV ECHO MEAS - LVLS AP4: 7 CM
BH CV ECHO MEAS - LVOT AREA (M): 4.9 CM^2
BH CV ECHO MEAS - LVOT AREA: 4.9 CM^2
BH CV ECHO MEAS - LVOT DIAM: 2.2 CM
BH CV ECHO MEAS - LVPWD: 0.88 CM
BH CV ECHO MEAS - MED PEAK E' VEL: 5 CM/SEC
BH CV ECHO MEAS - MV A MAX VEL: 124 CM/SEC
BH CV ECHO MEAS - MV DEC SLOPE: 482 CM/SEC^2
BH CV ECHO MEAS - MV DEC TIME: 0.17 SEC
BH CV ECHO MEAS - MV E MAX VEL: 118 CM/SEC
BH CV ECHO MEAS - MV E/A: 0.95
BH CV ECHO MEAS - MV MAX PG: 8.1 MMHG
BH CV ECHO MEAS - MV MEAN PG: 5 MMHG
BH CV ECHO MEAS - MV P1/2T MAX VEL: 126 CM/SEC
BH CV ECHO MEAS - MV P1/2T: 76.6 MSEC
BH CV ECHO MEAS - MV V2 MAX: 142 CM/SEC
BH CV ECHO MEAS - MV V2 MEAN: 103 CM/SEC
BH CV ECHO MEAS - MV V2 VTI: 36.8 CM
BH CV ECHO MEAS - MVA P1/2T LCG: 1.7 CM^2
BH CV ECHO MEAS - MVA(P1/2T): 2.9 CM^2
BH CV ECHO MEAS - MVA(VTI): 2.8 CM^2
BH CV ECHO MEAS - PA MAX PG: 1.3 MMHG
BH CV ECHO MEAS - PA V2 MAX: 57.7 CM/SEC
BH CV ECHO MEAS - PI END-D VEL: 115 CM/SEC
BH CV ECHO MEAS - RAP SYSTOLE: 10 MMHG
BH CV ECHO MEAS - RVSP: 37.2 MMHG
BH CV ECHO MEAS - SI(AO): 336.2 ML/M^2
BH CV ECHO MEAS - SI(CUBED): 62.3 ML/M^2
BH CV ECHO MEAS - SI(LVOT): 50.3 ML/M^2
BH CV ECHO MEAS - SI(MOD-SP4): 45.5 ML/M^2
BH CV ECHO MEAS - SI(TEICH): 49.4 ML/M^2
BH CV ECHO MEAS - SV(AO): 688.5 ML
BH CV ECHO MEAS - SV(CUBED): 127.6 ML
BH CV ECHO MEAS - SV(LVOT): 103.1 ML
BH CV ECHO MEAS - SV(MOD-SP4): 93.1 ML
BH CV ECHO MEAS - SV(TEICH): 101.1 ML
BH CV ECHO MEAS - TR MAX VEL: 261 CM/SEC
BH CV ECHO MEASUREMENTS AVERAGE E/E' RATIO: 23.14
LEFT ATRIUM VOLUME INDEX: 57.6 ML/M2
LEFT ATRIUM VOLUME: 118 CM3
MAXIMAL PREDICTED HEART RATE: 137 BPM
STRESS TARGET HR: 116 BPM

## 2021-03-22 ENCOUNTER — OFFICE VISIT (OUTPATIENT)
Dept: CARDIOLOGY | Facility: CLINIC | Age: 86
End: 2021-03-22

## 2021-03-22 VITALS
HEIGHT: 69 IN | HEART RATE: 72 BPM | DIASTOLIC BLOOD PRESSURE: 80 MMHG | WEIGHT: 203 LBS | SYSTOLIC BLOOD PRESSURE: 139 MMHG | BODY MASS INDEX: 30.07 KG/M2 | OXYGEN SATURATION: 98 %

## 2021-03-22 DIAGNOSIS — I10 ESSENTIAL HYPERTENSION: ICD-10-CM

## 2021-03-22 DIAGNOSIS — I44.2 ATRIOVENTRICULAR BLOCK, COMPLETE (HCC): ICD-10-CM

## 2021-03-22 DIAGNOSIS — R06.09 DOE (DYSPNEA ON EXERTION): ICD-10-CM

## 2021-03-22 DIAGNOSIS — I35.0 AORTIC STENOSIS, SEVERE: Primary | ICD-10-CM

## 2021-03-22 DIAGNOSIS — N18.4 CHRONIC KIDNEY DISEASE, STAGE 4 (SEVERE) (HCC): ICD-10-CM

## 2021-03-22 DIAGNOSIS — Z95.0 PACEMAKER: ICD-10-CM

## 2021-03-22 DIAGNOSIS — I25.810 CORONARY ARTERY DISEASE INVOLVING CORONARY BYPASS GRAFT OF NATIVE HEART WITHOUT ANGINA PECTORIS: ICD-10-CM

## 2021-03-22 PROBLEM — Z01.810 PREOP CARDIOVASCULAR EXAM: Status: RESOLVED | Noted: 2019-11-06 | Resolved: 2021-03-22

## 2021-03-22 PROBLEM — I25.119 CORONARY ARTERY DISEASE INVOLVING NATIVE CORONARY ARTERY OF NATIVE HEART WITH ANGINA PECTORIS (HCC): Status: RESOLVED | Noted: 2019-03-06 | Resolved: 2021-03-22

## 2021-03-22 PROBLEM — I25.700 CORONARY ARTERY DISEASE INVOLVING CORONARY BYPASS GRAFT OF NATIVE HEART WITH UNSTABLE ANGINA PECTORIS (HCC): Status: RESOLVED | Noted: 2021-03-22 | Resolved: 2021-03-22

## 2021-03-22 PROBLEM — I25.700 CORONARY ARTERY DISEASE INVOLVING CORONARY BYPASS GRAFT OF NATIVE HEART WITH UNSTABLE ANGINA PECTORIS (HCC): Status: ACTIVE | Noted: 2021-03-22

## 2021-03-22 PROCEDURE — 99214 OFFICE O/P EST MOD 30 MIN: CPT | Performed by: INTERNAL MEDICINE

## 2021-03-22 PROCEDURE — 93000 ELECTROCARDIOGRAM COMPLETE: CPT | Performed by: INTERNAL MEDICINE

## 2021-03-22 NOTE — PROGRESS NOTES
"    Subjective:     Encounter Date: 03/22/21      Patient ID: Mireille Magallanes is a 85 y.o. male.    Chief Complaint: Follow-up valvular heart disease, CAD    Mr. Magallanes is an 85-year-old male initially referred to me December 2017 with lightheadedness and syncope, determined to be from third-degree heart block, so for whom I implanted a dual-chamber pacemaker in January 2018..  He also has a history of CAD and had four-vessel CABG in 2001 and PCI to the LAD in 2014.  He also has history of carotid vascular disease, including right carotid endarterectomy in February 2020.    When he last saw GIANNI Rush, for routine f/u 2/3/21, she noted the following:    \"Today the patient reports his primary complaint is gradually worsening shortness of breath with exertion over the past 3 to 4 months.  He is unsure if this is due to inactivity and gradual weight gain due to diet choices.  He states he has gained around 8 pounds in 5 to 6 months.  He reports on 2 occasions he has had some substernal chest discomfort with the dyspnea on exertion.  However, he states the chest pain episodes lasted around 10 seconds.  Symptoms are relieved quickly with rest.  He has not had to take any sublingual nitroglycerin.  He reports Dr. Martínez, who follows him for CKD (he states he has been diagnosed with stage IV, but current labs reflect stage III), prescribed him some Lasix to take on an as-needed basis for lower extremity edema about 6 months ago.  He states he may take the Lasix about once per week at most, when his legs swell.  He denies any sudden weight gains.  He denies orthopnea, PND, palpitations, syncope or presyncope.  He tells me his blood pressure at home is 120s to 130 systolic.\"    She repeated an echocardiogram, with results as noted below.  Patient now returns today to clinic to discuss those results and treatment options.  Today, he tells me his breathing is gradually worsening. [place] has a shop about 80 ft down the hill.  On the way back, he has to stop at least once to catch his breath.  This has been progressively worsening over the last several months.  He denies associated orthopnea, but has noticed more leg swelling.  He is now actually requiring Lasix about twice per week.  He also cites associated poor energy.      The following portions of the patient's history were reviewed and updated as appropriate: allergies, current medications, past family history, past medical history, past social history, past surgical history and problem list.    Review of Systems   Constitutional: Positive for malaise/fatigue.   Cardiovascular: Positive for dyspnea on exertion. Negative for chest pain, claudication, leg swelling, near-syncope, orthopnea, palpitations, paroxysmal nocturnal dyspnea and syncope.   Respiratory: Positive for shortness of breath.    Hematologic/Lymphatic: Does not bruise/bleed easily.       Allergies   Allergen Reactions   • Sulfa Antibiotics Rash     Sulfa Drugs       Current Outpatient Medications:   •  amLODIPine (NORVASC) 10 MG tablet, Take 1 tablet by mouth once daily, Disp: 90 tablet, Rfl: 4  •  atorvastatin (LIPITOR) 80 MG tablet, Take 80 mg by mouth Daily., Disp: , Rfl:   •  clopidogrel (Plavix) 75 MG tablet, Take 1 tablet by mouth Daily., Disp: 90 tablet, Rfl: 3  •  Coenzyme Q10 (COQ-10) 100 MG capsule, Take 1 capsule by mouth Daily., Disp: , Rfl:   •  furosemide (LASIX) 40 MG tablet, Take 40 mg by mouth Daily As Needed., Disp: , Rfl:   •  irbesartan (AVAPRO) 300 MG tablet, Take 300 mg by mouth Every Night., Disp: , Rfl:   •  nitroglycerin (NITROSTAT) 0.4 MG SL tablet, Place 0.4 mg under the tongue as needed., Disp: , Rfl:          Objective:    /80   Pulse 72   Ht 175.3 cm (69\")   Wt 92.1 kg (203 lb)   SpO2 98%   BMI 29.98 kg/m²        Vitals and nursing note reviewed.   Constitutional:       General: Not in acute distress.     Appearance: Well-developed " and not in distress. Not diaphoretic.   Neck:      Vascular: No JVD.   Pulmonary:      Effort: Pulmonary effort is normal. No respiratory distress.      Breath sounds: Normal breath sounds.   Cardiovascular:      Normal rate. Regular rhythm.      Murmurs: There is a grade 3/6 harsh midsystolic murmur at the URSB, radiating to the neck.   Edema:     Peripheral edema (1-2+ BLE edema ) present.     Ankle: bilateral trace edema of the ankle.     Feet: bilateral trace edema of the feet.  Abdominal:      Tenderness: There is no abdominal tenderness.   Skin:     General: Skin is warm and dry.   Neurological:      Mental Status: Alert and oriented to person, place, and time.                 ECG 12 Lead    Date/Time: 3/22/2021 4:04 PM  Performed by: Kaushik Toledo MD  Authorized by: Kaushik Toledo MD   Comparison: compared with previous ECG from 2/3/2021  Rhythm: sinus rhythm and paced  BPM: 72    Clinical impression: abnormal EKG            Results for orders placed in visit on 02/03/21    Adult Transthoracic Echo Complete w/ Color, Spectral and Contrast if necessary per protocol    Interpretation Summary  · Left ventricular ejection fraction appears to be 61 - 65%. Left ventricular systolic function is normal.  · Severe aortic valve stenosis is present. Aortic valve area is 0.97 cm2 and dimensionless index 0.25.  · Left ventricular wall thickness is consistent with mild septal asymmetric hypertrophy.  · Left ventricular diastolic function is consistent with (grade II w/high LAP) pseudonormalization.  · Mild mitral valve stenosis is present due to severe calcification of the posterior mitral annulus.  · Left atrial volume is severely increased.  · Normal size and function of right ventricle.  · Estimated right ventricular systolic pressure from tricuspid regurgitation is mildly elevated (35-45 mmHg).  · Compared to most recent exam from 2019, aortic stenosis has progressed such that it is now classified as a  "severe.  ============================================================================  3/2019 cath -   Findings:     Selective coronary angiography:   Dominance: right  Left Main coronary artery: Arises normally from the left cusp and bifurcates.  Mildly calcified throughout with an eccentric 30-40% stenosis the ostium/proximal portion.  Left anterior descending artery: Moderate size vessel arising normally from the distal left main that is mildly calcified throughout its course.  It supplies one high diagonal branch and there is a stent in the midportion which is widely patent.  A second diagonal branch is 100% occluded and fills via a vein graft.  There are minor luminal irregularities of the mid to distal LAD.  LIMA is known to be nonfunctioning from prior catheterizations.   Left circumflex: Heavily calcified vessel arising normally from the distal left main is 100% occluded in its midportion with AUBREY 0 flow distally.  Obtuse marginal branches fill via saphenous vein graft and have minor luminal irregularities.  Right coronary artery: Heavily calcified vessel arising normally from the right cusp that is dominant for posterior circulation, supplying the PDA and posterior lateral branch.  There is diffuse mild disease with an eccentric 40% stenosis in the mid vessel.     Bypass graft angiography  LIMA to LAD: Known to be nonfunctioning from prior catheterizations  SVG, sequential to OM1 and OM 2: Widely patent at the ostium, body, and anastomoses.  SVG to diagonal: Small graft but is widely patent with no evidence of disease at the ostium, body, or anastomosis.     Fractional flow reserve assessment: Intermediate lesion noted at the ostium of the left main coronary artery.  6 Setswana JL 4.5 guide 6Fr JL 3.5 and 4 did not properly engage) was advanced into the ascending aorta and the FFR wire equalized prior to engagement.  Left main coronary artery was then engaged to allow advancement of the .014\" SJM FFR wire to " the distal LAD, and intracoronary nitroglycerin was administered to achieve maximal epicardial vasodilation.   Intravenous adenosine was administered at 140 mcg/kg/m.  Guide was confirmed to be dis-engaged (ie in the aortic cusp). After 2 minutes, lowest pressure recording difference was 0.87 (not physiologically significant).  Wire was removed and final angiography was performed to confirm the wire perforation or other complication.     Impression:  1.  Physiologically insignificant stenosis of the proximal left main coronary artery (FFR 0.87).  Patent stent in the mid LAD with an occluded diagonal branch, plus occlusions of OM1 and OM 2.    2.  Saphenous vein grafts to OM1->OM2, and to diagonal branch, are widely patent.  ====================================================================    Assessment:      Problem List Items Addressed This Visit        Cardiac and Vasculature    HTN (hypertension)    Aortic stenosis, severe - Primary    Overview     moderate on echo Aug '19         Relevant Orders    ECG 12 Lead    CT Angio TAVR Chest Abdomen Pelvis With & Without Contrast    Coronary artery disease involving coronary bypass graft of native heart without angina pectoris    Relevant Orders    ECG 12 Lead    Pacemaker    Overview     [employer] DC PPM implanted 1/8/18 for high degree AVB (was 2nd degree, Mobitz II then quickly progressed to 3rd degree with syncope)         OLMEDO (dyspnea on exertion)       Genitourinary and Reproductive     Chronic kidney disease, stage 4 (severe) (CMS/HCC)      Other Visit Diagnoses     Atrioventricular block, complete (CMS/HCC)              Plan:     1.  Severe aortic stenosis: At this point, recent echocardiogram has shown progression of his known valvular heart disease such that his aortic stenosis is now severe.  I also do believe that his gradually progressive worsening exertional dyspnea over the last several months is attributable to this.  He is not having angina or  other symptoms that make me strongly concerned that his coronary disease has worsened.  -We discussed treatment options for severe aortic stenosis.  Given his age and prior CABG, transcatheter aortic valve replacement would be preferred.  I explained to the patient the valvular heart team process here at [facility], and that we need to complete his work-up first before providing any specific recommendations on treatment modality of choice.  -We will start with CT TAVR protocol to assess for valve sizing and access vessels suitability  -We will discuss his case at our weekly valve meeting  -We will then plan diagnostic coronary and bypass graft angiography  -Carotids ultrasound also be scheduled   -Continue Lasix as needed, along with daily weight    2.  Coronary artery disease: Established problem, stable.    -Continue Plavix 75 mg daily indefinitely (was transitioned by neurology from aspirin to Plavix after previous stroke)  -Continue high intensity statin  -Continue CCB, ARB  -Has not had to use as needed sublingual nitroglycerin since last visit    3.  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      4.  Essential hypertension: Established problem, stable.    -Continue current doses of Norvasc and Avapro  -Continue to monitor closely and call here or PCP with persistent elevations-now that he has a pacemaker, would consider adding Coreg next if an additional agent is needed    Follow-up to be determined after work-up is completed for aortic valve intervention    Electronically signed by Kaushik Toledo MD, 03/22/21, 4:27 PM CDT.

## 2021-03-25 ENCOUNTER — HOSPITAL ENCOUNTER (OUTPATIENT)
Dept: CT IMAGING | Facility: HOSPITAL | Age: 86
Discharge: HOME | End: 2021-03-25
Admitting: INTERNAL MEDICINE

## 2021-03-25 VITALS
OXYGEN SATURATION: 98 % | DIASTOLIC BLOOD PRESSURE: 84 MMHG | RESPIRATION RATE: 14 BRPM | WEIGHT: 203.04 LBS | HEART RATE: 67 BPM | HEIGHT: 69 IN | BODY MASS INDEX: 30.07 KG/M2 | TEMPERATURE: 96.4 F | SYSTOLIC BLOOD PRESSURE: 153 MMHG

## 2021-03-25 DIAGNOSIS — N18.4 STAGE 4 CHRONIC KIDNEY DISEASE (HCC): Primary | ICD-10-CM

## 2021-03-25 DIAGNOSIS — I35.0 AORTIC STENOSIS, SEVERE: ICD-10-CM

## 2021-03-25 LAB — CREAT BLDA-MCNC: 2.3 MG/DL (ref 0.6–1.3)

## 2021-03-25 PROCEDURE — 74174 CTA ABD&PLVS W/CONTRAST: CPT

## 2021-03-25 PROCEDURE — 71275 CT ANGIOGRAPHY CHEST: CPT

## 2021-03-25 PROCEDURE — 0 IOPAMIDOL PER 1 ML: Performed by: INTERNAL MEDICINE

## 2021-03-25 PROCEDURE — 82565 ASSAY OF CREATININE: CPT

## 2021-03-25 RX ORDER — SODIUM CHLORIDE 9 MG/ML
500 INJECTION, SOLUTION INTRAVENOUS ONCE
Status: DISCONTINUED | OUTPATIENT
Start: 2021-03-25 | End: 2021-04-14

## 2021-03-25 RX ORDER — SODIUM CHLORIDE 9 MG/ML
500 INJECTION, SOLUTION INTRAVENOUS ONCE
Status: COMPLETED | OUTPATIENT
Start: 2021-03-25 | End: 2021-03-25

## 2021-03-25 RX ADMIN — IOPAMIDOL 100 ML: 755 INJECTION, SOLUTION INTRAVENOUS at 16:21

## 2021-03-25 RX ADMIN — SODIUM CHLORIDE 500 ML: 0.9 INJECTION, SOLUTION INTRAVENOUS at 13:45

## 2021-03-25 NOTE — NURSING NOTE
500ml NS IV infusion complete.  Pt transported to radiology for CT TAVR scan at this time via wheelchair by RN.  Pt will be discharged home from CT scan after test is completed.

## 2021-03-25 NOTE — NURSING NOTE
Spoke with Dr. SHANNON Toledo at this time regarding pt plan of care and D/C after CT TAVR.  Dr. SHANNON Toledo states pt will be ok to D/C home directly from CT scan after IVFluids and scans are complete this afternoon.

## 2021-04-01 ENCOUNTER — TELEPHONE (OUTPATIENT)
Dept: CARDIOLOGY | Facility: CLINIC | Age: 86
End: 2021-04-01

## 2021-04-01 DIAGNOSIS — N18.4 CKD (CHRONIC KIDNEY DISEASE) STAGE 4, GFR 15-29 ML/MIN (HCC): Primary | ICD-10-CM

## 2021-04-01 NOTE — TELEPHONE ENCOUNTER
Call to pt to let him know that he will need a heart cath as the next step in his TAVR work up. Dr. Toledo would like to recheck his BMP before we make plans for cath. He is agreeable. Order faxed to [facility] as requested by pt.

## 2021-04-06 ENCOUNTER — TELEPHONE (OUTPATIENT)
Dept: CARDIOLOGY | Facility: CLINIC | Age: 86
End: 2021-04-06

## 2021-04-06 ENCOUNTER — PREP FOR SURGERY (OUTPATIENT)
Dept: OTHER | Facility: HOSPITAL | Age: 86
End: 2021-04-06

## 2021-04-06 ENCOUNTER — TELEPHONE (OUTPATIENT)
Dept: CARDIAC SURGERY | Facility: CLINIC | Age: 86
End: 2021-04-06

## 2021-04-06 ENCOUNTER — OFFICE VISIT (OUTPATIENT)
Dept: CARDIAC SURGERY | Facility: CLINIC | Age: 86
End: 2021-04-06

## 2021-04-06 DIAGNOSIS — I35.0 AORTIC STENOSIS, SEVERE: Primary | ICD-10-CM

## 2021-04-06 RX ORDER — SODIUM CHLORIDE 0.9 % (FLUSH) 0.9 %
10 SYRINGE (ML) INJECTION AS NEEDED
Status: CANCELLED | OUTPATIENT
Start: 2021-04-06

## 2021-04-06 RX ORDER — SODIUM CHLORIDE 9 MG/ML
1-3 INJECTION, SOLUTION INTRAVENOUS CONTINUOUS
Status: CANCELLED | OUTPATIENT
Start: 2021-04-06

## 2021-04-06 RX ORDER — ASPIRIN 81 MG/1
81 TABLET ORAL DAILY
Status: CANCELLED | OUTPATIENT
Start: 2021-04-07

## 2021-04-06 RX ORDER — ASPIRIN 81 MG/1
324 TABLET, CHEWABLE ORAL ONCE
Status: CANCELLED | OUTPATIENT
Start: 2021-04-06 | End: 2021-04-06

## 2021-04-06 RX ORDER — SODIUM CHLORIDE 0.9 % (FLUSH) 0.9 %
3 SYRINGE (ML) INJECTION EVERY 12 HOURS SCHEDULED
Status: CANCELLED | OUTPATIENT
Start: 2021-04-06

## 2021-04-06 NOTE — TELEPHONE ENCOUNTER
Call to pt to let him know that I received his lab results and after reviewing are able to move forward with his heart cath. He verbalized understanding.

## 2021-04-06 NOTE — TELEPHONE ENCOUNTER
Pt was here today for new pt TAVR eval with Dr Wilcox at 10am but Dr Wilcox was delayed in surgery.  Pt waited until 10:45am but could not wait any longer.  Appt cx'd per pt request.  Pt states he will notify our office once Dr Toledo has sched his heart cath and we will talk to Dr Wilcox for an appt sometime after heart cath is performed.  Pt voiced understanding to all/kahm

## 2021-04-07 ENCOUNTER — TRANSCRIBE ORDERS (OUTPATIENT)
Dept: LAB | Facility: HOSPITAL | Age: 86
End: 2021-04-07

## 2021-04-07 DIAGNOSIS — Z01.818 PRE-OP TESTING: Primary | ICD-10-CM

## 2021-04-12 ENCOUNTER — TELEPHONE (OUTPATIENT)
Dept: CARDIAC SURGERY | Facility: CLINIC | Age: 86
End: 2021-04-12

## 2021-04-12 ENCOUNTER — LAB (OUTPATIENT)
Dept: LAB | Facility: HOSPITAL | Age: 86
End: 2021-04-12

## 2021-04-12 LAB — SARS-COV-2 ORF1AB RESP QL NAA+PROBE: NOT DETECTED

## 2021-04-12 PROCEDURE — U0005 INFEC AGEN DETEC AMPLI PROBE: HCPCS | Performed by: INTERNAL MEDICINE

## 2021-04-12 PROCEDURE — C9803 HOPD COVID-19 SPEC COLLECT: HCPCS | Performed by: INTERNAL MEDICINE

## 2021-04-12 PROCEDURE — U0004 COV-19 TEST NON-CDC HGH THRU: HCPCS | Performed by: INTERNAL MEDICINE

## 2021-04-12 NOTE — TELEPHONE ENCOUNTER
Called pt to offer appt tomorrow (4-13-21) with Dr Wilcox for TAVR eval but pt states he is not available tomorrow.  Pt states he will be here for heart cath on Wednesday and could see him then but informed pt his schedule is full on Wednesday.  Pt asked to be kept on the list for an appt sometime after he has had the heart cath done./annita

## 2021-04-13 NOTE — TELEPHONE ENCOUNTER
Discussed with Dr. Wilcox. It appears that patient has heart cath scheduled at 11 am tomorrow. Will make effort to evaluate patient while he is in COU for cath, but Dr. Wilcox does have a full clinic day tomorrow.

## 2021-04-14 ENCOUNTER — HOSPITAL ENCOUNTER (OUTPATIENT)
Facility: HOSPITAL | Age: 86
Discharge: HOME | End: 2021-04-14
Attending: INTERNAL MEDICINE | Admitting: INTERNAL MEDICINE

## 2021-04-14 VITALS
OXYGEN SATURATION: 98 % | SYSTOLIC BLOOD PRESSURE: 188 MMHG | DIASTOLIC BLOOD PRESSURE: 120 MMHG | HEART RATE: 70 BPM | WEIGHT: 202 LBS | RESPIRATION RATE: 19 BRPM | BODY MASS INDEX: 29.92 KG/M2 | TEMPERATURE: 97 F | HEIGHT: 69 IN

## 2021-04-14 DIAGNOSIS — I35.0 AORTIC STENOSIS, SEVERE: ICD-10-CM

## 2021-04-14 PROBLEM — Z79.01 ANTICOAGULATED: Status: RESOLVED | Noted: 2019-08-26 | Resolved: 2021-04-14

## 2021-04-14 PROBLEM — I44.2 CHB (COMPLETE HEART BLOCK) (HCC): Status: RESOLVED | Noted: 2021-03-03 | Resolved: 2021-04-14

## 2021-04-14 LAB
ANION GAP SERPL CALCULATED.3IONS-SCNC: 12 MMOL/L (ref 5–15)
BASOPHILS # BLD AUTO: 0.04 10*3/MM3 (ref 0–0.2)
BASOPHILS NFR BLD AUTO: 0.7 % (ref 0–1.5)
BUN SERPL-MCNC: 41 MG/DL (ref 8–23)
BUN/CREAT SERPL: 18.6 (ref 7–25)
CALCIUM SPEC-SCNC: 9.4 MG/DL (ref 8.6–10.5)
CHLORIDE SERPL-SCNC: 105 MMOL/L (ref 98–107)
CO2 SERPL-SCNC: 23 MMOL/L (ref 22–29)
CREAT SERPL-MCNC: 2.2 MG/DL (ref 0.76–1.27)
DEPRECATED RDW RBC AUTO: 42.5 FL (ref 37–54)
EOSINOPHIL # BLD AUTO: 0.07 10*3/MM3 (ref 0–0.4)
EOSINOPHIL NFR BLD AUTO: 1.2 % (ref 0.3–6.2)
ERYTHROCYTE [DISTWIDTH] IN BLOOD BY AUTOMATED COUNT: 13.3 % (ref 12.3–15.4)
GFR SERPL CREATININE-BSD FRML MDRD: 29 ML/MIN/1.73
GLUCOSE SERPL-MCNC: 105 MG/DL (ref 65–99)
HCT VFR BLD AUTO: 41.1 % (ref 37.5–51)
HGB BLD-MCNC: 13.6 G/DL (ref 13–17.7)
IMM GRANULOCYTES # BLD AUTO: 0.02 10*3/MM3 (ref 0–0.05)
IMM GRANULOCYTES NFR BLD AUTO: 0.3 % (ref 0–0.5)
INR PPP: 1.45 (ref 0.91–1.09)
LYMPHOCYTES # BLD AUTO: 1.61 10*3/MM3 (ref 0.7–3.1)
LYMPHOCYTES NFR BLD AUTO: 28 % (ref 19.6–45.3)
MCH RBC QN AUTO: 28.9 PG (ref 26.6–33)
MCHC RBC AUTO-ENTMCNC: 33.1 G/DL (ref 31.5–35.7)
MCV RBC AUTO: 87.3 FL (ref 79–97)
MONOCYTES # BLD AUTO: 0.51 10*3/MM3 (ref 0.1–0.9)
MONOCYTES NFR BLD AUTO: 8.9 % (ref 5–12)
NEUTROPHILS NFR BLD AUTO: 3.51 10*3/MM3 (ref 1.7–7)
NEUTROPHILS NFR BLD AUTO: 60.9 % (ref 42.7–76)
NRBC BLD AUTO-RTO: 0 /100 WBC (ref 0–0.2)
PLATELET # BLD AUTO: 163 10*3/MM3 (ref 140–450)
PMV BLD AUTO: 10.5 FL (ref 6–12)
POTASSIUM SERPL-SCNC: 5.2 MMOL/L (ref 3.5–5.2)
PROTHROMBIN TIME: 17.3 SECONDS (ref 11.9–14.6)
RBC # BLD AUTO: 4.71 10*6/MM3 (ref 4.14–5.8)
SODIUM SERPL-SCNC: 140 MMOL/L (ref 136–145)
WBC # BLD AUTO: 5.76 10*3/MM3 (ref 3.4–10.8)

## 2021-04-14 PROCEDURE — C1760 CLOSURE DEV, VASC: HCPCS | Performed by: INTERNAL MEDICINE

## 2021-04-14 PROCEDURE — 25010000002 FENTANYL CITRATE (PF) 100 MCG/2ML SOLUTION: Performed by: INTERNAL MEDICINE

## 2021-04-14 PROCEDURE — 80048 BASIC METABOLIC PNL TOTAL CA: CPT | Performed by: INTERNAL MEDICINE

## 2021-04-14 PROCEDURE — 25010000002 IOPAMIDOL 61 % SOLUTION: Performed by: INTERNAL MEDICINE

## 2021-04-14 PROCEDURE — 63710000001 ASPIRIN 81 MG CHEWABLE TABLET

## 2021-04-14 PROCEDURE — 99152 MOD SED SAME PHYS/QHP 5/>YRS: CPT | Performed by: INTERNAL MEDICINE

## 2021-04-14 PROCEDURE — 25010000002 HEPARIN (PORCINE) 1000-0.9 UT/500ML-% SOLUTION: Performed by: INTERNAL MEDICINE

## 2021-04-14 PROCEDURE — 99205 OFFICE O/P NEW HI 60 MIN: CPT | Performed by: THORACIC SURGERY (CARDIOTHORACIC VASCULAR SURGERY)

## 2021-04-14 PROCEDURE — 85025 COMPLETE CBC W/AUTO DIFF WBC: CPT | Performed by: INTERNAL MEDICINE

## 2021-04-14 PROCEDURE — A9270 NON-COVERED ITEM OR SERVICE: HCPCS

## 2021-04-14 PROCEDURE — C1894 INTRO/SHEATH, NON-LASER: HCPCS | Performed by: INTERNAL MEDICINE

## 2021-04-14 PROCEDURE — 25010000002 DIPHENHYDRAMINE PER 50 MG: Performed by: INTERNAL MEDICINE

## 2021-04-14 PROCEDURE — 93455 CORONARY ART/GRFT ANGIO S&I: CPT | Performed by: INTERNAL MEDICINE

## 2021-04-14 PROCEDURE — 99153 MOD SED SAME PHYS/QHP EA: CPT | Performed by: INTERNAL MEDICINE

## 2021-04-14 PROCEDURE — 25010000002 HEPARIN (PORCINE) 2000-0.9 UNIT/L-% SOLUTION: Performed by: INTERNAL MEDICINE

## 2021-04-14 PROCEDURE — 25010000002 MIDAZOLAM HCL (PF) 5 MG/5ML SOLUTION: Performed by: INTERNAL MEDICINE

## 2021-04-14 PROCEDURE — 85610 PROTHROMBIN TIME: CPT | Performed by: INTERNAL MEDICINE

## 2021-04-14 RX ORDER — SODIUM CHLORIDE 9 MG/ML
1-3 INJECTION, SOLUTION INTRAVENOUS CONTINUOUS
Status: DISCONTINUED | OUTPATIENT
Start: 2021-04-14 | End: 2021-04-14 | Stop reason: HOSPADM

## 2021-04-14 RX ORDER — ASPIRIN 81 MG/1
81 TABLET ORAL DAILY
Status: DISCONTINUED | OUTPATIENT
Start: 2021-04-15 | End: 2021-04-14 | Stop reason: HOSPADM

## 2021-04-14 RX ORDER — HEPARIN SODIUM 200 [USP'U]/100ML
INJECTION, SOLUTION INTRAVENOUS AS NEEDED
Status: DISCONTINUED | OUTPATIENT
Start: 2021-04-14 | End: 2021-04-14 | Stop reason: HOSPADM

## 2021-04-14 RX ORDER — ACETAMINOPHEN 325 MG/1
650 TABLET ORAL EVERY 4 HOURS PRN
Status: DISCONTINUED | OUTPATIENT
Start: 2021-04-14 | End: 2021-04-14 | Stop reason: HOSPADM

## 2021-04-14 RX ORDER — SODIUM CHLORIDE 0.9 % (FLUSH) 0.9 %
3 SYRINGE (ML) INJECTION EVERY 12 HOURS SCHEDULED
Status: DISCONTINUED | OUTPATIENT
Start: 2021-04-14 | End: 2021-04-14 | Stop reason: HOSPADM

## 2021-04-14 RX ORDER — ASPIRIN 81 MG/1
324 TABLET, CHEWABLE ORAL ONCE
Status: COMPLETED | OUTPATIENT
Start: 2021-04-14 | End: 2021-04-14

## 2021-04-14 RX ORDER — FENTANYL CITRATE 50 UG/ML
INJECTION, SOLUTION INTRAMUSCULAR; INTRAVENOUS AS NEEDED
Status: DISCONTINUED | OUTPATIENT
Start: 2021-04-14 | End: 2021-04-14 | Stop reason: HOSPADM

## 2021-04-14 RX ORDER — SODIUM CHLORIDE 0.9 % (FLUSH) 0.9 %
10 SYRINGE (ML) INJECTION AS NEEDED
Status: DISCONTINUED | OUTPATIENT
Start: 2021-04-14 | End: 2021-04-14 | Stop reason: HOSPADM

## 2021-04-14 RX ORDER — ASPIRIN 81 MG/1
TABLET, CHEWABLE ORAL
Status: COMPLETED
Start: 2021-04-14 | End: 2021-04-14

## 2021-04-14 RX ORDER — LIDOCAINE HYDROCHLORIDE 20 MG/ML
INJECTION, SOLUTION EPIDURAL; INFILTRATION; INTRACAUDAL; PERINEURAL AS NEEDED
Status: DISCONTINUED | OUTPATIENT
Start: 2021-04-14 | End: 2021-04-14 | Stop reason: HOSPADM

## 2021-04-14 RX ORDER — DIPHENHYDRAMINE HYDROCHLORIDE 50 MG/ML
INJECTION INTRAMUSCULAR; INTRAVENOUS AS NEEDED
Status: DISCONTINUED | OUTPATIENT
Start: 2021-04-14 | End: 2021-04-14 | Stop reason: HOSPADM

## 2021-04-14 RX ORDER — MIDAZOLAM HYDROCHLORIDE 1 MG/ML
INJECTION, SOLUTION INTRAMUSCULAR; INTRAVENOUS AS NEEDED
Status: DISCONTINUED | OUTPATIENT
Start: 2021-04-14 | End: 2021-04-14 | Stop reason: HOSPADM

## 2021-04-14 RX ORDER — SODIUM CHLORIDE 9 MG/ML
100 INJECTION, SOLUTION INTRAVENOUS CONTINUOUS
Status: DISPENSED | OUTPATIENT
Start: 2021-04-14 | End: 2021-04-14

## 2021-04-14 RX ADMIN — SODIUM CHLORIDE 1 ML/KG/HR: 9 INJECTION, SOLUTION INTRAVENOUS at 10:32

## 2021-04-14 RX ADMIN — ASPIRIN 324 MG: 81 TABLET, CHEWABLE ORAL at 10:31

## 2021-04-14 NOTE — INTERVAL H&P NOTE
H&P reviewed. The patient was examined and there are no changes to the H&P.      Will proceed with diagnostic and bypass graft angiography    I discussed cardiac catheterization, the procedure, risks (including bleeding, infection, vascular damage [including minor oozing, bruising, bleeding, and up to and including the need for vascular surgery], contrast reaction, renal failure, respiratory failure, heart attack, stroke, arrhythmia and even death), benefits, and alternatives and the patient has voiced understanding and is willing to proceed.

## 2021-04-14 NOTE — CONSULTS
CC: shortness of breath      Subjective     History of Present Illness    85-year-old male with salient past medical history of known coronary artery disease post coronary artery bypass grafting-four-vessel in 2001, history of carotid artery disease-now post right carotid endarterectomy in February 2020, history of left ICA territory stroke, chronic kidney disease stage III possibly stage IV-followed by Dr. 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 Kaushik Toledo.   In the last 6 months he has had increasing shortness of breath primarily on exertion.  He reports several pound weight gain.  Diuretics does help his weight and has helped his lower extremity edema.  He reports 1-2 pillow orthopnea.  He does have some proximal nocturnal dyspnea here and there but none recently.  Denies syncope, palpitations, or admissions for heart failure.  He is able to walk about 30 to 80 feet and then begins to have increasing dyspnea.  With rest it does not subside.  Activities of daily living that are short duration are not impacted.  He does have poor energy tolerance.  Further work-up has been performed and severe aortic valve stenosis has been identified.  He has undergone TAVR study.  Today left heart catheterization was performed and has been reviewed by me.  My impression is the following:  It is remarkable for an approximate 30% stenosis of the proximal left main coronary artery, mild luminal irregularities of the LAD with a widely patent LAD, and occluded second diagonal, occluded proximally circumflex, and moderate calcified R coronary artery with  40% stenosis and dominance.  Graft tomography is performed showing a patent vein graft sequencing both the first and second obtuse marginal and suitable with a vein graft to a diagonal that is also  suitable.  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  In comparison to his previous left heart catheterization in 2019 his disease is essentially stable.    He is joined by his wife.  He is eager for the consideration of TAVR and reports much concern about his kidneys being impacted by any procedure.    Review of Systems   Constitutional: Positive for activity change, fatigue and unexpected weight change. Negative for appetite change, chills, diaphoresis and fever.   HENT: Negative for dental problem, hearing loss, nosebleeds, sore throat, trouble swallowing and voice change.    Eyes: Negative for photophobia, redness and visual disturbance.   Respiratory: Positive for shortness of breath. Negative for apnea, cough, chest tightness, wheezing and stridor.    Cardiovascular: Positive for leg swelling. Negative for chest pain and palpitations.   Gastrointestinal: Negative for abdominal distention, abdominal pain, blood in stool, constipation, diarrhea, nausea and vomiting.   Endocrine: Negative for cold intolerance, heat intolerance, polyphagia and polyuria.   Genitourinary: Negative for decreased urine volume, difficulty urinating, dysuria, flank pain, frequency, hematuria and urgency.   Musculoskeletal: Positive for arthralgias. Negative for back pain, gait problem, joint swelling, myalgias and neck pain.   Skin: Negative for pallor, rash and wound.   Allergic/Immunologic: Negative for immunocompromised state.   Neurological: Positive for weakness. Negative for dizziness, tremors, seizures, syncope, speech difficulty, light-headedness, numbness and headaches.   Hematological: Bruises/bleeds easily.   Psychiatric/Behavioral: Negative for confusion, sleep disturbance and suicidal ideas. The patient is not nervous/anxious.           Past Medical History:   Diagnosis Date   • Acute viral hepatitis A    • Aortic valve stenosis    • Carotid artery stenosis    • Carotid artery stenosis      Right   • CKD (chronic kidney disease)    • Coagulopathy (CMS/HCC)     Plavix   • Coagulopathy (CMS/HCC)    • Coronary arteriosclerosis     stent ,? 4/2104, on plavix now.   • Diarrhea    • Dysphagia     Unspecified   • Family history of colon cancer    • Hematochezia     differential diagnosis discussed   • Hemorrhoids     Unspecified   • History of cardiac pacemaker 01/08/2018    [employer]   • History of colonic polyps    • Hypercholesterolemia    • Hyperlipidemia    • Hypertension    • Left ventricular hypertrophy    • Melanoma of back (CMS/HCC)    • Obesity    • Osteoarthritis    • Peripheral neuropathy    • Polyp of colon    • Stroke (CMS/HCC)    • TIA (transient ischemic attack)    • Tobacco non-user      Past Surgical History:   Procedure Laterality Date   • APPENDECTOMY     • CARDIAC CATHETERIZATION     • CARDIAC CATHETERIZATION N/A 3/18/2019    Procedure: Left Heart Cath;  Surgeon: Kaushik Toledo MD;  Location:  [facility] CATH INVASIVE LOC[facility];  Service: Cardiology   • CARDIAC ELECTROPHYSIOLOGY PROCEDURE N/A 1/5/2018    Procedure: Temporary Pacemaker;  Surgeon: Luis E Courtney MD;  Location:  [facility] CATH [facility] LOC[facility];  Service:    • CARDIAC ELECTROPHYSIOLOGY PROCEDURE N/A 1/8/2018    Procedure: Device Implant;  Surgeon: Kaushik Toledo MD;  Location:  [facility] CATH INVASIVE LOCATION;  Service:    • CAROTID ENDARTERECTOMY Right 2/14/2020    Procedure: RIGHT CAROTID ENDARTERECTOMY WITH EEG;  Surgeon: Martín Woo DO;  Location: [facility] HYBRID OR 12;  Service: Vascular;  Laterality: Right;   • CAROTID STENT     • COLON SURGERY  1999    for Ischemic Colitis   • COLONOSCOPY  05/07/2008    tubular adenoma ascending colon polyp - 3 yr   • COLONOSCOPY  08/19/2003    colon polyp @ 24 cm-see path - 5 yr   • COLONOSCOPY  08/11/2000    small interanl hemorrhoid - 3-5 yr   • COLONOSCOPY  06/22/1998    (8)small colon polyps removed-see path, internal anal skin tag - 2-3 yr   • CORONARY ARTERY BYPASS  "GRAFT     • CORONARY STENT PLACEMENT      x1  dr crane    • INSERT / REPLACE / REMOVE PACEMAKER     • OTHER SURGICAL HISTORY      Lithotripsy   • OTHER SURGICAL HISTORY  2011    recall 3 yr.    • PACEMAKER IMPLANTATION  2018   • VASECTOMY       Family History   Problem Relation Age of Onset   • Colon cancer Father         Diagnosed at 74 YO   • Other Sister         Polyps/Colon   • Other Brother         Polyps/Colon   • Stroke Mother      Social History     Tobacco Use   • Smoking status: Former Smoker     Types: Cigarettes     Quit date:      Years since quittin.2   • Smokeless tobacco: Never Used   Vaping Use   • Vaping Use: Never used   Substance Use Topics   • Alcohol use: No   • Drug use: No     Facility-Administered Medications Prior to Admission   Medication Dose Route Frequency Provider Last Rate Last Admin   • [DISCONTINUED] sodium chloride 0.9 % infusion 500 mL  500 mL Intravenous Once Kaushik Toledo MD         Medications Prior to Admission   Medication Sig Dispense Refill Last Dose   • amLODIPine (NORVASC) 10 MG tablet Take 1 tablet by mouth once daily 90 tablet 4 2021 at Unknown time   • atorvastatin (LIPITOR) 80 MG tablet Take 80 mg by mouth Daily.   2021 at Unknown time   • clopidogrel (Plavix) 75 MG tablet Take 1 tablet by mouth Daily. 90 tablet 3 2021 at Unknown time   • Coenzyme Q10 (COQ-10) 100 MG capsule Take 1 capsule by mouth Daily.   2021 at Unknown time   • furosemide (LASIX) 40 MG tablet Take 40 mg by mouth Daily As Needed.   2021 at Unknown time   • irbesartan (AVAPRO) 300 MG tablet Take 300 mg by mouth Every Night.   2021 at Unknown time   • nitroglycerin (NITROSTAT) 0.4 MG SL tablet Place 0.4 mg under the tongue as needed.   Unknown at Unknown time     Allergies:  Sulfa antibiotics    Objective      Vital Signs  Visit Vitals  /90   Pulse 70   Temp 97 °F (36.1 °C) (Temporal)   Resp 18   Ht 175.3 cm (69\")   Wt 91.6 kg (202 lb) "   SpO2 98%   BMI 29.83 kg/m²         Physical Exam  Constitutional:       Appearance: He is well-developed.      Comments: Hard of hearing   HENT:      Head: Normocephalic and atraumatic.   Eyes:      Pupils: Pupils are equal, round, and reactive to light.   Neck:      Thyroid: No thyromegaly.      Vascular: No JVD.      Trachea: No tracheal deviation.   Cardiovascular:      Rate and Rhythm: Normal rate and regular rhythm.      Heart sounds: Murmur heard.   No friction rub. No gallop.    Pulmonary:      Effort: Pulmonary effort is normal. No respiratory distress.      Breath sounds: Normal breath sounds. No wheezing or rales.   Chest:      Chest wall: No tenderness.   Abdominal:      General: There is no distension.      Palpations: Abdomen is soft.      Tenderness: There is no abdominal tenderness.   Musculoskeletal:         General: Normal range of motion.      Cervical back: Normal range of motion and neck supple.      Right lower leg: Edema present.      Left lower leg: Edema present.   Lymphadenopathy:      Cervical: No cervical adenopathy.   Skin:     General: Skin is warm and dry.   Neurological:      Mental Status: He is alert and oriented to person, place, and time.      Cranial Nerves: No cranial nerve deficit.         Results Review:   CT Angio TAVR Chest Abdomen Pelvis    Result Date: 3/26/2021  Narrative: CT ANGIO TAVR CHEST ABDOMEN PELVIS- 3/25/2021 4:03 PM CDT  HISTORY: pre-tavr; I35.0-Nonrheumatic aortic (valve) stenosis  COMPARISON: None  DOSE LENGTH PRODUCT: 1679 mGy cm. Automated exposure control was also utilized to decrease patient radiation dose.  TECHNIQUE: Helical tomographic images of the chest, abdomen and pelvis utilizing angiographic protocol were obtained following the intravenous infusion of contrast, pre-TAVR assessment. Multiplanar and 3D reformatted images were provided for review. Volume rendered images were also generated. Measurements provided by [employer] using [employer].   FINDINGS:  ANGIOGRAM:  Thoracic aorta is normal in caliber. No dissection flap.  Moderate atheromatous calcification along the arch.  No flow-limiting stenosis at the brachiocephalic, left common carotid or left subclavian origins. Moderate atheromatous calcification along the abdominal aorta.  No abdominal aortic aneurysm.  Moderate atheromatous calcification along the common and external iliac arteries. I do not see any flow limiting stenosis although the right common and and external iliac arteries in particular are very tortuous with tight hairpin turn. Celiac and SMA origins are unremarkable.  No flow-limiting stenosis at the renal artery origins.  AP is patent. Common femoral arteries are normal in caliber. The included portions of the bilateral superficial and deep femoral arteries are patent with mild to moderate diffuse atheromatous disease.  Other findings:  CHEST:  Neck base: The imaged portion of the neck and thyroid gland is unremarkable.  Lungs: Lung emphysema. Scattered parenchymal scars. No consolidation. No pleural effusion is present. Airways are clear.  Heart: Dense calcification at the mitral and aortic valves. Left heart enlargement. Dual-chamber pacemaker in place. No pericardial effusion.  Other vasculature: Central pulmonary arteries are nondilated.  Lymph nodes: No enlarged axillary, hilar, or mediastinal lymph nodes.  Bones and soft tissues: No acute osseous or soft tissue abnormality is seen. There are no worrisome osseous lesions.   ABDOMEN/ PELVIS:  LIVER: No suspicious liver lesion. The portal veins are patent..  BILIARY SYSTEM: The gallbladder is unremarkable. No intrahepatic or extrahepatic ductal dilatation.  PANCREAS: No focal pancreatic lesion.  SPLEEN: Unremarkable.  KIDNEYS AND ADRENALS: Adrenal glands are unremarkable.There are multiple bilateral simple renal cysts. There is a 1.5 cm partially exophytic lesion arising from the right upper pole which is quite dense, with  internal density measured at 107 [place]. This is more dense than is typically expected for hemorrhagic cyst in the concern would be that this is an enhancing solid mass. No hydronephrosis. The ureters are decompressed.  RETROPERITONEUM: No mass, lymphadenopathy or hemorrhage.  GI TRACT: The stomach is nondistended. Small bowel is nondilated. Colonic diverticulosis. Surgical changes identified along the right colon. No inflammatory changes along the colon.. The appendix is visualized and unremarkable.  OTHER: There is no mesenteric mass, lymphadenopathy or fluid collection. The abdominopelvic vasculature is patent. The osseous structures and soft tissues demonstrate no worrisome lesions.  PELVIS: Prostatomegaly. No pelvic adenopathy. Urinary bladder somewhat trabeculated.. The urinary bladder is normal in appearance.      Impression: 1. TAVR screening report generated by [employer] is included in PACS as an additional series and is available for review. 2. There are multiple bilateral renal cysts. There is a 1.5 cm partially exophytic, hyperdense right upper pole lesion which is much more dense than expected for a hemorrhagic cyst. I would be concerned for an exophytic solid mass at the right upper pole. There is quite a bit of excreted contrast in the collecting system and a parapelvic cyst with communication to the collecting system could have a similar appearance and be quite dense. Follow-up ultrasound is recommended to evaluate for cyst versus solid mass. 3. The right common and external iliac artery is quite tortuous with hairpin turn. 4. Lung emphysema. 5. Densely calcified aortic and mitral valves. 6. Colonic diverticulosis. 7. Prostatomegaly with trabeculated urinary bladder, suspected chronic partial outlet obstruction. This report was finalized on 03/26/2021 12:02 by Dr Jay Salazar, .    Narrative & Impression   History: Carotid occlusive disease     IMPRESSION:  Impression:  1. There is less  than 50% stenosis of the right internal carotid artery.  2. There is less than 50% stenosis of the left internal carotid artery.  3. Antegrade flow is demonstrated in bilateral vertebral arteries.     Comments: Bilateral carotid vertebral arterial duplex scan was  performed.     Grayscale imaging shows intimal thickening and calcified elements at the  carotid bifurcation. The right internal carotid artery peak systolic  velocity is 80.3 cm/sec. The end-diastolic velocity is 22.3 cm/sec. The  right ICA/CCA ratio is approximately 1.2 . These findings correlate with  less than 50% stenosis of the right internal carotid artery.     Grayscale imaging shows intimal thickening and calcified elements at the  carotid bifurcation. The left internal carotid artery peak systolic  velocity is 90.3 cm/sec. The end-diastolic velocity is 23.5 cm/sec. The  left ICA/CCA ratio is approximately 0.93 . These findings correlate with  less than 50% stenosis of the left internal carotid artery.     Antegrade flow is demonstrated in bilateral vertebral arteries.     This report was finalized on 02/03/2021 16:00 by Dr. Martín Woo MD.       PACS Images     Show images for Adult Transthoracic Echo Complete w/ Color, Spectral and Contrast if necessary per protocol    Sedation Narrator Report    Sedation Narrator Report      Interpretation Summary    · Left ventricular ejection fraction appears to be 61 - 65%. Left ventricular systolic function is normal.  · Severe aortic valve stenosis is present. Aortic valve area is 0.97 cm2 and dimensionless index 0.25.  · Left ventricular wall thickness is consistent with mild septal asymmetric hypertrophy.  · Left ventricular diastolic function is consistent with (grade II w/high LAP) pseudonormalization.  · Mild mitral valve stenosis is present due to severe calcification of the posterior mitral annulus.  · Left atrial volume is severely increased.  · Normal size and function of right  "ventricle.  · Estimated right ventricular systolic pressure from tricuspid regurgitation is mildly elevated (35-45 mmHg).  · Compared to most recent exam from 2019, aortic stenosis has progressed such that it is now classified as a severe.      Patient Hx Of Height, Weight, and Vitals    Height Weight BSA (Calculated - sq m) BMI (kg/m2) Pulse BP   175.3 cm (69.02\") 88.9 kg (196 lb) 2.05 sq meters 28.99  149/99   Clinical Indication    Valvular Function; Dyspnea   Dx: Aortic stenosis, moderate [I35.0 (ICD-10-CM)]; Mitral valve stenosis, unspecified etiology [I05.0 (ICD-10-CM)]; Dyspnea on exertion [R06.00 (ICD-10-CM)]   Cardiac History    Diagnosis Date Comment Source   Carotid artery stenosis      Carotid artery stenosis  Right    Coronary arteriosclerosis  stent ,? 4/2104, on plavix now.    Hypercholesterolemia      Hyperlipidemia      Hypertension      Study Description    A complete transthoracic echocardiogram with complete Doppler and color flow was performed. 2D Echo with color flow, Doppler and Bubble Study The study is technically adequate for diagnosis.  to use [employer] image enhancer in order to optimize the study.   Paced rhythm was observed for the duration of the procedure.   Echocardiogram Findings    Left Ventricle Left ventricular ejection fraction appears to be 61 - 65%. Left ventricular systolic function is normal.   Normal left ventricular cavity size noted. Left ventricular wall thickness is consistent with mild septal asymmetric hypertrophy. All left ventricular wall segments contract normally. Left ventricular diastolic function is consistent with (grade II w/high LAP) pseudonormalization. No evidence of left ventricular thrombus or mass present.   Right Ventricle Normal right ventricular cavity size, wall thickness, systolic function and septal motion noted. Electronic lead present in the ventricle.   Left Atrium The left atrial cavity is severely dilated. Left atrial volume is severely " increased.   Right Atrium Normal right atrial cavity size noted.   Aortic Valve The aortic valve is abnormal in structure. There is severe calcification of the aortic valve. The aortic valve appears trileaflet. Trace aortic valve regurgitation is present. Severe aortic valve stenosis is present. Aortic valve area is 1.0 cm2. Aortic valve dimensionless index is 0.25 .   Mitral Valve The mitral valve is normal in structure. Moderate mitral annular calcification is present. Trace to mild mitral valve regurgitation is present. Mild mitral valve stenosis is present.   Tricuspid Valve The tricuspid valve is structurally normal with no significant stenosis present. Physiologic tricuspid valve regurgitation is present. Estimated right ventricular systolic pressure from tricuspid regurgitation is mildly elevated (35-45 mmHg).   Pulmonic Valve The pulmonic valve is structurally normal with no significant stenosis present. There is trace to mild pulmonic valve regurgitation present.   Greater Vessels No dilation of the aortic root is present. No dilation of the sinuses of Valsalva is present.   Pericardium The pericardium is normal. There is no evidence of pericardial effusion. .      LV Measurements    Dimensions   LVIDd 5.5 cm      IVSd 1.2 cm      FS 39.2 %      ESV(cubed) 36.9 ml      EDV(cubed) 164.6 ml      LVOT area 4.9 cm^2      LVOT diam 2.2 cm      Diastolic Filling   MV E max lowell 118 cm/sec      MV A max lowell 124 cm/sec      MV dec time 0.17 sec      MV E/A 0.95       LA Volume Index 57.6 mL/m2      Med Peak E' [place]ell 5 cm/sec      Lat Peak E' [place]ell 5.2 cm/sec      Avg E/e' ratio 23.14       Shunt Ratio   SV(LVOT) 103.1 ml       Dimensions   LVIDs 3.3 cm      LVPWd 0.88 cm      IVS/LVPW 1.4       LV Sys Vol (BSA corrected) 26.8 ml/m^2      LV Rodriguez Vol (BSA corrected) 72.3 ml/m^2      LV mass(C)d 225.2 grams      EDV(MOD-sp4) 148 ml      ESV(MOD-sp4) 54.9 ml      Systolic Function   SV(MOD-sp4) 93.1 ml      SI(MOD-sp4)  45.5 ml/m^2      EF(MOD-sp4) 62.9 %         LA Measurements    LA Dimensions   LA dimension 4.3 cm      LA volume 118 cm3      LA Volume Index 57.6 mL/m2         Aortic Valve Measurements    Stenosis   LVOT diam 2.2 cm      LV V1 max 110 cm/sec      LV V1 max PG 4.9 mmHg      LV V1 mean PG 2 mmHg      LV V1 VTI 21 cm      Ao pk miki 345 cm/sec       Stenosis   Ao max PG 47.6 mmHg      Ao mean PG 30 mmHg      Ao V2 VTI 81.4 cm      WONG(I,D) 1 cm^2      Dimensionless Index 0.25 (DI)         Mitral Valve Measurements    Stenosis   MV max PG 8.1 mmHg      MV mean PG 5 mmHg      MV V2 VTI 36.8 cm      MV P1/2t 76.6 msec      MVA(P1/2t) 2.9 cm^2      MVA(VTI) 2.8 cm^2      MV dec slope 482 cm/sec^2      MV dec time 0.17 sec          Tricuspid Valve Measurements    Regurgitation   TR max miki 261 cm/sec      RVSP(TR) 37.2 mmHg      RAP systole 10 mmHg      PISA   TR max miki 261 cm/sec          Pulmonic Valve Measurements    Stenosis   PA V2 max 57.7 cm/sec       Regurgitation   PI end-d miki 115 cm/sec         Greater Vessels Measurements    Ao root diam 3.4 cm      Ao root area (BSA corrected) 1.7            STS risk score:      [place] heart association class III heart failure     I reviewed the patient's new clinical results.  Discussed with patient and wife      Assessment/Plan   Diagnoses and all orders for this visit:    1. Aortic stenosis, severe  2.  Aspirin like platelet defect  3.  Native coronary artery disease-stable  4.  Previous CVA without deficit  5.  Known cerebrovascular occlusive disease with history of previous right carotid endarterectomy  6.  Chronic kidney disease stage III by records  7.  Advanced age  8.  Hard of hearing  9.  Third-degree heart block with presence of pacemaker  10.  Atherosclerosis of the aorta      I discussed the patient's findings and my recommendations with the patient and his wife.  We discussed the treatment options of aortic valve disease to include medical therapy, surgical  aortic valve replacement in the setting of redo median sternotomy, balloon valvuloplasty, and percutaneous aortic valve replacement.  We discussed the pros and cons of each option and how it pertains to this current case.  He is by STS risk analysis a high risk surgical candidate.  Grossly the surgeons impression equally corroborates this with associated factors of deconditioning and certainly with high risk for renal failure and using STS risk analysis carries an approximate 23% risk of renal failure and a combined morbidity or mortality of 41%.  His stroke rate is estimated at 4.7%.  We discussed the operative conduct of both TAVR and redo median sternotomy for the performance of SAVR.  I recommend that he be considered for TAVR.  Dr. Toledo and myself have previous discussed Mr. Magallanes during previous structural heart meetings and it is Dr. Toledo impression that he would benefit from TAVR as well.  The patient strongly would like to consider TAVR as well.  This procedure is likely to provide him better benefit with less risk compared to surgery.  Risks were discussed to include but not limited to bleeding, infection,heart attack, need for additional procedures, anesthesia risk, organ dysfunction and/or failure, prolonged mechanical ventilation, prolonged ICU stay, chronic pain syndromes, conversion to open surgical repair, and/or death.  He is edentulous.  The patient and family understands the risks, benefits, and alternatives and he wishes to proceed forward with TAVR.      This is a level 5 consult having reviewed his 1) echocardiogram personally with the finding of preserved left ventricular function and left ventricular hypertrophy in the setting of aortic valve stenosis and marked mitral annular calcification, 2) current and previous left heart catheterization in 2019 with my impression as detailed above, 3) review of salient labs, 4) review of imaging during his stroke, and 5) consultation per routine  discussing his options and detailing the proposed and recommended procedure of TAVR well comparing to redo median sternotomy with surgical ao valve replacement.  This carries high risk and complex decision-making.       Clayton Wilcox MD  04/14/21  16:35 CDT

## 2021-04-15 ENCOUNTER — TRANSCRIBE ORDERS (OUTPATIENT)
Dept: LAB | Facility: HOSPITAL | Age: 86
End: 2021-04-15

## 2021-04-15 ENCOUNTER — PREP FOR SURGERY (OUTPATIENT)
Dept: OTHER | Facility: HOSPITAL | Age: 86
End: 2021-04-15

## 2021-04-15 DIAGNOSIS — R06.09 DYSPNEA ON EXERTION: ICD-10-CM

## 2021-04-15 DIAGNOSIS — I35.0 AORTIC STENOSIS, SEVERE: Primary | ICD-10-CM

## 2021-04-15 DIAGNOSIS — Z01.818 PREOPERATIVE TESTING: Primary | ICD-10-CM

## 2021-04-15 RX ORDER — CLOPIDOGREL BISULFATE 75 MG/1
300 TABLET ORAL ONCE
Status: CANCELLED | OUTPATIENT
Start: 2021-04-15 | End: 2021-04-15

## 2021-04-15 RX ORDER — ACETAMINOPHEN 325 MG/1
650 TABLET ORAL EVERY 4 HOURS PRN
Status: CANCELLED | OUTPATIENT
Start: 2021-04-15

## 2021-04-15 RX ORDER — ASPIRIN 325 MG
325 TABLET ORAL ONCE
Status: CANCELLED | OUTPATIENT
Start: 2021-04-15 | End: 2021-04-15

## 2021-04-15 RX ORDER — SODIUM CHLORIDE 0.9 % (FLUSH) 0.9 %
3 SYRINGE (ML) INJECTION EVERY 12 HOURS SCHEDULED
Status: CANCELLED | OUTPATIENT
Start: 2021-04-15

## 2021-04-15 RX ORDER — SODIUM CHLORIDE 0.9 % (FLUSH) 0.9 %
10 SYRINGE (ML) INJECTION AS NEEDED
Status: CANCELLED | OUTPATIENT
Start: 2021-04-15

## 2021-04-15 RX ORDER — BUPIVACAINE HCL/0.9 % NACL/PF 0.1 %
2 PLASTIC BAG, INJECTION (ML) EPIDURAL ONCE
Status: CANCELLED | OUTPATIENT
Start: 2021-04-15 | End: 2021-04-15

## 2021-04-15 RX ORDER — NITROGLYCERIN 0.4 MG/1
0.4 TABLET SUBLINGUAL
Status: CANCELLED | OUTPATIENT
Start: 2021-04-15

## 2021-04-17 ENCOUNTER — LAB (OUTPATIENT)
Dept: LAB | Facility: HOSPITAL | Age: 86
End: 2021-04-17

## 2021-04-17 DIAGNOSIS — Z01.818 PREOPERATIVE TESTING: ICD-10-CM

## 2021-04-17 LAB — SARS-COV-2 ORF1AB RESP QL NAA+PROBE: NOT DETECTED

## 2021-04-17 PROCEDURE — U0004 COV-19 TEST NON-CDC HGH THRU: HCPCS

## 2021-04-17 PROCEDURE — C9803 HOPD COVID-19 SPEC COLLECT: HCPCS

## 2021-04-17 PROCEDURE — U0005 INFEC AGEN DETEC AMPLI PROBE: HCPCS

## 2021-04-19 ENCOUNTER — PRE-ADMISSION TESTING (OUTPATIENT)
Dept: PREADMISSION TESTING | Facility: HOSPITAL | Age: 86
End: 2021-04-19

## 2021-04-19 ENCOUNTER — HOSPITAL ENCOUNTER (OUTPATIENT)
Dept: GENERAL RADIOLOGY | Facility: HOSPITAL | Age: 86
Discharge: HOME | End: 2021-04-19

## 2021-04-19 VITALS
SYSTOLIC BLOOD PRESSURE: 156 MMHG | DIASTOLIC BLOOD PRESSURE: 90 MMHG | BODY MASS INDEX: 30.37 KG/M2 | OXYGEN SATURATION: 99 % | WEIGHT: 205.03 LBS | HEIGHT: 69 IN | HEART RATE: 76 BPM | RESPIRATION RATE: 16 BRPM

## 2021-04-19 DIAGNOSIS — I35.0 AORTIC STENOSIS, SEVERE: ICD-10-CM

## 2021-04-19 DIAGNOSIS — R06.09 DYSPNEA ON EXERTION: ICD-10-CM

## 2021-04-19 LAB
ABO GROUP BLD: NORMAL
ALBUMIN SERPL-MCNC: 4.3 G/DL (ref 3.5–5.2)
ALBUMIN/GLOB SERPL: 1.4 G/DL
ALP SERPL-CCNC: 81 U/L (ref 39–117)
ALT SERPL W P-5'-P-CCNC: 18 U/L (ref 1–41)
ANION GAP SERPL CALCULATED.3IONS-SCNC: 11 MMOL/L (ref 5–15)
AST SERPL-CCNC: 25 U/L (ref 1–40)
BASOPHILS # BLD AUTO: 0.03 10*3/MM3 (ref 0–0.2)
BASOPHILS NFR BLD AUTO: 0.5 % (ref 0–1.5)
BILIRUB SERPL-MCNC: 0.6 MG/DL (ref 0–1.2)
BLD GP AB SCN SERPL QL: NEGATIVE
BUN SERPL-MCNC: 26 MG/DL (ref 8–23)
BUN/CREAT SERPL: 13.6 (ref 7–25)
CALCIUM SPEC-SCNC: 9.1 MG/DL (ref 8.6–10.5)
CHLORIDE SERPL-SCNC: 104 MMOL/L (ref 98–107)
CO2 SERPL-SCNC: 26 MMOL/L (ref 22–29)
CREAT SERPL-MCNC: 1.91 MG/DL (ref 0.76–1.27)
DEPRECATED RDW RBC AUTO: 41.9 FL (ref 37–54)
EOSINOPHIL # BLD AUTO: 0.12 10*3/MM3 (ref 0–0.4)
EOSINOPHIL NFR BLD AUTO: 2.2 % (ref 0.3–6.2)
ERYTHROCYTE [DISTWIDTH] IN BLOOD BY AUTOMATED COUNT: 13.2 % (ref 12.3–15.4)
GFR SERPL CREATININE-BSD FRML MDRD: 34 ML/MIN/1.73
GLOBULIN UR ELPH-MCNC: 3.1 GM/DL
GLUCOSE SERPL-MCNC: 100 MG/DL (ref 65–99)
HCT VFR BLD AUTO: 40.6 % (ref 37.5–51)
HGB BLD-MCNC: 13.5 G/DL (ref 13–17.7)
IMM GRANULOCYTES # BLD AUTO: 0.02 10*3/MM3 (ref 0–0.05)
IMM GRANULOCYTES NFR BLD AUTO: 0.4 % (ref 0–0.5)
INR PPP: 1.1 (ref 0.91–1.09)
LYMPHOCYTES # BLD AUTO: 1.67 10*3/MM3 (ref 0.7–3.1)
LYMPHOCYTES NFR BLD AUTO: 30.3 % (ref 19.6–45.3)
MCH RBC QN AUTO: 28.9 PG (ref 26.6–33)
MCHC RBC AUTO-ENTMCNC: 33.3 G/DL (ref 31.5–35.7)
MCV RBC AUTO: 86.9 FL (ref 79–97)
MONOCYTES # BLD AUTO: 0.47 10*3/MM3 (ref 0.1–0.9)
MONOCYTES NFR BLD AUTO: 8.5 % (ref 5–12)
NEUTROPHILS NFR BLD AUTO: 3.2 10*3/MM3 (ref 1.7–7)
NEUTROPHILS NFR BLD AUTO: 58.1 % (ref 42.7–76)
NRBC BLD AUTO-RTO: 0 /100 WBC (ref 0–0.2)
NT-PROBNP SERPL-MCNC: 443.7 PG/ML (ref 0–1800)
PLATELET # BLD AUTO: 174 10*3/MM3 (ref 140–450)
PMV BLD AUTO: 10.5 FL (ref 6–12)
POTASSIUM SERPL-SCNC: 4.7 MMOL/L (ref 3.5–5.2)
PROT SERPL-MCNC: 7.4 G/DL (ref 6–8.5)
PROTHROMBIN TIME: 13.8 SECONDS (ref 11.9–14.6)
RBC # BLD AUTO: 4.67 10*6/MM3 (ref 4.14–5.8)
RH BLD: NEGATIVE
SODIUM SERPL-SCNC: 141 MMOL/L (ref 136–145)
T&S EXPIRATION DATE: NORMAL
WBC # BLD AUTO: 5.51 10*3/MM3 (ref 3.4–10.8)

## 2021-04-19 PROCEDURE — 86923 COMPATIBILITY TEST ELECTRIC: CPT

## 2021-04-19 PROCEDURE — 86850 RBC ANTIBODY SCREEN: CPT | Performed by: INTERNAL MEDICINE

## 2021-04-19 PROCEDURE — 80053 COMPREHEN METABOLIC PANEL: CPT

## 2021-04-19 PROCEDURE — 71046 X-RAY EXAM CHEST 2 VIEWS: CPT

## 2021-04-19 PROCEDURE — 85610 PROTHROMBIN TIME: CPT

## 2021-04-19 PROCEDURE — 86900 BLOOD TYPING SEROLOGIC ABO: CPT | Performed by: INTERNAL MEDICINE

## 2021-04-19 PROCEDURE — 36415 COLL VENOUS BLD VENIPUNCTURE: CPT

## 2021-04-19 PROCEDURE — 93005 ELECTROCARDIOGRAM TRACING: CPT

## 2021-04-19 PROCEDURE — 83880 ASSAY OF NATRIURETIC PEPTIDE: CPT

## 2021-04-19 PROCEDURE — 86901 BLOOD TYPING SEROLOGIC RH(D): CPT | Performed by: INTERNAL MEDICINE

## 2021-04-19 PROCEDURE — 85025 COMPLETE CBC W/AUTO DIFF WBC: CPT

## 2021-04-19 PROCEDURE — 93010 ELECTROCARDIOGRAM REPORT: CPT | Performed by: INTERNAL MEDICINE

## 2021-04-19 RX ORDER — UBIDECARENONE 100 MG
100 CAPSULE ORAL DAILY
COMMUNITY

## 2021-04-19 NOTE — DISCHARGE INSTRUCTIONS
PATIENT/FAMILY/RESPONSIBLE PARTY VERBALIZES UNDERSTANDING OF ABOVE EDUCATION.  COPY OF PAIN SCALE GIVEN AND REVIEWED WITH VERBALIZED UNDERSTANDING.  DAY OF SURGERY INSTRUCTIONS        YOUR SURGEON: Kaushik Toledo    PROCEDURE: Transfemoral Transcatheter Aortic Valve Replacement     DATE OF SURGERY: 04/20/21    ARRIVAL TIME: AS DIRECTED BY OFFICE    YOU MAY TAKE THE FOLLOWING MEDICATION(S) THE MORNING OF SURGERY WITH A SIP OF WATER:     *** HOLD AVAPRO FOR 24 HOURS PRIOR TO SURGERY***      ALL OTHER HOME MEDICATION CHECK WITH YOUR PHYSICIAN      DO NOT TAKE ANY ERECTILE DYSFUNCTION MEDICATIONS (EX: CIALIS, VIAGRA) 24 HOURS PRIOR TO SURGERY                      MANAGING PAIN AFTER SURGERY    We know you are probably wondering what your pain will be like after surgery.  Following surgery it is unrealistic to expect you will not have pain.   Pain is how our bodies let us know that something is wrong or cautions us to be careful.  That said, our goal is to make your pain tolerable.    Methods we may use to treat your pain include (oral or IV medications, PCAs, epidurals, nerve blocks, etc.)   While some procedures require IV pain medications for a short time after surgery, transitioning to pain medications by mouth allows for better management of pain.   Your nurse will encourage you to take oral pain medications whenever possible.  IV medications work almost immediately, but only last a short while.  Taking medications by mouth allows for a more constant level of medication in your blood stream for a longer period of time.      Once your pain is out of control it is harder to get back under control.  It is important you are aware when your next dose of pain medication is due.  If you are admitted, your nurse may write the time of your next dose on the white board in your room to help you remember.      We are interested in your pain and encourage you to inform us about aggravating factors during your visit.   Many times a  simple repositioning every few hours can make a big difference.    If your physician says it is okay, do not let your pain prevent you from getting out of bed. Be sure to call your nurse for assistance prior to getting up so you do not fall.      Before surgery, please decide your tolerable pain goal.  These faces help describe the pain ratings we use on a 0-10 scale.   Be prepared to tell us your goal and whether or not you take pain or anxiety medications at home.          BEFORE YOU COME TO THE HOSPITAL  (Pre-op instructions)  • Do not eat, drink, smoke or chew gum after midnight the night before surgery.  This also includes no mints.  • Morning of surgery take only the medicines you have been instructed with a sip of water unless otherwise instructed  by your physician.  • Do not shave, wear makeup or dark nail polish.  • Remove all jewelry including rings.  • Leave anything you consider valuable at home.  • Leave your suitcase in the car until after your surgery.  • Bring the following with you if applicable:  o Picture ID and insurance, Medicare or Medicaid cards  o Co-pay/deductible required by insurance (cash, check, credit card)  o Copy of advance directive, living will or power-of- documents if not brought to PAT  o CPAP or BIPAP mask and tubing  o Relaxation aids ( book, magazine), etc.  o Hearing aids                        ON THE DAY OF SURGERY  · On the day of surgery check in at registration located at the main entrance of the hospital.   ? You will be registered and given a beeper with instructions where to wait in the main lobby.  ? When your beeper lights up and vibrates a member of the Outpatient Surgery staff will meet you at the double doors under the stair steps and escort you to your preoperative room.   · You may have cloth compression devices placed on your legs. These help to prevent blood clots and reduce swelling in your legs.  · An IV may be inserted into one of your veins.  · In  "the operating room, you may be given one or more of the following:  ? A medicine to help you relax (sedative).  ? A medicine to numb the area (local anesthetic).  ? A medicine to make you fall asleep (general anesthetic).  ? A medicine that is injected into an area of your body to numb everything below the injection site (regional anesthetic).  · Your surgical site will be marked or identified.  · You may be given an antibiotic through your IV to help prevent infection.  Contact a health care provider if you:  · Develop a fever of more than 100.4°F (38°C) or other feelings of illness during the 48 hours before your surgery.  · Have symptoms that get worse.  Have questions or concerns about your surgery    General Anesthesia/Surgery, Adult  General anesthesia is the use of medicines to make a person \"go to sleep\" (unconscious) for a medical procedure. General anesthesia must be used for certain procedures, and is often recommended for procedures that:  · Last a long time.  · Require you to be still or in an unusual position.  · Are major and can cause blood loss.  The medicines used for general anesthesia are called general anesthetics. As well as making you unconscious for a certain amount of time, these medicines:  · Prevent pain.  · Control your blood pressure.  · Relax your muscles.  Tell a health care provider about:  · Any allergies you have.  · All medicines you are taking, including vitamins, herbs, eye drops, creams, and over-the-counter medicines.  · Any problems you or family members have had with anesthetic medicines.  · Types of anesthetics you have had in the past.  · Any blood disorders you have.  · Any surgeries you have had.  · Any medical conditions you have.  · Any recent upper respiratory, chest, or ear infections.  · Any history of:  ? Heart or lung conditions, such as heart failure, sleep apnea, asthma, or chronic obstructive pulmonary disease (COPD).  ?  service.  ? Depression or " anxiety.  · Any tobacco or drug use, including marijuana or alcohol use.  · Whether you are pregnant or may be pregnant.  What are the risks?  Generally, this is a safe procedure. However, problems may occur, including:  · Allergic reaction.  · Lung and heart problems.  · Inhaling food or liquid from the stomach into the lungs (aspiration).  · Nerve injury.  · Air in the bloodstream, which can lead to stroke.  · Extreme agitation or confusion (delirium) when you wake up from the anesthetic.  · Waking up during your procedure and being unable to move. This is rare.  These problems are more likely to develop if you are having a major surgery or if you have an advanced or serious medical condition. You can prevent some of these complications by answering all of your health care provider's questions thoroughly and by following all instructions before your procedure.  General anesthesia can cause side effects, including:  · Nausea or vomiting.  · A sore throat from the breathing tube.  · Hoarseness.  · Wheezing or coughing.  · Shaking chills.  · Tiredness.  · Body aches.  · Anxiety.  · Sleepiness or drowsiness.  · Confusion or agitation.  RISKS AND COMPLICATIONS OF SURGERY  Your health care provider will discuss possible risks and complications with you before surgery. Common risks and complications include:    · Problems due to the use of anesthetics.  · Blood loss and replacement (does not apply to minor surgical procedures).  · Temporary increase in pain due to surgery.  · Uncorrected pain or problems that the surgery was meant to correct.  · Infection.  · New damage.    What happens before the procedure?    Medicines  Ask your health care provider about:  · Changing or stopping your regular medicines. This is especially important if you are taking diabetes medicines or blood thinners.  · Taking medicines such as aspirin and ibuprofen. These medicines can thin your blood. Do not take these medicines unless your health  care provider tells you to take them.  · Taking over-the-counter medicines, vitamins, herbs, and supplements. Do not take these during the week before your procedure unless your health care provider approves them.  General instructions  · Starting 3-6 weeks before the procedure, do not use any products that contain nicotine or tobacco, such as cigarettes and e-cigarettes. If you need help quitting, ask your health care provider.  · If you brush your teeth on the morning of the procedure, make sure to spit out all of the toothpaste.  · Tell your health care provider if you become ill or develop a cold, cough, or fever.  · If instructed by your health care provider, bring your sleep apnea device with you on the day of your surgery (if applicable).  · Ask your health care provider if you will be going home the same day, the following day, or after a longer hospital stay.  ? Plan to have someone take you home from the hospital or clinic.  ? Plan to have a responsible adult care for you for at least 24 hours after you leave the hospital or clinic. This is important.  What happens during the procedure?  · You will be given anesthetics through both of the following:  ? A mask placed over your nose and mouth.  ? An IV in one of your veins.  · You may receive a medicine to help you relax (sedative).  · After you are unconscious, a breathing tube may be inserted down your throat to help you breathe. This will be removed before you wake up.  · An anesthesia specialist will stay with you throughout your procedure. He or she will:  ? Keep you comfortable and safe by continuing to give you medicines and adjusting the amount of medicine that you get.  ? Monitor your blood pressure, pulse, and oxygen levels to make sure that the anesthetics do not cause any problems.  The procedure may vary among health care providers and hospitals.  What happens after the procedure?  · Your blood pressure, temperature, heart rate, breathing rate,  and blood oxygen level will be monitored until the medicines you were given have worn off.  · You will wake up in a recovery area. You may wake up slowly.  · If you feel anxious or agitated, you may be given medicine to help you calm down.  · If you will be going home the same day, your health care provider may check to make sure you can walk, drink, and urinate.  · Your health care provider will treat any pain or side effects you have before you go home.  · Do not drive for 24 hours if you were given a sedative.  Summary  · General anesthesia is used to keep you still and prevent pain during a procedure.  · It is important to tell your healthcare provider about your medical history and any surgeries you have had, and previous experience with anesthesia.  · Follow your healthcare provider’s instructions about when to stop eating, drinking, or taking certain medicines before your procedure.  · Plan to have someone take you home from the hospital or clinic.  This information is not intended to replace advice given to you by your health care provider. Make sure you discuss any questions you have with your health care provider.  Document Released: 03/26/2009 Document Revised: 08/03/2018 Document Reviewed: 08/03/2018  [employer] Interactive Patient Education © 2019 [employer]       Fall Prevention in Hospitals, Adult  As a hospital patient, your condition and the treatments you receive can increase your risk for falls. Some additional risk factors for falls in a hospital include:  · Being in an unfamiliar environment.  · Being on bed rest.  · Your surgery.  · Taking certain medicines.  · Your tubing requirements, such as intravenous (IV) therapy or catheters.  It is important that you learn how to decrease fall risks while at the hospital. Below are important tips that can help prevent falls.  SAFETY TIPS FOR PREVENTING FALLS  Talk about your risk of falling.  · Ask your health care provider why you are at risk for  falling. Is it your medicine, illness, tubing placement, or something else?  · Make a plan with your health care provider to keep you safe from falls.  · Ask your health care provider or pharmacist about side effects of your medicines. Some medicines can make you dizzy or affect your coordination.  Ask for help.  · Ask for help before getting out of bed. You may need to press your call button.  · Ask for assistance in getting safely to the toilet.  · Ask for a walker or cane to be put at your bedside. Ask that most of the side rails on your bed be placed up before your health care provider leaves the room.  · Ask family or friends to sit with you.  · Ask for things that are out of your reach, such as your glasses, hearing aids, telephone, bedside table, or call button.  Follow these tips to avoid falling:  · Stay lying or seated, rather than standing, while waiting for help.  · Wear rubber-soled slippers or shoes whenever you walk in the hospital.  · Avoid quick, sudden movements.  ¨ Change positions slowly.  ¨ Sit on the side of your bed before standing.  ¨ Stand up slowly and wait before you start to walk.  · Let your health care provider know if there is a spill on the floor.  · Pay careful attention to the medical equipment, electrical cords, and tubes around you.  · When you need help, use your call button by your bed or in the bathroom. Wait for one of your health care providers to help you.  · If you feel dizzy or unsure of your footing, return to bed and wait for assistance.  · Avoid being distracted by the TV, telephone, or another person in your room.  · Do not lean or support yourself on rolling objects, such as IV poles or bedside tables.     This information is not intended to replace advice given to you by your health care provider. Make sure you discuss any questions you have with your health care provider.     Document Released: 12/15/2001 Document Revised: 01/08/2016 Document Reviewed:  08/25/2013  [employer] Interactive Patient Education ©2016 [employer]       [facility]  CHG 4% Patient Instruction Sheet    Chlorhexidine Before Surgery  Chlorhexidine gluconate (CHG) is a germ-killing (antiseptic) solution that is used to clean the skin. It gets rid of the bacteria that normally live on the skin. Cleaning your skin with CHG before surgery helps lower the risk for infection after surgery.    How to use CHG solution  · You will take 2 showers, one shower the night before surgery, the second shower the morning of surgery before coming to the hospital.  · Use CHG only as told by your health care provider, and follow the instructions on the label.  · Use CHG solution while taking a shower. Follow these steps when using CHG solution (unless your health care provider gives you different instructions):  1. Start the shower.  2. Use your normal soap and shampoo to wash your face and hair.  3. Turn off the shower or move out of the shower stream.  4. Pour the CHG onto a clean washcloth. Do not use any type of brush or rough-edged sponge.  5. Starting at your neck, lather your body down to your toes. Make sure you:  6. Pay special attention to the part of your body where you will be having surgery. Scrub this area for at least 1 minute.  7. Use the full amount of CHG as directed. Usually, this is one half bottle for each shower.  8. Do not use CHG on your head or face. If the solution gets into your ears or eyes, rinse them well with water.  9. Avoid your genital area.  10. Avoid any areas of skin that have broken skin, cuts, or scrapes.  11. Scrub your back and under your arms. Make sure to wash skin folds.  12. Let the lather sit on your skin for 1-2 minutes or as long as told by your health care  provider.  13. Thoroughly rinse your entire body in the shower. Make sure that all body creases and crevices are rinsed well.  14. Dry off with a clean towel. Do not put any substances on your body  afterward, such as powder, lotion, or perfume.  15. Put on clean clothes or pajamas.  16. If it is the night before your surgery, sleep in clean sheets.    What are the risks?  Risks of using CHG include:  · A skin reaction.  · Hearing loss, if CHG gets in your ears.  · Eye injury, if CHG gets in your eyes and is not rinsed out.  · The CHG product catching fire.  Make sure that you avoid smoking and flames after applying CHG to your skin.  Do not use CHG:  · If you have a chlorhexidine allergy or have previously reacted to chlorhexidine.  · On babies younger than 2 months of age.      On the day of surgery, when you are taken to your room in Outpatient Surgery you will be given a CHG prepackaged cloth to wipe the site for your surgery.  How to use CHG prepackaged cloths  · Follow the instructions on the label.  · Use the CHG cloth on clean, dry skin. Follow these steps when using a CHG cloth (unless your health care provider gives you different instructions):  1. Using the CHG cloth, vigorously scrub the part of your body where you will be having surgery. Scrub using a back-and-forth motion for 3 minutes. The area on your body should be completely wet with CHG when you are finished scrubbing.  2. Do not rinse. Discard the cloth and let the area air-dry for 1 minute. Do not put any substances on your body afterward, such as powder, lotion, or perfume.  Contact a health care provider if:  · Your skin gets irritated after scrubbing.  · You have questions about using your solution or cloth.  Get help right away if:  · Your eyes become very red or swollen.  · Your eyes itch badly.  · Your skin itches badly and is red or swollen.  · Your hearing changes.  · You have trouble seeing.  · You have swelling or tingling in your mouth or throat.  · You have trouble breathing.  · You swallow any chlorhexidine.  Summary  · Chlorhexidine gluconate (CHG) is a germ-killing (antiseptic) solution that is used to clean the skin.  Cleaning your skin with CHG before surgery helps lower the risk for infection after surgery.  · You may be given CHG to use at home. It may be in a bottle or in a prepackaged cloth to use on your skin. Carefully follow your health care provider's instructions and the instructions on the product label.  · Do not use CHG if you have a chlorhexidine allergy.  · Contact your health care provider if your skin gets irritated after scrubbing.  This information is not intended to replace advice given to you by your health care provider. Make sure you discuss any questions you have with your health care provider.  Document Released: 09/11/2013 Document Revised: 11/15/2018 Document Reviewed: 11/15/2018  [employer] Interactive Patient Education © 2019 [employer]

## 2021-04-20 ENCOUNTER — ANESTHESIA (OUTPATIENT)
Dept: PERIOP | Facility: HOSPITAL | Age: 86
End: 2021-04-20

## 2021-04-20 ENCOUNTER — HOSPITAL ENCOUNTER (INPATIENT)
Facility: HOSPITAL | Age: 86
LOS: 1 days | Discharge: HOME | End: 2021-04-21
Attending: INTERNAL MEDICINE | Admitting: INTERNAL MEDICINE

## 2021-04-20 ENCOUNTER — APPOINTMENT (OUTPATIENT)
Dept: GENERAL RADIOLOGY | Facility: HOSPITAL | Age: 86
End: 2021-04-20

## 2021-04-20 ENCOUNTER — HOSPITAL ENCOUNTER (OUTPATIENT)
Dept: CARDIOLOGY | Facility: HOSPITAL | Age: 86
Setting detail: SURGERY ADMIT
Discharge: HOME | End: 2021-04-20

## 2021-04-20 ENCOUNTER — ANESTHESIA EVENT (OUTPATIENT)
Dept: PERIOP | Facility: HOSPITAL | Age: 86
End: 2021-04-20

## 2021-04-20 DIAGNOSIS — I35.0 AORTIC STENOSIS, SEVERE: ICD-10-CM

## 2021-04-20 DIAGNOSIS — Z95.2 S/P TAVR (TRANSCATHETER AORTIC VALVE REPLACEMENT): Primary | ICD-10-CM

## 2021-04-20 LAB
BH CV ECHO MEAS - AO MAX PG: 40.4 MMHG
BH CV ECHO MEAS - AO MEAN PG: 10 MMHG
BH CV ECHO MEAS - AO V2 MAX: 318 CM/SEC
BH CV ECHO MEAS - AO V2 MEAN: 131.6 CM/SEC
BH CV ECHO MEAS - AO V2 VTI: 43 CM
GLUCOSE BLDC GLUCOMTR-MCNC: 101 MG/DL (ref 70–130)
GLUCOSE BLDC GLUCOMTR-MCNC: 112 MG/DL (ref 70–130)
QT INTERVAL: 484 MS
QTC INTERVAL: 511 MS

## 2021-04-20 PROCEDURE — 4A023N7 MEASUREMENT OF CARDIAC SAMPLING AND PRESSURE, LEFT HEART, PERCUTANEOUS APPROACH: ICD-10-PCS | Performed by: INTERNAL MEDICINE

## 2021-04-20 PROCEDURE — 25010000002 PROPOFOL 10 MG/ML EMULSION: Performed by: NURSE ANESTHETIST, CERTIFIED REGISTERED

## 2021-04-20 PROCEDURE — 33361 REPLACE AORTIC VALVE PERQ: CPT | Performed by: THORACIC SURGERY (CARDIOTHORACIC VASCULAR SURGERY)

## 2021-04-20 PROCEDURE — C1760 CLOSURE DEV, VASC: HCPCS | Performed by: INTERNAL MEDICINE

## 2021-04-20 PROCEDURE — C1894 INTRO/SHEATH, NON-LASER: HCPCS | Performed by: INTERNAL MEDICINE

## 2021-04-20 PROCEDURE — C1889 IMPLANT/INSERT DEVICE, NOC: HCPCS | Performed by: INTERNAL MEDICINE

## 2021-04-20 PROCEDURE — 25010000002 PHENYLEPHRINE HCL 0.8 MG/10ML SOLUTION PREFILLED SYRINGE: Performed by: NURSE ANESTHETIST, CERTIFIED REGISTERED

## 2021-04-20 PROCEDURE — 93355 ECHO TRANSESOPHAGEAL (TEE): CPT | Performed by: INTERNAL MEDICINE

## 2021-04-20 PROCEDURE — 93355 ECHO TRANSESOPHAGEAL (TEE): CPT

## 2021-04-20 PROCEDURE — 25010000002 CEFAZOLIN 1-4 GM/50ML-% SOLUTION: Performed by: THORACIC SURGERY (CARDIOTHORACIC VASCULAR SURGERY)

## 2021-04-20 PROCEDURE — 82962 GLUCOSE BLOOD TEST: CPT

## 2021-04-20 PROCEDURE — C1769 GUIDE WIRE: HCPCS | Performed by: INTERNAL MEDICINE

## 2021-04-20 PROCEDURE — 71045 X-RAY EXAM CHEST 1 VIEW: CPT

## 2021-04-20 PROCEDURE — 94799 UNLISTED PULMONARY SVC/PX: CPT

## 2021-04-20 PROCEDURE — 25010000002 HEPARIN (PORCINE) 1000-0.9 UT/500ML-% SOLUTION: Performed by: INTERNAL MEDICINE

## 2021-04-20 PROCEDURE — 25010000002 PROTAMINE SULFATE PER 10 MG: Performed by: NURSE ANESTHETIST, CERTIFIED REGISTERED

## 2021-04-20 PROCEDURE — 25010000002 CEFAZOLIN PER 500 MG: Performed by: INTERNAL MEDICINE

## 2021-04-20 PROCEDURE — 93010 ELECTROCARDIOGRAM REPORT: CPT | Performed by: INTERNAL MEDICINE

## 2021-04-20 PROCEDURE — 25010000002 FENTANYL CITRATE (PF) 100 MCG/2ML SOLUTION: Performed by: NURSE ANESTHETIST, CERTIFIED REGISTERED

## 2021-04-20 PROCEDURE — P9041 ALBUMIN (HUMAN),5%, 50ML: HCPCS | Performed by: NURSE ANESTHETIST, CERTIFIED REGISTERED

## 2021-04-20 PROCEDURE — 93005 ELECTROCARDIOGRAM TRACING: CPT | Performed by: THORACIC SURGERY (CARDIOTHORACIC VASCULAR SURGERY)

## 2021-04-20 PROCEDURE — 25010000002 ALBUMIN HUMAN 5% PER 50 ML: Performed by: NURSE ANESTHETIST, CERTIFIED REGISTERED

## 2021-04-20 PROCEDURE — 25010000002 IOPAMIDOL 61 % SOLUTION: Performed by: INTERNAL MEDICINE

## 2021-04-20 PROCEDURE — 25010000002 HEPARIN (PORCINE) PER 1000 UNITS: Performed by: NURSE ANESTHETIST, CERTIFIED REGISTERED

## 2021-04-20 PROCEDURE — 25010000002 ONDANSETRON PER 1 MG: Performed by: NURSE ANESTHETIST, CERTIFIED REGISTERED

## 2021-04-20 PROCEDURE — B41DZZZ FLUOROSCOPY OF AORTA AND BILATERAL LOWER EXTREMITY ARTERIES: ICD-10-PCS | Performed by: INTERNAL MEDICINE

## 2021-04-20 PROCEDURE — B310ZZZ FLUOROSCOPY OF THORACIC AORTA: ICD-10-PCS | Performed by: INTERNAL MEDICINE

## 2021-04-20 PROCEDURE — 33361 REPLACE AORTIC VALVE PERQ: CPT | Performed by: INTERNAL MEDICINE

## 2021-04-20 PROCEDURE — 02RF38Z REPLACEMENT OF AORTIC VALVE WITH ZOOPLASTIC TISSUE, PERCUTANEOUS APPROACH: ICD-10-PCS | Performed by: THORACIC SURGERY (CARDIOTHORACIC VASCULAR SURGERY)

## 2021-04-20 DEVICE — IMPLANTABLE DEVICE: Type: IMPLANTABLE DEVICE | Status: FUNCTIONAL

## 2021-04-20 RX ORDER — LABETALOL HYDROCHLORIDE 5 MG/ML
5 INJECTION, SOLUTION INTRAVENOUS
Status: DISCONTINUED | OUTPATIENT
Start: 2021-04-20 | End: 2021-04-20 | Stop reason: HOSPADM

## 2021-04-20 RX ORDER — PHENYLEPHRINE HCL IN 0.9% NACL 0.8MG/10ML
SYRINGE (ML) INTRAVENOUS AS NEEDED
Status: DISCONTINUED | OUTPATIENT
Start: 2021-04-20 | End: 2021-04-20 | Stop reason: SURG

## 2021-04-20 RX ORDER — SODIUM CHLORIDE, SODIUM LACTATE, POTASSIUM CHLORIDE, CALCIUM CHLORIDE 600; 310; 30; 20 MG/100ML; MG/100ML; MG/100ML; MG/100ML
100 INJECTION, SOLUTION INTRAVENOUS CONTINUOUS
Status: DISCONTINUED | OUTPATIENT
Start: 2021-04-20 | End: 2021-04-20 | Stop reason: HOSPADM

## 2021-04-20 RX ORDER — AMLODIPINE BESYLATE 10 MG/1
10 TABLET ORAL DAILY
Status: DISCONTINUED | OUTPATIENT
Start: 2021-04-20 | End: 2021-04-21 | Stop reason: HOSPADM

## 2021-04-20 RX ORDER — ASPIRIN 81 MG/1
81 TABLET ORAL DAILY
Status: DISCONTINUED | OUTPATIENT
Start: 2021-04-21 | End: 2021-04-21 | Stop reason: HOSPADM

## 2021-04-20 RX ORDER — ASPIRIN 81 MG/1
324 TABLET, CHEWABLE ORAL ONCE
Status: COMPLETED | OUTPATIENT
Start: 2021-04-20 | End: 2021-04-20

## 2021-04-20 RX ORDER — SODIUM CHLORIDE 0.9 % (FLUSH) 0.9 %
10 SYRINGE (ML) INJECTION AS NEEDED
Status: DISCONTINUED | OUTPATIENT
Start: 2021-04-20 | End: 2021-04-20 | Stop reason: HOSPADM

## 2021-04-20 RX ORDER — ONDANSETRON 2 MG/ML
INJECTION INTRAMUSCULAR; INTRAVENOUS AS NEEDED
Status: DISCONTINUED | OUTPATIENT
Start: 2021-04-20 | End: 2021-04-20 | Stop reason: SURG

## 2021-04-20 RX ORDER — HEPARIN SODIUM 1000 [USP'U]/ML
INJECTION, SOLUTION INTRAVENOUS; SUBCUTANEOUS AS NEEDED
Status: DISCONTINUED | OUTPATIENT
Start: 2021-04-20 | End: 2021-04-20 | Stop reason: SURG

## 2021-04-20 RX ORDER — NICARDIPINE HYDROCHLORIDE 2.5 MG/ML
INJECTION INTRAVENOUS AS NEEDED
Status: DISCONTINUED | OUTPATIENT
Start: 2021-04-20 | End: 2021-04-20 | Stop reason: SURG

## 2021-04-20 RX ORDER — ATORVASTATIN CALCIUM 40 MG/1
80 TABLET, FILM COATED ORAL DAILY
Status: DISCONTINUED | OUTPATIENT
Start: 2021-04-20 | End: 2021-04-21 | Stop reason: HOSPADM

## 2021-04-20 RX ORDER — CLOPIDOGREL BISULFATE 75 MG/1
75 TABLET ORAL DAILY
Status: DISCONTINUED | OUTPATIENT
Start: 2021-04-20 | End: 2021-04-20 | Stop reason: SDUPTHER

## 2021-04-20 RX ORDER — FENTANYL CITRATE 50 UG/ML
25 INJECTION, SOLUTION INTRAMUSCULAR; INTRAVENOUS
Status: DISCONTINUED | OUTPATIENT
Start: 2021-04-20 | End: 2021-04-20 | Stop reason: HOSPADM

## 2021-04-20 RX ORDER — LIDOCAINE HYDROCHLORIDE 20 MG/ML
INJECTION, SOLUTION EPIDURAL; INFILTRATION; INTRACAUDAL; PERINEURAL AS NEEDED
Status: DISCONTINUED | OUTPATIENT
Start: 2021-04-20 | End: 2021-04-20 | Stop reason: SURG

## 2021-04-20 RX ORDER — NITROGLYCERIN 0.4 MG/1
0.4 TABLET SUBLINGUAL
Status: DISCONTINUED | OUTPATIENT
Start: 2021-04-20 | End: 2021-04-20 | Stop reason: HOSPADM

## 2021-04-20 RX ORDER — BUPIVACAINE HCL/0.9 % NACL/PF 0.1 %
2 PLASTIC BAG, INJECTION (ML) EPIDURAL ONCE
Status: COMPLETED | OUTPATIENT
Start: 2021-04-20 | End: 2021-04-20

## 2021-04-20 RX ORDER — FENTANYL CITRATE 50 UG/ML
INJECTION, SOLUTION INTRAMUSCULAR; INTRAVENOUS AS NEEDED
Status: DISCONTINUED | OUTPATIENT
Start: 2021-04-20 | End: 2021-04-20 | Stop reason: SURG

## 2021-04-20 RX ORDER — SODIUM CHLORIDE, SODIUM LACTATE, POTASSIUM CHLORIDE, CALCIUM CHLORIDE 600; 310; 30; 20 MG/100ML; MG/100ML; MG/100ML; MG/100ML
1000 INJECTION, SOLUTION INTRAVENOUS CONTINUOUS
Status: DISCONTINUED | OUTPATIENT
Start: 2021-04-20 | End: 2021-04-20 | Stop reason: HOSPADM

## 2021-04-20 RX ORDER — LOSARTAN POTASSIUM 50 MG/1
100 TABLET ORAL
Status: DISCONTINUED | OUTPATIENT
Start: 2021-04-20 | End: 2021-04-21 | Stop reason: HOSPADM

## 2021-04-20 RX ORDER — PROPOFOL 10 MG/ML
VIAL (ML) INTRAVENOUS AS NEEDED
Status: DISCONTINUED | OUTPATIENT
Start: 2021-04-20 | End: 2021-04-20 | Stop reason: SURG

## 2021-04-20 RX ORDER — PROTAMINE SULFATE 10 MG/ML
INJECTION, SOLUTION INTRAVENOUS AS NEEDED
Status: DISCONTINUED | OUTPATIENT
Start: 2021-04-20 | End: 2021-04-20 | Stop reason: SURG

## 2021-04-20 RX ORDER — ALBUMIN, HUMAN INJ 5% 5 %
SOLUTION INTRAVENOUS CONTINUOUS PRN
Status: DISCONTINUED | OUTPATIENT
Start: 2021-04-20 | End: 2021-04-20 | Stop reason: SURG

## 2021-04-20 RX ORDER — SODIUM CHLORIDE 0.9 % (FLUSH) 0.9 %
3 SYRINGE (ML) INJECTION EVERY 12 HOURS SCHEDULED
Status: DISCONTINUED | OUTPATIENT
Start: 2021-04-20 | End: 2021-04-20 | Stop reason: HOSPADM

## 2021-04-20 RX ORDER — ACETAMINOPHEN 325 MG/1
650 TABLET ORAL EVERY 4 HOURS PRN
Status: DISCONTINUED | OUTPATIENT
Start: 2021-04-20 | End: 2021-04-20 | Stop reason: HOSPADM

## 2021-04-20 RX ORDER — SODIUM CHLORIDE 9 MG/ML
1 INJECTION, SOLUTION INTRAVENOUS CONTINUOUS
Status: DISPENSED | OUTPATIENT
Start: 2021-04-20 | End: 2021-04-20

## 2021-04-20 RX ORDER — ONDANSETRON 2 MG/ML
4 INJECTION INTRAMUSCULAR; INTRAVENOUS ONCE AS NEEDED
Status: DISCONTINUED | OUTPATIENT
Start: 2021-04-20 | End: 2021-04-20 | Stop reason: HOSPADM

## 2021-04-20 RX ORDER — HEPARIN SODIUM 200 [USP'U]/100ML
INJECTION, SOLUTION INTRAVENOUS AS NEEDED
Status: DISCONTINUED | OUTPATIENT
Start: 2021-04-20 | End: 2021-04-20 | Stop reason: HOSPADM

## 2021-04-20 RX ORDER — CEFAZOLIN SODIUM 1 G/50ML
1 INJECTION, SOLUTION INTRAVENOUS EVERY 8 HOURS
Status: DISCONTINUED | OUTPATIENT
Start: 2021-04-20 | End: 2021-04-21 | Stop reason: HOSPADM

## 2021-04-20 RX ORDER — SODIUM CHLORIDE 0.9 % (FLUSH) 0.9 %
3 SYRINGE (ML) INJECTION AS NEEDED
Status: DISCONTINUED | OUTPATIENT
Start: 2021-04-20 | End: 2021-04-20 | Stop reason: HOSPADM

## 2021-04-20 RX ORDER — CLOPIDOGREL BISULFATE 75 MG/1
75 TABLET ORAL DAILY
Status: DISCONTINUED | OUTPATIENT
Start: 2021-04-21 | End: 2021-04-21 | Stop reason: HOSPADM

## 2021-04-20 RX ORDER — SODIUM CHLORIDE 0.9 % (FLUSH) 0.9 %
3-10 SYRINGE (ML) INJECTION AS NEEDED
Status: DISCONTINUED | OUTPATIENT
Start: 2021-04-20 | End: 2021-04-20 | Stop reason: HOSPADM

## 2021-04-20 RX ORDER — ROCURONIUM BROMIDE 10 MG/ML
INJECTION, SOLUTION INTRAVENOUS AS NEEDED
Status: DISCONTINUED | OUTPATIENT
Start: 2021-04-20 | End: 2021-04-20 | Stop reason: SURG

## 2021-04-20 RX ORDER — NEOSTIGMINE METHYLSULFATE 5 MG/5 ML
SYRINGE (ML) INTRAVENOUS AS NEEDED
Status: DISCONTINUED | OUTPATIENT
Start: 2021-04-20 | End: 2021-04-20 | Stop reason: SURG

## 2021-04-20 RX ORDER — LIDOCAINE HYDROCHLORIDE 10 MG/ML
0.5 INJECTION, SOLUTION EPIDURAL; INFILTRATION; INTRACAUDAL; PERINEURAL ONCE AS NEEDED
Status: DISCONTINUED | OUTPATIENT
Start: 2021-04-20 | End: 2021-04-20 | Stop reason: HOSPADM

## 2021-04-20 RX ORDER — CLOPIDOGREL BISULFATE 75 MG/1
300 TABLET ORAL ONCE
Status: COMPLETED | OUTPATIENT
Start: 2021-04-20 | End: 2021-04-20

## 2021-04-20 RX ORDER — LIDOCAINE HYDROCHLORIDE 40 MG/ML
SOLUTION TOPICAL AS NEEDED
Status: DISCONTINUED | OUTPATIENT
Start: 2021-04-20 | End: 2021-04-20 | Stop reason: SURG

## 2021-04-20 RX ORDER — FLUMAZENIL 0.1 MG/ML
0.2 INJECTION INTRAVENOUS AS NEEDED
Status: DISCONTINUED | OUTPATIENT
Start: 2021-04-20 | End: 2021-04-20 | Stop reason: HOSPADM

## 2021-04-20 RX ORDER — NALOXONE HCL 0.4 MG/ML
0.4 VIAL (ML) INJECTION AS NEEDED
Status: DISCONTINUED | OUTPATIENT
Start: 2021-04-20 | End: 2021-04-20 | Stop reason: HOSPADM

## 2021-04-20 RX ORDER — LIDOCAINE HYDROCHLORIDE 10 MG/ML
0.5 INJECTION, SOLUTION EPIDURAL; INFILTRATION; INTRACAUDAL; PERINEURAL ONCE AS NEEDED
Status: COMPLETED | OUTPATIENT
Start: 2021-04-20 | End: 2021-04-20

## 2021-04-20 RX ADMIN — Medication 80 MCG: at 10:36

## 2021-04-20 RX ADMIN — PROTAMINE SULFATE 25 MG: 10 INJECTION, SOLUTION INTRAVENOUS at 11:08

## 2021-04-20 RX ADMIN — SODIUM CHLORIDE 5 MG/HR: 9 INJECTION, SOLUTION INTRAVENOUS at 12:10

## 2021-04-20 RX ADMIN — Medication 3 MG: at 11:36

## 2021-04-20 RX ADMIN — ONDANSETRON HYDROCHLORIDE 4 MG: 2 SOLUTION INTRAMUSCULAR; INTRAVENOUS at 11:01

## 2021-04-20 RX ADMIN — FENTANYL CITRATE 100 MCG: 50 INJECTION, SOLUTION INTRAMUSCULAR; INTRAVENOUS at 09:38

## 2021-04-20 RX ADMIN — HEPARIN SODIUM 10000 UNITS: 1000 INJECTION, SOLUTION INTRAVENOUS; SUBCUTANEOUS at 10:27

## 2021-04-20 RX ADMIN — SODIUM CHLORIDE 250 MCG: 9 INJECTION, SOLUTION INTRAVENOUS at 11:41

## 2021-04-20 RX ADMIN — LIDOCAINE HYDROCHLORIDE 100 MG: 20 INJECTION, SOLUTION EPIDURAL; INFILTRATION; INTRACAUDAL; PERINEURAL at 10:37

## 2021-04-20 RX ADMIN — ATORVASTATIN CALCIUM 80 MG: 40 TABLET, FILM COATED ORAL at 13:46

## 2021-04-20 RX ADMIN — SODIUM CHLORIDE, POTASSIUM CHLORIDE, SODIUM LACTATE AND CALCIUM CHLORIDE 100 ML/HR: 600; 310; 30; 20 INJECTION, SOLUTION INTRAVENOUS at 12:58

## 2021-04-20 RX ADMIN — ALBUMIN HUMAN: 0.05 INJECTION, SOLUTION INTRAVENOUS at 10:35

## 2021-04-20 RX ADMIN — LIDOCAINE HYDROCHLORIDE 0.5 ML: 10 INJECTION, SOLUTION EPIDURAL; INFILTRATION; INTRACAUDAL; PERINEURAL at 09:01

## 2021-04-20 RX ADMIN — LOSARTAN POTASSIUM 100 MG: 50 TABLET, FILM COATED ORAL at 13:46

## 2021-04-20 RX ADMIN — ASPIRIN 324 MG: 81 TABLET, CHEWABLE ORAL at 07:40

## 2021-04-20 RX ADMIN — GLYCOPYRROLATE 0.4 MG: 0.2 INJECTION, SOLUTION INTRAMUSCULAR; INTRAVENOUS at 11:36

## 2021-04-20 RX ADMIN — Medication 80 MCG: at 10:46

## 2021-04-20 RX ADMIN — ROCURONIUM BROMIDE 10 MG: 10 INJECTION INTRAVENOUS at 10:34

## 2021-04-20 RX ADMIN — Medication 2 G: at 09:51

## 2021-04-20 RX ADMIN — LIDOCAINE HYDROCHLORIDE 100 MG: 20 INJECTION, SOLUTION EPIDURAL; INFILTRATION; INTRACAUDAL; PERINEURAL at 09:42

## 2021-04-20 RX ADMIN — SODIUM CHLORIDE, POTASSIUM CHLORIDE, SODIUM LACTATE AND CALCIUM CHLORIDE 1000 ML: 600; 310; 30; 20 INJECTION, SOLUTION INTRAVENOUS at 09:08

## 2021-04-20 RX ADMIN — ROCURONIUM BROMIDE 50 MG: 10 INJECTION INTRAVENOUS at 09:41

## 2021-04-20 RX ADMIN — CEFAZOLIN SODIUM 1 G: 1 INJECTION, SOLUTION INTRAVENOUS at 16:57

## 2021-04-20 RX ADMIN — AMLODIPINE BESYLATE 10 MG: 10 TABLET ORAL at 13:46

## 2021-04-20 RX ADMIN — CLOPIDOGREL BISULFATE 300 MG: 75 TABLET ORAL at 07:40

## 2021-04-20 RX ADMIN — LIDOCAINE HYDROCHLORIDE 1 EACH: 40 SOLUTION TOPICAL at 09:42

## 2021-04-20 RX ADMIN — ROCURONIUM BROMIDE 20 MG: 10 INJECTION INTRAVENOUS at 10:08

## 2021-04-20 RX ADMIN — PROPOFOL 80 MG: 10 INJECTION, EMULSION INTRAVENOUS at 09:41

## 2021-04-20 RX ADMIN — SODIUM CHLORIDE, POTASSIUM CHLORIDE, SODIUM LACTATE AND CALCIUM CHLORIDE 1000 ML: 600; 310; 30; 20 INJECTION, SOLUTION INTRAVENOUS at 09:01

## 2021-04-20 RX ADMIN — SODIUM CHLORIDE 1 ML/KG/HR: 9 INJECTION, SOLUTION INTRAVENOUS at 13:42

## 2021-04-20 RX ADMIN — PROTAMINE SULFATE 25 MG: 10 INJECTION, SOLUTION INTRAVENOUS at 11:11

## 2021-04-20 RX ADMIN — SODIUM CHLORIDE 5 MG/HR: 9 INJECTION, SOLUTION INTRAVENOUS at 17:25

## 2021-04-20 RX ADMIN — ALBUMIN HUMAN: 0.05 INJECTION, SOLUTION INTRAVENOUS at 09:55

## 2021-04-20 NOTE — ANESTHESIA PREPROCEDURE EVALUATION
Anesthesia Evaluation     Patient summary reviewed and Nursing notes reviewed   no history of anesthetic complications:  NPO Solid Status: > 8 hours  NPO Liquid Status: > 8 hours           Airway   Mallampati: I  TM distance: >3 FB  Neck ROM: full  No difficulty expected  Dental      Pulmonary    (+) a smoker Former, shortness of breath,   Cardiovascular   Exercise tolerance: poor (<4 METS)    (+) pacemaker pacemaker interrogated 1-3 months ago, hypertension, valvular problems/murmurs AS, CAD, CABG (2001) >6 Months, cardiac stents more than 12 months ago OLMEDO, hyperlipidemia,  carotid artery disease (<50% bilat) carotid bilateral    ROS comment: 4/6/21 Cath  Impression:  1.    No change in native coronary or bypass graft vessels compared to last catheterization in 2019  -- proximal left main stenosis appears visually unchanged (and was assessed by FFR at last catheterization and was negative at 0.87), patent stent in the mid LAD with an occluded diagonal branch, chronic total occlusions of OM1 and OM 2 (supplied via SVG), and mild-moderate disease of the RCA.    2.  Saphenous vein grafts to OM1->OM2, and to diagonal branch, are widely patent.      Echo 3/7/21  · Left ventricular ejection fraction appears to be 61 - 65%. Left ventricular systolic function is normal.  · Severe aortic valve stenosis is present. Aortic valve area is 0.97 cm2 and dimensionless index 0.25.  · Left ventricular wall thickness is consistent with mild septal asymmetric hypertrophy.  · Left ventricular diastolic function is consistent with (grade II w/high LAP) pseudonormalization.  · Mild mitral valve stenosis is present due to severe calcification of the posterior mitral annulus.  · Left atrial volume is severely increased.  · Normal size and function of right ventricle.  · Estimated right ventricular systolic pressure from tricuspid regurgitation is mildly elevated (35-45 mmHg).  · Compared to most recent exam from 2019, aortic stenosis has  progressed such that it is now classified as a severe.     [place] scientific pacemaker- % RV paced  Adequate battery    Neuro/Psych  (+) TIA, CVA,     GI/Hepatic/Renal/Endo    (+) obesity,   renal disease CRI,   (-) diabetes    Musculoskeletal     Abdominal    Substance History      OB/GYN          Other   arthritis,                    Anesthesia Plan    ASA 4     general     intravenous induction     Anesthetic plan, all risks, benefits, and alternatives have been provided, discussed and informed consent has been obtained with: patient.

## 2021-04-20 NOTE — ANESTHESIA PROCEDURE NOTES
Arterial Line      Patient location during procedure: holding area  Start time: 4/20/2021 9:01 AM  Stop Time:4/20/2021 9:03 AM       Line placed for hemodynamic monitoring.  Performed By   Anesthesiologist: Darrin Gross MD  Preanesthetic Checklist  Completed: patient identified, IV checked, site marked, risks and benefits discussed, surgical consent, monitors and equipment checked, pre-op evaluation and timeout performed  Arterial Line Prep   Sterile Tech: mask, cap and gloves  Prep: ChloraPrep  Patient monitoring: continuous pulse oximetry  Arterial Line Procedure   Laterality:right  Location:  radial artery  Catheter size: 20 G   Guidance: palpation technique  Number of attempts: 1  Successful placement: yes  Post Assessment   Dressing Type: occlusive dressing applied and secured with tape.   Complications no  Circ/Move/Sens Assessment: normal.   Patient Tolerance: patient tolerated the procedure well with no apparent complications

## 2021-04-20 NOTE — ANESTHESIA POSTPROCEDURE EVALUATION
Patient: Mireille Magallanes    Procedure Summary     Date: 04/20/21 Room / Location: [facility] OR  / [facility] HYBRID OR 12    Anesthesia Start: 0933 Anesthesia Stop: 1155    Procedures:       Transfemoral Transcatheter Aortic Valve Replacement (Bilateral )      TRANSFEMORAL TRANSCATHETER AORTIC VALVE REPLACEMENT (Bilateral Chest) Diagnosis:       Aortic stenosis, severe      (Aortic stenosis, severe [I35.0])    Surgeons: Kaushik Toledo MD; Clayton Wilcox MD Provider: Celeste Chávez CRNA    Anesthesia Type: general ASA Status: 4          Anesthesia Type: general    Vitals  Vitals Value Taken Time   /78 04/20/21 1223   Temp 97.5 °F (36.4 °C) 04/20/21 1235   Pulse 70 04/20/21 1235   Resp 16 04/20/21 1235   SpO2 100 % 04/20/21 1235           Post Anesthesia Care and Evaluation    Patient location during evaluation: PACU  Patient participation: complete - patient participated  Level of consciousness: awake and alert  Pain management: adequate  Airway patency: patent  Anesthetic complications: No anesthetic complications  PONV Status: none  Cardiovascular status: acceptable and hemodynamically stable  Respiratory status: acceptable  Hydration status: acceptable    Comments: Blood pressure 147/78, pulse 70, temperature 97.5 °F (36.4 °C), temperature source Temporal, resp. rate 16, SpO2 100 %.    Patient discharged from PACU based upon Aleyda score. Please see RN notes for further details

## 2021-04-20 NOTE — BRIEF OP NOTE
TRANSFEMORAL TRANSCATHETER AORTIC VALVE REPLACEMENT  Progress Note    Mireille L Elpidio  4/20/2021    Pre-op Diagnosis:   Aortic stenosis, severe [I35.0]       Post-Op Diagnosis Codes:     * Aortic stenosis, severe [I35.0]    Procedure/CPT® Codes:        Procedure(s):  Transfemoral Transcatheter Aortic Valve Replacement  TRANSFEMORAL TRANSCATHETER AORTIC VALVE REPLACEMENT    Surgeon(s):  Clayton Wilcox MD Faulkner, Michael Wade, MD Rains, Martin G, MD    Anesthesia: General    Staff:   Circulator: Solomon Munoz RN  Scrub Person: Bri Ivory  Documenter: Michael Tejada RN  Invasive Nurse: Karen Meier RN         Estimated Blood Loss: minimal    Urine Voided: * No values recorded between 4/20/2021  9:36 AM and 4/20/2021 11:05 AM *    Specimens:                None          Drains:   Urethral Catheter Latex 16 Fr. (Active)       [REMOVED] Closed/Suction Drain Right;Anterior Neck Bulb 15 Fr. (Removed)       Findings: Post implant gradient: 4mmHg, trace paravalvular leak    Complications: None          Clayton Wilcox MD     Date: 4/20/2021  Time: 11:46 CDT

## 2021-04-20 NOTE — INTERVAL H&P NOTE
H&P reviewed. The patient was examined and there are no changes to the H&P.      Heart cath last week, via right transfemoral approach, showed no significant difference compared to prior cath from 2019.  Patient remains dyspneic with minimal to moderate exertion, NYHA II.  He denies angina or syncope.  STS 11.07% mortality with SAVR (high risk) - patient offered TAVR by structural heart team for treatment of symptomatic severe aortic stenosis and elected to proceed.    I discussed transcatheter aortic valve replacement, the procedure, risks (including bleeding, infection, vascular damage [including minor oozing, bruising, bleeding, and up to and including the need for vascular surgery], contrast reaction, renal failure, respiratory failure, heart attack, stroke, arrhythmia and potential need for permanent pacemaker implantation, 1% risk of conversion to open sternotomy, and even death), benefits, and alternatives and the patient has voiced understanding and is willing to proceed.

## 2021-04-20 NOTE — ANESTHESIA PROCEDURE NOTES
Airway  Urgency: elective    Date/Time: 4/20/2021 9:42 AM  Airway not difficult    General Information and Staff    Patient location during procedure: OR  CRNA: Celeste Chávez CRNA    Indications and Patient Condition  Indications for airway management: airway protection    Preoxygenated: yes  Mask difficulty assessment: 1 - vent by mask    Final Airway Details  Final airway type: endotracheal airway      Successful airway: ETT  Cuffed: yes   Successful intubation technique: direct laryngoscopy  Facilitating devices/methods: intubating stylet  Endotracheal tube insertion site: oral  Blade: Melina  Blade size: 3.5 (3.5)  ETT size (mm): 8.0  Cormack-Lehane Classification: grade I - full view of glottis  Placement verified by: chest auscultation and capnometry   Cuff volume (mL): 5  Measured from: lips  ETT/EBT  to lips (cm): 23  Number of attempts at approach: 1  Assessment: lips, teeth, and gum same as pre-op and atraumatic intubation

## 2021-04-21 ENCOUNTER — APPOINTMENT (OUTPATIENT)
Dept: CARDIOLOGY | Facility: HOSPITAL | Age: 86
End: 2021-04-21

## 2021-04-21 VITALS
HEART RATE: 87 BPM | RESPIRATION RATE: 18 BRPM | DIASTOLIC BLOOD PRESSURE: 77 MMHG | SYSTOLIC BLOOD PRESSURE: 159 MMHG | WEIGHT: 201.5 LBS | BODY MASS INDEX: 29.84 KG/M2 | OXYGEN SATURATION: 95 % | TEMPERATURE: 97.6 F | HEIGHT: 69 IN

## 2021-04-21 PROBLEM — N18.32 STAGE 3B CHRONIC KIDNEY DISEASE (HCC): Status: ACTIVE | Noted: 2021-03-22

## 2021-04-21 PROBLEM — N18.32 STAGE 3B CHRONIC KIDNEY DISEASE (HCC): Status: RESOLVED | Noted: 2021-02-03 | Resolved: 2021-04-21

## 2021-04-21 LAB
ANION GAP SERPL CALCULATED.3IONS-SCNC: 11 MMOL/L (ref 5–15)
BH CV ECHO MEAS - AO MAX PG (FULL): 12.1 MMHG
BH CV ECHO MEAS - AO MAX PG: 17.8 MMHG
BH CV ECHO MEAS - AO MEAN PG (FULL): 5 MMHG
BH CV ECHO MEAS - AO MEAN PG: 8 MMHG
BH CV ECHO MEAS - AO V2 MAX: 211 CM/SEC
BH CV ECHO MEAS - AO V2 MEAN: 130 CM/SEC
BH CV ECHO MEAS - AO V2 VTI: 35.2 CM
BH CV ECHO MEAS - BSA(HAYCOCK): 2.1 M^2
BH CV ECHO MEAS - BSA: 2 M^2
BH CV ECHO MEAS - BZI_BMI: 30.6 KILOGRAMS/M^2
BH CV ECHO MEAS - BZI_METRIC_HEIGHT: 172.7 CM
BH CV ECHO MEAS - BZI_METRIC_WEIGHT: 91.2 KG
BH CV ECHO MEAS - EDV(MOD-SP4): 114 ML
BH CV ECHO MEAS - EF(MOD-SP4): 56.1 %
BH CV ECHO MEAS - ESV(MOD-SP4): 50.1 ML
BH CV ECHO MEAS - LA DIMENSION: 3.8 CM
BH CV ECHO MEAS - LV DIASTOLIC VOL/BSA (35-75): 55.7 ML/M^2
BH CV ECHO MEAS - LV MAX PG: 5.7 MMHG
BH CV ECHO MEAS - LV MEAN PG: 3 MMHG
BH CV ECHO MEAS - LV SYSTOLIC VOL/BSA (12-30): 24.5 ML/M^2
BH CV ECHO MEAS - LV V1 MAX: 119 CM/SEC
BH CV ECHO MEAS - LV V1 MEAN: 78.7 CM/SEC
BH CV ECHO MEAS - LV V1 VTI: 22.7 CM
BH CV ECHO MEAS - LVLD AP4: 8 CM
BH CV ECHO MEAS - LVLS AP4: 6.7 CM
BH CV ECHO MEAS - MV A MAX VEL: 179 CM/SEC
BH CV ECHO MEAS - MV DEC TIME: 0.29 SEC
BH CV ECHO MEAS - MV E MAX VEL: 157 CM/SEC
BH CV ECHO MEAS - MV E/A: 0.88
BH CV ECHO MEAS - MV MAX PG: 13.2 MMHG
BH CV ECHO MEAS - MV MEAN PG: 7 MMHG
BH CV ECHO MEAS - MV V2 MAX: 182 CM/SEC
BH CV ECHO MEAS - MV V2 MEAN: 119 CM/SEC
BH CV ECHO MEAS - MV V2 VTI: 44.6 CM
BH CV ECHO MEAS - RAP SYSTOLE: 5 MMHG
BH CV ECHO MEAS - RVSP: 38.2 MMHG
BH CV ECHO MEAS - SI(MOD-SP4): 31.2 ML/M^2
BH CV ECHO MEAS - SV(MOD-SP4): 63.9 ML
BH CV ECHO MEAS - TR MAX VEL: 288 CM/SEC
BUN SERPL-MCNC: 22 MG/DL (ref 8–23)
BUN/CREAT SERPL: 11.1 (ref 7–25)
CALCIUM SPEC-SCNC: 8.3 MG/DL (ref 8.6–10.5)
CHLORIDE SERPL-SCNC: 105 MMOL/L (ref 98–107)
CO2 SERPL-SCNC: 23 MMOL/L (ref 22–29)
CREAT SERPL-MCNC: 1.99 MG/DL (ref 0.76–1.27)
DEPRECATED RDW RBC AUTO: 41.8 FL (ref 37–54)
ERYTHROCYTE [DISTWIDTH] IN BLOOD BY AUTOMATED COUNT: 13.3 % (ref 12.3–15.4)
GFR SERPL CREATININE-BSD FRML MDRD: 32 ML/MIN/1.73
GLUCOSE BLDC GLUCOMTR-MCNC: 123 MG/DL (ref 70–130)
GLUCOSE SERPL-MCNC: 108 MG/DL (ref 65–99)
HCT VFR BLD AUTO: 33.1 % (ref 37.5–51)
HGB BLD-MCNC: 11 G/DL (ref 13–17.7)
MCH RBC QN AUTO: 28.9 PG (ref 26.6–33)
MCHC RBC AUTO-ENTMCNC: 33.2 G/DL (ref 31.5–35.7)
MCV RBC AUTO: 87.1 FL (ref 79–97)
PLATELET # BLD AUTO: 141 10*3/MM3 (ref 140–450)
PMV BLD AUTO: 11.1 FL (ref 6–12)
POTASSIUM SERPL-SCNC: 4.3 MMOL/L (ref 3.5–5.2)
RBC # BLD AUTO: 3.8 10*6/MM3 (ref 4.14–5.8)
SODIUM SERPL-SCNC: 139 MMOL/L (ref 136–145)
WBC # BLD AUTO: 7.87 10*3/MM3 (ref 3.4–10.8)

## 2021-04-21 PROCEDURE — 85027 COMPLETE CBC AUTOMATED: CPT | Performed by: INTERNAL MEDICINE

## 2021-04-21 PROCEDURE — 82962 GLUCOSE BLOOD TEST: CPT

## 2021-04-21 PROCEDURE — 25010000002 CEFAZOLIN 1-4 GM/50ML-% SOLUTION: Performed by: THORACIC SURGERY (CARDIOTHORACIC VASCULAR SURGERY)

## 2021-04-21 PROCEDURE — 99231 SBSQ HOSP IP/OBS SF/LOW 25: CPT | Performed by: NURSE PRACTITIONER

## 2021-04-21 PROCEDURE — 86900 BLOOD TYPING SEROLOGIC ABO: CPT

## 2021-04-21 PROCEDURE — 99238 HOSP IP/OBS DSCHRG MGMT 30/<: CPT | Performed by: INTERNAL MEDICINE

## 2021-04-21 PROCEDURE — 86901 BLOOD TYPING SEROLOGIC RH(D): CPT

## 2021-04-21 PROCEDURE — 93321 DOPPLER ECHO F-UP/LMTD STD: CPT

## 2021-04-21 PROCEDURE — 93321 DOPPLER ECHO F-UP/LMTD STD: CPT | Performed by: INTERNAL MEDICINE

## 2021-04-21 PROCEDURE — 80048 BASIC METABOLIC PNL TOTAL CA: CPT | Performed by: INTERNAL MEDICINE

## 2021-04-21 PROCEDURE — 93308 TTE F-UP OR LMTD: CPT | Performed by: INTERNAL MEDICINE

## 2021-04-21 PROCEDURE — 93325 DOPPLER ECHO COLOR FLOW MAPG: CPT | Performed by: INTERNAL MEDICINE

## 2021-04-21 PROCEDURE — 93325 DOPPLER ECHO COLOR FLOW MAPG: CPT

## 2021-04-21 PROCEDURE — 93010 ELECTROCARDIOGRAM REPORT: CPT | Performed by: INTERNAL MEDICINE

## 2021-04-21 PROCEDURE — 93005 ELECTROCARDIOGRAM TRACING: CPT | Performed by: THORACIC SURGERY (CARDIOTHORACIC VASCULAR SURGERY)

## 2021-04-21 PROCEDURE — 93308 TTE F-UP OR LMTD: CPT

## 2021-04-21 RX ORDER — ASPIRIN 81 MG/1
81 TABLET ORAL DAILY
Qty: 90 TABLET | Refills: 0 | Status: SHIPPED | OUTPATIENT
Start: 2021-04-21 | End: 2022-11-19 | Stop reason: HOSPADM

## 2021-04-21 RX ADMIN — LOSARTAN POTASSIUM 100 MG: 50 TABLET, FILM COATED ORAL at 10:07

## 2021-04-21 RX ADMIN — CLOPIDOGREL 75 MG: 75 TABLET, FILM COATED ORAL at 10:08

## 2021-04-21 RX ADMIN — AMLODIPINE BESYLATE 10 MG: 10 TABLET ORAL at 10:08

## 2021-04-21 RX ADMIN — CEFAZOLIN SODIUM 1 G: 1 INJECTION, SOLUTION INTRAVENOUS at 00:49

## 2021-04-21 RX ADMIN — ASPIRIN 81 MG: 81 TABLET, COATED ORAL at 10:08

## 2021-04-21 RX ADMIN — ATORVASTATIN CALCIUM 80 MG: 40 TABLET, FILM COATED ORAL at 10:08

## 2021-04-21 NOTE — PLAN OF CARE
Goal Outcome Evaluation:  Plan of Care Reviewed With: patient  Progress: no change  Outcome Summary: pt arrived to 4b this shift from ICU. pt has had no complaints of pain this shift. vss.  80-95, 5 beats pvc. pt L femoral site started bleeding on transfer from ICU. manual pressure and sandbag performed. bleeding stopped. L and R puncture sites now clean, dry. and soft. pulses good. pt due to void, allen pulled ay 03:45. alert and oriented. room air. no falls. safety maintained.

## 2021-04-21 NOTE — CASE MANAGEMENT/SOCIAL WORK
Discharge Planning Assessment  [facility]     Patient Name: Mireille Magallanes  MRN: [id number]  Today's Date: 4/21/2021    Admit Date: 4/20/2021    Discharge Needs Assessment     Row Name 04/21/21 0939       Living Environment    Lives With  spouse    Name(s) of Who Lives With Patient  [place]    Current Living Arrangements  home/apartment/condo    Primary Care Provided by  self    Provides Primary Care For  no one    Family Caregiver if Needed  spouse    Quality of Family Relationships  helpful;involved;supportive    Able to Return to Prior Arrangements  yes       Resource/Environmental Concerns    Resource/Environmental Concerns  none       Transition Planning    Patient/Family Anticipates Transition to  home with family    Patient/Family Anticipated Services at Transition  none    Transportation Anticipated  family or friend will provide       Discharge Needs Assessment    Readmission Within the Last 30 Days  no previous admission in last 30 days    Equipment Currently Used at Home  none    Concerns to be Addressed  no discharge needs identified    Anticipated Changes Related to Illness  none    Equipment Needed After Discharge  none    Discharge Coordination/Progress  Pt reside with spouse.  Pt has PCP, RX coverage and can afford medications.  Pt denies any discharge needs and does not want home health services.        Discharge Plan    No documentation.       Continued Care and Services - Admitted Since 4/20/2021    Coordination has not been started for this encounter.       Expected Discharge Date and Time     Expected Discharge Date Expected Discharge Time    Apr 21, 2021         Demographic Summary    No documentation.       Functional Status    No documentation.       Psychosocial    No documentation.       Abuse/Neglect    No documentation.       Legal    No documentation.       Substance Abuse    No documentation.       Patient Forms    No documentation.           DONTE OlveraW

## 2021-04-21 NOTE — DISCHARGE SUMMARY
[facility] DISCHARGE    Date of Discharge:  4/21/2021    Discharge Diagnosis:   1.  Status post transcatheter aortic valve replacement  2.  Coronary artery disease, bypass graft, without angina   3.  Paroxysmal atrial fibrillation  4.  Complete heart block, with history of dual-chamber permanent pacemaker    Presenting Problem/History of Present Illness  Mr. Magallanes is an 85-year-old male initially referred to me December 2017 with lightheadedness and syncope, determined to be from third-degree heart block, so for whom I implanted a dual-chamber pacemaker in January 2018..  He also has a history of CAD and had four-vessel CABG in 2001 and PCI to the LAD in 2014.  He also has history of carotid vascular disease, including right carotid endarterectomy in February 2020.     When he saw GIANNI Rush, in our clinic for routine follow-up and February, he was reporting worsening shortness of breath with exertion over the past few months.  He also has some associated chest discomfort.  She repeated an echocardiogram which confirmed severe aortic stenosis.  When I saw him back in March,  he told me that his breathing was gradually worsening. [place] a shop about 80 ft down the hill.  On the way back, he has to stop at least once to catch his breath.  This had been progressively worsening over the last several months.  He denied associated orthopnea, but has noticed more leg swelling.  He was requiring Lasix about twice per week.  He also cited associated poor energy.    For this reason, I felt as though he was symptomatic from his severe aortic stenosis, and he was evaluated by the structural heart committee.  After thorough work-up, it was recommended that he go transcatheter aortic valve replacement via transfemoral approach.    Hospital Course  Patient presented electively on 4/20/2021 for transcatheter aortic valve replacement.  He underwent successful  implantation of a 29 mm Gutierrez S3 valve.  Knowing there was no risk of conduction complications since he already had a pacemaker, and also knowing that he had a borderline left main stenosis that could potentially need to be re-accessed in the future should he develop symptoms suggestive of worsening coronary disease, the valve was intentionally deployed lower than we typically would.  On the noncoronary cusp, it was at approximately a 50/50 position, while in the left and right coronary cusp, it was approximately 70/30.  There was no evidence of any paravalvular leak.  The valve itself had been advanced through the 16 Stateless sheath from the right common femoral artery, which was successfully closed with 2 Perclose devices.  The left common femoral artery was used for placement of the pigtail during the procedure, through a 6 Stateless sheath, but attempts at Perclose at the conclusion of the case in this vessel failed due to severe calcification and manual pressure had to be applied.  Of note, patient already had a dual-chamber pacemaker so did not require transvenous pacemaking wire for rapid pacing run.  As result, his monitoring period in the ICU was abbreviated after bedrest is up he was transferred to the telemetry floor on the night of the procedure.  He did have a mild ooze from the left groin that required additional application of manual pressure that night, but this resolved without formation of a hematoma or any other evidence of complication.  He was up ambulating on POD #1, and even before doing so, reported significant improvement in his breathing that was noticeable even while at rest.  He had no new associated symptoms such as chest discomfort or palpitations.  Echocardiogram on POD #1/day of discharge showed acceptable valve positioning and transvalvular gradients.    He was felt appropriate for discharge with addition of aspirin to his pre-existing medical regimen of Plavix.  He was informed of the  "need for SBE prophylaxis with any dental procedures moving forward.  He can start cardiac rehab in 2 weeks.  Groin precautions were reviewed with the patient and his wife extensively prior to discharge.    Procedures Performed  Procedure(s):  Transfemoral Transcatheter Aortic Valve Replacement  TRANSFEMORAL TRANSCATHETER AORTIC VALVE REPLACEMENT  04/20 1442 Note By: Kaushik Toledo MD    Consults:   Consults     No orders found for last 30 day(s).          Pertinent Test Results:   Results for orders placed during the hospital encounter of 04/20/21    Adult Transthoracic Echo Limited W/ Cont if Necessary Per Protocol    Interpretation Summary  · Left ventricular ejection fraction appears to be 56 - 60%. Left ventricular systolic function is normal.  · Recently placed Gutierrez S3 29 mm TAVR valve remains in position with known, slightly \"deep,\" but still appropriate positioning. Mean transvalvular gradient 8 mmHg, with no evidence of paravalvular leak.  · Severe mitral annular calcification, particularly of the posterior annulus, resulting in moderate mitral valve stenosis.  · Estimated right ventricular systolic pressure from tricuspid regurgitation is mildly elevated (35-45 mmHg).  · Compared to prior transthoracic study on 3/3/2021, TAVR valve has now replaced native valve that had severe aortic stenosis; transvalvular gradients through TAVR valve are excellent (8 mmHg), and there is no evidence of paravalvular leak. Severe mitral annular calcification with resultant mitral stenosis relatively unchanged; transvalvular gradient is slightly higher (7 mmHg) on current study compared to previous (at which point in time it was 5 mmHg), likely due to increased heart rate (which is in the 80s on current exam, compared to 60s-70s on prior).    Basic Metabolic Panel    Sodium Sodium   Date Value Ref Range Status   04/21/2021 139 136 - 145 mmol/L Final   04/19/2021 141 136 - 145 mmol/L Final      Potassium Potassium   Date " Value Ref Range Status   04/21/2021 4.3 3.5 - 5.2 mmol/L Final   04/19/2021 4.7 3.5 - 5.2 mmol/L Final      Chloride Chloride   Date Value Ref Range Status   04/21/2021 105 98 - 107 mmol/L Final   04/19/2021 104 98 - 107 mmol/L Final      Bicarbonate No results found for: PLASMABICARB   BUN BUN   Date Value Ref Range Status   04/21/2021 22 8 - 23 mg/dL Final   04/19/2021 26 (H) 8 - 23 mg/dL Final      Creatinine Creatinine   Date Value Ref Range Status   04/21/2021 1.99 (H) 0.76 - 1.27 mg/dL Final   04/19/2021 1.91 (H) 0.76 - 1.27 mg/dL Final      Calcium Calcium   Date Value Ref Range Status   04/21/2021 8.3 (L) 8.6 - 10.5 mg/dL Final   04/19/2021 9.1 8.6 - 10.5 mg/dL Final      Glucose      No components found for: GLUCOSE.*         Ejection Fraction  Lab Results   Component Value Date    EF 59 01/05/2018       Condition on Discharge: Stable    Physical Exam at Discharge    Vital Signs  Temp:  [97.6 °F (36.4 °C)-99.1 °F (37.3 °C)] 97.6 °F (36.4 °C)  Heart Rate:  [58-87] 87  Resp:  [16-20] 18  BP: (119-159)/(53-77) 159/77  Arterial Line BP: (124-142)/(44-50) 137/45    Physical Exam:  Vitals and nursing note reviewed.   Constitutional:       General: Not in acute distress.     Appearance: Not in distress.   Neck:      Vascular: No JVD or JVR. JVD normal.   Pulmonary:      Effort: Pulmonary effort is normal.      Breath sounds: Normal breath sounds.   Cardiovascular:      Normal rate. Regular rhythm.      Murmurs: There is a grade 1/6 systolic murmur at the ULSB.      No gallop. No rub.   Pulses:     Intact distal pulses.   Edema:     Peripheral edema absent.   Skin:     General: Skin is warm and dry.   Neurological:      Mental Status: Alert, oriented to person, place, and time and oriented to person, place and time.         Discharge Disposition  Home or Self Care    Discharge Medications     Discharge Medications      New Medications      Instructions Start Date   aspirin 81 MG EC tablet   81 mg, Oral, Daily          Continue These Medications      Instructions Start Date   amLODIPine 10 MG tablet  Commonly known as: NORVASC   Take 1 tablet by mouth once daily      atorvastatin 80 MG tablet  Commonly known as: LIPITOR   80 mg, Oral, Daily      clopidogrel 75 MG tablet  Commonly known as: Plavix   75 mg, Oral, Daily      coenzyme Q10 100 MG capsule   100 mg, Oral, Daily      FISH OIL-COENZYME Q10 PO   300 mg, Oral, Daily      furosemide 40 MG tablet  Commonly known as: LASIX   40 mg, Oral, Daily PRN      irbesartan 300 MG tablet  Commonly known as: AVAPRO   300 mg, Oral, Nightly      nitroglycerin 0.4 MG SL tablet  Commonly known as: NITROSTAT   0.4 mg, Sublingual, As Needed             Discharge Diet: Cardiac    Activity at Discharge:      No lifting greater than 5 pounds for 7 days (this is the weight of a gallon of milk)  No heavy or strenuous pushing or pulling.  No tub baths, hot tubs, swimming pools, or submerging groin underwater for 1 week.  Wash groin site daily with antibacterial soap and water. Rinse and keep clean and dry.  No lotions, powders, or topical ointments to site. Keep open to air and dry.  Call our office with any concerns or if you notice redness, swelling, drainage, warmth, or pain at the site.    Follow-up Appointments  Future Appointments   Date Time Provider Department Center   5/17/2021  2:00 PM [facility] HRT GRP PAD, WATCHMAN/STRUCTURAL HEART [facility] CD PAD [facility] Heart [facility]   6/1/2021  3:00 PM PAD ECHO ROOM 2  [facility] CARDI PAD   8/4/2021 11:00 AM PAD US NIVAS CART 2  PAD US PAD   8/4/2021  1:15 PM Berna Recinos APRN [facility] VS PAD PAD   2/3/2022 10:00 AM PACEMAKER HEART GRP [facility] CD PAD [facility] Heart [facility]     Additional Instructions for the Follow-ups that You Need to Schedule     Ambulatory Referral to Cardiac Rehab   As directed      CAN BEGIN IN 2 WEEKS    Order Comments: CAN BEGIN IN 2 WEEKS                Test Results Pending at Discharge: none     I spent 29 minutes in the discharge process    Kaushik  ZAIDA Toledo MD  04/21/21  13:15 CDT

## 2021-04-21 NOTE — DISCHARGE INSTRUCTIONS
DR. BEDOYA' GROIN PRECAUTIONS:    No lifting greater than 5 pounds for 7 days (this is the weight of a gallon of milk)  No heavy or strenuous pushing or pulling.  No tub baths, hot tubs, swimming pools, or submerging groin underwater for 1 week.  Wash groin site daily with antibacterial soap and water. Rinse and keep clean and dry.  No lotions, powders, or topical ointments to site. Keep open to air and dry.  Call our office with any concerns or if you notice redness, swelling, drainage, warmth, or pain at the site.

## 2021-04-21 NOTE — PROGRESS NOTES
"Transcatheter aortic valve replacement (#29mm Gutierrez Claudio 3 valve), Ultrasound-guided vascular access, Left heart catheterization, Ascending aortography, Abdominal aortography with bilateral iliofemoral runoff    POD 1    Ambulating within the room. No complaints of pain. Eager for discharge home today. He did have bleeding from left femoral site overnight but this has stopped. Limited echo completed this morning.     Telemetry: V pacing 80-95 bpm, 5 beats of V tach at 2137 last night     Visit Vitals  /77 (BP Location: Left arm, Patient Position: Sitting)   Pulse 87   Temp 97.6 °F (36.4 °C) (Oral)   Resp 18   Ht 174.5 cm (68.7\")   Wt 91.4 kg (201 lb 8 oz)   SpO2 95%   BMI 30.02 kg/m²         Intake/Output Summary (Last 24 hours) at 4/21/2021 1021  Last data filed at 4/21/2021 0844  Gross per 24 hour   Intake 2904 ml   Output 2425 ml   Net 479 ml     Labs:      Results for orders placed during the hospital encounter of 04/20/21    Adult Transthoracic Echo Limited W/ Cont if Necessary Per Protocol    Interpretation Summary  · Left ventricular ejection fraction appears to be 56 - 60%. Left ventricular systolic function is normal.  · Recently placed Gutierrez S3 29 mm TAVR valve remains in position with known, slightly \"deep,\" but still appropriate positioning. Mean transvalvular gradient 8 mmHg, with no evidence of paravalvular leak.  · Severe mitral annular calcification, particularly of the posterior annulus, resulting in moderate mitral valve stenosis.  · Estimated right ventricular systolic pressure from tricuspid regurgitation is mildly elevated (35-45 mmHg).  · Compared to prior transthoracic study on 3/3/2021, TAVR valve has now replaced native valve that had severe aortic stenosis; transvalvular gradients through TAVR valve are excellent (8 mmHg), and there is no evidence of paravalvular leak. Severe mitral annular calcification with resultant mitral stenosis relatively unchanged; transvalvular gradient " is slightly higher (7 mmHg) on current study compared to previous (at which point in time it was 5 mmHg), likely due to increased heart rate (which is in the 80s on current exam, compared to 60s-70s on prior).    Physical Exam:  General: No apparent distress. In good spirits.  Cardiovascular: Regular rate and rhythm without murmur, rubs, or gallops.    Pulmonary: Clear to auscultation bilaterally without wheezing, rubs, or rales.  Abdomen: Soft, non distended and non tender.  Extremities: Warm, moves all extremities. Minimal lower extremity edema. Bilateral groin sites with serosanguinous stained dressings and bruising. No hematoma.    Neurologic: Grossly intact with no focal deficits.       Impression:  Aortic valve stenosis, severe symptomatic  Aspirin like platelet defect  Native coronary artery disease-stable  Previous CVA without deficit  Known cerebrovascular occlusive disease with history of previous right carotid endarterectomy  Chronic kidney disease stage III by records  Advanced age  Hard of hearing  Third-degree heart block with presence of pacemaker  Atherosclerosis of the aorta    Plan:  OK to dc home from cardiothoracic surgery standpoint  No strenuous activities for a week  Follow up with Dr. Wilcox in 1 month with limited echo  DW patient

## 2021-04-22 ENCOUNTER — TELEPHONE (OUTPATIENT)
Dept: ADMINISTRATIVE | Age: 86
End: 2021-04-22

## 2021-04-22 ENCOUNTER — READMISSION MANAGEMENT (OUTPATIENT)
Dept: CALL CENTER | Facility: HOSPITAL | Age: 86
End: 2021-04-22

## 2021-04-22 LAB
BH BB BLOOD EXPIRATION DATE: NORMAL
BH BB BLOOD EXPIRATION DATE: NORMAL
BH BB BLOOD TYPE BARCODE: 600
BH BB BLOOD TYPE BARCODE: 600
BH BB DISPENSE STATUS: NORMAL
BH BB DISPENSE STATUS: NORMAL
BH BB PRODUCT CODE: NORMAL
BH BB PRODUCT CODE: NORMAL
BH BB UNIT NUMBER: NORMAL
BH BB UNIT NUMBER: NORMAL
CROSSMATCH INTERPRETATION: NORMAL
CROSSMATCH INTERPRETATION: NORMAL
QT INTERVAL: 444 MS
QT INTERVAL: 500 MS
QTC INTERVAL: 524 MS
QTC INTERVAL: 565 MS
UNIT  ABO: NORMAL
UNIT  ABO: NORMAL
UNIT  RH: NORMAL
UNIT  RH: NORMAL

## 2021-04-22 NOTE — OUTREACH NOTE
Prep Survey      Responses   Gnosticist facility patient discharged from?  [facility]   Is LACE score < 7 ?  No   Emergency Room discharge w/ pulse ox?  No   Eligibility  Readm Mgmt   Discharge diagnosis  TAVR   Does the patient have one of the following disease processes/diagnoses(primary or secondary)?  Other   Does the patient have Home health ordered?  No   Is there a DME ordered?  No   Comments regarding appointments  see AVS   Medication alerts for this patient  aspirin   Prep survey completed?  Yes          Josephine Camarillo RN

## 2021-04-22 NOTE — OP NOTE
"Date: 04/20/2021    Pre-operative diagnosis:  1.  Aortic valve stenosis severe,  symptomatic  2.  Coronary artery disease  3.  Aspirin like platelet dysfunction  4.  Chronic kidney disease, stage IIIb  5.  History of stroke  6.  Heart block, 3rd degree  7.  High risk cardiac surgery candidate, previous CABG    Post-operative diagnosis:  Same     Procedures:  1.  Transcatheter aortic valve replacement (TAVR- 29mm Gutierrez Claudio 3 valve)   2.  Ultrasound-guided vascular access  3.  Ascending aortography  4.  Abdominal aortography with iliofemoral runoff (right lower extremity)    Physicians:  Cardiothoracic surgeon: Clayton Wilcox M.D.  Structural heart cardiologist: Kaushik Toledo M.D.  Assisting Structural heart cardiologist: Luis E Courtney M.D.    Estimated Blood Loss: minimal  Specimens: None  Complications: None immediate    Operative findings: No perivalvular leak of significance post balloon aortic valvuloplasty  Post valve deployment gradient measured 4 mmHg by JAMILA.  Trace paravalvular leak    Procedural details:  Mr. Magallanes was brought to the hybrid operating room and prepped and draped in the usual sterile fashion.  Time out was performed.  Antibiotics were administered.  A team based approach was used to simultaneously establish access.  Micropuncture needle was used to directly access the right common femoral artery using ultrasound guidance. Femoral angiography was performed at this point, confirming the arteriotomy site to be suitable for closure and vessel size to be appropriate for up-sizing to larger bore sheath.  This was exchanged for a 6 Fr sheath.  Finally, ultrasound-guided access was obtained in the left common femoral artery with a 6F sheath.    Over a wire, two 6Fr Perclose Proglides were deployed in \"Preclose\" fashion with the wire still in place. Thereafter an Amplatz  cm wire was advanced via the last perclose using fluoroscopy.   Over this, a 14 [place] E-sheath was advanced " "and positioned.  The patient was administered heparin with ACT's monitored for effective anticoagulation for TAVR implant.        Through the 6F sheath in the left CFA, a 6Fr angled pigtail was advanced into the RCC.  A 6Fr AL1 was advanced with a J tipped wire and exchanged for a 180 cm straight wire.  The aortic valve was crossed with a 6Fr angled pigtail advanced into the LV.  This was exchanged for a 260 cm Amplatz SS wire.  At this point, valve timeout was performed.  All potentially necessary equipment was confirmed in the room, with valve size also confirmed. The valve had been prepped and loaded at the back table.  The valve was loaded onto the balloon catheter in the descending aorta under fluoroscopy.  Valve was then advanced and positioned flouroscopically, using the 6Fr angled pigtail in the RCC as \"landing zone\" marker. Rapid pacing was initiated at 180 bpm using the patient's implanted device.  With SBP < 50 mmHg and PP < 10 mmHg.  The valve was deployed without remark.  Balloon had -1 from nominal volume inflation.  Pacing was turned off. A deep position was noted.  70/30 on the LCC and RCC. The NCC approaching 50%.  JAMILA showed trace paravalvular regurgitation and no significant regurgitation on aortography.  The valve remained deep but was well functioning.      The delivery system was removed and thereafter the 14 [place] E sheath with the 2 percloses tied down.  Abdominal aortography with runoff to the right lower extremity was performed from the left groin.  Thre was no contrast extravasation and no stenosis.  The wire was removed.  The left access was removed with a perclose attempted without success.  Manual pressure was held on the left.  Protamine 50 mg IV was administered.    He was extubated in the OR and transferred to the PACU in stable condition.      Plans:  to PACU hemodynamically stable and extubated.  Then to ICU, 6 hours bedrest, temp wire in overnight  -complete surgical prophylactic " abx x24 hrs  -ASA 81mg daily, plavix 75mg daily (x3 months, unless other mitigating factors)

## 2021-04-23 ENCOUNTER — READMISSION MANAGEMENT (OUTPATIENT)
Dept: CALL CENTER | Facility: HOSPITAL | Age: 86
End: 2021-04-23

## 2021-04-23 NOTE — OUTREACH NOTE
Medical Week 1 Survey      Responses   [facility] patient discharged from?  [place]   Does the patient have one of the following disease processes/diagnoses(primary or secondary)?  Other   Week 1 attempt successful?  Yes   Call start time  1633   Call end time  1634   Discharge diagnosis  TAVR   Is patient permission given to speak with other caregiver?  Yes   List who call center can speak with  [place]- Wife   Person spoke with today (if not patient) and relationship  [place]- Wife    Meds reviewed with patient/caregiver?  Yes   Is the patient having any side effects they believe may be caused by any medication additions or changes?  No   Does the patient have all medications ordered at discharge?  Yes   Is the patient taking all medications as directed (includes completed medication regime)?  Yes   Does the patient have a primary care provider?   Yes   Does the patient have an appointment with their PCP within 7 days of discharge?  Yes   Has the patient kept scheduled appointments due by today?  N/A   Psychosocial issues?  No   Did the patient receive a copy of their discharge instructions?  Yes   Nursing interventions  Reviewed instructions with patient   What is the patient's perception of their health status since discharge?  Improving   Is the patient/caregiver able to teach back signs and symptoms related to disease process for when to call PCP?  Yes   Is the patient/caregiver able to teach back signs and symptoms related to disease process for when to call 911?  Yes   Is the patient/caregiver able to teach back the hierarchy of who to call/visit for symptoms/problems? PCP, Specialist, Home health nurse, Urgent Care, ED, 911  Yes   If the patient is a current smoker, are they able to teach back resources for cessation?  Not a smoker   Week 1 call completed?  Yes          Margarita Wren RN

## 2021-04-27 ENCOUNTER — DOCUMENTATION (OUTPATIENT)
Dept: CT IMAGING | Facility: HOSPITAL | Age: 86
End: 2021-04-27

## 2021-04-27 NOTE — PROGRESS NOTES
Spoke with patient regarding recent incidental  findings on imaging exam.  Patient verbalized understanding the Radiologist's recommendations for follow-up and requested that the report be sent to Dr. Martínez.

## 2021-04-28 ENCOUNTER — TELEPHONE (OUTPATIENT)
Dept: CARDIOLOGY | Facility: CLINIC | Age: 86
End: 2021-04-28

## 2021-04-28 NOTE — TELEPHONE ENCOUNTER
Call from Mr. Magallanes regarding recent CT finding of a cyst vs mass on his right kidney. I spoke with Dr. Toledo and he would like the pt's PCP to manage this finding and any testing that would be needed.    I called Jake Leos's office and left a detailed voicemail with his nurse for a call back.     Pt. Verbalized understanding.

## 2021-04-28 NOTE — TELEPHONE ENCOUNTER
Call back from Latoya at patient's PCP office. She asked that I fax over the CT report. Jw Leos is currently out of the [place] with the  so she will make pt an appt to f/u with another provider.    I have faxed the CT report for their review.

## 2021-04-29 DIAGNOSIS — R93.89 ABNORMAL CT SCAN: Primary | ICD-10-CM

## 2021-05-03 ENCOUNTER — READMISSION MANAGEMENT (OUTPATIENT)
Dept: CALL CENTER | Facility: HOSPITAL | Age: 86
End: 2021-05-03

## 2021-05-03 ENCOUNTER — HOSPITAL ENCOUNTER (OUTPATIENT)
Dept: ULTRASOUND IMAGING | Age: 86
Discharge: HOME | End: 2021-05-03
Payer: MEDICARE

## 2021-05-03 DIAGNOSIS — R93.89 ABNORMAL CT SCAN: ICD-10-CM

## 2021-05-03 PROCEDURE — 76770 US EXAM ABDO BACK WALL COMP: CPT

## 2021-05-03 NOTE — OUTREACH NOTE
Medical Week 2 Survey      Responses   [facility] patient discharged from?  [place]   Does the patient have one of the following disease processes/diagnoses(primary or secondary)?  Other   Week 2 attempt successful?  Yes   Call start time  1138   Discharge diagnosis  TAVR   Call end time  1142   Person spoke with today (if not patient) and relationship  Patient   Meds reviewed with patient/caregiver?  Yes   Is the patient having any side effects they believe may be caused by any medication additions or changes?  No   Does the patient have all medications ordered at discharge?  Yes   Is the patient taking all medications as directed (includes completed medication regime)?  Yes   Does the patient have a primary care provider?   Yes   Does the patient have an appointment with their PCP within 7 days of discharge?  Yes   Has the patient kept scheduled appointments due by today?  N/A   Psychosocial issues?  No   Did the patient receive a copy of their discharge instructions?  Yes   Nursing interventions  Reviewed instructions with patient   What is the patient's perception of their health status since discharge?  Improving   Is the patient/caregiver able to teach back signs and symptoms related to disease process for when to call PCP?  Yes   Is the patient/caregiver able to teach back signs and symptoms related to disease process for when to call 911?  Yes   Is the patient/caregiver able to teach back the hierarchy of who to call/visit for symptoms/problems? PCP, Specialist, Home health nurse, Urgent Care, ED, 911  Yes   If the patient is a current smoker, are they able to teach back resources for cessation?  Not a smoker   Week 2 Call Completed?  Yes   Wrap up additional comments  Pt doing well. Pt states he will get US on kidneys r/t mass found on right kidney. No questons/concerns at this time.          Thea Eddy RN

## 2021-05-17 ENCOUNTER — HOSPITAL ENCOUNTER (OUTPATIENT)
Dept: CARDIOLOGY | Facility: HOSPITAL | Age: 86
Discharge: HOME | End: 2021-05-17
Admitting: INTERNAL MEDICINE

## 2021-05-17 ENCOUNTER — OFFICE VISIT (OUTPATIENT)
Dept: CARDIOLOGY | Facility: CLINIC | Age: 86
End: 2021-05-17

## 2021-05-17 VITALS
BODY MASS INDEX: 29.84 KG/M2 | DIASTOLIC BLOOD PRESSURE: 100 MMHG | SYSTOLIC BLOOD PRESSURE: 190 MMHG | WEIGHT: 201.5 LBS | HEIGHT: 69 IN

## 2021-05-17 VITALS
SYSTOLIC BLOOD PRESSURE: 140 MMHG | WEIGHT: 208 LBS | HEART RATE: 81 BPM | HEIGHT: 68 IN | DIASTOLIC BLOOD PRESSURE: 88 MMHG | BODY MASS INDEX: 31.52 KG/M2 | OXYGEN SATURATION: 97 %

## 2021-05-17 DIAGNOSIS — Z95.2 S/P TAVR (TRANSCATHETER AORTIC VALVE REPLACEMENT): ICD-10-CM

## 2021-05-17 DIAGNOSIS — Z86.73 HISTORY OF STROKE: ICD-10-CM

## 2021-05-17 DIAGNOSIS — N18.32 STAGE 3B CHRONIC KIDNEY DISEASE (HCC): ICD-10-CM

## 2021-05-17 DIAGNOSIS — I25.810 CORONARY ARTERY DISEASE INVOLVING CORONARY BYPASS GRAFT OF NATIVE HEART WITHOUT ANGINA PECTORIS: ICD-10-CM

## 2021-05-17 DIAGNOSIS — Z95.2 S/P TAVR (TRANSCATHETER AORTIC VALVE REPLACEMENT): Primary | ICD-10-CM

## 2021-05-17 DIAGNOSIS — Z95.0 PACEMAKER: ICD-10-CM

## 2021-05-17 PROBLEM — Z01.818 PREOP TESTING: Status: RESOLVED | Noted: 2019-12-09 | Resolved: 2021-05-17

## 2021-05-17 PROBLEM — I35.0 AORTIC STENOSIS, SEVERE: Status: RESOLVED | Noted: 2017-05-16 | Resolved: 2021-05-17

## 2021-05-17 PROBLEM — R06.09 DOE (DYSPNEA ON EXERTION): Status: RESOLVED | Noted: 2019-03-06 | Resolved: 2021-05-17

## 2021-05-17 LAB
BH CV ECHO MEAS - AO MAX PG (FULL): 21.6 MMHG
BH CV ECHO MEAS - AO MAX PG: 26.6 MMHG
BH CV ECHO MEAS - AO MEAN PG (FULL): 9 MMHG
BH CV ECHO MEAS - AO MEAN PG: 10 MMHG
BH CV ECHO MEAS - AO V2 MAX: 258 CM/SEC
BH CV ECHO MEAS - AO V2 MEAN: 159 CM/SEC
BH CV ECHO MEAS - AO V2 VTI: 53.1 CM
BH CV ECHO MEAS - BSA(HAYCOCK): 2.1 M^2
BH CV ECHO MEAS - BSA: 2.1 M^2
BH CV ECHO MEAS - BZI_BMI: 29.7 KILOGRAMS/M^2
BH CV ECHO MEAS - BZI_METRIC_HEIGHT: 175.3 CM
BH CV ECHO MEAS - BZI_METRIC_WEIGHT: 91.2 KG
BH CV ECHO MEAS - EDV(MOD-SP4): 89.1 ML
BH CV ECHO MEAS - EF(MOD-SP4): 55.9 %
BH CV ECHO MEAS - ESV(MOD-SP4): 39.3 ML
BH CV ECHO MEAS - LV DIASTOLIC VOL/BSA (35-75): 43 ML/M^2
BH CV ECHO MEAS - LV MAX PG: 5 MMHG
BH CV ECHO MEAS - LV MEAN PG: 3 MMHG
BH CV ECHO MEAS - LV SYSTOLIC VOL/BSA (12-30): 19 ML/M^2
BH CV ECHO MEAS - LV V1 MAX: 112 CM/SEC
BH CV ECHO MEAS - LV V1 MEAN: 74.5 CM/SEC
BH CV ECHO MEAS - LV V1 VTI: 25.3 CM
BH CV ECHO MEAS - LVLD AP4: 8.3 CM
BH CV ECHO MEAS - LVLS AP4: 7 CM
BH CV ECHO MEAS - MR MAX PG: 135 MMHG
BH CV ECHO MEAS - MR MAX VEL: 581 CM/SEC
BH CV ECHO MEAS - MR MEAN PG: 73 MMHG
BH CV ECHO MEAS - MR MEAN VEL: 391 CM/SEC
BH CV ECHO MEAS - MR VTI: 150 CM
BH CV ECHO MEAS - MV A MAX VEL: 175 CM/SEC
BH CV ECHO MEAS - MV DEC SLOPE: 700 CM/SEC^2
BH CV ECHO MEAS - MV DEC TIME: 0.28 SEC
BH CV ECHO MEAS - MV E MAX VEL: 196 CM/SEC
BH CV ECHO MEAS - MV E/A: 1.1
BH CV ECHO MEAS - MV MAX PG: 15.4 MMHG
BH CV ECHO MEAS - MV MEAN PG: 8 MMHG
BH CV ECHO MEAS - MV P1/2T MAX VEL: 208 CM/SEC
BH CV ECHO MEAS - MV P1/2T: 87 MSEC
BH CV ECHO MEAS - MV V2 MAX: 196 CM/SEC
BH CV ECHO MEAS - MV V2 MEAN: 134 CM/SEC
BH CV ECHO MEAS - MV V2 VTI: 47.8 CM
BH CV ECHO MEAS - MVA P1/2T LCG: 1.1 CM^2
BH CV ECHO MEAS - MVA(P1/2T): 2.5 CM^2
BH CV ECHO MEAS - RAP SYSTOLE: 5 MMHG
BH CV ECHO MEAS - RVSP: 54.6 MMHG
BH CV ECHO MEAS - SI(MOD-SP4): 24.1 ML/M^2
BH CV ECHO MEAS - SV(MOD-SP4): 49.8 ML
BH CV ECHO MEAS - TR MAX VEL: 352 CM/SEC
MAXIMAL PREDICTED HEART RATE: 137 BPM
STRESS TARGET HR: 116 BPM

## 2021-05-17 PROCEDURE — 93308 TTE F-UP OR LMTD: CPT

## 2021-05-17 PROCEDURE — 93325 DOPPLER ECHO COLOR FLOW MAPG: CPT

## 2021-05-17 PROCEDURE — 93321 DOPPLER ECHO F-UP/LMTD STD: CPT

## 2021-05-17 PROCEDURE — 93000 ELECTROCARDIOGRAM COMPLETE: CPT | Performed by: INTERNAL MEDICINE

## 2021-05-17 PROCEDURE — 93325 DOPPLER ECHO COLOR FLOW MAPG: CPT | Performed by: INTERNAL MEDICINE

## 2021-05-17 PROCEDURE — 93308 TTE F-UP OR LMTD: CPT | Performed by: INTERNAL MEDICINE

## 2021-05-17 PROCEDURE — 99214 OFFICE O/P EST MOD 30 MIN: CPT | Performed by: INTERNAL MEDICINE

## 2021-05-17 PROCEDURE — 93321 DOPPLER ECHO F-UP/LMTD STD: CPT | Performed by: INTERNAL MEDICINE

## 2021-05-17 RX ORDER — NITROGLYCERIN 0.4 MG/1
0.4 TABLET SUBLINGUAL AS NEEDED
Qty: 30 TABLET | Refills: 11 | Status: SHIPPED | OUTPATIENT
Start: 2021-05-17 | End: 2022-08-29

## 2021-05-17 RX ORDER — ATORVASTATIN CALCIUM 80 MG/1
80 TABLET, FILM COATED ORAL DAILY
Qty: 90 TABLET | Refills: 3 | Status: SHIPPED | OUTPATIENT
Start: 2021-05-17 | End: 2022-06-06

## 2021-05-17 NOTE — PROGRESS NOTES
Subjective:     Encounter Date:05/17/2021      Patient ID: Mireille Magallanes is a 85 y.o. male.    Chief Complaint: f/u tavr  History of Present Illness  85-year-old whom I follow for almost 4 years, initially implanting a dual-chamber pacemaker in January 2018 for third-degree heart block with associated lightheadedness and syncope.  He also has CAD including a four-vessel CABG in 2001 and subsequent PCI to LAD in 2014, carotid disease including right carotid endarterectomy in February 2020, and most recently, underwent successful TAVR implant on 4/20/21 after aortic stenosis had become severe and he reported symptoms attributable to this (predominantly shortness of breath with minimal exertion-specifically cited as an example trying to walk back up a slight incline from his shop to his house).  Symptoms had been gradually worsening over several months.     He underwent successful implantation of a 29 mm Gutierrez S3 valve on 4/20/21.  Knowing there was no risk of conduction complications since he already had a pacemaker, and also knowing that he had a borderline left main stenosis that could potentially need to be re-accessed in the future should he develop symptoms suggestive of worsening coronary disease, the valve was intentionally deployed lower than we typically would.  On the noncoronary cusp, it was at approximately a 50/50 position, while in the left and right coronary cusp, it was approximately 70/30.  There was no evidence of any paravalvular leak.  The valve itself had been advanced through the 16 Romanian sheath from the right common femoral artery, which was successfully closed with 2 Perclose devices.  The left common femoral artery was used for placement of the pigtail during the procedure, through a 6 Romanian sheath, but attempts at Perclose at the conclusion of the case in this vessel failed due to severe calcification and manual pressure had to be applied.  Of note, patient already had a  dual-chamber pacemaker so did not require transvenous pacemaking wire for rapid pacing run.  As result, his monitoring period in the ICU was abbreviated after bedrest is up he was transferred to the telemetry floor on the night of the procedure.  He did have a mild ooze from the left groin that required additional application of manual pressure that night, but this resolved without formation of a hematoma or any other evidence of complication.  He was up ambulating on POD #1, and even before doing so, reported significant improvement in his breathing that was noticeable even while at rest.  He had no new associated symptoms such as chest discomfort or palpitations.  Echocardiogram on POD #1/day of discharge showed acceptable valve positioning and transvalvular gradients.    He returns today for his first outpatient follow-up, and reports that his breathing has greatly improved -is now able to make it from shop up the hill to his house without significant dyspnea.  He has not yet started cardiac rehab.  No new chest pain.  Had a few episodes of low BP and light-headedness       The following portions of the patient's history were reviewed and updated as appropriate: allergies, current medications, past family history, past medical history, past social history, past surgical history and problem list.    Review of Systems   Constitutional: Negative for malaise/fatigue.   Cardiovascular: Negative for chest pain, claudication, dyspnea on exertion, leg swelling, near-syncope, orthopnea, palpitations, paroxysmal nocturnal dyspnea and syncope.   Respiratory: Negative for shortness of breath.    Hematologic/Lymphatic: Does not bruise/bleed easily.   Musculoskeletal: Positive for joint pain (left shoulder pain).           Current Outpatient Medications:   •  amLODIPine (NORVASC) 10 MG tablet, Take 1 tablet by mouth once daily, Disp: 90 tablet, Rfl: 4  •  aspirin 81 MG EC tablet, Take 1 tablet by mouth Daily., Disp: 90 tablet,  Rfl: 0  •  atorvastatin (LIPITOR) 80 MG tablet, Take 1 tablet by mouth Daily., Disp: 90 tablet, Rfl: 3  •  clopidogrel (Plavix) 75 MG tablet, Take 1 tablet by mouth Daily., Disp: 90 tablet, Rfl: 3  •  coenzyme Q10 100 MG capsule, Take 100 mg by mouth Daily., Disp: , Rfl:   •  FISH OIL-COENZYME Q10 PO, Take 300 mg by mouth Daily., Disp: , Rfl:   •  furosemide (LASIX) 40 MG tablet, Take 40 mg by mouth Daily As Needed., Disp: , Rfl:   •  irbesartan (AVAPRO) 300 MG tablet, Take 300 mg by mouth Every Night., Disp: , Rfl:   •  nitroglycerin (NITROSTAT) 0.4 MG SL tablet, Place 1 tablet under the tongue As Needed for Chest Pain., Disp: 30 tablet, Rfl: 11       Objective:      Vitals:    05/17/21 1434   BP: 140/88   Pulse: 81   SpO2: 97%     Vitals and nursing note reviewed.   Constitutional:       General: Not in acute distress.     Appearance: Not in distress.   Neck:      Vascular: No JVD or JVR. JVD normal.   Pulmonary:      Effort: Pulmonary effort is normal.      Breath sounds: Normal breath sounds.   Cardiovascular:      Normal rate. Regular rhythm.      Murmurs: There is no murmur.      No gallop. No rub.   Pulses:     Intact distal pulses.   Edema:     Peripheral edema absent.   Skin:     General: Skin is warm and dry.   Neurological:      Mental Status: Alert, oriented to person, place, and time and oriented to person, place and time.         Lab Review:         ECG 12 Lead    Date/Time: 5/17/2021 4:40 PM  Performed by: Kaushik Toledo MD  Authorized by: Kaushik Toledo MD   Comparison: compared with previous ECG from 4/21/2021  Rhythm: sinus rhythm and paced  BPM: 81  Pacing: ventricular paced rhythm  Clinical impression: abnormal EKG          Results for orders placed during the hospital encounter of 05/17/21    Adult Transthoracic Echo Limited W/ Cont if Necessary Per Protocol    Interpretation Summary  · Left ventricular ejection fraction appears to be 56 - 60%. Left ventricular systolic function is  normal.  · Recently placed Gutierrez S3 29 mm TAVR valve remains well positioned. Mean transvalvular gradient 10 mmHg, with no evidence of paravalvular leak.  · Severe mitral annular calcification, particularly of the posterior annulus, resulting in moderate mitral valve stenosis.  · Estimated right ventricular systolic pressure from tricuspid regurgitation is moderately elevated (45-55 mmHg).  · Compared to prior transthoracic study on 4/21/2021, no significant change.        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  At this point, plans and recommendations for post-TAVR care include the following:   -Continue aspirin 81 mg daily for 2 more months  -Continue plavix 75 mg daily indefinitely (instead of aspirin d/t hx of TIA/CVA)  -Start cardiac rehab as soon as possible  - SBE prophylaxis with any dental procedures     All other chronic conditions are stable at this point in time.  Routine clinical follow-up with me in 6 months.      Kaushik Toledo MD  05/17/2021  22:09 CDT

## 2021-06-01 ENCOUNTER — APPOINTMENT (OUTPATIENT)
Dept: CARDIOLOGY | Facility: HOSPITAL | Age: 86
End: 2021-06-01

## 2021-06-22 ENCOUNTER — TELEPHONE (OUTPATIENT)
Dept: CARDIOLOGY | Facility: CLINIC | Age: 86
End: 2021-06-22

## 2021-06-25 ENCOUNTER — OFFICE VISIT (OUTPATIENT)
Dept: CARDIOLOGY | Facility: CLINIC | Age: 86
End: 2021-06-25

## 2021-06-25 VITALS
OXYGEN SATURATION: 99 % | DIASTOLIC BLOOD PRESSURE: 62 MMHG | BODY MASS INDEX: 31.63 KG/M2 | HEART RATE: 68 BPM | WEIGHT: 208 LBS | SYSTOLIC BLOOD PRESSURE: 118 MMHG

## 2021-06-25 DIAGNOSIS — Z86.73 HISTORY OF STROKE: ICD-10-CM

## 2021-06-25 DIAGNOSIS — Z95.2 S/P TAVR (TRANSCATHETER AORTIC VALVE REPLACEMENT): ICD-10-CM

## 2021-06-25 DIAGNOSIS — I48.92 ATRIAL FLUTTER WITH CONTROLLED RESPONSE (HCC): Primary | ICD-10-CM

## 2021-06-25 DIAGNOSIS — I25.810 CORONARY ARTERY DISEASE INVOLVING CORONARY BYPASS GRAFT OF NATIVE HEART WITHOUT ANGINA PECTORIS: ICD-10-CM

## 2021-06-25 DIAGNOSIS — N18.32 STAGE 3B CHRONIC KIDNEY DISEASE (HCC): ICD-10-CM

## 2021-06-25 DIAGNOSIS — Z95.0 PACEMAKER: ICD-10-CM

## 2021-06-25 PROCEDURE — 93000 ELECTROCARDIOGRAM COMPLETE: CPT | Performed by: NURSE PRACTITIONER

## 2021-06-25 PROCEDURE — 99214 OFFICE O/P EST MOD 30 MIN: CPT | Performed by: NURSE PRACTITIONER

## 2021-06-25 NOTE — PROGRESS NOTES
"    Subjective:     Encounter Date:06/25/2021      Patient ID: Mireille Magallanes is a 85 y.o. male.    Chief Complaint: Follow-up new diagnosis of atrial flutter,    History of Present Illness     The patient has been followed by Dr. Toledo for approximately 4 years.  He initially implanted a dual-chamber pacemaker in January 2018 for third-degree heart block with associated lightheadedness and syncope.  He also has CAD including history of a four-vessel CABG in 2001 and subsequent PCI to the LAD in 2014, carotid disease status post right carotid endarterectomy in February 2020, and valvular heart disease status post successful TAVR implant 4/20/2021 after aortic stenosis had become severe and he had associated shortness of breath with minimal exertion-this had been gradually worsening over several months.    As previously noted by Dr. Toledo at his 5/17/2021 visit:   \"He underwent successful implantation of a 29 mm Gutierrez S3 valve on 4/20/21.  Knowing there was no risk of conduction complications since he already had a pacemaker, and also knowing that he had a borderline left main stenosis that could potentially need to be re-accessed in the future should he develop symptoms suggestive of worsening coronary disease, the valve was intentionally deployed lower than we typically would.  On the noncoronary cusp, it was at approximately a 50/50 position, while in the left and right coronary cusp, it was approximately 70/30.  There was no evidence of any paravalvular leak.  The valve itself had been advanced through the 16 [place] sheath from the right common femoral artery, which was successfully closed with 2 Perclose devices.  The left common femoral artery was used for placement of the pigtail during the procedure, through a 6 [place]ian sheath, but attempts at Perclose at the conclusion of the case in this vessel failed due to severe calcification and manual pressure had to be applied.  Of note, patient already had a " "dual-chamber pacemaker so did not require transvenous pacemaking wire for rapid pacing run.  As result, his monitoring period in the ICU was abbreviated after bedrest is up he was transferred to the telemetry floor on the night of the procedure.  He did have a mild ooze from the left groin that required additional application of manual pressure that night, but this resolved without formation of a hematoma or any other evidence of complication.  He was up ambulating on POD #1, and even before doing so, reported significant improvement in his breathing that was noticeable even while at rest.  He had no new associated symptoms such as chest discomfort or palpitations.  Echocardiogram on POD #1/day of discharge showed acceptable valve positioning and transvalvular gradients.\"    He returned to see Dr. Toledo on 5/17 and reported his breathing had greatly improved and he was now able to make it from his shop [place] the [place] to his house without any significant shortness of breath (he was unable to do that prior to TAVR).  He had not yet started cardiac rehab.  He had no new chest pain.  He reported a few episodes of low blood pressure and lightheadedness.  He had an echocardiogram the day of the visit that looked essentially unchanged compared to prehospitalization discharge TAVR on postop day 1.  He was instructed to continue aspirin 81 mg daily for 2 more months (take for 3 months after procedure), and continue Plavix 75 mg daily indefinitely (Plavix instead of aspirin due to history of stroke).  He was instructed to begin cardiac rehab as soon as possible and it was noted that he would need SBE prophylaxis with any dental procedures.  6-month follow-up was ordered.     The patient presents today for a sooner follow-up than anticipated due to evidence of atrial fibrillation/flutter on his pacemaker interrogation.  Per my review of the interrogation and discussion with Loretta Landry RN in device clinic the patient has been in " "what appears to be atrial fibrillation (per EGM's) since 6/19/2021.  Ventricular rates have been less than 90 the majority of the time with few episodes of heart rates reaching above 130.  Prior to this episode (currently ongoing), the patient had only had occasional episodes of atrial fibrillation lasting a few seconds (less than 1 minute).  These brief episodes occurred in late April/early May, and 5/14.    Today the patient reports he is doing fair overall but he has noticed that in the past 3 weeks he has been becoming more easily fatigued and short of breath with minimal exertion or lifting-this was not necessarily an abrupt onset.  He gives an example of making the bed or flipping the mattress.  Symptoms resolve quickly-within a few minutes of rest and he continues to do exertional activities but has to rest frequently.  He still feels much better compared to prior to TAVR, but now he is trying to do more and is noticing episodes of \"exhaustion and shortness of breath\" with activity.  He states he is doing well in cardiac rehab.  He denies chest pain, edema, orthopnea, PND, palpitations, syncope or presyncope.    The following portions of the patient's history were reviewed and updated as appropriate: allergies, current medications, past family history, past medical history, past social history, past surgical history and problem list.    Review of Systems   Constitutional: Positive for malaise/fatigue.   Cardiovascular: Positive for dyspnea on exertion. Negative for chest pain, claudication, leg swelling, near-syncope, orthopnea, palpitations, paroxysmal nocturnal dyspnea and syncope.   Respiratory: Positive for shortness of breath. Negative for cough.    Hematologic/Lymphatic: Does not bruise/bleed easily.   Musculoskeletal: Negative for falls.   Gastrointestinal: Negative for bloating.   Neurological: Negative for dizziness, light-headedness and weakness.       Allergies   Allergen Reactions   • Sulfa " Antibiotics Rash     Sulfa Drugs       Current Outpatient Medications:   •  amLODIPine (NORVASC) 10 MG tablet, Take 1 tablet by mouth once daily, Disp: 90 tablet, Rfl: 4  •  aspirin 81 MG EC tablet, Take 1 tablet by mouth Daily., Disp: 90 tablet, Rfl: 0  •  atorvastatin (LIPITOR) 80 MG tablet, Take 1 tablet by mouth Daily., Disp: 90 tablet, Rfl: 3  •  coenzyme Q10 100 MG capsule, Take 100 mg by mouth Daily., Disp: , Rfl:   •  FISH OIL-COENZYME Q10 PO, Take 300 mg by mouth Daily., Disp: , Rfl:   •  furosemide (LASIX) 40 MG tablet, Take 40 mg by mouth Daily As Needed., Disp: , Rfl:   •  irbesartan (AVAPRO) 300 MG tablet, Take 300 mg by mouth Every Night., Disp: , Rfl:   •  nitroglycerin (NITROSTAT) 0.4 MG SL tablet, Place 1 tablet under the tongue As Needed for Chest Pain., Disp: 30 tablet, Rfl: 11  •  apixaban (ELIQUIS) 2.5 MG tablet tablet, Take 1 tablet by mouth Every 12 (Twelve) Hours., Disp: 60 tablet, Rfl: 11         Objective:    /62   Pulse 68   Wt 94.3 kg (208 lb)   SpO2 99%   BMI 31.63 kg/m²        Vitals and nursing note reviewed.   Constitutional:       General: Not in acute distress.     Appearance: Well-developed and not in distress. Not diaphoretic.   Neck:      Vascular: No JVD.   Pulmonary:      Effort: Pulmonary effort is normal. No respiratory distress.      Breath sounds: Normal breath sounds.   Cardiovascular:      Normal rate. Regular rhythm.      Murmurs: There is no murmur.   Edema:     Peripheral edema absent.   Abdominal:      Tenderness: There is no abdominal tenderness.   Skin:     General: Skin is warm and dry.   Neurological:      Mental Status: Alert and oriented to person, place, and time.         Lab Review:   Lab Results   Component Value Date    GLUCOSE 108 (H) 04/21/2021    BUN 22 04/21/2021    CREATININE 1.99 (H) 04/21/2021    EGFRIFNONA 32 (L) 04/21/2021    EGFRIFAFRI 41 (L) 12/31/2020    BCR 11.1 04/21/2021    K 4.3 04/21/2021    CO2 23.0 04/21/2021    CALCIUM 8.3 (L)  04/21/2021    ALBUMIN 4.30 04/19/2021    AST 25 04/19/2021    ALT 18 04/19/2021     Lab Results   Component Value Date    CHOL 126 (L) 08/09/2019    CHLPL 156 (L) 12/31/2020    TRIG 149 12/31/2020    HDL 45 (L) 12/31/2020    LDL 81 12/31/2020     Lab Results   Component Value Date    WBC 7.87 04/21/2021    HGB 11.0 (L) 04/21/2021    HCT 33.1 (L) 04/21/2021    MCV 87.1 04/21/2021     04/21/2021     Lab Results   Component Value Date    HGBA1C 5.5 08/09/2019             ECG 12 Lead    Date/Time: 6/25/2021 3:20 PM  Performed by: Zena Sykes APRN  Authorized by: Zena Sykes APRN   Comparison: compared with previous ECG from 5/17/2021  Similar to previous ECG  Rhythm: atrial flutter  BPM: 70              Results for orders placed during the hospital encounter of 05/17/21    Adult Transthoracic Echo Limited W/ Cont if Necessary Per Protocol    Interpretation Summary  · Left ventricular ejection fraction appears to be 56 - 60%. Left ventricular systolic function is normal.  · Recently placed Gutierrez S3 29 mm TAVR valve remains well positioned. Mean transvalvular gradient 10 mmHg, with no evidence of paravalvular leak.  · Severe mitral annular calcification, particularly of the posterior annulus, resulting in moderate mitral valve stenosis.  · Estimated right ventricular systolic pressure from tricuspid regurgitation is moderately elevated (45-55 mmHg).  · Compared to prior transthoracic study on 4/21/2021, no significant change.      Assessment:      Problem List Items Addressed This Visit        Cardiac and Vasculature    Coronary artery disease involving coronary bypass graft of native heart without angina pectoris    Pacemaker    Overview     [place] [employer] DC PPM implanted 1/8/18 for high degree AVB (was 2nd degree, Mobitz II then quickly progressed to 3rd degree with syncope)         S/P TAVR (transcatheter aortic valve replacement)    Overview     29mm S3 valve 4/20/21            Genitourinary and  Reproductive     Stage 3b chronic kidney disease (CMS/HCC)       Neuro    History of stroke      Other Visit Diagnoses     Atrial flutter with controlled response (CMS/[facility])    -  Primary    Relevant Orders    Cardioversion External in Cardiology Department          Plan:     1.  Atrial fibrillation/flutter: New diagnosis, further treatment planned.  Per my discussion with Lois Landry RN in device clinic EGM's appear to be consistent with an ongoing episode of atrial fibrillation that started on 6/19/2021.  However, today's EKG appears to be more consistent with atrial flutter.  Ventricular rates are controlled overall.  He describes exertional fatigue and dyspnea that started 3 weeks ago, although the atrial arrhythmia did not start until 1 week ago.  However, his atrial arrhythmia could certainly be contributing to his symptoms.  -Start Eliquis 2.5 mg twice daily for stroke prophylaxis (creatinine is above 1.5 and age is greater than 80).  [id number] is 6.  Samples and discount card provided and prescription sent to pharmacy.  -Discontinue Plavix  -Continue aspirin 81 mg daily  -He currently does not appear to be in need of rate controlling medication  -Schedule cardioversion after 3 to 4 weeks of uninterrupted anticoagulation to see if he has symptomatic improvement with restoration of normal sinus rhythm.  He will call sooner if he develops worsening symptoms or signs of CHF (we discussed the signs and symptoms to monitor for-worsening dyspnea, edema, orthopnea, PND, sudden weight gain ), and could consider sooner cardioversion with JAMILA.    2.  Severe aortic stenosis status post successful TAVR 4/20/2021: Ideally, the patient would be on dual antiplatelet therapy for 3 months, and then transition to monotherapy.  However, given his development of atrial fibrillation/flutter warranting anticoagulation, we will go ahead and transition him to aspirin and Eliquis as described above.  Discontinue Plavix.   See  5/17 echo findings above.  Will need SBE prophylaxis for dental procedures.    3.  History of complete heart block with dual-chamber pacemaker in place: Normal function of device and satisfactory battery life    4.  Coronary artery disease: Established problem, stable.  No angina.  -Continue aspirin 81 mg daily  -Continue atorvastatin 80 mg nightly-dose was increased in February 2021 and do not have repeat lipid panel thereafter to see if he is at goal now.  Consider repeat at follow-up or request from PCP.  LDL goal less than 70.    Follow-up to be determined after cardioversion 4 weeks.  The case and plan was discussed with Dr. Toledo.

## 2021-06-28 ENCOUNTER — TELEPHONE (OUTPATIENT)
Dept: CARDIOLOGY | Facility: CLINIC | Age: 86
End: 2021-06-28

## 2021-06-28 NOTE — TELEPHONE ENCOUNTER
Call from pt regarding his new Rx of Eliquis. His insurance will not pay for it. He does have samples from the office and had a card for 30 days free, which has been filled, but after that he isn't sure what he should do. He is wondering if Zena can switch him to something else or if we need to do pt assistance. For now, I have instructed him to take it as prescribed.    He had questions about JAMILA and cardioversion. He did not understand everything that they talked about in office the other day. I did explain the purpose of JAMILA with Cardioversion and answered all of his questions regarding stroke risk.    The last question he had was about cardiac rehab. He asked if he could continue to do rehab knowing now that he is in A-fib. I instructed him to continue to do that as he tolerated. He verbalized understanding of all instructions/explanantions.

## 2021-06-28 NOTE — TELEPHONE ENCOUNTER
Thanks Jami ! At this point, no plans for JAMILA unless he develops worsening symptoms and we move it up to sooner than 3-4 weeks on Eliquis. Enriqueta, please look into the Eliquis coverage issue and see if he needs prior auth or pt assist or if Xarelto would be cheaper, etc. Thank you !

## 2021-06-29 DIAGNOSIS — I10 ESSENTIAL HYPERTENSION: ICD-10-CM

## 2021-06-29 DIAGNOSIS — E78.00 PURE HYPERCHOLESTEROLEMIA: ICD-10-CM

## 2021-06-29 LAB
ALBUMIN SERPL-MCNC: 4.4 G/DL (ref 3.5–5.2)
ALP BLD-CCNC: 80 U/L (ref 40–130)
ALT SERPL-CCNC: 38 U/L (ref 5–41)
ANION GAP SERPL CALCULATED.3IONS-SCNC: 13 MMOL/L (ref 7–19)
AST SERPL-CCNC: 38 U/L (ref 5–40)
BASOPHILS ABSOLUTE: 0 K/UL (ref 0–0.2)
BASOPHILS RELATIVE PERCENT: 0.5 % (ref 0–1)
BILIRUB SERPL-MCNC: 0.6 MG/DL (ref 0.2–1.2)
BUN BLDV-MCNC: 34 MG/DL (ref 8–23)
CALCIUM SERPL-MCNC: 9.7 MG/DL (ref 8.8–10.2)
CHLORIDE BLD-SCNC: 105 MMOL/L (ref 98–111)
CHOLESTEROL, TOTAL: 125 MG/DL (ref 160–199)
CO2: 21 MMOL/L (ref 22–29)
CREAT SERPL-MCNC: 2.6 MG/DL (ref 0.5–1.2)
EOSINOPHILS ABSOLUTE: 0.1 K/UL (ref 0–0.6)
EOSINOPHILS RELATIVE PERCENT: 1.8 % (ref 0–5)
GFR AFRICAN AMERICAN: 29
GFR NON-AFRICAN AMERICAN: 24
GLUCOSE BLD-MCNC: 87 MG/DL (ref 74–109)
HCT VFR BLD CALC: 40.8 % (ref 42–52)
HDLC SERPL-MCNC: 34 MG/DL (ref 55–121)
HEMOGLOBIN: 13.4 G/DL (ref 14–18)
IMMATURE GRANULOCYTES #: 0 K/UL
LDL CHOLESTEROL CALCULATED: 66 MG/DL
LYMPHOCYTES ABSOLUTE: 2.3 K/UL (ref 1.1–4.5)
LYMPHOCYTES RELATIVE PERCENT: 29.8 % (ref 20–40)
MCH RBC QN AUTO: 30 PG (ref 27–31)
MCHC RBC AUTO-ENTMCNC: 32.8 G/DL (ref 33–37)
MCV RBC AUTO: 91.5 FL (ref 80–94)
MONOCYTES ABSOLUTE: 0.7 K/UL (ref 0–0.9)
MONOCYTES RELATIVE PERCENT: 9.1 % (ref 0–10)
NEUTROPHILS ABSOLUTE: 4.5 K/UL (ref 1.5–7.5)
NEUTROPHILS RELATIVE PERCENT: 58.5 % (ref 50–65)
PDW BLD-RTO: 13.7 % (ref 11.5–14.5)
PLATELET # BLD: 166 K/UL (ref 130–400)
PMV BLD AUTO: 10.9 FL (ref 9.4–12.4)
POTASSIUM SERPL-SCNC: 4.9 MMOL/L (ref 3.5–5)
RBC # BLD: 4.46 M/UL (ref 4.7–6.1)
SODIUM BLD-SCNC: 139 MMOL/L (ref 136–145)
TOTAL PROTEIN: 7.5 G/DL (ref 6.6–8.7)
TRIGL SERPL-MCNC: 126 MG/DL (ref 0–149)
WBC # BLD: 7.7 K/UL (ref 4.8–10.8)

## 2021-06-29 NOTE — TELEPHONE ENCOUNTER
If he prefers to switch to Xarelto due to cost, let me know and we can transition him to Xarelto before he runs out of Eliquis.  As he currently has over 1 month worth of Eliquis, continue Eliquis 2.5 mg twice daily.  When transitioning to Xarelto he will take his first dose of Xarelto 12 hours after the last dose of Eliquis.  Make sure he knows that Xarelto will be 15 mg once a day with a meal.

## 2021-06-29 NOTE — TELEPHONE ENCOUNTER
I have received a notice from the patient's pharmacy that Xarelto and Pradaxa should not require a prior authorization but the Eliquis does, therefore, the Xarelto may be more affordable for him.  Enriqueta Reyes MA

## 2021-06-30 NOTE — TELEPHONE ENCOUNTER
Attempted to reach the patient at both the phone numbers listed with no answer.  A message is left on both for him to call back about the Eliquis and change to an alternative medication.  Enriqueta Reyes MA

## 2021-07-06 ENCOUNTER — OFFICE VISIT (OUTPATIENT)
Dept: INTERNAL MEDICINE | Age: 86
End: 2021-07-06
Payer: MEDICARE

## 2021-07-06 VITALS
SYSTOLIC BLOOD PRESSURE: 134 MMHG | BODY MASS INDEX: 31.43 KG/M2 | WEIGHT: 207.4 LBS | HEART RATE: 76 BPM | HEIGHT: 68 IN | DIASTOLIC BLOOD PRESSURE: 88 MMHG

## 2021-07-06 DIAGNOSIS — E78.00 PURE HYPERCHOLESTEROLEMIA: ICD-10-CM

## 2021-07-06 DIAGNOSIS — N18.4 STAGE 4 CHRONIC KIDNEY DISEASE (HCC): Primary | ICD-10-CM

## 2021-07-06 DIAGNOSIS — I10 ESSENTIAL HYPERTENSION: ICD-10-CM

## 2021-07-06 PROCEDURE — 1036F TOBACCO NON-USER: CPT | Performed by: INTERNAL MEDICINE

## 2021-07-06 PROCEDURE — 4040F PNEUMOC VAC/ADMIN/RCVD: CPT | Performed by: INTERNAL MEDICINE

## 2021-07-06 PROCEDURE — 1123F ACP DISCUSS/DSCN MKR DOCD: CPT | Performed by: INTERNAL MEDICINE

## 2021-07-06 PROCEDURE — 99204 OFFICE O/P NEW MOD 45 MIN: CPT | Performed by: INTERNAL MEDICINE

## 2021-07-06 PROCEDURE — G8427 DOCREV CUR MEDS BY ELIG CLIN: HCPCS | Performed by: INTERNAL MEDICINE

## 2021-07-06 PROCEDURE — G8417 CALC BMI ABV UP PARAM F/U: HCPCS | Performed by: INTERNAL MEDICINE

## 2021-07-06 ASSESSMENT — ENCOUNTER SYMPTOMS
EYE ITCHING: 0
NAUSEA: 0
EYE DISCHARGE: 0
ABDOMINAL PAIN: 0
WHEEZING: 0
SORE THROAT: 0
BACK PAIN: 0
SINUS PRESSURE: 0
ABDOMINAL DISTENTION: 0
VOMITING: 0
TROUBLE SWALLOWING: 0
SHORTNESS OF BREATH: 0
BLOOD IN STOOL: 0

## 2021-07-06 NOTE — PROGRESS NOTES
[address] [facility]  [address] [address]  [address] [address]  Dept: [phone]  Dept Fax: [phone]: [phone]      Visit Date: 7/6/2021    Sid Patel a 82 y.o. male who presents today for:  Chief Complaint   Patient presents with    6 Month Follow-Up     I dont have any energy at all. HPI:     Is 82years old he is a patient of . Ariana Rhodes he is following up with his regular heart problems as well as his renal insufficiency. He is now in atrial fibrillation ever since he had his Lindsay done back in the spring and has not been feeling well. Last week was started on Eliquis and anticipation of a cardioversion in a month. He had lab for review. Past Medical History:   Diagnosis Date    Adenomatous colon polyp     Aortic stenosis     Arthritis, degenerative     AV block, 2nd degree     12/17, Holter monitor, high degree second-degree A-V block, 7 second pauses, read by Dr. Zarate Forward, PHP    CAD (coronary artery disease)     Chronic kidney disease, stage III (moderate) ([address]     Hearing loss     Hyperlipidemia     Hypertension     Left ventricular hypertrophy     [facility]anoma of back ([facility] [address]     Neuropathy     idiopathic peripheral neuropathy    Obesity       Past Surgical History:   Procedure Laterality Date    COLONOSCOPY  09/06/2016    PACEMAKER PLACEMENT  01/05/2018       History reviewed. No pertinent family history.     Social History     Tobacco Use    Smoking status: Former Smoker    Smokeless tobacco: Never Used   Substance Use Topics    Alcohol use: No      Current Outpatient Medications   Medication Sig Dispense Refill    apixaban (ELIQUIS) 2.5 MG TABS tablet Take 2.5 mg by mouth every 12 hours      FISH OIL-COENZYME Q10 PO Take 300 mg by mouth daily      atorvastatin (LIPITOR) 80 MG tablet Take 1 tablet by mouth once daily 90 tablet 0    irbesartan (AVAPRO) 300 MG tablet Take 1 tablet by mouth distress. Appearance: He is well-developed. He is not diaphoretic. HENT:      Head: Normocephalic and atraumatic. Right Ear: External ear normal.      Left Ear: External ear normal.      Nose: Nose normal.      Mouth/Throat:      Pharynx: No oropharyngeal exudate. Eyes:      General: No scleral icterus. Right eye: No discharge. Left eye: No discharge. Conjunctiva/sclera: Conjunctivae normal.      Pupils: Pupils are equal, round, and reactive to light. Neck:      Thyroid: No thyromegaly. Vascular: No JVD. Trachea: No tracheal deviation. Cardiovascular:      Rate and Rhythm: Normal rate. Rhythm irregular. Heart sounds: Normal heart sounds. No murmur heard. No friction rub. No gallop. Pulmonary:      Effort: Pulmonary effort is normal. No respiratory distress. Breath sounds: Normal breath sounds. No wheezing or rales. Abdominal:      General: Bowel sounds are normal. There is no distension. Palpations: Abdomen is soft. There is no mass. Tenderness: There is no abdominal tenderness. There is no guarding or rebound. Musculoskeletal:         General: No tenderness or deformity. Normal range of motion. Cervical back: Normal range of motion and neck supple. Lymphadenopathy:      Cervical: No cervical adenopathy. Skin:     General: Skin is warm and dry. Coloration: Skin is not pale. Findings: No erythema or rash. Neurological:      Mental Status: He is alert and oriented to person, place, and time. Cranial Nerves: No cranial nerve deficit. Motor: No abnormal muscle tone. Coordination: Coordination normal.      Deep Tendon Reflexes: Reflexes are normal and symmetric. Reflexes normal.   Psychiatric:         Behavior: Behavior normal.         Thought Content: Thought content normal.         Judgment: Judgment normal.          Assessment:      Diagnosis Orders   1. Stage 4 chronic kidney disease ([facility] [address]     2.  Essential

## 2021-07-09 ENCOUNTER — TELEPHONE (OUTPATIENT)
Dept: CARDIOLOGY | Facility: CLINIC | Age: 86
End: 2021-07-09

## 2021-07-09 NOTE — TELEPHONE ENCOUNTER
The patient returned my call and he is notified of the change to Xarelto after he finishes the 30 day RX of the Eliquis.  He will call our office and let me know when he is down to his last week of pills so Zena can send an RX in for the Xarelto..  Enriqueta Reyes MA

## 2021-07-19 ENCOUNTER — TRANSCRIBE ORDERS (OUTPATIENT)
Dept: LAB | Facility: HOSPITAL | Age: 86
End: 2021-07-19

## 2021-07-19 DIAGNOSIS — Z01.811 PRE-OP CHEST EXAM: Primary | ICD-10-CM

## 2021-07-19 DIAGNOSIS — Z01.818 PRE-OP TESTING: ICD-10-CM

## 2021-07-20 ENCOUNTER — TRANSCRIBE ORDERS (OUTPATIENT)
Dept: LAB | Facility: HOSPITAL | Age: 86
End: 2021-07-20

## 2021-07-20 ENCOUNTER — TELEPHONE (OUTPATIENT)
Dept: CARDIOLOGY | Facility: CLINIC | Age: 86
End: 2021-07-20

## 2021-07-20 DIAGNOSIS — Z01.818 PREOPERATIVE TESTING: Primary | ICD-10-CM

## 2021-07-20 NOTE — TELEPHONE ENCOUNTER
Pt called and left a  msg on [facility]'s  phone.  He said he wanted to schedule the Covid test.  I called him back and explained that we do not schedule those and he would need to wait for the Covid team to call him with an appointment.  He said no he wants it done at [facility] and wanted us to send a order.  I told him I would check to see what the protocol is for this when the schedulers come in in the morning.  I checked with Shani today and she said that we do not write the order that this will have to come from his pcp but there is not guarantee that the results will be in his chart for his procedure so it is recommended to have it done here.  Pt informed and stated understanding.  He will go Friday to have it done.  Ari Naik, CMA

## 2021-07-22 NOTE — PLAN OF CARE
Problem: Patient Care Overview  Goal: Plan of Care Review  Outcome: Ongoing (interventions implemented as appropriate)   08/08/19 1015   Coping/Psychosocial   Plan of Care Reviewed With patient;spouse;daughter;other (see comments)  (KATHY Romo)   Plan of Care Review   Progress no change  (Initial Evaluation )   OTHER   Outcome Summary Clinical bedside swallow evaluation completed. Patient is alert and cooperative with family at bedside. Admitted for possible CVA. Medical hx is remarkable for dysphagia. When patient is asked about this he explains that he has had his esophagus dilated and occasionally gets choked on foods. He tells me that this results in some coughing spells. He tells me when he eats slow and takes smaller bites/sips this seems to help. Oral motor assessment was unremarkable. The patient completed a full range of consistencies except for mechanical soft. No overt s/s were observed. He does indep. take several seconds in between each consecutive sip/bite. A functional rotary chew was observed given regular solid trial. SLP educated patient on s/s of aspiration and if increased difficulty with feeling like foods are getting stuck/choking, he may benefit from GI consult for esophageal dilation on an outpatient basis. SLP will follow up to ensure diet tolerance and that pharyngeal swallowing abilities are not affected at this time. SLP recommends: 1) Regular diet 2) Thin liquids 3) Meds whole with thin liquids 4) Standard swallow precautions and oral care.           Complex Repair And W Plasty Text: The defect edges were debeveled with a #15 scalpel blade.  The primary defect was closed partially with a complex linear closure.  Given the location of the remaining defect, shape of the defect and the proximity to free margins a W plasty was deemed most appropriate for complete closure of the defect.  Using a sterile surgical marker, an appropriate advancement flap was drawn incorporating the defect and placing the expected incisions within the relaxed skin tension lines where possible.    The area thus outlined was incised deep to adipose tissue with a #15 scalpel blade.  The skin margins were undermined to an appropriate distance in all directions utilizing iris scissors.

## 2021-07-23 ENCOUNTER — LAB (OUTPATIENT)
Dept: LAB | Facility: HOSPITAL | Age: 86
End: 2021-07-23

## 2021-07-23 LAB — SARS-COV-2 ORF1AB RESP QL NAA+PROBE: NOT DETECTED

## 2021-07-23 PROCEDURE — U0005 INFEC AGEN DETEC AMPLI PROBE: HCPCS | Performed by: NURSE PRACTITIONER

## 2021-07-23 PROCEDURE — U0004 COV-19 TEST NON-CDC HGH THRU: HCPCS | Performed by: NURSE PRACTITIONER

## 2021-07-23 PROCEDURE — C9803 HOPD COVID-19 SPEC COLLECT: HCPCS | Performed by: NURSE PRACTITIONER

## 2021-07-26 ENCOUNTER — ANESTHESIA (OUTPATIENT)
Dept: CARDIOLOGY | Facility: HOSPITAL | Age: 86
End: 2021-07-26

## 2021-07-26 ENCOUNTER — ANESTHESIA EVENT (OUTPATIENT)
Dept: CARDIOLOGY | Facility: HOSPITAL | Age: 86
End: 2021-07-26

## 2021-07-26 ENCOUNTER — HOSPITAL ENCOUNTER (OUTPATIENT)
Dept: CARDIOLOGY | Facility: HOSPITAL | Age: 86
Discharge: HOME | End: 2021-07-26

## 2021-07-26 VITALS
HEIGHT: 69 IN | OXYGEN SATURATION: 100 % | DIASTOLIC BLOOD PRESSURE: 89 MMHG | RESPIRATION RATE: 18 BRPM | HEART RATE: 61 BPM | BODY MASS INDEX: 29.62 KG/M2 | WEIGHT: 200 LBS | SYSTOLIC BLOOD PRESSURE: 142 MMHG | TEMPERATURE: 98.6 F

## 2021-07-26 DIAGNOSIS — I48.92 ATRIAL FLUTTER WITH CONTROLLED RESPONSE (HCC): ICD-10-CM

## 2021-07-26 LAB
MAXIMAL PREDICTED HEART RATE: 137 BPM
STRESS TARGET HR: 116 BPM

## 2021-07-26 PROCEDURE — 93010 ELECTROCARDIOGRAM REPORT: CPT | Performed by: INTERNAL MEDICINE

## 2021-07-26 PROCEDURE — 93005 ELECTROCARDIOGRAM TRACING: CPT | Performed by: INTERNAL MEDICINE

## 2021-07-26 PROCEDURE — 25010000002 PROPOFOL 10 MG/ML EMULSION: Performed by: NURSE ANESTHETIST, CERTIFIED REGISTERED

## 2021-07-26 PROCEDURE — 92960 CARDIOVERSION ELECTRIC EXT: CPT | Performed by: INTERNAL MEDICINE

## 2021-07-26 PROCEDURE — 92960 CARDIOVERSION ELECTRIC EXT: CPT

## 2021-07-26 RX ORDER — LIDOCAINE HYDROCHLORIDE 20 MG/ML
INJECTION, SOLUTION EPIDURAL; INFILTRATION; INTRACAUDAL; PERINEURAL AS NEEDED
Status: DISCONTINUED | OUTPATIENT
Start: 2021-07-26 | End: 2021-07-26 | Stop reason: SURG

## 2021-07-26 RX ORDER — PROPOFOL 10 MG/ML
VIAL (ML) INTRAVENOUS AS NEEDED
Status: DISCONTINUED | OUTPATIENT
Start: 2021-07-26 | End: 2021-07-26 | Stop reason: SURG

## 2021-07-26 RX ADMIN — PROPOFOL 60 MG: 10 INJECTION, EMULSION INTRAVENOUS at 08:09

## 2021-07-26 RX ADMIN — LIDOCAINE HYDROCHLORIDE 50 MG: 20 INJECTION, SOLUTION EPIDURAL; INFILTRATION; INTRACAUDAL; PERINEURAL at 08:09

## 2021-07-26 NOTE — H&P
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  However, he came back in for [id number] follow-up after pacemaker interrogation showed that he had been in atrial fibrillation/flutter since about 6/19/2021.  Therefore he came back in for discussion regarding this with Zena Sykes on 6/25/2021, and did admit that for 3 weeks he had been more easily fatigued and short of breath with minimal exertion.  He noted he was still better than he felt prior to TAVR, but seemed to have lost some of the progress made as reported at his initial post-TAVR visit with me on 5/17.  Therefore, he was started on anticoagulation, with discussion and orders placed for elective cardioversion after 4 weeks of uninterrupted anticoagulation.  He presents now today for this.  He states he is still been taking Eliquis as prescribed without any missed doses or interruption.  However, his insurance is not covering this so plans for him to switch to Xarelto when he runs out of his current allotment.    Review of Systems   Respiratory: Positive for shortness of breath.    Cardiovascular: Positive for leg swelling.   Neurological: Positive for dizziness and light-headedness.      Otherwise complete ROS reviewed and negative except as mentioned in the HPI.    Past Medical History:   Past Medical History:   Diagnosis Date   • Acute viral hepatitis A    • Aortic valve stenosis    • Carotid artery stenosis    • Carotid artery stenosis     Right   • CKD (chronic kidney disease)    • Coagulopathy (CMS/HCC)     Plavix   • Coagulopathy (CMS/HCC)    • Coronary  arteriosclerosis     stent ,? 4/2104, on plavix now.   • Diarrhea    • Dysphagia     Unspecified   • Family history of colon cancer    • Hematochezia     differential diagnosis discussed   • Hemorrhoids     Unspecified   • History of cardiac pacemaker 01/08/2018    [employer]   • History of colonic polyps    • Hypercholesterolemia    • Hyperlipidemia    • Hypertension    • Left ventricular hypertrophy    • Melanoma of back (CMS/HCC)    • Obesity    • Osteoarthritis    • Pacemaker    • Peripheral neuropathy    • Polyp of colon    • Stroke (CMS/HCC)    • TIA (transient ischemic attack)    • Tobacco non-user        Past Surgical History:  Past Surgical History:   Procedure Laterality Date   • AORTIC VALVE REPAIR/REPLACEMENT Bilateral 4/20/2021    Procedure: Transfemoral Transcatheter Aortic Valve Replacement;  Surgeon: Kaushik Toledo MD;  Location:  [facility] HYBRID OR 12;  Service: Cardiovascular;  Laterality: Bilateral;   • AORTIC VALVE REPAIR/REPLACEMENT Bilateral 4/20/2021    Procedure: TRANSFEMORAL TRANSCATHETER AORTIC VALVE REPLACEMENT;  Surgeon: Clayton Wilcox MD;  Location: [facility] HYBRID OR 12;  Service: Cardiothoracic;  Laterality: Bilateral;   • APPENDECTOMY     • CARDIAC CATHETERIZATION     • CARDIAC CATHETERIZATION N/A 3/18/2019    Procedure: Left Heart Cath;  Surgeon: Kaushik Toledo MD;  Location:  [facility] CATH INVASIVE LOCATION;  Service: Cardiology   • CARDIAC CATHETERIZATION N/A 4/14/2021    Procedure: Left Heart Cath;  Surgeon: Kaushik Toledo MD;  Location:  [facility] CATH INVASIVE LOCATION;  Service: Cardiology;  Laterality: N/A;   • CARDIAC ELECTROPHYSIOLOGY PROCEDURE N/A 1/5/2018    Procedure: Temporary Pacemaker;  Surgeon: Luis E Courtney MD;  Location:  [facility] CATH INVASIVE LOCATION;  Service:    • CARDIAC ELECTROPHYSIOLOGY PROCEDURE N/A 1/8/2018    Procedure: Device Implant;  Surgeon: Kaushik Toledo MD;  Location:  [facility] CATH INVASIVE LOCATION;  Service:    • CAROTID ENDARTERECTOMY  Right 2/14/2020    Procedure: RIGHT CAROTID ENDARTERECTOMY WITH EEG;  Surgeon: Martín Woo DO;  Location: [facility] OR 12;  Service: Vascular;  Laterality: Right;   • CAROTID STENT     • COLON SURGERY  1999    for Ischemic Colitis   • COLONOSCOPY  05/07/2008    tubular adenoma ascending colon polyp - 3 yr   • COLONOSCOPY  08/19/2003    colon polyp @ 24 cm-see path - 5 yr   • COLONOSCOPY  08/11/2000    small interanl hemorrhoid - 3-5 yr   • COLONOSCOPY  06/22/1998    (8)small colon polyps removed-see path, internal anal skin tag - 2-3 yr   • CORONARY ARTERY BYPASS GRAFT     • CORONARY STENT PLACEMENT      x1 2014 dr crane    • INSERT / REPLACE / REMOVE PACEMAKER     • OTHER SURGICAL HISTORY      Lithotripsy   • OTHER SURGICAL HISTORY  06/2011    recall 3 yr.    • PACEMAKER IMPLANTATION  01/08/2018   • VASECTOMY         Family History: family history includes Colon cancer in his father; Other in his brother and sister; Stroke in his mother.    Social History:  reports that he quit smoking about 22 years ago. His smoking use included cigarettes. He has never used smokeless tobacco. He reports that he does not drink alcohol and does not use drugs.    Medications:  Prior to Admission medications    Medication Sig Start Date End Date Taking? Authorizing Provider   amLODIPine (NORVASC) 10 MG tablet Take 1 tablet by mouth once daily 6/19/20  Yes Kaushik Toledo MD   apixaban (ELIQUIS) 2.5 MG tablet tablet Take 1 tablet by mouth Every 12 (Twelve) Hours. 6/25/21  Yes Zena Sykes APRN   aspirin 81 MG EC tablet Take 1 tablet by mouth Daily. 4/21/21  Yes Kaushik Toledo MD   atorvastatin (LIPITOR) 80 MG tablet Take 1 tablet by mouth Daily. 5/17/21  Yes Kaushik Toledo MD   coenzyme Q10 100 MG capsule Take 100 mg by mouth Daily.   Yes ProviderTreell MD   FISH OIL-COENZYME Q10 PO Take 300 mg by mouth Daily.   Yes ProviderTerell MD   furosemide (LASIX) 40 MG tablet Take 40 mg by mouth Daily As Needed.  "6/8/20  Yes Provider, MD Terell   irbesartan (AVAPRO) 300 MG tablet Take 300 mg by mouth Every Night.   Yes Provider, MD Terell   nitroglycerin (NITROSTAT) 0.4 MG SL tablet Place 1 tablet under the tongue As Needed for Chest Pain. 5/17/21  Yes Kaushik Toledo MD     Allergies:  Allergies   Allergen Reactions   • Sulfa Antibiotics Rash     Sulfa Drugs       Objective     Vital Signs: /86   Pulse 70   Temp 98.6 °F (37 °C)   Resp 18   Ht 175.3 cm (69\")   Wt 90.7 kg (200 lb)   SpO2 99%   BMI 29.53 kg/m²     Vitals and nursing note reviewed.   Constitutional:       General: Not in acute distress.     Appearance: Not in distress.   Neck:      Vascular: No JVD or JVR. JVD normal.   Pulmonary:      Effort: Pulmonary effort is normal.      Breath sounds: Normal breath sounds.   Cardiovascular:      Normal rate. Regular rhythm.      Murmurs: There is no murmur.      No gallop. No rub.   Pulses:     Intact distal pulses.   Edema:     Peripheral edema absent.   Skin:     General: Skin is warm and dry.   Neurological:      Mental Status: Alert, oriented to person, place, and time and oriented to person, place and time.         Results Reviewed:    Tele reviewed: v-pacing at 70 bpm, no P waves. Appears to have subtle flutter waves.    ECG 6/25: aflutter w/v-pacing    Assessment / Plan        Active Hospital Problems    Diagnosis    • **Atrial flutter with controlled response (CMS/HCC)    • S/P TAVR (transcatheter aortic valve replacement)      29mm S3 valve 4/20/21     • Coronary artery disease involving coronary bypass graft of native heart without angina pectoris      Plans:   -Proceed with elective cardioversion  -Switch prescription to Xarelto upon discharge  -Plan for clinic follow-up in 4 weeks      Kaushik Toledo MD   07/26/21   08:14 CDT    "

## 2021-07-26 NOTE — ANESTHESIA POSTPROCEDURE EVALUATION
Patient: Mireille Magallanes    Procedure Summary     Date: 07/26/21 Room / Location: [facility] CATH LAB    Anesthesia Start: 0805 Anesthesia Stop: 0814    Procedure: CARDIOVERSION EXTERNAL IN CARDIOLOGY DEPARTMENT Diagnosis:       Atrial flutter with controlled response (CMS/HCC)      (atrial flutter, Dr. Toledo)    Scheduled Providers: Kaushik Toledo MD Provider: Melquiades Brush CRNA    Anesthesia Type: general ASA Status: 4          Anesthesia Type: general    Vitals  No vitals data found for the desired time range.          Post Anesthesia Care and Evaluation    Patient location during evaluation: PHASE II  Patient participation: complete - patient participated  Level of consciousness: awake  Pain management: adequate  Airway patency: patent  Anesthetic complications: No anesthetic complications  Respiratory status: acceptable  Hydration status: acceptable

## 2021-07-26 NOTE — ANESTHESIA PREPROCEDURE EVALUATION
Anesthesia Evaluation     Patient summary reviewed and Nursing notes reviewed   no history of anesthetic complications:  NPO Solid Status: > 8 hours  NPO Liquid Status: > 8 hours           Airway   Mallampati: I  TM distance: >3 FB  Neck ROM: full  No difficulty expected  Dental      Pulmonary    (+) a smoker Former, shortness of breath,   Cardiovascular   Exercise tolerance: poor (<4 METS)    (+) pacemaker pacemaker interrogated 1-3 months ago, hypertension, valvular problems/murmurs AS, CAD, CABG >6 Months, cardiac stents more than 12 months ago [facility]MEDO, hyperlipidemia,  carotid artery disease (<50% bilat) carotid bilateral    ROS comment: 4/6/21 Cath  Impression:  1.    No change in native coronary or bypass graft vessels compared to last catheterization in 2019  -- proximal left main stenosis appears visually unchanged (and was assessed by FFR at last catheterization and was negative at 0.87), patent stent in the mid LAD with an occluded diagonal branch, chronic total occlusions of OM1 and OM 2 (supplied via SVG), and mild-moderate disease of the RCA.    2.  Saphenous vein grafts to OM1->OM2, and to diagonal branch, are widely patent.      Echo 3/7/21  · Left ventricular ejection fraction appears to be 61 - 65%. Left ventricular systolic function is normal.  · Severe aortic valve stenosis is present. Aortic valve area is 0.97 cm2 and dimensionless index 0.25.  · Left ventricular wall thickness is consistent with mild septal asymmetric hypertrophy.  · Left ventricular diastolic function is consistent with (grade II w/high LAP) pseudonormalization.  · Mild mitral valve stenosis is present due to severe calcification of the posterior mitral annulus.  · Left atrial volume is severely increased.  · Normal size and function of right ventricle.  · Estimated right ventricular systolic pressure from tricuspid regurgitation is mildly elevated (35-45 mmHg).  · Compared to most recent exam from 2019, aortic stenosis has  progressed such that it is now classified as a severe.     [employer] pacemaker- % RV paced  Adequate battery    Neuro/Psych  (+) TIA, CVA,     GI/Hepatic/Renal/Endo    (+) obesity,   renal disease CRI,   (-) diabetes    Musculoskeletal     Abdominal    Substance History      OB/GYN          Other   arthritis,                        Anesthesia Plan    ASA 4     general     intravenous induction     Anesthetic plan, all risks, benefits, and alternatives have been provided, discussed and informed consent has been obtained with: patient.

## 2021-07-27 LAB
QT INTERVAL: 532 MS
QTC INTERVAL: 532 MS

## 2021-07-28 ENCOUNTER — TELEPHONE (OUTPATIENT)
Dept: CARDIOLOGY | Facility: CLINIC | Age: 86
End: 2021-07-28

## 2021-07-28 NOTE — TELEPHONE ENCOUNTER
Call back from pt. His wife heard him call me and explained to him that he was supposed to take the Xarelto for 30 days then it could be re-addressed at his follow up appt. I told him I would pass this info along, but I told the pt not to pay $500 for his Rx.    I will check with Chery about samples or some type of discount. He verbalized understanding.

## 2021-07-28 NOTE — TELEPHONE ENCOUNTER
Call from pt today. He reports the he and Dr. Toledo talked about switching from Eliquis to Xarelto after his cardioversion the other day. He could not recall the reason for the switch, but said that Xarelto was going to cost him over $500. He wants to know from Dr. Toledo if this is something he will be on short term or long term?    I explained that I would send Dr. Toledo and his MA a message. Maybe the MA can apply for some pt assistance on his behalf. Once I hear back, I will let him know.    Instructed the pt to continue his Eliquis as prescribed. He has 6 days left. If he starts to run out he has been instructed to contact the office for samples. He verbalized understanding.

## 2021-07-29 NOTE — TELEPHONE ENCOUNTER
Anticoagulation of some form needs to be continued without interruption given his atrial fibrillation and recent cardioversion.  I discussed this with BROOKS Robison and she states the patient is continuing the Xarelto without interruption.  Thanks.

## 2021-07-29 NOTE — TELEPHONE ENCOUNTER
The pharmacy is called and it is verified the issue with the Xarelto is one of his deductible.  The medication will cost him $430.00 this one time but then be covered after that.  The Eliquis, however, is not covered at all so it will never get less expensive.  The patient is called and notified of this.  He voices understanding and said he will discuss with Zena at his upcoming appointment and if he needs to stay on it will go ahead and pay for it.  Enriqueta Reyes MA

## 2021-08-03 ENCOUNTER — TELEPHONE (OUTPATIENT)
Dept: VASCULAR SURGERY | Facility: CLINIC | Age: 86
End: 2021-08-03

## 2021-08-03 NOTE — TELEPHONE ENCOUNTER
Spoke with Mr Magallanes reminding him of his appointment for Wednesday, August 4th, 2021. Reminded [facility] for 11 o'clock testing and follow up afterwards at 115 pm with Berna ARCHER. Mr Magallanes confirmed he would be here.

## 2021-08-04 ENCOUNTER — HOSPITAL ENCOUNTER (OUTPATIENT)
Dept: ULTRASOUND IMAGING | Facility: HOSPITAL | Age: 86
Discharge: HOME | End: 2021-08-04
Admitting: NURSE PRACTITIONER

## 2021-08-04 DIAGNOSIS — I44.2 ATRIOVENTRICULAR BLOCK, COMPLETE (HCC): ICD-10-CM

## 2021-08-04 DIAGNOSIS — N18.30 STAGE 3 CHRONIC KIDNEY DISEASE (HCC): ICD-10-CM

## 2021-08-04 DIAGNOSIS — E78.00 PURE HYPERCHOLESTEROLEMIA: ICD-10-CM

## 2021-08-04 DIAGNOSIS — I10 ESSENTIAL HYPERTENSION: ICD-10-CM

## 2021-08-04 DIAGNOSIS — I65.23 BILATERAL CAROTID ARTERY STENOSIS: ICD-10-CM

## 2021-08-04 DIAGNOSIS — Z91.81 AT HIGH RISK FOR FALLS: ICD-10-CM

## 2021-08-04 PROCEDURE — 93880 EXTRACRANIAL BILAT STUDY: CPT

## 2021-08-04 PROCEDURE — 93880 EXTRACRANIAL BILAT STUDY: CPT | Performed by: SURGERY

## 2021-08-05 RX ORDER — IRBESARTAN 300 MG/1
300 TABLET ORAL NIGHTLY
Qty: 90 TABLET | Refills: 1 | Status: SHIPPED | OUTPATIENT
Start: 2021-08-05

## 2021-08-05 NOTE — TELEPHONE ENCOUNTER
Debbi Mccollum called to request a refill on his medication.       Last office visit : 7/6/2021   Next office visit : 11/10/2021     Requested Prescriptions     Pending Prescriptions Disp Refills    irbesartan (AVAPRO) 300 MG tablet [Pharmacy Med Name: IRBESARTAN 300MG     TAB] 90 tablet 0     Sig: Take 1 tablet by mouth nightly            Donna Coates MA

## 2021-08-09 ENCOUNTER — TELEPHONE (OUTPATIENT)
Dept: GASTROENTEROLOGY | Facility: CLINIC | Age: 86
End: 2021-08-09

## 2021-08-09 NOTE — TELEPHONE ENCOUNTER
PT CALLED AFTER RECEIVING A COLONOSCOPY RECALL LETTER. HE WOULD LIKE TO WAIT UNTIL MARCH OF NEXT YEAR TO GET ANOTHER REMINDER LETTER. I WILL PUT BACK IN RECALL.

## 2021-08-11 PROCEDURE — 93296 REM INTERROG EVL PM/IDS: CPT | Performed by: INTERNAL MEDICINE

## 2021-08-11 PROCEDURE — 93294 REM INTERROG EVL PM/LDLS PM: CPT | Performed by: INTERNAL MEDICINE

## 2021-08-26 ENCOUNTER — CLINICAL SUPPORT NO REQUIREMENTS (OUTPATIENT)
Dept: CARDIOLOGY | Facility: CLINIC | Age: 86
End: 2021-08-26

## 2021-08-26 ENCOUNTER — OFFICE VISIT (OUTPATIENT)
Dept: CARDIOLOGY | Facility: CLINIC | Age: 86
End: 2021-08-26

## 2021-08-26 VITALS
WEIGHT: 207 LBS | HEART RATE: 72 BPM | HEIGHT: 69 IN | BODY MASS INDEX: 30.66 KG/M2 | OXYGEN SATURATION: 97 % | SYSTOLIC BLOOD PRESSURE: 118 MMHG | DIASTOLIC BLOOD PRESSURE: 68 MMHG

## 2021-08-26 DIAGNOSIS — N18.32 STAGE 3B CHRONIC KIDNEY DISEASE (HCC): ICD-10-CM

## 2021-08-26 DIAGNOSIS — Z95.0 PACEMAKER: ICD-10-CM

## 2021-08-26 DIAGNOSIS — I48.92 ATRIAL FLUTTER WITH CONTROLLED RESPONSE (HCC): Primary | ICD-10-CM

## 2021-08-26 DIAGNOSIS — I25.810 CORONARY ARTERY DISEASE INVOLVING CORONARY BYPASS GRAFT OF NATIVE HEART WITHOUT ANGINA PECTORIS: ICD-10-CM

## 2021-08-26 DIAGNOSIS — I44.2 COMPLETE HEART BLOCK (HCC): ICD-10-CM

## 2021-08-26 DIAGNOSIS — Z95.2 S/P TAVR (TRANSCATHETER AORTIC VALVE REPLACEMENT): ICD-10-CM

## 2021-08-26 DIAGNOSIS — Z86.73 HISTORY OF STROKE: ICD-10-CM

## 2021-08-26 PROCEDURE — 93000 ELECTROCARDIOGRAM COMPLETE: CPT | Performed by: NURSE PRACTITIONER

## 2021-08-26 PROCEDURE — 93288 INTERROG EVL PM/LDLS PM IP: CPT | Performed by: PHYSICIAN ASSISTANT

## 2021-08-26 PROCEDURE — 99214 OFFICE O/P EST MOD 30 MIN: CPT | Performed by: NURSE PRACTITIONER

## 2021-08-26 NOTE — PROGRESS NOTES
"    Subjective:     Encounter Date:08/26/2021      Patient ID: Mireille Magallanes is a 85 y.o. male.    Chief Complaint: Follow-up new diagnosis of atrial flutter,    History of Present Illness     The patient has been followed by Dr. Toledo for approximately 4 years.  He initially implanted a dual-chamber pacemaker in January 2018 for third-degree heart block with associated lightheadedness and syncope.  He also has CAD including history of a four-vessel CABG in 2001 and subsequent PCI to the LAD in 2014, carotid disease status post right carotid endarterectomy in February 2020, and valvular heart disease status post successful TAVR implant 4/20/2021 after aortic stenosis had become severe and he had associated shortness of breath with minimal exertion-this had been gradually worsening over several months.    As previously noted by Dr. Toledo at his 5/17/2021 visit:   \"He underwent successful implantation of a 29 mm Gutierrez S3 valve on 4/20/21.  Knowing there was no risk of conduction complications since he already had a pacemaker, and also knowing that he had a borderline left main stenosis that could potentially need to be re-accessed in the future should he develop symptoms suggestive of worsening coronary disease, the valve was intentionally deployed lower than we typically would.  On the noncoronary cusp, it was at approximately a 50/50 position, while in the left and right coronary cusp, it was approximately 70/30.  There was no evidence of any paravalvular leak.  The valve itself had been advanced through the 16 Albanian sheath from the right common femoral artery, which was successfully closed with 2 Perclose devices.  The left common femoral artery was used for placement of the pigtail during the procedure, through a 6 Albanian sheath, but attempts at Perclose at the conclusion of the case in this vessel failed due to severe calcification and manual pressure had to be applied.  Of note, patient already had a " "dual-chamber pacemaker so did not require transvenous pacemaking wire for rapid pacing run.  As result, his monitoring period in the ICU was abbreviated after bedrest is up he was transferred to the telemetry floor on the night of the procedure.  He did have a mild ooze from the left groin that required additional application of manual pressure that night, but this resolved without formation of a hematoma or any other evidence of complication.  He was up ambulating on POD #1, and even before doing so, reported significant improvement in his breathing that was noticeable even while at rest.  He had no new associated symptoms such as chest discomfort or palpitations.  Echocardiogram on POD #1/day of discharge showed acceptable valve positioning and transvalvular gradients.\"    He returned to see Dr. Toledo on 5/17 and reported his breathing had greatly improved and he was now able to make it from his shop [place] to his house without any significant shortness of breath (he was unable to do that prior to TAVR).  He had not yet started cardiac rehab.  He had no new chest pain.  He reported a few episodes of low blood pressure and lightheadedness.  He had an echocardiogram the day of the visit that looked essentially unchanged compared to prehospitalization discharge TAVR on postop day 1.  He was instructed to continue aspirin 81 mg daily for 2 more months (take for 3 months after procedure), and continue Plavix 75 mg daily indefinitely (Plavix instead of aspirin due to history of stroke).  He was instructed to begin cardiac rehab as soon as possible and it was noted that he would need SBE prophylaxis with any dental procedures.  6-month follow-up was ordered.    He returned to see me on 6/25/21, sooner than his regularly scheduled follow-up due to evidence of atrial fibrillation/flutter on pacemaker interrogation, that began on 6/19/2021 and continued through the date of the visit.  Ventricular rates have been mostly " "in the 90s, with a few episodes of heart rates reaching above 130.  Prior episodes of atrial fibrillation/flutter only lasted less than 1 minute.  The patient reported that he had noticed he was becoming more easily fatigued and short of breath with minimal exertion or lifting for 3 weeks prior to the visit-not necessarily an abrupt onset.  Symptoms resolved quickly within a few minutes of rest.  He was continuing his exertional activities but would have to rest frequently.  He reported he felt much better compared to prior to TAVR, but he was starting to notice episodes of \"exhaustion and shortness of breath\" with activity because he was trying to do more.  He was doing well in cardiac rehab.  At that visit, Plavix was discontinued and anticoagulation was initiated with Eliquis.  Aspirin was continued.  He was scheduled for cardioversion following 4 weeks of uninterrupted anticoagulation, because although his symptoms seem to start before the episode of atrial fibrillation/flutter fatigue and OLMEDO could have definitely been at least partially related to the atrial arrhythmia.    On 7/26/2021 the patient was successfully cardioverted from atrial flutter to normal sinus rhythm with 1 shock at 50 J per Dr. Toledo.    He presents today for follow-up and reports he has had 2-3 episodes of nonexertional chest discomfort on the left with radiation to the left shoulder over the past month.  Episodes lasted approximately 2 minutes-he describes them as hard, grabbing, squeezing.  He denies other associated symptoms and states episodes are not necessarily similar to previous angina.  He continues to have sporadic dizzy spells despite normal blood pressures-not necessarily related to position changes-improved overall after TAVR.  He is able to exert himself without chest discomfort.  When questioned about whether or not he had significant improvement in fatigue and dyspnea on exertion after cardioversion he states he did have at " least mild improvement in fatigue and dyspnea on exertion after cardioversion, but he still credits most of his improvement to TAVR.  He denies palpitations.  He denies sudden weight gain, orthopnea, PND.  He admits lower extremity edema-he relates this to his CKD.  He takes Lasix approximately every other day for his edema and it will improve.    The following portions of the patient's history were reviewed and updated as appropriate: allergies, current medications, past family history, past medical history, past social history, past surgical history and problem list.    Review of Systems   Constitutional: Positive for malaise/fatigue.   Cardiovascular: Positive for chest pain, dyspnea on exertion and leg swelling. Negative for claudication, near-syncope, orthopnea, palpitations, paroxysmal nocturnal dyspnea and syncope.   Respiratory: Negative for cough and shortness of breath.    Hematologic/Lymphatic: Does not bruise/bleed easily.   Musculoskeletal: Negative for falls.   Gastrointestinal: Negative for bloating.   Neurological: Positive for dizziness. Negative for light-headedness and weakness.       Allergies   Allergen Reactions   • Sulfa Antibiotics Rash     Sulfa Drugs       Current Outpatient Medications:   •  amLODIPine (NORVASC) 10 MG tablet, Take 1 tablet by mouth once daily, Disp: 90 tablet, Rfl: 4  •  aspirin 81 MG EC tablet, Take 1 tablet by mouth Daily., Disp: 90 tablet, Rfl: 0  •  atorvastatin (LIPITOR) 80 MG tablet, Take 1 tablet by mouth Daily., Disp: 90 tablet, Rfl: 3  •  coenzyme Q10 100 MG capsule, Take 100 mg by mouth Daily., Disp: , Rfl:   •  FISH OIL-COENZYME Q10 PO, Take 300 mg by mouth Daily., Disp: , Rfl:   •  furosemide (LASIX) 40 MG tablet, Take 40 mg by mouth Daily As Needed., Disp: , Rfl:   •  irbesartan (AVAPRO) 300 MG tablet, Take 300 mg by mouth Every Night., Disp: , Rfl:   •  nitroglycerin (NITROSTAT) 0.4 MG SL tablet, Place 1 tablet under the tongue As Needed for Chest Pain.,  "Disp: 30 tablet, Rfl: 11  •  rivaroxaban (Xarelto) 15 MG tablet, Take 1 tablet by mouth Daily., Disp: 30 tablet, Rfl: 11         Objective:    /68   Pulse 72   Ht 175.3 cm (69\")   Wt 93.9 kg (207 lb)   SpO2 97%   BMI 30.57 kg/m²        Vitals and nursing note reviewed.   Constitutional:       General: Not in acute distress.     Appearance: Well-developed and not in distress. Not diaphoretic.   Neck:      Vascular: No JVD.   Pulmonary:      Effort: Pulmonary effort is normal. No respiratory distress.      Breath sounds: Normal breath sounds.   Cardiovascular:      Normal rate. Regular rhythm.      Murmurs: There is a grade 2/6 systolic murmur.   Edema:     Peripheral edema (1-2+ BLE edema ) present.  Abdominal:      Tenderness: There is no abdominal tenderness.   Skin:     General: Skin is warm and dry.   Neurological:      Mental Status: Alert and oriented to person, place, and time.         Lab Review:   Lab Results   Component Value Date    GLUCOSE 87 06/29/2021    BUN 34 (H) 06/29/2021    CREATININE 2.6 (H) 06/29/2021    EGFRIFNONA 24 (A) 06/29/2021    EGFRIFAFRI 29 (L) 06/29/2021    BCR 11.1 04/21/2021    K 4.9 06/29/2021    CO2 21 (L) 06/29/2021    CALCIUM 9.7 06/29/2021    ALBUMIN 4.4 06/29/2021    AST 38 06/29/2021    ALT 38 06/29/2021     Lab Results   Component Value Date    CHOL 126 (L) 08/09/2019    CHLPL 125 (L) 06/29/2021    TRIG 126 06/29/2021    HDL 34 (L) 06/29/2021    LDL 66 06/29/2021     Lab Results   Component Value Date    WBC 7.7 06/29/2021    HGB 13.4 (L) 06/29/2021    HCT 40.8 (L) 06/29/2021    MCV 91.5 06/29/2021     06/29/2021     Lab Results   Component Value Date    HGBA1C 5.5 08/09/2019             ECG 12 Lead    Date/Time: 8/26/2021 5:47 PM  Performed by: Knees, Zena E, APRN  Authorized by: Zena Sykes APRN   Comparison: compared with previous ECG from 7/26/2021  Similar to previous ECG  Comparison to previous ECG: A sensed, V-paced -> replaces AV pacing   Rhythm: " sinus rhythm and paced  BPM: 72              Results for orders placed during the hospital encounter of 05/17/21    Adult Transthoracic Echo Limited W/ Cont if Necessary Per Protocol    Interpretation Summary  · Left ventricular ejection fraction appears to be 56 - 60%. Left ventricular systolic function is normal.  · Recently placed Gutierrez S3 29 mm TAVR valve remains well positioned. Mean transvalvular gradient 10 mmHg, with no evidence of paravalvular leak.  · Severe mitral annular calcification, particularly of the posterior annulus, resulting in moderate mitral valve stenosis.  · Estimated right ventricular systolic pressure from tricuspid regurgitation is moderately elevated (45-55 mmHg).  · Compared to prior transthoracic study on 4/21/2021, no significant change.      Assessment:      Problem List Items Addressed This Visit        Cardiac and Vasculature    Coronary artery disease involving coronary bypass graft of native heart without angina pectoris    Pacemaker    Overview     [employer] DC PPM implanted 1/8/18 for high degree AVB (was 2nd degree, Mobitz II then quickly progressed to 3rd degree with syncope)         S/P TAVR (transcatheter aortic valve replacement)    Overview     29mm S3 valve 4/20/21         Atrial flutter with controlled response (CMS/[facility]) - Primary       Genitourinary and Reproductive     Stage 3b chronic kidney disease (CMS/HCC)       Neuro    History of stroke          Plan:     1.  Atrial flutter: Newly diagnosed in June 2021.  He is status post successful cardioversion on 7/26/2021.  Per review of device interrogation today, since cardioversion he has had approximately 6 very brief episodes of atrial fibrillation/flutter lasting 12 seconds max.  The patient denies any abrupt improvement in dyspnea on exertion and fatigue following cardioversion, but he does think the symptoms are at least mildly improved following cardioversion overall.  He reiterates that more than  anything he has improved overall since TAVR.  [id number].  -Continue anticoagulation with Xarelto 15 mg daily with a meal (switched from Eliquis to Xarelto due to cost/coverage issues)  -When in atrial flutter, he was rate controlled and rate controlling medication was not added  -Continue aspirin 81 mg daily  -As he did have at least mild improvement in fatigue and dyspnea on exertion with cardioversion, will discuss addition of antiarrhythmic with Dr. Tloedo; consider Multaq.    2.  Severe aortic stenosis status post successful TAVR 4/20/2021: Established problem, stable.  Continue aspirin 81 mg daily.  He is also anticoagulated due to atrial flutter.  See 5/17 echo findings above.  Will need SBE prophylaxis for dental procedures.    3.  History of complete heart block with dual-chamber pacemaker in place: Normal function of device and satisfactory battery life    4.  Coronary artery disease: Established problem, stable.  He has had to 2-3 episodes of atypical, nonexertional chest discomfort lasting 1 to 2 minutes over the past month without associated symptoms.  He has no exertional chest discomfort.  No further testing for now.  Continue to monitor.  -Continue aspirin 81 mg daily  -Continue atorvastatin 80 mg nightly-dose was increased in February 2021 -LDL now controlled at 66 per June 2021 labs.    5.  CKD: The patient continues to follow with nephrology and he reports he has progressed to stage IV.  He is using Lasix every other day for peripheral edema but has no sudden weight gain, orthopnea, PND.  He did also have a normal BNP in April and LVEF remains normal.    Keep scheduled follow-up with Dr. Toledo in 3 months, will call sooner with any change in plans based on initiation of antiarrhythmic.

## 2021-08-31 ENCOUNTER — TELEPHONE (OUTPATIENT)
Dept: CARDIOLOGY | Facility: CLINIC | Age: 86
End: 2021-08-31

## 2021-08-31 NOTE — TELEPHONE ENCOUNTER
----- Message from GIANNI Kilpatrick sent at 8/31/2021  9:06 AM CDT -----  Please notify the patient I discussed his case with Dr. Toledo. No change in medical therapy or addition of antiarrhythmic medication at this time.  If he has a recurrence of atrial flutter, we may consider a change in medication at that time depending on his symptoms.  Keep follow-up.  Thanks.

## 2021-09-15 NOTE — PROGRESS NOTES
Dual Chamber Pacemaker Evaluation Report  Clinic Interrogation    September 15, 2021    Primary Cardiologist: Tre  : Guidant Model: Essentio  Implant date: 1/8/18    Reason for evaluation: routine  Indication for pacemaker: complete heart block    Measurements  Atrial sensing - P wave: 2.5 mV  Atrial threshold: 0.8V@ 0.4 ms  Atrial lead impedance: 757 ohms  Ventricular sensing - R wave: 22.9 mV  Ventricular threshold: 0.8 V @ 0.4 ms  Ventricular lead impedance:   568 ohms     Diagnostic Data  Atrial paced: 5 %  Ventricular paced: 100 %  Other: AF burden is 33%.  Longest duration is longest over 48 hours.  Pt is anticoagulated.  Battery status: satisfactory         Final Parameters  Mode:  DDDR  Lower rate: 60 bpm   Upper rate: 130 bpm  AV Delay: paced- 120-180 ms  Gqhcal-517-895 ms  Atrial - Amplitude: 2.6 V   Pulse width: 0.4 ms   Sensitivity: 0.25 mV     Ventricular - Amplitude: 2.0 V  Pulse width: 0.4 ms  Sensitivity: 1.5 mV    Changes made: none  Conclusions: normal pacemaker function    Follow up: Keep scheduled f/u.

## 2021-10-05 ENCOUNTER — TELEPHONE (OUTPATIENT)
Dept: CARDIOLOGY | Facility: CLINIC | Age: 86
End: 2021-10-05

## 2021-10-05 NOTE — TELEPHONE ENCOUNTER
Patient called regarding clearance/medication management for mole removal. He states he canceled the procedure for now until he sees his kidney doctor on 10/13 to check renal function. He will let us know if he decides to proceed after that visit. Notified Anna at [facility]. She voiced understanding.Pricilla King MA

## 2021-10-25 RX ORDER — AMLODIPINE BESYLATE 10 MG/1
TABLET ORAL
Qty: 90 TABLET | Refills: 4 | Status: ON HOLD | OUTPATIENT
Start: 2021-10-25 | End: 2022-05-26

## 2021-12-10 ENCOUNTER — OFFICE VISIT (OUTPATIENT)
Dept: CARDIOLOGY | Facility: CLINIC | Age: 86
End: 2021-12-10

## 2021-12-10 VITALS
DIASTOLIC BLOOD PRESSURE: 74 MMHG | WEIGHT: 206 LBS | BODY MASS INDEX: 30.51 KG/M2 | HEART RATE: 72 BPM | OXYGEN SATURATION: 96 % | HEIGHT: 69 IN | SYSTOLIC BLOOD PRESSURE: 128 MMHG

## 2021-12-10 DIAGNOSIS — Z86.73 HISTORY OF STROKE: ICD-10-CM

## 2021-12-10 DIAGNOSIS — Z95.2 S/P TAVR (TRANSCATHETER AORTIC VALVE REPLACEMENT): ICD-10-CM

## 2021-12-10 DIAGNOSIS — I25.810 CORONARY ARTERY DISEASE INVOLVING CORONARY BYPASS GRAFT OF NATIVE HEART WITHOUT ANGINA PECTORIS: Primary | ICD-10-CM

## 2021-12-10 DIAGNOSIS — I48.92 ATRIAL FLUTTER WITH CONTROLLED RESPONSE (HCC): ICD-10-CM

## 2021-12-10 DIAGNOSIS — Z95.0 PACEMAKER: ICD-10-CM

## 2021-12-10 DIAGNOSIS — N18.32 STAGE 3B CHRONIC KIDNEY DISEASE (HCC): ICD-10-CM

## 2021-12-10 PROCEDURE — 99214 OFFICE O/P EST MOD 30 MIN: CPT | Performed by: NURSE PRACTITIONER

## 2021-12-14 PROCEDURE — 93000 ELECTROCARDIOGRAM COMPLETE: CPT | Performed by: NURSE PRACTITIONER

## 2021-12-14 NOTE — PROGRESS NOTES
"    Subjective:     Encounter Date:12/10/2021      Patient ID: Mireille Magallanes is a 86 y.o. male.    Chief Complaint: Follow-up atrial flutter, CAD, severe AS s/p TAVR, pacemaker in place     History of Present Illness     The patient has been followed by Dr. Toledo for approximately 4 years.  He initially implanted a dual-chamber pacemaker in January 2018 for third-degree heart block with associated lightheadedness and syncope.  He also has CAD including history of a four-vessel CABG in 2001 and subsequent PCI to the LAD in 2014, carotid disease status post right carotid endarterectomy in February 2020, and valvular heart disease status post successful TAVR implant 4/20/2021 after aortic stenosis had become severe and he had associated shortness of breath with minimal exertion-this had been gradually worsening over several months.    As previously noted by Dr. Toledo at his 5/17/2021 visit:   \"He underwent successful implantation of a 29 mm Gutierrez S3 valve on 4/20/21.  Knowing there was no risk of conduction complications since he already had a pacemaker, and also knowing that he had a borderline left main stenosis that could potentially need to be re-accessed in the future should he develop symptoms suggestive of worsening coronary disease, the valve was intentionally deployed lower than we typically would.  On the noncoronary cusp, it was at approximately a 50/50 position, while in the left and right coronary cusp, it was approximately 70/30.  There was no evidence of any paravalvular leak.  The valve itself had been advanced through the 16 [place] sheath from the right common femoral artery, which was successfully closed with 2 Perclose devices.  The left common femoral artery was used for placement of the pigtail during the procedure, through a 6 [place] sheath, but attempts at Perclose at the conclusion of the case in this vessel failed due to severe calcification and manual pressure had to be applied.  Of note, " "patient already had a dual-chamber pacemaker so did not require transvenous pacemaking wire for rapid pacing run.  As result, his monitoring period in the ICU was abbreviated after bedrest is up he was transferred to the telemetry floor on the night of the procedure.  He did have a mild ooze from the left groin that required additional application of manual pressure that night, but this resolved without formation of a hematoma or any other evidence of complication.  He was up ambulating on POD #1, and even before doing so, reported significant improvement in his breathing that was noticeable even while at rest.  He had no new associated symptoms such as chest discomfort or palpitations.  Echocardiogram on POD #1/day of discharge showed acceptable valve positioning and transvalvular gradients.\"    He returned to see Dr. Toledo on 5/17 and reported his breathing had greatly improved and he was now able to make it from his shop [place] to his house without any significant shortness of breath (he was unable to do that prior to TAVR).  He had not yet started cardiac rehab.  He had no new chest pain.  He reported a few episodes of low blood pressure and lightheadedness.  He had an echocardiogram the day of the visit that looked essentially unchanged compared to prehospitalization discharge TAVR on postop day 1.  He was instructed to continue aspirin 81 mg daily for 2 more months (take for 3 months after procedure), and continue Plavix 75 mg daily indefinitely (Plavix instead of aspirin due to history of stroke).  He was instructed to begin cardiac rehab as soon as possible and it was noted that he would need SBE prophylaxis with any dental procedures.  6-month follow-up was ordered.    He returned to see me on 6/25/21, sooner than his regularly scheduled follow-up due to evidence of atrial fibrillation/flutter on pacemaker interrogation, that began on 6/19/2021 and continued through the date of the visit.  Ventricular " "rates have been mostly in the 90s, with a few episodes of heart rates reaching above 130.  Prior episodes of atrial fibrillation/flutter only lasted less than 1 minute.  The patient reported that he had noticed he was becoming more easily fatigued and short of breath with minimal exertion or lifting for 3 weeks prior to the visit-not necessarily an abrupt onset.  Symptoms resolved quickly within a few minutes of rest.  He was continuing his exertional activities but would have to rest frequently.  He reported he felt much better compared to prior to TAVR, but he was starting to notice episodes of \"exhaustion and shortness of breath\" with activity because he was trying to do more.  He was doing well in cardiac rehab.  At that visit, Plavix was discontinued and anticoagulation was initiated with Eliquis.  Aspirin was continued.  He was scheduled for cardioversion following 4 weeks of uninterrupted anticoagulation, because although his symptoms seem to start before the episode of atrial fibrillation/flutter fatigue and OLMEDO could have definitely been at least partially related to the atrial arrhythmia.    On 7/26/2021 the patient was successfully cardioverted from atrial flutter to normal sinus rhythm with 1 shock at 50 J per Dr. Toledo.    I saw the pt in follow up 8/2021 and he reported 2-3 episodes of non exertional CP on the left with radiation to the left shoulder over the previous month. These would last 2 min and were described as hard, grabbing and squeezing. Symptoms were not similar to his previous angina. He was continuing to have dizzy spells despite normal BP, but they had improved overall after TAVR. He had no exertional CP.   When questioned about whether or not he had significant improvement in fatigue and dyspnea on exertion after cardioversion he stated he did have at least mild improvement in fatigue and dyspnea on exertion after cardioversion, but he still credited most of his improvement to TAVR.  He " was taking lasix every other day for edema, which he related to his CKD. No changes were made at that visit. AAD was noted added d/t lack of profound symptomatic improvement with restoration of NSR, but I did discuss this with Dr. Toledo and AAD may be considered if he has a recurrence.    Today the patient reports he is without complaint from a cardiac standpoint. He states his main issue is his arthritic pain. He reports chronic stable BLE edema that he continues to relate to his CKD. He is following closely with nephrology and is trying to minimize lasix use (is taking it less frequently in recent months). Denies chest pain, dyspnea, orthopnea, PND, sudden weight gain, palpitations.     The following portions of the patient's history were reviewed and updated as appropriate: allergies, current medications, past family history, past medical history, past social history, past surgical history and problem list.    Review of Systems   Constitutional: Negative for malaise/fatigue.   Cardiovascular: Positive for leg swelling. Negative for claudication, dyspnea on exertion, near-syncope, orthopnea, palpitations, paroxysmal nocturnal dyspnea and syncope.   Respiratory: Negative for shortness of breath.    Hematologic/Lymphatic: Does not bruise/bleed easily.   Musculoskeletal: Positive for arthritis. Negative for falls.   Gastrointestinal: Negative for bloating.   Neurological: Negative for dizziness and light-headedness.       Allergies   Allergen Reactions   • Sulfa Antibiotics Rash     Sulfa Drugs       Current Outpatient Medications:   •  amLODIPine (NORVASC) 10 MG tablet, Take 1 tablet by mouth once daily, Disp: 90 tablet, Rfl: 4  •  aspirin 81 MG EC tablet, Take 1 tablet by mouth Daily., Disp: 90 tablet, Rfl: 0  •  atorvastatin (LIPITOR) 80 MG tablet, Take 1 tablet by mouth Daily., Disp: 90 tablet, Rfl: 3  •  coenzyme Q10 100 MG capsule, Take 100 mg by mouth Daily., Disp: , Rfl:   •  FISH OIL-COENZYME Q10 PO, Take  "300 mg by mouth Daily., Disp: , Rfl:   •  furosemide (LASIX) 40 MG tablet, Take 40 mg by mouth Daily As Needed., Disp: , Rfl:   •  irbesartan (AVAPRO) 300 MG tablet, Take 300 mg by mouth Every Night., Disp: , Rfl:   •  nitroglycerin (NITROSTAT) 0.4 MG SL tablet, Place 1 tablet under the tongue As Needed for Chest Pain., Disp: 30 tablet, Rfl: 11  •  rivaroxaban (Xarelto) 15 MG tablet, Take 1 tablet by mouth Daily., Disp: 30 tablet, Rfl: 11         Objective:    /74   Pulse 72   Ht 175.3 cm (69\")   Wt 93.4 kg (206 lb)   SpO2 96%   BMI 30.42 kg/m²        Vitals and nursing note reviewed.   Constitutional:       General: Not in acute distress.     Appearance: Well-developed and not in distress. Not diaphoretic.   Neck:      Vascular: No JVD.   Pulmonary:      Effort: Pulmonary effort is normal. No respiratory distress.      Breath sounds: Normal breath sounds.   Cardiovascular:      Normal rate. Regular rhythm.      Murmurs: There is a grade 2/6 systolic murmur.   Edema:     Peripheral edema (2-3+ BLE edema ) present.  Abdominal:      Tenderness: There is no abdominal tenderness.   Skin:     General: Skin is warm and dry.   Neurological:      Mental Status: Alert and oriented to person, place, and time.         Lab Review:   Lab Results   Component Value Date    GLUCOSE 87 06/29/2021    BUN 34 (H) 06/29/2021    CREATININE 2.6 (H) 06/29/2021    EGFRIFNONA 24 (A) 06/29/2021    EGFRIFAFRI 29 (L) 06/29/2021    BCR 11.1 04/21/2021    K 4.9 06/29/2021    CO2 21 (L) 06/29/2021    CALCIUM 9.7 06/29/2021    ALBUMIN 4.4 06/29/2021    AST 38 06/29/2021    ALT 38 06/29/2021     Lab Results   Component Value Date    CHOL 126 (L) 08/09/2019    CHLPL 125 (L) 06/29/2021    TRIG 126 06/29/2021    HDL 34 (L) 06/29/2021    LDL 66 06/29/2021     Lab Results   Component Value Date    WBC 7.7 06/29/2021    HGB 13.4 (L) 06/29/2021    HCT 40.8 (L) 06/29/2021    MCV 91.5 06/29/2021     06/29/2021     Lab Results   Component " Value Date    HGBA1C 5.5 08/09/2019             ECG 12 Lead    Date/Time: 12/14/2021 1:13 PM  Performed by: Zena Sykes APRN  Authorized by: Zena Sykes APRN   Comparison: compared with previous ECG from 8/26/2021  Similar to previous ECG  Comparison to previous ECG: V-paced  Rhythm: sinus rhythm and paced  BPM: 72              Results for orders placed during the hospital encounter of 05/17/21    Adult Transthoracic Echo Limited W/ Cont if Necessary Per Protocol    Interpretation Summary  · Left ventricular ejection fraction appears to be 56 - 60%. Left ventricular systolic function is normal.  · Recently placed Gutierrez S3 29 mm TAVR valve remains well positioned. Mean transvalvular gradient 10 mmHg, with no evidence of paravalvular leak.  · Severe mitral annular calcification, particularly of the posterior annulus, resulting in moderate mitral valve stenosis.  · Estimated right ventricular systolic pressure from tricuspid regurgitation is moderately elevated (45-55 mmHg).  · Compared to prior transthoracic study on 4/21/2021, no significant change.      Assessment:      Problem List Items Addressed This Visit        Cardiac and Vasculature    Coronary artery disease involving coronary bypass graft of native heart without angina pectoris - Primary    Pacemaker    Overview     [employer] DC PPM implanted 1/8/18 for high degree AVB (was 2nd degree, Mobitz II then quickly progressed to 3rd degree with syncope)         S/P TAVR (transcatheter aortic valve replacement)    Overview     29mm S3 valve 4/20/21         Atrial flutter with controlled response (HCC)       Genitourinary and Reproductive     Stage 3b chronic kidney disease (HCC)       Neuro    History of stroke          Plan:     1.  Atrial flutter: Diagnosed in June 2021.  He is status post successful cardioversion on 7/26/2021.  Most recent device interrogation 11/2021 reviewed - 1% AF burden, longest duration 11 seconds. He reported mild  improvement in OLMEDO and fatigue after CV but no profound improvement, so AAD was not initiated. However, this may be considered if he has a sustained or symptomatic recurrence. CHADSVASC 6.  -Continue Xarelto 15 mg daily with a meal  -When in atrial flutter, he was rate controlled and rate controlling medication was not added    2.  Severe aortic stenosis status post successful TAVR 4/20/2021: Established problem, stable.  Continue aspirin 81 mg daily.  He is also anticoagulated due to atrial flutter.  See 5/17 echo findings above.  Will need SBE prophylaxis for dental procedures. Will order repeat 1 year echo for 4/2022     3.  History of complete heart block with dual-chamber pacemaker in place: Normal function of device and satisfactory battery life, 11% a-pacing, 100% v-pacing per 11/2021 interrogation.     4.  Coronary artery disease: Established problem, stable, no angina.  -Continue aspirin 81 mg daily  -Continue atorvastatin 80 mg nightly-dose was increased in February 2021 -LDL now controlled at 66 per June 2021 labs.    5.  CKD: The patient continues to follow with nephrology and previously reported he has progressed to stage IV.  He is using Lasix PRN for peripheral edema but has no sudden weight gain, orthopnea, PND.  He did also have a normal BNP in April and LVEF remains normal.    Follow up with Dr. Toledo 3-6 months, sooner with new or worsening symptoms

## 2022-01-03 LAB
ALBUMIN SERPL-MCNC: 4.8 G/DL (ref 3.5–5.2)
ALP BLD-CCNC: 85 U/L (ref 40–130)
ALT SERPL-CCNC: 27 U/L (ref 5–41)
ANION GAP SERPL CALCULATED.3IONS-SCNC: 21 MMOL/L (ref 7–19)
AST SERPL-CCNC: 31 U/L (ref 5–40)
BACTERIA: NEGATIVE /HPF
BASOPHILS ABSOLUTE: 0.1 K/UL (ref 0–0.2)
BASOPHILS RELATIVE PERCENT: 0.8 % (ref 0–1)
BILIRUB SERPL-MCNC: 0.7 MG/DL (ref 0.2–1.2)
BILIRUBIN URINE: NEGATIVE
BLOOD, URINE: ABNORMAL
BUN BLDV-MCNC: 30 MG/DL (ref 8–23)
CALCIUM SERPL-MCNC: 9.9 MG/DL (ref 8.8–10.2)
CHLORIDE BLD-SCNC: 109 MMOL/L (ref 98–111)
CLARITY: CLEAR
CO2: 17 MMOL/L (ref 22–29)
COLOR: YELLOW
CREAT SERPL-MCNC: 2.4 MG/DL (ref 0.5–1.2)
CREATININE URINE: 76 MG/DL (ref 4.2–622)
CRYSTALS, UA: ABNORMAL /HPF
EOSINOPHILS ABSOLUTE: 0.2 K/UL (ref 0–0.6)
EOSINOPHILS RELATIVE PERCENT: 2.9 % (ref 0–5)
EPITHELIAL CELLS, UA: 0 /HPF (ref 0–5)
GFR AFRICAN AMERICAN: 31
GFR NON-AFRICAN AMERICAN: 26
GLUCOSE BLD-MCNC: 92 MG/DL (ref 74–109)
GLUCOSE URINE: NEGATIVE MG/DL
HCT VFR BLD CALC: 43.6 % (ref 42–52)
HEMOGLOBIN: 13.9 G/DL (ref 14–18)
HYALINE CASTS: 2 /HPF (ref 0–8)
IMMATURE GRANULOCYTES #: 0 K/UL
KETONES, URINE: NEGATIVE MG/DL
LEUKOCYTE ESTERASE, URINE: NEGATIVE
LYMPHOCYTES ABSOLUTE: 2 K/UL (ref 1.1–4.5)
LYMPHOCYTES RELATIVE PERCENT: 32.8 % (ref 20–40)
MAGNESIUM: 2 MG/DL (ref 1.6–2.4)
MCH RBC QN AUTO: 29.2 PG (ref 27–31)
MCHC RBC AUTO-ENTMCNC: 31.9 G/DL (ref 33–37)
MCV RBC AUTO: 91.6 FL (ref 80–94)
MONOCYTES ABSOLUTE: 0.5 K/UL (ref 0–0.9)
MONOCYTES RELATIVE PERCENT: 8.9 % (ref 0–10)
NEUTROPHILS ABSOLUTE: 3.2 K/UL (ref 1.5–7.5)
NEUTROPHILS RELATIVE PERCENT: 54.4 % (ref 50–65)
NITRITE, URINE: NEGATIVE
PARATHYROID HORMONE INTACT: 109.6 PG/ML (ref 15–65)
PDW BLD-RTO: 13.2 % (ref 11.5–14.5)
PH UA: 6.5 (ref 5–8)
PHOSPHORUS: 3.9 MG/DL (ref 2.5–4.5)
PLATELET # BLD: 125 K/UL (ref 130–400)
PMV BLD AUTO: 10.7 FL (ref 9.4–12.4)
POTASSIUM SERPL-SCNC: 4.9 MMOL/L (ref 3.5–5)
PROTEIN PROTEIN: 157 MG/DL (ref 15–45)
PROTEIN UA: 100 MG/DL
RBC # BLD: 4.76 M/UL (ref 4.7–6.1)
RBC UA: 1 /HPF (ref 0–4)
SODIUM BLD-SCNC: 147 MMOL/L (ref 136–145)
SPECIFIC GRAVITY UA: 1.01 (ref 1–1.03)
TOTAL PROTEIN: 7.8 G/DL (ref 6.6–8.7)
URIC ACID, SERUM: 7.7 MG/DL (ref 3.4–7)
UROBILINOGEN, URINE: 0.2 E.U./DL
VITAMIN D 25-HYDROXY: 43.9 NG/ML
WBC # BLD: 6 K/UL (ref 4.8–10.8)
WBC UA: 1 /HPF (ref 0–5)

## 2022-02-10 ENCOUNTER — CLINICAL SUPPORT NO REQUIREMENTS (OUTPATIENT)
Dept: CARDIOLOGY | Facility: CLINIC | Age: 87
End: 2022-02-10

## 2022-02-10 DIAGNOSIS — I44.2 CHB (COMPLETE HEART BLOCK): ICD-10-CM

## 2022-02-10 DIAGNOSIS — Z95.0 PACEMAKER: Primary | ICD-10-CM

## 2022-02-10 PROCEDURE — 93280 PM DEVICE PROGR EVAL DUAL: CPT | Performed by: INTERNAL MEDICINE

## 2022-02-10 NOTE — PROGRESS NOTES
Dual Chamber Pacemaker Evaluation Report  IN OFFICE INTERROGATION    February 10, 2022    Primary Cardiologist: Tre  : Guidant Model: [employer] MRI EL L131  Implant date: 1/8/2018    Reason for evaluation: routine  Indication for pacemaker: complete heart block    Measurements  Atrial sensing - P wave: 2.4 mV  Atrial threshold: 1.5V@ 0.4ms  Atrial lead impedance: 693 ohms  Ventricular sensing - R wave: No R wave at VVI 30  Ventricular threshold: 0.8 V @ 0.4 ms  Ventricular lead impedance:   558 ohms     Manual sensing and threshold testing performed:  Yes    Diagnostic Data  Atrial paced: 13 %  Ventricular paced: 100 %    Episodes recorded since 11/8/21:  P-AFL, burden <1%, longest 9 seconds, on Xarelto.  PMT noted; PVARP adjusted.    Battery status: satisfactory   Estimated 11.5 years remaining      Final Parameters  Mode:  DDDR  Lower rate: 60 bpm   Upper rate: 130 bpm  AV Delay: paced- 120-180 ms  Ivirze-714-612 ms  Atrial - Amplitude: 3 V   Pulse width: 0.4 ms   Sensitivity: 0.25 mV     Ventricular - Amplitude: 2 V  Pulse width: 0.4 ms  Sensitivity: 1.5 mV    Changes made: Normal ana atrial output changed to 3V; Minimum PVARP extended to 340 ms.    Conclusions: normal pacemaker function, stable pacing and sensing thresholds and adequate battery reserve    Follow up: Every 3 months via latitude, annually in office

## 2022-04-26 ENCOUNTER — HOSPITAL ENCOUNTER (OUTPATIENT)
Dept: CARDIOLOGY | Facility: HOSPITAL | Age: 87
Discharge: HOME | End: 2022-04-26
Admitting: NURSE PRACTITIONER

## 2022-04-26 DIAGNOSIS — Z95.2 S/P TAVR (TRANSCATHETER AORTIC VALVE REPLACEMENT): ICD-10-CM

## 2022-04-26 PROCEDURE — 93306 TTE W/DOPPLER COMPLETE: CPT

## 2022-04-26 PROCEDURE — 25010000002 PERFLUTREN 6.52 MG/ML SUSPENSION: Performed by: INTERNAL MEDICINE

## 2022-04-26 PROCEDURE — 93306 TTE W/DOPPLER COMPLETE: CPT | Performed by: INTERNAL MEDICINE

## 2022-04-26 RX ADMIN — PERFLUTREN 2 ML: 6.52 INJECTION, SUSPENSION INTRAVENOUS at 11:03

## 2022-04-27 LAB
BH CV ECHO MEAS - ACS: 2 CM
BH CV ECHO MEAS - AO MAX PG: 18.5 MMHG
BH CV ECHO MEAS - AO MEAN PG: 10 MMHG
BH CV ECHO MEAS - AO ROOT DIAM: 2.4 CM
BH CV ECHO MEAS - AO V2 MAX: 215 CM/SEC
BH CV ECHO MEAS - AO V2 VTI: 53.2 CM
BH CV ECHO MEAS - EDV(CUBED): 178.5 ML
BH CV ECHO MEAS - EDV(MOD-SP2): 125 ML
BH CV ECHO MEAS - EDV(MOD-SP4): 129 ML
BH CV ECHO MEAS - EF(MOD-BP): 69.5 %
BH CV ECHO MEAS - EF(MOD-SP2): 80.2 %
BH CV ECHO MEAS - EF(MOD-SP4): 60.9 %
BH CV ECHO MEAS - ESV(CUBED): 45.1 ML
BH CV ECHO MEAS - ESV(MOD-SP2): 24.7 ML
BH CV ECHO MEAS - ESV(MOD-SP4): 50.5 ML
BH CV ECHO MEAS - FS: 36.8 %
BH CV ECHO MEAS - IVS/LVPW: 1.23 CM
BH CV ECHO MEAS - IVSD: 1.18 CM
BH CV ECHO MEAS - LA DIMENSION: 3.8 CM
BH CV ECHO MEAS - LAT PEAK E' VEL: 6.4 CM/SEC
BH CV ECHO MEAS - LV DIASTOLIC VOL/BSA (35-75): 61.7 CM2
BH CV ECHO MEAS - LV MASS(C)D: 242 GRAMS
BH CV ECHO MEAS - LV MAX PG: 3.9 MMHG
BH CV ECHO MEAS - LV MEAN PG: 2 MMHG
BH CV ECHO MEAS - LV SYSTOLIC VOL/BSA (12-30): 24.1 CM2
BH CV ECHO MEAS - LV V1 MAX: 99.1 CM/SEC
BH CV ECHO MEAS - LV V1 VTI: 24.2 CM
BH CV ECHO MEAS - LVIDD: 5.6 CM
BH CV ECHO MEAS - LVIDS: 3.6 CM
BH CV ECHO MEAS - LVPWD: 0.96 CM
BH CV ECHO MEAS - MED PEAK E' VEL: 6 CM/SEC
BH CV ECHO MEAS - MR MAX PG: 111.7 MMHG
BH CV ECHO MEAS - MR MAX VEL: 527 CM/SEC
BH CV ECHO MEAS - MV A MAX VEL: 111 CM/SEC
BH CV ECHO MEAS - MV DEC SLOPE: 678.5 CM/SEC2
BH CV ECHO MEAS - MV DEC TIME: 0.35 MSEC
BH CV ECHO MEAS - MV E MAX VEL: 181 CM/SEC
BH CV ECHO MEAS - MV E/A: 1.63
BH CV ECHO MEAS - MV MAX PG: 16.6 MMHG
BH CV ECHO MEAS - MV MEAN PG: 8 MMHG
BH CV ECHO MEAS - MV V2 VTI: 58.3 CM
BH CV ECHO MEAS - RAP SYSTOLE: 10 MMHG
BH CV ECHO MEAS - RVDD: 2.21 CM
BH CV ECHO MEAS - RVSP: 49.4 MMHG
BH CV ECHO MEAS - SI(MOD-SP2): 47.9 ML/M2
BH CV ECHO MEAS - SI(MOD-SP4): 37.5 ML/M2
BH CV ECHO MEAS - SV(MOD-SP2): 100.3 ML
BH CV ECHO MEAS - SV(MOD-SP4): 78.5 ML
BH CV ECHO MEAS - TAPSE (>1.6): 1.94 CM
BH CV ECHO MEAS - TR MAX PG: 39.4 MMHG
BH CV ECHO MEAS - TR MAX VEL: 314 CM/SEC
BH CV ECHO MEASUREMENTS AVERAGE E/E' RATIO: 29.19
BH CV XLRA - TDI S': 10.2 CM/SEC
LEFT ATRIUM VOLUME INDEX: 63.6 ML/M2
LEFT ATRIUM VOLUME: 133 CM3
MAXIMAL PREDICTED HEART RATE: 136 BPM
STRESS TARGET HR: 116 BPM

## 2022-04-28 ENCOUNTER — OFFICE VISIT (OUTPATIENT)
Dept: CARDIOLOGY | Facility: CLINIC | Age: 87
End: 2022-04-28

## 2022-04-28 VITALS
WEIGHT: 207 LBS | HEART RATE: 70 BPM | OXYGEN SATURATION: 98 % | SYSTOLIC BLOOD PRESSURE: 128 MMHG | DIASTOLIC BLOOD PRESSURE: 73 MMHG | HEIGHT: 69 IN | BODY MASS INDEX: 30.66 KG/M2

## 2022-04-28 DIAGNOSIS — I25.708 CORONARY ARTERY DISEASE OF BYPASS GRAFT OF NATIVE HEART WITH STABLE ANGINA PECTORIS: ICD-10-CM

## 2022-04-28 DIAGNOSIS — I10 PRIMARY HYPERTENSION: ICD-10-CM

## 2022-04-28 DIAGNOSIS — Z95.2 S/P TAVR (TRANSCATHETER AORTIC VALVE REPLACEMENT): Primary | ICD-10-CM

## 2022-04-28 DIAGNOSIS — I48.92 ATRIAL FLUTTER WITH CONTROLLED RESPONSE: ICD-10-CM

## 2022-04-28 PROCEDURE — 93000 ELECTROCARDIOGRAM COMPLETE: CPT | Performed by: INTERNAL MEDICINE

## 2022-04-28 PROCEDURE — 99214 OFFICE O/P EST MOD 30 MIN: CPT | Performed by: INTERNAL MEDICINE

## 2022-04-28 RX ORDER — SODIUM BICARBONATE 650 MG/1
650 TABLET ORAL 2 TIMES DAILY
COMMUNITY
Start: 2022-04-06

## 2022-05-25 ENCOUNTER — TELEPHONE (OUTPATIENT)
Dept: CARDIOLOGY | Facility: CLINIC | Age: 87
End: 2022-05-25

## 2022-05-25 ENCOUNTER — APPOINTMENT (OUTPATIENT)
Dept: GENERAL RADIOLOGY | Facility: HOSPITAL | Age: 87
End: 2022-05-25

## 2022-05-25 ENCOUNTER — APPOINTMENT (OUTPATIENT)
Dept: CARDIOLOGY | Facility: HOSPITAL | Age: 87
End: 2022-05-25

## 2022-05-25 ENCOUNTER — HOSPITAL ENCOUNTER (INPATIENT)
Facility: HOSPITAL | Age: 87
LOS: 2 days | Discharge: HOME | End: 2022-05-27
Attending: FAMILY MEDICINE | Admitting: FAMILY MEDICINE

## 2022-05-25 ENCOUNTER — APPOINTMENT (OUTPATIENT)
Dept: MRI IMAGING | Facility: HOSPITAL | Age: 87
End: 2022-05-25

## 2022-05-25 ENCOUNTER — APPOINTMENT (OUTPATIENT)
Dept: CT IMAGING | Facility: HOSPITAL | Age: 87
End: 2022-05-25

## 2022-05-25 DIAGNOSIS — I63.9 CEREBROVASCULAR ACCIDENT (CVA), UNSPECIFIED MECHANISM: Primary | ICD-10-CM

## 2022-05-25 DIAGNOSIS — Z95.2 S/P TAVR (TRANSCATHETER AORTIC VALVE REPLACEMENT): ICD-10-CM

## 2022-05-25 DIAGNOSIS — I65.29 STENOSIS OF CAROTID ARTERY, UNSPECIFIED LATERALITY: ICD-10-CM

## 2022-05-25 DIAGNOSIS — E78.2 MIXED HYPERLIPIDEMIA: ICD-10-CM

## 2022-05-25 DIAGNOSIS — I65.21 STENOSIS OF RIGHT CAROTID ARTERY: ICD-10-CM

## 2022-05-25 DIAGNOSIS — N18.4 CHRONIC RENAL FAILURE, STAGE 4 (SEVERE): ICD-10-CM

## 2022-05-25 DIAGNOSIS — Z95.0 PACEMAKER: ICD-10-CM

## 2022-05-25 DIAGNOSIS — E66.3 OVERWEIGHT (BMI 25.0-29.9): ICD-10-CM

## 2022-05-25 DIAGNOSIS — G45.9 TIA (TRANSIENT ISCHEMIC ATTACK): ICD-10-CM

## 2022-05-25 DIAGNOSIS — I10 PRIMARY HYPERTENSION: ICD-10-CM

## 2022-05-25 DIAGNOSIS — R07.89 OTHER CHEST PAIN: ICD-10-CM

## 2022-05-25 DIAGNOSIS — R47.01 ANOMIC APHASIA: ICD-10-CM

## 2022-05-25 DIAGNOSIS — Z86.73 HISTORY OF STROKE: ICD-10-CM

## 2022-05-25 DIAGNOSIS — I25.810 CORONARY ARTERY DISEASE INVOLVING CORONARY BYPASS GRAFT OF NATIVE HEART WITHOUT ANGINA PECTORIS: ICD-10-CM

## 2022-05-25 DIAGNOSIS — I63.9 ACUTE CEREBROVASCULAR ACCIDENT (CVA) OF CEREBELLUM: ICD-10-CM

## 2022-05-25 DIAGNOSIS — I48.92 ATRIAL FLUTTER WITH CONTROLLED RESPONSE: ICD-10-CM

## 2022-05-25 DIAGNOSIS — I65.23 BILATERAL CAROTID ARTERY STENOSIS: ICD-10-CM

## 2022-05-25 DIAGNOSIS — I63.432 CEREBROVASCULAR ACCIDENT (CVA) DUE TO EMBOLISM OF LEFT POSTERIOR CEREBRAL ARTERY: ICD-10-CM

## 2022-05-25 LAB
ABO GROUP BLD: NORMAL
ALBUMIN SERPL-MCNC: 4.5 G/DL (ref 3.5–5.2)
ALBUMIN/GLOB SERPL: 1.6 G/DL
ALP SERPL-CCNC: 82 U/L (ref 39–117)
ALT SERPL W P-5'-P-CCNC: 19 U/L (ref 1–41)
ANION GAP SERPL CALCULATED.3IONS-SCNC: 12 MMOL/L (ref 5–15)
AST SERPL-CCNC: 19 U/L (ref 1–40)
BASOPHILS # BLD AUTO: 0.03 10*3/MM3 (ref 0–0.2)
BASOPHILS NFR BLD AUTO: 0.4 % (ref 0–1.5)
BILIRUB SERPL-MCNC: 0.7 MG/DL (ref 0–1.2)
BLD GP AB SCN SERPL QL: NEGATIVE
BUN SERPL-MCNC: 41 MG/DL (ref 8–23)
BUN/CREAT SERPL: 14.9 (ref 7–25)
CALCIUM SPEC-SCNC: 9.1 MG/DL (ref 8.6–10.5)
CHLORIDE SERPL-SCNC: 106 MMOL/L (ref 98–107)
CO2 SERPL-SCNC: 22 MMOL/L (ref 22–29)
CREAT SERPL-MCNC: 2.75 MG/DL (ref 0.76–1.27)
DEPRECATED RDW RBC AUTO: 44.5 FL (ref 37–54)
EGFRCR SERPLBLD CKD-EPI 2021: 22 ML/MIN/1.73
EOSINOPHIL # BLD AUTO: 0.14 10*3/MM3 (ref 0–0.4)
EOSINOPHIL NFR BLD AUTO: 1.9 % (ref 0.3–6.2)
ERYTHROCYTE [DISTWIDTH] IN BLOOD BY AUTOMATED COUNT: 13.4 % (ref 12.3–15.4)
GLOBULIN UR ELPH-MCNC: 2.8 GM/DL
GLUCOSE BLDC GLUCOMTR-MCNC: 92 MG/DL (ref 70–130)
GLUCOSE SERPL-MCNC: 94 MG/DL (ref 65–99)
HCT VFR BLD AUTO: 38.3 % (ref 37.5–51)
HGB BLD-MCNC: 12.5 G/DL (ref 13–17.7)
HOLD SPECIMEN: NORMAL
HOLD SPECIMEN: NORMAL
IMM GRANULOCYTES # BLD AUTO: 0.02 10*3/MM3 (ref 0–0.05)
IMM GRANULOCYTES NFR BLD AUTO: 0.3 % (ref 0–0.5)
INR PPP: 2.51 (ref 0.91–1.09)
LYMPHOCYTES # BLD AUTO: 1.6 10*3/MM3 (ref 0.7–3.1)
LYMPHOCYTES NFR BLD AUTO: 22.2 % (ref 19.6–45.3)
MCH RBC QN AUTO: 29.3 PG (ref 26.6–33)
MCHC RBC AUTO-ENTMCNC: 32.6 G/DL (ref 31.5–35.7)
MCV RBC AUTO: 89.9 FL (ref 79–97)
MONOCYTES # BLD AUTO: 0.62 10*3/MM3 (ref 0.1–0.9)
MONOCYTES NFR BLD AUTO: 8.6 % (ref 5–12)
NEUTROPHILS NFR BLD AUTO: 4.81 10*3/MM3 (ref 1.7–7)
NEUTROPHILS NFR BLD AUTO: 66.6 % (ref 42.7–76)
NRBC BLD AUTO-RTO: 0 /100 WBC (ref 0–0.2)
PLATELET # BLD AUTO: 144 10*3/MM3 (ref 140–450)
PMV BLD AUTO: 10.6 FL (ref 6–12)
POTASSIUM SERPL-SCNC: 4.9 MMOL/L (ref 3.5–5.2)
PROT SERPL-MCNC: 7.3 G/DL (ref 6–8.5)
PROTHROMBIN TIME: 26.1 SECONDS (ref 11.9–14.6)
RBC # BLD AUTO: 4.26 10*6/MM3 (ref 4.14–5.8)
RH BLD: NEGATIVE
SARS-COV-2 RNA PNL SPEC NAA+PROBE: NOT DETECTED
SODIUM SERPL-SCNC: 140 MMOL/L (ref 136–145)
T&S EXPIRATION DATE: NORMAL
WBC NRBC COR # BLD: 7.22 10*3/MM3 (ref 3.4–10.8)
WHOLE BLOOD HOLD COAG: NORMAL
WHOLE BLOOD HOLD SPECIMEN: NORMAL

## 2022-05-25 PROCEDURE — 80053 COMPREHEN METABOLIC PANEL: CPT | Performed by: FAMILY MEDICINE

## 2022-05-25 PROCEDURE — 99284 EMERGENCY DEPT VISIT MOD MDM: CPT

## 2022-05-25 PROCEDURE — 86900 BLOOD TYPING SEROLOGIC ABO: CPT | Performed by: FAMILY MEDICINE

## 2022-05-25 PROCEDURE — 93005 ELECTROCARDIOGRAM TRACING: CPT | Performed by: FAMILY MEDICINE

## 2022-05-25 PROCEDURE — 70551 MRI BRAIN STEM W/O DYE: CPT

## 2022-05-25 PROCEDURE — 85610 PROTHROMBIN TIME: CPT | Performed by: FAMILY MEDICINE

## 2022-05-25 PROCEDURE — 71045 X-RAY EXAM CHEST 1 VIEW: CPT

## 2022-05-25 PROCEDURE — 85025 COMPLETE CBC W/AUTO DIFF WBC: CPT | Performed by: FAMILY MEDICINE

## 2022-05-25 PROCEDURE — 70450 CT HEAD/BRAIN W/O DYE: CPT

## 2022-05-25 PROCEDURE — 99223 1ST HOSP IP/OBS HIGH 75: CPT | Performed by: PSYCHIATRY & NEUROLOGY

## 2022-05-25 PROCEDURE — 82962 GLUCOSE BLOOD TEST: CPT

## 2022-05-25 PROCEDURE — 86850 RBC ANTIBODY SCREEN: CPT | Performed by: FAMILY MEDICINE

## 2022-05-25 PROCEDURE — 86901 BLOOD TYPING SEROLOGIC RH(D): CPT | Performed by: FAMILY MEDICINE

## 2022-05-25 PROCEDURE — 87635 SARS-COV-2 COVID-19 AMP PRB: CPT | Performed by: FAMILY MEDICINE

## 2022-05-25 RX ORDER — ONDANSETRON 2 MG/ML
4 INJECTION INTRAMUSCULAR; INTRAVENOUS EVERY 6 HOURS PRN
Status: DISCONTINUED | OUTPATIENT
Start: 2022-05-25 | End: 2022-05-27 | Stop reason: HOSPADM

## 2022-05-25 RX ORDER — SODIUM CHLORIDE 0.9 % (FLUSH) 0.9 %
10 SYRINGE (ML) INJECTION AS NEEDED
Status: DISCONTINUED | OUTPATIENT
Start: 2022-05-25 | End: 2022-05-26

## 2022-05-25 RX ORDER — ASPIRIN 81 MG/1
81 TABLET ORAL DAILY
Status: DISCONTINUED | OUTPATIENT
Start: 2022-05-25 | End: 2022-05-25

## 2022-05-25 RX ORDER — ASPIRIN 81 MG/1
81 TABLET ORAL DAILY
Status: DISCONTINUED | OUTPATIENT
Start: 2022-05-26 | End: 2022-05-27 | Stop reason: HOSPADM

## 2022-05-25 RX ORDER — SODIUM CHLORIDE 0.9 % (FLUSH) 0.9 %
10 SYRINGE (ML) INJECTION AS NEEDED
Status: DISCONTINUED | OUTPATIENT
Start: 2022-05-25 | End: 2022-05-27 | Stop reason: HOSPADM

## 2022-05-25 RX ORDER — NITROGLYCERIN 0.4 MG/1
0.4 TABLET SUBLINGUAL AS NEEDED
Status: DISCONTINUED | OUTPATIENT
Start: 2022-05-25 | End: 2022-05-27 | Stop reason: HOSPADM

## 2022-05-25 RX ORDER — SODIUM CHLORIDE 0.9 % (FLUSH) 0.9 %
10 SYRINGE (ML) INJECTION EVERY 12 HOURS SCHEDULED
Status: DISCONTINUED | OUTPATIENT
Start: 2022-05-25 | End: 2022-05-27 | Stop reason: HOSPADM

## 2022-05-25 RX ORDER — SODIUM BICARBONATE 650 MG/1
650 TABLET ORAL 2 TIMES DAILY
Status: DISCONTINUED | OUTPATIENT
Start: 2022-05-25 | End: 2022-05-27 | Stop reason: HOSPADM

## 2022-05-25 RX ORDER — ATORVASTATIN CALCIUM 40 MG/1
80 TABLET, FILM COATED ORAL DAILY
Status: DISCONTINUED | OUTPATIENT
Start: 2022-05-26 | End: 2022-05-27 | Stop reason: HOSPADM

## 2022-05-25 RX ADMIN — Medication 10 ML: at 20:16

## 2022-05-25 RX ADMIN — SODIUM BICARBONATE 650 MG: 650 TABLET ORAL at 20:16

## 2022-05-25 NOTE — TELEPHONE ENCOUNTER
NURSE FROM [facility] CALLED ABOUT PATIENT BEING INPATIENT AND NEEDING A MRI.    [employer]   L131  A [id number]  V [id number]    FORM HAS BEEN SIGNED BY DR. BEDOYA AND FAXED TO RADIOLOGY

## 2022-05-26 ENCOUNTER — APPOINTMENT (OUTPATIENT)
Dept: MRI IMAGING | Facility: HOSPITAL | Age: 87
End: 2022-05-26

## 2022-05-26 ENCOUNTER — APPOINTMENT (OUTPATIENT)
Dept: ULTRASOUND IMAGING | Facility: HOSPITAL | Age: 87
End: 2022-05-26

## 2022-05-26 ENCOUNTER — APPOINTMENT (OUTPATIENT)
Dept: CARDIOLOGY | Facility: HOSPITAL | Age: 87
End: 2022-05-26

## 2022-05-26 LAB
ALBUMIN SERPL-MCNC: 4.1 G/DL (ref 3.5–5.2)
ALBUMIN/GLOB SERPL: 1.5 G/DL
ALP SERPL-CCNC: 78 U/L (ref 39–117)
ALT SERPL W P-5'-P-CCNC: 15 U/L (ref 1–41)
ANION GAP SERPL CALCULATED.3IONS-SCNC: 13 MMOL/L (ref 5–15)
AST SERPL-CCNC: 17 U/L (ref 1–40)
BH CV ECHO MEAS - AO MAX PG: 18.8 MMHG
BH CV ECHO MEAS - AO MEAN PG: 7.5 MMHG
BH CV ECHO MEAS - AO ROOT DIAM: 2.9 CM
BH CV ECHO MEAS - AO V2 MAX: 217 CM/SEC
BH CV ECHO MEAS - AO V2 VTI: 36.4 CM
BH CV ECHO MEAS - AVA(I,D): 1.92 CM2
BH CV ECHO MEAS - EDV(CUBED): 157.5 ML
BH CV ECHO MEAS - EDV(MOD-SP2): 120 ML
BH CV ECHO MEAS - EDV(MOD-SP4): 117 ML
BH CV ECHO MEAS - EF(MOD-BP): 60.1 %
BH CV ECHO MEAS - EF(MOD-SP2): 63.6 %
BH CV ECHO MEAS - EF(MOD-SP4): 61.3 %
BH CV ECHO MEAS - ESV(CUBED): 44.7 ML
BH CV ECHO MEAS - ESV(MOD-SP2): 43.7 ML
BH CV ECHO MEAS - ESV(MOD-SP4): 45.3 ML
BH CV ECHO MEAS - FS: 34.3 %
BH CV ECHO MEAS - IVS/LVPW: 1.82 CM
BH CV ECHO MEAS - IVSD: 1.28 CM
BH CV ECHO MEAS - LA DIMENSION: 3.8 CM
BH CV ECHO MEAS - LV MASS(C)D: 204.7 GRAMS
BH CV ECHO MEAS - LV MAX PG: 4.6 MMHG
BH CV ECHO MEAS - LV MEAN PG: 2 MMHG
BH CV ECHO MEAS - LV V1 MAX: 107.4 CM/SEC
BH CV ECHO MEAS - LV V1 VTI: 22.2 CM
BH CV ECHO MEAS - LVIDD: 5.4 CM
BH CV ECHO MEAS - LVIDS: 3.6 CM
BH CV ECHO MEAS - LVOT AREA: 3.1 CM2
BH CV ECHO MEAS - LVOT DIAM: 2 CM
BH CV ECHO MEAS - LVPWD: 0.71 CM
BH CV ECHO MEAS - MR MAX PG: 92.8 MMHG
BH CV ECHO MEAS - MR MAX VEL: 481.5 CM/SEC
BH CV ECHO MEAS - MR MEAN PG: 58 MMHG
BH CV ECHO MEAS - MR MEAN VEL: 359 CM/SEC
BH CV ECHO MEAS - MR VTI: 154 CM
BH CV ECHO MEAS - MV A MAX VEL: 143 CM/SEC
BH CV ECHO MEAS - MV DEC SLOPE: 488 CM/SEC2
BH CV ECHO MEAS - MV DEC TIME: 0.39 MSEC
BH CV ECHO MEAS - MV E MAX VEL: 144 CM/SEC
BH CV ECHO MEAS - MV E/A: 1.01
BH CV ECHO MEAS - MV MAX PG: 11.3 MMHG
BH CV ECHO MEAS - MV MEAN PG: 4 MMHG
BH CV ECHO MEAS - MV P1/2T: 96 MSEC
BH CV ECHO MEAS - MV V2 VTI: 56.5 CM
BH CV ECHO MEAS - MVA(P1/2T): 2.29 CM2
BH CV ECHO MEAS - MVA(VTI): 1.23 CM2
BH CV ECHO MEAS - SV(LVOT): 69.7 ML
BH CV ECHO MEAS - SV(MOD-SP2): 76.3 ML
BH CV ECHO MEAS - SV(MOD-SP4): 71.7 ML
BH CV ECHO MEAS - TR MAX PG: 26 MMHG
BH CV ECHO MEAS - TR MAX VEL: 255 CM/SEC
BILIRUB SERPL-MCNC: 0.6 MG/DL (ref 0–1.2)
BUN SERPL-MCNC: 40 MG/DL (ref 8–23)
BUN/CREAT SERPL: 14.5 (ref 7–25)
CALCIUM SPEC-SCNC: 9 MG/DL (ref 8.6–10.5)
CHLORIDE SERPL-SCNC: 106 MMOL/L (ref 98–107)
CHOLEST SERPL-MCNC: 113 MG/DL (ref 0–200)
CO2 SERPL-SCNC: 20 MMOL/L (ref 22–29)
CREAT SERPL-MCNC: 2.75 MG/DL (ref 0.76–1.27)
DEPRECATED RDW RBC AUTO: 44.8 FL (ref 37–54)
EGFRCR SERPLBLD CKD-EPI 2021: 22 ML/MIN/1.73
ERYTHROCYTE [DISTWIDTH] IN BLOOD BY AUTOMATED COUNT: 13.6 % (ref 12.3–15.4)
GLOBULIN UR ELPH-MCNC: 2.8 GM/DL
GLUCOSE SERPL-MCNC: 94 MG/DL (ref 65–99)
HBA1C MFR BLD: 5.5 % (ref 4.8–5.6)
HCT VFR BLD AUTO: 36.3 % (ref 37.5–51)
HDLC SERPL-MCNC: 31 MG/DL (ref 40–60)
HGB BLD-MCNC: 11.9 G/DL (ref 13–17.7)
LDLC SERPL CALC-MCNC: 62 MG/DL (ref 0–100)
LDLC/HDLC SERPL: 1.94 {RATIO}
LEFT ATRIUM VOLUME: 76.3 ML
MAXIMAL PREDICTED HEART RATE: 136 BPM
MCH RBC QN AUTO: 29.5 PG (ref 26.6–33)
MCHC RBC AUTO-ENTMCNC: 32.8 G/DL (ref 31.5–35.7)
MCV RBC AUTO: 89.9 FL (ref 79–97)
PLATELET # BLD AUTO: 143 10*3/MM3 (ref 140–450)
PMV BLD AUTO: 10.8 FL (ref 6–12)
POTASSIUM SERPL-SCNC: 4.9 MMOL/L (ref 3.5–5.2)
PROT SERPL-MCNC: 6.9 G/DL (ref 6–8.5)
RBC # BLD AUTO: 4.04 10*6/MM3 (ref 4.14–5.8)
SODIUM SERPL-SCNC: 139 MMOL/L (ref 136–145)
STRESS TARGET HR: 116 BPM
TRIGL SERPL-MCNC: 110 MG/DL (ref 0–150)
TSH SERPL DL<=0.05 MIU/L-ACNC: 2.91 UIU/ML (ref 0.27–4.2)
VIT B12 BLD-MCNC: 476 PG/ML (ref 211–946)
VLDLC SERPL-MCNC: 20 MG/DL (ref 5–40)
WBC NRBC COR # BLD: 6 10*3/MM3 (ref 3.4–10.8)

## 2022-05-26 PROCEDURE — 70544 MR ANGIOGRAPHY HEAD W/O DYE: CPT

## 2022-05-26 PROCEDURE — 97161 PT EVAL LOW COMPLEX 20 MIN: CPT | Performed by: PHYSICAL THERAPIST

## 2022-05-26 PROCEDURE — 84443 ASSAY THYROID STIM HORMONE: CPT | Performed by: FAMILY MEDICINE

## 2022-05-26 PROCEDURE — 93880 EXTRACRANIAL BILAT STUDY: CPT | Performed by: SURGERY

## 2022-05-26 PROCEDURE — 99233 SBSQ HOSP IP/OBS HIGH 50: CPT | Performed by: PSYCHIATRY & NEUROLOGY

## 2022-05-26 PROCEDURE — 85027 COMPLETE CBC AUTOMATED: CPT | Performed by: FAMILY MEDICINE

## 2022-05-26 PROCEDURE — 83036 HEMOGLOBIN GLYCOSYLATED A1C: CPT | Performed by: FAMILY MEDICINE

## 2022-05-26 PROCEDURE — 93880 EXTRACRANIAL BILAT STUDY: CPT

## 2022-05-26 PROCEDURE — 80053 COMPREHEN METABOLIC PANEL: CPT | Performed by: FAMILY MEDICINE

## 2022-05-26 PROCEDURE — 70547 MR ANGIOGRAPHY NECK W/O DYE: CPT

## 2022-05-26 PROCEDURE — 96105 ASSESSMENT OF APHASIA: CPT | Performed by: SPEECH-LANGUAGE PATHOLOGIST

## 2022-05-26 PROCEDURE — 93306 TTE W/DOPPLER COMPLETE: CPT | Performed by: INTERNAL MEDICINE

## 2022-05-26 PROCEDURE — 82607 VITAMIN B-12: CPT | Performed by: PSYCHIATRY & NEUROLOGY

## 2022-05-26 PROCEDURE — 97165 OT EVAL LOW COMPLEX 30 MIN: CPT

## 2022-05-26 PROCEDURE — 93306 TTE W/DOPPLER COMPLETE: CPT

## 2022-05-26 PROCEDURE — 80061 LIPID PANEL: CPT | Performed by: FAMILY MEDICINE

## 2022-05-26 PROCEDURE — 25010000002 PERFLUTREN 6.52 MG/ML SUSPENSION: Performed by: FAMILY MEDICINE

## 2022-05-26 RX ORDER — AMLODIPINE BESYLATE 10 MG/1
10 TABLET ORAL DAILY
COMMUNITY
End: 2022-05-27 | Stop reason: HOSPADM

## 2022-05-26 RX ADMIN — Medication 10 ML: at 08:53

## 2022-05-26 RX ADMIN — PERFLUTREN 1.17 MG: 6.52 INJECTION, SUSPENSION INTRAVENOUS at 12:58

## 2022-05-26 RX ADMIN — SODIUM BICARBONATE 650 MG: 650 TABLET ORAL at 20:29

## 2022-05-26 RX ADMIN — ATORVASTATIN CALCIUM 80 MG: 40 TABLET, FILM COATED ORAL at 08:53

## 2022-05-26 RX ADMIN — Medication 10 ML: at 20:28

## 2022-05-26 RX ADMIN — ASPIRIN 81 MG: 81 TABLET, COATED ORAL at 08:53

## 2022-05-26 RX ADMIN — SODIUM BICARBONATE 650 MG: 650 TABLET ORAL at 08:53

## 2022-05-27 ENCOUNTER — APPOINTMENT (OUTPATIENT)
Dept: CARDIOLOGY | Facility: HOSPITAL | Age: 87
End: 2022-05-27

## 2022-05-27 ENCOUNTER — APPOINTMENT (OUTPATIENT)
Dept: ULTRASOUND IMAGING | Facility: HOSPITAL | Age: 87
End: 2022-05-27

## 2022-05-27 ENCOUNTER — READMISSION MANAGEMENT (OUTPATIENT)
Dept: CALL CENTER | Facility: HOSPITAL | Age: 87
End: 2022-05-27

## 2022-05-27 VITALS
RESPIRATION RATE: 18 BRPM | SYSTOLIC BLOOD PRESSURE: 128 MMHG | HEIGHT: 69 IN | HEART RATE: 70 BPM | TEMPERATURE: 98 F | DIASTOLIC BLOOD PRESSURE: 72 MMHG | WEIGHT: 204.59 LBS | BODY MASS INDEX: 30.3 KG/M2 | OXYGEN SATURATION: 98 %

## 2022-05-27 LAB
ANION GAP SERPL CALCULATED.3IONS-SCNC: 13 MMOL/L (ref 5–15)
BUN SERPL-MCNC: 41 MG/DL (ref 8–23)
BUN/CREAT SERPL: 14.7 (ref 7–25)
CALCIUM SPEC-SCNC: 9.1 MG/DL (ref 8.6–10.5)
CHLORIDE SERPL-SCNC: 105 MMOL/L (ref 98–107)
CO2 SERPL-SCNC: 21 MMOL/L (ref 22–29)
CREAT SERPL-MCNC: 2.78 MG/DL (ref 0.76–1.27)
DEPRECATED RDW RBC AUTO: 44.9 FL (ref 37–54)
EGFRCR SERPLBLD CKD-EPI 2021: 21.8 ML/MIN/1.73
ERYTHROCYTE [DISTWIDTH] IN BLOOD BY AUTOMATED COUNT: 13.3 % (ref 12.3–15.4)
GLUCOSE SERPL-MCNC: 154 MG/DL (ref 65–99)
HCT VFR BLD AUTO: 37.5 % (ref 37.5–51)
HGB BLD-MCNC: 12 G/DL (ref 13–17.7)
MCH RBC QN AUTO: 29.4 PG (ref 26.6–33)
MCHC RBC AUTO-ENTMCNC: 32 G/DL (ref 31.5–35.7)
MCV RBC AUTO: 91.9 FL (ref 79–97)
PLATELET # BLD AUTO: 149 10*3/MM3 (ref 140–450)
PMV BLD AUTO: 10.8 FL (ref 6–12)
POTASSIUM SERPL-SCNC: 4.7 MMOL/L (ref 3.5–5.2)
RBC # BLD AUTO: 4.08 10*6/MM3 (ref 4.14–5.8)
SODIUM SERPL-SCNC: 139 MMOL/L (ref 136–145)
WBC NRBC COR # BLD: 5.71 10*3/MM3 (ref 3.4–10.8)

## 2022-05-27 PROCEDURE — 85027 COMPLETE CBC AUTOMATED: CPT | Performed by: FAMILY MEDICINE

## 2022-05-27 PROCEDURE — 80048 BASIC METABOLIC PNL TOTAL CA: CPT | Performed by: FAMILY MEDICINE

## 2022-05-27 PROCEDURE — 92507 TX SP LANG VOICE COMM INDIV: CPT | Performed by: SPEECH-LANGUAGE PATHOLOGIST

## 2022-05-27 PROCEDURE — 99233 SBSQ HOSP IP/OBS HIGH 50: CPT | Performed by: PHYSICIAN ASSISTANT

## 2022-05-27 PROCEDURE — 76775 US EXAM ABDO BACK WALL LIM: CPT

## 2022-05-27 RX ORDER — DABIGATRAN ETEXILATE 150 MG/1
150 CAPSULE ORAL 2 TIMES DAILY
Qty: 60 CAPSULE | Refills: 5 | Status: SHIPPED | OUTPATIENT
Start: 2022-06-09 | End: 2022-07-29

## 2022-05-27 RX ORDER — IRBESARTAN 300 MG/1
150 TABLET ORAL NIGHTLY
Status: ON HOLD
Start: 2022-05-27 | End: 2022-11-17 | Stop reason: DRUGHIGH

## 2022-05-27 RX ADMIN — SODIUM BICARBONATE 650 MG: 650 TABLET ORAL at 08:33

## 2022-05-27 RX ADMIN — ATORVASTATIN CALCIUM 80 MG: 40 TABLET, FILM COATED ORAL at 08:33

## 2022-05-27 RX ADMIN — Medication 10 ML: at 08:34

## 2022-05-27 RX ADMIN — ASPIRIN 81 MG: 81 TABLET, COATED ORAL at 08:33

## 2022-05-28 NOTE — OUTREACH NOTE
Prep Survey    Flowsheet Row Responses   [facility] facility patient discharged from? [place]   Is LACE score < 7 ? No   Emergency Room discharge w/ pulse ox? No   Eligibility Readm Mgmt   Discharge diagnosis Cerebrovascular accident Chronic renal failure, stage 4    Does the patient have one of the following disease processes/diagnoses(primary or secondary)? Stroke (TIA)   Does the patient have Home health ordered? No   Is there a DME ordered? No   Prep survey completed? Yes          CHERYL SUH - Registered Nurse

## 2022-06-01 ENCOUNTER — READMISSION MANAGEMENT (OUTPATIENT)
Dept: CALL CENTER | Facility: HOSPITAL | Age: 87
End: 2022-06-01

## 2022-06-01 ENCOUNTER — TELEPHONE (OUTPATIENT)
Dept: INTERNAL MEDICINE | Age: 87
End: 2022-06-01

## 2022-06-01 ENCOUNTER — TELEPHONE (OUTPATIENT)
Dept: VASCULAR SURGERY | Facility: CLINIC | Age: 87
End: 2022-06-01

## 2022-06-01 NOTE — OUTREACH NOTE
Stroke Week 1 Survey    Flowsheet Row Responses   [facility] facility patient discharged from? [place]   Does the patient have one of the following disease processes/diagnoses(primary or secondary)? Stroke (TIA)   Week 1 attempt successful? No   Unsuccessful attempts Attempt 1          HAO MAXWELL - Registered Nurse

## 2022-06-01 NOTE — TELEPHONE ENCOUNTER
[facility] Transitions Initial Follow Up Call    Outreach made within 2 business days of discharge: Yes    Patient: Darian Robertson   Patient : 1935 MRN: [id number]    Reason for Admission: Stroke like symptoms  Discharge Date: 2022      Discharge Diagnoses: Active Hospital Problems   Diagnosis    [facility]A) [facility] [address]    Chronic renal failure, stage 4 (severe) (HCC)    Atrial flutter with controlled response ([address]    S/P TAVR (transcatheter aortic valve replacement)   29mm S3 valve 21     Stenosis of right carotid artery   Added automatically from request for surgery [id number]     Coronary artery disease involving coronary bypass graft of native heart without angina pectoris      Spoke with: [place] patients wife    Discharge department/facility: [facility] Interactive Patient Contact:  Was patient able to fill all prescriptions: Yes  Was patient instructed to bring all medications to the follow-up visit: Yes  Is patient taking all medications as directed in the discharge summary? Yes  Does patient understand their discharge instructions: Yes  Does patient have questions or concerns that need addressed prior to 7-14 day follow up office visit: no    Spoke to patients wife [place] she said that he is doing ok, he is having trouble with vision on one side and speech. PT has all his medications and is bringing a list with him to the apt today. She said they have changed several medications and they want to make sure he in on what he should be.      Scheduled appointment with PCP within 7-14 days    Follow Up  Future Appointments   Date Time Provider Adalberto Ricardo   2022  1:30 PM SASCHA Kidd [place], [place]

## 2022-06-02 ENCOUNTER — OFFICE VISIT (OUTPATIENT)
Dept: VASCULAR SURGERY | Facility: CLINIC | Age: 87
End: 2022-06-02

## 2022-06-02 VITALS
HEIGHT: 69 IN | SYSTOLIC BLOOD PRESSURE: 148 MMHG | OXYGEN SATURATION: 98 % | BODY MASS INDEX: 30.07 KG/M2 | WEIGHT: 203 LBS | DIASTOLIC BLOOD PRESSURE: 90 MMHG | HEART RATE: 78 BPM | RESPIRATION RATE: 18 BRPM

## 2022-06-02 DIAGNOSIS — E78.2 MIXED HYPERLIPIDEMIA: ICD-10-CM

## 2022-06-02 DIAGNOSIS — I10 PRIMARY HYPERTENSION: ICD-10-CM

## 2022-06-02 DIAGNOSIS — I63.9 ACUTE CEREBROVASCULAR ACCIDENT (CVA) OF CEREBELLUM: ICD-10-CM

## 2022-06-02 DIAGNOSIS — I65.23 BILATERAL CAROTID ARTERY STENOSIS: Primary | ICD-10-CM

## 2022-06-02 PROCEDURE — 99214 OFFICE O/P EST MOD 30 MIN: CPT | Performed by: SURGERY

## 2022-06-02 NOTE — PROGRESS NOTES
"6/2/2022    Jw Leos PA-C  [address] [place] [address] [address]IVE [address]  [facility] [address]        Mireille Magallanes  1935    Chief Complaint   Patient presents with   • Carotid Artery Disease     Abnormal carotid, CVA 5/24/22.  Pt states he has blurred vision since the stroke. Pt denies any other stroke-like symptoms.       Dear Jw Leos PA-C   HPI  I had the pleasure of seeing your patient Mireille Magallanes in the office today.  As you recall, Mireille Magallanes is a 86 y.o.  male who we are following for carotid occlusive disease.  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  He recently had a left hemispheric stroke and spent a couple days in the hospital.  He is maintained on Plavix and Lipitor.  He did undergo a right carotid endarterectomy on 2/14/2020.  He did have noninvasive testing performed today, which I did review in office.      Review of Systems   Constitutional: Negative.    HENT: Negative.    Eyes: Negative.    Respiratory: Negative.    Cardiovascular: Negative.    Gastrointestinal: Negative.    Endocrine: Negative.    Genitourinary: Negative.    Musculoskeletal: Negative.    Skin: Negative.    Allergic/Immunologic: Negative.    Neurological: Negative.    Hematological: Negative.    Psychiatric/Behavioral: Negative.    All other systems reviewed and are negative.       /90 (BP Location: Left arm, Patient Position: Sitting, Cuff Size: Adult)   Pulse 78   Resp 18   Ht 175.3 cm (69\")   Wt 92.1 kg (203 lb)   SpO2 98%   BMI 29.98 kg/m²      Physical Exam  Vitals and nursing note reviewed.   Constitutional:       Appearance: Normal appearance. He is well-developed and normal weight.   HENT:      Head: Normocephalic and atraumatic.   Eyes:      General: No scleral icterus.     Pupils: Pupils are equal, round, and reactive to light.   Neck:      Thyroid: No thyromegaly.      Vascular: No carotid bruit or JVD.   Cardiovascular:      Rate and Rhythm: " Normal rate and regular rhythm.      Pulses:           Carotid pulses are 2+ on the right side and 2+ on the left side.       Femoral pulses are 2+ on the right side and 2+ on the left side.       Popliteal pulses are 2+ on the right side and 2+ on the left side.        Dorsalis pedis pulses are 2+ on the right side and 2+ on the left side.        Posterior tibial pulses are 2+ on the right side and 2+ on the left side.      Heart sounds: Murmur heard.   Pulmonary:      Effort: Pulmonary effort is normal.      Breath sounds: Normal breath sounds.   Abdominal:      General: Bowel sounds are normal. There is no distension or abdominal bruit.      Palpations: Abdomen is soft. There is no mass.      Tenderness: There is no abdominal tenderness.   Musculoskeletal:         General: Normal range of motion.      Cervical back: Normal range of motion and neck supple.   Lymphadenopathy:      Cervical: No cervical adenopathy.   Skin:     General: Skin is warm and dry.   Neurological:      General: No focal deficit present.      Mental Status: He is alert and oriented to person, place, and time.      Cranial Nerves: No cranial nerve deficit.      Sensory: No sensory deficit.   Psychiatric:         Mood and Affect: Mood normal.         Behavior: Behavior normal. Behavior is cooperative.         Thought Content: Thought content normal.         Judgment: Judgment normal.       Diagnostic data:  Adult Transthoracic Echo Complete W/ Cont if Necessary Per Protocol (With Agitated Saline)    Result Date: 5/26/2022  Narrative: · Left ventricular ejection fraction appears to be 61 - 65%. Left ventricular systolic function is normal. · Left ventricular wall thickness is consistent with mild concentric hypertrophy. · Left ventricular diastolic dysfunction is noted. 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 · Mild to moderate mitral valve stenosis is present. · Estimated right  ventricular systolic pressure from tricuspid regurgitation is normal (<35 mmHg). · There is no evidence of right to left shunt on bubble study. · There is no evidence of intracardiac mass or thrombus. · Compared to previous echo from 4/26/2022, there are no significant changes.      MRI Angiogram Head Without Contrast    Result Date: 5/26/2022  Narrative: HISTORY: Stroke  MRA brain: Time-of-flight technique is utilized to acquire the images. The source axial and third reconstructed images are reviewed.  COMPARISON: MRI brain 5/25/2022  FINDINGS: There is signal identified within the distal bilateral internal carotid arteries. Signal present within the bilateral anterior and middle cerebral arteries. There is a hypoplastic right distal vertebral artery. Diminished signal of the distal vertebral arteries likely related to directional flow artifact. There is appropriate signal within and the basilar artery. Bilateral posterior cerebral arteries demonstrate appropriate signal. The superior and inferior cerebellar arteries appear normal in caliber with signal. No aneurysm is identified.      Impression: 1. Diminished signal within the bilateral vertebral arteries at the C1/occiput level may relate to directional flow artifact. No large vessel occlusion. No aneurysm. 2. Decreased signal changes of the medial left temporal and occipital lobe representing the known acute ischemia seen on recent MRI brain exam. This report was finalized on 05/26/2022 20:34 by Dr. Hailey Kowalski MD.    MRI Angiogram Neck Without Contrast    Result Date: 5/27/2022  Narrative: EXAMINATION: MRI ANGIOGRAM NECK WO CONTRAST- 5/27/2022 8:18 AM CDT  HISTORY: Stroke, follow up.  REPORT: 3-D time-of-flight MR angiography of the neck was performed without contrast.  COMPARISON: Ultrasound of the carotid arteries 5/26/2022.  There is excessive motion artifact. This causes loss of signal within the proximal right common carotid artery, the visualized  common carotid artery on the right is patent, without occlusion and no significant stenosis is seen at the right carotid bifurcation including the proximal right ICA. On the left, the common carotid artery is patent from its origin in the left common carotid bifurcation appears patent, without significant stenosis. Both vertebral arteries appear patent, the left vertebral artery is dominant compared to the right.      Impression: Motion limited study with incomplete visualization of the origin of the right common carotid artery, the visualized carotid arteries are patent, without flow-limiting stenosis. The vertebral arteries are patent bilaterally. This report was finalized on 05/27/2022 08:23 by Dr. Nhan Betancourt MD.    MRI Brain Without Contrast    Result Date: 5/25/2022  Narrative: EXAMINATION: MRI BRAIN WO CONTRAST-  5/25/2022 4:32 PM CDT  HISTORY: Stroke, follow up; I63.9-Cerebral infarction, unspecified  Noncontrast MR imaging of the brain.  Comparison is made with a noncontrast head CT from earlier today at 12:01 PM.  Large late acute left temporal occipital infarction involving an area measuring approximately 86 x 32 x 26 mm. No hemorrhage. No midline shift.  Additional small ischemic foci noted. 9 x 4 mm acute ischemic focus within the cerebellar vermis. 11 x 6 mm acute ischemic focus within the right periventricular white matter.  Moderate atrophy and small vessel disease. Clear paranasal sinuses.  Ventricle size is normal.  Summary: 1. Large left temporal occipital acute infarct with no hemorrhage or midline shift. 2. Additional tiny acute ischemic focus within the cerebellar vermis and within the right frontal lobe periventricular white matter. This report was finalized on 05/25/2022 17:06 by Dr. Franco Christy MD.    XR Chest 1 View    Result Date: 5/25/2022  Narrative: EXAMINATION: XR CHEST 1 VW-  5/25/2022 12:28 PM CDT  HISTORY: Stroke Protocol (Onset > 12 hrs)  1 view chest x-ray.  Comparison is  made with April 20, 2021.  CABG changes. Prosthetic heart valve. Unchanged left cardiac pacer.  Chronic lung disease. No pneumonia, pneumothorax, or congestive failure.  Summary: 1. Stable chronic change. This report was finalized on 05/25/2022 12:35 by Dr. Franco Christy MD.    US Carotid Bilateral    Result Date: 5/26/2022  Narrative: History: Stroke      Impression: Impression: 1. There is less than 50% stenosis of the right internal carotid artery. 2. There is less than 50% stenosis of the left internal carotid artery. 3. Antegrade flow is demonstrated in bilateral vertebral arteries. 4. There appears to be plaque or potential clot burden at the left bifurcation. Further imaging with a CTA of the neck may be warranted.  Comments: Bilateral carotid vertebral arterial duplex scan was performed.  Grayscale imaging shows intimal thickening and calcified elements at the carotid bifurcation. The right internal carotid artery peak systolic velocity is 74.7 cm/sec. The end-diastolic velocity is 20.6 cm/sec. The right ICA/CCA ratio is approximately 0.9 . These findings correlate with less than 50% stenosis of the right internal carotid artery.  Grayscale imaging shows intimal thickening and calcified elements at the carotid bifurcation. The left internal carotid artery peak systolic velocity is 71.8 cm/sec. The end-diastolic velocity is 18.5 cm/sec. The left ICA/CCA ratio is approximately 1.2 . These findings correlate with less than 50% stenosis of the left internal carotid artery.  Antegrade flow is demonstrated in bilateral vertebral arteries.  This report was finalized on 05/26/2022 15:11 by Dr. Martín Woo MD.    US Renal Bilateral    Result Date: 5/27/2022  Narrative: EXAMINATION: US RENAL BILATERAL- 5/27/2022 1:33 PM CDT  HISTORY: Renal failure.; I63.9-Cerebral infarction, unspecified; R47.01-Aphasia; N18.4-Chronic kidney disease, stage 4 (severe); I63.432-Cerebral infarction due to embolism of left posterior  "cerebral artery; I48.92-Unspecified atrial flutter; Z95.2-Presence of prosthetic heart valve; Z86.73-Personal history of transient ischemic attack (TIA), and cerebral infarction without residual deficits; I65.29-Occlu.  REPORT: Sonographic images of the kidneys were obtained bilaterally.  COMPARISON: There are no correlative imaging studies for comparison.  The right kidney measures 11.4 x 6.7 x 6.8 cm and contains multiple cysts of various sizes, the largest cyst is at the upper pole and measures 4.5 x 4.9 x 4.6 cm. This is uncomplicated. No solid masses in the right kidney. There is increased echogenicity of the renal cortex on the right which may be related to \"medical\" renal disease. No hydronephrosis is seen.  The bladder wall appears thickened and trabeculated with diverticula. Correlate clinically for chronic bladder outlet obstruction.  The left kidney measures 10.2 x 4.2 x 4.1 cm and has increased cortical echogenicity similar to the right kidney. There are multiple left renal cysts, the largest on the left is at the inferior pole and measures 5.2 x 3.6 x 4.9 cm. No solid mass is seen in the left kidney. Color Doppler images demonstrate vascular flow within the left kidney.      Impression: 1. Bilateral benign-appearing represent cysts. No renal mass or hydronephrosis. 2. Both kidneys demonstrate increased cortical echogenicity which may be related to \"medical\" renal disease. 3. Bladder wall thickening with trabeculation and diverticula. Correlate clinically for chronic bladder outlet obstruction. This report was finalized on 05/27/2022 13:37 by Dr. Nhan Betancourt MD.    CT Head Without Contrast Stroke Protocol    Result Date: 5/25/2022  Narrative: EXAMINATION: CT HEAD WO CONTRAST STROKE PROTOCOL- 5/25/2022 12:12 PM CDT  HISTORY: Stroke, follow up. Left-sided headaches, confusion. HISTORY of previous strokes.  DOSE: 753 mGycm (Automatic exposure control technique was implemented in an effort to keep the " radiation dose as low as possible without compromising image quality)  REPORT: Axial CT of the head was performed without contrast, reconstructed coronal and sagittal images are also reviewed.  Comparison: MRI brain without contrast 8/8/2019.  No intracranial hemorrhage is identified, there is abnormally decreased attenuation in the distribution of the left posterior cerebral artery compatible with edema and acute infarction. Clinical correlation is recommended as to the timing of onset of symptoms. The ventricles and basal cisterns are within normal limits. There is decreased attenuation in the periventricular and subcortical white matter tracts compatible with chronic small vessel white matter ischemic disease. There is mild diffuse cerebral atrophy. Review of bone windows is unremarkable. There is normal aeration of the visualized paranasal sinuses and mastoid air cells.      Impression: 1. Acute nonhemorrhagic left PCA infarct with associated cerebral edema. 2. Mild diffuse atrophy and chronic small vessel white matter ischemic changes.  REPORT called to the emergency department at the time of dictation. This report was finalized on 05/25/2022 12:21 by Dr. Nhan Betancourt MD.       Patient Active Problem List   Diagnosis   • HTN (hypertension)   • Hyperlipidemia   • Coronary artery disease involving coronary bypass graft of native heart without angina pectoris   • Pacemaker   • Other chest pain   • Overweight (BMI 25.0-29.9)   • TIA (transient ischemic attack)   • Carotid artery stenosis   • Acute cerebrovascular accident (CVA) of cerebellum ([facility])   • Stenosis of right carotid artery   • Bilateral carotid artery stenosis   • Carotid stenosis   • History of stroke   • S/P TAVR (transcatheter aortic valve replacement)   • Atrial flutter with controlled response ([facility])   • Cerebrovascular accident (CVA) ([facility])   • Chronic renal failure, stage 4 (severe) ([facility])        Diagnosis Plan   1. Bilateral carotid artery  stenosis     2. Primary hypertension     3. Mixed hyperlipidemia     4. Acute cerebrovascular accident (CVA) of cerebellum (HCC)         Plan: After thoroughly evaluating Mireille Magallanes, I believe the best course of action to initially remain conservative from vascular surgery standpoint.  I did review his testing which shows less than 50% carotid stenosis on the right side and about 75 to 80% on the left side.  The MRA has poor images it is inconclusive.  It appears as though he may have had an embolic component to his stroke, thus he was changed to Pradaxa.  I will see him back in 1 month's time for reevaluation to allow some time for his stroke area to heal.  At that point we may consider left carotid revascularization.  I did discuss vascular risk factors as they pertain to the progression of vascular disease including controlling hypertension and hyperlipidemia.  His blood pressure is controlled on his current medication regimen.  He is maintained on Lipitor for his hyperlipidemia. Patient's Body mass index is 29.98 kg/m². BMI is above normal parameters. Recommendations include: educational material.  The patient is to continue taking their medications as previously discussed.   This was all discussed in full with complete understanding.  Thank you for allowing me to participate in the care of your patient.  Please do not hesitate to call with any questions or concerns.  We will keep you aware of any further encounters with Mireille Magallanes.        Sincerely yours,         DO Deric Jimenez Robert A, PA-C

## 2022-06-03 ENCOUNTER — OFFICE VISIT (OUTPATIENT)
Dept: INTERNAL MEDICINE | Age: 87
End: 2022-06-03
Payer: MEDICARE

## 2022-06-03 ENCOUNTER — PATIENT ROUNDING (BHMG ONLY) (OUTPATIENT)
Dept: VASCULAR SURGERY | Facility: CLINIC | Age: 87
End: 2022-06-03

## 2022-06-03 VITALS
DIASTOLIC BLOOD PRESSURE: 82 MMHG | HEART RATE: 82 BPM | OXYGEN SATURATION: 98 % | BODY MASS INDEX: 30.21 KG/M2 | SYSTOLIC BLOOD PRESSURE: 148 MMHG | HEIGHT: 69 IN | WEIGHT: 204 LBS

## 2022-06-03 DIAGNOSIS — Z09 HOSPITAL DISCHARGE FOLLOW-UP: ICD-10-CM

## 2022-06-03 DIAGNOSIS — I63.9 CEREBROVASCULAR ACCIDENT (CVA), UNSPECIFIED MECHANISM (HCC): Primary | ICD-10-CM

## 2022-06-03 PROCEDURE — 1111F DSCHRG MED/CURRENT MED MERGE: CPT | Performed by: INTERNAL MEDICINE

## 2022-06-03 PROCEDURE — 99496 TRANSJ CARE MGMT HIGH F2F 7D: CPT | Performed by: INTERNAL MEDICINE

## 2022-06-03 RX ORDER — DABIGATRAN ETEXILATE 150 MG/1
150 CAPSULE, COATED PELLETS ORAL 2 TIMES DAILY
COMMUNITY
End: 2022-08-05 | Stop reason: ALTCHOICE

## 2022-06-03 RX ORDER — B-COMPLEX WITH VITAMIN C
TABLET ORAL
COMMUNITY
End: 2022-08-05

## 2022-06-03 ASSESSMENT — ENCOUNTER SYMPTOMS
EYE ITCHING: 0
SORE THROAT: 0
SINUS PRESSURE: 0
ABDOMINAL DISTENTION: 0
BLOOD IN STOOL: 0
SHORTNESS OF BREATH: 0
EYE DISCHARGE: 0
VOMITING: 0
WHEEZING: 0
TROUBLE SWALLOWING: 0
ABDOMINAL PAIN: 0
BACK PAIN: 0
NAUSEA: 0

## 2022-06-03 NOTE — PROGRESS NOTES
Post-Discharge Transitional Care Management Services      Jamilah Sterling   YOB: 1935    Date of Visit:  6/3/2022  30 Day Post-Discharge Date: June 27, 2022    Allergies   Allergen Reactions    Sulfa Antibiotics      Outpatient Medications Marked as Taking for the 6/3/22 encounter (Office Visit) with Tom Toure MD   Medication Sig Dispense Refill    Zinc 100 MG TABS Take by mouth      dabigatran (PRADAXA) 150 MG capsule Take 150 mg by mouth 2 times daily      Cholecalciferol (VITAMIN D3) 125 MCG (5000 UT) TABS Take by mouth      Omega-3 Fatty Acids (FISH OIL) 1000 MG CAPS Take 3,000 mg by mouth 3 times daily      sodium bicarbonate 325 MG tablet Take 650 mg by mouth 2 times daily      irbesartan (AVAPRO) 300 MG tablet Take 1 tablet by mouth nightly 90 tablet 1    atorvastatin (LIPITOR) 80 MG tablet Take 1 tablet by mouth once daily 90 tablet 0    aspirin 81 MG tablet Take 81 mg by mouth daily      Coenzyme Q10 (CO Q 10) 10 MG CAPS Take 1 capsule by mouth daily      nitroGLYCERIN (NITROSTAT) 0.4 MG SL tablet Place 1 tablet under the tongue One under tongue as needed for chest pain. Vitals:    06/03/22 0826 06/03/22 0833   BP: (!) 148/82 (!) 148/82   Pulse: 82    SpO2: 98%    Weight: 204 lb (92.5 kg)    Height: 5' 9\" (1.753 m)      Body mass index is 30.13 kg/m². Wt Readings from Last 3 Encounters:   06/03/22 204 lb (92.5 kg)   06/01/22 205 lb (93 kg)   07/06/21 207 lb 6.4 oz (94.1 kg)     BP Readings from Last 3 Encounters:   06/03/22 (!) 148/82   06/01/22 (!) 150/84   07/06/21 134/88        Patient was admitted to [facility] from 5/25/2022 to 5/27/2022 for acute stroke. HPI:  Admitted to [facility] on 5/25/2022 with a stroke. He was hospitalized until 27 May and was at evaluated by neurology. He did not have any major sequela from the stroke and is home now getting physical therapy as well as speech therapy.   He was started on Pradaxa      Inpatient course: Discharge summary reviewed- see chart. Current status: Stable    Review of Systems:  Review of Systems   Constitutional: Negative for activity change, appetite change, fatigue and fever. HENT: Negative for congestion, hearing loss, sinus pressure, sore throat and trouble swallowing. Eyes: Negative for discharge and itching. Respiratory: Negative for shortness of breath and wheezing. Cardiovascular: Negative for chest pain, palpitations and leg swelling. Gastrointestinal: Negative for abdominal distention, abdominal pain, blood in stool, nausea and vomiting. Endocrine: Negative for cold intolerance, heat intolerance and polydipsia. Genitourinary: Negative for flank pain, frequency, hematuria and urgency. Musculoskeletal: Negative for arthralgias, back pain and joint swelling. Skin: Negative for rash and wound. Allergic/Immunologic: Negative for environmental allergies and food allergies. Neurological: Positive for speech difficulty. Negative for dizziness, tremors, syncope, weakness, numbness and headaches. Hematological: Negative for adenopathy. Psychiatric/Behavioral: Negative for agitation and hallucinations. The patient is not nervous/anxious. Physical Exam:  Physical Exam  Constitutional:       General: He is not in acute distress. Appearance: He is well-developed. He is not diaphoretic. HENT:      Head: Normocephalic and atraumatic. Right Ear: External ear normal.      Left Ear: External ear normal.      Nose: Nose normal.      Mouth/Throat:      Pharynx: No oropharyngeal exudate. Eyes:      General: No scleral icterus. Right eye: No discharge. Left eye: No discharge. Conjunctiva/sclera: Conjunctivae normal.      Pupils: Pupils are equal, round, and reactive to light. Neck:      Thyroid: No thyromegaly. Vascular: No JVD. Trachea: No tracheal deviation.    Cardiovascular:      Rate and Rhythm: Normal rate and regular rhythm. Heart sounds: Normal heart sounds. No murmur heard. No friction rub. No gallop. Pulmonary:      Effort: Pulmonary effort is normal. No respiratory distress. Breath sounds: Normal breath sounds. No wheezing or rales. Abdominal:      General: Bowel sounds are normal. There is no distension. Palpations: Abdomen is soft. There is no mass. Tenderness: There is no abdominal tenderness. There is no guarding or rebound. Musculoskeletal:         General: No tenderness or deformity. Normal range of motion. Cervical back: Normal range of motion and neck supple. Lymphadenopathy:      Cervical: No cervical adenopathy. Skin:     General: Skin is warm and dry. Coloration: Skin is not pale. Findings: No erythema or rash. Neurological:      Mental Status: He is alert and oriented to person, place, and time. Cranial Nerves: No cranial nerve deficit. Motor: No abnormal muscle tone. Coordination: Coordination normal.      Deep Tendon Reflexes: Reflexes are normal and symmetric. Reflexes normal.      Comments: Some word finding issues   Psychiatric:         Behavior: Behavior normal.         Thought Content: Thought content normal.         Judgment: Judgment normal.         Initial post-discharge communication occurred between medical assistant and patient on 6/1/2020 to- see documentation in chart: telephone encounter. Assessment/Plan:  There are no diagnoses linked to this encounter. Diagnostic test results reviewed: inpatient labs    Patient risk of morbidity and mortality: high    Medical Decision Making: high complexity  Patient is home now and has an upper assessment going on with speech therapy for his word finding issues. From a otherwise physical standpoint he is appears to be stable. He was started on Pradaxa and he is fighting with his insurance company to try to get it covered as it is very expensive.   We will I will look for samples and a coupon for while he is here. He will continue therapy as ordered. He is going to see Dr. Cate Hdz the vascular surgeon in a month for follow-up on his carotids to see if further work-up needs to be done. We will see him back again in 6 weeks.

## 2022-06-03 NOTE — PROGRESS NOTES
Linnea 3, 2022    Hello, may I speak with Mireille Magallanes?    My name is Natalya       I am  with [facility]  [address], [address]  [facility] [address] [phone].    Before we get started may I verify your date of birth? 1935    I am calling to officially welcome you to our practice and ask about your recent visit. Is this a good time to talk? Left message for a return call.    Tell me about your visit with us. What things went well?  Left message for a return call.       We're always looking for ways to make our patients' experiences even better. Do you have recommendations on ways we may improve?  Left message for a return call.    Overall were you satisfied with your first visit to our practice? Left message for a return call.       I appreciate you taking the time to speak with me today. Is there anything else I can do for you? Left message for a return call.      Thank you, and have a great day.

## 2022-06-03 NOTE — PROGRESS NOTES
Linnea 3, 2022    Hello, may I speak with Mireille Magallanes?    My name is JUNE      I am  with [facility]  [address], [address]  [facility] [address] [phone].    Before we get started may I verify your date of birth? 1935    I am calling to officially welcome you to our practice and ask about your recent visit. Is this a good time to talk?  YES    Tell me about your visit with us. What things went well?  VISIT WAS GREAT, DR MARCELO TOLD HIM AND HIS WIFE THINGS THAT THEY DID NOT KNOW AND THEY WERE VERY HAPPY WITH HIM       We're always looking for ways to make our patients' experiences even better. Do you have recommendations on ways we may improve?  NO    Overall were you satisfied with your first visit to our practice?  YES       I appreciate you taking the time to speak with me today. Is there anything else I can do for you?  NO      Thank you, and have a great day.

## 2022-06-06 ENCOUNTER — TELEPHONE (OUTPATIENT)
Dept: GASTROENTEROLOGY | Facility: CLINIC | Age: 87
End: 2022-06-06

## 2022-06-06 RX ORDER — ATORVASTATIN CALCIUM 80 MG/1
TABLET, FILM COATED ORAL
Qty: 90 TABLET | Refills: 3 | Status: ON HOLD | OUTPATIENT
Start: 2022-06-06 | End: 2022-09-20

## 2022-06-06 NOTE — TELEPHONE ENCOUNTER
SPOKE WITH PT'S WIFE ABOUT PT BEING DUE FOR A REPEAT COLONOSCOPY. SHE SAID HE HAD A STROKE LAST WEEK AND HAD A HEART VALVE PUT IN LAST YEAR. HE HAS SOME OTHER HEALTH ISSUES. SHE DOES NOT THINK HE COULD GO THROUGH THAT RIGHT NOW.  I TOLD HER I WOULD LET DR BURT KNOW.   LETTER SENT TO PCP.

## 2022-06-07 ENCOUNTER — READMISSION MANAGEMENT (OUTPATIENT)
Dept: CALL CENTER | Facility: HOSPITAL | Age: 87
End: 2022-06-07

## 2022-06-07 NOTE — OUTREACH NOTE
Stroke Week 1 Survey    Flowsheet Row Responses   [facility] patient discharged from? [place]   Does the patient have one of the following disease processes/diagnoses(primary or secondary)? Stroke (TIA)   Week 1 attempt successful? Yes   Call start time 1043   Call end time 1050   Discharge diagnosis Cerebrovascular accident Chronic renal failure, stage 4    Is patient permission given to speak with other caregiver? Yes   List who call center can speak with Dahlia daughter    Person spoke with today (if not patient) and relationship Dahlia daughter    Meds reviewed with patient/caregiver? Yes   Is the patient having any side effects they believe may be caused by any medication additions or changes? No   Does the patient have all medications ordered at discharge? No   What is keeping the patient from filling the prescriptions? --  [waiting for return call re: Pradaxa ]   Nursing Interventions Nurse provided patient education   Prescription comments encouraged daughter to contact PCP re: Pradaxa prescription    Does the patient have a primary care provider?  Yes   Has the patient kept scheduled appointments due by today? Yes   Home health comments Speech Therapy starting 6-13-22 outpatient    Psychosocial issues? No   Does the patient require any assistance with activities of daily living such as eating, bathing, dressing, walking, etc.? No   Does the patient have any residual symptoms from stroke/TIA? Yes   Residual symptoms comments Reading and finding the right words per daughter    Does the patient understand the diet ordered at discharge? Yes   Did the patient receive a copy of their discharge instructions? Yes   Nursing interventions Reviewed instructions with patient   What is the patient's perception of their health status since discharge? Improving   Nursing interventions Nurse provided patient education   Is the patient able to teach back FAST for Stroke? Yes   Is the patient/caregiver able to teach back  the risk factors for a stroke? High blood pressure-goal below 120/80, History of TIAs   Is the patient/caregiver able to teach back signs and symptoms related to disease process for when to call PCP? Yes   Is the patient/caregiver able to teach back signs and symptoms related to disease process for when to call 911? Yes   If the patient is a current smoker, are they able to teach back resources for cessation? Not a smoker   Is the patient/caregiver able to teach back the hierarchy of who to call/visit for symptoms/problems? PCP, Specialist, Home health nurse, Urgent Care, ED, 911 Yes   Week 1 call completed? Yes          TYRON H - Registered Nurse

## 2022-06-08 ENCOUNTER — TELEPHONE (OUTPATIENT)
Dept: PRIMARY CARE CLINIC | Age: 87
End: 2022-06-08

## 2022-06-08 ENCOUNTER — TELEPHONE (OUTPATIENT)
Dept: PODIATRY | Facility: CLINIC | Age: 87
End: 2022-06-08

## 2022-06-08 NOTE — TELEPHONE ENCOUNTER
Pt daughter called and LM on VM stating that this patient was prescribed Pradaxa by a hospitalist and he cannot get it approved by insurance because it requires a prior authorization be sent to the insurance. We cannot submit a prior authorization unless you are the prescriber. Will you prescribe this medication so that we can work to get it approved for patient?

## 2022-06-10 ENCOUNTER — TELEPHONE (OUTPATIENT)
Dept: NEUROLOGY | Facility: CLINIC | Age: 87
End: 2022-06-10

## 2022-06-10 ENCOUNTER — TELEPHONE (OUTPATIENT)
Dept: CARDIOLOGY | Facility: CLINIC | Age: 87
End: 2022-06-10

## 2022-06-10 NOTE — TELEPHONE ENCOUNTER
Provider: DANNIE  Caller: WILLI DAVIS  Relationship to Patient: DAUGHTER  Pharmacy: N/A  Phone Number: [phone]  Reason for Call: PATIENT DAUGHTER TELEPHONED; SAID WILL NEED A PRIOR AUTHORIZATION FOR DABIGATRAN ETEXILATE (PRADAXA) 150 MG CAPSULE.    PLEASE SEE NOTES FROM HOSPITAL DISCHARGE WRITTEN BY MAGALY MEJIA.    CALL WITH ANY QUESTIONS.    THANK YOU.

## 2022-06-10 NOTE — TELEPHONE ENCOUNTER
Call from pt's daughter, Dahlia. She wanted to double check with Dr. Toledo about some medication changes when pt was recently in the hospital.    Pt was recently admitted to the hospital for a CVA. He was switched from Xarelto to Pradaxa. His insurance does not want to pay for it. She wanted to know if Dr. Toledo was made aware and also if he would help get the insurance to cover it. Pt was switched because Pradaxa has a reversal if pt were to develop a bleed post CVA.    I spoke with GIANNI Rush (Dr. Toledo is out of town) and she said neurology does do this sometimes and it should be ok as long as the pt is on the appropriate dose. I explained to Zena that I would reinforce that Dr. Toledo will probably not want to write a letter to pt's insurance company because he was not involved in any decision making to make this medication switch. Zena agreed this was the appropriate action.    I explained all of the above to the pt's daughter and she verbalized understanding. I did ask her to follow up with the neurology office and she said she would do that.    She also reported this morning that her dad's BP was 175/88 this morning. Medication changes were made to his BP meds also and she was concerned about that as well. I did explain that neurology likely made those changes to keep his BP from dropping too low. This would cause a negative effect on the area of his CVA. I encouraged her to speak with neurology about this also.    She verbalized understanding of all instruction given.

## 2022-06-13 ENCOUNTER — OFFICE VISIT (OUTPATIENT)
Dept: PHYSICAL THERAPY | Facility: CLINIC | Age: 87
End: 2022-06-13

## 2022-06-13 DIAGNOSIS — R47.01 APHASIA: Primary | ICD-10-CM

## 2022-06-13 DIAGNOSIS — R48.0 ALEXIA: ICD-10-CM

## 2022-06-13 PROCEDURE — 92523 SPEECH SOUND LANG COMPREHEN: CPT | Performed by: SPEECH-LANGUAGE PATHOLOGIST

## 2022-06-13 NOTE — PROGRESS NOTES
Speech/Language Pathology Initial Evaluation and Plan of Care    Patient: Mireille Magallanes               : 1935  Visit Date: 2022  Referring practitioner: Ismael Bernstein MD  Date of Initial Visit: 2022  Patient seen for 1 sessions    Visit Diagnoses:    ICD-10-CM ICD-9-CM   1. Aphasia  R47.01 784.3   2. Alexia  R48.0 315.01     Past Medical History:   Diagnosis Date   • Acute viral hepatitis A    • Aortic valve stenosis    • Carotid artery stenosis    • Carotid artery stenosis     Right   • CKD (chronic kidney disease)    • Coagulopathy (HCC)     Plavix   • Coagulopathy (HCC)    • Coronary arteriosclerosis     stent ,? 2104, on plavix now.   • Diarrhea    • Dysphagia     Unspecified   • Family history of colon cancer    • Hematochezia     differential diagnosis discussed   • Hemorrhoids     Unspecified   • History of cardiac pacemaker 2018    [employer]   • History of colonic polyps    • Hypercholesterolemia    • Hyperlipidemia    • Hypertension    • Left ventricular hypertrophy    • Melanoma of back (HCC)    • Obesity    • Osteoarthritis    • Pacemaker    • Peripheral neuropathy    • Polyp of colon    • Stroke (HCC)    • TIA (transient ischemic attack)    • Tobacco non-user      Past Surgical History:   Procedure Laterality Date   • AORTIC VALVE REPAIR/REPLACEMENT Bilateral 2021    Procedure: Transfemoral Transcatheter Aortic Valve Replacement;  Surgeon: Kaushik Toledo MD;  Location: [facility] OR ;  Service: Cardiovascular;  Laterality: Bilateral;   • AORTIC VALVE REPAIR/REPLACEMENT Bilateral 2021    Procedure: TRANSFEMORAL TRANSCATHETER AORTIC VALVE REPLACEMENT;  Surgeon: Clayton Wilcox MD;  Location: [facility] HYBRID OR 12;  Service: Cardiothoracic;  Laterality: Bilateral;   • APPENDECTOMY     • CARDIAC CATHETERIZATION     • CARDIAC CATHETERIZATION N/A 3/18/2019    Procedure: Left Heart Cath;  Surgeon: Kaushik Toledo MD;  Location: [facility] CATH INVASIVE  LOCATION;  Service: Cardiology   • CARDIAC CATHETERIZATION N/A 4/14/2021    Procedure: Left Heart Cath;  Surgeon: Kaushik Toledo MD;  Location:  [facility] CATH INVASIVE LOCATION;  Service: Cardiology;  Laterality: N/A;   • CARDIAC ELECTROPHYSIOLOGY PROCEDURE N/A 1/5/2018    Procedure: Temporary Pacemaker;  Surgeon: Luis E Courtney MD;  Location:  [facility] CATH INVASIVE LOCATION;  Service:    • CARDIAC ELECTROPHYSIOLOGY PROCEDURE N/A 1/8/2018    Procedure: Device Implant;  Surgeon: Kaushik Toledo MD;  Location:  [facility] CATH INVASIVE LOCATION;  Service:    • CAROTID ENDARTERECTOMY Right 2/14/2020    Procedure: RIGHT CAROTID ENDARTERECTOMY WITH EEG;  Surgeon: Martín Woo DO;  Location: [facility] HYBRID OR 12;  Service: Vascular;  Laterality: Right;   • CAROTID STENT     • COLON SURGERY  1999    for Ischemic Colitis   • COLONOSCOPY  05/07/2008    tubular adenoma ascending colon polyp - 3 yr   • COLONOSCOPY  08/19/2003    colon polyp @ 24 cm-see path - 5 yr   • COLONOSCOPY  08/11/2000    small interanl hemorrhoid - 3-5 yr   • COLONOSCOPY  06/22/1998    (8)small colon polyps removed-see path, internal anal skin tag - 2-3 yr   • CORONARY ARTERY BYPASS GRAFT     • CORONARY STENT PLACEMENT      x1 2014 dr crane    • INSERT / REPLACE / REMOVE PACEMAKER     • OTHER SURGICAL HISTORY      Lithotripsy   • OTHER SURGICAL HISTORY  06/2011    recall 3 yr.    • PACEMAKER IMPLANTATION  01/08/2018   • VASECTOMY         SUBJECTIVE     Subjective Patient was alert and stated he was well. He was accompanied by his wife and daughter  Patient presents with the following symptoms: Expressive language impairment, c/o memory loss. Patient denies dysphagia.  Date of Onset: 5/25/22  History of present problems: Patient suffered a stroke with difficulty speaking and reading.   The functional impact on the patient: Difficulty communicating with family and friends.   Prior level of function/education: WFL. He did have 2 prior strokes/TIA  with no residual effect from those.   Pertinent Medical History Related to this Problem: Carotid artery stenosis, Coagulopathy, Coronary arteriosclerosis s/p stent, hypertension, obesity, stroke. See chart for full history.       OBJECTIVE     Speech/Articulation assessment:  Speech is intelligible at the sentence level. No dysarthria noted.      Language assessment:  Aphasia Diagnostic Profiles (ADP): Assesses language and communication impairment associated with aphasia resulting from acquired brain damage. The ADP consists of nine sub tests and yields aphasia type and severity as well as alternative communication profile.     Lexical Retrieval Standard Score 7   ADP Phrase Length 13   Auditory Comprehension Standard Score 1   Repetition Standard Score    Classification: Fluent Anomic Aphasia with alexia      IMPRESSION/RECOMMENDATIONS  Factors Affecting Performance: Hearing loss  Learning Motivation: strong  Education/Learning Comments: Patient and family demonstrated understanding of evaluation results and plan of care.     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 Patient will improve verbal expressive language skills by naming objects/pictures, providing antonym/synonym to word, and completing divergent naming tasks with >85% accuracy over 3 sessions.     2. Patient will improve comprehension of written language skills by selecting printed word to complete a phrase,  matching printed word to corresponding picture, answering simple written Yes/No questions, and by following one-step written directions with >85% accuracy over 3 sessions.     3. Patient will utilize self-cueing strategies when encountering difficulty with expressive language and reading.           LTG by:      1. Patient will be able to use verbal expressive language skills to communicate effectively in all situations with  familiar listener     2. Patient will be able to comprehend written material in home/home and social  environment         ASSESSMENT:   Patient is indicated for skilled care by a Speech/Language Pathologist.     Summary of Assessment: Patient presents with fluent anomic aphasia with alexia. He is able to read some words by reading out the letters first. He is able to write personal information.       PLAN:  Details of Plan of Care: Initiate therapy and home exercises.     Frequency: 1 time per week  Duration: 12 visits  Estimated Length of Session: 45 minutes      SPEECH/LANGUAGE PATHOLOGIST SIGNATURE: Sita Leyva CCC-SLP  License # [id number]  Electronically signed on 6/13/2022      Initial Certification  Certification Period: 6/13/2022 through 9/10/2022  I certify that the therapy services are furnished while this patient is under my care.  The services outlined above are required by this patient, and will be reviewed every 90 days.     PHYSICIAN:     Ismael Bernstein MD______________________________________DATE: _________     Please sign and return via fax to [phone].   Thank you so much for letting us work with Mireille. I appreciate your letting us work with your patients. If you have any questions or concerns, please don't hesitate to contact me.

## 2022-06-14 ENCOUNTER — TELEPHONE (OUTPATIENT)
Dept: NEUROLOGY | Facility: CLINIC | Age: 87
End: 2022-06-14

## 2022-06-14 NOTE — TELEPHONE ENCOUNTER
TELEPHONED PATIENT DAUGHTER RE: PRIOR AUTHORIZATION FOR MEDICATION.    NO ANS; LEFT VM MESSAGE TO CALL.    THANK YOU.

## 2022-06-14 NOTE — TELEPHONE ENCOUNTER
Caller: WLILI DAVIS    Relationship: Emergency Contact    Best call back number: [phone]    What was the call regarding:   PRIOR AUTH  PRADAXA    Do you require a callback: PLEASE CALL PT'S FAMILY TO LET THEM KNOW WHEN THE PRIOR AUTH HAS BEEN COMPLETED.    SPOKE WITH PT AND WAS GIVEN TO SPEAK WITH WILLI.    PT IS SCHEDULED FOR OV ON 8-19-22.

## 2022-06-14 NOTE — TELEPHONE ENCOUNTER
DAUGHTER IS RETURNING CALL BUT NOT ON PT VERBAL . SHE IS GOING TO HIS HOUSE AND WILL CALL WHEN THEY ARE TOGETHER FOR VERBAL

## 2022-06-15 ENCOUNTER — READMISSION MANAGEMENT (OUTPATIENT)
Dept: CALL CENTER | Facility: HOSPITAL | Age: 87
End: 2022-06-15

## 2022-06-20 ENCOUNTER — TREATMENT (OUTPATIENT)
Dept: PHYSICAL THERAPY | Facility: CLINIC | Age: 87
End: 2022-06-20

## 2022-06-20 DIAGNOSIS — R48.0 ALEXIA: ICD-10-CM

## 2022-06-20 DIAGNOSIS — R47.01 APHASIA: Primary | ICD-10-CM

## 2022-06-20 PROCEDURE — 92507 TX SP LANG VOICE COMM INDIV: CPT | Performed by: SPEECH-LANGUAGE PATHOLOGIST

## 2022-06-20 NOTE — PROGRESS NOTES
Speech Language Pathology Treatment Note    Patient: Mireille Magallanes                                                                                     Visit Date: 2022  :     1935    Referring practitioner:    Ismael Bernstein MD  Date of Initial Visit:          Type: THERAPY  Noted: 2022    Patient seen for 2 sessions    Visit Diagnoses:    ICD-10-CM ICD-9-CM   1. Aphasia  R47.01 784.3   2. Alexia  R48.0 315.01     SUBJECTIVE     First therapy session. Patient accompanied by wife and daughter.     OBJECTIVE   GOALS    Goals                                         STG Comments Status   1. Patient will improve verbal expressive language skills by naming objects/pictures, providing antonym/synonym to word, and completing divergent naming tasks with >85% accuracy over 3 sessions. Goal introduced. Patient able to name line drawing pictures 80% with self cuing, extra processing time and circumlocution.   New   2. Patient will improve comprehension of written language skills by selecting printed word to complete a phrase, matching printed word to corresponding picture, answering simple written Yes/No questions, and by following one-step written directions with >85% accuracy over 3 sessions. Goal introduce. Given letter tiles out of order he was able to spell 3 and 4 letter words even with 3-4 foils present. He was able to pick letters from a group of 15 to spell 3-4 letter words spoken.  New   3. Patient will utilize self-cueing strategies when encountering difficulty with expressive language and reading.   Patient used self cuing when naming pictures.  New           LTG by:        1. Patient will be able to use verbal expressive language skills to communicate effectively in all situations with  familiar listener  Goals introduced.  New   2. Patient will be able to comprehend written material in home/home and social  environment  Goals  introduced.  New         Therapy Education/Self Care    Details: POC   Given home strategies   Progress: New   Education provided to:  Patient and Family   Level of understanding Verbalized             Total Time of Visit:             45   mins         ASSESSMENT/PLAN     ASSESSMENT: Goals introduced. Patient needs extra processing time but was able to use self cuing to name pictures 80%. He was able to spell simple words with letter tiles.     PLAN: Continue therapy and home exercises.     Progress Note Due Date:7/10/22  Recertification Due Date:9/10/22    SIGNATURE: Sita Leyva CCC-SLP, [place] License #: [id number]  Electronically Signed on 6/20/2022          [address] [place] [address] [place] [address]  [phone]

## 2022-06-22 ENCOUNTER — READMISSION MANAGEMENT (OUTPATIENT)
Dept: CALL CENTER | Facility: HOSPITAL | Age: 87
End: 2022-06-22

## 2022-06-22 ENCOUNTER — TELEPHONE (OUTPATIENT)
Dept: NEUROLOGY | Facility: CLINIC | Age: 87
End: 2022-06-22

## 2022-06-22 NOTE — TELEPHONE ENCOUNTER
I SPOKE WITH WILLI, DAUGHTER, REGARDING THE PRADAXA RX. I EXPLAINED THAT I DID INITIATE THE AUTHORIZATION FOR THAT MEDICATION; HOWEVER, THIS IS NOT A MEDICATION THAT OUR OFFICE WILL PRESCRIBE. DUE TO DR. BEDOYA PREVIOUSLY PRESCRIBING MR. DICKEY'S XARELTO, HIS OFFICE SHOULD CONTINUE WITH CONTINUING WRITING THE PRESCRIPTIONS FOR THIS, AS WELL.    MS. MÁRQUEZ VOICED CONCERN OVER THE SUN THAT MR. DICKEY HAD TO PAY OUT-OF-POCKET TO FILL PRADAXA. I CAN SEE THAT SHE HAS TRIED TO REACH OUT AND ASK FOR MR. DICKEY'S CARE TEAM TO ASSIST IN OBTAINING THE AUTHORIZATION, WITHOUT LUCK. I HAVE EXPLAINED THAT I CAN ONLY HELP WITH THE AUTHORIZATION PART AND THAT THE NEUROLOGY PROVIDERS WOULD NOT BE WRITING FOR ANY REFILLS. WILLI VOICED UNDERSTANDING AND APPRECIATION.

## 2022-06-22 NOTE — OUTREACH NOTE
Stroke Week 3 Survey    Flowsheet Row Responses   [facility] patient discharged from? [place]   Does the patient have one of the following disease processes/diagnoses(primary or secondary)? Stroke (TIA)   Week 3 attempt successful? Yes   Call start time 1108   Call end time 1116   Discharge diagnosis Cerebrovascular accident Chronic renal failure, stage 4    Is patient permission given to speak with other caregiver? Yes   List who call center can speak with Spouse Virginia    Person spoke with today (if not patient) and relationship Spouse [place]    Meds reviewed with patient/caregiver? Yes   Prescription comments Daughter Dahlia has been taking care of Pradaxa issue per spouse    Is the patient taking all medications as directed (includes completed medication regime)? Yes   Has the patient kept scheduled appointments due by today? Yes  [SLP apts ]   Does the patient require any assistance with activities of daily living such as eating, bathing, dressing, walking, etc.? No   Does the patient have any residual symptoms from stroke/TIA? Yes   Residual symptoms comments memory issues at times    Does the patient understand the diet ordered at discharge? Yes   What is the patient's perception of their health status since discharge? Same   Is the patient able to teach back FAST for Stroke? Yes   Is the patient/caregiver able to teach back the risk factors for a stroke? High blood pressure-goal below 120/80, Physical inactivity and obesity   Week 3 call completed? Yes   Wrap up additional comments pt walks every morning           TYRON ROWAN - Registered Nurse

## 2022-06-24 NOTE — THERAPY DISCHARGE NOTE
Acute Care - Speech Language Pathology Discharge Summary  [facility]       Patient Name: Mireille Magallanes  : 1935  MRN: [id number]    Today's Date: 2022                   Admit Date: 2022      SLP Recommendation and Plan    Visit Dx:    ICD-10-CM ICD-9-CM   1. Cerebrovascular accident (CVA), unspecified mechanism ([facility])  I63.9 434.91   2. Anomic aphasia  R47.01 784.3   3. Chronic renal failure, stage 4 (severe) ([facility])  N18.4 585.4   4. 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 Atrial flutter with controlled response ([facility])  I48.92 427.32   6. S/P TAVR (transcatheter aortic valve replacement)  Z95.2 V43.3   7. History of stroke  Z86.73 V12.54   8. Stenosis of carotid artery, unspecified laterality  I65.29 433.10   9. Bilateral carotid artery stenosis  I65.23 433.10     433.30   10. Stenosis of right carotid artery  I65.21 433.10   11. Acute cerebrovascular accident (CVA) of cerebellum ([facility])  I63.9 434.91   12. TIA (transient ischemic attack)  G45.9 435.9   13. Overweight (BMI 25.0-29.9)  E66.3 278.02   14. Other chest pain  R07.89 786.59   15. Pacemaker  Z95.0 V45.01   16. Coronary artery disease involving coronary bypass graft of native heart without angina pectoris  I25.810 414.05   17. Mixed hyperlipidemia  E78.2 272.2   18. Primary hypertension  I10 401.9                SLP GOALS     Row Name 22 1200             Reading Comprehension of Basic Signs and Letters Goal 1 (SLP)    Reading Comprehension of Basic Signs and Letters Goal 1 (SLP) ID safety sign and environmental symbols;match printed letter to picture;match printed letter to spoken letter;independently (over 90% accuracy)  -MM      Time Frame (Reading Comprehension of Basic Signs and Letters Goal 1, SLP) by discharge  -MM      Barriers (Reading Comprehension of Basic Signs and Letters Goal 1, SLP) none  -MM      Progress (Reading Comprehension of Basic Signs and Letters  Goal 1, SLP) with minimal cues (75-90%)  -MM      Progress/Outcomes (Reading Comprehension of Basic Signs and Letters Goal 1, SLP) discharged from facility;goal partially met  -MM              Reading Comprehension at Word Level Goal 1 (SLP)    Improve Reading Comprehension at Word Level Goal 1 (SLP) matching picture to word;independently (over 90% accuracy)  -MM      Time Frame (Reading Comprehension at Word Level Goal 1, SLP) by discharge  -MM      Barriers (Reading Comprehension at Word Level Goal 1, SLP) none  -MM      Progress (Reading Comprehension at Word Level Goal 1, SLP) with minimal cues (75-90%)  -MM      Progress/Outcomes (Reading Comprehension at Word Level Goal 1, SLP) discharged from facility;goal partially met  -MM              Word Retrieval Skills Goal 1 (SLP)    Improve Word Retrieval Skills By Goal 1 (SLP) completing functional word finding tasks;independently (over 90% accuracy)  -MM      Time Frame (Word Retrieval Goal 1, SLP) by discharge  -MM      Barriers (Word Retrieval Goal 1, SLP) none  -MM      Progress (Word Retrieval Skills Goal 1, SLP) with moderate cues (50-74%)  -MM      Progress/Outcomes (Word Retrieval Goal 1, SLP) discharged from facility;goal partially met  -MM            User Key  (r) = Recorded By, (t) = Taken By, (c) = Cosigned By    Initials Name Provider Type    Jade Lynne MS CCC-SLP Speech and Language Pathologist                        SLP Discharge Summary  Anticipated Discharge Disposition (SLP): home with OP services  Reason for Discharge: discharge from this facility  Progress Toward Achieving Short/long Term Goals: goals partially met within established timelines  Discharge Destination: home      Jade Lugo MS CCC-SLP  6/24/2022

## 2022-06-27 NOTE — TELEPHONE ENCOUNTER
I SPOKE WITH MS. MÁRQUEZ, AGAIN TODAY. I LET HER KNOW THAT [facility] APPROVED THE PRADAXA; HOWEVER, MEDICARE WILL NOT ALLOW A TIER EXCEPTION FOR LOWERING CO-PAYS FOR THIS MEDICATION. I DID CHECK WITH MR. DICKEY'S [facility] AND THEY QUOTED $201.86/ 30 DAYS. MS. MÁRQUEZ AGREES THAT THEY WILL DISCUSS THIS WITH DR. BEDOYA AND SEE WHAT OPTIONS THEY HAVE GO FORWARD.

## 2022-06-29 ENCOUNTER — TREATMENT (OUTPATIENT)
Dept: PHYSICAL THERAPY | Facility: CLINIC | Age: 87
End: 2022-06-29

## 2022-06-29 DIAGNOSIS — R48.0 ALEXIA: ICD-10-CM

## 2022-06-29 DIAGNOSIS — R47.01 APHASIA: Primary | ICD-10-CM

## 2022-06-29 PROCEDURE — 92507 TX SP LANG VOICE COMM INDIV: CPT | Performed by: SPEECH-LANGUAGE PATHOLOGIST

## 2022-06-29 NOTE — PROGRESS NOTES
Speech Language Pathology Treatment Note    Patient: Mireille Magallanes                                                                                     Visit Date: 2022  :     1935    Referring practitioner:    Ismael Bernstein MD  Date of Initial Visit:          Type: THERAPY  Noted: 2022    Patient seen for 3 sessions    Visit Diagnoses:    ICD-10-CM ICD-9-CM   1. Aphasia  R47.01 784.3   2. Alexia  R48.0 315.01     SUBJECTIVE     Patient accompanied by wife. Patient was attentive and alert during the session.    OBJECTIVE   GOALS    Goals                                         STG Comments Status   1. Patient will improve verbal expressive language skills by naming objects/pictures, providing antonym/synonym to word, and completing divergent naming tasks with >85% accuracy over 3 sessions. Patient was able to name pictures 92% with self cuing, extra processing time, and circumlocution.   Progress   2. Patient will improve comprehension of written language skills by selecting printed word to complete a phrase, matching printed word to corresponding picture, answering simple written Yes/No questions, and by following one-step written directions with >85% accuracy over 3 sessions. Patient was able to identify both pictures and printed words that matched the written word shown. Patient spelled the words aloud to help identify the printed word.  Progress   3. Patient will utilize self-cueing strategies when encountering difficulty with expressive language and reading.  Patient used self cuing when naming pictures and when matching pictures with the printed word.  Progress           LTG by:        1. Patient will be able to use verbal expressive language skills to communicate effectively in all situations with familiar listener  Patient made progress in word finding and was able to use circumlocution to self-cue which also provides cues  for listeners to know the word he is referring to if he can not come up with the exact word. Progress   2. Patient will be able to comprehend written material in home/home and social  environment Patient was able to pick the correct printed word out of a field of 2 and 3 options when shown a picture indicating comprehension of the printed words. Progress         Therapy Education/Self Care    Details: POC   Given home strategies   Progress: Reinforced   Education provided to:  Patient and Family   Level of understanding Verbalized             Total Time of Visit:             45   mins         ASSESSMENT/PLAN     ASSESSMENT: Patient is improving in word finding ability and comprehension of written language. Patient's consistent use of self cueing is benifical to his expressive language.     PLAN: Continue therapy and home exercises.     Progress Note Due Date:7/10/22  Recertification Due Date:9/10/22    SIGNATURE: Kasey Mahoney, Speech Therapy Student,   Electronically Signed on 6/29/2022     The clinical instructor and/or supervising staff, Sita Leyva CCC-SLP, was present in clinic guiding the visit by approving, concurring, and confirming the skilled judgement for all services rendered.    Signature:  Sita Leyva CCC-SLP, License/Certification#: [id number]  Electronically signed on 6/29/2022            [address] [place] [address], [place] [address]  [phone]

## 2022-06-30 ENCOUNTER — TELEPHONE (OUTPATIENT)
Dept: CARDIOLOGY | Facility: CLINIC | Age: 87
End: 2022-06-30

## 2022-07-05 ENCOUNTER — OFFICE VISIT (OUTPATIENT)
Dept: VASCULAR SURGERY | Facility: CLINIC | Age: 87
End: 2022-07-05

## 2022-07-05 ENCOUNTER — TELEPHONE (OUTPATIENT)
Dept: CARDIOLOGY | Facility: CLINIC | Age: 87
End: 2022-07-05

## 2022-07-05 VITALS
BODY MASS INDEX: 30.07 KG/M2 | WEIGHT: 203 LBS | HEIGHT: 69 IN | OXYGEN SATURATION: 98 % | DIASTOLIC BLOOD PRESSURE: 90 MMHG | SYSTOLIC BLOOD PRESSURE: 138 MMHG | HEART RATE: 76 BPM

## 2022-07-05 DIAGNOSIS — I10 PRIMARY HYPERTENSION: ICD-10-CM

## 2022-07-05 DIAGNOSIS — I65.23 BILATERAL CAROTID ARTERY STENOSIS: Primary | ICD-10-CM

## 2022-07-05 PROCEDURE — 99213 OFFICE O/P EST LOW 20 MIN: CPT | Performed by: SURGERY

## 2022-07-05 NOTE — TELEPHONE ENCOUNTER
Call from pt's daughter to let me know that the pt's GI bleeding has stopped since he stopped his Pradaxa. The pt was nervous about being off of his anticoagulant, so he restarted his Xarelto. He was previously on it and had no bleeding issues. They wanted to know if pt needed an appt with Dr. Toledo to discuss further.    I spoke with Dr. Toledo. He does not feel like the pt needs to be seen in the office at this time. He did want me to remind pt to be sure and take his Xarelto with food every time.    I called pt's daughter back and left a detailed voice  message with all of this info. She has my call back number for questions.

## 2022-07-05 NOTE — PROGRESS NOTES
"7/5/2022    Hossien Hua MD  [address] [place] [address] [address]  [place] [address]        Mireille Magallanes  1935    Chief Complaint   Patient presents with   • Bilateral Carotid atery Stenosis     Pt here for 1 month return regarding Bilateral carotid artery stenosis  PT states there is no pn associated   Pt denies any stroke like Sx  Pt is a Former Smoker (Quit Date: 01/01/1999)       Dear Hossein Hua MD   I had the pleasure of seeing your patient Mireille Magallanes in the office today.  As you recall, Mireille Magallanes is a 86 y.o.  male who we are following for carotid occlusive disease.  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  He recently had a left hemispheric stroke and spent a couple days in the hospital.  He is maintained on aspirin and Xarelto.  He was previously on Pradaxa, but developed rectal bleeding.   He did undergo a right carotid endarterectomy on 2/14/2020.  He is doing better and without bleeding since changing.     Review of Systems   Constitutional: Negative.    HENT: Negative.    Eyes: Negative.    Respiratory: Negative.    Cardiovascular: Negative.    Gastrointestinal: Negative.    Endocrine: Negative.    Genitourinary: Negative.    Musculoskeletal: Negative.    Skin: Negative.    Allergic/Immunologic: Negative.    Neurological: Negative.    Hematological: Negative.    Psychiatric/Behavioral: Negative.    All other systems reviewed and are negative.       /90   Pulse 76   Ht 175.3 cm (69\")   Wt 92.1 kg (203 lb)   SpO2 98%   BMI 29.98 kg/m²      Physical Exam  Vitals and nursing note reviewed.   Constitutional:       Appearance: Normal appearance. He is well-developed and normal weight.   HENT:      Head: Normocephalic and atraumatic.   Eyes:      General: No scleral icterus.     Pupils: Pupils are equal, round, and reactive to light.   Neck:      Thyroid: No thyromegaly.      Vascular: No carotid bruit or JVD. "   Cardiovascular:      Rate and Rhythm: Normal rate and regular rhythm.      Pulses:           Carotid pulses are 2+ on the right side and 2+ on the left side.       Femoral pulses are 2+ on the right side and 2+ on the left side.       Popliteal pulses are 2+ on the right side and 2+ on the left side.        Dorsalis pedis pulses are 2+ on the right side and 2+ on the left side.        Posterior tibial pulses are 2+ on the right side and 2+ on the left side.      Heart sounds: Murmur heard.   Pulmonary:      Effort: Pulmonary effort is normal.      Breath sounds: Normal breath sounds.   Abdominal:      General: Bowel sounds are normal. There is no distension or abdominal bruit.      Palpations: Abdomen is soft. There is no mass.      Tenderness: There is no abdominal tenderness.   Musculoskeletal:         General: Normal range of motion.      Cervical back: Normal range of motion and neck supple.   Lymphadenopathy:      Cervical: No cervical adenopathy.   Skin:     General: Skin is warm and dry.   Neurological:      General: No focal deficit present.      Mental Status: He is alert and oriented to person, place, and time.      Cranial Nerves: No cranial nerve deficit.      Sensory: No sensory deficit.   Psychiatric:         Mood and Affect: Mood normal.         Behavior: Behavior normal. Behavior is cooperative.         Thought Content: Thought content normal.         Judgment: Judgment normal.       Diagnostic data:  No results found.     Patient Active Problem List   Diagnosis   • HTN (hypertension)   • Hyperlipidemia   • Coronary artery disease involving coronary bypass graft of native heart without angina pectoris   • Pacemaker   • Other chest pain   • Overweight (BMI 25.0-29.9)   • TIA (transient ischemic attack)   • Carotid artery stenosis   • Acute cerebrovascular accident (CVA) of cerebellum (HCC)   • Stenosis of right carotid artery   • Bilateral carotid artery stenosis   • Carotid stenosis   • History of  stroke   • S/P TAVR (transcatheter aortic valve replacement)   • Atrial flutter with controlled response ([facility])   • Cerebrovascular accident (CVA) ([facility])   • Chronic renal failure, stage 4 (severe) ([facility])        Diagnosis Plan   1. Bilateral carotid artery stenosis     2. Primary hypertension         Plan: After thoroughly evaluating Mireille Magallanes, I believe the best course of action to initially remain conservative from vascular surgery standpoint.  I did review his testing which shows less than 50% carotid stenosis on the right side and about 75 to 80% on the left side.  The MRA has poor images it is inconclusive.  It appears as though he may have had an embolic component to his stroke, thus he was changed to Pradaxa.  He did have some bleeding on this and his taken upon himself to change back to Xarelto.  Family reports this was previously followed by Dr. Toledo and will contact his office.  He is hesitant to proceed with left trams carotid artery revascularization at this time.  I will see him back in 1 month for reevaluation to allow more time for him to heal.   I did discuss vascular risk factors as they pertain to the progression of vascular disease including controlling hypertension and hyperlipidemia.  These risk factors are currently stable.  The patient is to continue taking their medications as previously discussed.   This was all discussed in full with complete understanding.  Thank you for allowing me to participate in the care of your patient.  Please do not hesitate to call with any questions or concerns.  We will keep you aware of any further encounters with Mireille Magallanes.        Sincerely yours,         DO Javid Jimenez Joseph Matt, MD

## 2022-07-06 ENCOUNTER — TREATMENT (OUTPATIENT)
Dept: PHYSICAL THERAPY | Facility: CLINIC | Age: 87
End: 2022-07-06

## 2022-07-06 DIAGNOSIS — R48.0 ALEXIA: ICD-10-CM

## 2022-07-06 DIAGNOSIS — R47.01 APHASIA: Primary | ICD-10-CM

## 2022-07-06 PROCEDURE — 92507 TX SP LANG VOICE COMM INDIV: CPT | Performed by: SPEECH-LANGUAGE PATHOLOGIST

## 2022-07-06 NOTE — PROGRESS NOTES
Speech Language Pathology Treatment Note    Patient: Mireille Magallanes                                                                                     Visit Date: 2022  :     1935    Referring practitioner:    Ismael Bernstein MD  Date of Initial Visit:          Type: THERAPY  Noted: 2022    Patient seen for 4 sessions    Visit Diagnoses:    ICD-10-CM ICD-9-CM   1. Aphasia  R47.01 784.3   2. Alexia  R48.0 315.01     SUBJECTIVE     Patient accompanied by wife. Patient and wife reported that the week had been more difficult with memory and communication errors. Patient's wife reported that he was able to complete the homework with minimal errors. Patient was attentive and alert during the session. Patient benefited from SLP wearing the clear mask to allow for lip reading.     OBJECTIVE   GOALS    Goals                                         STG Comments Status   1. Patient will improve verbal expressive language skills by naming objects/pictures, providing antonym/synonym to word, and completing divergent naming tasks with >85% accuracy over 3 sessions. Patient was able to name synonyms for words that were provided in a sentence= 80% independently. Using the word with an extra sentence to provide more context was needed for <3 words.    Progressing   2. Patient will improve comprehension of written language skills by selecting printed word to complete a phrase, matching printed word to corresponding picture, answering simple written Yes/No questions, and by following one-step written directions with >85% accuracy   over 3 sessions. Previous: Patient was able to identify both pictures and printed words that matched the written word shown. Patient spelled the words aloud to help identify the printed word.  Previous: Progress   3. Patient will utilize self-cueing strategies when encountering difficulty with expressive language and  reading.  Patient used self cuing when naming pictures, synonyms, and providing information about pictures. Progressing           LTG by:        1. Patient will be able to use verbal expressive language skills to communicate effectively in all situations with familiar listener Expressive language targeted during a memory task, picture naming, and divergent naming. Patient used self-cueing strategies during all 3 tasks and required minimal verbal prompts to complete tasks. Progressing   2. Patient will be able to comprehend written material in home/home and social  environment Previous: Patient was able to pick the correct printed word out of a field of 2 and 3 options when shown a picture indicating comprehension of the printed words. Previous: Progress         Therapy Education/Self Care    Details: POC   Given home strategies   Progress: Reinforced   Education provided to:  Patient and Family   Level of understanding Verbalized             Total Time of Visit:             45   mins         ASSESSMENT/PLAN     ASSESSMENT: Patient continues to use self cuing to improve expressive language. Progress in synonyms and divergent naming will help provide strategies to use when word finding impacts communication.    PLAN: Continue therapy and home exercises.     Progress Note Due Date:7/10/22  Recertification Due Date:9/10/22    SIGNATURE: Kasey Mahoney, Speech Therapy Student,   Electronically Signed on 7/6/2022     The clinical instructor and/or supervising staff, Sita Leyva CCC-SLP, was present in clinic guiding the visit by approving, concurring, and confirming the skilled judgement for all services rendered.    Signature:  Sita Leyva CCC-SLP, License #[id number]  Electronically signed on 7/6/2022            [address] [place] [address] [place] [address]  [phone]

## 2022-07-07 ENCOUNTER — TELEPHONE (OUTPATIENT)
Dept: NEUROLOGY | Facility: CLINIC | Age: 87
End: 2022-07-07

## 2022-07-07 NOTE — TELEPHONE ENCOUNTER
Spoke with the patient's daughter and advised that per Dr.Braun Lynn, she requested that  see the patient in f/u. Original f/u on 8/19/22 with GIANNI York has been canceled. [facility] f/u appt with  on 7/29/22. Patient's daughter verbalizes understanding.

## 2022-07-13 ENCOUNTER — TREATMENT (OUTPATIENT)
Dept: PHYSICAL THERAPY | Facility: CLINIC | Age: 87
End: 2022-07-13

## 2022-07-13 DIAGNOSIS — R48.0 ALEXIA: ICD-10-CM

## 2022-07-13 DIAGNOSIS — R47.01 APHASIA: Primary | ICD-10-CM

## 2022-07-13 PROCEDURE — 92507 TX SP LANG VOICE COMM INDIV: CPT | Performed by: SPEECH-LANGUAGE PATHOLOGIST

## 2022-07-13 NOTE — PROGRESS NOTES
"                                                                  Speech Language Pathology 30 Day Progress Note    Patient: Mireille Magallanes                                                                                     Visit Date: 2022  :     1935    Referring practitioner:    Ismael Bernstein MD  Date of Initial Visit:          Type: THERAPY  Noted: 2022    Patient seen for 5 sessions    Visit Diagnoses:    ICD-10-CM ICD-9-CM   1. Aphasia  R47.01 784.3   2. Alexia  R48.0 315.01     SUBJECTIVE     Patient accompanied by wife. Patient reported \"moving slow this morning\". Patient and wife also shared that they think he is making progress each week.     OBJECTIVE   GOALS    Goals                                         STG Comments Status   1. Patient will improve verbal expressive language skills by naming objects/pictures, providing antonym/synonym to word, and completing divergent naming tasks with >85% accuracy over 3 sessions. -Patient named antonyms for words =100% with verbal cuing provided on fewer then 3 attempts  -Patient completed divergent naming associated with actions (3 things you do with___) on 6/6 attempts with no cues or prompts needed  -Patient also provided multiple detail picture descriptions with minimal cues needed.  Good progress   2. Patient will improve comprehension of written language skills by selecting printed word to complete a phrase, matching printed word to corresponding picture, answering simple written Yes/No questions, and by following one-step written directions with >85% accuracy   over 3 sessions. -Patient underlined sentences that matched the given picture out of f2 sentences with 100% accuracy independently (8/8)  -Patient followed written directions that included physical tasks such as pointing to specific items and moving body parts with 96% accuracy at the independent level ()  -Patient followed written directions that included written tasks such " as underline/[place]/x out 1 specific word from f4 with 87% accuracy independently (7/8)  -Patient answered written Yes/No questions with minimal cues with 100% accuracy (15/15) Good Progress   3. Patient will utilize self-cueing strategies when encountering difficulty with expressive language and reading.  Patient used self cuing when reading by spelling words aloud and asking to use the pencil to help follow along as he read. Progress           LTG by:        1. Patient will be able to use verbal expressive language skills to communicate effectively in all situations with familiar listener Patient used expressive language skills to describe picture scenes with minimal cues needed. His descriptions included a variety of details. Progress   2. Patient will be able to comprehend written material in home/home and social  environment Patient was able to comprehend multiple forms of written materials including yes/no questions, 1 step directions, and descriptive sentences  Good progress         Therapy Education/Self Care    Details: POC   Given home strategies   Progress: Reinforced   Education provided to:  Patient and Family   Level of understanding Verbalized             Total Time of Visit:             45   mins         ASSESSMENT/PLAN     ASSESSMENT: Patient's comprehension of written material has improved with fewer self-cues needed to read information and ability to complete various types of written tasks. Patient required fewer contextual and verbal cues to complete expressive language tasks such as antonyms and scene descriptions.     PLAN: Continue therapy and home exercises.     Progress Note Due Date:7/10/22  Recertification Due Date:9/10/22    SIGNATURE: Kasey Mahoney, Speech Therapy Student,   Electronically Signed on 7/13/2022     The clinical instructor and/or supervising staff, Sita Leyva CCC-SLP, was present in clinic guiding the visit by approving, concurring, and confirming the skilled  judgement for all services rendered.    Signature:  Sita Leyva, CCC-SLP,   Electronically signed on 7/13/2022            [address] [place] [address]  [phone]

## 2022-07-15 ENCOUNTER — OFFICE VISIT (OUTPATIENT)
Dept: INTERNAL MEDICINE | Age: 87
End: 2022-07-15
Payer: MEDICARE

## 2022-07-15 VITALS
BODY MASS INDEX: 29.62 KG/M2 | DIASTOLIC BLOOD PRESSURE: 82 MMHG | WEIGHT: 200 LBS | OXYGEN SATURATION: 98 % | HEIGHT: 69 IN | HEART RATE: 78 BPM | TEMPERATURE: 97.7 F | SYSTOLIC BLOOD PRESSURE: 142 MMHG

## 2022-07-15 DIAGNOSIS — N18.4 STAGE 4 CHRONIC KIDNEY DISEASE (HCC): ICD-10-CM

## 2022-07-15 DIAGNOSIS — I10 ESSENTIAL HYPERTENSION: Primary | ICD-10-CM

## 2022-07-15 PROCEDURE — G8417 CALC BMI ABV UP PARAM F/U: HCPCS | Performed by: INTERNAL MEDICINE

## 2022-07-15 PROCEDURE — 99212 OFFICE O/P EST SF 10 MIN: CPT | Performed by: INTERNAL MEDICINE

## 2022-07-15 PROCEDURE — 1123F ACP DISCUSS/DSCN MKR DOCD: CPT | Performed by: INTERNAL MEDICINE

## 2022-07-15 PROCEDURE — G8427 DOCREV CUR MEDS BY ELIG CLIN: HCPCS | Performed by: INTERNAL MEDICINE

## 2022-07-15 PROCEDURE — 1036F TOBACCO NON-USER: CPT | Performed by: INTERNAL MEDICINE

## 2022-07-15 RX ORDER — DOXYCYCLINE HYCLATE 100 MG
100 TABLET ORAL 2 TIMES DAILY
Qty: 10 TABLET | Refills: 0 | Status: SHIPPED | OUTPATIENT
Start: 2022-07-15 | End: 2022-07-21 | Stop reason: SDUPTHER

## 2022-07-15 ASSESSMENT — ENCOUNTER SYMPTOMS
TROUBLE SWALLOWING: 0
WHEEZING: 0
SHORTNESS OF BREATH: 0
EYE ITCHING: 0
BLOOD IN STOOL: 0
VOMITING: 0
SINUS PRESSURE: 0
NAUSEA: 0
EYE DISCHARGE: 0
BACK PAIN: 0
ABDOMINAL PAIN: 0
SORE THROAT: 0
ABDOMINAL DISTENTION: 0

## 2022-07-15 NOTE — PROGRESS NOTES
[address] [facility]  [address] [address] [id number] [address] [address]  Dept: [phone]  Dept Fax: [phone]: [phone]      Visit Date: 7/15/2022    Osman Hernandez a 82 y.o. male who presents today for:  Chief Complaint   Patient presents with    Follow-up     6 week follow up stroke, L hand is swelling started last week does not remember anything biting it or stinging it. Pt wife reports he has been having higher BP readings since last visit. They have brought log with them today. HPI:     Here for 6-week follow-up on his stroke. The only complaint he has now some cellulitis of his left hand from some sort of bite    Past Medical History:   Diagnosis Date    Adenomatous colon polyp     Aortic stenosis     Arthritis, degenerative     AV block, 2nd degree     12/17, Holter monitor, high degree second-degree A-V block, 7 second pauses, read by Dr. Renny Tomlin, PHP    CAD (coronary artery disease)     Chronic kidney disease, stage III (moderate) ([address]     Hearing loss     Hyperlipidemia     Hypertension     Left ventricular hypertrophy     Melanoma of back ([facility] [address]     Neuropathy     idiopathic peripheral neuropathy    Obesity       Past Surgical History:   Procedure Laterality Date    COLONOSCOPY  09/06/2016    PACEMAKER PLACEMENT  01/05/2018       No family history on file. Social History     Tobacco Use    Smoking status: Former    Smokeless tobacco: Never   Substance Use Topics    Alcohol use: No      Current Outpatient Medications   Medication Sig Dispense Refill    rivaroxaban (XARELTO) 15 MG TABS tablet Take 15 mg by mouth      doxycycline hyclate (VIBRA-TABS) 100 MG tablet Take 1 tablet by mouth in the morning and 1 tablet before bedtime. Do all this for 5 days.  10 tablet 0    Cholecalciferol (VITAMIN D3) 125 MCG (5000 UT) TABS Take by mouth      Omega-3 Fatty Acids (FISH OIL) 1000 MG CAPS Take 3,000 mg by mouth 3 times daily      sodium bicarbonate 325 MG tablet Take 650 mg by mouth 2 times daily      irbesartan (AVAPRO) 300 MG tablet Take 1 tablet by mouth nightly 90 tablet 1    atorvastatin (LIPITOR) 80 MG tablet Take 1 tablet by mouth once daily 90 tablet 0    aspirin 81 MG tablet Take 81 mg by mouth daily      Coenzyme Q10 (CO Q 10) 10 MG CAPS Take 1 capsule by mouth daily      nitroGLYCERIN (NITROSTAT) 0.4 MG SL tablet Place 1 tablet under the tongue One under tongue as needed for chest pain. Zinc 100 MG TABS Take by mouth (Patient not taking: Reported on 7/15/2022)      dabigatran (PRADAXA) 150 MG capsule Take 150 mg by mouth 2 times daily (Patient not taking: Reported on 7/15/2022)       No current facility-administered medications for this visit. Allergies   Allergen Reactions    Sulfa Antibiotics          Subjective:     Review of Systems   Constitutional:  Negative for activity change, appetite change, fatigue and fever. HENT:  Negative for congestion, hearing loss, sinus pressure, sore throat and trouble swallowing. Eyes:  Negative for discharge and itching. Respiratory:  Negative for shortness of breath and wheezing. Cardiovascular:  Negative for chest pain, palpitations and leg swelling. Gastrointestinal:  Negative for abdominal distention, abdominal pain, blood in stool, nausea and vomiting. Endocrine: Negative for cold intolerance, heat intolerance and polydipsia. Genitourinary:  Negative for flank pain, frequency, hematuria and urgency. Musculoskeletal:  Negative for arthralgias, back pain and joint swelling. Skin:  Negative for rash and wound. Redness to hand secondary to a bite   Allergic/Immunologic: Negative for environmental allergies and food allergies. Neurological:  Negative for dizziness, tremors, syncope, weakness, numbness and headaches. Hematological:  Negative for adenopathy. Psychiatric/Behavioral:  Negative for agitation and hallucinations. The patient is not nervous/anxious. Objective:      BP (!) 142/82   Pulse 78   Temp 97.7 °F (36.5 °C) (Temporal)   Ht 5' 9\" (1.753 m)   Wt 200 lb (90.7 kg)   SpO2 98%   BMI 29.53 kg/m²    Physical Exam  Constitutional:       General: He is not in acute distress. Appearance: He is well-developed. He is not diaphoretic. HENT:      Head: Normocephalic and atraumatic. Right Ear: External ear normal.      Left Ear: External ear normal.      Nose: Nose normal.      Mouth/Throat:      Pharynx: No oropharyngeal exudate. Eyes:      General: No scleral icterus. Right eye: No discharge. Left eye: No discharge. Conjunctiva/sclera: Conjunctivae normal.      Pupils: Pupils are equal, round, and reactive to light. Neck:      Thyroid: No thyromegaly. Vascular: No JVD. Trachea: No tracheal deviation. Cardiovascular:      Rate and Rhythm: Normal rate and regular rhythm. Heart sounds: Normal heart sounds. No murmur heard. No friction rub. No gallop. Pulmonary:      Effort: Pulmonary effort is normal. No respiratory distress. Breath sounds: Normal breath sounds. No wheezing or rales. Abdominal:      General: Bowel sounds are normal. There is no distension. Palpations: Abdomen is soft. There is no mass. Tenderness: There is no abdominal tenderness. There is no guarding or rebound. Musculoskeletal:         General: No tenderness or deformity. Normal range of motion. Cervical back: Normal range of motion and neck supple. Lymphadenopathy:      Cervical: No cervical adenopathy. Skin:     General: Skin is warm and dry. Coloration: Skin is not pale. Findings: Erythema present. No rash. Neurological:      Mental Status: He is alert and oriented to person, place, and time. Cranial Nerves: No cranial nerve deficit. Motor: No abnormal muscle tone. Coordination: Coordination normal.      Deep Tendon Reflexes: Reflexes are normal and symmetric.  Reflexes normal. Psychiatric:         Behavior: Behavior normal.         Thought Content: Thought content normal.         Judgment: Judgment normal.        Assessment:      Diagnosis Orders   1. Essential hypertension  CBC with Auto Differential    Comprehensive Metabolic Panel    Lipid Panel    PSA Screening      2. Stage 4 chronic kidney disease (HCC)  CBC with Auto Differential    Comprehensive Metabolic Panel    Lipid Panel    PSA Screening               Plan:     Essential hypertension which is well controlled. Blood pressure is 138/82 by me. He is on Iver Eli 300 mg daily and I told him to switch it to morning instead of night he is taking a half a tab for a total of 150 mg. Chronic kidney disease. Is time for him to see nephrology next week. His labs been doing okay in that respect. Stroke. He is getting better his speech is getting better he sees neurology again next week and he is had a consultation with the vascular surgeon for a left carotid endarterectomy with an 80% stenosis. He is going to contemplate that again in in a month. He otherwise is stable. We will set him up for some doxycycline for the hand infection 100 mg twice daily for 10 days and see him back in 3 months for his yearly exam with blood work. Return in about 3 months (around 10/15/2022) for LAB [address]. Patient given educational materials- see patient instructions. Discussed use, benefit, and side effects of prescribedmedications. All patient questions answered. Pt voiced understanding. Reviewedhealth maintenance. Instructed to continue current medications, diet and exercise. Patient agreed with treatment plan. **This report has been created usingvoice recognition software. It may contain minor errors which are inherent in voicerecognition technology. **    Electronically signed by Tate Rondon MD on 7/15/2022 at 9:43 AM

## 2022-07-20 ENCOUNTER — TREATMENT (OUTPATIENT)
Dept: PHYSICAL THERAPY | Facility: CLINIC | Age: 87
End: 2022-07-20

## 2022-07-20 DIAGNOSIS — R47.01 APHASIA: Primary | ICD-10-CM

## 2022-07-20 DIAGNOSIS — R48.0 ALEXIA: ICD-10-CM

## 2022-07-20 PROCEDURE — 92507 TX SP LANG VOICE COMM INDIV: CPT | Performed by: SPEECH-LANGUAGE PATHOLOGIST

## 2022-07-20 NOTE — PROGRESS NOTES
"                                                                  Speech Language Pathology Treatment Note    Patient: Mireille Magallanes                                                                                     Visit Date: 2022  :     1935    Referring practitioner:    Ismael Bernstein MD  Date of Initial Visit:          Type: THERAPY  Noted: 2022    Patient seen for 6 sessions    Visit Diagnoses:    ICD-10-CM ICD-9-CM   1. Aphasia  R47.01 784.3   2. Alexia  R48.0 315.01     SUBJECTIVE     Patient accompanied by wife. Wife reported that patient has had a \"bad week\" due to his hand swelling and is on antibiotics for that.     OBJECTIVE   GOALS    Goals                                         STG Comments Status   1. Patient will improve verbal expressive language skills by naming objects/pictures, providing antonym/synonym to word, and completing divergent naming tasks with >85% accuracy over 3 sessions. -Patient experienced difficulty with synonyms during written tasks. When provided with verbal prompts, he still experienced difficulty naming a synonym= 2/5.  -Patient completed picture naming with 90% accuracy. Self cueing and self correction improved naming. Ongoing   2. Patient will improve comprehension of written language skills by selecting printed word to complete a phrase, matching printed word to corresponding picture, answering simple written Yes/No questions, and by following one-step written directions with >85% accuracy   over 3 sessions. -Comprehension of written language targeted by having the patient Alutiiq synonyms for a written word. Task was discontinued after 5 attempts to reduce frustration =2/5. The next task required the patient to follow 2 written directions such as underline/put a line over/cross out a written word f/4. This task was also discontinued to reduce frustration.  Ongoing   3. Patient will utilize self-cueing strategies when encountering difficulty with " expressive language and reading.  Patient used self cuing when reading by spelling words aloud. He also went back and reread sentences and answer options multiple times as a strategy while completing written tasks. Patient used self-cuing while naming pictures. Progressing           LTG by:        1. Patient will be able to use verbal expressive language skills to communicate effectively in all situations with familiar listener Patient able to complete picture naming but experienced difficulty when selecting synonyms. Ongoing   2. Patient will be able to comprehend written material in home/home and social  environment Patient experienced more difficulty comprehending written material compared to previous session. Tasks were increased in difficulty compared to previous session. Ongoing         Therapy Education/Self Care    Details: POC   Given home strategies   Progress: Reinforced   Education provided to:  Patient and Family   Level of understanding Verbalized             Total Time of Visit:             45   mins         ASSESSMENT/PLAN     ASSESSMENT: Patient made progress in picture naming. Comprehension of written material was reduced compared to previous session; however, performance may be impacted by difficulty during the week.    PLAN: Continue therapy and home exercises.     Progress Note Due Date:8/10/22  Recertification Due Date:9/10/22    SIGNATURE: Kasey Mahoney, Speech Therapy Student,   Electronically Signed on 7/20/2022     The clinical instructor and/or supervising staff, Sita Leyva CCC-SLP, was present in clinic guiding the visit by approving, concurring, and confirming the skilled judgement for all services rendered.    Signature:  Sita Leyva CCC-SLP,   Electronically signed on 7/20/2022            [address] [address]  [phone]

## 2022-07-21 ENCOUNTER — TELEPHONE (OUTPATIENT)
Dept: INTERNAL MEDICINE | Age: 87
End: 2022-07-21

## 2022-07-21 RX ORDER — DOXYCYCLINE HYCLATE 100 MG
100 TABLET ORAL 2 TIMES DAILY
Qty: 14 TABLET | Refills: 0 | Status: SHIPPED | OUTPATIENT
Start: 2022-07-21 | End: 2022-07-28

## 2022-07-21 NOTE — TELEPHONE ENCOUNTER
Pt's daughter called stating pt's (L) hand was swollen when he was seen on 7/15. He was prescribed Doxycycline 100mg BID X 5 days. He finished the abx rest. She says that it has improved some, but it is still swollen quite a bit. He ran a low grade fever a couple of days ago, but hasn't had one since. She says he can move his thumb and pinky now, but he is unable to move the other fingers due to the swelling. It is still painful. They have been using moist heat as instructed. He takes ASA 81mg. WM/Tomás Please advise.

## 2022-07-27 ENCOUNTER — TREATMENT (OUTPATIENT)
Dept: PHYSICAL THERAPY | Facility: CLINIC | Age: 87
End: 2022-07-27

## 2022-07-27 DIAGNOSIS — R48.0 ALEXIA: ICD-10-CM

## 2022-07-27 DIAGNOSIS — R47.01 APHASIA: Primary | ICD-10-CM

## 2022-07-27 PROCEDURE — 92507 TX SP LANG VOICE COMM INDIV: CPT | Performed by: SPEECH-LANGUAGE PATHOLOGIST

## 2022-07-27 NOTE — PROGRESS NOTES
Speech Language Pathology Treatment Note and Discharge Note    Patient: Mireille Magallanes                                                                                     Visit Date: 2022  :     1935    Referring practitioner:    Ismael Bernstein MD  Date of Initial Visit:          Type: THERAPY  Noted: 2022    Patient seen for 7 sessions    Visit Diagnoses:    ICD-10-CM ICD-9-CM   1. Aphasia  R47.01 784.3   2. Alexia  R48.0 315.01     SUBJECTIVE     Patient requested discharge due to dealing with multiple medical issues at this time. He feels he has made good progress and is ready to work on his language independently.     OBJECTIVE   GOALS    Goals                                         STG Comments Status   1. Patient will improve verbal expressive language skills by naming objects/pictures, providing antonym/synonym to word, and completing divergent naming tasks with >85% accuracy over 3 sessions. Patient was able to provide synonyms for pictured words with 80% accuracy after time to formulate. He was able to identify antonyms from a field of 3 100%. Discussed using similar words as a word finding strategy. Patient demonstrated understanding.  Progress made/ discontinue   2. Patient will improve comprehension of written language skills by selecting printed word to complete a phrase, matching printed word to corresponding picture, answering simple written Yes/No questions, and by following one-step written directions with >85% accuracy   over 3 sessions. Patient able to follow one step written directions with time to sound out each word while reading with minimal cues. Able to read and select opposite words from a field of 3.    Progress made/ discontinue   3. Patient will utilize self-cueing strategies when encountering difficulty with expressive language and reading.  Patient used self cuing when reading by spelling words aloud.  He also went back and reread sentences and answer options multiple times as a strategy while completing written tasks. Patient used self-cuing while naming pictures. Progress made/ discontinue           LTG by:        1. Patient will be able to use verbal expressive language skills to communicate effectively in all situations with familiar listener Patient feels he is able to communicate with his wife and others given time. He continues to present with word finding difficulty and some processing difficulty.  Progress made/ discontinue   2. Patient will be able to comprehend written material in home/home and social  environment Patient continues to spell out each word while reading but is able to demonstrate understanding after decoding words correctly.  Progress made/ discontinue         Therapy Education/Self Care    Details: Discharge planning   Given home strategies   Progress: Reinforced   Education provided to:  Patient and Family   Level of understanding Verbalized             Total Time of Visit:             40   mins         ASSESSMENT/PLAN     ASSESSMENT: Patient has made progress in therapy.     PLAN: Continue home exercises. .     DISCHARGE SUMMARY   Discharge date 7/27/2022   Dates of this episode 6/13/2022  through today   Number of visits on this episode 7   Reason for discharge patient/caregiver request  due to medical issues   Outcomes achieved Refer to the goals table for specifics on goals  Progress made   Discharge plan Continue with current home exercise program as instructed  Patient to return to referring/providing physician   Summary of care Patient made progress in therapy and was compliant with home program. He is having new/continued medical issues and wishes to discontinue therapy at this time.    Discharge instruction Return to MD as needed.      Thank you for this referral and for allowing us to participate in the care of this patient. He is certainly welcome to return for therapy should  conditions warrant.     Sita Leyva, CCC-SLP 7/27/2022 10:47 CDT  License #[id number]          [address] [place] [address]  [phone]

## 2022-07-29 ENCOUNTER — TELEPHONE (OUTPATIENT)
Dept: INTERNAL MEDICINE | Age: 87
End: 2022-07-29

## 2022-07-29 ENCOUNTER — OFFICE VISIT (OUTPATIENT)
Dept: NEUROLOGY | Facility: CLINIC | Age: 87
End: 2022-07-29

## 2022-07-29 VITALS
SYSTOLIC BLOOD PRESSURE: 188 MMHG | OXYGEN SATURATION: 98 % | HEART RATE: 70 BPM | BODY MASS INDEX: 29.59 KG/M2 | WEIGHT: 199.8 LBS | DIASTOLIC BLOOD PRESSURE: 94 MMHG | HEIGHT: 69 IN

## 2022-07-29 DIAGNOSIS — Z86.73 HISTORY OF STROKE: Primary | ICD-10-CM

## 2022-07-29 DIAGNOSIS — I10 ESSENTIAL HYPERTENSION: Primary | ICD-10-CM

## 2022-07-29 PROCEDURE — 99214 OFFICE O/P EST MOD 30 MIN: CPT | Performed by: PSYCHIATRY & NEUROLOGY

## 2022-07-29 RX ORDER — DOXYCYCLINE HYCLATE 100 MG
1 TABLET ORAL 2 TIMES DAILY
COMMUNITY
Start: 2022-07-21 | End: 2022-08-09

## 2022-07-29 RX ORDER — CLONIDINE HYDROCHLORIDE 0.1 MG/1
TABLET ORAL
Qty: 30 TABLET | Refills: 3 | Status: SHIPPED | OUTPATIENT
Start: 2022-07-29

## 2022-07-29 NOTE — TELEPHONE ENCOUNTER
Daughter left message stating pt's bp has been erratic. Dr. Rita Caal at [facility] suggested that his medication be adjusted. He is taking 150mg QAM. /94 today at the neurologist. When he was here last on 7/15 his bp was 138/82, but the daughter says he has times where it is ok but other times it shoots up. She says the pt can tell when his bp is high. She says his head always feels funny when this happens. WM/Tomás Please advise.

## 2022-08-05 ENCOUNTER — OFFICE VISIT (OUTPATIENT)
Dept: INTERNAL MEDICINE | Age: 87
End: 2022-08-05
Payer: MEDICARE

## 2022-08-05 VITALS
OXYGEN SATURATION: 98 % | DIASTOLIC BLOOD PRESSURE: 72 MMHG | SYSTOLIC BLOOD PRESSURE: 148 MMHG | RESPIRATION RATE: 22 BRPM | HEART RATE: 69 BPM | WEIGHT: 199.6 LBS | HEIGHT: 69 IN | BODY MASS INDEX: 29.56 KG/M2

## 2022-08-05 DIAGNOSIS — I10 ESSENTIAL HYPERTENSION: Primary | ICD-10-CM

## 2022-08-05 PROBLEM — I44.2 ATRIOVENTRICULAR BLOCK, COMPLETE (HCC): Status: RESOLVED | Noted: 2018-01-22 | Resolved: 2022-08-05

## 2022-08-05 PROCEDURE — 1123F ACP DISCUSS/DSCN MKR DOCD: CPT | Performed by: INTERNAL MEDICINE

## 2022-08-05 PROCEDURE — G8427 DOCREV CUR MEDS BY ELIG CLIN: HCPCS | Performed by: INTERNAL MEDICINE

## 2022-08-05 PROCEDURE — 99214 OFFICE O/P EST MOD 30 MIN: CPT | Performed by: INTERNAL MEDICINE

## 2022-08-05 PROCEDURE — G8417 CALC BMI ABV UP PARAM F/U: HCPCS | Performed by: INTERNAL MEDICINE

## 2022-08-05 PROCEDURE — 1036F TOBACCO NON-USER: CPT | Performed by: INTERNAL MEDICINE

## 2022-08-05 ASSESSMENT — ENCOUNTER SYMPTOMS
SHORTNESS OF BREATH: 0
TROUBLE SWALLOWING: 0
BACK PAIN: 0
BLOOD IN STOOL: 0
NAUSEA: 0
SINUS PRESSURE: 0
ABDOMINAL PAIN: 0
WHEEZING: 0
EYE DISCHARGE: 0
ABDOMINAL DISTENTION: 0
VOMITING: 0
SORE THROAT: 0
EYE ITCHING: 0

## 2022-08-05 NOTE — PROGRESS NOTES
[address] [facility]  [address] [address] [address]  [address] [address]  Dept: [phone]  Dept Fax: [phone]: [phone]      Visit Date: 2022    Angie Torres a 82 y.o. male who presents today for:  Chief Complaint   Patient presents with    Hypertension     Patient is her to go over his blood pressure readings over the past week. He has brought a record of his readings. HPI:     Is in for follow-up on his blood pressure. He called in last week and blood pressures running high we gave him clonidine and told him to bring a log again of however many times he is having to use it. Past Medical History:   Diagnosis Date    Adenomatous colon polyp     Aortic stenosis     Arthritis, degenerative     AV block, 2nd degree     , Holter monitor, high degree second-degree A-V block, 7 second pauses, read by Dr. Jessee Mcardle, PHP    CAD (coronary artery disease)     Chronic kidney disease, stage III (moderate) ([facility] [address]     Hearing loss     Hyperlipidemia     Hypertension     Left ventricular hypertrophy     Melanoma of back ([facility] [address]     Neuropathy     idiopathic peripheral neuropathy    Obesity       Past Surgical History:   Procedure Laterality Date    COLONOSCOPY  2016    PACEMAKER PLACEMENT  2018       No family history on file.     Social History     Tobacco Use    Smoking status: Former     Types: Cigarettes     Start date: 10/19/1954     Quit date: 1999     Years since quittin.0    Smokeless tobacco: Never   Substance Use Topics    Alcohol use: No      Current Outpatient Medications   Medication Sig Dispense Refill    cloNIDine (CATAPRES) 0.1 MG tablet 1 tablet every 8 hours PRN for bp >150/90. 30 tablet 3    rivaroxaban (XARELTO) 15 MG TABS tablet Take 15 mg by mouth      Cholecalciferol (VITAMIN D3) 125 MCG (5000 UT) TABS Take by mouth      Omega-3 Fatty Acids (FISH OIL) 1000 MG CAPS Take 3,000 mg by mouth 3 times daily sodium bicarbonate 325 MG tablet Take 650 mg by mouth 2 times daily      irbesartan (AVAPRO) 300 MG tablet Take 1 tablet by mouth nightly (Patient taking differently: Take 150 mg by mouth in the morning. 0.5mg daily.) 90 tablet 1    atorvastatin (LIPITOR) 80 MG tablet Take 1 tablet by mouth once daily 90 tablet 0    aspirin 81 MG tablet Take 81 mg by mouth daily      Coenzyme Q10 (CO Q 10) 10 MG CAPS Take 1 capsule by mouth daily      nitroGLYCERIN (NITROSTAT) 0.4 MG SL tablet Place 1 tablet under the tongue One under tongue as needed for chest pain. No current facility-administered medications for this visit. Allergies   Allergen Reactions    Sulfa Antibiotics Rash         Subjective:     Review of Systems   Constitutional:  Positive for fatigue. Negative for activity change, appetite change and fever. HENT:  Negative for congestion, hearing loss, sinus pressure, sore throat and trouble swallowing. Eyes:  Negative for discharge and itching. Respiratory:  Negative for shortness of breath and wheezing. Cardiovascular:  Negative for chest pain, palpitations and leg swelling. Gastrointestinal:  Negative for abdominal distention, abdominal pain, blood in stool, nausea and vomiting. Endocrine: Negative for cold intolerance, heat intolerance and polydipsia. Genitourinary:  Negative for flank pain, frequency, hematuria and urgency. Musculoskeletal:  Negative for arthralgias, back pain and joint swelling. Skin:  Negative for rash and wound. Allergic/Immunologic: Negative for environmental allergies and food allergies. Neurological:  Positive for speech difficulty and weakness. Negative for dizziness, tremors, syncope, numbness and headaches. Hematological:  Negative for adenopathy. Psychiatric/Behavioral:  Negative for agitation and hallucinations. The patient is not nervous/anxious.       Objective:      BP (!) 148/72 (Site: Left Upper Arm, Position: Sitting)   Pulse 69   Resp 22 Ht 5' 9\" (1.753 m)   Wt 199 lb 9.6 oz (90.5 kg)   SpO2 98%   BMI 29.48 kg/m²    Physical Exam     Assessment:      Diagnosis Orders   1. Essential hypertension                 Plan:     Blood pressure is doing better. He is to use the clonidine point 1 mg 3 times in the last 2 weeks. I told him that it is important that he run his blood pressure little bit higher than normal since he has had a stroke. He is 148/72 today which I think is adequate. They seem hesitant to running his pressures higher but I have assured them that neurologist after stroke we will always try to keep the blood pressure running little higher to insure perfusion we will see him back at his scheduled appointment    No follow-ups on file. Patient given educational materials- see patient instructions. Discussed use, benefit, and side effects of prescribedmedications. All patient questions answered. Pt voiced understanding. Reviewedhealth maintenance. Instructed to continue current medications, diet and exercise. Patient agreed with treatment plan. **This report has been created usingvoice recognition software. It may contain minor errors which are inherent in voicerecognition technology. **    Electronically signed by Sylvia Burr MD on 8/5/2022 at 1:54 PM

## 2022-08-08 ENCOUNTER — TELEPHONE (OUTPATIENT)
Dept: VASCULAR SURGERY | Facility: CLINIC | Age: 87
End: 2022-08-08

## 2022-08-09 ENCOUNTER — OFFICE VISIT (OUTPATIENT)
Dept: VASCULAR SURGERY | Facility: CLINIC | Age: 87
End: 2022-08-09

## 2022-08-09 VITALS
BODY MASS INDEX: 29.77 KG/M2 | HEIGHT: 69 IN | HEART RATE: 67 BPM | OXYGEN SATURATION: 98 % | WEIGHT: 201 LBS | SYSTOLIC BLOOD PRESSURE: 160 MMHG | RESPIRATION RATE: 18 BRPM | DIASTOLIC BLOOD PRESSURE: 84 MMHG

## 2022-08-09 DIAGNOSIS — E78.2 MIXED HYPERLIPIDEMIA: ICD-10-CM

## 2022-08-09 DIAGNOSIS — Z01.818 PREOP TESTING: ICD-10-CM

## 2022-08-09 DIAGNOSIS — I65.23 BILATERAL CAROTID ARTERY STENOSIS: Primary | ICD-10-CM

## 2022-08-09 DIAGNOSIS — Z79.02 ENCOUNTER FOR MONITORING ANTIPLATELET THERAPY: ICD-10-CM

## 2022-08-09 DIAGNOSIS — Z51.81 ENCOUNTER FOR MONITORING ANTIPLATELET THERAPY: ICD-10-CM

## 2022-08-09 DIAGNOSIS — I10 PRIMARY HYPERTENSION: ICD-10-CM

## 2022-08-09 DIAGNOSIS — I63.432 CEREBROVASCULAR ACCIDENT (CVA) DUE TO EMBOLISM OF LEFT POSTERIOR CEREBRAL ARTERY: ICD-10-CM

## 2022-08-09 PROBLEM — E78.00 PURE HYPERCHOLESTEROLEMIA: Status: ACTIVE | Noted: 2017-06-14

## 2022-08-09 PROBLEM — M17.0 PRIMARY OSTEOARTHRITIS OF BOTH KNEES: Status: ACTIVE | Noted: 2017-06-14

## 2022-08-09 PROBLEM — I25.700 CORONARY ARTERY DISEASE INVOLVING CORONARY BYPASS GRAFT WITH UNSTABLE ANGINA PECTORIS (HCC): Status: ACTIVE | Noted: 2017-12-28

## 2022-08-09 PROBLEM — R01.1 MURMUR, CARDIAC: Status: ACTIVE | Noted: 2017-05-16

## 2022-08-09 PROCEDURE — 99214 OFFICE O/P EST MOD 30 MIN: CPT | Performed by: SURGERY

## 2022-08-09 RX ORDER — CLONIDINE HYDROCHLORIDE 0.1 MG/1
0.1 TABLET ORAL EVERY 8 HOURS PRN
COMMUNITY
Start: 2022-07-29 | End: 2022-11-22

## 2022-08-09 RX ORDER — CLOPIDOGREL BISULFATE 75 MG/1
75 TABLET ORAL DAILY
Qty: 30 TABLET | Refills: 2 | Status: SHIPPED | OUTPATIENT
Start: 2022-08-09 | End: 2022-11-02

## 2022-08-09 NOTE — PROGRESS NOTES
"8/9/2022    Hossein Hua MD  [address] [place] [address] [address]  [place] [address]        Mireille Magallanes  1935    Chief Complaint   Patient presents with   • Carotid Artery Disease     1 month follow-up to discuss procedure.  Pt is a Former Smoker - Quit: 1999. Pt states he has headaches and pressure in the back side of his since he had a stroke, but denies any other stroke like symptoms.       Dear Hossein Hua MD   I had the pleasure of seeing your patient Mireille Magallanes in the office today.  As you recall, Mireille Magallanes is a 86 y.o.  male who we are following for carotid occlusive disease.  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  He recently had a left hemispheric stroke and spent a couple days in the hospital.  He is maintained on aspirin and Xarelto.  He was previously on Pradaxa, but developed rectal bleeding.  He is currently back on Xarelto.   He did undergo a right carotid endarterectomy on 2/14/2020.  He is doing better and without bleeding since changing.     Review of Systems   Constitutional: Negative.    HENT: Negative.    Eyes: Negative.    Respiratory: Negative.    Cardiovascular: Negative.    Gastrointestinal: Negative.    Endocrine: Negative.    Genitourinary: Negative.    Musculoskeletal: Negative.    Skin: Negative.    Allergic/Immunologic: Negative.    Neurological: Negative.    Hematological: Negative.    Psychiatric/Behavioral: Negative.    All other systems reviewed and are negative.       /84 (BP Location: Left arm, Patient Position: Sitting, Cuff Size: Adult)   Pulse 67   Resp 18   Ht 175.3 cm (69\")   Wt 91.2 kg (201 lb)   SpO2 98%   BMI 29.68 kg/m²      Physical Exam  Vitals and nursing note reviewed.   Constitutional:       Appearance: Normal appearance. He is well-developed and normal weight.   HENT:      Head: Normocephalic and atraumatic.   Eyes:      General: No scleral icterus.     Pupils: Pupils are equal, " round, and reactive to light.   Neck:      Thyroid: No thyromegaly.      Vascular: No carotid bruit or JVD.   Cardiovascular:      Rate and Rhythm: Normal rate and regular rhythm.      Pulses:           Carotid pulses are 2+ on the right side and 2+ on the left side.       Femoral pulses are 2+ on the right side and 2+ on the left side.       Popliteal pulses are 2+ on the right side and 2+ on the left side.        Dorsalis pedis pulses are 2+ on the right side and 2+ on the left side.        Posterior tibial pulses are 2+ on the right side and 2+ on the left side.      Heart sounds: Murmur heard.   Pulmonary:      Effort: Pulmonary effort is normal.      Breath sounds: Normal breath sounds.   Abdominal:      General: Bowel sounds are normal. There is no distension or abdominal bruit.      Palpations: Abdomen is soft. There is no mass.      Tenderness: There is no abdominal tenderness.   Musculoskeletal:         General: Normal range of motion.      Cervical back: Normal range of motion and neck supple.   Lymphadenopathy:      Cervical: No cervical adenopathy.   Skin:     General: Skin is warm and dry.   Neurological:      General: No focal deficit present.      Mental Status: He is alert and oriented to person, place, and time.      Cranial Nerves: No cranial nerve deficit.      Sensory: No sensory deficit.   Psychiatric:         Mood and Affect: Mood normal.         Behavior: Behavior normal. Behavior is cooperative.         Thought Content: Thought content normal.         Judgment: Judgment normal.       Diagnostic data:  Narrative & Impression   History: Stroke     IMPRESSION:  Impression:  1. There is less than 50% stenosis of the right internal carotid artery.  2. There is less than 50% stenosis of the left internal carotid artery.  3. Antegrade flow is demonstrated in bilateral vertebral arteries.  4. There appears to be plaque or potential clot burden at the left  bifurcation. Further imaging with a CTA of  the neck may be warranted.     Comments: Bilateral carotid vertebral arterial duplex scan was  performed.     Grayscale imaging shows intimal thickening and calcified elements at the  carotid bifurcation. The right internal carotid artery peak systolic  velocity is 74.7 cm/sec. The end-diastolic velocity is 20.6 cm/sec. The  right ICA/CCA ratio is approximately 0.9 . These findings correlate with  less than 50% stenosis of the right internal carotid artery.     Grayscale imaging shows intimal thickening and calcified elements at the  carotid bifurcation. The left internal carotid artery peak systolic  velocity is 71.8 cm/sec. The end-diastolic velocity is 18.5 cm/sec. The  left ICA/CCA ratio is approximately 1.2 . These findings correlate with  less than 50% stenosis of the left internal carotid artery.     Antegrade flow is demonstrated in bilateral vertebral arteries.     This report was finalized on 05/26/2022 15:11 by Dr. Martín Woo MD.         Patient Active Problem List   Diagnosis   • HTN (hypertension)   • Hyperlipidemia   • Coronary artery disease involving coronary bypass graft of native heart without angina pectoris   • Pacemaker   • Other chest pain   • Overweight (BMI 25.0-29.9)   • TIA (transient ischemic attack)   • Carotid artery stenosis   • Acute cerebrovascular accident (CVA) of cerebellum ([facility])   • Stenosis of right carotid artery   • Bilateral carotid artery stenosis   • Carotid stenosis   • History of stroke   • S/P TAVR (transcatheter aortic valve replacement)   • Atrial flutter with controlled response ([facility])   • Cerebrovascular accident (CVA) ([facility])   • Chronic renal failure, stage 4 (severe) ([facility])   • 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 ([facility])   • Murmur, cardiac   • Primary osteoarthritis of both knees   • Pure hypercholesterolemia        Diagnosis Plan   1. Bilateral carotid artery stenosis     2. Primary hypertension     3. Mixed  hyperlipidemia     4. Cerebrovascular accident (CVA) due to embolism of left posterior cerebral artery (HCC)         Plan: After thoroughly evaluating Mireille Magallanes, I believe the best course of action to proceed with a left TCAR.  Risks/benefits were explained in great length to the patient which include but not limited to bleeding, infection, vessel damage, damage, MI, stroke, and death.  The patient understands the risks of the procedure.  I sent a prescription in for Plavix to have him start taking that as well in addition to the aspirin and Xarelto.  When his surgery is scheduled he did hold the Xarelto for 2 days prior to the surgery.  He will need to continue these medications right up to the day of surgery.  I did review his testing which shows less than 50% carotid stenosis on the right side and about 75 to 80% on the left side.  The MRA has poor images it is inconclusive.  It appears as though he may have had an embolic component to his stroke.  I did discuss vascular risk factors as they pertain to the progression of vascular disease including controlling hypertension and hyperlipidemia.  These risk factors are currently stable.  The patient is to continue taking their medications as previously discussed.   This was all discussed in full with complete understanding.  Thank you for allowing me to participate in the care of your patient.  Please do not hesitate to call with any questions or concerns.  We will keep you aware of any further encounters with Mireille Magallanes.        Sincerely yours,         DO Javid Jimenez, Hossein Cervantes MD

## 2022-08-09 NOTE — PATIENT INSTRUCTIONS
DO NOT  hold aspirin, Plavix, or Lipitor prior to the procedure.  Hold Xarelto for 2 days prior to procedure.

## 2022-08-18 ENCOUNTER — TELEPHONE (OUTPATIENT)
Dept: VASCULAR SURGERY | Facility: CLINIC | Age: 87
End: 2022-08-18

## 2022-08-18 PROBLEM — Z01.818 PREOP TESTING: Status: ACTIVE | Noted: 2022-08-18

## 2022-08-18 NOTE — TELEPHONE ENCOUNTER
SPOKE WITH PATIENT REGARDING SURGERY. PT WAS INFORMED OF PRE WORK ON 09/16 AT 0915. PT WAS ALSO INFORMED OF SURGERY ON 09/19 AT 0700. PT WAS INFORMED OF COVID TEST AND VISITATION. PT STATED UNDERSTANDING OF INSTRUCTIONS AND ALL INFORMATION.

## 2022-08-29 RX ORDER — NITROGLYCERIN 0.4 MG/1
0.4 TABLET SUBLINGUAL AS NEEDED
Qty: 30 TABLET | Refills: 11 | Status: SHIPPED | OUTPATIENT
Start: 2022-08-29

## 2022-08-31 ENCOUNTER — TELEPHONE (OUTPATIENT)
Dept: INTERNAL MEDICINE | Age: 87
End: 2022-08-31

## 2022-08-31 NOTE — TELEPHONE ENCOUNTER
S/w daughter, pt has a membership at a [employer] but he hasn't been able to play since his stroke; he has another surgery coming up. He is still getting charged for being in the club but they will put the charges on hold for 3 months if they get a doctor's excuse.

## 2022-09-16 ENCOUNTER — PRE-ADMISSION TESTING (OUTPATIENT)
Dept: PREADMISSION TESTING | Facility: HOSPITAL | Age: 87
End: 2022-09-16

## 2022-09-16 ENCOUNTER — HOSPITAL ENCOUNTER (OUTPATIENT)
Dept: GENERAL RADIOLOGY | Facility: HOSPITAL | Age: 87
Discharge: HOME | End: 2022-09-16

## 2022-09-16 ENCOUNTER — TELEPHONE (OUTPATIENT)
Dept: VASCULAR SURGERY | Facility: CLINIC | Age: 87
End: 2022-09-16

## 2022-09-16 VITALS
BODY MASS INDEX: 30.44 KG/M2 | RESPIRATION RATE: 16 BRPM | SYSTOLIC BLOOD PRESSURE: 195 MMHG | WEIGHT: 200.84 LBS | HEIGHT: 68 IN | HEART RATE: 67 BPM | DIASTOLIC BLOOD PRESSURE: 88 MMHG | OXYGEN SATURATION: 100 %

## 2022-09-16 DIAGNOSIS — Z79.02 ENCOUNTER FOR MONITORING ANTIPLATELET THERAPY: ICD-10-CM

## 2022-09-16 DIAGNOSIS — Z51.81 ENCOUNTER FOR MONITORING ANTIPLATELET THERAPY: ICD-10-CM

## 2022-09-16 DIAGNOSIS — I65.23 BILATERAL CAROTID ARTERY STENOSIS: ICD-10-CM

## 2022-09-16 DIAGNOSIS — Z01.818 PREOP TESTING: ICD-10-CM

## 2022-09-16 LAB
ANION GAP SERPL CALCULATED.3IONS-SCNC: 9 MMOL/L (ref 5–15)
APTT PPP: 34.9 SECONDS (ref 24.1–35)
BASOPHILS # BLD AUTO: 0.02 10*3/MM3 (ref 0–0.2)
BASOPHILS NFR BLD AUTO: 0.4 % (ref 0–1.5)
BUN SERPL-MCNC: 27 MG/DL (ref 8–23)
BUN/CREAT SERPL: 9.7 (ref 7–25)
CALCIUM SPEC-SCNC: 9.1 MG/DL (ref 8.6–10.5)
CHLORIDE SERPL-SCNC: 108 MMOL/L (ref 98–107)
CO2 SERPL-SCNC: 24 MMOL/L (ref 22–29)
CREAT SERPL-MCNC: 2.77 MG/DL (ref 0.76–1.27)
DEPRECATED RDW RBC AUTO: 41.8 FL (ref 37–54)
EGFRCR SERPLBLD CKD-EPI 2021: 21.9 ML/MIN/1.73
EOSINOPHIL # BLD AUTO: 0.16 10*3/MM3 (ref 0–0.4)
EOSINOPHIL NFR BLD AUTO: 2.9 % (ref 0.3–6.2)
ERYTHROCYTE [DISTWIDTH] IN BLOOD BY AUTOMATED COUNT: 13.4 % (ref 12.3–15.4)
GLUCOSE SERPL-MCNC: 96 MG/DL (ref 65–99)
HCT VFR BLD AUTO: 31 % (ref 37.5–51)
HGB BLD-MCNC: 9.7 G/DL (ref 13–17.7)
IMM GRANULOCYTES # BLD AUTO: 0.01 10*3/MM3 (ref 0–0.05)
IMM GRANULOCYTES NFR BLD AUTO: 0.2 % (ref 0–0.5)
INR PPP: 1.52 (ref 0.91–1.09)
LYMPHOCYTES # BLD AUTO: 1.46 10*3/MM3 (ref 0.7–3.1)
LYMPHOCYTES NFR BLD AUTO: 26.7 % (ref 19.6–45.3)
MCH RBC QN AUTO: 26.6 PG (ref 26.6–33)
MCHC RBC AUTO-ENTMCNC: 31.3 G/DL (ref 31.5–35.7)
MCV RBC AUTO: 85.2 FL (ref 79–97)
MONOCYTES # BLD AUTO: 0.55 10*3/MM3 (ref 0.1–0.9)
MONOCYTES NFR BLD AUTO: 10.1 % (ref 5–12)
NEUTROPHILS NFR BLD AUTO: 3.27 10*3/MM3 (ref 1.7–7)
NEUTROPHILS NFR BLD AUTO: 59.7 % (ref 42.7–76)
NRBC BLD AUTO-RTO: 0 /100 WBC (ref 0–0.2)
PLATELET # BLD AUTO: 149 10*3/MM3 (ref 140–450)
PMV BLD AUTO: 10.8 FL (ref 6–12)
POTASSIUM SERPL-SCNC: 4.9 MMOL/L (ref 3.5–5.2)
PROTHROMBIN TIME: 17.7 SECONDS (ref 11.9–14.6)
RBC # BLD AUTO: 3.64 10*6/MM3 (ref 4.14–5.8)
SODIUM SERPL-SCNC: 141 MMOL/L (ref 136–145)
WBC NRBC COR # BLD: 5.47 10*3/MM3 (ref 3.4–10.8)

## 2022-09-16 PROCEDURE — 85610 PROTHROMBIN TIME: CPT

## 2022-09-16 PROCEDURE — 36415 COLL VENOUS BLD VENIPUNCTURE: CPT

## 2022-09-16 PROCEDURE — 93005 ELECTROCARDIOGRAM TRACING: CPT

## 2022-09-16 PROCEDURE — 85730 THROMBOPLASTIN TIME PARTIAL: CPT

## 2022-09-16 PROCEDURE — 71046 X-RAY EXAM CHEST 2 VIEWS: CPT

## 2022-09-16 PROCEDURE — 93010 ELECTROCARDIOGRAM REPORT: CPT | Performed by: EMERGENCY MEDICINE

## 2022-09-16 PROCEDURE — 80048 BASIC METABOLIC PNL TOTAL CA: CPT

## 2022-09-16 PROCEDURE — 85025 COMPLETE CBC W/AUTO DIFF WBC: CPT

## 2022-09-16 RX ORDER — CLOPIDOGREL BISULFATE 75 MG/1
75 TABLET ORAL DAILY
COMMUNITY
End: 2022-10-28

## 2022-09-18 ENCOUNTER — ANESTHESIA EVENT (OUTPATIENT)
Dept: PERIOP | Facility: HOSPITAL | Age: 87
End: 2022-09-18

## 2022-09-18 LAB
QT INTERVAL: 512 MS
QTC INTERVAL: 512 MS

## 2022-09-19 ENCOUNTER — ANESTHESIA (OUTPATIENT)
Dept: PERIOP | Facility: HOSPITAL | Age: 87
End: 2022-09-19

## 2022-09-19 ENCOUNTER — HOSPITAL ENCOUNTER (INPATIENT)
Facility: HOSPITAL | Age: 87
LOS: 1 days | Discharge: HOME | End: 2022-09-20
Attending: SURGERY | Admitting: SURGERY

## 2022-09-19 ENCOUNTER — APPOINTMENT (OUTPATIENT)
Dept: INTERVENTIONAL RADIOLOGY/VASCULAR | Facility: HOSPITAL | Age: 87
End: 2022-09-19

## 2022-09-19 DIAGNOSIS — Z01.818 PREOP TESTING: ICD-10-CM

## 2022-09-19 DIAGNOSIS — I65.23 BILATERAL CAROTID ARTERY STENOSIS: ICD-10-CM

## 2022-09-19 DIAGNOSIS — I65.22 CAROTID ARTERY STENOSIS, SYMPTOMATIC, LEFT: Primary | ICD-10-CM

## 2022-09-19 LAB
ABO GROUP BLD: NORMAL
ACT BLD: 242 SECONDS (ref 82–152)
BLD GP AB SCN SERPL QL: NEGATIVE
RH BLD: NEGATIVE
T&S EXPIRATION DATE: NORMAL

## 2022-09-19 PROCEDURE — 037L3DZ DILATION OF LEFT INTERNAL CAROTID ARTERY WITH INTRALUMINAL DEVICE, PERCUTANEOUS APPROACH: ICD-10-PCS | Performed by: SURGERY

## 2022-09-19 PROCEDURE — 37215 TRANSCATH STENT CCA W/EPS: CPT | Performed by: SURGERY

## 2022-09-19 PROCEDURE — C1769 GUIDE WIRE: HCPCS | Performed by: SURGERY

## 2022-09-19 PROCEDURE — 86850 RBC ANTIBODY SCREEN: CPT | Performed by: SURGERY

## 2022-09-19 PROCEDURE — 86901 BLOOD TYPING SEROLOGIC RH(D): CPT | Performed by: SURGERY

## 2022-09-19 PROCEDURE — 25010000002 PROPOFOL 10 MG/ML EMULSION: Performed by: NURSE ANESTHETIST, CERTIFIED REGISTERED

## 2022-09-19 PROCEDURE — 25010000002 HEPARIN (PORCINE) PER 1000 UNITS: Performed by: SURGERY

## 2022-09-19 PROCEDURE — 25010000002 FENTANYL CITRATE (PF) 100 MCG/2ML SOLUTION: Performed by: NURSE ANESTHETIST, CERTIFIED REGISTERED

## 2022-09-19 PROCEDURE — 85347 COAGULATION TIME ACTIVATED: CPT

## 2022-09-19 PROCEDURE — X2AJ336 CEREBRAL EMBOLIC FILTRATION, EXTRACORPOREAL FLOW REVERSAL CIRCUIT FROM LEFT COMMON CAROTID ARTERY, PERCUTANEOUS APPROACH, NEW TECHNOLOGY GROUP 6: ICD-10-PCS | Performed by: SURGERY

## 2022-09-19 PROCEDURE — C1876 STENT, NON-COA/NON-COV W/DEL: HCPCS | Performed by: SURGERY

## 2022-09-19 PROCEDURE — C1894 INTRO/SHEATH, NON-LASER: HCPCS | Performed by: SURGERY

## 2022-09-19 PROCEDURE — 25010000002 MORPHINE PER 10 MG: Performed by: SURGERY

## 2022-09-19 PROCEDURE — C1725 CATH, TRANSLUMIN NON-LASER: HCPCS | Performed by: SURGERY

## 2022-09-19 PROCEDURE — 25010000002 PROTAMINE SULFATE PER 10 MG: Performed by: NURSE ANESTHETIST, CERTIFIED REGISTERED

## 2022-09-19 PROCEDURE — 93005 ELECTROCARDIOGRAM TRACING: CPT | Performed by: SURGERY

## 2022-09-19 PROCEDURE — 25010000002 DEXAMETHASONE PER 1 MG: Performed by: NURSE ANESTHETIST, CERTIFIED REGISTERED

## 2022-09-19 PROCEDURE — 25010000002 PHENYLEPHRINE 10 MG/ML SOLUTION 1 ML VIAL: Performed by: NURSE ANESTHETIST, CERTIFIED REGISTERED

## 2022-09-19 PROCEDURE — C1884 EMBOLIZATION PROTECT SYST: HCPCS | Performed by: SURGERY

## 2022-09-19 PROCEDURE — 86900 BLOOD TYPING SEROLOGIC ABO: CPT | Performed by: SURGERY

## 2022-09-19 PROCEDURE — 0 IOVERSOL 74 % SOLUTION: Performed by: SURGERY

## 2022-09-19 PROCEDURE — 25010000002 ONDANSETRON PER 1 MG: Performed by: NURSE ANESTHETIST, CERTIFIED REGISTERED

## 2022-09-19 PROCEDURE — 25010000002 CEFAZOLIN PER 500 MG: Performed by: SURGERY

## 2022-09-19 PROCEDURE — 25010000002 HYDRALAZINE PER 20 MG: Performed by: NURSE PRACTITIONER

## 2022-09-19 PROCEDURE — 25010000002 HEPARIN (PORCINE) PER 1000 UNITS: Performed by: NURSE ANESTHETIST, CERTIFIED REGISTERED

## 2022-09-19 PROCEDURE — 76000 FLUOROSCOPY <1 HR PHYS/QHP: CPT

## 2022-09-19 PROCEDURE — 93010 ELECTROCARDIOGRAM REPORT: CPT | Performed by: HOSPITALIST

## 2022-09-19 DEVICE — 10 MM X 40 MM
Type: IMPLANTABLE DEVICE | Site: CAROTID | Status: FUNCTIONAL
Brand: ENROUTE TRANSCAROTID STENT

## 2022-09-19 RX ORDER — SODIUM CHLORIDE 0.9 % (FLUSH) 0.9 %
3 SYRINGE (ML) INJECTION EVERY 12 HOURS SCHEDULED
Status: DISCONTINUED | OUTPATIENT
Start: 2022-09-19 | End: 2022-09-19 | Stop reason: HOSPADM

## 2022-09-19 RX ORDER — ROCURONIUM BROMIDE 10 MG/ML
INJECTION, SOLUTION INTRAVENOUS AS NEEDED
Status: DISCONTINUED | OUTPATIENT
Start: 2022-09-19 | End: 2022-09-19 | Stop reason: SURG

## 2022-09-19 RX ORDER — SODIUM BICARBONATE 650 MG/1
650 TABLET ORAL 2 TIMES DAILY
Status: DISCONTINUED | OUTPATIENT
Start: 2022-09-19 | End: 2022-09-20 | Stop reason: HOSPADM

## 2022-09-19 RX ORDER — FLUMAZENIL 0.1 MG/ML
0.2 INJECTION INTRAVENOUS AS NEEDED
Status: DISCONTINUED | OUTPATIENT
Start: 2022-09-19 | End: 2022-09-19 | Stop reason: HOSPADM

## 2022-09-19 RX ORDER — NALOXONE HCL 0.4 MG/ML
0.4 VIAL (ML) INJECTION
Status: DISCONTINUED | OUTPATIENT
Start: 2022-09-19 | End: 2022-09-20 | Stop reason: HOSPADM

## 2022-09-19 RX ORDER — HYDRALAZINE HYDROCHLORIDE 20 MG/ML
20 INJECTION INTRAMUSCULAR; INTRAVENOUS EVERY 6 HOURS PRN
Status: DISCONTINUED | OUTPATIENT
Start: 2022-09-19 | End: 2022-09-20 | Stop reason: HOSPADM

## 2022-09-19 RX ORDER — SODIUM CHLORIDE, SODIUM LACTATE, POTASSIUM CHLORIDE, CALCIUM CHLORIDE 600; 310; 30; 20 MG/100ML; MG/100ML; MG/100ML; MG/100ML
1000 INJECTION, SOLUTION INTRAVENOUS CONTINUOUS
Status: DISCONTINUED | OUTPATIENT
Start: 2022-09-19 | End: 2022-09-19

## 2022-09-19 RX ORDER — SODIUM CHLORIDE 0.9 % (FLUSH) 0.9 %
3 SYRINGE (ML) INJECTION AS NEEDED
Status: DISCONTINUED | OUTPATIENT
Start: 2022-09-19 | End: 2022-09-19 | Stop reason: HOSPADM

## 2022-09-19 RX ORDER — ONDANSETRON 2 MG/ML
4 INJECTION INTRAMUSCULAR; INTRAVENOUS EVERY 6 HOURS PRN
Status: DISCONTINUED | OUTPATIENT
Start: 2022-09-19 | End: 2022-09-20 | Stop reason: HOSPADM

## 2022-09-19 RX ORDER — HEPARIN SODIUM 1000 [USP'U]/ML
INJECTION, SOLUTION INTRAVENOUS; SUBCUTANEOUS AS NEEDED
Status: DISCONTINUED | OUTPATIENT
Start: 2022-09-19 | End: 2022-09-19 | Stop reason: SURG

## 2022-09-19 RX ORDER — DEXAMETHASONE SODIUM PHOSPHATE 4 MG/ML
INJECTION, SOLUTION INTRA-ARTICULAR; INTRALESIONAL; INTRAMUSCULAR; INTRAVENOUS; SOFT TISSUE AS NEEDED
Status: DISCONTINUED | OUTPATIENT
Start: 2022-09-19 | End: 2022-09-19 | Stop reason: SURG

## 2022-09-19 RX ORDER — BUPIVACAINE HCL/0.9 % NACL/PF 0.1 %
2 PLASTIC BAG, INJECTION (ML) EPIDURAL EVERY 8 HOURS
Status: COMPLETED | OUTPATIENT
Start: 2022-09-19 | End: 2022-09-20

## 2022-09-19 RX ORDER — FENTANYL CITRATE 50 UG/ML
25 INJECTION, SOLUTION INTRAMUSCULAR; INTRAVENOUS
Status: DISCONTINUED | OUTPATIENT
Start: 2022-09-19 | End: 2022-09-19 | Stop reason: HOSPADM

## 2022-09-19 RX ORDER — OXYCODONE AND ACETAMINOPHEN 10; 325 MG/1; MG/1
1 TABLET ORAL ONCE AS NEEDED
Status: DISCONTINUED | OUTPATIENT
Start: 2022-09-19 | End: 2022-09-19 | Stop reason: HOSPADM

## 2022-09-19 RX ORDER — SODIUM CHLORIDE 0.9 % (FLUSH) 0.9 %
10 SYRINGE (ML) INJECTION AS NEEDED
Status: DISCONTINUED | OUTPATIENT
Start: 2022-09-19 | End: 2022-09-20 | Stop reason: HOSPADM

## 2022-09-19 RX ORDER — ACETAMINOPHEN 325 MG/1
650 TABLET ORAL EVERY 4 HOURS PRN
Status: DISCONTINUED | OUTPATIENT
Start: 2022-09-19 | End: 2022-09-20 | Stop reason: HOSPADM

## 2022-09-19 RX ORDER — LABETALOL HYDROCHLORIDE 5 MG/ML
5 INJECTION, SOLUTION INTRAVENOUS
Status: DISCONTINUED | OUTPATIENT
Start: 2022-09-19 | End: 2022-09-19 | Stop reason: HOSPADM

## 2022-09-19 RX ORDER — LIDOCAINE HYDROCHLORIDE 20 MG/ML
INJECTION, SOLUTION EPIDURAL; INFILTRATION; INTRACAUDAL; PERINEURAL AS NEEDED
Status: DISCONTINUED | OUTPATIENT
Start: 2022-09-19 | End: 2022-09-19 | Stop reason: SURG

## 2022-09-19 RX ORDER — MORPHINE SULFATE 2 MG/ML
2 INJECTION, SOLUTION INTRAMUSCULAR; INTRAVENOUS
Status: DISCONTINUED | OUTPATIENT
Start: 2022-09-19 | End: 2022-09-20 | Stop reason: HOSPADM

## 2022-09-19 RX ORDER — FENTANYL CITRATE 50 UG/ML
INJECTION, SOLUTION INTRAMUSCULAR; INTRAVENOUS AS NEEDED
Status: DISCONTINUED | OUTPATIENT
Start: 2022-09-19 | End: 2022-09-19 | Stop reason: SURG

## 2022-09-19 RX ORDER — NITROGLYCERIN 0.4 MG/1
0.4 TABLET SUBLINGUAL AS NEEDED
Status: DISCONTINUED | OUTPATIENT
Start: 2022-09-19 | End: 2022-09-20 | Stop reason: HOSPADM

## 2022-09-19 RX ORDER — IBUPROFEN 600 MG/1
600 TABLET ORAL ONCE AS NEEDED
Status: DISCONTINUED | OUTPATIENT
Start: 2022-09-19 | End: 2022-09-19 | Stop reason: HOSPADM

## 2022-09-19 RX ORDER — SODIUM CHLORIDE, SODIUM LACTATE, POTASSIUM CHLORIDE, CALCIUM CHLORIDE 600; 310; 30; 20 MG/100ML; MG/100ML; MG/100ML; MG/100ML
100 INJECTION, SOLUTION INTRAVENOUS CONTINUOUS
Status: DISCONTINUED | OUTPATIENT
Start: 2022-09-19 | End: 2022-09-19

## 2022-09-19 RX ORDER — LIDOCAINE HYDROCHLORIDE 10 MG/ML
0.5 INJECTION, SOLUTION EPIDURAL; INFILTRATION; INTRACAUDAL; PERINEURAL ONCE AS NEEDED
Status: DISCONTINUED | OUTPATIENT
Start: 2022-09-19 | End: 2022-09-19 | Stop reason: HOSPADM

## 2022-09-19 RX ORDER — LABETALOL HYDROCHLORIDE 5 MG/ML
20 INJECTION, SOLUTION INTRAVENOUS
Status: DISCONTINUED | OUTPATIENT
Start: 2022-09-19 | End: 2022-09-20 | Stop reason: HOSPADM

## 2022-09-19 RX ORDER — PROPOFOL 10 MG/ML
VIAL (ML) INTRAVENOUS AS NEEDED
Status: DISCONTINUED | OUTPATIENT
Start: 2022-09-19 | End: 2022-09-19 | Stop reason: SURG

## 2022-09-19 RX ORDER — ONDANSETRON 2 MG/ML
INJECTION INTRAMUSCULAR; INTRAVENOUS AS NEEDED
Status: DISCONTINUED | OUTPATIENT
Start: 2022-09-19 | End: 2022-09-19 | Stop reason: SURG

## 2022-09-19 RX ORDER — PSEUDOEPHEDRINE HCL 30 MG
60 TABLET ORAL EVERY 4 HOURS PRN
Status: DISCONTINUED | OUTPATIENT
Start: 2022-09-19 | End: 2022-09-20 | Stop reason: HOSPADM

## 2022-09-19 RX ORDER — CLOPIDOGREL BISULFATE 75 MG/1
75 TABLET ORAL DAILY
Status: DISCONTINUED | OUTPATIENT
Start: 2022-09-19 | End: 2022-09-20 | Stop reason: HOSPADM

## 2022-09-19 RX ORDER — NALOXONE HCL 0.4 MG/ML
0.04 VIAL (ML) INJECTION AS NEEDED
Status: DISCONTINUED | OUTPATIENT
Start: 2022-09-19 | End: 2022-09-19 | Stop reason: HOSPADM

## 2022-09-19 RX ORDER — NICARDIPINE HYDROCHLORIDE 2.5 MG/ML
INJECTION INTRAVENOUS AS NEEDED
Status: DISCONTINUED | OUTPATIENT
Start: 2022-09-19 | End: 2022-09-19 | Stop reason: SURG

## 2022-09-19 RX ORDER — DROPERIDOL 2.5 MG/ML
0.62 INJECTION, SOLUTION INTRAMUSCULAR; INTRAVENOUS ONCE AS NEEDED
Status: DISCONTINUED | OUTPATIENT
Start: 2022-09-19 | End: 2022-09-19 | Stop reason: HOSPADM

## 2022-09-19 RX ORDER — BUPIVACAINE HYDROCHLORIDE 5 MG/ML
INJECTION, SOLUTION PERINEURAL AS NEEDED
Status: DISCONTINUED | OUTPATIENT
Start: 2022-09-19 | End: 2022-09-19 | Stop reason: HOSPADM

## 2022-09-19 RX ORDER — HYDROCODONE BITARTRATE AND ACETAMINOPHEN 5; 325 MG/1; MG/1
1 TABLET ORAL EVERY 4 HOURS PRN
Status: DISCONTINUED | OUTPATIENT
Start: 2022-09-19 | End: 2022-09-20 | Stop reason: HOSPADM

## 2022-09-19 RX ORDER — CLONIDINE HYDROCHLORIDE 0.1 MG/1
0.1 TABLET ORAL EVERY 8 HOURS PRN
Status: DISCONTINUED | OUTPATIENT
Start: 2022-09-19 | End: 2022-09-20 | Stop reason: HOSPADM

## 2022-09-19 RX ORDER — SODIUM CHLORIDE 0.9 % (FLUSH) 0.9 %
3-10 SYRINGE (ML) INJECTION AS NEEDED
Status: DISCONTINUED | OUTPATIENT
Start: 2022-09-19 | End: 2022-09-19 | Stop reason: HOSPADM

## 2022-09-19 RX ORDER — HYDROMORPHONE HYDROCHLORIDE 1 MG/ML
0.5 INJECTION, SOLUTION INTRAMUSCULAR; INTRAVENOUS; SUBCUTANEOUS
Status: DISCONTINUED | OUTPATIENT
Start: 2022-09-19 | End: 2022-09-19 | Stop reason: HOSPADM

## 2022-09-19 RX ORDER — SODIUM CHLORIDE 0.9 % (FLUSH) 0.9 %
10 SYRINGE (ML) INJECTION AS NEEDED
Status: DISCONTINUED | OUTPATIENT
Start: 2022-09-19 | End: 2022-09-19 | Stop reason: HOSPADM

## 2022-09-19 RX ORDER — ASPIRIN 81 MG/1
81 TABLET ORAL DAILY
Status: DISCONTINUED | OUTPATIENT
Start: 2022-09-19 | End: 2022-09-20 | Stop reason: HOSPADM

## 2022-09-19 RX ORDER — BUPIVACAINE HCL/0.9 % NACL/PF 0.1 %
2 PLASTIC BAG, INJECTION (ML) EPIDURAL ONCE
Status: COMPLETED | OUTPATIENT
Start: 2022-09-19 | End: 2022-09-19

## 2022-09-19 RX ORDER — ATORVASTATIN CALCIUM 40 MG/1
80 TABLET, FILM COATED ORAL DAILY
Status: DISCONTINUED | OUTPATIENT
Start: 2022-09-19 | End: 2022-09-20 | Stop reason: HOSPADM

## 2022-09-19 RX ORDER — SODIUM CHLORIDE 0.9 % (FLUSH) 0.9 %
10 SYRINGE (ML) INJECTION EVERY 12 HOURS SCHEDULED
Status: DISCONTINUED | OUTPATIENT
Start: 2022-09-19 | End: 2022-09-20 | Stop reason: HOSPADM

## 2022-09-19 RX ORDER — LOSARTAN POTASSIUM 50 MG/1
100 TABLET ORAL
Status: DISCONTINUED | OUTPATIENT
Start: 2022-09-19 | End: 2022-09-20 | Stop reason: HOSPADM

## 2022-09-19 RX ORDER — NEOSTIGMINE METHYLSULFATE 5 MG/5 ML
SYRINGE (ML) INTRAVENOUS AS NEEDED
Status: DISCONTINUED | OUTPATIENT
Start: 2022-09-19 | End: 2022-09-19 | Stop reason: SURG

## 2022-09-19 RX ORDER — PROTAMINE SULFATE 10 MG/ML
INJECTION, SOLUTION INTRAVENOUS AS NEEDED
Status: DISCONTINUED | OUTPATIENT
Start: 2022-09-19 | End: 2022-09-19 | Stop reason: SURG

## 2022-09-19 RX ORDER — ONDANSETRON 2 MG/ML
4 INJECTION INTRAMUSCULAR; INTRAVENOUS
Status: DISCONTINUED | OUTPATIENT
Start: 2022-09-19 | End: 2022-09-19 | Stop reason: HOSPADM

## 2022-09-19 RX ORDER — ONDANSETRON 4 MG/1
4 TABLET, FILM COATED ORAL EVERY 6 HOURS PRN
Status: DISCONTINUED | OUTPATIENT
Start: 2022-09-19 | End: 2022-09-20 | Stop reason: HOSPADM

## 2022-09-19 RX ADMIN — PROPOFOL 120 MG: 10 INJECTION, EMULSION INTRAVENOUS at 13:36

## 2022-09-19 RX ADMIN — HEPARIN SODIUM 9000 UNITS: 1000 INJECTION, SOLUTION INTRAVENOUS; SUBCUTANEOUS at 13:58

## 2022-09-19 RX ADMIN — SODIUM CHLORIDE, POTASSIUM CHLORIDE, SODIUM LACTATE AND CALCIUM CHLORIDE 1000 ML: 600; 310; 30; 20 INJECTION, SOLUTION INTRAVENOUS at 09:01

## 2022-09-19 RX ADMIN — MORPHINE SULFATE 2 MG: 2 INJECTION, SOLUTION INTRAMUSCULAR; INTRAVENOUS at 19:08

## 2022-09-19 RX ADMIN — LABETALOL HYDROCHLORIDE 20 MG: 5 INJECTION INTRAVENOUS at 19:09

## 2022-09-19 RX ADMIN — HYDRALAZINE HYDROCHLORIDE 20 MG: 20 INJECTION, SOLUTION INTRAMUSCULAR; INTRAVENOUS at 20:41

## 2022-09-19 RX ADMIN — LABETALOL HYDROCHLORIDE 20 MG: 5 INJECTION INTRAVENOUS at 16:43

## 2022-09-19 RX ADMIN — HEPARIN SODIUM 2000 UNITS: 1000 INJECTION, SOLUTION INTRAVENOUS; SUBCUTANEOUS at 14:08

## 2022-09-19 RX ADMIN — ONDANSETRON 4 MG: 2 INJECTION INTRAMUSCULAR; INTRAVENOUS at 13:57

## 2022-09-19 RX ADMIN — SODIUM CHLORIDE, POTASSIUM CHLORIDE, SODIUM LACTATE AND CALCIUM CHLORIDE 1000 ML: 600; 310; 30; 20 INJECTION, SOLUTION INTRAVENOUS at 09:02

## 2022-09-19 RX ADMIN — Medication 10 ML: at 22:12

## 2022-09-19 RX ADMIN — SODIUM BICARBONATE 650 MG: 650 TABLET ORAL at 22:12

## 2022-09-19 RX ADMIN — CLONIDINE HYDROCHLORIDE 0.1 MG: 0.1 TABLET ORAL at 19:11

## 2022-09-19 RX ADMIN — ROCURONIUM BROMIDE 50 MG: 50 INJECTION INTRAVENOUS at 13:36

## 2022-09-19 RX ADMIN — GLYCOPYRROLATE 0.2 MG: 0.2 INJECTION INTRAMUSCULAR; INTRAVENOUS at 14:18

## 2022-09-19 RX ADMIN — LOSARTAN POTASSIUM 100 MG: 50 TABLET, FILM COATED ORAL at 20:34

## 2022-09-19 RX ADMIN — GLYCOPYRROLATE 0.4 MG: 0.2 INJECTION INTRAMUSCULAR; INTRAVENOUS at 14:28

## 2022-09-19 RX ADMIN — FENTANYL CITRATE 100 MCG: 50 INJECTION INTRAMUSCULAR; INTRAVENOUS at 13:36

## 2022-09-19 RX ADMIN — GLYCOPYRROLATE 0.2 MG: 0.2 INJECTION INTRAMUSCULAR; INTRAVENOUS at 14:16

## 2022-09-19 RX ADMIN — CEFAZOLIN 2 G: 2 INJECTION, POWDER, FOR SOLUTION INTRAMUSCULAR; INTRAVENOUS at 22:12

## 2022-09-19 RX ADMIN — Medication 2 G: at 13:40

## 2022-09-19 RX ADMIN — NICARDIPINE HYDROCHLORIDE 500 MCG: 2.5 INJECTION INTRAVENOUS at 14:26

## 2022-09-19 RX ADMIN — GLYCOPYRROLATE 0.2 MG: 0.2 INJECTION INTRAMUSCULAR; INTRAVENOUS at 13:46

## 2022-09-19 RX ADMIN — DEXAMETHASONE SODIUM PHOSPHATE 8 MG: 4 INJECTION, SOLUTION INTRA-ARTICULAR; INTRALESIONAL; INTRAMUSCULAR; INTRAVENOUS; SOFT TISSUE at 14:00

## 2022-09-19 RX ADMIN — MORPHINE SULFATE 2 MG: 2 INJECTION, SOLUTION INTRAMUSCULAR; INTRAVENOUS at 21:32

## 2022-09-19 RX ADMIN — GLYCOPYRROLATE 0.2 MG: 0.2 INJECTION INTRAMUSCULAR; INTRAVENOUS at 13:50

## 2022-09-19 RX ADMIN — Medication 40 MG: at 13:36

## 2022-09-19 RX ADMIN — ATORVASTATIN CALCIUM 80 MG: 40 TABLET, FILM COATED ORAL at 22:12

## 2022-09-19 RX ADMIN — PROTAMINE SULFATE 30 MG: 10 INJECTION, SOLUTION INTRAVENOUS at 14:21

## 2022-09-19 RX ADMIN — Medication 3 MG: at 14:28

## 2022-09-19 RX ADMIN — PHENYLEPHRINE HYDROCHLORIDE 1 MCG/KG/MIN: 10 INJECTION INTRAVENOUS at 14:07

## 2022-09-19 NOTE — ANESTHESIA POSTPROCEDURE EVALUATION
"Patient: Mireille Magallanes    Procedure Summary     Date: 09/19/22 Room / Location: [facility] OR  /  PAD HYBRID OR 12    Anesthesia Start: 1330 Anesthesia Stop: 1445    Procedure: LEFT TRANSCAROTID ARTERY REVASCULARIZATION (Left Neck) Diagnosis:       Bilateral carotid artery stenosis      Preop testing      (Bilateral carotid artery stenosis [I65.23])      (Preop testing [Z01.818])    Surgeons: Martín Woo DO Provider: Dary Davies CRNA    Anesthesia Type: general ASA Status: 4          Anesthesia Type: general    Vitals  Vitals Value Taken Time   /87 09/19/22 1541   Temp 97.3 °F (36.3 °C) 09/19/22 1541   Pulse 60 09/19/22 1541   Resp 19 09/19/22 1541   SpO2 100 % 09/19/22 1541           Post Anesthesia Care and Evaluation    Patient location during evaluation: PACU  Patient participation: complete - patient participated  Level of consciousness: awake  Pain management: adequate    Airway patency: patent  Anesthetic complications: No anesthetic complications  PONV Status: none  Cardiovascular status: acceptable  Respiratory status: acceptable  Hydration status: acceptable    Comments: /87   Pulse 60   Temp 97.3 °F (36.3 °C) (Temporal)   Resp 19   Ht 173 cm (68.11\")   Wt 89.7 kg (197 lb 12 oz)   SpO2 100%   BMI 29.97 kg/m²         "

## 2022-09-19 NOTE — ANESTHESIA PROCEDURE NOTES
Arterial Line      Patient location during procedure: holding area  Start time: 9/19/2022 12:23 PM  Stop Time:9/19/2022 12:26 PM       Line placed for hemodynamic monitoring.  Performed By   Anesthesiologist: Darrin Gross MD  Preanesthetic Checklist  Completed: patient identified, IV checked, site marked, risks and benefits discussed, surgical consent, monitors and equipment checked, pre-op evaluation and timeout performed  Arterial Line Prep   Sterile Tech: mask, cap and gloves  Prep: ChloraPrep  Patient monitoring: continuous pulse oximetry  Arterial Line Procedure   Laterality:right  Location:  radial artery  Catheter size: 20 G   Guidance: palpation technique  Number of attempts: 1  Successful placement: yes  Post Assessment   Dressing Type: occlusive dressing applied and secured with tape.   Complications no  Circ/Move/Sens Assessment: normal.   Patient Tolerance: patient tolerated the procedure well with no apparent complications

## 2022-09-19 NOTE — ANESTHESIA PREPROCEDURE EVALUATION
Anesthesia Evaluation     Patient summary reviewed and Nursing notes reviewed   no history of anesthetic complications:  NPO Solid Status: > 8 hours  NPO Liquid Status: > 8 hours           Airway   Mallampati: I  TM distance: >3 FB  Neck ROM: full  No difficulty expected  Dental - normal exam   (+) edentulous    Pulmonary    (+) a smoker Former, shortness of breath,   Cardiovascular   Exercise tolerance: poor (<4 METS)    ECG reviewed  Rhythm: regular  Rate: normal    (+) pacemaker pacemaker interrogated 1-3 months ago, hypertension, valvular problems/murmurs AS, CAD, CABG >6 Months, cardiac stents more than 12 months ago [facility]MEDO, hyperlipidemia,  carotid artery disease (<50% bilat) carotid bilateral    ROS comment: 4/6/21 Cath  Impression:  1.    No change in native coronary or bypass graft vessels compared to last catheterization in 2019  -- proximal left main stenosis appears visually unchanged (and was assessed by FFR at last catheterization and was negative at 0.87), patent stent in the mid LAD with an occluded diagonal branch, chronic total occlusions of OM1 and OM 2 (supplied via SVG), and mild-moderate disease of the RCA.    2.  Saphenous vein grafts to OM1->OM2, and to diagonal branch, are widely patent.      Echo 3/7/21  · Left ventricular ejection fraction appears to be 61 - 65%. Left ventricular systolic function is normal.  · Severe aortic valve stenosis is present. Aortic valve area is 0.97 cm2 and dimensionless index 0.25.  · Left ventricular wall thickness is consistent with mild septal asymmetric hypertrophy.  · Left ventricular diastolic function is consistent with (grade II w/high LAP) pseudonormalization.  · Mild mitral valve stenosis is present due to severe calcification of the posterior mitral annulus.  · Left atrial volume is severely increased.  · Normal size and function of right ventricle.  · Estimated right ventricular systolic pressure from tricuspid regurgitation is mildly elevated (35-45  mmHg).  · Compared to most recent exam from 2019, aortic stenosis has progressed such that it is now classified as a severe.     [place] scientific pacemaker- % RV paced  Adequate battery    Interpretation Summary 5/26/22    · Left ventricular ejection fraction appears to be 61 - 65%. Left ventricular systolic function is normal.  · Left ventricular wall thickness is consistent with mild concentric hypertrophy.  · Left ventricular diastolic dysfunction is noted.  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  · Mild to moderate mitral valve stenosis is present.  · Estimated right ventricular systolic pressure from tricuspid regurgitation is normal (<35 mmHg).  · There is no evidence of right to left shunt on bubble study.  · There is no evidence of intracardiac mass or thrombus.  · Compared to previous echo from 4/26/2022, there are no significant changes.         Neuro/Psych  (+) TIA, CVA,    GI/Hepatic/Renal/Endo    (+) obesity,   hepatitis A, liver disease, renal disease CRI,   (-) diabetes    Musculoskeletal     Abdominal    Substance History      OB/GYN          Other   arthritis,                        Anesthesia Plan    ASA 4     general     intravenous induction     Anesthetic plan, risks, benefits, and alternatives have been provided, discussed and informed consent has been obtained with: patient.

## 2022-09-19 NOTE — ANESTHESIA PROCEDURE NOTES
Airway  Urgency: elective    Date/Time: 9/19/2022 1:44 PM  Airway not difficult    General Information and Staff    Patient location during procedure: OR    Indications and Patient Condition  Indications for airway management: airway protection    Preoxygenated: yes  Mask difficulty assessment: 1 - vent by mask    Final Airway Details  Final airway type: endotracheal airway      Successful airway: ETT  Cuffed: yes   Successful intubation technique: direct laryngoscopy  Facilitating devices/methods: intubating stylet  Endotracheal tube insertion site: oral  Blade: Melina  Blade size: 3.5  ETT size (mm): 7.5  Cormack-Lehane Classification: grade I - full view of glottis  Placement verified by: chest auscultation and capnometry   Measured from: lips  ETT/EBT  to lips (cm): 22  Number of attempts at approach: 1  Assessment: lips, teeth, and gum same as pre-op and atraumatic intubation

## 2022-09-20 ENCOUNTER — READMISSION MANAGEMENT (OUTPATIENT)
Dept: CALL CENTER | Facility: HOSPITAL | Age: 87
End: 2022-09-20

## 2022-09-20 ENCOUNTER — TELEPHONE (OUTPATIENT)
Dept: VASCULAR SURGERY | Facility: CLINIC | Age: 87
End: 2022-09-20

## 2022-09-20 VITALS
TEMPERATURE: 97.8 F | SYSTOLIC BLOOD PRESSURE: 118 MMHG | HEIGHT: 68 IN | BODY MASS INDEX: 29.97 KG/M2 | DIASTOLIC BLOOD PRESSURE: 61 MMHG | WEIGHT: 197.75 LBS | OXYGEN SATURATION: 99 % | RESPIRATION RATE: 16 BRPM | HEART RATE: 69 BPM

## 2022-09-20 PROCEDURE — 25010000002 CEFAZOLIN PER 500 MG: Performed by: SURGERY

## 2022-09-20 PROCEDURE — 99024 POSTOP FOLLOW-UP VISIT: CPT | Performed by: SURGERY

## 2022-09-20 RX ORDER — ATORVASTATIN CALCIUM 80 MG/1
80 TABLET, FILM COATED ORAL DAILY
COMMUNITY
End: 2022-09-20 | Stop reason: HOSPADM

## 2022-09-20 RX ORDER — TAMSULOSIN HYDROCHLORIDE 0.4 MG/1
1 CAPSULE ORAL DAILY
Qty: 30 CAPSULE | Refills: 5 | Status: SHIPPED | OUTPATIENT
Start: 2022-09-20 | End: 2022-10-28

## 2022-09-20 RX ORDER — HYDROCODONE BITARTRATE AND ACETAMINOPHEN 5; 325 MG/1; MG/1
1 TABLET ORAL EVERY 6 HOURS PRN
Qty: 20 TABLET | Refills: 0 | Status: SHIPPED | OUTPATIENT
Start: 2022-09-20 | End: 2022-09-26

## 2022-09-20 RX ORDER — TAMSULOSIN HYDROCHLORIDE 0.4 MG/1
0.4 CAPSULE ORAL DAILY
Status: DISCONTINUED | OUTPATIENT
Start: 2022-09-20 | End: 2022-09-20 | Stop reason: HOSPADM

## 2022-09-20 RX ORDER — CHLORAL HYDRATE 500 MG
1000 CAPSULE ORAL
COMMUNITY

## 2022-09-20 RX ORDER — NALOXONE HCL 0.4 MG/ML
0.4 VIAL (ML) INJECTION
Start: 2022-09-20 | End: 2022-09-20 | Stop reason: HOSPADM

## 2022-09-20 RX ADMIN — SODIUM BICARBONATE 650 MG: 650 TABLET ORAL at 09:06

## 2022-09-20 RX ADMIN — CEFAZOLIN 2 G: 2 INJECTION, POWDER, FOR SOLUTION INTRAMUSCULAR; INTRAVENOUS at 05:04

## 2022-09-20 RX ADMIN — TAMSULOSIN HYDROCHLORIDE 0.4 MG: 0.4 CAPSULE ORAL at 11:45

## 2022-09-20 RX ADMIN — LOSARTAN POTASSIUM 100 MG: 50 TABLET, FILM COATED ORAL at 09:06

## 2022-09-20 RX ADMIN — ATORVASTATIN CALCIUM 80 MG: 40 TABLET, FILM COATED ORAL at 09:06

## 2022-09-20 RX ADMIN — ASPIRIN 81 MG: 81 TABLET, COATED ORAL at 09:06

## 2022-09-20 RX ADMIN — CLOPIDOGREL 75 MG: 75 TABLET, FILM COATED ORAL at 09:06

## 2022-09-20 NOTE — TELEPHONE ENCOUNTER
Chief complaint: OUTSIDE 2WEEKS    Type of visit: POST OP    Additional notes: SHECHEDULED PATIENT WITH MIKEY CASTILLO APRN FIRST AVAILABLE ON 10/10/22.

## 2022-09-21 NOTE — OUTREACH NOTE
Prep Survey    Flowsheet Row Responses   [facility] facility patient discharged from? [place]   Is LACE score < 7 ? No   Emergency Room discharge w/ pulse ox? No   Eligibility Readm Mgmt   Discharge diagnosis LEFT TRANSCAROTID ARTERY REVASCULARIZATION   Does the patient have one of the following disease processes/diagnoses(primary or secondary)? General Surgery   Does the patient have Home health ordered? No   Is there a DME ordered? No   Prep survey completed? Yes          YANIV REBOLLEDO - Registered Nurse

## 2022-09-22 ENCOUNTER — TELEPHONE (OUTPATIENT)
Dept: INTERNAL MEDICINE | Age: 87
End: 2022-09-22

## 2022-09-22 ENCOUNTER — READMISSION MANAGEMENT (OUTPATIENT)
Dept: CALL CENTER | Facility: HOSPITAL | Age: 87
End: 2022-09-22

## 2022-09-22 NOTE — TELEPHONE ENCOUNTER
[facility] Transitions Initial Follow Up Call    Outreach made within 2 business days of discharge: Yes    Patient: Virgen Arndt   Patient : 1935 MRN: [id number]    Reason for Admission: elective left trams carotid artery revascularization     Discharge Date: 2022   Discharge Diagnosis:   Symptomatic left carotid stenosis     Spoke with: Veronika    Discharge department/facility: [facility]    TCM Interactive Patient Contact:  Was patient able to fill all prescriptions: Yes  Was patient instructed to bring all medications to the follow-up visit: Yes  Is patient taking all medications as directed in the discharge summary? Yes  Does patient understand their discharge instructions: Yes  Does patient have questions or concerns that need addressed prior to 7-14 day follow up office visit: no    Spoke to Yovanny Bryant she said that the pt is having trouble with his BP going up and down since the procedure. She said that it was over 200 yesterday but today it was in the 150's. She said that Dr Juliana Santos cut his BP med in half and since then his BP is elevated. She said he never gave them a reason for this and wants to see if Dr Aleksandr Silverman will fix this during the follow up. I did ask her to keep a check on his BP and bring a list with him because it is not uncommon to have swings in BP after a vascular procedure. She VU and will do that. At this time he is not really having any pain he does have medication for this but has not used it. Over all she said that he is doing very well and has a follow up apt on . She said she will let us know if she needs anything before then and will let us know if the BP stays high or if it starts to regulate now that he had the surgery.       Scheduled appointment with PCP within 7-14 days    Follow Up  Future Appointments   Date Time Provider Adalberto Ricardo   2022  1:30 PM Alessandra Licona MD [facility]   2023  9:15 AM Alessandra Licona MD [facility] [place], [place]

## 2022-09-22 NOTE — OUTREACH NOTE
General Surgery Week 1 Survey    Flowsheet Row Responses   [facility] facility patient discharged from? [place]   Does the patient have one of the following disease processes/diagnoses(primary or secondary)? General Surgery   Week 1 attempt successful? No   Unsuccessful attempts Attempt 1          CHARO SILVA - Registered Nurse

## 2022-09-23 LAB
QT INTERVAL: 498 MS
QTC INTERVAL: 548 MS

## 2022-09-27 ENCOUNTER — OFFICE VISIT (OUTPATIENT)
Dept: INTERNAL MEDICINE | Age: 87
End: 2022-09-27
Payer: MEDICARE

## 2022-09-27 VITALS
TEMPERATURE: 99.5 F | WEIGHT: 200 LBS | HEART RATE: 67 BPM | HEIGHT: 69 IN | OXYGEN SATURATION: 99 % | SYSTOLIC BLOOD PRESSURE: 160 MMHG | BODY MASS INDEX: 29.62 KG/M2 | DIASTOLIC BLOOD PRESSURE: 84 MMHG

## 2022-09-27 DIAGNOSIS — Z98.890 HISTORY OF CAROTID ENDARTERECTOMY: ICD-10-CM

## 2022-09-27 PROCEDURE — 99496 TRANSJ CARE MGMT HIGH F2F 7D: CPT | Performed by: INTERNAL MEDICINE

## 2022-09-27 RX ORDER — CLOPIDOGREL BISULFATE 75 MG/1
TABLET ORAL
COMMUNITY
Start: 2022-08-09

## 2022-09-27 ASSESSMENT — ENCOUNTER SYMPTOMS
NAUSEA: 0
BLOOD IN STOOL: 0
SORE THROAT: 0
WHEEZING: 0
EYE ITCHING: 0
EYE DISCHARGE: 0
ABDOMINAL DISTENTION: 0
SHORTNESS OF BREATH: 0
VOMITING: 0
SINUS PRESSURE: 0
BACK PAIN: 0
TROUBLE SWALLOWING: 0
ABDOMINAL PAIN: 0

## 2022-09-27 NOTE — PROGRESS NOTES
Post-Discharge Transitional Care Management Services      Aylin Muro   YOB: 1935    Date of Visit:  9/27/2022  30 Day Post-Discharge Date: 10/20/2022    Allergies   Allergen Reactions    Sulfa Antibiotics Rash     Outpatient Medications Marked as Taking for the 9/27/22 encounter (Office Visit) with Robb Meek MD   Medication Sig Dispense Refill    clopidogrel (PLAVIX) 75 MG tablet Until 10/21/2022      cloNIDine (CATAPRES) 0.1 MG tablet 1 tablet every 8 hours PRN for bp >150/90. 30 tablet 3    rivaroxaban (XARELTO) 15 MG TABS tablet Take 15 mg by mouth      Cholecalciferol (VITAMIN D3) 125 MCG (5000 UT) TABS Take by mouth      Omega-3 Fatty Acids (FISH OIL) 1000 MG CAPS Take 3,000 mg by mouth 3 times daily      sodium bicarbonate 325 MG tablet Take 650 mg by mouth 2 times daily      irbesartan (AVAPRO) 300 MG tablet Take 1 tablet by mouth nightly (Patient taking differently: Take 150 mg by mouth daily 0.5 tab daily) 90 tablet 1    atorvastatin (LIPITOR) 80 MG tablet Take 1 tablet by mouth once daily 90 tablet 0    aspirin 81 MG tablet Take 81 mg by mouth daily      Coenzyme Q10 (CO Q 10) 10 MG CAPS Take 1 capsule by mouth daily      nitroGLYCERIN (NITROSTAT) 0.4 MG SL tablet Place 1 tablet under the tongue One under tongue as needed for chest pain. Vitals:    09/27/22 1339   BP: (!) 160/84   Site: Left Upper Arm   Pulse: 67   Temp: 99.5 °F (37.5 °C)   SpO2: 99%   Weight: 200 lb (90.7 kg)   Height: 5' 9\" (1.753 m)     Body mass index is 29.53 kg/m². Wt Readings from Last 3 Encounters:   09/27/22 200 lb (90.7 kg)   08/05/22 199 lb 9.6 oz (90.5 kg)   07/15/22 200 lb (90.7 kg)     BP Readings from Last 3 Encounters:   09/27/22 (!) 160/84   08/05/22 (!) 148/72   07/15/22 (!) 142/82        Patient was admitted to [facility] from 9/19/2022 to 9/20/2022 for left carotid endarterectomy.     HPI:  Patient was admitted to [facility] on 9/19/2022 for a left carotid endarterectomy. He did well postoperatively and was discharged on 9/20/2022. He is here for follow-up feels well though he has had some fluctuations in his blood pressure. Inpatient course: Discharge summary reviewed- see chart. Current status: Stable    Review of Systems:  Review of Systems   Constitutional:  Positive for fatigue. Negative for activity change, appetite change and fever. HENT:  Positive for tinnitus. Negative for congestion, hearing loss, sinus pressure, sore throat and trouble swallowing. Eyes:  Negative for discharge and itching. Respiratory:  Negative for shortness of breath and wheezing. Cardiovascular:  Negative for chest pain, palpitations and leg swelling. Gastrointestinal:  Negative for abdominal distention, abdominal pain, blood in stool, nausea and vomiting. Endocrine: Negative for cold intolerance, heat intolerance and polydipsia. Genitourinary:  Negative for flank pain, frequency, hematuria and urgency. Musculoskeletal:  Negative for arthralgias, back pain and joint swelling. Skin:  Negative for rash and wound. Allergic/Immunologic: Negative for environmental allergies and food allergies. Neurological:  Negative for dizziness, tremors, syncope, weakness, numbness and headaches. Hematological:  Negative for adenopathy. Psychiatric/Behavioral:  Negative for agitation and hallucinations. The patient is not nervous/anxious. Physical Exam:  Physical Exam  Constitutional:       General: He is not in acute distress. Appearance: He is well-developed. He is not diaphoretic. HENT:      Head: Normocephalic and atraumatic. Right Ear: External ear normal.      Left Ear: External ear normal.      Nose: Nose normal.      Mouth/Throat:      Pharynx: No oropharyngeal exudate. Eyes:      General: No scleral icterus. Right eye: No discharge. Left eye: No discharge.       Conjunctiva/sclera: Conjunctivae normal.      Pupils: Pupils are equal, round, and reactive to light. Neck:      Thyroid: No thyromegaly. Vascular: No JVD. Trachea: No tracheal deviation. Cardiovascular:      Rate and Rhythm: Normal rate and regular rhythm. Heart sounds: Normal heart sounds. No murmur heard. No friction rub. No gallop. Pulmonary:      Effort: Pulmonary effort is normal. No respiratory distress. Breath sounds: Normal breath sounds. No wheezing or rales. Abdominal:      General: Bowel sounds are normal. There is no distension. Palpations: Abdomen is soft. There is no mass. Tenderness: There is no abdominal tenderness. There is no guarding or rebound. Musculoskeletal:         General: No tenderness or deformity. Normal range of motion. Cervical back: Normal range of motion and neck supple. Lymphadenopathy:      Cervical: No cervical adenopathy. Skin:     General: Skin is warm and dry. Coloration: Skin is not pale. Findings: No erythema or rash. Neurological:      Mental Status: He is alert and oriented to person, place, and time. Cranial Nerves: No cranial nerve deficit. Motor: No abnormal muscle tone. Coordination: Coordination normal.      Deep Tendon Reflexes: Reflexes are normal and symmetric. Reflexes normal.   Psychiatric:         Behavior: Behavior normal.         Thought Content: Thought content normal.         Judgment: Judgment normal.       Initial post-discharge communication occurred between medical assistant and patient on 9/22/2022- see documentation in chart: telephone encounter. Assessment/Plan:  There are no diagnoses linked to this encounter. Diagnostic test results reviewed: inpatient labs    Patient risk of morbidity and mortality: High    Medical Decision Making: high complexity  Family is concerned about his blood pressure. They think it is running too high. Is 142/72 by me today which I think is fine.   I told him after this kind of surgery and a stroke is better running a little too high than too low. He is taking losartan 150 mg daily and supplementing with clonidine as needed as needed. I told him as a safest way to go until he is cleared for discharge purposes by Dr. Darnell Devine on his hospital follow-up visit.

## 2022-09-28 ENCOUNTER — READMISSION MANAGEMENT (OUTPATIENT)
Dept: CALL CENTER | Facility: HOSPITAL | Age: 87
End: 2022-09-28

## 2022-09-28 NOTE — OUTREACH NOTE
General Surgery Week 2 Survey    Flowsheet Row Responses   [facility] patient discharged from? [facility]   Does the patient have one of the following disease processes/diagnoses(primary or secondary)? General Surgery   Week 2 attempt successful? Yes   Call start time 1649   Call end time 1651   Discharge diagnosis LEFT TRANSCAROTID ARTERY REVASCULARIZATION   Person spoke with today (if not patient) and relationship Daughter   Meds reviewed with patient/caregiver? Yes   Is the patient taking all medications as directed (includes completed medication regime)? Yes   Does the patient have a follow up appointment scheduled with their surgeon? Yes   Has the patient kept scheduled appointments due by today? Yes   Comments PCP yesterday per daughter   Psychosocial issues? No   Did the patient receive a copy of their discharge instructions? Yes   Nursing interventions Reviewed instructions with patient   What is the patient's perception of their health status since discharge? Improving   If the patient is a current smoker, are they able to teach back resources for cessation? Not a smoker   Is the patient/caregiver able to teach back the hierarchy of who to call/visit for symptoms/problems? PCP, Specialist, Home health nurse, Urgent Care, ED, 911 Yes   Additional teach back comments Incision looks good today   Week 2 call completed? Yes   Wrap up additional comments dtr reports patient is doing well.  No needs.          CAM BUI - Registered Nurse

## 2022-10-06 ENCOUNTER — TELEPHONE (OUTPATIENT)
Dept: VASCULAR SURGERY | Facility: CLINIC | Age: 87
End: 2022-10-06

## 2022-10-06 NOTE — TELEPHONE ENCOUNTER
PATIENTS FAMILY MEMBER CALLED STATING THAT HE HAS HAS TINGLING ING BOTH ARMS GOING UP TO HEAD AND HAS HAD 2 EPISODES WHERE HE FELT AS IF HE WAS ABOUT TO BLACK OUT TWICE. ASKED IF THEY COULD POSSIBLY GET IN SOONER. I LET THEM KNOW THAT PROVIDERS ARE ON VACATION AND THEY HAVE ONE OF MIKEY'S FIRST APPOINTMENTS BACK IN OFFICE ON 10/10/22 @ 10AM. LET THEM KNOW THAT IF THINGS GET ANY WORSE, PLEASE GO TO ER.

## 2022-10-07 ENCOUNTER — TELEPHONE (OUTPATIENT)
Dept: VASCULAR SURGERY | Facility: CLINIC | Age: 87
End: 2022-10-07

## 2022-10-10 ENCOUNTER — OFFICE VISIT (OUTPATIENT)
Dept: VASCULAR SURGERY | Facility: CLINIC | Age: 87
End: 2022-10-10

## 2022-10-10 VITALS
SYSTOLIC BLOOD PRESSURE: 142 MMHG | WEIGHT: 200 LBS | DIASTOLIC BLOOD PRESSURE: 82 MMHG | BODY MASS INDEX: 29.62 KG/M2 | HEART RATE: 71 BPM | HEIGHT: 69 IN | OXYGEN SATURATION: 98 %

## 2022-10-10 DIAGNOSIS — I65.23 BILATERAL CAROTID ARTERY STENOSIS: Primary | ICD-10-CM

## 2022-10-10 DIAGNOSIS — I10 PRIMARY HYPERTENSION: ICD-10-CM

## 2022-10-10 DIAGNOSIS — E78.2 MIXED HYPERLIPIDEMIA: ICD-10-CM

## 2022-10-10 PROCEDURE — 99024 POSTOP FOLLOW-UP VISIT: CPT | Performed by: NURSE PRACTITIONER

## 2022-10-10 NOTE — PROGRESS NOTES
10/10/2022    Hossein Hua MD  [address] [place] [address] [address]  [place] [address]        Mireille Magallanes  1935    Chief Complaint   Patient presents with   • POST-OP     2 wk po left TCAR.  Pt states that he feels that he is healing well after this procedure.  Pt states that he has had an issue with almost blacking out multiple times.  He states that with a small amount of exertion that he feels like there is something in the middle of the chest that causes him to not breathe correctly.       Dear Hossein Hua MD   I had the pleasure of seeing your patient Mireille Magallanes in the office today.  As you recall, Mireille Magallanes is a 87 y.o.  male who we are following for carotid occlusive disease.  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  He has also had a left hemispheric stroke.  He is maintained on aspirin and Xarelto.  He did undergo a right carotid endarterectomy on 2/14/2020.   He underwent a left transcarotid artery revascularization on 9/19/2022.  He does feel like he is healing well overall.  He reports 2 episodes of near syncope.  He also reports some shortness of breath.  He is currently maintained aspirin, Plavix, Xarelto.  He does present with a list of blood pressures which vary from high to low.  The first episode he reports was just while sitting but the second episode was while at doing things.  It does not sound like stroke symptoms.  His wife does report an episode of not being unable to find the word he wanted to use.  No unilateral weakness no facial drooping.  Family reports he was supposed to have a stress echo but that was canceled.  She has called cardiology office and has an appointment but not until November.  He also feels like his shortness of breath with exertion has worsened.    Review of Systems   Constitutional: Negative.    HENT: Negative.    Eyes: Negative.    Respiratory: Positive for shortness of breath.   "  Cardiovascular: Negative.    Gastrointestinal: Negative.    Endocrine: Negative.    Genitourinary: Negative.    Musculoskeletal: Negative.    Skin: Negative.    Allergic/Immunologic: Negative.    Neurological: Positive for syncope (near syncope).   Hematological: Negative.    Psychiatric/Behavioral: Negative.    All other systems reviewed and are negative.       /82   Pulse 71   Ht 175.3 cm (69\")   Wt 90.7 kg (200 lb)   SpO2 98%   BMI 29.53 kg/m²      Physical Exam  Vitals and nursing note reviewed.   Constitutional:       Appearance: Normal appearance. He is well-developed and normal weight.   HENT:      Head: Normocephalic and atraumatic.   Eyes:      General: No scleral icterus.     Pupils: Pupils are equal, round, and reactive to light.   Neck:      Thyroid: No thyromegaly.      Vascular: No carotid bruit or JVD.      Comments: Left neck incision healed  Cardiovascular:      Rate and Rhythm: Normal rate and regular rhythm.      Pulses:           Carotid pulses are 2+ on the right side and 2+ on the left side.       Femoral pulses are 2+ on the right side and 2+ on the left side.       Popliteal pulses are 2+ on the right side and 2+ on the left side.        Dorsalis pedis pulses are 2+ on the right side and 2+ on the left side.        Posterior tibial pulses are 2+ on the right side and 2+ on the left side.      Heart sounds: Murmur heard.   Pulmonary:      Effort: Pulmonary effort is normal.      Breath sounds: Normal breath sounds.   Abdominal:      General: Bowel sounds are normal. There is no distension or abdominal bruit.      Palpations: Abdomen is soft. There is no mass.      Tenderness: There is no abdominal tenderness.   Musculoskeletal:         General: Normal range of motion.      Cervical back: Normal range of motion and neck supple.   Lymphadenopathy:      Cervical: No cervical adenopathy.   Skin:     General: Skin is warm and dry.   Neurological:      General: No focal deficit present. "      Mental Status: He is alert and oriented to person, place, and time.      Cranial Nerves: No cranial nerve deficit.      Sensory: No sensory deficit.   Psychiatric:         Mood and Affect: Mood normal.         Behavior: Behavior normal. Behavior is cooperative.         Thought Content: Thought content normal.         Judgment: Judgment normal.       Diagnostic data:      Patient Active Problem List   Diagnosis   • HTN (hypertension)   • Hyperlipidemia   • Coronary artery disease involving coronary bypass graft of native heart without angina pectoris   • Pacemaker   • Other chest pain   • Overweight (BMI 25.0-29.9)   • TIA (transient ischemic attack)   • Carotid artery stenosis   • Acute cerebrovascular accident (CVA) of cerebellum ([facility])   • Stenosis of right carotid artery   • Bilateral carotid artery stenosis   • Carotid stenosis   • History of stroke   • S/P TAVR (transcatheter aortic valve replacement)   • Atrial flutter with controlled response ([facility])   • Cerebrovascular accident (CVA) ([facility])   • Chronic renal failure, stage 4 (severe) ([facility])   • 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 ([facility])   • Murmur, cardiac   • Primary osteoarthritis of both knees   • Pure hypercholesterolemia   • Preop testing   • Carotid artery stenosis, symptomatic, left        Diagnosis Plan   1. Bilateral carotid artery stenosis  US Carotid Bilateral      2. Primary hypertension        3. Mixed hyperlipidemia            Plan: After thoroughly evaluating Mireille Magallanes, I am pleased overall he is doing well status post left TCAR.  His incision to his left neck has healed as well as his groin incision.  He is free to resume normal activity without restriction.  He is having some variance of blood pressure and near syncopal episodes on 2 different occasions.  He is also having some increased shortness of breath with exertion.  I did recommend follow-up with cardiology and his daughter has  scheduled that appointment.  This is not until November.  If he does have any further episodes I would contact their office sooner and/or be evaluated in the emergency department if needed.  From a vascular standpoint, we will see him back in 6 months with repeat noninvasive testing for continued surveillance, including a carotid duplex.  He is to continue on the aspirin, Plavix, and Xarelto until October 20.  At that point he can go back to taking aspirin and Xarelto only.  His previous MRA of the neck showed less than 50% right carotid stenosis.  I did discuss vascular risk factors as it pertains to the progression of vascular disease including controlling his hypertension and hyperlipidemia.  His blood pressure has had some variance however stable in office today.  He is maintained on Lipitor for his hyperlipidemia. The patient is to continue taking their medications as previously discussed.   This was all discussed in full with complete understanding.  Thank you for allowing me to participate in the care of your patient.  Please do not hesitate to call with any questions or concerns.  We will keep you aware of any further encounters with Mireille Magallanes.        Sincerely yours,         GIANNI Pollack Joseph Matt, MD

## 2022-10-24 ENCOUNTER — TELEPHONE (OUTPATIENT)
Dept: CARDIOLOGY | Facility: CLINIC | Age: 87
End: 2022-10-24

## 2022-10-24 NOTE — TELEPHONE ENCOUNTER
Call from pt and his wife. Pt has been having trouble with SOA and feeling like he is going to pass out. He was scheduled to have a lexiscan in May, but had a CVA and that was canceled due to his hospitalization. They are wondering if this should be done now. Pt most days can not even take a shower without having to stop and take a break.    Pt is also having trouble with his BP. His BP runs high at times (156/101) and then will be low. Today his BP is running 112/63. He is on avapro and catapres. The dosage on avapro has recently been increased to a whole pill instead of half.    Pt had his carotid fixed in September, but has since had another TIA. They are concerned about he he is feeling. They have an appt on Nov 22, but would like to be seen sooner. I will forward this msg to Dr. Toledo and see how he would like to proceed.

## 2022-10-28 ENCOUNTER — OFFICE VISIT (OUTPATIENT)
Dept: CARDIOLOGY | Facility: CLINIC | Age: 87
End: 2022-10-28

## 2022-10-28 VITALS
BODY MASS INDEX: 29.03 KG/M2 | HEART RATE: 82 BPM | WEIGHT: 196 LBS | SYSTOLIC BLOOD PRESSURE: 130 MMHG | DIASTOLIC BLOOD PRESSURE: 72 MMHG | HEIGHT: 69 IN

## 2022-10-28 DIAGNOSIS — I25.810 CORONARY ARTERY DISEASE INVOLVING CORONARY BYPASS GRAFT OF NATIVE HEART WITHOUT ANGINA PECTORIS: ICD-10-CM

## 2022-10-28 DIAGNOSIS — I48.92 ATRIAL FLUTTER WITH CONTROLLED RESPONSE: ICD-10-CM

## 2022-10-28 DIAGNOSIS — Z95.2 S/P TAVR (TRANSCATHETER AORTIC VALVE REPLACEMENT): ICD-10-CM

## 2022-10-28 DIAGNOSIS — I10 PRIMARY HYPERTENSION: ICD-10-CM

## 2022-10-28 DIAGNOSIS — I25.700 CORONARY ARTERY DISEASE INVOLVING CORONARY BYPASS GRAFT OF NATIVE HEART WITH UNSTABLE ANGINA PECTORIS: Primary | ICD-10-CM

## 2022-10-28 DIAGNOSIS — R06.09 DOE (DYSPNEA ON EXERTION): ICD-10-CM

## 2022-10-28 PROCEDURE — 99214 OFFICE O/P EST MOD 30 MIN: CPT | Performed by: INTERNAL MEDICINE

## 2022-10-28 PROCEDURE — 93000 ELECTROCARDIOGRAM COMPLETE: CPT | Performed by: INTERNAL MEDICINE

## 2022-10-28 RX ORDER — ATORVASTATIN CALCIUM 80 MG/1
80 TABLET, FILM COATED ORAL DAILY
COMMUNITY
End: 2023-04-06 | Stop reason: SDUPTHER

## 2022-10-28 RX ORDER — RANOLAZINE 500 MG/1
500 TABLET, EXTENDED RELEASE ORAL 2 TIMES DAILY
Qty: 60 TABLET | Refills: 11 | Status: SHIPPED | OUTPATIENT
Start: 2022-10-28 | End: 2022-11-22

## 2022-10-28 NOTE — PROGRESS NOTES
"     Subjective:     Encounter Date:10/28/2022      Patient ID: Mireille Magallanes is a 87 y.o. male.    Chief Complaint: Shortness of breath, and lightheadedness    History of Present Illness    Mr. 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 I last saw him in the office in 04/2022, at which point he was complaining of exertional dyspnea. I did not think he was volume overloaded on exam, and his EKG that day showed sinus rhythm. A recent echocardiogram looked good in terms of normal ventricular systolic function, and no evidence of any issue with his TAVR. Therefore, I did order a Lexiscan to see if his symptoms were an anginal equivalent given his known history of coronary disease; however, this was never performed due to other issues that came up in the meantime with the patient. Recently, he called into our structural heart coordinator, Marianna Shah, on 10/24/2022, and stated that shortness of breath was worsening. They are wondering if he should go ahead with a Lexiscan now or not. Specifically, he mentioned most days not even being able to take a shower without stopping to take a break. He also at that time described issues with blood pressure fluctuating that had been as high as 156/101 mmHg that day, but on other times being low, including down to 112/63 mmHg. Also of note, he had a carotid surgery this past 09/2022, but had another TIA since then.    Mr. Magallanes comes to the clinic today accompanied by his wife who contributes to his history. His wife reports that he had \"a severe stroke\" on 05/25/2022. He states that he has had a near syncopal episodes a couple of times since then. He states that when he feels that way he would sit down, and wait until it passes. He reports that at times when he begins to walk a long distance, he " "feels a pressure in his chest, which then radiates \"up to his head.\" He then gets out of breath, which requires him to sit down. He adds that if he continues to walk, he feels lightheaded, and starts to feel worse. He states there was a time when he experienced similar episodes, however when he sat down to rest, the symptoms did not resolve quickly. He states that \"it went up through my arms, my chest, my neck and to the top of my head,\" and he began feeling faint/lightheaded like he was going to \"blackout.\" He notes that it eventually resolved.     His wife reports that he will get short of breath walking a short distance from the bedroom to the bathroom, which requires him to take frequent breaks to sit down. He states that along with the shortness of breath, he experiences chest discomfort that worsens if he continues to walk. His wife adds that he has had 2 to 3 similar episodes while he has been in the shower. He denies recalling a similar episode of shortness of breath and chest discomfort at rest. The patient reports that the lightheadedness episodes, and the severe exertional dyspnea/ chest discomfort have occurred on separate occasions. He denies orthopnea.    The patient states that he has had problems since the carotid artery surgery, and reports that they went in through the area just above the location of his pacemaker to get to his neck for the surgery. His wife reports that when he went to surgery that day, his blood pressure was 240 systolic, which eventually improved after treatment.    He does not recall ever taking Ranexa.       The following portions of the patient's history were reviewed and updated as appropriate: allergies, current medications, past family history, past medical history, past social history, past surgical history and problem list.    Review of Systems   Constitutional: Negative for malaise/fatigue.   Cardiovascular: Positive for chest pain, dyspnea on exertion and near-syncope. " Negative for claudication, leg swelling, orthopnea, palpitations, paroxysmal nocturnal dyspnea and syncope.   Respiratory: Negative for shortness of breath.    Hematologic/Lymphatic: Does not bruise/bleed easily.   Neurological: Positive for weakness.       Current Outpatient Medications:   •  aspirin 81 MG EC tablet, Take 1 tablet by mouth Daily., Disp: 90 tablet, Rfl: 0  •  atorvastatin (LIPITOR) 80 MG tablet, Take 1 tablet by mouth Daily., Disp: , Rfl:   •  Cholecalciferol (D3 VITAMIN PO), Take 1 tablet by mouth Daily., Disp: , Rfl:   •  cloNIDine (CATAPRES) 0.1 MG tablet, Take 0.1 mg by mouth Every 8 (Eight) Hours As Needed for High Blood Pressure (BLOOD PRESSURE GREATER THAN 150/90)., Disp: , Rfl:   •  coenzyme Q10 100 MG capsule, Take 100 mg by mouth Daily., Disp: , Rfl:   •  irbesartan (AVAPRO) 300 MG tablet, Take 0.5 tablets by mouth Every Night. (Patient taking differently: Take 150 mg by mouth Daily.), Disp: , Rfl:   •  nitroglycerin (NITROSTAT) 0.4 MG SL tablet, PLACE 1 TABLET UNDER THE TONGUE AS NEEDED FOR CHEST PAIN, Disp: 30 tablet, Rfl: 11  •  Omega-3 Fatty Acids (fish oil) 1000 MG capsule capsule, Take 1,000 mg by mouth Daily With Breakfast., Disp: , Rfl:   •  rivaroxaban (XARELTO) 15 MG tablet, Take 15 mg by mouth Daily., Disp: , Rfl:   •  sodium bicarbonate 650 MG tablet, Take 650 mg by mouth 2 (Two) Times a Day., Disp: , Rfl:   •  ranolazine (Ranexa) 500 MG 12 hr tablet, Take 1 tablet by mouth 2 (Two) Times a Day., Disp: 60 tablet, Rfl: 11       Objective:      Vitals:    10/28/22 1258   BP: 130/72   Pulse: 82     Vitals and nursing note reviewed.   Constitutional:       General: Not in acute distress.     Appearance: Not in distress.   Neck:      Vascular: No JVD or JVR. JVD normal.   Pulmonary:      Effort: Pulmonary effort is normal.      Breath sounds: Normal breath sounds.   Cardiovascular:      Normal rate. Regular rhythm.      Murmurs: There is a grade 1/6 systolic murmur at the URSB.       No gallop. No rub.   Pulses:     Intact distal pulses.   Edema:     Peripheral edema absent.   Skin:     General: Skin is warm and dry.   Neurological:      Mental Status: Alert, oriented to person, place, and time and oriented to person, place and time.         Lab Review:       Results for orders placed during the hospital encounter of 05/25/22    Adult Transthoracic Echo Complete W/ Cont if Necessary Per Protocol (With Agitated Saline)    Interpretation Summary  · Left ventricular ejection fraction appears to be 61 - 65%. Left ventricular systolic function is normal.  · Left ventricular wall thickness is consistent with mild concentric hypertrophy.  · Left ventricular diastolic dysfunction is noted.  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  · Mild to moderate mitral valve stenosis is present.  · Estimated right ventricular systolic pressure from tricuspid regurgitation is normal (<35 mmHg).  · There is no evidence of right to left shunt on bubble study.  · There is no evidence of intracardiac mass or thrombus.  · Compared to previous echo from 4/26/2022, there are no significant changes.        ECG 12 Lead    Date/Time: 10/28/2022 1:06 PM  Performed by: Kaushik Toledo MD  Authorized by: Kaushik Toledo MD   Comparison: compared with previous ECG from 9/19/2022  Similar to previous ECG  Rhythm: sinus rhythm and paced  Conduction comments: Occasional fusion complexes  Pacing: atrial sensed rhythm and ventricular paced rhythm  Clinical impression: abnormal EKG          Assessment/Plan:     Problem List Items Addressed This Visit (all established problems)       Cardiac and Vasculature    HTN (hypertension): stable, though complaining of wide swinging fluctuations    S/P TAVR (transcatheter aortic valve replacement): stable    Overview     29mm S3 valve 4/20/21         Atrial flutter with controlled response (HCC): stable, in NSR today    Relevant  Medications    ranolazine (Ranexa) 500 MG 12 hr tablet    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 (HCC) - Primary    Relevant Medications    ranolazine (Ranexa) 500 MG 12 hr tablet    Other Relevant Orders    Stress Test With Myocardial Perfusion (1 Day)   Other Visit Diagnoses     OLMEDO (dyspnea on exertion): worsening        Relevant Orders    Stress Test With Myocardial Perfusion (1 Day)          Recommendations/plans:    The patient has had progressively worsening symptoms concerning for worsening obstructive coronary disease in the form of severe exertional dyspnea and chest discomfort, now with very minimal exertion, even like just taking a shower or walking from the bed to the bathroom. These would classify as CCS III anginal symptoms.    From a cardiac perspective, I have no other explanation for the symptoms as multiple different cardiac issues he has had in the past otherwise all appear stable (TAVR valve looked good on last echocardiogram, he is not in atrial flutter or fibrillation by EKG, and he does not appear to be volume overloaded).    I recommend he continue all of his current therapies, but we will add Ranexa 500 mg twice daily for antianginal benefit.  I have also reordered the Lexiscan that we ordered for similar (though less severe) symptoms back in 04/2022 that ultimately was not performed because he ended admitted for a stroke.    We will plan to follow up with him as soon as those results are available.  If abnormal and symptoms are still present despite starting Ranexa, will then recommend proceeding with diagnostic coronary and bypass graft angiography.      Transcribed from ambient dictation for Kaushik Toledo MD by Latoya Jay.  10/28/22   19:33 CDT    Patient or patient representative verbalized consent to the visit recording.   I Kaushik Toledo MD have personally performed the services described in this document as scribed by the above  individual, and it is both accurate and complete.   I have edited each component as needed.    Kaushik Toledo MD  10/30/2022  09:28 CDT

## 2022-11-02 RX ORDER — CLOPIDOGREL BISULFATE 75 MG/1
TABLET ORAL
Qty: 90 TABLET | Refills: 2 | Status: ON HOLD | OUTPATIENT
Start: 2022-11-02 | End: 2022-11-17

## 2022-11-11 ENCOUNTER — HOSPITAL ENCOUNTER (OUTPATIENT)
Dept: CARDIOLOGY | Facility: HOSPITAL | Age: 87
Discharge: HOME | End: 2022-11-11

## 2022-11-11 VITALS
HEIGHT: 69 IN | WEIGHT: 195.99 LBS | HEART RATE: 75 BPM | DIASTOLIC BLOOD PRESSURE: 81 MMHG | SYSTOLIC BLOOD PRESSURE: 167 MMHG | BODY MASS INDEX: 29.03 KG/M2

## 2022-11-11 DIAGNOSIS — I25.700 CORONARY ARTERY DISEASE INVOLVING CORONARY BYPASS GRAFT OF NATIVE HEART WITH UNSTABLE ANGINA PECTORIS: ICD-10-CM

## 2022-11-11 DIAGNOSIS — R06.09 DOE (DYSPNEA ON EXERTION): ICD-10-CM

## 2022-11-11 PROCEDURE — 0 TECHNETIUM TETROFOSMIN KIT: Performed by: INTERNAL MEDICINE

## 2022-11-11 PROCEDURE — A9502 TC99M TETROFOSMIN: HCPCS | Performed by: INTERNAL MEDICINE

## 2022-11-11 PROCEDURE — 93018 CV STRESS TEST I&R ONLY: CPT | Performed by: INTERNAL MEDICINE

## 2022-11-11 PROCEDURE — 25010000002 REGADENOSON 0.4 MG/5ML SOLUTION: Performed by: INTERNAL MEDICINE

## 2022-11-11 PROCEDURE — 93017 CV STRESS TEST TRACING ONLY: CPT

## 2022-11-11 PROCEDURE — 78452 HT MUSCLE IMAGE SPECT MULT: CPT | Performed by: INTERNAL MEDICINE

## 2022-11-11 PROCEDURE — 78452 HT MUSCLE IMAGE SPECT MULT: CPT

## 2022-11-11 RX ADMIN — TETROFOSMIN 1 DOSE: 1.38 INJECTION, POWDER, LYOPHILIZED, FOR SOLUTION INTRAVENOUS at 08:32

## 2022-11-11 RX ADMIN — REGADENOSON 0.4 MG: 0.08 INJECTION, SOLUTION INTRAVENOUS at 09:54

## 2022-11-11 RX ADMIN — TETROFOSMIN 1 DOSE: 1.38 INJECTION, POWDER, LYOPHILIZED, FOR SOLUTION INTRAVENOUS at 10:12

## 2022-11-13 LAB
BH CV REST NUCLEAR ISOTOPE DOSE: 9.3 MCI
BH CV STRESS BP STAGE 1: NORMAL
BH CV STRESS COMMENTS STAGE 1: NORMAL
BH CV STRESS DOSE REGADENOSON STAGE 1: 0.4
BH CV STRESS DURATION MIN STAGE 1: 0
BH CV STRESS DURATION SEC STAGE 1: 10
BH CV STRESS HR STAGE 1: 88
BH CV STRESS NUCLEAR ISOTOPE DOSE: 27.2 MCI
BH CV STRESS PROTOCOL 1: NORMAL
BH CV STRESS RECOVERY BP: NORMAL MMHG
BH CV STRESS RECOVERY HR: 79 BPM
BH CV STRESS STAGE 1: 1
LV EF NUC BP: 52 %
MAXIMAL PREDICTED HEART RATE: 135 BPM
PERCENT MAX PREDICTED HR: 65.19 %
STRESS BASELINE BP: NORMAL MMHG
STRESS BASELINE HR: 74 BPM
STRESS PERCENT HR: 77 %
STRESS POST EXERCISE DUR MIN: 0 MIN
STRESS POST EXERCISE DUR SEC: 10 SEC
STRESS POST PEAK BP: NORMAL MMHG
STRESS POST PEAK HR: 88 BPM
STRESS TARGET HR: 115 BPM

## 2022-11-17 ENCOUNTER — APPOINTMENT (OUTPATIENT)
Dept: CT IMAGING | Facility: HOSPITAL | Age: 87
End: 2022-11-17

## 2022-11-17 ENCOUNTER — HOSPITAL ENCOUNTER (INPATIENT)
Facility: HOSPITAL | Age: 87
LOS: 2 days | Discharge: HOME | End: 2022-11-19
Attending: EMERGENCY MEDICINE | Admitting: INTERNAL MEDICINE

## 2022-11-17 ENCOUNTER — APPOINTMENT (OUTPATIENT)
Dept: GENERAL RADIOLOGY | Facility: HOSPITAL | Age: 87
End: 2022-11-17

## 2022-11-17 DIAGNOSIS — Z74.09 IMPAIRED MOBILITY: ICD-10-CM

## 2022-11-17 DIAGNOSIS — N18.9 CHRONIC KIDNEY DISEASE, UNSPECIFIED CKD STAGE: ICD-10-CM

## 2022-11-17 DIAGNOSIS — D64.9 ACUTE ANEMIA: Primary | ICD-10-CM

## 2022-11-17 PROBLEM — E61.1 IRON DEFICIENCY: Status: ACTIVE | Noted: 2022-11-17

## 2022-11-17 PROBLEM — N17.9 ACUTE RENAL FAILURE SUPERIMPOSED ON STAGE 4 CHRONIC KIDNEY DISEASE: Status: ACTIVE | Noted: 2022-11-17

## 2022-11-17 PROBLEM — K59.00 CONSTIPATION: Status: ACTIVE | Noted: 2022-11-17

## 2022-11-17 PROBLEM — N18.4 ACUTE RENAL FAILURE SUPERIMPOSED ON STAGE 4 CHRONIC KIDNEY DISEASE: Status: ACTIVE | Noted: 2022-11-17

## 2022-11-17 LAB
ABO GROUP BLD: NORMAL
ANION GAP SERPL CALCULATED.3IONS-SCNC: 13 MMOL/L (ref 5–15)
BASOPHILS # BLD AUTO: 0.01 10*3/MM3 (ref 0–0.2)
BASOPHILS NFR BLD AUTO: 0.2 % (ref 0–1.5)
BLD GP AB SCN SERPL QL: NEGATIVE
BUN SERPL-MCNC: 31 MG/DL (ref 8–23)
BUN/CREAT SERPL: 9.5 (ref 7–25)
CALCIUM SPEC-SCNC: 9.2 MG/DL (ref 8.6–10.5)
CHLORIDE SERPL-SCNC: 106 MMOL/L (ref 98–107)
CO2 SERPL-SCNC: 21 MMOL/L (ref 22–29)
CREAT SERPL-MCNC: 3.28 MG/DL (ref 0.76–1.27)
DEPRECATED RDW RBC AUTO: 48.6 FL (ref 37–54)
EGFRCR SERPLBLD CKD-EPI 2021: 17.7 ML/MIN/1.73
EOSINOPHIL # BLD AUTO: 0.04 10*3/MM3 (ref 0–0.4)
EOSINOPHIL NFR BLD AUTO: 1 % (ref 0.3–6.2)
ERYTHROCYTE [DISTWIDTH] IN BLOOD BY AUTOMATED COUNT: 16.7 % (ref 12.3–15.4)
FERRITIN SERPL-MCNC: 10.68 NG/ML (ref 30–400)
GLUCOSE SERPL-MCNC: 111 MG/DL (ref 65–99)
HCT VFR BLD AUTO: 15.6 % (ref 37.5–51)
HCT VFR BLD AUTO: 25.1 % (ref 37.5–51)
HGB BLD-MCNC: 4.5 G/DL (ref 13–17.7)
HGB BLD-MCNC: 6.6 G/DL (ref 13–17.7)
HOLD SPECIMEN: NORMAL
IMM GRANULOCYTES # BLD AUTO: 0.01 10*3/MM3 (ref 0–0.05)
IMM GRANULOCYTES NFR BLD AUTO: 0.2 % (ref 0–0.5)
IRON 24H UR-MRATE: 8 MCG/DL (ref 59–158)
IRON SATN MFR SERPL: 2 % (ref 20–50)
LYMPHOCYTES # BLD AUTO: 0.8 10*3/MM3 (ref 0.7–3.1)
LYMPHOCYTES NFR BLD AUTO: 19.9 % (ref 19.6–45.3)
MCH RBC QN AUTO: 23.2 PG (ref 26.6–33)
MCHC RBC AUTO-ENTMCNC: 28.8 G/DL (ref 31.5–35.7)
MCV RBC AUTO: 80.4 FL (ref 79–97)
MONOCYTES # BLD AUTO: 0.44 10*3/MM3 (ref 0.1–0.9)
MONOCYTES NFR BLD AUTO: 10.9 % (ref 5–12)
NEUTROPHILS NFR BLD AUTO: 2.73 10*3/MM3 (ref 1.7–7)
NEUTROPHILS NFR BLD AUTO: 67.8 % (ref 42.7–76)
NRBC BLD AUTO-RTO: 0 /100 WBC (ref 0–0.2)
PLATELET # BLD AUTO: 165 10*3/MM3 (ref 140–450)
PMV BLD AUTO: 10.2 FL (ref 6–12)
POTASSIUM SERPL-SCNC: 4.8 MMOL/L (ref 3.5–5.2)
RBC # BLD AUTO: 1.94 10*6/MM3 (ref 4.14–5.8)
RETICS # AUTO: 0.05 10*6/MM3 (ref 0.02–0.13)
RETICS/RBC NFR AUTO: 2.48 % (ref 0.7–1.9)
RH BLD: NEGATIVE
SODIUM SERPL-SCNC: 140 MMOL/L (ref 136–145)
T&S EXPIRATION DATE: NORMAL
TIBC SERPL-MCNC: 522 MCG/DL (ref 298–536)
TRANSFERRIN SERPL-MCNC: 350 MG/DL (ref 200–360)
WBC NRBC COR # BLD: 4.03 10*3/MM3 (ref 3.4–10.8)
WHOLE BLOOD HOLD COAG: NORMAL
WHOLE BLOOD HOLD SPECIMEN: NORMAL

## 2022-11-17 PROCEDURE — 82728 ASSAY OF FERRITIN: CPT | Performed by: INTERNAL MEDICINE

## 2022-11-17 PROCEDURE — 86850 RBC ANTIBODY SCREEN: CPT | Performed by: EMERGENCY MEDICINE

## 2022-11-17 PROCEDURE — 36430 TRANSFUSION BLD/BLD COMPNT: CPT

## 2022-11-17 PROCEDURE — 85014 HEMATOCRIT: CPT | Performed by: NURSE PRACTITIONER

## 2022-11-17 PROCEDURE — 99284 EMERGENCY DEPT VISIT MOD MDM: CPT

## 2022-11-17 PROCEDURE — P9016 RBC LEUKOCYTES REDUCED: HCPCS

## 2022-11-17 PROCEDURE — 86923 COMPATIBILITY TEST ELECTRIC: CPT

## 2022-11-17 PROCEDURE — 86900 BLOOD TYPING SEROLOGIC ABO: CPT

## 2022-11-17 PROCEDURE — 85018 HEMOGLOBIN: CPT | Performed by: NURSE PRACTITIONER

## 2022-11-17 PROCEDURE — 83540 ASSAY OF IRON: CPT | Performed by: INTERNAL MEDICINE

## 2022-11-17 PROCEDURE — 74176 CT ABD & PELVIS W/O CONTRAST: CPT

## 2022-11-17 PROCEDURE — 86900 BLOOD TYPING SEROLOGIC ABO: CPT | Performed by: EMERGENCY MEDICINE

## 2022-11-17 PROCEDURE — 86901 BLOOD TYPING SEROLOGIC RH(D): CPT

## 2022-11-17 PROCEDURE — 80048 BASIC METABOLIC PNL TOTAL CA: CPT | Performed by: EMERGENCY MEDICINE

## 2022-11-17 PROCEDURE — 74018 RADEX ABDOMEN 1 VIEW: CPT

## 2022-11-17 PROCEDURE — 94799 UNLISTED PULMONARY SVC/PX: CPT

## 2022-11-17 PROCEDURE — 25010000002 NA FERRIC GLUC CPLX PER 12.5 MG: Performed by: NURSE PRACTITIONER

## 2022-11-17 PROCEDURE — 84466 ASSAY OF TRANSFERRIN: CPT | Performed by: INTERNAL MEDICINE

## 2022-11-17 PROCEDURE — 85045 AUTOMATED RETICULOCYTE COUNT: CPT | Performed by: NURSE PRACTITIONER

## 2022-11-17 PROCEDURE — 36415 COLL VENOUS BLD VENIPUNCTURE: CPT

## 2022-11-17 PROCEDURE — 86901 BLOOD TYPING SEROLOGIC RH(D): CPT | Performed by: EMERGENCY MEDICINE

## 2022-11-17 PROCEDURE — 85025 COMPLETE CBC W/AUTO DIFF WBC: CPT | Performed by: EMERGENCY MEDICINE

## 2022-11-17 RX ORDER — AMOXICILLIN 250 MG
1 CAPSULE ORAL NIGHTLY
Status: DISCONTINUED | OUTPATIENT
Start: 2022-11-17 | End: 2022-11-18

## 2022-11-17 RX ORDER — FAMOTIDINE 20 MG/1
10 TABLET, FILM COATED ORAL DAILY
Status: DISCONTINUED | OUTPATIENT
Start: 2022-11-17 | End: 2022-11-19 | Stop reason: HOSPADM

## 2022-11-17 RX ORDER — BISACODYL 10 MG
10 SUPPOSITORY, RECTAL RECTAL DAILY PRN
Status: DISCONTINUED | OUTPATIENT
Start: 2022-11-18 | End: 2022-11-19 | Stop reason: HOSPADM

## 2022-11-17 RX ORDER — ACETAMINOPHEN 325 MG/1
650 TABLET ORAL EVERY 4 HOURS PRN
Status: DISCONTINUED | OUTPATIENT
Start: 2022-11-17 | End: 2022-11-19 | Stop reason: HOSPADM

## 2022-11-17 RX ORDER — BISACODYL 10 MG
10 SUPPOSITORY, RECTAL RECTAL ONCE
Status: COMPLETED | OUTPATIENT
Start: 2022-11-17 | End: 2022-11-17

## 2022-11-17 RX ORDER — ACETAMINOPHEN 160 MG/5ML
650 SOLUTION ORAL EVERY 4 HOURS PRN
Status: DISCONTINUED | OUTPATIENT
Start: 2022-11-17 | End: 2022-11-19 | Stop reason: HOSPADM

## 2022-11-17 RX ORDER — ATORVASTATIN CALCIUM 40 MG/1
80 TABLET, FILM COATED ORAL DAILY
Status: DISCONTINUED | OUTPATIENT
Start: 2022-11-17 | End: 2022-11-19 | Stop reason: HOSPADM

## 2022-11-17 RX ORDER — ONDANSETRON 4 MG/1
4 TABLET, FILM COATED ORAL EVERY 6 HOURS PRN
Status: DISCONTINUED | OUTPATIENT
Start: 2022-11-17 | End: 2022-11-19 | Stop reason: HOSPADM

## 2022-11-17 RX ORDER — IRBESARTAN 300 MG/1
300 TABLET ORAL NIGHTLY
COMMUNITY

## 2022-11-17 RX ORDER — LOSARTAN POTASSIUM 50 MG/1
50 TABLET ORAL NIGHTLY
Status: DISCONTINUED | OUTPATIENT
Start: 2022-11-17 | End: 2022-11-19 | Stop reason: HOSPADM

## 2022-11-17 RX ORDER — SODIUM CHLORIDE 0.9 % (FLUSH) 0.9 %
10 SYRINGE (ML) INJECTION AS NEEDED
Status: DISCONTINUED | OUTPATIENT
Start: 2022-11-17 | End: 2022-11-19 | Stop reason: HOSPADM

## 2022-11-17 RX ORDER — NITROGLYCERIN 0.4 MG/1
0.4 TABLET SUBLINGUAL AS NEEDED
Status: DISCONTINUED | OUTPATIENT
Start: 2022-11-17 | End: 2022-11-19 | Stop reason: HOSPADM

## 2022-11-17 RX ORDER — RANOLAZINE 500 MG/1
500 TABLET, EXTENDED RELEASE ORAL 2 TIMES DAILY
Status: DISCONTINUED | OUTPATIENT
Start: 2022-11-17 | End: 2022-11-19 | Stop reason: HOSPADM

## 2022-11-17 RX ORDER — ONDANSETRON 2 MG/ML
4 INJECTION INTRAMUSCULAR; INTRAVENOUS EVERY 6 HOURS PRN
Status: DISCONTINUED | OUTPATIENT
Start: 2022-11-17 | End: 2022-11-19 | Stop reason: HOSPADM

## 2022-11-17 RX ORDER — ACETAMINOPHEN 650 MG/1
650 SUPPOSITORY RECTAL EVERY 4 HOURS PRN
Status: DISCONTINUED | OUTPATIENT
Start: 2022-11-17 | End: 2022-11-19 | Stop reason: HOSPADM

## 2022-11-17 RX ORDER — SODIUM CHLORIDE, SODIUM LACTATE, POTASSIUM CHLORIDE, CALCIUM CHLORIDE 600; 310; 30; 20 MG/100ML; MG/100ML; MG/100ML; MG/100ML
75 INJECTION, SOLUTION INTRAVENOUS CONTINUOUS
Status: DISCONTINUED | OUTPATIENT
Start: 2022-11-17 | End: 2022-11-18

## 2022-11-17 RX ORDER — SODIUM CHLORIDE 0.9 % (FLUSH) 0.9 %
10 SYRINGE (ML) INJECTION EVERY 12 HOURS SCHEDULED
Status: DISCONTINUED | OUTPATIENT
Start: 2022-11-17 | End: 2022-11-19 | Stop reason: HOSPADM

## 2022-11-17 RX ADMIN — Medication 10 ML: at 15:31

## 2022-11-17 RX ADMIN — BISACODYL 10 MG: 10 SUPPOSITORY RECTAL at 15:31

## 2022-11-17 RX ADMIN — LOSARTAN POTASSIUM 50 MG: 50 TABLET, FILM COATED ORAL at 21:28

## 2022-11-17 RX ADMIN — SODIUM CHLORIDE 250 MG: 9 INJECTION, SOLUTION INTRAVENOUS at 17:24

## 2022-11-17 RX ADMIN — Medication 10 ML: at 22:03

## 2022-11-17 RX ADMIN — RANOLAZINE 500 MG: 500 TABLET, FILM COATED, EXTENDED RELEASE ORAL at 21:28

## 2022-11-17 RX ADMIN — SODIUM CHLORIDE, POTASSIUM CHLORIDE, SODIUM LACTATE AND CALCIUM CHLORIDE 75 ML/HR: 600; 310; 30; 20 INJECTION, SOLUTION INTRAVENOUS at 15:31

## 2022-11-17 RX ADMIN — DOCUSATE SODIUM 50 MG AND SENNOSIDES 8.6 MG 1 TABLET: 8.6; 5 TABLET, FILM COATED ORAL at 21:28

## 2022-11-17 RX ADMIN — FAMOTIDINE 10 MG: 20 TABLET, FILM COATED ORAL at 15:31

## 2022-11-17 NOTE — ED NOTES
Patient was educated on the signs and symptoms of a transfusion reaction which may include:    Hives  Itching/Rash/Urticarias  Flushing  Chills  Fever (if greater than 1.8 degrees F or 1 degree C from the pre-transfusion baseline temperature And the increased result is a temperature greater than or equal to 100.4 °F)  Nausea/Vomiting  Chest/Abdominal/Back Pain  Pain at the Infusion Site  Headache  Muscle Aches/Myalgia  Dyspnea/Pulmonary Edema/Rales  Cyanosis  Coughing/Wheezing  Shock  Rigors  Hypoxemia  Hypertension  Hypotension  Abnormal Bleeding  Oliguria/Anuria  Hemoglobinuria  Tachycardia    Patient to notify staff if any signs/symptoms occur.  Patient verbalizes understanding.

## 2022-11-17 NOTE — PROGRESS NOTES
"Pharmacy Dosing Service  Automatic Renal Adjustment  Famotidine    Assessment/Action/Plan:  Based on prescribing information provided by the drug  and CrCl <30 mL/min, upon order verification, famotidine 40 mg PO every 24 hours, has been changed to famotidine 10 mg PO every 24 hours. Pharmacy will continue to monitor daily and make further adjustment(s) accordingly.     Subjective:  Mireille Magallanes is a 87 y.o. male     Additional Factors Considered:  Patient disposition per documentation  Disease state or condition being treated    Objective:  Ht: 175.3 cm (69\"); Wt: 86.2 kg (190 lb)  Estimated Creatinine Clearance: 17.9 mL/min (A) (by C-G formula based on SCr of 3.28 mg/dL (H)).   Creatinine   Date Value Ref Range Status   11/17/2022 3.28 (H) 0.76 - 1.27 mg/dL Final   09/16/2022 2.77 (H) 0.76 - 1.27 mg/dL Final   05/27/2022 2.78 (H) 0.76 - 1.27 mg/dL Final   01/03/2022 2.4 (H) 0.5 - 1.2 mg/dL Final   06/29/2021 2.6 (H) 0.5 - 1.2 mg/dL Final   03/25/2021 2.30 (H) 0.60 - 1.30 mg/dL Final     Comment:     Serial Number: [id number]:  [id number]   12/31/2020 1.9 (H) 0.5 - 1.2 mg/dL Final       Bianka Ambrocio, PharmVITALIY  11/17/22 13:42 CST    "

## 2022-11-17 NOTE — H&P
[facility]  HISTORY AND PHYSICAL    Date of Admission: 11/17/2022  Primary Care Physician: Hossein Hua MD    Subjective     Chief Complaint: Constipation for 4-week    History of Present Illness  Edgar Magallanes is a 89-year-old male with past medical history of aortic valve stenosis status post replacement 4/2021, carotid artery disease status post surgical intervention TCAR on 9/16/2022, chronic kidney disease stage IV, stroke, pacemaker, hypertension, please see below for complete list.  Daughter at bedside provides majority of history.  Labs not been obtained since surgery in September at that time hemoglobin was 9.7.  He reports postoperative bleeding with significant hematoma after surgery.  Patient underwent stress echo on 11/11/2022 per Dr. Jansen for complaints of worsening shortness of breathing, worse with exertion.  Study was essentially normal.  Again laboratory studies were not obtained.  Today he presented to [facility] emergency department with complaints of 4-week history of constipation.  He has had minimal bowel movements and is feeling quite full.  He denies pain, nausea vomiting, no active bleeding.  He is reporting abdominal pressure.  Patient had a stroke 5/25/2022 and at that time Xarelto was changed to Pradaxa.  He developed a GI bleed after discharge.  Family did not seek evaluation however they did stop Pradaxa and resumed his Xarelto.  He has never had bleeding with Xarelto in the past.  ER work-up revealed hemoglobin 4.5, creatinine 3.28 with baseline 2.7, iron 8, ferritin 10.6, saturation 2.  Patient is admitted for further evaluation treatment.    Review of Systems   A 10 point review of systems was completed, all negative except for those discussed in    Past Medical History:   Past Medical History:   Diagnosis Date   • Acute viral hepatitis A    • Aortic valve stenosis    • Carotid artery stenosis     Right   •  CKD (chronic kidney disease)    • Coagulopathy (HCC)     Plavix   • Coronary arteriosclerosis     stent ,? 4/2104, on plavix now.   • Diarrhea    • Dysphagia     Unspecified   • Family history of colon cancer    • Hematochezia     differential diagnosis discussed   • Hemorrhoids     Unspecified   • History of cardiac pacemaker 01/08/2018    [employer]   • History of colonic polyps    • Hyperlipidemia    • Hypertension    • Left ventricular hypertrophy    • Melanoma of back (HCC)    • Obesity    • Osteoarthritis    • Pacemaker    • Peripheral neuropathy    • Stroke (HCC)    • TIA (transient ischemic attack)    • Tobacco non-user        Past Surgical History:   Past Surgical History:   Procedure Laterality Date   • AORTIC VALVE REPAIR/REPLACEMENT Bilateral 4/20/2021    Procedure: Transfemoral Transcatheter Aortic Valve Replacement;  Surgeon: Kaushik Toledo MD;  Location: [facility] HYBRID OR 12;  Service: Cardiovascular;  Laterality: Bilateral;   • AORTIC VALVE REPAIR/REPLACEMENT Bilateral 4/20/2021    Procedure: TRANSFEMORAL TRANSCATHETER AORTIC VALVE REPLACEMENT;  Surgeon: Clayton Wilcox MD;  Location: [facility] HYBRID OR 12;  Service: Cardiothoracic;  Laterality: Bilateral;   • APPENDECTOMY     • CARDIAC CATHETERIZATION     • CARDIAC CATHETERIZATION N/A 3/18/2019    Procedure: Left Heart Cath;  Surgeon: Kaushik Toledo MD;  Location:  [facility] CATH INVASIVE LOCATION;  Service: Cardiology   • CARDIAC CATHETERIZATION N/A 4/14/2021    Procedure: Left Heart Cath;  Surgeon: Kaushik Toledo MD;  Location:  [facility] CATH INVASIVE LOCATION;  Service: Cardiology;  Laterality: N/A;   • CARDIAC ELECTROPHYSIOLOGY PROCEDURE N/A 1/5/2018    Procedure: Temporary Pacemaker;  Surgeon: Luis E Courtney MD;  Location:  [facility] CATH INVASIVE LOCATION;  Service:    • CARDIAC ELECTROPHYSIOLOGY PROCEDURE N/A 1/8/2018    Procedure: Device Implant;  Surgeon: Kaushik Toledo MD;  Location:  [facility] CATH INVASIVE LOCATION;  Service:    •  CAROTID ENDARTERECTOMY Right 2/14/2020    Procedure: RIGHT CAROTID ENDARTERECTOMY WITH EEG;  Surgeon: Martín Woo DO;  Location: [facility] OR ;  Service: Vascular;  Laterality: Right;   • CAROTID STENT     • COLON SURGERY  1999    for Ischemic Colitis   • COLONOSCOPY  05/07/2008    tubular adenoma ascending colon polyp - 3 yr   • COLONOSCOPY  08/19/2003    colon polyp @ 24 cm-see path - 5 yr   • COLONOSCOPY  08/11/2000    small interanl hemorrhoid - 3-5 yr   • COLONOSCOPY  06/22/1998    (8)small colon polyps removed-see path, internal anal skin tag - 2-3 yr   • CORONARY ARTERY BYPASS GRAFT     • CORONARY STENT PLACEMENT      x1 2014 dr crane    • INSERT / REPLACE / REMOVE PACEMAKER     • OTHER SURGICAL HISTORY      Lithotripsy   • OTHER SURGICAL HISTORY  06/2011    recall 3 yr.    • PACEMAKER IMPLANTATION  01/08/2018   • VASECTOMY         Family History: family history includes Colon cancer in his father; Other in his brother and sister; Stroke in his mother.    Social History:  reports that he quit smoking about 23 years ago. His smoking use included cigarettes. He has never used smokeless tobacco. He reports that he does not drink alcohol and does not use drugs.    Code Status: DNR/DNI, patient's wife or daughter speaks for      Allergies:  Allergies   Allergen Reactions   • Sulfa Antibiotics Rash     Sulfa Drugs       Medications:  Prior to Admission medications    Medication Sig Start Date End Date Taking? Authorizing Provider   aspirin 81 MG EC tablet Take 1 tablet by mouth Daily. 4/21/21   Kaushik Toledo MD   atorvastatin (LIPITOR) 80 MG tablet Take 1 tablet by mouth Daily.    ProviderTerell MD   cloNIDine (CATAPRES) 0.1 MG tablet Take 0.1 mg by mouth Every 8 (Eight) Hours As Needed for High Blood Pressure (BLOOD PRESSURE GREATER THAN 150/90). 7/29/22   Terell Diehl MD   coenzyme Q10 100 MG capsule Take 100 mg by mouth Daily.    Terell Diehl MD   irbesartan (AVAPRO) 300  "MG tablet Take 0.5 tablets by mouth Every Night.  Patient taking differently: Take 150 mg by mouth Daily. 5/27/22   Ismael Bernstein MD   nitroglycerin (NITROSTAT) 0.4 MG SL tablet PLACE 1 TABLET UNDER THE TONGUE AS NEEDED FOR CHEST PAIN 8/29/22   Kaushik Toledo MD   Omega-3 Fatty Acids (fish oil) 1000 MG capsule capsule Take 1,000 mg by mouth Daily With Breakfast.    Terell Diehl MD   ranolazine (Ranexa) 500 MG 12 hr tablet Take 1 tablet by mouth 2 (Two) Times a Day. 10/28/22   Kaushik Toledo MD   rivaroxaban (XARELTO) 15 MG tablet Take 15 mg by mouth Daily.    Terell Diehl MD   sodium bicarbonate 650 MG tablet Take 650 mg by mouth 2 (Two) Times a Day. 4/6/22   Terell Diehl MD   Cholecalciferol (D3 VITAMIN PO) Take 1 tablet by mouth Daily.  11/17/22  Terell Diehl MD     I have utilized all available immediate resources to obtain, update, and review the patient's current medications.    Objective     BP (!) 184/79   Pulse 74   Temp 97.8 °F (36.6 °C)   Resp 16   Ht 175.3 cm (69\")   Wt 86.2 kg (190 lb)   SpO2 99%   BMI 28.06 kg/m²   Physical Exam  Vitals reviewed.   Constitutional:       Appearance: He is obese.   HENT:      Head: Normocephalic and atraumatic.      Mouth/Throat:      Mouth: Mucous membranes are moist.      Pharynx: Oropharynx is clear.   Eyes:      Extraocular Movements: Extraocular movements intact.      Conjunctiva/sclera: Conjunctivae normal.   Cardiovascular:      Rate and Rhythm: Normal rate and regular rhythm.      Comments: Click from mechanical valve  Pulmonary:      Effort: Pulmonary effort is normal.      Breath sounds: Normal breath sounds.   Abdominal:      General: Bowel sounds are normal. There is no distension.      Palpations: Abdomen is soft.      Comments: Protuberant   Musculoskeletal:      Cervical back: Normal range of motion and neck supple.      Right lower leg: No edema.      Left lower leg: No edema.      Comments: Generalized " weakness and debility   Skin:     General: Skin is warm and dry.      Comments: Daughter reports severe paleness prior to first unit of blood, currently skin color is within normal   Neurological:      General: No focal deficit present.      Mental Status: He is alert and oriented to person, place, and time.   Psychiatric:         Mood and Affect: Mood normal.         Behavior: Behavior normal.       Pertinent Data:   Lab Results (last 72 hours)     Procedure Component Value Units Date/Time    Hemoglobin & Hematocrit, Blood [[id number]]  (Abnormal) Collected: 11/17/22 1600    Specimen: Blood Updated: 11/17/22 1610     Hemoglobin 6.6 g/dL      Hematocrit 25.1 %     Reticulocytes [[id number]]  (Abnormal) Collected: 11/17/22 0937    Specimen: Blood Updated: 11/17/22 1524     Reticulocyte % 2.48 %      Reticulocyte Absolute 0.0498 10*6/mm3     Ferritin [[id number]]  (Abnormal) Collected: 11/17/22 0847    Specimen: Blood Updated: 11/17/22 1244     Ferritin 10.68 ng/mL     Narrative:      Results may be falsely decreased if patient taking Biotin.      Iron Profile [[id number]]  (Abnormal) Collected: 11/17/22 0847    Specimen: Blood Updated: 11/17/22 1240     Iron 8 mcg/dL      Iron Saturation 2 %      Transferrin 350 mg/dL      TIBC 522 mcg/dL     CBC Auto Differential [[id number]]  (Abnormal) Collected: 11/17/22 0937    Specimen: Blood Updated: 11/17/22 0959     WBC 4.03 10*3/mm3      RBC 1.94 10*6/mm3      Hemoglobin 4.5 g/dL      Hematocrit 15.6 %      MCV 80.4 fL      MCH 23.2 pg      MCHC 28.8 g/dL      RDW 16.7 %      RDW-SD 48.6 fl      MPV 10.2 fL      Platelets 165 10*3/mm3      Neutrophil % 67.8 %      Lymphocyte % 19.9 %      Monocyte % 10.9 %      Eosinophil % 1.0 %      Basophil % 0.2 %      Immature Grans % 0.2 %      Neutrophils, Absolute 2.73 10*3/mm3      Lymphocytes, Absolute 0.80 10*3/mm3      Monocytes, Absolute 0.44 10*3/mm3      Eosinophils, Absolute 0.04 10*3/mm3      Basophils, Absolute 0.01 10*3/mm3       Immature Grans, Absolute 0.01 10*3/mm3      nRBC 0.0 /100 WBC     Basic Metabolic Panel [[id number]]  (Abnormal) Collected: 11/17/22 0847    Specimen: Blood Updated: 11/17/22 0926     Glucose 111 mg/dL      BUN 31 mg/dL      Creatinine 3.28 mg/dL      Sodium 140 mmol/L      Potassium 4.8 mmol/L      Chloride 106 mmol/L      CO2 21.0 mmol/L      Calcium 9.2 mg/dL      BUN/Creatinine Ratio 9.5     Anion Gap 13.0 mmol/L      eGFR 17.7 mL/min/1.73      Imaging Results (Last 24 Hours)     Procedure Component Value Units Date/Time    CT Abdomen Pelvis Without Contrast [[id number]] Collected: 11/17/22 1144     Updated: 11/17/22 1156    Narrative:      EXAM/TECHNIQUE: CT abdomen pelvis without contrast     INDICATION: Bowel obstruction suspected     COMPARISON: None available.     DLP: 614 mGy cm. Automated exposure control was also utilized to  decrease patient radiation dose.     FINDINGS:     Included lung bases are clear. Partially imaged mitral annular  calcification.     Unenhanced liver appears unremarkable. No gallstones common gallbladder  wall thickening, or biliary ductal dilatation.     Pancreas, spleen, and adrenal glands are unremarkable.     Bilateral renal cortical thinning. Multiple bilateral renal cysts are  present. 1.9 cm RIGHT upper pole hemorrhagic/proteinaceous renal cyst.  No urolithiasis or hydronephrosis. Mild trabeculated urinary bladder  wall thickening.     Mild colonic diverticulosis without evidence of diverticulitis.  Postoperative change of RIGHT hemicolectomy. No abnormal bowel  distention or evidence of active bowel inflammation.     No ascites or free pelvic fluid. No pelvic mass or pelvic collection.  Prostate is enlarged measuring 5.1 cm transverse dimension.     Atherosclerotic nonaneurysmal abdominal aorta. No enlarged  retroperitoneal, mesenteric, pelvic, or inguinal lymph nodes.     No acute abdominal wall soft tissue abnormality. Bilateral  fat-containing inguinal hernias. No  acute osseous finding.       Impression:         1.  No acute abdominopelvic findings. No bowel obstruction or evidence  of active bowel inflammation. Mild sigmoid diverticulosis without  evidence of diverticulitis. Mild to moderate volume stool in the colon.     2.  Bilateral renal cortical thinning/atrophy. Multiple bilateral renal  cysts.     3.  Enlarged prostate.  This report was finalized on 11/17/2022 11:53 by Dr. Wil Beckett MD.    XR Abdomen KUB [[id number]] Collected: 11/17/22 0952     Updated: 11/17/22 0956    Narrative:      EXAMINATION: KUB 11/17/2022     HISTORY: Constipation.     FINDINGS: KUB radiograph demonstrates mild constipation within the right  colon. There is no obstruction or free air. Postoperative changes are  noted within the right abdomen. There is arthritic change in the mid and  lower lumbar spine. There are multiple phleboliths within the pelvis.       Impression:      1.. Mild constipation. Otherwise normal bowel gas pattern.  This report was finalized on 11/17/2022 09:52 by Dr. Alfred Coates MD.          11/11/2022 Stress ECHO   Interpretation Summary       •  Impressions are consistent with a low risk study.  •  Left ventricular ejection fraction is normal (Calculated EF = 52%). Mild apical hypokinesis.  •  Myocardial perfusion imaging indicates a normal myocardial perfusion study with no evidence of inducible ischemia - this is a small szied, mild perfusion defect in the apical anterior wall and apex at rest, but normal perfusion after stress.  •  There is no prior study available for comparison.      Assessment / Plan     Assessment:   Active Hospital Problems    Diagnosis    • **Symptomatic anemia    • Iron deficiency    • Acute renal failure superimposed on stage 4 chronic kidney disease (HCC)    • Constipation    • Chronic anticoagulation        Plan:   1.  Admit as inpatient  2.  H&H every 8 hours; patient has received 1 unit PRBC ransfusion-repeat hemoglobin 6.6,  second unit ordered  3.  Ferric gluconate 250 mg IV daily x2  4.  Home medications reviewed and restarted as appropriate  5.  DVT prophylaxis with SCDs, hold Xarelto and aspirin for now  6.  Labs in a.m., stool for occult blood  7.  LR at 75 mL/hour  8.  Start bowel regimen-Milk and molasses enema, Dulcolax suppository today and daily as needed, Nancy-Colace 8.6 mg nightly start today  9.  Supplemental oxygen as needed, incentive spirometry, continuous pulse oximeter    I discussed the patient's findings and my recommendations with: Shadi García MD  Time spent 45    Patient seen and examined by me on 11/17/2022 at 3:13 PM.    Electronically signed by GIANNI Spring, 11/17/22, 16:26 CST.

## 2022-11-17 NOTE — ED PROVIDER NOTES
Subjective   History of Present Illness  Patient is a 87-year-old who came with constipation her last good bowel movement and he had was about a week ago he has been having small amounts.  There is no other complaint associate with this.  There is no nausea and there is no vomiting associate with this.  Patient denies any melena or hematemesis denies any epistaxis.    Constipation  Severity:  Moderate  Timing:  Constant  Chronicity:  New  Context: not dehydration, not dietary changes and not medication    Stool description:  Hard and pellet like  Relieved by:  Nothing  Worsened by:  Nothing  Ineffective treatments:  None tried  Associated symptoms: no abdominal pain, no anorexia, no back pain, no diarrhea, no dysuria, no fever, no flatus, no hematochezia, no nausea, no urinary retention and no vomiting    Risk factors: no change in medication, no hx of abdominal surgery, no recent antibiotic use, no recent illness and no recent surgery        Review of Systems   Constitutional: Negative.  Negative for fever.   HENT: Negative.    Eyes: Negative.    Respiratory: Negative.    Cardiovascular: Negative.    Gastrointestinal: Positive for constipation. Negative for abdominal pain, anorexia, diarrhea, flatus, hematochezia, nausea and vomiting.   Endocrine: Negative.    Genitourinary: Negative.  Negative for dysuria.   Musculoskeletal: Negative for back pain.   Skin: Negative.    Neurological: Negative.    Hematological: Negative.    All other systems reviewed and are negative.      Past Medical History:   Diagnosis Date   • Acute viral hepatitis A    • Aortic valve stenosis    • Carotid artery stenosis     Right   • CKD (chronic kidney disease)    • Coagulopathy (HCC)     Plavix   • Coronary arteriosclerosis     stent ,? 4/2104, on plavix now.   • Diarrhea    • Dysphagia     Unspecified   • Family history of colon cancer    • Hematochezia     differential diagnosis discussed   • Hemorrhoids     Unspecified   • History of  cardiac pacemaker 01/08/2018    [employer]   • History of colonic polyps    • Hyperlipidemia    • Hypertension    • Left ventricular hypertrophy    • Melanoma of back (HCC)    • Obesity    • Osteoarthritis    • Pacemaker    • Peripheral neuropathy    • Stroke (HCC)    • TIA (transient ischemic attack)    • Tobacco non-user        Allergies   Allergen Reactions   • Sulfa Antibiotics Rash     Sulfa Drugs       Past Surgical History:   Procedure Laterality Date   • AORTIC VALVE REPAIR/REPLACEMENT Bilateral 4/20/2021    Procedure: Transfemoral Transcatheter Aortic Valve Replacement;  Surgeon: Kaushik Toledo MD;  Location: [facility] HYBRID OR 12;  Service: Cardiovascular;  Laterality: Bilateral;   • AORTIC VALVE REPAIR/REPLACEMENT Bilateral 4/20/2021    Procedure: TRANSFEMORAL TRANSCATHETER AORTIC VALVE REPLACEMENT;  Surgeon: Clayton Wilcox MD;  Location:  [facility] HYBRID OR 12;  Service: Cardiothoracic;  Laterality: Bilateral;   • APPENDECTOMY     • CARDIAC CATHETERIZATION     • CARDIAC CATHETERIZATION N/A 3/18/2019    Procedure: Left Heart Cath;  Surgeon: Kaushik Toledo MD;  Location:  [facility] CATH INVASIVE LOCATION;  Service: Cardiology   • CARDIAC CATHETERIZATION N/A 4/14/2021    Procedure: Left Heart Cath;  Surgeon: Kaushik Toledo MD;  Location:  [facility] CATH INVASIVE LOCATION;  Service: Cardiology;  Laterality: N/A;   • CARDIAC ELECTROPHYSIOLOGY PROCEDURE N/A 1/5/2018    Procedure: Temporary Pacemaker;  Surgeon: Luis E Courtney MD;  Location:  [facility] CATH INVASIVE LOCATION;  Service:    • CARDIAC ELECTROPHYSIOLOGY PROCEDURE N/A 1/8/2018    Procedure: Device Implant;  Surgeon: Kaushik Toledo MD;  Location:  [facility] CATH INVASIVE LOCATION;  Service:    • CAROTID ENDARTERECTOMY Right 2/14/2020    Procedure: RIGHT CAROTID ENDARTERECTOMY WITH EEG;  Surgeon: Martín Woo DO;  Location: [facility] HYBRID OR 12;  Service: Vascular;  Laterality: Right;   • CAROTID STENT     • COLON SURGERY  1999    for  Ischemic Colitis   • COLONOSCOPY  2008    tubular adenoma ascending colon polyp - 3 yr   • COLONOSCOPY  2003    colon polyp @ 24 cm-see path - 5 yr   • COLONOSCOPY  2000    small interanl hemorrhoid - 3-5 yr   • COLONOSCOPY  1998    (8)small colon polyps removed-see path, internal anal skin tag - 2-3 yr   • CORONARY ARTERY BYPASS GRAFT     • CORONARY STENT PLACEMENT      x1  dr crane    • INSERT / REPLACE / REMOVE PACEMAKER     • OTHER SURGICAL HISTORY      Lithotripsy   • OTHER SURGICAL HISTORY  2011    recall 3 yr.    • PACEMAKER IMPLANTATION  2018   • VASECTOMY         Family History   Problem Relation Age of Onset   • Colon cancer Father         Diagnosed at 72 YO   • Other Sister         Polyps/Colon   • Other Brother         Polyps/Colon   • Stroke Mother        Social History     Socioeconomic History   • Marital status:    Tobacco Use   • Smoking status: Former     Types: Cigarettes     Quit date:      Years since quittin.8   • Smokeless tobacco: Never   Vaping Use   • Vaping Use: Never used   Substance and Sexual Activity   • Alcohol use: No   • Drug use: No   • Sexual activity: Defer           Objective   Physical Exam  Vitals and nursing note reviewed. Exam conducted with a chaperone present.   Constitutional:       General: He is awake. He is not in acute distress.     Appearance: Normal appearance. He is well-developed. He is not toxic-appearing.   HENT:      Head: Normocephalic and atraumatic.      Nose:      Right Nostril: No epistaxis.      Left Nostril: No epistaxis.   Eyes:      General: Lids are normal. No scleral icterus.     Conjunctiva/sclera: Conjunctivae normal.      Pupils: Pupils are equal, round, and reactive to light.   Neck:      Vascular: No hepatojugular reflux or JVD.   Cardiovascular:      Rate and Rhythm: Normal rate and regular rhythm.      Chest Wall: PMI is not displaced.      Pulses: Normal pulses. No decreased pulses.       Heart sounds: Normal heart sounds. No murmur heard.  Pulmonary:      Effort: Pulmonary effort is normal. No accessory muscle usage or respiratory distress.      Breath sounds: Normal breath sounds. No decreased breath sounds or wheezing.   Abdominal:      General: Abdomen is flat. Bowel sounds are normal. There is no distension or abdominal bruit.      Palpations: Abdomen is soft. There is no shifting dullness, fluid wave, mass or pulsatile mass.      Tenderness: There is no abdominal tenderness. There is no right CVA tenderness, left CVA tenderness, guarding or rebound.      Hernia: No hernia is present.   Genitourinary:     Comments: Hemoccult stool negative brown stool no impaction  Musculoskeletal:         General: Normal range of motion.      Cervical back: Normal range of motion and neck supple. No rigidity.      Right lower leg: No edema.      Left lower leg: No edema.   Skin:     General: Skin is warm and dry.      Capillary Refill: Capillary refill takes less than 2 seconds.      Coloration: Skin is not cyanotic, jaundiced, mottled or pale.   Neurological:      General: No focal deficit present.      Mental Status: He is alert and oriented to person, place, and time. Mental status is at baseline.      GCS: GCS eye subscore is 4. GCS verbal subscore is 5. GCS motor subscore is 6.      Cranial Nerves: Cranial nerves are intact. No cranial nerve deficit.      Sensory: Sensation is intact.      Motor: Motor function is intact.      Deep Tendon Reflexes: Reflexes are normal and symmetric.   Psychiatric:         Behavior: Behavior normal. Behavior is cooperative.         Procedures           ED Course  ED Course as of 11/17/22 1726   u Nov 17, 2022   1158 No acute abdominopelvic findings. No bowel obstruction or evidence  of active bowel inflammation. Mild sigmoid diverticulosis without  evidence of diverticulitis. Mild to moderate volume stool in the colon.     2.  Bilateral renal cortical thinning/atrophy.  Multiple bilateral renal  cysts.     3.  Enlarged prostate. [TS]   1223 The CT findings discussed the patient including the renal cyst the patient will be admitted to the hospital [TS]      ED Course User Index  [TS] Jermain Min MD                                           [facility]    Final diagnoses:   Acute anemia   Chronic kidney disease, unspecified CKD stage       ED Disposition  ED Disposition     ED Disposition   Decision to Admit    Condition   --    Comment   Level of Care: Telemetry [5]   Diagnosis: Acute anemia [[id number]]   Admitting Physician: RADHA BARRY [[id number]]   Attending Physician: RADHA BARRY [[id number]]   Certification: I Certify That Inpatient Hospital Services Are Medically Necessary For Greater Than 2 Midnights               No follow-up provider specified.       Medication List      ASK your doctor about these medications    irbesartan 300 MG tablet  Commonly known as: AVAPRO  Ask about: Which instructions should I use?     vitamin D3 125 MCG (5000 UT) capsule capsule  Ask about: Which instructions should I use?             Jermain Min MD  11/17/22 1053       Jermain Min MD  11/17/22 2956

## 2022-11-18 LAB
ALBUMIN SERPL-MCNC: 3.7 G/DL (ref 3.5–5.2)
ALBUMIN/GLOB SERPL: 1.6 G/DL
ALP SERPL-CCNC: 58 U/L (ref 39–117)
ALT SERPL W P-5'-P-CCNC: 8 U/L (ref 1–41)
ANION GAP SERPL CALCULATED.3IONS-SCNC: 12 MMOL/L (ref 5–15)
AST SERPL-CCNC: 15 U/L (ref 1–40)
BH BB BLOOD EXPIRATION DATE: NORMAL
BH BB BLOOD EXPIRATION DATE: NORMAL
BH BB BLOOD TYPE BARCODE: 600
BH BB BLOOD TYPE BARCODE: 600
BH BB DISPENSE STATUS: NORMAL
BH BB DISPENSE STATUS: NORMAL
BH BB PRODUCT CODE: NORMAL
BH BB PRODUCT CODE: NORMAL
BH BB UNIT NUMBER: NORMAL
BH BB UNIT NUMBER: NORMAL
BILIRUB SERPL-MCNC: 1.1 MG/DL (ref 0–1.2)
BUN SERPL-MCNC: 28 MG/DL (ref 8–23)
BUN/CREAT SERPL: 9 (ref 7–25)
CALCIUM SPEC-SCNC: 8.7 MG/DL (ref 8.6–10.5)
CHLORIDE SERPL-SCNC: 108 MMOL/L (ref 98–107)
CO2 SERPL-SCNC: 19 MMOL/L (ref 22–29)
CREAT SERPL-MCNC: 3.12 MG/DL (ref 0.76–1.27)
CROSSMATCH INTERPRETATION: NORMAL
CROSSMATCH INTERPRETATION: NORMAL
DEPRECATED RDW RBC AUTO: 49.5 FL (ref 37–54)
EGFRCR SERPLBLD CKD-EPI 2021: 18.8 ML/MIN/1.73
ERYTHROCYTE [DISTWIDTH] IN BLOOD BY AUTOMATED COUNT: 16.5 % (ref 12.3–15.4)
GLOBULIN UR ELPH-MCNC: 2.3 GM/DL
GLUCOSE SERPL-MCNC: 88 MG/DL (ref 65–99)
HCT VFR BLD AUTO: 23.2 % (ref 37.5–51)
HCT VFR BLD AUTO: 24.1 % (ref 37.5–51)
HCT VFR BLD AUTO: 30.2 % (ref 37.5–51)
HCT VFR BLD AUTO: NORMAL %
HGB BLD-MCNC: 6.9 G/DL (ref 13–17.7)
HGB BLD-MCNC: 7.4 G/DL (ref 13–17.7)
HGB BLD-MCNC: 9.3 G/DL (ref 13–17.7)
HGB BLD-MCNC: NORMAL G/DL
MCH RBC QN AUTO: 24.6 PG (ref 26.6–33)
MCHC RBC AUTO-ENTMCNC: 30.2 G/DL (ref 31.5–35.7)
MCV RBC AUTO: 81.4 FL (ref 79–97)
PLATELET # BLD AUTO: 181 10*3/MM3 (ref 140–450)
PMV BLD AUTO: 10 FL (ref 6–12)
POTASSIUM SERPL-SCNC: 4.4 MMOL/L (ref 3.5–5.2)
PROCALCITONIN SERPL-MCNC: 0.1 NG/ML (ref 0–0.25)
PROT SERPL-MCNC: 6 G/DL (ref 6–8.5)
RBC # BLD AUTO: 2.85 10*6/MM3 (ref 4.14–5.8)
SODIUM SERPL-SCNC: 139 MMOL/L (ref 136–145)
UNIT  ABO: NORMAL
UNIT  ABO: NORMAL
UNIT  RH: NORMAL
UNIT  RH: NORMAL
WBC NRBC COR # BLD: 6.78 10*3/MM3 (ref 3.4–10.8)

## 2022-11-18 PROCEDURE — 85014 HEMATOCRIT: CPT | Performed by: INTERNAL MEDICINE

## 2022-11-18 PROCEDURE — 85018 HEMOGLOBIN: CPT | Performed by: INTERNAL MEDICINE

## 2022-11-18 PROCEDURE — P9016 RBC LEUKOCYTES REDUCED: HCPCS

## 2022-11-18 PROCEDURE — 36430 TRANSFUSION BLD/BLD COMPNT: CPT

## 2022-11-18 PROCEDURE — 84145 PROCALCITONIN (PCT): CPT | Performed by: INTERNAL MEDICINE

## 2022-11-18 PROCEDURE — 25010000002 NA FERRIC GLUC CPLX PER 12.5 MG: Performed by: NURSE PRACTITIONER

## 2022-11-18 PROCEDURE — 97161 PT EVAL LOW COMPLEX 20 MIN: CPT | Performed by: PHYSICAL THERAPIST

## 2022-11-18 PROCEDURE — 80053 COMPREHEN METABOLIC PANEL: CPT | Performed by: INTERNAL MEDICINE

## 2022-11-18 PROCEDURE — 85027 COMPLETE CBC AUTOMATED: CPT | Performed by: INTERNAL MEDICINE

## 2022-11-18 PROCEDURE — 86900 BLOOD TYPING SEROLOGIC ABO: CPT

## 2022-11-18 RX ORDER — POLYETHYLENE GLYCOL 3350 17 G/17G
17 POWDER, FOR SOLUTION ORAL DAILY
Status: DISCONTINUED | OUTPATIENT
Start: 2022-11-18 | End: 2022-11-19 | Stop reason: HOSPADM

## 2022-11-18 RX ORDER — BISACODYL 10 MG
10 SUPPOSITORY, RECTAL RECTAL ONCE
Status: COMPLETED | OUTPATIENT
Start: 2022-11-18 | End: 2022-11-18

## 2022-11-18 RX ORDER — AMLODIPINE BESYLATE 5 MG/1
5 TABLET ORAL
Status: DISCONTINUED | OUTPATIENT
Start: 2022-11-18 | End: 2022-11-19 | Stop reason: HOSPADM

## 2022-11-18 RX ORDER — AMOXICILLIN 250 MG
1 CAPSULE ORAL 2 TIMES DAILY
Status: DISCONTINUED | OUTPATIENT
Start: 2022-11-18 | End: 2022-11-19 | Stop reason: HOSPADM

## 2022-11-18 RX ORDER — FERROUS GLUCONATE 324(37.5)
324 TABLET ORAL
Status: DISCONTINUED | OUTPATIENT
Start: 2022-11-19 | End: 2022-11-19 | Stop reason: HOSPADM

## 2022-11-18 RX ADMIN — LOSARTAN POTASSIUM 50 MG: 50 TABLET, FILM COATED ORAL at 20:15

## 2022-11-18 RX ADMIN — BISACODYL 10 MG: 10 SUPPOSITORY RECTAL at 19:12

## 2022-11-18 RX ADMIN — AMLODIPINE BESYLATE 5 MG: 5 TABLET ORAL at 17:14

## 2022-11-18 RX ADMIN — FAMOTIDINE 10 MG: 20 TABLET, FILM COATED ORAL at 08:07

## 2022-11-18 RX ADMIN — POLYETHYLENE GLYCOL 3350 17 G: 17 POWDER, FOR SOLUTION ORAL at 17:14

## 2022-11-18 RX ADMIN — RANOLAZINE 500 MG: 500 TABLET, FILM COATED, EXTENDED RELEASE ORAL at 08:07

## 2022-11-18 RX ADMIN — RANOLAZINE 500 MG: 500 TABLET, FILM COATED, EXTENDED RELEASE ORAL at 20:15

## 2022-11-18 RX ADMIN — SODIUM CHLORIDE 250 MG: 9 INJECTION, SOLUTION INTRAVENOUS at 08:07

## 2022-11-18 RX ADMIN — ATORVASTATIN CALCIUM 80 MG: 40 TABLET, FILM COATED ORAL at 08:07

## 2022-11-18 RX ADMIN — DOCUSATE SODIUM 50 MG AND SENNOSIDES 8.6 MG 1 TABLET: 8.6; 5 TABLET, FILM COATED ORAL at 20:15

## 2022-11-18 RX ADMIN — Medication 10 ML: at 20:16

## 2022-11-18 NOTE — THERAPY EVALUATION
Patient Name: Mireille Magallanes  : 1935    MRN: [id number]                              Today's Date: 2022       Admit Date: 2022    Visit Dx:     ICD-10-CM ICD-9-CM   1. Acute anemia  D64.9 285.9   2. Chronic kidney disease, unspecified CKD stage  N18.9 585.9   3. Impaired mobility  Z74.09 799.89     Patient Active Problem List   Diagnosis   • HTN (hypertension)   • Hyperlipidemia   • Coronary artery disease involving coronary bypass graft of native heart without angina pectoris   • Pacemaker   • Other chest pain   • Overweight (BMI 25.0-29.9)   • TIA (transient ischemic attack)   • Carotid artery stenosis   • Acute cerebrovascular accident (CVA) of cerebellum ([facility])   • Chronic anticoagulation   • Stenosis of right carotid artery   • Bilateral carotid artery stenosis   • Carotid stenosis   • History of stroke   • S/P TAVR (transcatheter aortic valve replacement)   • Atrial flutter with controlled response ([facility])   • Cerebrovascular accident (CVA) ([facility])   • Chronic renal failure, stage 4 (severe) ([facility])   • 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 ([facility])   • Murmur, cardiac   • Primary osteoarthritis of both knees   • Pure hypercholesterolemia   • Preop testing   • Carotid artery stenosis, symptomatic, left   • Symptomatic anemia   • Acute anemia   • Iron deficiency   • Acute renal failure superimposed on stage 4 chronic kidney disease (HCC)   • Constipation     Past Medical History:   Diagnosis Date   • Acute viral hepatitis A    • Aortic valve stenosis    • Carotid artery stenosis     Right   • CKD (chronic kidney disease)    • Coagulopathy (HCC)     Plavix   • Coronary arteriosclerosis     stent ,? 2104, on plavix now.   • Diarrhea    • Dysphagia     Unspecified   • Family history of colon cancer    • Hematochezia     differential diagnosis discussed   • Hemorrhoids     Unspecified   • History of cardiac pacemaker 2018    [employer]   •  History of colonic polyps    • Hyperlipidemia    • Hypertension    • Left ventricular hypertrophy    • Melanoma of back (HCC)    • Obesity    • Osteoarthritis    • Pacemaker    • Peripheral neuropathy    • Stroke (HCC)    • TIA (transient ischemic attack)    • Tobacco non-user      Past Surgical History:   Procedure Laterality Date   • AORTIC VALVE REPAIR/REPLACEMENT Bilateral 4/20/2021    Procedure: Transfemoral Transcatheter Aortic Valve Replacement;  Surgeon: Kaushik Toledo MD;  Location: [facility] HYBRID OR 12;  Service: Cardiovascular;  Laterality: Bilateral;   • AORTIC VALVE REPAIR/REPLACEMENT Bilateral 4/20/2021    Procedure: TRANSFEMORAL TRANSCATHETER AORTIC VALVE REPLACEMENT;  Surgeon: Clayton Wilcox MD;  Location:  [facility] HYBRID OR 12;  Service: Cardiothoracic;  Laterality: Bilateral;   • APPENDECTOMY     • CARDIAC CATHETERIZATION     • CARDIAC CATHETERIZATION N/A 3/18/2019    Procedure: Left Heart Cath;  Surgeon: Kaushik Toledo MD;  Location:  [facility] CATH INVASIVE LOCATION;  Service: Cardiology   • CARDIAC CATHETERIZATION N/A 4/14/2021    Procedure: Left Heart Cath;  Surgeon: Kaushik Toledo MD;  Location:  [facility] CATH INVASIVE LOCATION;  Service: Cardiology;  Laterality: N/A;   • CARDIAC ELECTROPHYSIOLOGY PROCEDURE N/A 1/5/2018    Procedure: Temporary Pacemaker;  Surgeon: Luis E Courtney MD;  Location:  [facility] CATH INVASIVE LOCATION;  Service:    • CARDIAC ELECTROPHYSIOLOGY PROCEDURE N/A 1/8/2018    Procedure: Device Implant;  Surgeon: Kaushik Toledo MD;  Location:  [facility] CATH INVASIVE LOCATION;  Service:    • CAROTID ENDARTERECTOMY Right 2/14/2020    Procedure: RIGHT CAROTID ENDARTERECTOMY WITH EEG;  Surgeon: Martín Woo DO;  Location: [facility] HYBRID OR 12;  Service: Vascular;  Laterality: Right;   • CAROTID STENT     • COLON SURGERY  1999    for Ischemic Colitis   • COLONOSCOPY  05/07/2008    tubular adenoma ascending colon polyp - 3 yr   • COLONOSCOPY  08/19/2003    colon polyp @ 24  cm-see path - 5 yr   • COLONOSCOPY  08/11/2000    small interanl hemorrhoid - 3-5 yr   • COLONOSCOPY  06/22/1998    (8)small colon polyps removed-see path, internal anal skin tag - 2-3 yr   • CORONARY ARTERY BYPASS GRAFT     • CORONARY STENT PLACEMENT      x1 2014 dr crane    • INSERT / REPLACE / REMOVE PACEMAKER     • OTHER SURGICAL HISTORY      Lithotripsy   • OTHER SURGICAL HISTORY  06/2011    recall 3 yr.    • PACEMAKER IMPLANTATION  01/08/2018   • VASECTOMY        General Information     Row Name 11/18/22 0851          Physical Therapy Time and Intention    Document Type evaluation  Symptomatic Anemia; Iron deficiency, Stage 4 chronic kidney disease; constipation  -MS (r) JW (t) MS (c)     Mode of Treatment physical therapy  -MS (r) JW (t) MS (c)     Row Name 11/18/22 0851          General Information    Patient Profile Reviewed yes  -MS (r) JW (t) MS (c)     Prior Level of Function independent:;all household mobility;gait;transfer;community mobility;bed mobility;grooming;dressing;yard work;home management  -MS (r) JW (t) MS (c)     Existing Precautions/Restrictions fall  -MS (r) JW (t) MS (c)     Barriers to Rehab medically complex;hearing deficit  -MS (r) JW (t) MS (c)     Row Name 11/18/22 0851          Living Environment    People in Home spouse;child(eleuterio), adult  Daughter lives down the street and is able to help out  -MS (r) JW (t) MS (c)     Name(s) of People in Home Spouse- Bri;    Daughter- Dahlia  -MS (r) JW (t) MS (c)     Row Name 11/18/22 0851          Home Main Entrance    Number of Stairs, Main Entrance two  -MS (r) JW (t) MS (c)     Stair Railings, Main Entrance none  -MS (r) JW (t) MS (c)     Row Name 11/18/22 0851          Stairs Within Home, Primary    Number of Stairs, Within Home, Primary three  -MS (r) JW (t) MS (c)     Stair Railings, Within Home, Primary none  -MS (r) JW (t) MS (c)     Row Name 11/18/22 0851          Cognition    Orientation Status (Cognition) oriented x 4  -MS (r) JW (t)  MS (c)           User Key  (r) = Recorded By, (t) = Taken By, (c) = Cosigned By    Initials Name Provider Type    Jade Bates R, PT, DPT, NCS Physical Therapist    Shayne Hugo, PT Student PT Student               Mobility     Row Name 11/18/22 1011          Bed Mobility    Bed Mobility supine-sit  -MS (r) JW (t) MS (c)     Supine-Sit [place] (Bed Mobility) modified independence;standby assist  -MS (r) JW (t) MS (c)     Assistive Device (Bed Mobility) bed rails;head of bed elevated  -MS (r) JW (t) MS (c)     Row Name 11/18/22 1011          Sit-Stand Transfer    Sit-Stand [place] (Transfers) supervision  -MS (r) JW (t) MS (c)     Row Name 11/18/22 1011          Gait/Stairs (Locomotion)    [place] Level (Gait) contact guard  -MS (r) JW (t) MS (c)     Distance in Feet (Gait) 50 ft x2  -MS (r) JW (t) MS (c)     Deviations/Abnormal Patterns (Gait) gait speed decreased;stride length decreased  -MS (r) JW (t) MS (c)           User Key  (r) = Recorded By, (t) = Taken By, (c) = Cosigned By    Initials Name Provider Type    Jade Bates R, PT, DPT, NCS Physical Therapist    Shayne Hugo PT Student PT Student               Obj/Interventions     Row Name 11/18/22 1013          Range of Motion Comprehensive    General Range of Motion bilateral upper extremity ROM WNL;bilateral lower extremity ROM WNL  -MS (r) JW (t) MS (c)     Row Name 11/18/22 1013          Strength Comprehensive (MMT)    Comment, General Manual Muscle Testing (MMT) Assessment B LE 4/5 gross strength; B UE 4/5 gross strength.  -MS (r) JW (t) MS (c)     Row Name 11/18/22 1013          Balance    Balance Assessment sitting static balance;sitting dynamic balance;standing static balance;sit to stand dynamic balance;standing dynamic balance  -MS (r) JW (t) MS (c)     Static Sitting Balance independent  -MS (r) JW (t) MS (c)     Dynamic Sitting Balance independent  -MS (r) JW (t) MS (c)     Position, Sitting Balance sitting edge  of bed  -MS (r) JW (t) MS (c)     Sit to Stand Dynamic Balance contact guard  -MS (r) JW (t) MS (c)     Static Standing Balance standby assist  -MS (r) JW (t) MS (c)     Dynamic Standing Balance contact guard  -MS (r) JW (t) MS (c)     Comment, Balance Tinetti  -MS (r) JW (t) MS (c)     Row Name 11/18/22 1013          Sensory Assessment (Somatosensory)    Sensory Assessment (Somatosensory) UE sensation intact;LE sensation intact  -MS (r) JW (t) MS (c)           User Key  (r) = Recorded By, (t) = Taken By, (c) = Cosigned By    Initials Name Provider Type    Jade Bates, PT, DPT, NCS Physical Therapist    Shyane Hugo, PT Student PT Student               Goals/Plan     Row Name 11/18/22 1035          Bed Mobility Goal 1 (PT)    Activity/Assistive Device (Bed Mobility Goal 1, PT) bed mobility activities, all  -MS (r) JW (t) MS (c)     [place] Level/Cues Needed (Bed Mobility Goal 1, PT) independent  -MS (r) JW (t) MS (c)     Time Frame (Bed Mobility Goal 1, PT) long term goal (LTG)  -MS (r) JW (t) MS (c)     Strategies/Barriers (Bed Mobility Goal 1, PT) Pain and weakness  -MS (r) JW (t) MS (c)     Progress/Outcomes (Bed Mobility Goal 1, PT) new goal  -MS (r) JW (t) MS (c)     Row Name 11/18/22 1035          Transfer Goal 1 (PT)    Activity/Assistive Device (Transfer Goal 1, PT) sit-to-stand/stand-to-sit;bed-to-chair/chair-to-bed  -MS (r) JW (t) MS (c)     [place] Level/Cues Needed (Transfer Goal 1, PT) independent  -MS (r) JW (t) MS (c)     Time Frame (Transfer Goal 1, PT) long term goal (LTG)  -MS (r) JW (t) MS (c)     Strategies/Barriers (Transfers Goal 1, PT) Pain and weakness  -MS (r) JW (t) MS (c)     Progress/Outcome (Transfer Goal 1, PT) new goal  -MS (r) JW (t) MS (c)     Row Name 11/18/22 1035          Gait Training Goal 1 (PT)    Activity/Assistive Device (Gait Training Goal 1, PT) gait (walking locomotion)  -MS (r) JW (t) MS (c)     [place] Level (Gait Training Goal 1, PT)  independent  -MS (r) JW (t) MS (c)     Distance (Gait Training Goal 1, PT) 50ft x4  -MS (r) JW (t) MS (c)     Time Frame (Gait Training Goal 1, PT) long term goal (LTG)  -MS (r) JW (t) MS (c)     Strategies/Barriers (Gait Training Goal 1, PT) Pain fatigue  -MS (r) JW (t) MS (c)     Progress/Outcome (Gait Training Goal 1, PT) new goal  -MS (r) JW (t) MS (c)     Row Name 11/18/22 1035          Therapy Assessment/Plan (PT)    Planned Therapy Interventions (PT) balance training;bed mobility training;gait training;motor coordination training;patient/family education;stair training;strengthening;transfer training  -MS (r) JW (t) MS (c)           User Key  (r) = Recorded By, (t) = Taken By, (c) = Cosigned By    Initials Name Provider Type    Jade Bates, PT, DPT, NCS Physical Therapist    Shayne Hugo, KELSY Student PT Student               Clinical Impression     Row Name 11/18/22 0901          Pain    Pretreatment Pain Rating 7/10  -MS (r) JW (t) MS (c)     Pain Location - Side/Orientation Right  -MS (r) JW (t) MS (c)     Pain Location upper  -MS (r) JW (t) MS (c)     Pain Location - extremity  -MS (r) JW (t) MS (c)     Pain Intervention(s) Repositioned;Ambulation/increased activity  -MS (r) JW (t) MS (c)     Row Name 11/18/22 0901          Plan of Care Review    Plan of Care Reviewed With patient;spouse;daughter  -MS (r) JW (t) MS (c)     Progress no change  -MS (r) JW (t) MS (c)     Outcome Evaluation PT evaluation complete. Pt A&Ox4. Checked with pt's nurse, because pt's hgb has been running low. Nursing approved pt was able to work with therapy and the hgb numbers were better. Pt was mod I with all bed mobility. Pt was able to sit<>stand t/f with no assistive device and CGA from therapist. Pt was able to ambulate 50ft x2 and was CGA x1. Educated pt to get good foot clearance and heel contact thoughout ambulation to prevent future falls. Peformed a tinetti balance assesment with pt. Pt had an overall tinetti  of 24. Educated pt to perform ankle pumps, quad sets, and glute squeezes to maintain strength and improve blood flow to prevent DVT's. PT intervention is indicated to improve funtional mobility, decrease fall risk, and increse overall endurance. Suggest pt return home with assistance upon d/c.  -MS (r) JW (t) MS (c)     Row Name 11/18/22 0901          Therapy Assessment/Plan (PT)    Rehab Potential (PT) good, to achieve stated therapy goals  -MS (r) JW (t) MS (c)     Criteria for Skilled Interventions Met (PT) skilled treatment is necessary  -MS (r) JW (t) MS (c)     Therapy Frequency (PT) 2 times/day  -MS (r) JW (t) MS (c)     Row Name 11/18/22 0901          Vital Signs    O2 Delivery Pre Treatment room air  -MS (r) JW (t) MS (c)     O2 Delivery Intra Treatment room air  -MS (r) JW (t) MS (c)     O2 Delivery Post Treatment room air  -MS (r) JW (t) MS (c)     Pre Patient Position Supine  -MS (r) JW (t) MS (c)     Intra Patient Position Standing  -MS (r) JW (t) MS (c)     Post Patient Position Sitting  -MS (r) JW (t) MS (c)     Row Name 11/18/22 0901          Positioning and Restraints    Pre-Treatment Position in bed  -MS (r) JW (t) MS (c)     Post Treatment Position bed  -MS (r) JW (t) MS (c)     In Bed sitting EOB;call light within reach;encouraged to call for assist;with family/caregiver  -MS (r) JW (t) MS (c)           User Key  (r) = Recorded By, (t) = Taken By, (c) = Cosigned By    Initials Name Provider Type    Jade Bates R, PT, DPT, NCS Physical Therapist    Shayne Hugo, PT Student PT Student               Outcome Measures     Row Name 11/18/22 1025 11/18/22 2659       How much help from another person do you currently need...    Turning from your back to your side while in flat bed without using bedrails? 4  -MS (r) JW (t) MS (c) 4  -JE    Moving from lying on back to sitting on the side of a flat bed without bedrails? 4  -MS (r) JW (t) MS (c) 4  -JE    Moving to and from a bed to a chair  "(including a wheelchair)? 4  -MS (r) JW (t) MS (c) 3  -JE    Standing up from a chair using your arms (e.g., wheelchair, bedside chair)? 4  -MS (r) JW (t) MS (c) 3  -JE    Climbing 3-5 steps with a railing? 3  -MS (r) JW (t) MS (c) 3  -JE    To walk in hospital room? 3  -MS (r) JW (t) MS (c) 3  -JE    AM-PAC 6 Clicks Score (PT) 22  -MS (r) JW (t) 20  -JE    Highest level of mobility 7 --> Walked 25 feet or more  -MS (r) JW (t) 6 --> Walked 10 steps or more  -JE    Row Name 11/18/22 1025          Tinetti Assessment    Tinetti Assessment yes  -MS (r) JW (t) MS (c)     Sitting Balance 1  -MS (r) JW (t) MS (c)     Arises 1  -MS (r) JW (t) MS (c)     Attempts to Rise 2  -MS (r) JW (t) MS (c)     Immediate Standing Balance (first 5 sec) 2  -MS (r) JW (t) MS (c)     Standing Balance 2  -MS (r) JW (t) MS (c)     Sternal Nudge (feet close together) 2  -MS (r) JW (t) MS (c)     Eyes Closed (feet close together) 1  -MS (r) JW (t) MS (c)     Turning 360 Degrees- Steps 1  -MS (r) JW (t) MS (c)     Turning 360 Degrees- Steadiness 1  -MS (r) JW (t) MS (c)     Sitting Down 1  -MS (r) JW (t) MS (c)     Tinetti Balance Score 14  -MS (r) JW (t)     Gait Initiation (immediate after told \"go\") 1  -MS (r) JW (t) MS (c)     Step Length- Right Swing 1  -MS (r) JW (t) MS (c)     Step Length- Left Swing 1  -MS (r) JW (t) MS (c)     Foot Clearance- Right Foot 1  -MS (r) JW (t) MS (c)     Foot Clearance- Left Foot 1  -MS (r) JW (t) MS (c)     Step Symmetry 0  -MS (r) JW (t) MS (c)     Step Continuity 1  -MS (r) JW (t) MS (c)     Path (excursion) 1  -MS (r) JW (t) MS (c)     Trunk 2  -MS (r) JW (t) MS (c)     Base of Support 1  -MS (r) JW (t) MS (c)     Gait Score 10  -MS (r) JW (t)     Tinetti Total Score 24  -MS (r) JW (t)     Row Name 11/18/22 1025          Functional Assessment    Outcome Measure Options Tinetti;AM-PAC 6 Clicks Basic Mobility (PT)  -MS (r) JW (t) MS (c)           User Key  (r) = Recorded By, (t) = Taken By, (c) = Cosigned " By    Initials Name Provider Type    Jade Bates R, PT, DPT, NCS Physical Therapist    Radha Betancourt, RN Registered Nurse    Shayne Hugo, PT Student PT Student                             Physical Therapy Education     Title: PT OT SLP Therapies (In Progress)     Topic: Physical Therapy (In Progress)     Point: Mobility training (Done)     Learning Progress Summary           Patient Acceptance, E, VU by MIRIAM at 11/18/2022 1038    Comment: Educated pt on getting good foot cleance and heel contact when ambulating to prevent future falls.                   Point: Home exercise program (Not Started)     Learner Progress:  Not documented in this visit.          Point: Body mechanics (Not Started)     Learner Progress:  Not documented in this visit.          Point: Precautions (Done)     Learning Progress Summary           Patient Acceptance, E, VU by MIRIAM at 11/18/2022 1038    Comment: Educated pt on getting good foot cleance and heel contact when ambulating to prevent future falls.                               User Key     Initials Effective Dates Name Provider Type Discipline     09/27/22 -  Shayne Oreilly, PT Student PT Student PT              PT Recommendation and Plan  Planned Therapy Interventions (PT): balance training, bed mobility training, gait training, motor coordination training, patient/family education, stair training, strengthening, transfer training  Plan of Care Reviewed With: patient, spouse, daughter  Progress: no change  Outcome Evaluation: PT evaluation complete. Pt A&Ox4. Checked with pt's nurse, because pt's hgb has been running low. Nursing approved pt was able to work with therapy and the hgb numbers were better. Pt was mod I with all bed mobility. Pt was able to sit<>stand t/f with no assistive device and CGA from therapist. Pt was able to ambulate 50ft x2 and was CGA x1. Educated pt to get good foot clearance and heel contact thoughout ambulation to prevent future falls.  Peformed a tinetti balance assesment with pt. Pt had an overall tinetti of 24. Educated pt to perform ankle pumps, quad sets, and glute squeezes to maintain strength and improve blood flow to prevent DVT's. PT intervention is indicated to improve funtional mobility, decrease fall risk, and increse overall endurance. Suggest pt return home with assistance upon d/c.     Time Calculation:    PT Charges     Row Name 11/18/22 1043             Time Calculation    Start Time 0850  add 15 mins for chart review  -MS (r) JW (t) MS (c)      Stop Time 0929  -MS (r) JW (t) MS (c)      Time Calculation (min) 39 min  -MS (r) JW (t)      PT Received On 11/18/22  -MS (r) JW (t) MS (c)      PT Goal Re-Cert Due Date 11/28/22  -MS (r) JW (t) MS (c)         Untimed Charges    PT Eval/Re-eval Minutes 54  -MS (r) JW (t) MS (c)         Total Minutes    Untimed Charges Total Minutes 54  -MS (r) JW (t)       Total Minutes 54  -MS (r) JW (t)            User Key  (r) = Recorded By, (t) = Taken By, (c) = Cosigned By    Initials Name Provider Type    MS [place] Jade YESIKA, PT, DPT, NCS Physical Therapist    Shayne Hugo PT Student PT Student                  PT G-Codes  Outcome Measure Options: [place]etti, AM-PAC 6 Clicks Basic Mobility (PT)  AM-PAC 6 Clicks Score (PT): 22  Tinetti Total Score: 24  PT Discharge Summary  Anticipated Discharge Disposition (PT): home with assist    Shayne Oreilly PT Student  11/18/2022

## 2022-11-18 NOTE — PLAN OF CARE
Goal Outcome Evaluation:  Plan of Care Reviewed With: patient        Progress: improving  Outcome Evaluation: Pt denies pain. IVF dc'd. Pt recieved his 4th unit of PRBC. His current hemoglobin is 9.3. Tolerating diet. Family at bedside. Safety maintained.

## 2022-11-18 NOTE — PLAN OF CARE
Goal Outcome Evaluation:           Progress: no change  Outcome Evaluation: Patient having no c/o of pain. IVF infusing. Hgb 6.9 MD notified and 1 unit of blood ordered. Voiding. Safety maintained.

## 2022-11-18 NOTE — PROGRESS NOTES
[facility] Medicine Services  INPATIENT PROGRESS NOTE    Patient Name: Mireille Magallanes  Date of Admission: 11/17/2022  Today's Date: 11/18/22  Length of Stay: 1  Primary Care Physician: Hossein Hua MD    Subjective   Chief Complaint: Weakness  HPI     Patient was seen and examined at bedside.  Patient is feeling much better.  The patient was able to work with physical therapy.  The patient denies any nausea vomiting diarrhea.  Patient had some black stool, but reported that it was very hard stool.  The patient indicates that he would like another suppository as he would like to make sure that he cleans himself out.  Patient has had no signs of active bleeding at the present time.        Review of Systems   Constitutional: Negative for activity change, appetite change, chills, fatigue and fever.   Respiratory: Negative for cough and shortness of breath.    Cardiovascular: Negative for chest pain and palpitations.   Gastrointestinal: Negative for abdominal distention, abdominal pain, diarrhea, nausea and vomiting.   Neurological: Positive for weakness.        All pertinent negatives and positives are as above. All other systems have been reviewed and are negative unless otherwise stated.     Objective    Temp:  [97.6 °F (36.4 °C)-98.6 °F (37 °C)] 97.9 °F (36.6 °C)  Heart Rate:  [73-81] 80  Resp:  [14-18] 16  BP: (148-187)/(67-84) 187/80  Physical Exam  Vitals and nursing note reviewed.   Constitutional:       Appearance: Normal appearance.   HENT:      Head: Normocephalic and atraumatic.      Mouth/Throat:      Mouth: Mucous membranes are dry.      Pharynx: Oropharynx is clear.   Eyes:      General: No scleral icterus.     Conjunctiva/sclera: Conjunctivae normal.   Cardiovascular:      Rate and Rhythm: Normal rate and regular rhythm.   Pulmonary:      Effort: Pulmonary effort is normal. No respiratory distress.      Breath sounds: Normal breath sounds. No stridor.    Abdominal:      General: Abdomen is flat. Bowel sounds are normal. There is no distension.      Palpations: Abdomen is soft. There is no mass.      Tenderness: There is no abdominal tenderness.      Hernia: No hernia is present.   Skin:     General: Skin is warm and dry.      Coloration: Skin is pale. Skin is not jaundiced.   Neurological:      General: No focal deficit present.      Mental Status: He is alert and oriented to person, place, and time.   Psychiatric:         Mood and Affect: Mood normal.         Behavior: Behavior normal.             Results Review:  I have reviewed the labs, radiology results, and diagnostic studies.    Laboratory Data:   Results from last 7 days   Lab Units 11/18/22  0543 11/18/22  0107 11/17/22  1600 11/17/22  0937   WBC 10*3/mm3  --  6.78  --  4.03   HEMOGLOBIN g/dL 7.4* 6.9* 6.6* 4.5*   HEMATOCRIT % 24.1* 23.2* 25.1* 15.6*   PLATELETS 10*3/mm3  --  181  --  165        Results from last 7 days   Lab Units 11/18/22  0543 11/17/22  0847   SODIUM mmol/L 139 140   POTASSIUM mmol/L 4.4 4.8   CHLORIDE mmol/L 108* 106   CO2 mmol/L 19.0* 21.0*   BUN mg/dL 28* 31*   CREATININE mg/dL 3.12* 3.28*   CALCIUM mg/dL 8.7 9.2   BILIRUBIN mg/dL 1.1  --    ALK PHOS U/L 58  --    ALT (SGPT) U/L 8  --    AST (SGOT) U/L 15  --    GLUCOSE mg/dL 88 111*       Culture Data:   No results found for: BLOODCX, URINECX, WOUNDCX, MRSACX, RESPCX, STOOLCX    Radiology Data:   Imaging Results (Last 24 Hours)     ** No results found for the last 24 hours. **          I have reviewed the patient's current medications.     Assessment/Plan     Active Hospital Problems    Diagnosis    • **Symptomatic anemia    • Iron deficiency    • Acute renal failure superimposed on stage 4 chronic kidney disease (HCC)    • Constipation    • Chronic anticoagulation        Plan:  Patient was admitted on 11/17 by Dr. García and GIANNI Berman.  Patient was admitted for significant weakness.  Patient was found to have a  hemoglobin of 4.5.  Patient is on anticoagulation.  Patient denies any occult signs of bleeding.  Patient had fecal occult was reportedly negative in the emergency department.    Patient has been transfused 3 units, patient hemoglobin 7.4 and patient is still symptomatic.  We will transfuse 1 more unit of packed red blood cells.     Hold the patient's aspirin and Xarelto.  I will likely hold the patient's Xarelto long-term, it is for atrial fibrillation, given his age, and he has a history of a GI bleed on Pradaxa, and presented here with a hemoglobin of 4.5.  Feel as though the Xarelto risk outweighs the benefit.  Will likely resume the patient aspirin, but will likely hold it for 1 week.    Patient could benefit possibly from colonoscopy as an outpatient, however given his advanced age, they would like to avoid this if possible.    PT/OT    SCDs for VTE prophylaxis    Discharge Planning: I expect the patient to be discharged to home in 1-2 days.    Electronically signed by Shadi García MD, 11/18/22, 15:49 CST.

## 2022-11-18 NOTE — PAYOR COMM NOTE
"Koffi Dickey (87 y.o. 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    Marital Status                               Admission Date   11/17/22    Admission Type   Emergency    Admitting Provider   Shadi García MD    Attending Provider   Shadi García MD    Department, Room/Bed   [facility] 3C, 370/1       Discharge Date       Discharge Disposition       Discharge Destination                               Attending Provider: Shadi García MD    Allergies: Sulfa Antibiotics    Isolation: None   Infection: None   Code Status: No CPR    Ht: 175.3 cm (69\")   Wt: 88.5 kg (195 lb)    Admission Cmt: None   Principal Problem: Symptomatic anemia [D64.9]                 Active Insurance as of 11/17/2022     Primary Coverage     Payor Plan Insurance Group Employer/Plan Group    MEDICARE MEDICARE A & B      Payor Plan Address Payor Plan Phone Number Payor Plan Fax Number Effective Dates    [address] [phone]  10/1/2002 - None Entered    [facility] [id number]       Subscriber Name Subscriber Birth Date Member ID       KOFFI DICKEY 1935 [id number]           Secondary Coverage     Payor Plan Insurance Group Employer/Plan Group    [employer] [place] [id number]     Payor Plan Address Payor Plan Phone Number Payor Plan Fax Number Effective Dates    [address] [address] [phone]  1/1/2013 - None Entered    [facility] [id number]       Subscriber Name Subscriber Birth Date Member ID       KOFFI DICKEY 1935 [id number]                 Emergency Contacts      (Rel.) Home Phone Work Phone Mobile Phone    Sonya Dickey (Spouse) [phone] -- --    WILLI DAVIS (Daughter) [phone] -- " [phone]               History & Physical      Amisha Preciado, APRN at 11/17/22 6843     Attestation signed by Shadi García MD at 11/17/22 2042    This visit was performed by both a physician and an APC. I personally evaluated and examined the patient. I performed all aspects of the MDM as documented.    Patient seen and examined at bedside.  Patient has no evidence of GI bleeding and is actually constipated.  Suspect patient had blood loss from surgery as family indicated had large hematoma and required manual pressure by nurses for a while after surgery and had extensive ecchymosis down the arm.  Discussed with them that we do not currently have GI services so if he was bleeding we would be in a tough situation and discussed that given his age and co-morbidities we would really try to avoid any aggressive intervention such as endoscopy which that were very much in favor of.  Will hold anticoagulation.  Transfuse.  Serial hemoglobins.     Electronically signed by Shadi García MD, 11/17/2022, 20:41 CST.                      [facility] Medicine Services  HISTORY AND PHYSICAL    Date of Admission: 11/17/2022  Primary Care Physician: Hossein Hua MD    Subjective     Chief Complaint: Constipation for 4-week    History of Present Illness  Edgar Magallanes is a 89-year-old male with past medical history of aortic valve stenosis status post replacement 4/2021, carotid artery disease status post surgical intervention TCAR on 9/16/2022, chronic kidney disease stage IV, stroke, pacemaker, hypertension, please see below for complete list.  Daughter at bedside provides majority of history.  Labs not been obtained since surgery in September at that time hemoglobin was 9.7.  He reports postoperative bleeding with significant hematoma after surgery.  Patient underwent stress echo on 11/11/2022 per Dr. Jansen for complaints of worsening shortness of breathing, worse with  exertion.  Study was essentially normal.  Again laboratory studies were not obtained.  Today he presented to [facility] emergency department with complaints of 4-week history of constipation.  He has had minimal bowel movements and is feeling quite full.  He denies pain, nausea vomiting, no active bleeding.  He is reporting abdominal pressure.  Patient had a stroke 5/25/2022 and at that time Xarelto was changed to Pradaxa.  He developed a GI bleed after discharge.  Family did not seek evaluation however they did stop Pradaxa and resumed his Xarelto.  He has never had bleeding with Xarelto in the past.  ER work-up revealed hemoglobin 4.5, creatinine 3.28 with baseline 2.7, iron 8, ferritin 10.6, saturation 2.  Patient is admitted for further evaluation treatment.    Review of Systems   A 10 point review of systems was completed, all negative except for those discussed in    Past Medical History:   Past Medical History:   Diagnosis Date   • Acute viral hepatitis A    • Aortic valve stenosis    • Carotid artery stenosis     Right   • CKD (chronic kidney disease)    • Coagulopathy (HCC)     Plavix   • Coronary arteriosclerosis     stent ,? 4/2104, on plavix now.   • Diarrhea    • Dysphagia     Unspecified   • Family history of colon cancer    • Hematochezia     differential diagnosis discussed   • Hemorrhoids     Unspecified   • History of cardiac pacemaker 01/08/2018    [employer]   • History of colonic polyps    • Hyperlipidemia    • Hypertension    • Left ventricular hypertrophy    • Melanoma of back (HCC)    • Obesity    • Osteoarthritis    • Pacemaker    • Peripheral neuropathy    • Stroke (HCC)    • TIA (transient ischemic attack)    • Tobacco non-user        Past Surgical History:   Past Surgical History:   Procedure Laterality Date   • AORTIC VALVE REPAIR/REPLACEMENT Bilateral 4/20/2021    Procedure: Transfemoral Transcatheter Aortic Valve Replacement;  Surgeon: Kaushik Toledo MD;  Location:   [facility] HYBRID OR 12;  Service: Cardiovascular;  Laterality: Bilateral;   • AORTIC VALVE REPAIR/REPLACEMENT Bilateral 4/20/2021    Procedure: TRANSFEMORAL TRANSCATHETER AORTIC VALVE REPLACEMENT;  Surgeon: Clayton Wilcox MD;  Location: [facility] HYBRID OR 12;  Service: Cardiothoracic;  Laterality: Bilateral;   • APPENDECTOMY     • CARDIAC CATHETERIZATION     • CARDIAC CATHETERIZATION N/A 3/18/2019    Procedure: Left Heart Cath;  Surgeon: Kaushik Toledo MD;  Location:  [facility] CATH INVASIVE LOCATION;  Service: Cardiology   • CARDIAC CATHETERIZATION N/A 4/14/2021    Procedure: Left Heart Cath;  Surgeon: Kaushik Toledo MD;  Location:  [facility] CATH INVASIVE LOCATION;  Service: Cardiology;  Laterality: N/A;   • CARDIAC ELECTROPHYSIOLOGY PROCEDURE N/A 1/5/2018    Procedure: Temporary Pacemaker;  Surgeon: Luis E Courtney MD;  Location:  [facility] CATH INVASIVE LOCATION;  Service:    • CARDIAC ELECTROPHYSIOLOGY PROCEDURE N/A 1/8/2018    Procedure: Device Implant;  Surgeon: Kaushik Toledo MD;  Location: [facility] CATH INVASIVE LOCATION;  Service:    • CAROTID ENDARTERECTOMY Right 2/14/2020    Procedure: RIGHT CAROTID ENDARTERECTOMY WITH EEG;  Surgeon: Martín Woo DO;  Location: [facility] HYBRID OR 12;  Service: Vascular;  Laterality: Right;   • CAROTID STENT     • COLON SURGERY  1999    for Ischemic Colitis   • COLONOSCOPY  05/07/2008    tubular adenoma ascending colon polyp - 3 yr   • COLONOSCOPY  08/19/2003    colon polyp @ 24 cm-see path - 5 yr   • COLONOSCOPY  08/11/2000    small interanl hemorrhoid - 3-5 yr   • COLONOSCOPY  06/22/1998    (8)small colon polyps removed-see path, internal anal skin tag - 2-3 yr   • CORONARY ARTERY BYPASS GRAFT     • CORONARY STENT PLACEMENT      x1 2014 dr crane    • INSERT / REPLACE / REMOVE PACEMAKER     • OTHER SURGICAL HISTORY      Lithotripsy   • OTHER SURGICAL HISTORY  06/2011    recall 3 yr.    • PACEMAKER IMPLANTATION  01/08/2018   • VASECTOMY         Family History: family  history includes Colon cancer in his father; Other in his brother and sister; Stroke in his mother.    Social History:  reports that he quit smoking about 23 years ago. His smoking use included cigarettes. He has never used smokeless tobacco. He reports that he does not drink alcohol and does not use drugs.    Code Status: DNR/DNI, patient's wife or daughter speaks for      Allergies:  Allergies   Allergen Reactions   • Sulfa Antibiotics Rash     Sulfa Drugs       Medications:  Prior to Admission medications    Medication Sig Start Date End Date Taking? Authorizing Provider   aspirin 81 MG EC tablet Take 1 tablet by mouth Daily. 4/21/21   Kaushik Toledo MD   atorvastatin (LIPITOR) 80 MG tablet Take 1 tablet by mouth Daily.    Terell Diehl MD   cloNIDine (CATAPRES) 0.1 MG tablet Take 0.1 mg by mouth Every 8 (Eight) Hours As Needed for High Blood Pressure (BLOOD PRESSURE GREATER THAN 150/90). 7/29/22   Terell Diehl MD   coenzyme Q10 100 MG capsule Take 100 mg by mouth Daily.    Terell Diehl MD   irbesartan (AVAPRO) 300 MG tablet Take 0.5 tablets by mouth Every Night.  Patient taking differently: Take 150 mg by mouth Daily. 5/27/22   Ismael Bernstein MD   nitroglycerin (NITROSTAT) 0.4 MG SL tablet PLACE 1 TABLET UNDER THE TONGUE AS NEEDED FOR CHEST PAIN 8/29/22   Kaushik Toledo MD   Omega-3 Fatty Acids (fish oil) 1000 MG capsule capsule Take 1,000 mg by mouth Daily With Breakfast.    Terell Diehl MD   ranolazine (Ranexa) 500 MG 12 hr tablet Take 1 tablet by mouth 2 (Two) Times a Day. 10/28/22   Kaushik Toledo MD   rivaroxaban (XARELTO) 15 MG tablet Take 15 mg by mouth Daily.    Terell Diehl MD   sodium bicarbonate 650 MG tablet Take 650 mg by mouth 2 (Two) Times a Day. 4/6/22   Terell Diehl MD   Cholecalciferol (D3 VITAMIN PO) Take 1 tablet by mouth Daily.  11/17/22  Terell Diehl MD     I have utilized all available immediate resources to obtain,  "update, and review the patient's current medications.    Objective     BP (!) 184/79   Pulse 74   Temp 97.8 °F (36.6 °C)   Resp 16   Ht 175.3 cm (69\")   Wt 86.2 kg (190 lb)   SpO2 99%   BMI 28.06 kg/m²   Physical Exam  Vitals reviewed.   Constitutional:       Appearance: He is obese.   HENT:      Head: Normocephalic and atraumatic.      Mouth/Throat:      Mouth: Mucous membranes are moist.      Pharynx: Oropharynx is clear.   Eyes:      Extraocular Movements: Extraocular movements intact.      Conjunctiva/sclera: Conjunctivae normal.   Cardiovascular:      Rate and Rhythm: Normal rate and regular rhythm.      Comments: Click from mechanical valve  Pulmonary:      Effort: Pulmonary effort is normal.      Breath sounds: Normal breath sounds.   Abdominal:      General: Bowel sounds are normal. There is no distension.      Palpations: Abdomen is soft.      Comments: Protuberant   Musculoskeletal:      Cervical back: Normal range of motion and neck supple.      Right lower leg: No edema.      Left lower leg: No edema.      Comments: Generalized weakness and debility   Skin:     General: Skin is warm and dry.      Comments: Daughter reports severe paleness prior to first unit of blood, currently skin color is within normal   Neurological:      General: No focal deficit present.      Mental Status: He is alert and oriented to person, place, and time.   Psychiatric:         Mood and Affect: Mood normal.         Behavior: Behavior normal.       Pertinent Data:   Lab Results (last 72 hours)     Procedure Component Value Units Date/Time    Hemoglobin & Hematocrit, Blood [[id number]]  (Abnormal) Collected: 11/17/22 1600    Specimen: Blood Updated: 11/17/22 1610     Hemoglobin 6.6 g/dL      Hematocrit 25.1 %     Reticulocytes [[id number]]  (Abnormal) Collected: 11/17/22 0937    Specimen: Blood Updated: 11/17/22 1524     Reticulocyte % 2.48 %      Reticulocyte Absolute 0.0498 10*6/mm3     Ferritin [[id number]]  (Abnormal) " Collected: 11/17/22 0847    Specimen: Blood Updated: 11/17/22 1244     Ferritin 10.68 ng/mL     Narrative:      Results may be falsely decreased if patient taking Biotin.      Iron Profile [[id number]]  (Abnormal) Collected: 11/17/22 0847    Specimen: Blood Updated: 11/17/22 1240     Iron 8 mcg/dL      Iron Saturation 2 %      Transferrin 350 mg/dL      TIBC 522 mcg/dL     CBC Auto Differential [[id number]]  (Abnormal) Collected: 11/17/22 0937    Specimen: Blood Updated: 11/17/22 0959     WBC 4.03 10*3/mm3      RBC 1.94 10*6/mm3      Hemoglobin 4.5 g/dL      Hematocrit 15.6 %      MCV 80.4 fL      MCH 23.2 pg      MCHC 28.8 g/dL      RDW 16.7 %      RDW-SD 48.6 fl      MPV 10.2 fL      Platelets 165 10*3/mm3      Neutrophil % 67.8 %      Lymphocyte % 19.9 %      Monocyte % 10.9 %      Eosinophil % 1.0 %      Basophil % 0.2 %      Immature Grans % 0.2 %      Neutrophils, Absolute 2.73 10*3/mm3      Lymphocytes, Absolute 0.80 10*3/mm3      Monocytes, Absolute 0.44 10*3/mm3      Eosinophils, Absolute 0.04 10*3/mm3      Basophils, Absolute 0.01 10*3/mm3      Immature Grans, Absolute 0.01 10*3/mm3      nRBC 0.0 /100 WBC     Basic Metabolic Panel [[id number]]  (Abnormal) Collected: 11/17/22 0847    Specimen: Blood Updated: 11/17/22 0926     Glucose 111 mg/dL      BUN 31 mg/dL      Creatinine 3.28 mg/dL      Sodium 140 mmol/L      Potassium 4.8 mmol/L      Chloride 106 mmol/L      CO2 21.0 mmol/L      Calcium 9.2 mg/dL      BUN/Creatinine Ratio 9.5     Anion Gap 13.0 mmol/L      eGFR 17.7 mL/min/1.73      Imaging Results (Last 24 Hours)     Procedure Component Value Units Date/Time    CT Abdomen Pelvis Without Contrast [[id number]] Collected: 11/17/22 1144     Updated: 11/17/22 1156    Narrative:      EXAM/TECHNIQUE: CT abdomen pelvis without contrast     INDICATION: Bowel obstruction suspected     COMPARISON: None available.     DLP: 614 mGy cm. Automated exposure control was also utilized to  decrease patient radiation  dose.     FINDINGS:     Included lung bases are clear. Partially imaged mitral annular  calcification.     Unenhanced liver appears unremarkable. No gallstones common gallbladder  wall thickening, or biliary ductal dilatation.     Pancreas, spleen, and adrenal glands are unremarkable.     Bilateral renal cortical thinning. Multiple bilateral renal cysts are  present. 1.9 cm RIGHT upper pole hemorrhagic/proteinaceous renal cyst.  No urolithiasis or hydronephrosis. Mild trabeculated urinary bladder  wall thickening.     Mild colonic diverticulosis without evidence of diverticulitis.  Postoperative change of RIGHT hemicolectomy. No abnormal bowel  distention or evidence of active bowel inflammation.     No ascites or free pelvic fluid. No pelvic mass or pelvic collection.  Prostate is enlarged measuring 5.1 cm transverse dimension.     Atherosclerotic nonaneurysmal abdominal aorta. No enlarged  retroperitoneal, mesenteric, pelvic, or inguinal lymph nodes.     No acute abdominal wall soft tissue abnormality. Bilateral  fat-containing inguinal hernias. No acute osseous finding.       Impression:         1.  No acute abdominopelvic findings. No bowel obstruction or evidence  of active bowel inflammation. Mild sigmoid diverticulosis without  evidence of diverticulitis. Mild to moderate volume stool in the colon.     2.  Bilateral renal cortical thinning/atrophy. Multiple bilateral renal  cysts.     3.  Enlarged prostate.  This report was finalized on 11/17/2022 11:53 by Dr. Wil Beckett MD.    XR Abdomen KUB [[id number]] Collected: 11/17/22 0952     Updated: 11/17/22 0956    Narrative:      EXAMINATION: KUB 11/17/2022     HISTORY: Constipation.     FINDINGS: KUB radiograph demonstrates mild constipation within the right  colon. There is no obstruction or free air. Postoperative changes are  noted within the right abdomen. There is arthritic change in the mid and  lower lumbar spine. There are multiple phleboliths within  the pelvis.       Impression:      1.. Mild constipation. Otherwise normal bowel gas pattern.  This report was finalized on 11/17/2022 09:52 by Dr. Alfred Coates MD.          11/11/2022 Stress ECHO   Interpretation Summary       •  Impressions are consistent with a low risk study.  •  Left ventricular ejection fraction is normal (Calculated EF = 52%). Mild apical hypokinesis.  •  Myocardial perfusion imaging indicates a normal myocardial perfusion study with no evidence of inducible ischemia - this is a small szied, mild perfusion defect in the apical anterior wall and apex at rest, but normal perfusion after stress.  •  There is no prior study available for comparison.      Assessment / Plan     Assessment:   Active Hospital Problems    Diagnosis    • **Symptomatic anemia    • Iron deficiency    • Acute renal failure superimposed on stage 4 chronic kidney disease (HCC)    • Constipation    • Chronic anticoagulation        Plan:   1.  Admit as inpatient  2.  H&H every 8 hours; patient has received 1 unit PRBC ransfusion-repeat hemoglobin 6.6, second unit ordered  3.  Ferric gluconate 250 mg IV daily x2  4.  Home medications reviewed and restarted as appropriate  5.  DVT prophylaxis with SCDs, hold Xarelto and aspirin for now  6.  Labs in a.m., stool for occult blood  7.  LR at 75 mL/hour  8.  Start bowel regimen-Milk and molasses enema, Dulcolax suppository today and daily as needed, Nancy-Colace 8.6 mg nightly start today  9.  Supplemental oxygen as needed, incentive spirometry, continuous pulse oximeter    I discussed the patient's findings and my recommendations with: Shadi García MD  Time spent 45    Patient seen and examined by me on 11/17/2022 at 3:13 PM.    Electronically signed by GIANNI Spring, 11/17/22, 16:26 CST.    Electronically signed by Shadi García MD at 11/17/22 2042          Emergency Department Notes      Jermain Min MD at 11/17/22 1053          Subjective   History of  Present Illness  Patient is a 87-year-old who came with constipation her last good bowel movement and he had was about a week ago he has been having small amounts.  There is no other complaint associate with this.  There is no nausea and there is no vomiting associate with this.  Patient denies any melena or hematemesis denies any epistaxis.    Constipation  Severity:  Moderate  Timing:  Constant  Chronicity:  New  Context: not dehydration, not dietary changes and not medication    Stool description:  Hard and pellet like  Relieved by:  Nothing  Worsened by:  Nothing  Ineffective treatments:  None tried  Associated symptoms: no abdominal pain, no anorexia, no back pain, no diarrhea, no dysuria, no fever, no flatus, no hematochezia, no nausea, no urinary retention and no vomiting    Risk factors: no change in medication, no hx of abdominal surgery, no recent antibiotic use, no recent illness and no recent surgery        Review of Systems   Constitutional: Negative.  Negative for fever.   HENT: Negative.    Eyes: Negative.    Respiratory: Negative.    Cardiovascular: Negative.    Gastrointestinal: Positive for constipation. Negative for abdominal pain, anorexia, diarrhea, flatus, hematochezia, nausea and vomiting.   Endocrine: Negative.    Genitourinary: Negative.  Negative for dysuria.   Musculoskeletal: Negative for back pain.   Skin: Negative.    Neurological: Negative.    Hematological: Negative.    All other systems reviewed and are negative.      Past Medical History:   Diagnosis Date   • Acute viral hepatitis A    • Aortic valve stenosis    • Carotid artery stenosis     Right   • CKD (chronic kidney disease)    • Coagulopathy (HCC)     Plavix   • Coronary arteriosclerosis     stent ,? 4/2104, on plavix now.   • Diarrhea    • Dysphagia     Unspecified   • Family history of colon cancer    • Hematochezia     differential diagnosis discussed   • Hemorrhoids     Unspecified   • History of cardiac pacemaker  01/08/2018    [facility]   • History of colonic polyps    • Hyperlipidemia    • Hypertension    • Left ventricular hypertrophy    • Melanoma of back (HCC)    • Obesity    • Osteoarthritis    • Pacemaker    • Peripheral neuropathy    • Stroke (HCC)    • TIA (transient ischemic attack)    • Tobacco non-user        Allergies   Allergen Reactions   • Sulfa Antibiotics Rash     Sulfa Drugs       Past Surgical History:   Procedure Laterality Date   • AORTIC VALVE REPAIR/REPLACEMENT Bilateral 4/20/2021    Procedure: Transfemoral Transcatheter Aortic Valve Replacement;  Surgeon: Kaushik Toledo MD;  Location: [facility] HYBRID OR 12;  Service: Cardiovascular;  Laterality: Bilateral;   • AORTIC VALVE REPAIR/REPLACEMENT Bilateral 4/20/2021    Procedure: TRANSFEMORAL TRANSCATHETER AORTIC VALVE REPLACEMENT;  Surgeon: Clayton Wilcox MD;  Location: [facility] HYBRID OR 12;  Service: Cardiothoracic;  Laterality: Bilateral;   • APPENDECTOMY     • CARDIAC CATHETERIZATION     • CARDIAC CATHETERIZATION N/A 3/18/2019    Procedure: Left Heart Cath;  Surgeon: Kaushik Toledo MD;  Location:  [facility] CATH INVASIVE LOCATION;  Service: Cardiology   • CARDIAC CATHETERIZATION N/A 4/14/2021    Procedure: Left Heart Cath;  Surgeon: Kaushik Toledo MD;  Location:  [facility] CATH INVASIVE LOCATION;  Service: Cardiology;  Laterality: N/A;   • CARDIAC ELECTROPHYSIOLOGY PROCEDURE N/A 1/5/2018    Procedure: Temporary Pacemaker;  Surgeon: Luis E Courtney MD;  Location:  [facility] CATH INVASIVE LOCATION;  Service:    • CARDIAC ELECTROPHYSIOLOGY PROCEDURE N/A 1/8/2018    Procedure: Device Implant;  Surgeon: Kaushik Toledo MD;  Location: [facility] CATH INVASIVE LOCATION;  Service:    • CAROTID ENDARTERECTOMY Right 2/14/2020    Procedure: RIGHT CAROTID ENDARTERECTOMY WITH EEG;  Surgeon: Martín Woo DO;  Location: [facility] HYBRID OR 12;  Service: Vascular;  Laterality: Right;   • CAROTID STENT     • COLON SURGERY  1999    for Ischemic Colitis   •  COLONOSCOPY  2008    tubular adenoma ascending colon polyp - 3 yr   • COLONOSCOPY  2003    colon polyp @ 24 cm-see path - 5 yr   • COLONOSCOPY  2000    small interanl hemorrhoid - 3-5 yr   • COLONOSCOPY  1998    (8)small colon polyps removed-see path, internal anal skin tag - 2-3 yr   • CORONARY ARTERY BYPASS GRAFT     • CORONARY STENT PLACEMENT      x1  dr crane    • INSERT / REPLACE / REMOVE PACEMAKER     • OTHER SURGICAL HISTORY      Lithotripsy   • OTHER SURGICAL HISTORY  2011    recall 3 yr.    • PACEMAKER IMPLANTATION  2018   • VASECTOMY         Family History   Problem Relation Age of Onset   • Colon cancer Father         Diagnosed at 74 YO   • Other Sister         Polyps/Colon   • Other Brother         Polyps/Colon   • Stroke Mother        Social History     Socioeconomic History   • Marital status:    Tobacco Use   • Smoking status: Former     Types: Cigarettes     Quit date:      Years since quittin.8   • Smokeless tobacco: Never   Vaping Use   • Vaping Use: Never used   Substance and Sexual Activity   • Alcohol use: No   • Drug use: No   • Sexual activity: Defer           Objective   Physical Exam  Vitals and nursing note reviewed. Exam conducted with a chaperone present.   Constitutional:       General: He is awake. He is not in acute distress.     Appearance: Normal appearance. He is well-developed. He is not toxic-appearing.   HENT:      Head: Normocephalic and atraumatic.      Nose:      Right Nostril: No epistaxis.      Left Nostril: No epistaxis.   Eyes:      General: Lids are normal. No scleral icterus.     Conjunctiva/sclera: Conjunctivae normal.      Pupils: Pupils are equal, round, and reactive to light.   Neck:      Vascular: No hepatojugular reflux or JVD.   Cardiovascular:      Rate and Rhythm: Normal rate and regular rhythm.      Chest Wall: PMI is not displaced.      Pulses: Normal pulses. No decreased pulses.      Heart sounds: Normal  heart sounds. No murmur heard.  Pulmonary:      Effort: Pulmonary effort is normal. No accessory muscle usage or respiratory distress.      Breath sounds: Normal breath sounds. No decreased breath sounds or wheezing.   Abdominal:      General: Abdomen is flat. Bowel sounds are normal. There is no distension or abdominal bruit.      Palpations: Abdomen is soft. There is no shifting dullness, fluid wave, mass or pulsatile mass.      Tenderness: There is no abdominal tenderness. There is no right CVA tenderness, left CVA tenderness, guarding or rebound.      Hernia: No hernia is present.   Genitourinary:     Comments: Hemoccult stool negative brown stool no impaction  Musculoskeletal:         General: Normal range of motion.      Cervical back: Normal range of motion and neck supple. No rigidity.      Right lower leg: No edema.      Left lower leg: No edema.   Skin:     General: Skin is warm and dry.      Capillary Refill: Capillary refill takes less than 2 seconds.      Coloration: Skin is not cyanotic, jaundiced, mottled or pale.   Neurological:      General: No focal deficit present.      Mental Status: He is alert and oriented to person, place, and time. Mental status is at baseline.      GCS: GCS eye subscore is 4. GCS verbal subscore is 5. GCS motor subscore is 6.      Cranial Nerves: Cranial nerves are intact. No cranial nerve deficit.      Sensory: Sensation is intact.      Motor: Motor function is intact.      Deep Tendon Reflexes: Reflexes are normal and symmetric.   Psychiatric:         Behavior: Behavior normal. Behavior is cooperative.         Procedures          ED Course  ED Course as of 11/17/22 1726   Thu Nov 17, 2022   1158 No acute abdominopelvic findings. No bowel obstruction or evidence  of active bowel inflammation. Mild sigmoid diverticulosis without  evidence of diverticulitis. Mild to moderate volume stool in the colon.     2.  Bilateral renal cortical thinning/atrophy. Multiple bilateral  renal  cysts.     3.  Enlarged prostate. [TS]   1223 The CT findings discussed the patient including the renal cyst the patient will be admitted to the hospital [TS]      ED Course User Index  [TS] Jermain Min MD                                           [facility]    Final diagnoses:   Acute anemia   Chronic kidney disease, unspecified CKD stage       ED Disposition  ED Disposition     ED Disposition   Decision to Admit    Condition   --    Comment   Level of Care: Telemetry [5]   Diagnosis: Acute anemia [[id number]]   Admitting Physician: RADHA BARRY [[id number]]   Attending Physician: RADHA BARRY [[id number]]   Certification: I Certify That Inpatient Hospital Services Are Medically Necessary For Greater Than 2 Midnights               No follow-up provider specified.       Medication List      ASK your doctor about these medications    irbesartan 300 MG tablet  Commonly known as: AVAPRO  Ask about: Which instructions should I use?     vitamin D3 125 MCG (5000 UT) capsule capsule  Ask about: Which instructions should I use?             Jermain Min MD  11/17/22 1054       Jermain Min MD  11/17/22 1726      Electronically signed by Jermain Min MD at 11/17/22 1726     Nick Rogers, RN at 11/17/22 1157        Patient was educated on the signs and symptoms of a transfusion reaction which may include:    • Hives  • Itching/Rash/Urticarias  • Flushing  • Chills  • Fever (if greater than 1.8 degrees F or 1 degree C from the pre-transfusion baseline temperature And the increased result is a temperature greater than or equal to 100.4 °F)  • Nausea/Vomiting  • Chest/Abdominal/Back Pain  • Pain at the Infusion Site  • Headache  • Muscle Aches/Myalgia  • Dyspnea/Pulmonary Edema/Rales  • Cyanosis  • Coughing/Wheezing  • Shock  • Rigors  • Hypoxemia  • Hypertension  • Hypotension  • Abnormal Bleeding  • Oliguria/Anuria  • Hemoglobinuria  • Tachycardia    Patient to notify staff if any signs/symptoms occur.  Patient  verbalizes understanding.     Electronically signed by Nick Rogers RN at 11/17/22 1158         Current Facility-Administered Medications   Medication Dose Route Frequency Provider Last Rate Last Admin   • acetaminophen (TYLENOL) tablet 650 mg  650 mg Oral Q4H PRN Shadi García MD        Or   • acetaminophen (TYLENOL) 160 MG/5ML solution 650 mg  650 mg Oral Q4H PRN Shadi García MD        Or   • acetaminophen (TYLENOL) suppository 650 mg  650 mg Rectal Q4H PRN Shadi García MD       • atorvastatin (LIPITOR) tablet 80 mg  80 mg Oral Daily Amisha Preciado APRN   80 mg at 11/18/22 0807   • bisacodyl (DULCOLAX) suppository 10 mg  10 mg Rectal Daily PRN Amisha Preciado APRN       • famotidine (PEPCID) tablet 10 mg  10 mg Oral Daily Shadi García MD   10 mg at 11/18/22 0807   • ferric gluconate (FERRLECIT) 250 mg in sodium chloride 0.9 % 120 mL IVPB  250 mg Intravenous Daily Amisha Preciado APRN 60 mL/hr at 11/18/22 0807 250 mg at 11/18/22 0807   • lactated ringers infusion  75 mL/hr Intravenous Continuous Amisha Preciado APRN 75 mL/hr at 11/17/22 1531 75 mL/hr at 11/17/22 1531   • losartan (COZAAR) tablet 50 mg  50 mg Oral Nightly Amsiha Preciado APRN   50 mg at 11/17/22 2128   • nitroglycerin (NITROSTAT) SL tablet 0.4 mg  0.4 mg Sublingual PRN Amisha Preciado APRN       • ondansetron (ZOFRAN) tablet 4 mg  4 mg Oral Q6H PRN Shadi García MD        Or   • ondansetron (ZOFRAN) injection 4 mg  4 mg Intravenous Q6H PRN Shadi García MD       • ranolazine (RANEXA) 12 hr tablet 500 mg  500 mg Oral BID Amisha Preciado APRN   500 mg at 11/18/22 0807   • sennosides-docusate (PERICOLACE) 8.6-50 MG per tablet 1 tablet  1 tablet Oral Nightly Amisha Preciado, APRN   1 tablet at 11/17/22 2128   • sodium chloride 0.9 % flush 10 mL  10 mL Intravenous Q12H Shadi García MD   10 mL at 11/17/22 2203   • sodium chloride 0.9 % flush 10 mL  10 mL Intravenous PRN Ricky,  Shadi Gamez MD         Physician Progress Notes (last 24 hours)  Notes from 11/17/22 0840 through 11/18/22 0840   No notes of this type exist for this encounter.         Consult Notes (last 24 hours)  Notes from 11/17/22 0840 through 11/18/22 0840   No notes of this type exist for this encounter.

## 2022-11-18 NOTE — PLAN OF CARE
Goal Outcome Evaluation:  Plan of Care Reviewed With: patient           Outcome Evaluation: Pt. admitted from ER with hgb of 4.5, weakness, and complaints of constipation. 2 units of blood given. IVF infusing. Ferric gluconate given. Alert x4. SCD's on pt. Suppository given; Large bowel movement. Up with 1 assist. Safety maintained.

## 2022-11-18 NOTE — PLAN OF CARE
Goal Outcome Evaluation:  Plan of Care Reviewed With: patient, spouse, daughter        Progress: no change  Outcome Evaluation: PT evaluation complete. Pt A&Ox4. Checked with pt's nurse, because pt's hgb has been running low. Nursing approved pt was able to work with therapy and the hgb numbers were better. Pt was mod I with all bed mobility. Pt was able to sit<>stand t/f with no assistive device and CGA from therapist. Pt was able to ambulate 50ft x2 and was CGA x1. Educated pt to get good foot clearance and heel contact thoughout ambulation to prevent future falls. Peformed a tinetti balance assesment with pt. Pt had an overall tinetti of 24. Educated pt to perform ankle pumps, quad sets, and glute squeezes to maintain strength and improve blood flow to prevent DVT's. PT intervention is indicated to improve funtional mobility, decrease fall risk, and increse overall endurance. Suggest pt return home with assistance upon d/c.

## 2022-11-19 ENCOUNTER — READMISSION MANAGEMENT (OUTPATIENT)
Dept: CALL CENTER | Facility: HOSPITAL | Age: 87
End: 2022-11-19

## 2022-11-19 ENCOUNTER — NURSE TRIAGE (OUTPATIENT)
Dept: CALL CENTER | Facility: HOSPITAL | Age: 87
End: 2022-11-19

## 2022-11-19 VITALS
SYSTOLIC BLOOD PRESSURE: 152 MMHG | BODY MASS INDEX: 28.88 KG/M2 | HEART RATE: 81 BPM | WEIGHT: 195 LBS | OXYGEN SATURATION: 97 % | RESPIRATION RATE: 16 BRPM | DIASTOLIC BLOOD PRESSURE: 78 MMHG | HEIGHT: 69 IN | TEMPERATURE: 97.8 F

## 2022-11-19 LAB
ALBUMIN SERPL-MCNC: 4.3 G/DL (ref 3.5–5.2)
ALBUMIN/GLOB SERPL: 1.6 G/DL
ALP SERPL-CCNC: 73 U/L (ref 39–117)
ALT SERPL W P-5'-P-CCNC: 9 U/L (ref 1–41)
ANION GAP SERPL CALCULATED.3IONS-SCNC: 12 MMOL/L (ref 5–15)
AST SERPL-CCNC: 20 U/L (ref 1–40)
BH BB BLOOD EXPIRATION DATE: NORMAL
BH BB BLOOD EXPIRATION DATE: NORMAL
BH BB BLOOD TYPE BARCODE: 600
BH BB BLOOD TYPE BARCODE: 600
BH BB DISPENSE STATUS: NORMAL
BH BB DISPENSE STATUS: NORMAL
BH BB PRODUCT CODE: NORMAL
BH BB PRODUCT CODE: NORMAL
BH BB UNIT NUMBER: NORMAL
BH BB UNIT NUMBER: NORMAL
BILIRUB SERPL-MCNC: 1.1 MG/DL (ref 0–1.2)
BUN SERPL-MCNC: 25 MG/DL (ref 8–23)
BUN/CREAT SERPL: 9.1 (ref 7–25)
CALCIUM SPEC-SCNC: 9.5 MG/DL (ref 8.6–10.5)
CHLORIDE SERPL-SCNC: 106 MMOL/L (ref 98–107)
CO2 SERPL-SCNC: 21 MMOL/L (ref 22–29)
CREAT SERPL-MCNC: 2.75 MG/DL (ref 0.76–1.27)
CROSSMATCH INTERPRETATION: NORMAL
CROSSMATCH INTERPRETATION: NORMAL
DEPRECATED RDW RBC AUTO: 49.8 FL (ref 37–54)
EGFRCR SERPLBLD CKD-EPI 2021: 21.9 ML/MIN/1.73
ERYTHROCYTE [DISTWIDTH] IN BLOOD BY AUTOMATED COUNT: 16.8 % (ref 12.3–15.4)
GLOBULIN UR ELPH-MCNC: 2.7 GM/DL
GLUCOSE SERPL-MCNC: 107 MG/DL (ref 65–99)
HCT VFR BLD AUTO: 31.2 % (ref 37.5–51)
HGB BLD-MCNC: 9.7 G/DL (ref 13–17.7)
MCH RBC QN AUTO: 25.4 PG (ref 26.6–33)
MCHC RBC AUTO-ENTMCNC: 31.1 G/DL (ref 31.5–35.7)
MCV RBC AUTO: 81.7 FL (ref 79–97)
PLATELET # BLD AUTO: 185 10*3/MM3 (ref 140–450)
PMV BLD AUTO: 10.2 FL (ref 6–12)
POTASSIUM SERPL-SCNC: 4.6 MMOL/L (ref 3.5–5.2)
PROT SERPL-MCNC: 7 G/DL (ref 6–8.5)
RBC # BLD AUTO: 3.82 10*6/MM3 (ref 4.14–5.8)
SODIUM SERPL-SCNC: 139 MMOL/L (ref 136–145)
UNIT  ABO: NORMAL
UNIT  ABO: NORMAL
UNIT  RH: NORMAL
UNIT  RH: NORMAL
WBC NRBC COR # BLD: 8.19 10*3/MM3 (ref 3.4–10.8)

## 2022-11-19 PROCEDURE — 80053 COMPREHEN METABOLIC PANEL: CPT | Performed by: INTERNAL MEDICINE

## 2022-11-19 PROCEDURE — 85027 COMPLETE CBC AUTOMATED: CPT | Performed by: INTERNAL MEDICINE

## 2022-11-19 RX ORDER — FERROUS GLUCONATE 324(37.5)
324 TABLET ORAL
Qty: 56 TABLET | Refills: 0 | Status: SHIPPED | OUTPATIENT
Start: 2022-11-19 | End: 2023-01-10

## 2022-11-19 RX ORDER — POLYETHYLENE GLYCOL 3350 17 G/17G
17 POWDER, FOR SOLUTION ORAL DAILY
Start: 2022-11-19

## 2022-11-19 RX ORDER — AMOXICILLIN 250 MG
1 CAPSULE ORAL DAILY PRN
Start: 2022-11-19

## 2022-11-19 RX ORDER — PANTOPRAZOLE SODIUM 40 MG/1
40 TABLET, DELAYED RELEASE ORAL DAILY
Qty: 30 TABLET | Refills: 2 | Status: SHIPPED | OUTPATIENT
Start: 2022-11-19 | End: 2023-02-10

## 2022-11-19 RX ORDER — AMLODIPINE BESYLATE 5 MG/1
5 TABLET ORAL
Qty: 30 TABLET | Refills: 2 | Status: SHIPPED | OUTPATIENT
Start: 2022-11-19 | End: 2023-01-19 | Stop reason: SDUPTHER

## 2022-11-19 RX ADMIN — Medication 10 ML: at 08:26

## 2022-11-19 RX ADMIN — FAMOTIDINE 10 MG: 20 TABLET, FILM COATED ORAL at 08:25

## 2022-11-19 RX ADMIN — POLYETHYLENE GLYCOL 3350 17 G: 17 POWDER, FOR SOLUTION ORAL at 08:25

## 2022-11-19 RX ADMIN — ATORVASTATIN CALCIUM 80 MG: 40 TABLET, FILM COATED ORAL at 08:25

## 2022-11-19 RX ADMIN — AMLODIPINE BESYLATE 5 MG: 5 TABLET ORAL at 08:25

## 2022-11-19 RX ADMIN — DOCUSATE SODIUM 50 MG AND SENNOSIDES 8.6 MG 1 TABLET: 8.6; 5 TABLET, FILM COATED ORAL at 08:25

## 2022-11-19 RX ADMIN — Medication 324 MG: at 08:25

## 2022-11-19 RX ADMIN — RANOLAZINE 500 MG: 500 TABLET, FILM COATED, EXTENDED RELEASE ORAL at 08:25

## 2022-11-19 NOTE — TELEPHONE ENCOUNTER
"Patient was prescribed amlodipine at discharge. Patient's daughter wanted to make sure it was okay to take with his irbesartan and ranolozine. Reviewed medications with patient's daughter.    Reason for Disposition  • General information question, no triage required and triager able to answer question    Additional Information  • Negative: [1] Caller is not with the adult (patient) AND [2] reporting urgent symptoms  • Negative: Lab result questions  • Negative: Medication questions  • Negative: Caller can't be reached by phone  • Negative: Caller has already spoken to PCP or another triager  • Negative: RN needs further essential information from caller in order to complete triage  • Negative: Requesting regular office appointment  • Negative: [1] Caller requesting NON-URGENT health information AND [2] PCP's office is the best resource  • Negative: Health Information question, no triage required and triager able to answer question    Answer Assessment - Initial Assessment Questions  1. REASON FOR CALL or QUESTION: \"What is your reason for calling today?\" or \"How can I best help you?\" or \"What question do you have that I can help answer?\"      Patient was discharged from the hospital today. Newly prescribed amlodipine. Wanted to make sure it was okay to take with his other medications.    Protocols used: INFORMATION ONLY CALL - NO TRIAGE-ADULT-    "

## 2022-11-19 NOTE — DISCHARGE SUMMARY
[facility] Medicine Services  DISCHARGE SUMMARY       Date of Admission: 11/17/2022  Date of Discharge:  11/19/2022  Primary Care Physician: Hossein Hua MD    Presenting Problem/History of Present Illness:   Constipation for a week    Final Discharge Diagnoses:  Active Hospital Problems    Diagnosis    • **Symptomatic anemia    • Iron deficiency    • Acute renal failure superimposed on stage 4 chronic kidney disease (HCC)    • Constipation    • Chronic anticoagulation        Consults: None    Procedures Performed: None    Pertinent Test Results:   Results for orders placed during the hospital encounter of 05/25/22    Adult Transthoracic Echo Complete W/ Cont if Necessary Per Protocol (With Agitated Saline)    Interpretation Summary  · Left ventricular ejection fraction appears to be 61 - 65%. Left ventricular systolic function is normal.  · Left ventricular wall thickness is consistent with mild concentric hypertrophy.  · Left ventricular diastolic dysfunction is noted.  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  · Mild to moderate mitral valve stenosis is present.  · Estimated right ventricular systolic pressure from tricuspid regurgitation is normal (<35 mmHg).  · There is no evidence of right to left shunt on bubble study.  · There is no evidence of intracardiac mass or thrombus.  · Compared to previous echo from 4/26/2022, there are no significant changes.      Imaging Results (All)     Procedure Component Value Units Date/Time    CT Abdomen Pelvis Without Contrast [[id number]] Collected: 11/17/22 1144     Updated: 11/17/22 1156    Narrative:      EXAM/TECHNIQUE: CT abdomen pelvis without contrast     INDICATION: Bowel obstruction suspected     COMPARISON: None available.     DLP: 614 mGy cm. Automated exposure control was also utilized to  decrease patient radiation dose.     FINDINGS:     Included lung  bases are clear. Partially imaged mitral annular  calcification.     Unenhanced liver appears unremarkable. No gallstones common gallbladder  wall thickening, or biliary ductal dilatation.     Pancreas, spleen, and adrenal glands are unremarkable.     Bilateral renal cortical thinning. Multiple bilateral renal cysts are  present. 1.9 cm RIGHT upper pole hemorrhagic/proteinaceous renal cyst.  No urolithiasis or hydronephrosis. Mild trabeculated urinary bladder  wall thickening.     Mild colonic diverticulosis without evidence of diverticulitis.  Postoperative change of RIGHT hemicolectomy. No abnormal bowel  distention or evidence of active bowel inflammation.     No ascites or free pelvic fluid. No pelvic mass or pelvic collection.  Prostate is enlarged measuring 5.1 cm transverse dimension.     Atherosclerotic nonaneurysmal abdominal aorta. No enlarged  retroperitoneal, mesenteric, pelvic, or inguinal lymph nodes.     No acute abdominal wall soft tissue abnormality. Bilateral  fat-containing inguinal hernias. No acute osseous finding.       Impression:         1.  No acute abdominopelvic findings. No bowel obstruction or evidence  of active bowel inflammation. Mild sigmoid diverticulosis without  evidence of diverticulitis. Mild to moderate volume stool in the colon.     2.  Bilateral renal cortical thinning/atrophy. Multiple bilateral renal  cysts.     3.  Enlarged prostate.  This report was finalized on 11/17/2022 11:53 by Dr. Wil Beckett MD.    XR Abdomen KUB [[id number]] Collected: 11/17/22 0952     Updated: 11/17/22 0956    Narrative:      EXAMINATION: KUB 11/17/2022     HISTORY: Constipation.     FINDINGS: KUB radiograph demonstrates mild constipation within the right  colon. There is no obstruction or free air. Postoperative changes are  noted within the right abdomen. There is arthritic change in the mid and  lower lumbar spine. There are multiple phleboliths within the pelvis.       Impression:      1..  Mild constipation. Otherwise normal bowel gas pattern.  This report was finalized on 11/17/2022 09:52 by Dr. Alfred Coates MD.        LAB RESULTS:      Lab 11/19/22  0411 11/18/22  1558 11/18/22  0543 11/18/22  0107 11/17/22  1600 11/17/22  0937   WBC 8.19  --   --  6.78  --  4.03   HEMOGLOBIN 9.7* 9.3* 7.4* 6.9* 6.6* 4.5*   HEMATOCRIT 31.2* 30.2* 24.1* 23.2* 25.1* 15.6*   PLATELETS 185  --   --  181  --  165   NEUTROS ABS  --   --   --   --   --  2.73   IMMATURE GRANS (ABS)  --   --   --   --   --  0.01   LYMPHS ABS  --   --   --   --   --  0.80   MONOS ABS  --   --   --   --   --  0.44   EOS ABS  --   --   --   --   --  0.04   MCV 81.7  --   --  81.4  --  80.4   PROCALCITONIN  --   --  0.10  --   --   --          Lab 11/19/22  0411 11/18/22  0543 11/17/22  0847   SODIUM 139 139 140   POTASSIUM 4.6 4.4 4.8   CHLORIDE 106 108* 106   CO2 21.0* 19.0* 21.0*   ANION GAP 12.0 12.0 13.0   BUN 25* 28* 31*   CREATININE 2.75* 3.12* 3.28*   EGFR 21.9* 18.8* 17.7*   GLUCOSE 107* 88 111*   CALCIUM 9.5 8.7 9.2         Lab 11/19/22  0411 11/18/22  0543   TOTAL PROTEIN 7.0 6.0   ALBUMIN 4.30 3.70   GLOBULIN 2.7 2.3   ALT (SGPT) 9 8   AST (SGOT) 20 15   BILIRUBIN 1.1 1.1   ALK PHOS 73 58                 Lab 11/17/22  0937 11/17/22  0847   IRON  --  8*   IRON SATURATION  --  2*   TIBC  --  522   TRANSFERRIN  --  350   FERRITIN  --  10.68*   ABO TYPING A  --    RH TYPING Negative  --    ANTIBODY SCREEN Negative  --          Brief Urine Lab Results  (Last result in the past 365 days)      Color   Clarity   Blood   Leuk Est   Nitrite   Protein   CREAT   Urine HCG        01/03/22 1048 YELLOW   Clear   TRACE   Negative   Negative                 Microbiology Results (last 10 days)     ** No results found for the last 240 hours. **          Hospital Course:   Patient was admitted on 11/17 by Dr. García and GIANNI Berman.  Patient was admitted for significant weakness.  Patient was found to have a hemoglobin of 4.5.  Patient is  "on anticoagulation and antiplatelets chronically.  Patient denies any occult signs of bleeding.  Patient had fecal occult was reportedly negative in the emergency department.     Patient has been transfused 4 units total.     Hold the patient's aspirin and Xarelto.  I will likely hold the patient's Xarelto long-term, it is for atrial fibrillation, given his age, and he has a history of a GI bleed on Pradaxa, and presented here with a hemoglobin of 4.5.  Feel as though the Xarelto risk outweighs the benefit.  Will likely resume the patient aspirin, but will likely hold it for 1 week.     Patient could benefit possibly from EGD as an outpatient, however given his advanced age, they would like to avoid this if possible.    Patient was extensively constipated upon arrival.  When he finally had a bowel movements with suppository and bowel regimen it was noted to be black.  Suspect patient had some bleeding but likely had stopped on its own as he was not having frequent stools as often seen with an active GI bleed.    My recommendation is to stop ASA and xarelto for 1 week and subsequently resume ASA after 1 week given my suspicion that he recently had a GI bleed.  Reaching out to patients primary cardiologist Dr. Toledo and Zena ARCHER as the patient happens to have an appointment with Zena on 11/22.  Will see if he would be a candidate for watchman procedure as I currently believe the risk of bleeding outweighs the benefit of xarelto at the present time.       PT/OT     SCDs for VTE prophylaxis      Physical Exam on Discharge:  /78 (BP Location: Left arm, Patient Position: Sitting)   Pulse 81   Temp 97.8 °F (36.6 °C) (Oral)   Resp 16   Ht 175.3 cm (69\")   Wt 88.5 kg (195 lb)   SpO2 97%   BMI 28.80 kg/m²   Physical Exam  Vitals and nursing note reviewed.   Constitutional:       Appearance: Normal appearance.   HENT:      Head: Normocephalic and atraumatic.      Mouth/Throat:      Mouth: Mucous membranes are " dry.      Pharynx: Oropharynx is clear.   Eyes:      General: No scleral icterus.     Conjunctiva/sclera: Conjunctivae normal.   Cardiovascular:      Rate and Rhythm: Normal rate and regular rhythm.   Pulmonary:      Effort: Pulmonary effort is normal. No respiratory distress.      Breath sounds: Normal breath sounds. No stridor.   Abdominal:      General: Abdomen is flat. Bowel sounds are normal. There is no distension.      Palpations: Abdomen is soft. There is no mass.      Tenderness: There is no abdominal tenderness.      Hernia: No hernia is present.   Skin:     General: Skin is warm and dry.      Coloration: Skin is pale. Skin is not jaundiced.   Neurological:      General: No focal deficit present.      Mental Status: He is alert and oriented to person, place, and time.   Psychiatric:         Mood and Affect: Mood normal.         Behavior: Behavior normal.     Condition on Discharge: Stable    Discharge Disposition:  Home or Self Care    Discharge Medications:     Discharge Medications      New Medications      Instructions Start Date   amLODIPine 5 MG tablet  Commonly known as: NORVASC   5 mg, Oral, Every 24 Hours Scheduled      ferrous gluconate 324 (37.5 Fe) MG tablet tablet   324 mg, Oral, Daily With Breakfast      pantoprazole 40 MG EC tablet  Commonly known as: PROTONIX   40 mg, Oral, Daily      polyethylene glycol 17 g packet  Commonly known as: MIRALAX   17 g, Oral, Daily      sennosides-docusate 8.6-50 MG per tablet  Commonly known as: PERICOLACE   1 tablet, Oral, Daily PRN         Continue These Medications      Instructions Start Date   atorvastatin 80 MG tablet  Commonly known as: LIPITOR   80 mg, Oral, Daily      cloNIDine 0.1 MG tablet  Commonly known as: CATAPRES   0.1 mg, Oral, Every 8 Hours PRN      coenzyme Q10 100 MG capsule   100 mg, Oral, Daily      fish oil 1000 MG capsule capsule   1,000 mg, Oral, Daily With Breakfast      irbesartan 300 MG tablet  Commonly known as: AVAPRO   300 mg,  Oral, Nightly      nitroglycerin 0.4 MG SL tablet  Commonly known as: NITROSTAT   0.4 mg, Sublingual, As Needed      ranolazine 500 MG 12 hr tablet  Commonly known as: Ranexa   500 mg, Oral, 2 Times Daily      sodium bicarbonate 650 MG tablet   650 mg, Oral, 2 Times Daily      vitamin D3 125 MCG (5000 UT) capsule capsule   5,000 Units, Oral, Daily         Stop These Medications    aspirin 81 MG EC tablet     rivaroxaban 15 MG tablet  Commonly known as: XARELTO            Discharge Diet:   Diet Instructions     Diet: Regular; Thin      Discharge Diet: Regular    Fluid Consistency: Thin          Activity at Discharge:   Activity Instructions     Activity as Tolerated            Follow-up Appointments:   Future Appointments   Date Time Provider Department Center   11/22/2022  2:30 PM Zena Sykes APRN [facility] CD PAD PAD   1/30/2023 10:30 AM Mona Alan MD [facility] N PAD PAD   2/14/2023 11:15 AM [facility] PAD DEVICE CHECK [facility] CD PAD PAD   4/12/2023  9:00 AM PAD US NIVAS CART 2 [facility] PAD US PAD   4/12/2023 10:00 AM Berna Recinos APRN [facility] VS PAD PAD     11/23/2022 - [facility] at [facility] 8:30 AM     Test Results Pending at Discharge: None    Electronically signed by Shadi García MD, 11/19/22, 08:13 CST.    Time: 35 minutes.

## 2022-11-19 NOTE — THERAPY DISCHARGE NOTE
Acute Care - Physical Therapy Discharge Summary  [facility]       Patient Name: Mireille Magallanes  : 1935  MRN: [id number]    Today's Date: 2022                 Admit Date: 2022      PT Recommendation and Plan    Visit Dx:    ICD-10-CM ICD-9-CM   1. Acute anemia  D64.9 285.9   2. Chronic kidney disease, unspecified CKD stage  N18.9 585.9   3. Impaired mobility  Z74.09 799.89                PT Rehab Goals     Row Name 22 1500             Bed Mobility Goal 1 (PT)    Activity/Assistive Device (Bed Mobility Goal 1, PT) bed mobility activities, all  -AB      [place] Level/Cues Needed (Bed Mobility Goal 1, PT) independent  -AB      Time Frame (Bed Mobility Goal 1, PT) long term goal (LTG)  -AB      Strategies/Barriers (Bed Mobility Goal 1, PT) Pain and weakness  -AB      Progress/Outcomes (Bed Mobility Goal 1, PT) goal not met  -AB         Transfer Goal 1 (PT)    Activity/Assistive Device (Transfer Goal 1, PT) sit-to-stand/stand-to-sit;bed-to-chair/chair-to-bed  -AB      [place] Level/Cues Needed (Transfer Goal 1, PT) independent  -AB      Time Frame (Transfer Goal 1, PT) long term goal (LTG)  -AB      Strategies/Barriers (Transfers Goal 1, PT) Pain and weakness  -AB      Progress/Outcome (Transfer Goal 1, PT) goal not met  -AB         Gait Training Goal 1 (PT)    Activity/Assistive Device (Gait Training Goal 1, PT) gait (walking locomotion)  -AB      [place] Level (Gait Training Goal 1, PT) independent  -AB      Distance (Gait Training Goal 1, PT) 50ft x4  -AB      Time Frame (Gait Training Goal 1, PT) long term goal (LTG)  -AB      Strategies/Barriers (Gait Training Goal 1, PT) Pain fatigue  -AB      Progress/Outcome (Gait Training Goal 1, PT) goal not met  -AB            User Key  (r) = Recorded By, (t) = Taken By, (c) = Cosigned By    Initials Name Provider Type Discipline    Brisa Narayan, PTA Physical Therapist Assistant PT                    PT Discharge  Summary  Anticipated Discharge Disposition (PT): home with assist  Reason for Discharge: Discharge from facility  Outcomes Achieved: Refer to plan of care for updates on goals achieved  Discharge Destination: Home with assist      Brisa Rai, PTA   11/19/2022

## 2022-11-20 NOTE — OUTREACH NOTE
Prep Survey    Flowsheet Row Responses   [facility] facility patient discharged from? [facility]   Is LACE score < 7 ? No   Emergency Room discharge w/ pulse ox? No   Eligibility Readm Mgmt   Discharge diagnosis **Symptomatic anemia   Does the patient have one of the following disease processes/diagnoses(primary or secondary)? Other   Does the patient have Home health ordered? No   Is there a DME ordered? No   Prep survey completed? Yes          ERNST SILVA - Registered Nurse

## 2022-11-21 ENCOUNTER — TELEPHONE (OUTPATIENT)
Dept: INTERNAL MEDICINE | Age: 87
End: 2022-11-21

## 2022-11-21 NOTE — PAYOR COMM NOTE
"[id number]  ADMIT 11-17-22  DC HOME 11-19-22  UR  [phone]    Kfofi Dickey (87 y.o. Male)     Date of Birth   1935    Social Security Number       Address   [address]  [facility] [address]    Home Phone   [phone]    MRN   [id number]       Roman Catholic   Mormonism    Marital Status                               Admission Date   11/17/22    Admission Type   Emergency    Admitting Provider   Shadi García MD    Attending Provider       Department, Room/Bed   [facility] 3C, 370/1       Discharge Date   11/19/2022    Discharge Disposition   Home or Self Care    Discharge Destination                               Attending Provider: (none)   Allergies: Sulfa Antibiotics    Isolation: None   Infection: None   Code Status: Prior    Ht: 175.3 cm (69\")   Wt: 88.5 kg (195 lb)    Admission Cmt: None   Principal Problem: Symptomatic anemia [D64.9]                 Active Insurance as of 11/17/2022     Primary Coverage     Payor Plan Insurance Group Employer/Plan Group    MEDICARE MEDICARE A & B      Payor Plan Address Payor Plan Phone Number Payor Plan Fax Number Effective Dates    [address] [phone]  10/1/2002 - None Entered    [facility] [id number]       Subscriber Name Subscriber Birth Date Member ID       KOFFI DICKEY 1935 [id number]           Secondary Coverage     Payor Plan Insurance Group Employer/Plan Group    [employer] [place] [place] [id number]     Payor Plan Address Payor Plan Phone Number Payor Plan Fax Number Effective Dates    [address] [address] [phone]  1/1/2013 - None Entered    [facility] [id number]       Subscriber Name Subscriber Birth Date Member ID       KOFFI DICKEY 1935 [id number]                 Emergency Contacts      (Rel.) Home Phone Work Phone Mobile Phone    Sonya Dickey (Spouse) [phone] -- --    SUSANWILLI (Daughter) [phone] -- [phone]               History & Physical    "   Amisha Preciado, APRN at 11/17/22 1513     Attestation signed by Shadi García MD at 11/17/22 2042    This visit was performed by both a physician and an APC. I personally evaluated and examined the patient. I performed all aspects of the MDM as documented.    Patient seen and examined at bedside.  Patient has no evidence of GI bleeding and is actually constipated.  Suspect patient had blood loss from surgery as family indicated had large hematoma and required manual pressure by nurses for a while after surgery and had extensive ecchymosis down the arm.  Discussed with them that we do not currently have GI services so if he was bleeding we would be in a tough situation and discussed that given his age and co-morbidities we would really try to avoid any aggressive intervention such as endoscopy which that were very much in favor of.  Will hold anticoagulation.  Transfuse.  Serial hemoglobins.     Electronically signed by Shadi García MD, 11/17/2022, 20:41 CST.                      [facility] Medicine Services  HISTORY AND PHYSICAL    Date of Admission: 11/17/2022  Primary Care Physician: Hossein Hua MD    Subjective     Chief Complaint: Constipation for 4-week    History of Present Illness  Edgar Magallanes is a 89-year-old male with past medical history of aortic valve stenosis status post replacement 4/2021, carotid artery disease status post surgical intervention TCAR on 9/16/2022, chronic kidney disease stage IV, stroke, pacemaker, hypertension, please see below for complete list.  Daughter at bedside provides majority of history.  Labs not been obtained since surgery in September at that time hemoglobin was 9.7.  He reports postoperative bleeding with significant hematoma after surgery.  Patient underwent stress echo on 11/11/2022 per Dr. Jansen for complaints of worsening shortness of breathing, worse with exertion.  Study was essentially normal.  Again  laboratory studies were not obtained.  Today he presented to [facility] emergency department with complaints of 4-week history of constipation.  He has had minimal bowel movements and is feeling quite full.  He denies pain, nausea vomiting, no active bleeding.  He is reporting abdominal pressure.  Patient had a stroke 5/25/2022 and at that time Xarelto was changed to Pradaxa.  He developed a GI bleed after discharge.  Family did not seek evaluation however they did stop Pradaxa and resumed his Xarelto.  He has never had bleeding with Xarelto in the past.  ER work-up revealed hemoglobin 4.5, creatinine 3.28 with baseline 2.7, iron 8, ferritin 10.6, saturation 2.  Patient is admitted for further evaluation treatment.    Review of Systems   A 10 point review of systems was completed, all negative except for those discussed in    Past Medical History:   Past Medical History:   Diagnosis Date   • Acute viral hepatitis A    • Aortic valve stenosis    • Carotid artery stenosis     Right   • CKD (chronic kidney disease)    • Coagulopathy (HCC)     Plavix   • Coronary arteriosclerosis     stent ,? 4/2104, on plavix now.   • Diarrhea    • Dysphagia     Unspecified   • Family history of colon cancer    • Hematochezia     differential diagnosis discussed   • Hemorrhoids     Unspecified   • History of cardiac pacemaker 01/08/2018    [employer]   • History of colonic polyps    • Hyperlipidemia    • Hypertension    • Left ventricular hypertrophy    • Melanoma of back (HCC)    • Obesity    • Osteoarthritis    • Pacemaker    • Peripheral neuropathy    • Stroke (HCC)    • TIA (transient ischemic attack)    • Tobacco non-user        Past Surgical History:   Past Surgical History:   Procedure Laterality Date   • AORTIC VALVE REPAIR/REPLACEMENT Bilateral 4/20/2021    Procedure: Transfemoral Transcatheter Aortic Valve Replacement;  Surgeon: Kaushik Toledo MD;  Location: [place] [address];  Service: Cardiovascular;   Laterality: Bilateral;   • AORTIC VALVE REPAIR/REPLACEMENT Bilateral 4/20/2021    Procedure: TRANSFEMORAL TRANSCATHETER AORTIC VALVE REPLACEMENT;  Surgeon: Clayton Wilcox MD;  Location: [facility] HYBRID OR 12;  Service: Cardiothoracic;  Laterality: Bilateral;   • APPENDECTOMY     • CARDIAC CATHETERIZATION     • CARDIAC CATHETERIZATION N/A 3/18/2019    Procedure: Left Heart Cath;  Surgeon: Kaushik Toledo MD;  Location:  [facility] CATH INVASIVE LOCATION;  Service: Cardiology   • CARDIAC CATHETERIZATION N/A 4/14/2021    Procedure: Left Heart Cath;  Surgeon: Kaushik Toledo MD;  Location:  [facility] CATH INVASIVE [facility];  Service: Cardiology;  Laterality: N/A;   • CARDIAC ELECTROPHYSIOLOGY PROCEDURE N/A 1/5/2018    Procedure: Temporary Pacemaker;  Surgeon: Luis E Courtney MD;  Location:  [facility] CATH INVASIVE LOC[facility];  Service:    • CARDIAC ELECTROPHYSIOLOGY PROCEDURE N/A 1/8/2018    Procedure: Device Implant;  Surgeon: Kaushik Toledo MD;  Location:  [facility] CATH INVASIVE [facility];  Service:    • CAROTID ENDARTERECTOMY Right 2/14/2020    Procedure: RIGHT CAROTID ENDARTERECTOMY WITH EEG;  Surgeon: Martín Woo DO;  Location: [facility] HYBRID OR 12;  Service: Vascular;  Laterality: Right;   • CAROTID STENT     • COLON SURGERY  1999    for Ischemic Colitis   • COLONOSCOPY  05/07/2008    tubular adenoma ascending colon polyp - 3 yr   • COLONOSCOPY  08/19/2003    colon polyp @ 24 cm-see path - 5 yr   • COLONOSCOPY  08/11/2000    small interanl hemorrhoid - 3-5 yr   • COLONOSCOPY  06/22/1998    (8)small colon polyps removed-see path, internal anal skin tag - 2-3 yr   • CORONARY ARTERY BYPASS GRAFT     • CORONARY STENT PLACEMENT      x1 2014 dr crane    • INSERT / REPLACE / REMOVE PACEMAKER     • OTHER SURGICAL HISTORY      Lithotripsy   • OTHER SURGICAL HISTORY  06/2011    recall 3 yr.    • PACEMAKER IMPLANTATION  01/08/2018   • VASECTOMY         Family History: family history includes Colon cancer in his father; Other  in his brother and sister; Stroke in his mother.    Social History:  reports that he quit smoking about 23 years ago. His smoking use included cigarettes. He has never used smokeless tobacco. He reports that he does not drink alcohol and does not use drugs.    Code Status: DNR/DNI, patient's wife or daughter speaks for      Allergies:  Allergies   Allergen Reactions   • Sulfa Antibiotics Rash     Sulfa Drugs       Medications:  Prior to Admission medications    Medication Sig Start Date End Date Taking? Authorizing Provider   aspirin 81 MG EC tablet Take 1 tablet by mouth Daily. 4/21/21   Kaushik Toledo MD   atorvastatin (LIPITOR) 80 MG tablet Take 1 tablet by mouth Daily.    Terell Diehl MD   cloNIDine (CATAPRES) 0.1 MG tablet Take 0.1 mg by mouth Every 8 (Eight) Hours As Needed for High Blood Pressure (BLOOD PRESSURE GREATER THAN 150/90). 7/29/22   Terell Diehl MD   coenzyme Q10 100 MG capsule Take 100 mg by mouth Daily.    Terell Diehl MD   irbesartan (AVAPRO) 300 MG tablet Take 0.5 tablets by mouth Every Night.  Patient taking differently: Take 150 mg by mouth Daily. 5/27/22   Ismael Bernstein MD   nitroglycerin (NITROSTAT) 0.4 MG SL tablet PLACE 1 TABLET UNDER THE TONGUE AS NEEDED FOR CHEST PAIN 8/29/22   Kaushik Toledo MD   Omega-3 Fatty Acids (fish oil) 1000 MG capsule capsule Take 1,000 mg by mouth Daily With Breakfast.    Terell Diehl MD   ranolazine (Ranexa) 500 MG 12 hr tablet Take 1 tablet by mouth 2 (Two) Times a Day. 10/28/22   Kaushik Toledo MD   rivaroxaban (XARELTO) 15 MG tablet Take 15 mg by mouth Daily.    Terell Diehl MD   sodium bicarbonate 650 MG tablet Take 650 mg by mouth 2 (Two) Times a Day. 4/6/22   Terell Diehl MD   Cholecalciferol (D3 VITAMIN PO) Take 1 tablet by mouth Daily.  11/17/22  Terell Diehl MD     I have utilized all available immediate resources to obtain, update, and review the patient's current  "medications.    Objective     BP (!) 184/79   Pulse 74   Temp 97.8 °F (36.6 °C)   Resp 16   Ht 175.3 cm (69\")   Wt 86.2 kg (190 lb)   SpO2 99%   BMI 28.06 kg/m²   Physical Exam  Vitals reviewed.   Constitutional:       Appearance: He is obese.   HENT:      Head: Normocephalic and atraumatic.      Mouth/Throat:      Mouth: Mucous membranes are moist.      Pharynx: Oropharynx is clear.   Eyes:      Extraocular Movements: Extraocular movements intact.      Conjunctiva/sclera: Conjunctivae normal.   Cardiovascular:      Rate and Rhythm: Normal rate and regular rhythm.      Comments: Click from mechanical valve  Pulmonary:      Effort: Pulmonary effort is normal.      Breath sounds: Normal breath sounds.   Abdominal:      General: Bowel sounds are normal. There is no distension.      Palpations: Abdomen is soft.      Comments: Protuberant   Musculoskeletal:      Cervical back: Normal range of motion and neck supple.      Right lower leg: No edema.      Left lower leg: No edema.      Comments: Generalized weakness and debility   Skin:     General: Skin is warm and dry.      Comments: Daughter reports severe paleness prior to first unit of blood, currently skin color is within normal   Neurological:      General: No focal deficit present.      Mental Status: He is alert and oriented to person, place, and time.   Psychiatric:         Mood and Affect: Mood normal.         Behavior: Behavior normal.       Pertinent Data:   Lab Results (last 72 hours)     Procedure Component Value Units Date/Time    Hemoglobin & Hematocrit, Blood [[id number]]  (Abnormal) Collected: 11/17/22 1600    Specimen: Blood Updated: 11/17/22 1610     Hemoglobin 6.6 g/dL      Hematocrit 25.1 %     Reticulocytes [[id number]]  (Abnormal) Collected: 11/17/22 0937    Specimen: Blood Updated: 11/17/22 1524     Reticulocyte % 2.48 %      Reticulocyte Absolute 0.0498 10*6/mm3     Ferritin [[id number]]  (Abnormal) Collected: 11/17/22 0847    Specimen: Blood " Updated: 11/17/22 1244     Ferritin 10.68 ng/mL     Narrative:      Results may be falsely decreased if patient taking Biotin.      Iron Profile [[id number]]  (Abnormal) Collected: 11/17/22 0847    Specimen: Blood Updated: 11/17/22 1240     Iron 8 mcg/dL      Iron Saturation 2 %      Transferrin 350 mg/dL      TIBC 522 mcg/dL     CBC Auto Differential [[id number]]  (Abnormal) Collected: 11/17/22 0937    Specimen: Blood Updated: 11/17/22 0959     WBC 4.03 10*3/mm3      RBC 1.94 10*6/mm3      Hemoglobin 4.5 g/dL      Hematocrit 15.6 %      MCV 80.4 fL      MCH 23.2 pg      MCHC 28.8 g/dL      RDW 16.7 %      RDW-SD 48.6 fl      MPV 10.2 fL      Platelets 165 10*3/mm3      Neutrophil % 67.8 %      Lymphocyte % 19.9 %      Monocyte % 10.9 %      Eosinophil % 1.0 %      Basophil % 0.2 %      Immature Grans % 0.2 %      Neutrophils, Absolute 2.73 10*3/mm3      Lymphocytes, Absolute 0.80 10*3/mm3      Monocytes, Absolute 0.44 10*3/mm3      Eosinophils, Absolute 0.04 10*3/mm3      Basophils, Absolute 0.01 10*3/mm3      Immature Grans, Absolute 0.01 10*3/mm3      nRBC 0.0 /100 WBC     Basic Metabolic Panel [[id number]]  (Abnormal) Collected: 11/17/22 0847    Specimen: Blood Updated: 11/17/22 0926     Glucose 111 mg/dL      BUN 31 mg/dL      Creatinine 3.28 mg/dL      Sodium 140 mmol/L      Potassium 4.8 mmol/L      Chloride 106 mmol/L      CO2 21.0 mmol/L      Calcium 9.2 mg/dL      BUN/Creatinine Ratio 9.5     Anion Gap 13.0 mmol/L      eGFR 17.7 mL/min/1.73      Imaging Results (Last 24 Hours)     Procedure Component Value Units Date/Time    CT Abdomen Pelvis Without Contrast [[id number]] Collected: 11/17/22 1144     Updated: 11/17/22 1156    Narrative:      EXAM/TECHNIQUE: CT abdomen pelvis without contrast     INDICATION: Bowel obstruction suspected     COMPARISON: None available.     DLP: 614 mGy cm. Automated exposure control was also utilized to  decrease patient radiation dose.     FINDINGS:     Included lung bases are  clear. Partially imaged mitral annular  calcification.     Unenhanced liver appears unremarkable. No gallstones common gallbladder  wall thickening, or biliary ductal dilatation.     Pancreas, spleen, and adrenal glands are unremarkable.     Bilateral renal cortical thinning. Multiple bilateral renal cysts are  present. 1.9 cm RIGHT upper pole hemorrhagic/proteinaceous renal cyst.  No urolithiasis or hydronephrosis. Mild trabeculated urinary bladder  wall thickening.     Mild colonic diverticulosis without evidence of diverticulitis.  Postoperative change of RIGHT hemicolectomy. No abnormal bowel  distention or evidence of active bowel inflammation.     No ascites or free pelvic fluid. No pelvic mass or pelvic collection.  Prostate is enlarged measuring 5.1 cm transverse dimension.     Atherosclerotic nonaneurysmal abdominal aorta. No enlarged  retroperitoneal, mesenteric, pelvic, or inguinal lymph nodes.     No acute abdominal wall soft tissue abnormality. Bilateral  fat-containing inguinal hernias. No acute osseous finding.       Impression:         1.  No acute abdominopelvic findings. No bowel obstruction or evidence  of active bowel inflammation. Mild sigmoid diverticulosis without  evidence of diverticulitis. Mild to moderate volume stool in the colon.     2.  Bilateral renal cortical thinning/atrophy. Multiple bilateral renal  cysts.     3.  Enlarged prostate.  This report was finalized on 11/17/2022 11:53 by Dr. Wil Beckett MD.    XR Abdomen KUB [[id number]] Collected: 11/17/22 0952     Updated: 11/17/22 0956    Narrative:      EXAMINATION: KUB 11/17/2022     HISTORY: Constipation.     FINDINGS: KUB radiograph demonstrates mild constipation within the right  colon. There is no obstruction or free air. Postoperative changes are  noted within the right abdomen. There is arthritic change in the mid and  lower lumbar spine. There are multiple phleboliths within the pelvis.       Impression:      1.. Mild  constipation. Otherwise normal bowel gas pattern.  This report was finalized on 11/17/2022 09:52 by Dr. Alfred Coates MD.          11/11/2022 Stress ECHO   Interpretation Summary       •  Impressions are consistent with a low risk study.  •  Left ventricular ejection fraction is normal (Calculated EF = 52%). Mild apical hypokinesis.  •  Myocardial perfusion imaging indicates a normal myocardial perfusion study with no evidence of inducible ischemia - this is a small szied, mild perfusion defect in the apical anterior wall and apex at rest, but normal perfusion after stress.  •  There is no prior study available for comparison.      Assessment / Plan     Assessment:   Active Hospital Problems    Diagnosis    • **Symptomatic anemia    • Iron deficiency    • Acute renal failure superimposed on stage 4 chronic kidney disease (HCC)    • Constipation    • Chronic anticoagulation        Plan:   1.  Admit as inpatient  2.  H&H every 8 hours; patient has received 1 unit PRBC ransfusion-repeat hemoglobin 6.6, second unit ordered  3.  Ferric gluconate 250 mg IV daily x2  4.  Home medications reviewed and restarted as appropriate  5.  DVT prophylaxis with SCDs, hold Xarelto and aspirin for now  6.  Labs in a.m., stool for occult blood  7.  LR at 75 mL/hour  8.  Start bowel regimen-Milk and molasses enema, Dulcolax suppository today and daily as needed, Nancy-Colace 8.6 mg nightly start today  9.  Supplemental oxygen as needed, incentive spirometry, continuous pulse oximeter    I discussed the patient's findings and my recommendations with: Shadi García MD  Time spent 45    Patient seen and examined by me on 11/17/2022 at 3:13 PM.    Electronically signed by GIANNI Spring, 11/17/22, 16:26 CST.    Electronically signed by Shadi García MD at 11/17/22 2042          Emergency Department Notes      Jermain Min MD at 11/17/22 1053          Subjective   History of Present Illness  Patient is a 87-year-old  who came with constipation her last good bowel movement and he had was about a week ago he has been having small amounts.  There is no other complaint associate with this.  There is no nausea and there is no vomiting associate with this.  Patient denies any melena or hematemesis denies any epistaxis.    Constipation  Severity:  Moderate  Timing:  Constant  Chronicity:  New  Context: not dehydration, not dietary changes and not medication    Stool description:  Hard and pellet like  Relieved by:  Nothing  Worsened by:  Nothing  Ineffective treatments:  None tried  Associated symptoms: no abdominal pain, no anorexia, no back pain, no diarrhea, no dysuria, no fever, no flatus, no hematochezia, no nausea, no urinary retention and no vomiting    Risk factors: no change in medication, no hx of abdominal surgery, no recent antibiotic use, no recent illness and no recent surgery        Review of Systems   Constitutional: Negative.  Negative for fever.   HENT: Negative.    Eyes: Negative.    Respiratory: Negative.    Cardiovascular: Negative.    Gastrointestinal: Positive for constipation. Negative for abdominal pain, anorexia, diarrhea, flatus, hematochezia, nausea and vomiting.   Endocrine: Negative.    Genitourinary: Negative.  Negative for dysuria.   Musculoskeletal: Negative for back pain.   Skin: Negative.    Neurological: Negative.    Hematological: Negative.    All other systems reviewed and are negative.      Past Medical History:   Diagnosis Date   • Acute viral hepatitis A    • Aortic valve stenosis    • Carotid artery stenosis     Right   • CKD (chronic kidney disease)    • Coagulopathy (HCC)     Plavix   • Coronary arteriosclerosis     stent ,? 4/2104, on plavix now.   • Diarrhea    • Dysphagia     Unspecified   • Family history of colon cancer    • Hematochezia     differential diagnosis discussed   • Hemorrhoids     Unspecified   • History of cardiac pacemaker 01/08/2018    [employer]   • History of  colonic polyps    • Hyperlipidemia    • Hypertension    • Left ventricular hypertrophy    • Melanoma of back (HCC)    • Obesity    • Osteoarthritis    • Pacemaker    • Peripheral neuropathy    • Stroke (HCC)    • TIA (transient ischemic attack)    • Tobacco non-user        Allergies   Allergen Reactions   • Sulfa Antibiotics Rash     Sulfa Drugs       Past Surgical History:   Procedure Laterality Date   • AORTIC VALVE REPAIR/REPLACEMENT Bilateral 4/20/2021    Procedure: Transfemoral Transcatheter Aortic Valve Replacement;  Surgeon: Kaushik Toledo MD;  Location:  [facility] HYBRID OR 12;  Service: Cardiovascular;  Laterality: Bilateral;   • AORTIC VALVE REPAIR/REPLACEMENT Bilateral 4/20/2021    Procedure: TRANSFEMORAL TRANSCATHETER AORTIC VALVE REPLACEMENT;  Surgeon: Clayton Wilcox MD;  Location:  [facility] HYBRID OR 12;  Service: Cardiothoracic;  Laterality: Bilateral;   • APPENDECTOMY     • CARDIAC CATHETERIZATION     • CARDIAC CATHETERIZATION N/A 3/18/2019    Procedure: Left Heart Cath;  Surgeon: Kaushik Toledo MD;  Location:  [facility] CATH INVASIVE LOCATION;  Service: Cardiology   • CARDIAC CATHETERIZATION N/A 4/14/2021    Procedure: Left Heart Cath;  Surgeon: Kaushik Toledo MD;  Location:  [facility] CATH INVASIVE LOC[facility];  Service: Cardiology;  Laterality: N/A;   • CARDIAC ELECTROPHYSIOLOGY PROCEDURE N/A 1/5/2018    Procedure: Temporary Pacemaker;  Surgeon: Luis E Courtney MD;  Location:  [facility] CATH INVASIVE LOCATION;  Service:    • CARDIAC ELECTROPHYSIOLOGY PROCEDURE N/A 1/8/2018    Procedure: Device Implant;  Surgeon: Kaushik Toledo MD;  Location:  [facility] CATH INVASIVE LOCATION;  Service:    • CAROTID ENDARTERECTOMY Right 2/14/2020    Procedure: RIGHT CAROTID ENDARTERECTOMY WITH EEG;  Surgeon: Martín Woo DO;  Location:  [facility] HYBRID OR 12;  Service: Vascular;  Laterality: Right;   • CAROTID STENT     • COLON SURGERY  1999    for Ischemic Colitis   • COLONOSCOPY  05/07/2008    tubular adenoma  ascending colon polyp - 3 yr   • COLONOSCOPY  2003    colon polyp @ 24 cm-see path - 5 yr   • COLONOSCOPY  2000    small interanl hemorrhoid - 3-5 yr   • COLONOSCOPY  1998    (8)small colon polyps removed-see path, internal anal skin tag - 2-3 yr   • CORONARY ARTERY BYPASS GRAFT     • CORONARY STENT PLACEMENT      x1  dr crane    • INSERT / REPLACE / REMOVE PACEMAKER     • OTHER SURGICAL HISTORY      Lithotripsy   • OTHER SURGICAL HISTORY  2011    recall 3 yr.    • PACEMAKER IMPLANTATION  2018   • VASECTOMY         Family History   Problem Relation Age of Onset   • Colon cancer Father         Diagnosed at 74 YO   • Other Sister         Polyps/Colon   • Other Brother         Polyps/Colon   • Stroke Mother        Social History     Socioeconomic History   • Marital status:    Tobacco Use   • Smoking status: Former     Types: Cigarettes     Quit date:      Years since quittin.8   • Smokeless tobacco: Never   Vaping Use   • Vaping Use: Never used   Substance and Sexual Activity   • Alcohol use: No   • Drug use: No   • Sexual activity: Defer           Objective   Physical Exam  Vitals and nursing note reviewed. Exam conducted with a chaperone present.   Constitutional:       General: He is awake. He is not in acute distress.     Appearance: Normal appearance. He is well-developed. He is not toxic-appearing.   HENT:      Head: Normocephalic and atraumatic.      Nose:      Right Nostril: No epistaxis.      Left Nostril: No epistaxis.   Eyes:      General: Lids are normal. No scleral icterus.     Conjunctiva/sclera: Conjunctivae normal.      Pupils: Pupils are equal, round, and reactive to light.   Neck:      Vascular: No hepatojugular reflux or JVD.   Cardiovascular:      Rate and Rhythm: Normal rate and regular rhythm.      Chest Wall: PMI is not displaced.      Pulses: Normal pulses. No decreased pulses.      Heart sounds: Normal heart sounds. No murmur heard.  Pulmonary:       Effort: Pulmonary effort is normal. No accessory muscle usage or respiratory distress.      Breath sounds: Normal breath sounds. No decreased breath sounds or wheezing.   Abdominal:      General: Abdomen is flat. Bowel sounds are normal. There is no distension or abdominal bruit.      Palpations: Abdomen is soft. There is no shifting dullness, fluid wave, mass or pulsatile mass.      Tenderness: There is no abdominal tenderness. There is no right CVA tenderness, left CVA tenderness, guarding or rebound.      Hernia: No hernia is present.   Genitourinary:     Comments: Hemoccult stool negative brown stool no impaction  Musculoskeletal:         General: Normal range of motion.      Cervical back: Normal range of motion and neck supple. No rigidity.      Right lower leg: No edema.      Left lower leg: No edema.   Skin:     General: Skin is warm and dry.      Capillary Refill: Capillary refill takes less than 2 seconds.      Coloration: Skin is not cyanotic, jaundiced, mottled or pale.   Neurological:      General: No focal deficit present.      Mental Status: He is alert and oriented to person, place, and time. Mental status is at baseline.      GCS: GCS eye subscore is 4. GCS verbal subscore is 5. GCS motor subscore is 6.      Cranial Nerves: Cranial nerves are intact. No cranial nerve deficit.      Sensory: Sensation is intact.      Motor: Motor function is intact.      Deep Tendon Reflexes: Reflexes are normal and symmetric.   Psychiatric:         Behavior: Behavior normal. Behavior is cooperative.         Procedures          ED Course  ED Course as of 11/17/22 1726   Thu Nov 17, 2022   1158 No acute abdominopelvic findings. No bowel obstruction or evidence  of active bowel inflammation. Mild sigmoid diverticulosis without  evidence of diverticulitis. Mild to moderate volume stool in the colon.     2.  Bilateral renal cortical thinning/atrophy. Multiple bilateral renal  cysts.     3.  Enlarged prostate. [TS]    1223 The CT findings discussed the patient including the renal cyst the patient will be admitted to the hospital [TS]      ED Course User Index  [TS] Jermain Min MD                                           MDM    Final diagnoses:   Acute anemia   Chronic kidney disease, unspecified CKD stage       ED Disposition  ED Disposition     ED Disposition   Decision to Admit    Condition   --    Comment   Level of Care: Telemetry [5]   Diagnosis: Acute anemia [[id number]]   Admitting Physician: RADHA BARRY [[id number]]   Attending Physician: RADHA BARRY [[id number]]   Certification: I Certify That Inpatient Hospital Services Are Medically Necessary For Greater Than 2 Midnights               No follow-up provider specified.       Medication List      ASK your doctor about these medications    irbesartan 300 MG tablet  Commonly known as: AVAPRO  Ask about: Which instructions should I use?     vitamin D3 125 MCG (5000 UT) capsule capsule  Ask about: Which instructions should I use?             Jermain Min MD  11/17/22 1054       Jermain Min MD  11/17/22 1726      Electronically signed by Jermain Min MD at 11/17/22 1726     Nick Rogers, RN at 11/17/22 1157        Patient was educated on the signs and symptoms of a transfusion reaction which may include:    • Hives  • Itching/Rash/Urticarias  • Flushing  • Chills  • Fever (if greater than 1.8 degrees F or 1 degree C from the pre-transfusion baseline temperature And the increased result is a temperature greater than or equal to 100.4 °F)  • Nausea/Vomiting  • Chest/Abdominal/Back Pain  • Pain at the Infusion Site  • Headache  • Muscle Aches/Myalgia  • Dyspnea/Pulmonary Edema/Rales  • Cyanosis  • Coughing/Wheezing  • Shock  • Rigors  • Hypoxemia  • Hypertension  • Hypotension  • Abnormal Bleeding  • Oliguria/Anuria  • Hemoglobinuria  • Tachycardia    Patient to notify staff if any signs/symptoms occur.  Patient verbalizes understanding.     Electronically  signed by Nick Rogers, RN at 11/17/22 1158       Orders (all)      Start     Ordered    11/19/22 0800  ferrous gluconate tablet 324 mg  Daily With Breakfast,   Status:  Discontinued         11/18/22 1719 11/19/22 0756  Discontinue IV  Once,   Status:  Canceled         11/19/22 0813 11/19/22 0753  Discharge patient  Once         11/19/22 0813    11/19/22 0600  CBC (No Diff)  Morning Draw         11/18/22 1719 11/19/22 0600  Comprehensive Metabolic Panel  Morning Draw         11/18/22 1719 11/19/22 0000  amLODIPine (NORVASC) 5 MG tablet  Every 24 Hours Scheduled         11/19/22 0813 11/19/22 0000  pantoprazole (PROTONIX) 40 MG EC tablet  Daily         11/19/22 0813 11/19/22 0000  ferrous gluconate 324 (37.5 Fe) MG tablet tablet  Daily With Breakfast         11/19/22 0813 11/19/22 0000  sennosides-docusate (PERICOLACE) 8.6-50 MG per tablet  Daily PRN         11/19/22 0813 11/19/22 0000  Discharge Follow-up with PCP         11/19/22 0813 11/19/22 0000  Discharge Instructions        Comments: 1.  Hold aspirin and xarelto for 1 week.  Would consider holding the xarelto indefiitely given low blood count and GI bleed previously on anticoagulation (blood thinner).  Can resume aspirin in 1 week.  2.  If start having large amounts of black stools and weakness, return to ER.  Since you were so constipated, suspect that the dark stools are likely old blood.  If continue to have issue will have to consider GI referral for endoscopy.  3.  Added amlodipine (brand name: Norvasc) for blood pressure.  Added pantoprazole (brand name: Protonix) for acid reduction in your stomach.   4.  Iron tablets for 8 weeks and then reassess.    5.  Stool softeners (Miralax) and as needed (Docusate-senna) - Both of these are over the counter.    11/19/22 0813    11/19/22 0000  polyethylene glycol 17 g packet  Daily         11/19/22 0813    11/19/22 0000  Diet: Regular; Thin         11/19/22 0813    11/19/22 0000   Activity as Tolerated         22 0813    22 2100  sennosides-docusate (PERICOLACE) 8.6-50 MG per tablet 1 tablet  2 Times Daily,   Status:  Discontinued         22 1547    22 1800  bisacodyl (DULCOLAX) suppository 10 mg  Once         22 1524    22 1800  Ambulate In [place]  3 Times Daily,   Status:  Canceled         22 1550    22 1645  polyethylene glycol (MIRALAX) packet 17 g  Daily,   Status:  Discontinued         22 1547    22 1645  amLODIPine (NORVASC) tablet 5 mg  Every 24 Hours Scheduled,   Status:  Discontinued         22 1559    22 1542  PT Plan of Care Cert / Re-Cert  Once        Comments: Physical Therapy Plan of Care  Initial Certification  Certification Period: 2022 - 2023    Patient Name: Mireille Magallanes  : 1935    (D64.9) Acute anemia  (primary encounter diagnosis)  (N18.9) Chronic kidney disease, unspecified CKD stage  (Z74.09) Impaired mobility                  Assessment  PT Assessment  Rehab Potential (PT): good, to achieve stated therapy goals  Criteria for Skilled Interventions Met (PT): skilled treatment is necessary         PT Rehab Goals     Row Name 22 1035             Bed Mobility Goal 1 (PT)    Activity/Assistive Device (Bed Mobility Goal 1, PT) bed mobility   activities, all  -MS (r) JW (t) MS (c)      [place] Level/Cues Needed (Bed Mobility Goal 1, PT) independent  -MS   (r) JW (t) MS (c)      Time Frame (Bed Mobility Goal 1, PT) long term goal (LTG)  -MS (r) JW (t)   MS (c)      Strategies/Barriers (Bed Mobility Goal 1, PT) Pain and weakness  -MS (r)   JW (t) MS (c)      Progress/Outcomes (Bed Mobility Goal 1, PT) new goal  -MS (r) JW (t) MS   (c)         Transfer Goal 1 (PT)    Activity/Assistive Device (Transfer Goal 1, PT)   sit-to-stand/stand-to-sit;bed-to-chair/chair-to-bed  -MS (r) JW (t) MS (c)        [place] Level/Cues Needed (Transfer Goal 1, PT) independent  -MS (r)   JW  (t) MS (c)      Time Frame (Transfer Goal 1, PT) long term goal (LTG)  -MS (r) JW (t) MS   (c)      Strategies/Barriers (Transfers Goal 1, PT) Pain and weakness  -MS (r) JW   (t) MS (c)      Progress/Outcome (Transfer Goal 1, PT) new goal  -MS (r) JW (t) MS (c)         Gait Training Goal 1 (PT)    Activity/Assistive Device (Gait Training Goal 1, PT) gait (walking   locomotion)  -MS (r) JW (t) MS (c)      [place] Level (Gait Training Goal 1, PT) independent  -MS (r) JW (t)   MS (c)      Distance (Gait Training Goal 1, PT) 50ft x4  -MS (r) JW (t) MS (c)      Time Frame (Gait Training Goal 1, PT) long term goal (LTG)  -MS (r) JW   (t) MS (c)      Strategies/Barriers (Gait Training Goal 1, PT) Pain fatigue  -MS (r) JW   (t) MS (c)      Progress/Outcome (Gait Training Goal 1, PT) new goal  -MS (r) JW (t) MS   (c)            User Key  (r) = Recorded By, (t) = Taken By, (c) = Cosigned By    Initials Name Provider Type Discipline    Jade Bates YESIKA, PT, DPT, NCS Physical Therapist PT    Shayne Hugo, PT Student PT Student PT               PT OP Goals     Row Name 11/18/22 1043          Time Calculation    PT Goal Re-Cert Due Date 11/28/22  -MS (r) JW (t) MS (c)           User Key  (r) = Recorded By, (t) = Taken By, (c) = Cosigned By    Initials Name Provider Type    Jade Bates YESIKA, PT, DPT, NCS Physical Therapist    Shayne Hugo, PT Student PT Student                  Plan  PT Plan  Therapy Frequency (PT): 2 times/day  Planned Therapy Interventions (PT): balance training, bed mobility training, gait training, motor coordination training, patient/family education, stair training, strengthening, transfer training  Outcome Evaluation: PT evaluation complete. Pt A&Ox4. Checked with pt's nurse, because pt's hgb has been running low. Nursing approved pt was able to work with therapy and the hgb numbers were better. Pt was mod I with all bed mobility. Pt was able to sit<>stand t/f with no assistive device  and CGA from therapist. Pt was able to ambulate 50ft x2 and was CGA x1. Educated pt to get good foot clearance and heel contact thoughout ambulation to prevent future falls. Peformed a tinetti balance assesment with pt. Pt had an overall tinetti of 24. Educated pt to perform ankle pumps, quad sets, and glute squeezes to maintain strength and improve blood flow to prevent DVT's. PT intervention is indicated to improve funtional mobility, decrease fall risk, and increse overall endurance. Suggest pt return home with assistance upon d/c.        Jade De La Torre, PT, DPT, NCS  11/18/2022            By cosigning this order, either electronically or physically, I certify that the therapy services are furnished while this patient is under my care, the services outline above are required by this patient, and will be reviewed every 90 days.        MFOZIA.:__________________________________________ Date: ______________    11/18/22 1541    11/18/22 1528  Hemoglobin & Hematocrit, Blood  STAT,   Status:  Canceled         11/18/22 1527    11/18/22 1325  Soap suds  Nursing Communication  Once,   Status:  Canceled        Comments: Soap suds    11/18/22 1325    11/18/22 0846  Prepare RBC, 1 Units  Blood - Once         11/18/22 0845    11/18/22 0845  Transfuse RBC Infuse Each Unit Over: 3.5H  Transfusion         11/18/22 0845    11/18/22 0845  Verify Informed Consent for Blood Product Administration  Once         11/18/22 0845    11/18/22 0600  Comprehensive Metabolic Panel  Morning Draw         11/17/22 1335    11/18/22 0600  Procalcitonin  Morning Draw         11/17/22 1335    11/18/22 0600  CBC (No Diff)  Morning Draw         11/17/22 1335    11/18/22 0131  Prepare RBC, 1 Units  Blood - Once         11/18/22 0132    11/18/22 0130  Transfuse RBC Infuse Each Unit Over: 3.5H  Transfusion         11/18/22 0132    11/18/22 0130  Verify Informed Consent for Blood Product Administration  Once         11/18/22 0132    11/18/22 0046   Hemoglobin & Hematocrit, Blood  STAT,   Status:  Canceled         11/18/22 0046    11/18/22 0000  bisacodyl (DULCOLAX) suppository 10 mg  Daily PRN,   Status:  Discontinued         11/17/22 1618    11/17/22 2100  losartan (COZAAR) tablet 50 mg  Nightly,   Status:  Discontinued         11/17/22 1617    11/17/22 2100  ranolazine (RANEXA) 12 hr tablet 500 mg  2 Times Daily,   Status:  Discontinued         11/17/22 1617    11/17/22 2100  sennosides-docusate (PERICOLACE) 8.6-50 MG per tablet 1 tablet  Nightly,   Status:  Discontinued         11/17/22 1618    11/17/22 1800  Oral Care  2 Times Daily,   Status:  Canceled       11/17/22 1335    11/17/22 1715  atorvastatin (LIPITOR) tablet 80 mg  Daily,   Status:  Discontinued         11/17/22 1617    11/17/22 1642  Vascular Access Consult  Once        Provider:  (Not yet assigned)    11/17/22 1643    11/17/22 1636  PT Consult: Eval & Treat Functional Mobility Below Baseline  Once,   Status:  Canceled         11/17/22 1635    11/17/22 1626  Continuous pulse oximetry  Continuous,   Status:  Canceled         11/17/22 1626    11/17/22 1618  POC Occult Blood Stool  Once,   Status:  Canceled         11/17/22 1618    11/17/22 1615  nitroglycerin (NITROSTAT) SL tablet 0.4 mg  As Needed,   Status:  Discontinued         11/17/22 1617    11/17/22 1615  lactated ringers infusion  Continuous,   Status:  Discontinued         11/17/22 1521    11/17/22 1614  Code Status and Medical Interventions:  Continuous,   Status:  Canceled         11/17/22 1613    11/17/22 1613  Verify Informed Consent for Blood Product Administration  Once         11/17/22 1612    11/17/22 1613  Prepare RBC, 1 Units  Blood - Once         11/17/22 1612    11/17/22 1612  Transfuse RBC Infuse Each Unit Over: 3.5H  Transfusion         11/17/22 1612    11/17/22 1600  Vital Signs  Every 4 Hours,   Status:  Canceled       11/17/22 1335    11/17/22 1600  ferric gluconate (FERRLECIT) 250 mg in sodium chloride 0.9 % 120 mL  IVPB  Daily         11/17/22 1513    11/17/22 1552  Hemoglobin & Hematocrit, Blood  STAT         11/17/22 1551    11/17/22 1551  Apply milk and molassas enema Until Discontinued  Until Discontinued,   Status:  Canceled         11/17/22 1550    11/17/22 1521  Reticulocytes  Once         11/17/22 1520    11/17/22 1430  sodium chloride 0.9 % flush 10 mL  Every 12 Hours Scheduled,   Status:  Discontinued         11/17/22 1335    11/17/22 1430  famotidine (PEPCID) tablet 10 mg  Daily,   Status:  Discontinued         11/17/22 1335    11/17/22 1400  Strict Intake & Output  Every Hour,   Status:  Canceled       11/17/22 1335    11/17/22 1400  Incentive Spirometry  Every 4 Hours While Awake,   Status:  Canceled       11/17/22 1335    11/17/22 1336  Please obtain medicine reconciliation and notify Dr. García once complete.  Nursing Communication  Once        Comments: Please obtain medicine reconciliation and notify Dr. García once complete.    11/17/22 1335    11/17/22 1336  Intake & Output  Every Shift,   Status:  Canceled       11/17/22 1335    11/17/22 1336  Weigh Patient  Once,   Status:  Canceled         11/17/22 1335    11/17/22 1336  Oxygen Therapy- Nasal Cannula; Titrate for SPO2: 90% - 95%  Continuous,   Status:  Canceled         11/17/22 1335    11/17/22 1336  Insert Peripheral IV  Once,   Status:  Canceled         11/17/22 1335    11/17/22 1336  Saline Lock & Maintain IV Access  Continuous,   Status:  Canceled         11/17/22 1335    11/17/22 1336  Place Sequential Compression Device  Once         11/17/22 1335    11/17/22 1336  Maintain Sequential Compression Device  Continuous,   Status:  Canceled         11/17/22 1335    11/17/22 1336  Daily Weights  Daily,   Status:  Canceled       11/17/22 1335    11/17/22 1336  Diet Regular Texture (IDDSI 7); Regular Consistency; Regular/House Diet  Diet Effective Now,   Status:  Canceled        Comments: Comments entered here are not received by the   "Department and are not considered part of the interpreted diet order. Please use the \"DIET MESSAGE\" order to communicate additional instructions to the diet office. If necessary, consult a registered dietitian.    11/17/22 1335    11/17/22 1336  Hemoglobin & Hematocrit, Blood  Every 8 Hours,   Status:  Canceled       11/17/22 1335    11/17/22 1336  Cardiac Monitoring  Continuous,   Status:  Canceled        Comments: Follow Standing Orders As Outlined in Process Instructions (Open Order Report to View Full Instructions)    11/17/22 1335    11/17/22 1335  acetaminophen (TYLENOL) tablet 650 mg  Every 4 Hours PRN,   Status:  Discontinued        See Hyperspace for full Linked Orders Report.    11/17/22 1335    11/17/22 1335  acetaminophen (TYLENOL) 160 MG/5ML solution 650 mg  Every 4 Hours PRN,   Status:  Discontinued        See Hyperspace for full Linked Orders Report.    11/17/22 1335    11/17/22 1335  acetaminophen (TYLENOL) suppository 650 mg  Every 4 Hours PRN,   Status:  Discontinued        See Hyperspace for full Linked Orders Report.    11/17/22 1335    11/17/22 1335  ondansetron (ZOFRAN) tablet 4 mg  Every 6 Hours PRN,   Status:  Discontinued        See Hyperspace for full Linked Orders Report.    11/17/22 1335    11/17/22 1335  ondansetron (ZOFRAN) injection 4 mg  Every 6 Hours PRN,   Status:  Discontinued        See Hyperspace for full Linked Orders Report.    11/17/22 1335    11/17/22 1335  sodium chloride 0.9 % flush 10 mL  As Needed,   Status:  Discontinued         11/17/22 1335    11/17/22 1225  Inpatient Admission  Once         11/17/22 1225    11/17/22 1219  Inpatient Admission  Once         11/17/22 1220    11/17/22 1218  bisacodyl (DULCOLAX) suppository 10 mg  Once         11/17/22 1216    11/17/22 1218  Iron Profile  Once         11/17/22 1217    11/17/22 1218  Ferritin  STAT         11/17/22 1217    11/17/22 1116  Verify Informed Consent  Once         11/17/22 1115    11/17/22 1116  Prepare RBC, 1 " Units  Blood - Once         11/17/22 1115    11/17/22 1115  Transfuse RBC Infuse Each Unit Over: 2H  Transfusion         11/17/22 1115    11/17/22 1104  CT Abdomen Pelvis Without Contrast  1 Time Imaging        Comments: NON-CONTRASTED STUDY      11/17/22 1103    11/17/22 0910  CBC Auto Differential  PROCEDURE ONCE         11/17/22 0839    11/17/22 0910  Type & Screen  STAT         11/17/22 0910    11/17/22 0840  CBC & Differential  Once         11/17/22 0839    11/17/22 0840  Basic Metabolic Panel  Once         11/17/22 0839    11/17/22 0840  XR Abdomen KUB  1 Time Imaging         11/17/22 0839    11/17/22 0839  [place] Draw  Once         11/17/22 0838    11/17/22 0839  Green Top (Gel)  PROCEDURE ONCE         11/17/22 0838    11/17/22 0839  Lavender Top  PROCEDURE ONCE         11/17/22 0839    11/17/22 0839  Red Top  PROCEDURE ONCE         11/17/22 0839    11/17/22 0839  [place] Blood Culture Bottle Set  PROCEDURE ONCE         11/17/22 0839    11/17/22 0839  [place] Top  PROCEDURE ONCE         11/17/22 0839    11/17/22 0839  Light Blue Top  PROCEDURE ONCE         11/17/22 0839    --  irbesartan (AVAPRO) 300 MG tablet  Nightly         11/17/22 1523    --  vitamin D3 125 MCG (5000 UT) capsule capsule  Daily         11/17/22 1615    --  SCANNED - TELEMETRY           11/17/22 0000                   Physician Progress Notes (all)      Shadi García MD at 11/18/22 1549              [facility] Medicine Services  INPATIENT PROGRESS NOTE    Patient Name: Mireille Magallanes  Date of Admission: 11/17/2022  Today's Date: 11/18/22  Length of Stay: 1  Primary Care Physician: Hossein Hua MD    Subjective   Chief Complaint: Weakness  HPI     Patient was seen and examined at bedside.  Patient is feeling much better.  The patient was able to work with physical therapy.  The patient denies any nausea vomiting diarrhea.  Patient had some black stool, but reported that it was very hard  stool.  The patient indicates that he would like another suppository as he would like to make sure that he cleans himself out.  Patient has had no signs of active bleeding at the present time.        Review of Systems   Constitutional: Negative for activity change, appetite change, chills, fatigue and fever.   Respiratory: Negative for cough and shortness of breath.    Cardiovascular: Negative for chest pain and palpitations.   Gastrointestinal: Negative for abdominal distention, abdominal pain, diarrhea, nausea and vomiting.   Neurological: Positive for weakness.        All pertinent negatives and positives are as above. All other systems have been reviewed and are negative unless otherwise stated.     Objective    Temp:  [97.6 °F (36.4 °C)-98.6 °F (37 °C)] 97.9 °F (36.6 °C)  Heart Rate:  [73-81] 80  Resp:  [14-18] 16  BP: (148-187)/(67-84) 187/80  Physical Exam  Vitals and nursing note reviewed.   Constitutional:       Appearance: Normal appearance.   HENT:      Head: Normocephalic and atraumatic.      Mouth/Throat:      Mouth: Mucous membranes are dry.      Pharynx: Oropharynx is clear.   Eyes:      General: No scleral icterus.     Conjunctiva/sclera: Conjunctivae normal.   Cardiovascular:      Rate and Rhythm: Normal rate and regular rhythm.   Pulmonary:      Effort: Pulmonary effort is normal. No respiratory distress.      Breath sounds: Normal breath sounds. No stridor.   Abdominal:      General: Abdomen is flat. Bowel sounds are normal. There is no distension.      Palpations: Abdomen is soft. There is no mass.      Tenderness: There is no abdominal tenderness.      Hernia: No hernia is present.   Skin:     General: Skin is warm and dry.      Coloration: Skin is pale. Skin is not jaundiced.   Neurological:      General: No focal deficit present.      Mental Status: He is alert and oriented to person, place, and time.   Psychiatric:         Mood and Affect: Mood normal.         Behavior: Behavior normal.              Results Review:  I have reviewed the labs, radiology results, and diagnostic studies.    Laboratory Data:   Results from last 7 days   Lab Units 11/18/22  0543 11/18/22  0107 11/17/22  1600 11/17/22  0937   WBC 10*3/mm3  --  6.78  --  4.03   HEMOGLOBIN g/dL 7.4* 6.9* 6.6* 4.5*   HEMATOCRIT % 24.1* 23.2* 25.1* 15.6*   PLATELETS 10*3/mm3  --  181  --  165        Results from last 7 days   Lab Units 11/18/22  0543 11/17/22  0847   SODIUM mmol/L 139 140   POTASSIUM mmol/L 4.4 4.8   CHLORIDE mmol/L 108* 106   CO2 mmol/L 19.0* 21.0*   BUN mg/dL 28* 31*   CREATININE mg/dL 3.12* 3.28*   CALCIUM mg/dL 8.7 9.2   BILIRUBIN mg/dL 1.1  --    ALK PHOS U/L 58  --    ALT (SGPT) U/L 8  --    AST (SGOT) U/L 15  --    GLUCOSE mg/dL 88 111*       Culture Data:   No results found for: BLOODCX, URINECX, WOUNDCX, MRSACX, RESPCX, STOOLCX    Radiology Data:   Imaging Results (Last 24 Hours)     ** No results found for the last 24 hours. **          I have reviewed the patient's current medications.     Assessment/Plan     Active Hospital Problems    Diagnosis    • **Symptomatic anemia    • Iron deficiency    • Acute renal failure superimposed on stage 4 chronic kidney disease (HCC)    • Constipation    • Chronic anticoagulation        Plan:  Patient was admitted on 11/17 by Dr. García and GIANNI Berman.  Patient was admitted for significant weakness.  Patient was found to have a hemoglobin of 4.5.  Patient is on anticoagulation.  Patient denies any occult signs of bleeding.  Patient had fecal occult was reportedly negative in the emergency department.    Patient has been transfused 3 units, patient hemoglobin 7.4 and patient is still symptomatic.  We will transfuse 1 more unit of packed red blood cells.     Hold the patient's aspirin and Xarelto.  I will likely hold the patient's Xarelto long-term, it is for atrial fibrillation, given his age, and he has a history of a GI bleed on Pradaxa, and presented here with a  hemoglobin of 4.5.  Feel as though the Xarelto risk outweighs the benefit.  Will likely resume the patient aspirin, but will likely hold it for 1 week.    Patient could benefit possibly from colonoscopy as an outpatient, however given his advanced age, they would like to avoid this if possible.    PT/OT    SCDs for VTE prophylaxis    Discharge Planning: I expect the patient to be discharged to home in 1-2 days.    Electronically signed by Shadi García MD, 11/18/22, 15:49 CST.    Electronically signed by Shadi García MD at 11/18/22 1813       Consult Notes (all)    No notes of this type exist for this encounter.            Discharge Summary      Shadi García MD at 11/19/22 0813                [facility] Medicine Services  DISCHARGE SUMMARY       Date of Admission: 11/17/2022  Date of Discharge:  11/19/2022  Primary Care Physician: Hossein Hua MD    Presenting Problem/History of Present Illness:   Constipation for a week    Final Discharge Diagnoses:  Active Hospital Problems    Diagnosis    • **Symptomatic anemia    • Iron deficiency    • Acute renal failure superimposed on stage 4 chronic kidney disease (HCC)    • Constipation    • Chronic anticoagulation        Consults: None    Procedures Performed: None    Pertinent Test Results:   Results for orders placed during the hospital encounter of 05/25/22    Adult Transthoracic Echo Complete W/ Cont if Necessary Per Protocol (With Agitated Saline)    Interpretation Summary  · Left ventricular ejection fraction appears to be 61 - 65%. Left ventricular systolic function is normal.  · Left ventricular wall thickness is consistent with mild concentric hypertrophy.  · Left ventricular diastolic dysfunction is noted.  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  · Mild to moderate mitral valve stenosis is present.  · Estimated right ventricular systolic  pressure from tricuspid regurgitation is normal (<35 mmHg).  · There is no evidence of right to left shunt on bubble study.  · There is no evidence of intracardiac mass or thrombus.  · Compared to previous echo from 4/26/2022, there are no significant changes.      Imaging Results (All)     Procedure Component Value Units Date/Time    CT Abdomen Pelvis Without Contrast [[id number]] Collected: 11/17/22 1144     Updated: 11/17/22 1156    Narrative:      EXAM/TECHNIQUE: CT abdomen pelvis without contrast     INDICATION: Bowel obstruction suspected     COMPARISON: None available.     DLP: 614 mGy cm. Automated exposure control was also utilized to  decrease patient radiation dose.     FINDINGS:     Included lung bases are clear. Partially imaged mitral annular  calcification.     Unenhanced liver appears unremarkable. No gallstones common gallbladder  wall thickening, or biliary ductal dilatation.     Pancreas, spleen, and adrenal glands are unremarkable.     Bilateral renal cortical thinning. Multiple bilateral renal cysts are  present. 1.9 cm RIGHT upper pole hemorrhagic/proteinaceous renal cyst.  No urolithiasis or hydronephrosis. Mild trabeculated urinary bladder  wall thickening.     Mild colonic diverticulosis without evidence of diverticulitis.  Postoperative change of RIGHT hemicolectomy. No abnormal bowel  distention or evidence of active bowel inflammation.     No ascites or free pelvic fluid. No pelvic mass or pelvic collection.  Prostate is enlarged measuring 5.1 cm transverse dimension.     Atherosclerotic nonaneurysmal abdominal aorta. No enlarged  retroperitoneal, mesenteric, pelvic, or inguinal lymph nodes.     No acute abdominal wall soft tissue abnormality. Bilateral  fat-containing inguinal hernias. No acute osseous finding.       Impression:         1.  No acute abdominopelvic findings. No bowel obstruction or evidence  of active bowel inflammation. Mild sigmoid diverticulosis without  evidence of  diverticulitis. Mild to moderate volume stool in the colon.     2.  Bilateral renal cortical thinning/atrophy. Multiple bilateral renal  cysts.     3.  Enlarged prostate.  This report was finalized on 11/17/2022 11:53 by Dr. Wil Beckett MD.    XR Abdomen KUB [[id number]] Collected: 11/17/22 0952     Updated: 11/17/22 0956    Narrative:      EXAMINATION: KUB 11/17/2022     HISTORY: Constipation.     FINDINGS: KUB radiograph demonstrates mild constipation within the right  colon. There is no obstruction or free air. Postoperative changes are  noted within the right abdomen. There is arthritic change in the mid and  lower lumbar spine. There are multiple phleboliths within the pelvis.       Impression:      1.. Mild constipation. Otherwise normal bowel gas pattern.  This report was finalized on 11/17/2022 09:52 by Dr. Alfred Coates MD.        LAB RESULTS:      Lab 11/19/22  0411 11/18/22  1558 11/18/22  0543 11/18/22  0107 11/17/22  1600 11/17/22  0937   WBC 8.19  --   --  6.78  --  4.03   HEMOGLOBIN 9.7* 9.3* 7.4* 6.9* 6.6* 4.5*   HEMATOCRIT 31.2* 30.2* 24.1* 23.2* 25.1* 15.6*   PLATELETS 185  --   --  181  --  165   NEUTROS ABS  --   --   --   --   --  2.73   IMMATURE GRANS (ABS)  --   --   --   --   --  0.01   LYMPHS ABS  --   --   --   --   --  0.80   MONOS ABS  --   --   --   --   --  0.44   EOS ABS  --   --   --   --   --  0.04   MCV 81.7  --   --  81.4  --  80.4   PROCALCITONIN  --   --  0.10  --   --   --          Lab 11/19/22  0411 11/18/22  0543 11/17/22  0847   SODIUM 139 139 140   POTASSIUM 4.6 4.4 4.8   CHLORIDE 106 108* 106   CO2 21.0* 19.0* 21.0*   ANION GAP 12.0 12.0 13.0   BUN 25* 28* 31*   CREATININE 2.75* 3.12* 3.28*   EGFR 21.9* 18.8* 17.7*   GLUCOSE 107* 88 111*   CALCIUM 9.5 8.7 9.2         Lab 11/19/22  0411 11/18/22  0543   TOTAL PROTEIN 7.0 6.0   ALBUMIN 4.30 3.70   GLOBULIN 2.7 2.3   ALT (SGPT) 9 8   AST (SGOT) 20 15   BILIRUBIN 1.1 1.1   ALK PHOS 73 58                 Lab 11/17/22  0937  11/17/22  0847   IRON  --  8*   IRON SATURATION  --  2*   TIBC  --  522   TRANSFERRIN  --  350   FERRITIN  --  10.68*   ABO TYPING A  --    RH TYPING Negative  --    ANTIBODY SCREEN Negative  --          Brief Urine Lab Results  (Last result in the past 365 days)      Color   Clarity   Blood   Leuk Est   Nitrite   Protein   CREAT   Urine HCG        01/03/22 1048 YELLOW   Clear   TRACE   Negative   Negative                 Microbiology Results (last 10 days)     ** No results found for the last 240 hours. **          Hospital Course:   Patient was admitted on 11/17 by Dr. García and GIANNI Berman.  Patient was admitted for significant weakness.  Patient was found to have a hemoglobin of 4.5.  Patient is on anticoagulation and antiplatelets chronically.  Patient denies any occult signs of bleeding.  Patient had fecal occult was reportedly negative in the emergency department.     Patient has been transfused 4 units total.     Hold the patient's aspirin and Xarelto.  I will likely hold the patient's Xarelto long-term, it is for atrial fibrillation, given his age, and he has a history of a GI bleed on Pradaxa, and presented here with a hemoglobin of 4.5.  Feel as though the Xarelto risk outweighs the benefit.  Will likely resume the patient aspirin, but will likely hold it for 1 week.     Patient could benefit possibly from EGD as an outpatient, however given his advanced age, they would like to avoid this if possible.    Patient was extensively constipated upon arrival.  When he finally had a bowel movements with suppository and bowel regimen it was noted to be black.  Suspect patient had some bleeding but likely had stopped on its own as he was not having frequent stools as often seen with an active GI bleed.    My recommendation is to stop ASA and xarelto for 1 week and subsequently resume ASA after 1 week given my suspicion that he recently had a GI bleed.  Reaching out to patients primary cardiologist   "Tre and Zena Knees APRN as the patient happens to have an appointment with Zena on 11/22.  Will see if he would be a candidate for watchman procedure as I currently believe the risk of bleeding outweighs the benefit of xarelto at the present time.       PT/OT     SCDs for VTE prophylaxis      Physical Exam on Discharge:  /78 (BP Location: Left arm, Patient Position: Sitting)   Pulse 81   Temp 97.8 °F (36.6 °C) (Oral)   Resp 16   Ht 175.3 cm (69\")   Wt 88.5 kg (195 lb)   SpO2 97%   BMI 28.80 kg/m²   Physical Exam  Vitals and nursing note reviewed.   Constitutional:       Appearance: Normal appearance.   HENT:      Head: Normocephalic and atraumatic.      Mouth/Throat:      Mouth: Mucous membranes are dry.      Pharynx: Oropharynx is clear.   Eyes:      General: No scleral icterus.     Conjunctiva/sclera: Conjunctivae normal.   Cardiovascular:      Rate and Rhythm: Normal rate and regular rhythm.   Pulmonary:      Effort: Pulmonary effort is normal. No respiratory distress.      Breath sounds: Normal breath sounds. No stridor.   Abdominal:      General: Abdomen is flat. Bowel sounds are normal. There is no distension.      Palpations: Abdomen is soft. There is no mass.      Tenderness: There is no abdominal tenderness.      Hernia: No hernia is present.   Skin:     General: Skin is warm and dry.      Coloration: Skin is pale. Skin is not jaundiced.   Neurological:      General: No focal deficit present.      Mental Status: He is alert and oriented to person, place, and time.   Psychiatric:         Mood and Affect: Mood normal.         Behavior: Behavior normal.     Condition on Discharge: Stable    Discharge Disposition:  Home or Self Care    Discharge Medications:     Discharge Medications      New Medications      Instructions Start Date   amLODIPine 5 MG tablet  Commonly known as: NORVASC   5 mg, Oral, Every 24 Hours Scheduled      ferrous gluconate 324 (37.5 Fe) MG tablet tablet   324 mg, Oral, Daily " With Breakfast      pantoprazole 40 MG EC tablet  Commonly known as: PROTONIX   40 mg, Oral, Daily      polyethylene glycol 17 g packet  Commonly known as: MIRALAX   17 g, Oral, Daily      sennosides-docusate 8.6-50 MG per tablet  Commonly known as: PERICOLACE   1 tablet, Oral, Daily PRN         Continue These Medications      Instructions Start Date   atorvastatin 80 MG tablet  Commonly known as: LIPITOR   80 mg, Oral, Daily      cloNIDine 0.1 MG tablet  Commonly known as: CATAPRES   0.1 mg, Oral, Every 8 Hours PRN      coenzyme Q10 100 MG capsule   100 mg, Oral, Daily      fish oil 1000 MG capsule capsule   1,000 mg, Oral, Daily With Breakfast      irbesartan 300 MG tablet  Commonly known as: AVAPRO   300 mg, Oral, Nightly      nitroglycerin 0.4 MG SL tablet  Commonly known as: NITROSTAT   0.4 mg, Sublingual, As Needed      ranolazine 500 MG 12 hr tablet  Commonly known as: Ranexa   500 mg, Oral, 2 Times Daily      sodium bicarbonate 650 MG tablet   650 mg, Oral, 2 Times Daily      vitamin D3 125 MCG (5000 UT) capsule capsule   5,000 Units, Oral, Daily         Stop These Medications    aspirin 81 MG EC tablet     rivaroxaban 15 MG tablet  Commonly known as: XARELTO            Discharge Diet:   Diet Instructions     Diet: Regular; Thin      Discharge Diet: Regular    Fluid Consistency: Thin          Activity at Discharge:   Activity Instructions     Activity as Tolerated            Follow-up Appointments:   Future Appointments   Date Time Provider Department Center   11/22/2022  2:30 PM Zena Sykes APRN [facility] CD PAD PAD   1/30/2023 10:30 AM Mona Alan MD [facility] N PAD PAD   2/14/2023 11:15 AM [facility] DEVICE CHECK [facility]   4/12/2023  9:00 AM PAD US NIVAS CART 2  PAD US PAD   4/12/2023 10:00 AM Berna Recinos APRN [facility] VS PAD PAD     11/23/2022 - [facility] 8:30 AM     Test Results Pending at Discharge: None    Electronically signed by Shadi García MD,  11/19/22, 08:13 CST.    Time: 35 minutes.         Electronically signed by Shadi García MD at 11/19/22 9302

## 2022-11-21 NOTE — PAYOR COMM NOTE
"REF: [id number]    [facility]  [place],  [phone]  OR  FAX   [phone]     Koffi Dickey (87 y.o. Male)     Date of Birth   1935    Social Security Number       Address   [address]  [facility] [address]    Home Phone   [phone]    MRN   [id number]       Evangelical   Shinto    Marital Status                               Admission Date   11/17/22    Admission Type   Emergency    Admitting Provider   Shadi García MD    Attending Provider       Department, Room/Bed   [facility] 3C, 370/1       Discharge Date   11/19/2022    Discharge Disposition   Home or Self Care    Discharge Destination                               Attending Provider: (none)   Allergies: Sulfa Antibiotics    Isolation: None   Infection: None   Code Status: Prior    Ht: 175.3 cm (69\")   Wt: 88.5 kg (195 lb)    Admission Cmt: None   Principal Problem: Symptomatic anemia [D64.9]                 Active Insurance as of 11/17/2022     Primary Coverage     Payor Plan Insurance Group Employer/Plan Group    MEDICARE MEDICARE A & B      Payor Plan Address Payor Plan Phone Number Payor Plan Fax Number Effective Dates    [address] [phone]  10/1/2002 - None Entered    [facility] [id number]       Subscriber Name Subscriber Birth Date Member ID       KOFFI DICKEY 1935 [id number]           Secondary Coverage     Payor Plan Insurance Group Employer/Plan Group    [employer]ERCIAL [place] [place] [id number]     Payor Plan Address Payor Plan Phone Number Payor Plan Fax Number Effective Dates    [address] [address] [phone]  1/1/2013 - None Entered    [facility] [id number]       Subscriber Name Subscriber Birth Date Member ID       KOFFI DICKEY 1935 [id number]                 Emergency Contacts      (Rel.) Home Phone Work Phone Mobile Phone    Sonya Dickey (Spouse) [phone] -- --    SUSANWILLI (Daughter) [phone] -- [phone]             "   History & Physical      Amisha Preciado, APRN at 11/17/22 1513     Attestation signed by Shadi García MD at 11/17/22 2042    This visit was performed by both a physician and an APC. I personally evaluated and examined the patient. I performed all aspects of the MDM as documented.    Patient seen and examined at bedside.  Patient has no evidence of GI bleeding and is actually constipated.  Suspect patient had blood loss from surgery as family indicated had large hematoma and required manual pressure by nurses for a while after surgery and had extensive ecchymosis down the arm.  Discussed with them that we do not currently have GI services so if he was bleeding we would be in a tough situation and discussed that given his age and co-morbidities we would really try to avoid any aggressive intervention such as endoscopy which that were very much in favor of.  Will hold anticoagulation.  Transfuse.  Serial hemoglobins.     Electronically signed by Shadi García MD, 11/17/2022, 20:41 CST.                      [facility] Medicine Services  HISTORY AND PHYSICAL    Date of Admission: 11/17/2022  Primary Care Physician: Hossein Hua MD    Subjective     Chief Complaint: Constipation for 4-week    History of Present Illness  Edgar Magallanes is a 89-year-old male with past medical history of aortic valve stenosis status post replacement 4/2021, carotid artery disease status post surgical intervention TCAR on 9/16/2022, chronic kidney disease stage IV, stroke, pacemaker, hypertension, please see below for complete list.  Daughter at bedside provides majority of history.  Labs not been obtained since surgery in September at that time hemoglobin was 9.7.  He reports postoperative bleeding with significant hematoma after surgery.  Patient underwent stress echo on 11/11/2022 per Dr. Jansen for complaints of worsening shortness of breathing, worse with exertion.  Study was  essentially normal.  Again laboratory studies were not obtained.  Today he presented to [facility] emergency department with complaints of 4-week history of constipation.  He has had minimal bowel movements and is feeling quite full.  He denies pain, nausea vomiting, no active bleeding.  He is reporting abdominal pressure.  Patient had a stroke 5/25/2022 and at that time Xarelto was changed to Pradaxa.  He developed a GI bleed after discharge.  Family did not seek evaluation however they did stop Pradaxa and resumed his Xarelto.  He has never had bleeding with Xarelto in the past.  ER work-up revealed hemoglobin 4.5, creatinine 3.28 with baseline 2.7, iron 8, ferritin 10.6, saturation 2.  Patient is admitted for further evaluation treatment.    Review of Systems   A 10 point review of systems was completed, all negative except for those discussed in    Past Medical History:   Past Medical History:   Diagnosis Date   • Acute viral hepatitis A    • Aortic valve stenosis    • Carotid artery stenosis     Right   • CKD (chronic kidney disease)    • Coagulopathy (HCC)     Plavix   • Coronary arteriosclerosis     stent ,? 4/2104, on plavix now.   • Diarrhea    • Dysphagia     Unspecified   • Family history of colon cancer    • Hematochezia     differential diagnosis discussed   • Hemorrhoids     Unspecified   • History of cardiac pacemaker 01/08/2018    [employer]   • History of colonic polyps    • Hyperlipidemia    • Hypertension    • Left ventricular hypertrophy    • Melanoma of back (HCC)    • Obesity    • Osteoarthritis    • Pacemaker    • Peripheral neuropathy    • Stroke (HCC)    • TIA (transient ischemic attack)    • Tobacco non-user        Past Surgical History:   Past Surgical History:   Procedure Laterality Date   • AORTIC VALVE REPAIR/REPLACEMENT Bilateral 4/20/2021    Procedure: Transfemoral Transcatheter Aortic Valve Replacement;  Surgeon: Kaushik Toledo MD;  Location: [place] [address];   Service: Cardiovascular;  Laterality: Bilateral;   • AORTIC VALVE REPAIR/REPLACEMENT Bilateral 4/20/2021    Procedure: TRANSFEMORAL TRANSCATHETER AORTIC VALVE REPLACEMENT;  Surgeon: Clayton Wilcox MD;  Location: [facility] HYBRID OR 12;  Service: Cardiothoracic;  Laterality: Bilateral;   • APPENDECTOMY     • CARDIAC CATHETERIZATION     • CARDIAC CATHETERIZATION N/A 3/18/2019    Procedure: Left Heart Cath;  Surgeon: Kaushik Toledo MD;  Location:  [facility] CATH INVASIVE LOCATION;  Service: Cardiology   • CARDIAC CATHETERIZATION N/A 4/14/2021    Procedure: Left Heart Cath;  Surgeon: Kaushik Toledo MD;  Location:  [facility] CATH INVASIVE LOC[facility];  Service: Cardiology;  Laterality: N/A;   • CARDIAC ELECTROPHYSIOLOGY PROCEDURE N/A 1/5/2018    Procedure: Temporary Pacemaker;  Surgeon: Luis E Courtney MD;  Location:  [facility] CATH INVASIVE LOCATION;  Service:    • CARDIAC ELECTROPHYSIOLOGY PROCEDURE N/A 1/8/2018    Procedure: Device Implant;  Surgeon: Kaushik Toledo MD;  Location:  [facility] CATH INVASIVE LOCATION;  Service:    • CAROTID ENDARTERECTOMY Right 2/14/2020    Procedure: RIGHT CAROTID ENDARTERECTOMY WITH EEG;  Surgeon: Martín Woo DO;  Location: [facility] HYBRID OR 12;  Service: Vascular;  Laterality: Right;   • CAROTID STENT     • COLON SURGERY  1999    for Ischemic Colitis   • COLONOSCOPY  05/07/2008    tubular adenoma ascending colon polyp - 3 yr   • COLONOSCOPY  08/19/2003    colon polyp @ 24 cm-see path - 5 yr   • COLONOSCOPY  08/11/2000    small interanl hemorrhoid - 3-5 yr   • COLONOSCOPY  06/22/1998    (8)small colon polyps removed-see path, internal anal skin tag - 2-3 yr   • CORONARY ARTERY BYPASS GRAFT     • CORONARY STENT PLACEMENT      x1 2014 dr crane    • INSERT / REPLACE / REMOVE PACEMAKER     • OTHER SURGICAL HISTORY      Lithotripsy   • OTHER SURGICAL HISTORY  06/2011    recall 3 yr.    • PACEMAKER IMPLANTATION  01/08/2018   • VASECTOMY         Family History: family history includes Colon  cancer in his father; Other in his brother and sister; Stroke in his mother.    Social History:  reports that he quit smoking about 23 years ago. His smoking use included cigarettes. He has never used smokeless tobacco. He reports that he does not drink alcohol and does not use drugs.    Code Status: DNR/DNI, patient's wife or daughter speaks for      Allergies:  Allergies   Allergen Reactions   • Sulfa Antibiotics Rash     Sulfa Drugs       Medications:  Prior to Admission medications    Medication Sig Start Date End Date Taking? Authorizing Provider   aspirin 81 MG EC tablet Take 1 tablet by mouth Daily. 4/21/21   Kaushik Toledo MD   atorvastatin (LIPITOR) 80 MG tablet Take 1 tablet by mouth Daily.    Terell Diehl MD   cloNIDine (CATAPRES) 0.1 MG tablet Take 0.1 mg by mouth Every 8 (Eight) Hours As Needed for High Blood Pressure (BLOOD PRESSURE GREATER THAN 150/90). 7/29/22   Terell Diehl MD   coenzyme Q10 100 MG capsule Take 100 mg by mouth Daily.    Terell Diehl MD   irbesartan (AVAPRO) 300 MG tablet Take 0.5 tablets by mouth Every Night.  Patient taking differently: Take 150 mg by mouth Daily. 5/27/22   Ismael Bernstein MD   nitroglycerin (NITROSTAT) 0.4 MG SL tablet PLACE 1 TABLET UNDER THE TONGUE AS NEEDED FOR CHEST PAIN 8/29/22   Kaushik Toledo MD   Omega-3 Fatty Acids (fish oil) 1000 MG capsule capsule Take 1,000 mg by mouth Daily With Breakfast.    Terell Diehl MD   ranolazine (Ranexa) 500 MG 12 hr tablet Take 1 tablet by mouth 2 (Two) Times a Day. 10/28/22   Kaushik Toledo MD   rivaroxaban (XARELTO) 15 MG tablet Take 15 mg by mouth Daily.    Terell Diehl MD   sodium bicarbonate 650 MG tablet Take 650 mg by mouth 2 (Two) Times a Day. 4/6/22   Terell Diehl MD   Cholecalciferol (D3 VITAMIN PO) Take 1 tablet by mouth Daily.  11/17/22  Terell Diehl MD     I have utilized all available immediate resources to obtain, update, and review the  "patient's current medications.    Objective     BP (!) 184/79   Pulse 74   Temp 97.8 °F (36.6 °C)   Resp 16   Ht 175.3 cm (69\")   Wt 86.2 kg (190 lb)   SpO2 99%   BMI 28.06 kg/m²   Physical Exam  Vitals reviewed.   Constitutional:       Appearance: He is obese.   HENT:      Head: Normocephalic and atraumatic.      Mouth/Throat:      Mouth: Mucous membranes are moist.      Pharynx: Oropharynx is clear.   Eyes:      Extraocular Movements: Extraocular movements intact.      Conjunctiva/sclera: Conjunctivae normal.   Cardiovascular:      Rate and Rhythm: Normal rate and regular rhythm.      Comments: Click from mechanical valve  Pulmonary:      Effort: Pulmonary effort is normal.      Breath sounds: Normal breath sounds.   Abdominal:      General: Bowel sounds are normal. There is no distension.      Palpations: Abdomen is soft.      Comments: Protuberant   Musculoskeletal:      Cervical back: Normal range of motion and neck supple.      Right lower leg: No edema.      Left lower leg: No edema.      Comments: Generalized weakness and debility   Skin:     General: Skin is warm and dry.      Comments: Daughter reports severe paleness prior to first unit of blood, currently skin color is within normal   Neurological:      General: No focal deficit present.      Mental Status: He is alert and oriented to person, place, and time.   Psychiatric:         Mood and Affect: Mood normal.         Behavior: Behavior normal.       Pertinent Data:   Lab Results (last 72 hours)     Procedure Component Value Units Date/Time    Hemoglobin & Hematocrit, Blood [[id number]]  (Abnormal) Collected: 11/17/22 1600    Specimen: Blood Updated: 11/17/22 1610     Hemoglobin 6.6 g/dL      Hematocrit 25.1 %     Reticulocytes [[id number]]  (Abnormal) Collected: 11/17/22 0937    Specimen: Blood Updated: 11/17/22 1524     Reticulocyte % 2.48 %      Reticulocyte Absolute 0.0498 10*6/mm3     Ferritin [[id number]]  (Abnormal) Collected: 11/17/22 0847 "    Specimen: Blood Updated: 11/17/22 1244     Ferritin 10.68 ng/mL     Narrative:      Results may be falsely decreased if patient taking Biotin.      Iron Profile [[id number]]  (Abnormal) Collected: 11/17/22 0847    Specimen: Blood Updated: 11/17/22 1240     Iron 8 mcg/dL      Iron Saturation 2 %      Transferrin 350 mg/dL      TIBC 522 mcg/dL     CBC Auto Differential [[id number]]  (Abnormal) Collected: 11/17/22 0937    Specimen: Blood Updated: 11/17/22 0959     WBC 4.03 10*3/mm3      RBC 1.94 10*6/mm3      Hemoglobin 4.5 g/dL      Hematocrit 15.6 %      MCV 80.4 fL      MCH 23.2 pg      MCHC 28.8 g/dL      RDW 16.7 %      RDW-SD 48.6 fl      MPV 10.2 fL      Platelets 165 10*3/mm3      Neutrophil % 67.8 %      Lymphocyte % 19.9 %      Monocyte % 10.9 %      Eosinophil % 1.0 %      Basophil % 0.2 %      Immature Grans % 0.2 %      Neutrophils, Absolute 2.73 10*3/mm3      Lymphocytes, Absolute 0.80 10*3/mm3      Monocytes, Absolute 0.44 10*3/mm3      Eosinophils, Absolute 0.04 10*3/mm3      Basophils, Absolute 0.01 10*3/mm3      Immature Grans, Absolute 0.01 10*3/mm3      nRBC 0.0 /100 WBC     Basic Metabolic Panel [[id number]]  (Abnormal) Collected: 11/17/22 0847    Specimen: Blood Updated: 11/17/22 0926     Glucose 111 mg/dL      BUN 31 mg/dL      Creatinine 3.28 mg/dL      Sodium 140 mmol/L      Potassium 4.8 mmol/L      Chloride 106 mmol/L      CO2 21.0 mmol/L      Calcium 9.2 mg/dL      BUN/Creatinine Ratio 9.5     Anion Gap 13.0 mmol/L      eGFR 17.7 mL/min/1.73      Imaging Results (Last 24 Hours)     Procedure Component Value Units Date/Time    CT Abdomen Pelvis Without Contrast [[id number]] Collected: 11/17/22 1144     Updated: 11/17/22 1156    Narrative:      EXAM/TECHNIQUE: CT abdomen pelvis without contrast     INDICATION: Bowel obstruction suspected     COMPARISON: None available.     DLP: 614 mGy cm. Automated exposure control was also utilized to  decrease patient radiation dose.     FINDINGS:      Included lung bases are clear. Partially imaged mitral annular  calcification.     Unenhanced liver appears unremarkable. No gallstones common gallbladder  wall thickening, or biliary ductal dilatation.     Pancreas, spleen, and adrenal glands are unremarkable.     Bilateral renal cortical thinning. Multiple bilateral renal cysts are  present. 1.9 cm RIGHT upper pole hemorrhagic/proteinaceous renal cyst.  No urolithiasis or hydronephrosis. Mild trabeculated urinary bladder  wall thickening.     Mild colonic diverticulosis without evidence of diverticulitis.  Postoperative change of RIGHT hemicolectomy. No abnormal bowel  distention or evidence of active bowel inflammation.     No ascites or free pelvic fluid. No pelvic mass or pelvic collection.  Prostate is enlarged measuring 5.1 cm transverse dimension.     Atherosclerotic nonaneurysmal abdominal aorta. No enlarged  retroperitoneal, mesenteric, pelvic, or inguinal lymph nodes.     No acute abdominal wall soft tissue abnormality. Bilateral  fat-containing inguinal hernias. No acute osseous finding.       Impression:         1.  No acute abdominopelvic findings. No bowel obstruction or evidence  of active bowel inflammation. Mild sigmoid diverticulosis without  evidence of diverticulitis. Mild to moderate volume stool in the colon.     2.  Bilateral renal cortical thinning/atrophy. Multiple bilateral renal  cysts.     3.  Enlarged prostate.  This report was finalized on 11/17/2022 11:53 by Dr. Wil Beckett MD.    XR Abdomen KUB [[id number]] Collected: 11/17/22 0952     Updated: 11/17/22 0956    Narrative:      EXAMINATION: KUB 11/17/2022     HISTORY: Constipation.     FINDINGS: KUB radiograph demonstrates mild constipation within the right  colon. There is no obstruction or free air. Postoperative changes are  noted within the right abdomen. There is arthritic change in the mid and  lower lumbar spine. There are multiple phleboliths within the pelvis.        Impression:      1.. Mild constipation. Otherwise normal bowel gas pattern.  This report was finalized on 11/17/2022 09:52 by Dr. Alfred Coates MD.          11/11/2022 Stress ECHO   Interpretation Summary       •  Impressions are consistent with a low risk study.  •  Left ventricular ejection fraction is normal (Calculated EF = 52%). Mild apical hypokinesis.  •  Myocardial perfusion imaging indicates a normal myocardial perfusion study with no evidence of inducible ischemia - this is a small szied, mild perfusion defect in the apical anterior wall and apex at rest, but normal perfusion after stress.  •  There is no prior study available for comparison.      Assessment / Plan     Assessment:   Active Hospital Problems    Diagnosis    • **Symptomatic anemia    • Iron deficiency    • Acute renal failure superimposed on stage 4 chronic kidney disease (HCC)    • Constipation    • Chronic anticoagulation        Plan:   1.  Admit as inpatient  2.  H&H every 8 hours; patient has received 1 unit PRBC ransfusion-repeat hemoglobin 6.6, second unit ordered  3.  Ferric gluconate 250 mg IV daily x2  4.  Home medications reviewed and restarted as appropriate  5.  DVT prophylaxis with SCDs, hold Xarelto and aspirin for now  6.  Labs in a.m., stool for occult blood  7.  LR at 75 mL/hour  8.  Start bowel regimen-Milk and molasses enema, Dulcolax suppository today and daily as needed, Nancy-Colace 8.6 mg nightly start today  9.  Supplemental oxygen as needed, incentive spirometry, continuous pulse oximeter    I discussed the patient's findings and my recommendations with: Shadi García MD  Time spent 45    Patient seen and examined by me on 11/17/2022 at 3:13 PM.    Electronically signed by GIANNI Spring, 11/17/22, 16:26 CST.    Electronically signed by Shadi García MD at 11/17/22 2042          Physician Progress Notes (last 7 days)      Shadi García MD at 11/18/22 0900              [facility]  [facility] Medicine Services  INPATIENT PROGRESS NOTE    Patient Name: Mireille Magallanes  Date of Admission: 11/17/2022  Today's Date: 11/18/22  Length of Stay: 1  Primary Care Physician: Hossein Hua MD    Subjective   Chief Complaint: Weakness  HPI     Patient was seen and examined at bedside.  Patient is feeling much better.  The patient was able to work with physical therapy.  The patient denies any nausea vomiting diarrhea.  Patient had some black stool, but reported that it was very hard stool.  The patient indicates that he would like another suppository as he would like to make sure that he cleans himself out.  Patient has had no signs of active bleeding at the present time.        Review of Systems   Constitutional: Negative for activity change, appetite change, chills, fatigue and fever.   Respiratory: Negative for cough and shortness of breath.    Cardiovascular: Negative for chest pain and palpitations.   Gastrointestinal: Negative for abdominal distention, abdominal pain, diarrhea, nausea and vomiting.   Neurological: Positive for weakness.        All pertinent negatives and positives are as above. All other systems have been reviewed and are negative unless otherwise stated.     Objective    Temp:  [97.6 °F (36.4 °C)-98.6 °F (37 °C)] 97.9 °F (36.6 °C)  Heart Rate:  [73-81] 80  Resp:  [14-18] 16  BP: (148-187)/(67-84) 187/80  Physical Exam  Vitals and nursing note reviewed.   Constitutional:       Appearance: Normal appearance.   HENT:      Head: Normocephalic and atraumatic.      Mouth/Throat:      Mouth: Mucous membranes are dry.      Pharynx: Oropharynx is clear.   Eyes:      General: No scleral icterus.     Conjunctiva/sclera: Conjunctivae normal.   Cardiovascular:      Rate and Rhythm: Normal rate and regular rhythm.   Pulmonary:      Effort: Pulmonary effort is normal. No respiratory distress.      Breath sounds: Normal breath sounds. No stridor.   Abdominal:       General: Abdomen is flat. Bowel sounds are normal. There is no distension.      Palpations: Abdomen is soft. There is no mass.      Tenderness: There is no abdominal tenderness.      Hernia: No hernia is present.   Skin:     General: Skin is warm and dry.      Coloration: Skin is pale. Skin is not jaundiced.   Neurological:      General: No focal deficit present.      Mental Status: He is alert and oriented to person, place, and time.   Psychiatric:         Mood and Affect: Mood normal.         Behavior: Behavior normal.             Results Review:  I have reviewed the labs, radiology results, and diagnostic studies.    Laboratory Data:   Results from last 7 days   Lab Units 11/18/22  0543 11/18/22  0107 11/17/22  1600 11/17/22  0937   WBC 10*3/mm3  --  6.78  --  4.03   HEMOGLOBIN g/dL 7.4* 6.9* 6.6* 4.5*   HEMATOCRIT % 24.1* 23.2* 25.1* 15.6*   PLATELETS 10*3/mm3  --  181  --  165        Results from last 7 days   Lab Units 11/18/22  0543 11/17/22  0847   SODIUM mmol/L 139 140   POTASSIUM mmol/L 4.4 4.8   CHLORIDE mmol/L 108* 106   CO2 mmol/L 19.0* 21.0*   BUN mg/dL 28* 31*   CREATININE mg/dL 3.12* 3.28*   CALCIUM mg/dL 8.7 9.2   BILIRUBIN mg/dL 1.1  --    ALK PHOS U/L 58  --    ALT (SGPT) U/L 8  --    AST (SGOT) U/L 15  --    GLUCOSE mg/dL 88 111*       Culture Data:   No results found for: BLOODCX, URINECX, WOUNDCX, MRSACX, RESPCX, STOOLCX    Radiology Data:   Imaging Results (Last 24 Hours)     ** No results found for the last 24 hours. **          I have reviewed the patient's current medications.     Assessment/Plan     Active Hospital Problems    Diagnosis    • **Symptomatic anemia    • Iron deficiency    • Acute renal failure superimposed on stage 4 chronic kidney disease (HCC)    • Constipation    • Chronic anticoagulation        Plan:  Patient was admitted on 11/17 by Dr. García and GIANNI Berman.  Patient was admitted for significant weakness.  Patient was found to have a hemoglobin of 4.5.   Patient is on anticoagulation.  Patient denies any occult signs of bleeding.  Patient had fecal occult was reportedly negative in the emergency department.    Patient has been transfused 3 units, patient hemoglobin 7.4 and patient is still symptomatic.  We will transfuse 1 more unit of packed red blood cells.     Hold the patient's aspirin and Xarelto.  I will likely hold the patient's Xarelto long-term, it is for atrial fibrillation, given his age, and he has a history of a GI bleed on Pradaxa, and presented here with a hemoglobin of 4.5.  Feel as though the Xarelto risk outweighs the benefit.  Will likely resume the patient aspirin, but will likely hold it for 1 week.    Patient could benefit possibly from colonoscopy as an outpatient, however given his advanced age, they would like to avoid this if possible.    PT/OT    SCDs for VTE prophylaxis    Discharge Planning: I expect the patient to be discharged to home in 1-2 days.    Electronically signed by Shadi García MD, 11/18/22, 15:49 CST.    Electronically signed by Shadi García MD at 11/18/22 1813          Discharge Summary      Shadi García MD at 11/19/22 0813                [facility] Medicine Services  DISCHARGE SUMMARY       Date of Admission: 11/17/2022  Date of Discharge:  11/19/2022  Primary Care Physician: Hossein Hua MD    Presenting Problem/History of Present Illness:   Constipation for a week    Final Discharge Diagnoses:  Active Hospital Problems    Diagnosis    • **Symptomatic anemia    • Iron deficiency    • Acute renal failure superimposed on stage 4 chronic kidney disease (HCC)    • Constipation    • Chronic anticoagulation        Consults: None    Procedures Performed: None    Pertinent Test Results:   Results for orders placed during the hospital encounter of 05/25/22    Adult Transthoracic Echo Complete W/ Cont if Necessary Per Protocol (With Agitated Saline)    Interpretation  Summary  · Left ventricular ejection fraction appears to be 61 - 65%. Left ventricular systolic function is normal.  · Left ventricular wall thickness is consistent with mild concentric hypertrophy.  · Left ventricular diastolic dysfunction is noted.  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  · Mild to moderate mitral valve stenosis is present.  · Estimated right ventricular systolic pressure from tricuspid regurgitation is normal (<35 mmHg).  · There is no evidence of right to left shunt on bubble study.  · There is no evidence of intracardiac mass or thrombus.  · Compared to previous echo from 4/26/2022, there are no significant changes.      Imaging Results (All)     Procedure Component Value Units Date/Time    CT Abdomen Pelvis Without Contrast [[id number]] Collected: 11/17/22 1144     Updated: 11/17/22 1156    Narrative:      EXAM/TECHNIQUE: CT abdomen pelvis without contrast     INDICATION: Bowel obstruction suspected     COMPARISON: None available.     DLP: 614 mGy cm. Automated exposure control was also utilized to  decrease patient radiation dose.     FINDINGS:     Included lung bases are clear. Partially imaged mitral annular  calcification.     Unenhanced liver appears unremarkable. No gallstones common gallbladder  wall thickening, or biliary ductal dilatation.     Pancreas, spleen, and adrenal glands are unremarkable.     Bilateral renal cortical thinning. Multiple bilateral renal cysts are  present. 1.9 cm RIGHT upper pole hemorrhagic/proteinaceous renal cyst.  No urolithiasis or hydronephrosis. Mild trabeculated urinary bladder  wall thickening.     Mild colonic diverticulosis without evidence of diverticulitis.  Postoperative change of RIGHT hemicolectomy. No abnormal bowel  distention or evidence of active bowel inflammation.     No ascites or free pelvic fluid. No pelvic mass or pelvic collection.  Prostate is enlarged measuring 5.1 cm  transverse dimension.     Atherosclerotic nonaneurysmal abdominal aorta. No enlarged  retroperitoneal, mesenteric, pelvic, or inguinal lymph nodes.     No acute abdominal wall soft tissue abnormality. Bilateral  fat-containing inguinal hernias. No acute osseous finding.       Impression:         1.  No acute abdominopelvic findings. No bowel obstruction or evidence  of active bowel inflammation. Mild sigmoid diverticulosis without  evidence of diverticulitis. Mild to moderate volume stool in the colon.     2.  Bilateral renal cortical thinning/atrophy. Multiple bilateral renal  cysts.     3.  Enlarged prostate.  This report was finalized on 11/17/2022 11:53 by Dr. Wil Beckett MD.    XR Abdomen KUB [[id number]] Collected: 11/17/22 0952     Updated: 11/17/22 0956    Narrative:      EXAMINATION: KUB 11/17/2022     HISTORY: Constipation.     FINDINGS: KUB radiograph demonstrates mild constipation within the right  colon. There is no obstruction or free air. Postoperative changes are  noted within the right abdomen. There is arthritic change in the mid and  lower lumbar spine. There are multiple phleboliths within the pelvis.       Impression:      1.. Mild constipation. Otherwise normal bowel gas pattern.  This report was finalized on 11/17/2022 09:52 by Dr. Alfred Coates MD.        LAB RESULTS:      Lab 11/19/22  0411 11/18/22  1558 11/18/22  0543 11/18/22  0107 11/17/22  1600 11/17/22  0937   WBC 8.19  --   --  6.78  --  4.03   HEMOGLOBIN 9.7* 9.3* 7.4* 6.9* 6.6* 4.5*   HEMATOCRIT 31.2* 30.2* 24.1* 23.2* 25.1* 15.6*   PLATELETS 185  --   --  181  --  165   NEUTROS ABS  --   --   --   --   --  2.73   IMMATURE GRANS (ABS)  --   --   --   --   --  0.01   LYMPHS ABS  --   --   --   --   --  0.80   MONOS ABS  --   --   --   --   --  0.44   EOS ABS  --   --   --   --   --  0.04   MCV 81.7  --   --  81.4  --  80.4   PROCALCITONIN  --   --  0.10  --   --   --          Lab 11/19/22  0411 11/18/22  0543 11/17/22  0847    SODIUM 139 139 140   POTASSIUM 4.6 4.4 4.8   CHLORIDE 106 108* 106   CO2 21.0* 19.0* 21.0*   ANION GAP 12.0 12.0 13.0   BUN 25* 28* 31*   CREATININE 2.75* 3.12* 3.28*   EGFR 21.9* 18.8* 17.7*   GLUCOSE 107* 88 111*   CALCIUM 9.5 8.7 9.2         Lab 11/19/22  0411 11/18/22  0543   TOTAL PROTEIN 7.0 6.0   ALBUMIN 4.30 3.70   GLOBULIN 2.7 2.3   ALT (SGPT) 9 8   AST (SGOT) 20 15   BILIRUBIN 1.1 1.1   ALK PHOS 73 58                 Lab 11/17/22  0937 11/17/22  0847   IRON  --  8*   IRON SATURATION  --  2*   TIBC  --  522   TRANSFERRIN  --  350   FERRITIN  --  10.68*   ABO TYPING A  --    RH TYPING Negative  --    ANTIBODY SCREEN Negative  --          Brief Urine Lab Results  (Last result in the past 365 days)      Color   Clarity   Blood   Leuk Est   Nitrite   Protein   CREAT   Urine HCG        01/03/22 1048 YELLOW   Clear   TRACE   Negative   Negative                 Microbiology Results (last 10 days)     ** No results found for the last 240 hours. **          Hospital Course:   Patient was admitted on 11/17 by Dr. García and GIANNI Berman.  Patient was admitted for significant weakness.  Patient was found to have a hemoglobin of 4.5.  Patient is on anticoagulation and antiplatelets chronically.  Patient denies any occult signs of bleeding.  Patient had fecal occult was reportedly negative in the emergency department.     Patient has been transfused 4 units total.     Hold the patient's aspirin and Xarelto.  I will likely hold the patient's Xarelto long-term, it is for atrial fibrillation, given his age, and he has a history of a GI bleed on Pradaxa, and presented here with a hemoglobin of 4.5.  Feel as though the Xarelto risk outweighs the benefit.  Will likely resume the patient aspirin, but will likely hold it for 1 week.     Patient could benefit possibly from EGD as an outpatient, however given his advanced age, they would like to avoid this if possible.    Patient was extensively constipated upon  "arrival.  When he finally had a bowel movements with suppository and bowel regimen it was noted to be black.  Suspect patient had some bleeding but likely had stopped on its own as he was not having frequent stools as often seen with an active GI bleed.    My recommendation is to stop ASA and xarelto for 1 week and subsequently resume ASA after 1 week given my suspicion that he recently had a GI bleed.  Reaching out to patients primary cardiologist Dr. Toledo and Zena ARCHER as the patient happens to have an appointment with Zena on 11/22.  Will see if he would be a candidate for watchman procedure as I currently believe the risk of bleeding outweighs the benefit of xarelto at the present time.       PT/OT     SCDs for VTE prophylaxis      Physical Exam on Discharge:  /78 (BP Location: Left arm, Patient Position: Sitting)   Pulse 81   Temp 97.8 °F (36.6 °C) (Oral)   Resp 16   Ht 175.3 cm (69\")   Wt 88.5 kg (195 lb)   SpO2 97%   BMI 28.80 kg/m²   Physical Exam  Vitals and nursing note reviewed.   Constitutional:       Appearance: Normal appearance.   HENT:      Head: Normocephalic and atraumatic.      Mouth/Throat:      Mouth: Mucous membranes are dry.      Pharynx: Oropharynx is clear.   Eyes:      General: No scleral icterus.     Conjunctiva/sclera: Conjunctivae normal.   Cardiovascular:      Rate and Rhythm: Normal rate and regular rhythm.   Pulmonary:      Effort: Pulmonary effort is normal. No respiratory distress.      Breath sounds: Normal breath sounds. No stridor.   Abdominal:      General: Abdomen is flat. Bowel sounds are normal. There is no distension.      Palpations: Abdomen is soft. There is no mass.      Tenderness: There is no abdominal tenderness.      Hernia: No hernia is present.   Skin:     General: Skin is warm and dry.      Coloration: Skin is pale. Skin is not jaundiced.   Neurological:      General: No focal deficit present.      Mental Status: He is alert and oriented to " person, place, and time.   Psychiatric:         Mood and Affect: Mood normal.         Behavior: Behavior normal.     Condition on Discharge: Stable    Discharge Disposition:  Home or Self Care    Discharge Medications:     Discharge Medications      New Medications      Instructions Start Date   amLODIPine 5 MG tablet  Commonly known as: NORVASC   5 mg, Oral, Every 24 Hours Scheduled      ferrous gluconate 324 (37.5 Fe) MG tablet tablet   324 mg, Oral, Daily With Breakfast      pantoprazole 40 MG EC tablet  Commonly known as: PROTONIX   40 mg, Oral, Daily      polyethylene glycol 17 g packet  Commonly known as: MIRALAX   17 g, Oral, Daily      sennosides-docusate 8.6-50 MG per tablet  Commonly known as: PERICOLACE   1 tablet, Oral, Daily PRN         Continue These Medications      Instructions Start Date   atorvastatin 80 MG tablet  Commonly known as: LIPITOR   80 mg, Oral, Daily      cloNIDine 0.1 MG tablet  Commonly known as: CATAPRES   0.1 mg, Oral, Every 8 Hours PRN      coenzyme Q10 100 MG capsule   100 mg, Oral, Daily      fish oil 1000 MG capsule capsule   1,000 mg, Oral, Daily With Breakfast      irbesartan 300 MG tablet  Commonly known as: AVAPRO   300 mg, Oral, Nightly      nitroglycerin 0.4 MG SL tablet  Commonly known as: NITROSTAT   0.4 mg, Sublingual, As Needed      ranolazine 500 MG 12 hr tablet  Commonly known as: Ranexa   500 mg, Oral, 2 Times Daily      sodium bicarbonate 650 MG tablet   650 mg, Oral, 2 Times Daily      vitamin D3 125 MCG (5000 UT) capsule capsule   5,000 Units, Oral, Daily         Stop These Medications    aspirin 81 MG EC tablet     rivaroxaban 15 MG tablet  Commonly known as: XARELTO            Discharge Diet:   Diet Instructions     Diet: Regular; Thin      Discharge Diet: Regular    Fluid Consistency: Thin          Activity at Discharge:   Activity Instructions     Activity as Tolerated            Follow-up Appointments:   Future Appointments   Date Time Provider Department  Center   11/22/2022  2:30 PM Zena Sykes APRN [facility] CD PAD PAD   1/30/2023 10:30 AM Mona Alan MD [facility] N PAD PAD   2/14/2023 11:15 AM [facility] PAD DEVICE CHECK [facility] CD PAD PAD   4/12/2023  9:00 AM PAD US NIVAS CART 2 BH PAD US PAD   4/12/2023 10:00 AM Berna Recinos APRN [facility] VS PAD PAD     11/23/2022 - Ricky at [facility] 8:30 AM     Test Results Pending at Discharge: None    Electronically signed by Shadi García MD, 11/19/22, 08:13 CST.    Time: 35 minutes.         Electronically signed by Shadi García MD at 11/19/22 0111

## 2022-11-21 NOTE — PAYOR COMM NOTE
"RE: [id number]    [facility]  [place],  [phone]  OR   FAX  [phone]       Koffi Dickey (87 y.o. Male)     Date of Birth   1935    Social Security Number       Address   [address]  [facility] [address]    Home Phone   [phone]    MRN   [id number]       Lutheran   [employer]orationism    Marital Status                               Admission Date   11/17/22    Admission Type   Emergency    Admitting Provider   Shadi García MD    Attending Provider       Department, Room/Bed   [facility] 3C, 370/1       Discharge Date   11/19/2022    Discharge Disposition   Home or Self Care    Discharge Destination                               Attending Provider: (none)   Allergies: Sulfa Antibiotics    Isolation: None   Infection: None   Code Status: Prior    Ht: 175.3 cm (69\")   Wt: 88.5 kg (195 lb)    Admission Cmt: None   Principal Problem: Symptomatic anemia [D64.9]                 Active Insurance as of 11/17/2022     Primary Coverage     Payor Plan Insurance Group Employer/Plan Group    MEDICARE MEDICARE A & B      Payor Plan Address Payor Plan Phone Number Payor Plan Fax Number Effective Dates    [address] [phone]  10/1/2002 - None Entered    [facility] [address]       Subscriber Name Subscriber Birth Date Member ID       KOFFI DICKEY 1935 [id number]           Secondary Coverage     Payor Plan Insurance Group Employer/Plan Group    [employer] [place] [id number]     Payor Plan Address Payor Plan Phone Number Payor Plan Fax Number Effective Dates    [address] [address] [phone]  1/1/2013 - None Entered    [facility] [id number]       Subscriber Name Subscriber Birth Date Member ID       KOFFI DICKEY 1935 [id number]                 Emergency Contacts      (Rel.) Home Phone Work Phone Mobile Phone    Sonya Dickey (Spouse) [phone] -- --    SUSANWILLI (Daughter) [phone] -- [phone]             "   Discharge Summary      Shadi García MD at 11/19/22 0813                [facility]  DISCHARGE SUMMARY       Date of Admission: 11/17/2022  Date of Discharge:  11/19/2022  Primary Care Physician: Hossein Hua MD    Presenting Problem/History of Present Illness:   Constipation for a week    Final Discharge Diagnoses:  Active Hospital Problems    Diagnosis    • **Symptomatic anemia    • Iron deficiency    • Acute renal failure superimposed on stage 4 chronic kidney disease (HCC)    • Constipation    • Chronic anticoagulation        Consults: None    Procedures Performed: None    Pertinent Test Results:   Results for orders placed during the hospital encounter of 05/25/22    Adult Transthoracic Echo Complete W/ Cont if Necessary Per Protocol (With Agitated Saline)    Interpretation Summary  · Left ventricular ejection fraction appears to be 61 - 65%. Left ventricular systolic function is normal.  · Left ventricular wall thickness is consistent with mild concentric hypertrophy.  · Left ventricular diastolic dysfunction is noted.  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  · Mild to moderate mitral valve stenosis is present.  · Estimated right ventricular systolic pressure from tricuspid regurgitation is normal (<35 mmHg).  · There is no evidence of right to left shunt on bubble study.  · There is no evidence of intracardiac mass or thrombus.  · Compared to previous echo from 4/26/2022, there are no significant changes.      Imaging Results (All)     Procedure Component Value Units Date/Time    CT Abdomen Pelvis Without Contrast [[id number]] Collected: 11/17/22 1144     Updated: 11/17/22 1156    Narrative:      EXAM/TECHNIQUE: CT abdomen pelvis without contrast     INDICATION: Bowel obstruction suspected     COMPARISON: None available.     DLP: 614 mGy cm. Automated exposure control was also  utilized to  decrease patient radiation dose.     FINDINGS:     Included lung bases are clear. Partially imaged mitral annular  calcification.     Unenhanced liver appears unremarkable. No gallstones common gallbladder  wall thickening, or biliary ductal dilatation.     Pancreas, spleen, and adrenal glands are unremarkable.     Bilateral renal cortical thinning. Multiple bilateral renal cysts are  present. 1.9 cm RIGHT upper pole hemorrhagic/proteinaceous renal cyst.  No urolithiasis or hydronephrosis. Mild trabeculated urinary bladder  wall thickening.     Mild colonic diverticulosis without evidence of diverticulitis.  Postoperative change of RIGHT hemicolectomy. No abnormal bowel  distention or evidence of active bowel inflammation.     No ascites or free pelvic fluid. No pelvic mass or pelvic collection.  Prostate is enlarged measuring 5.1 cm transverse dimension.     Atherosclerotic nonaneurysmal abdominal aorta. No enlarged  retroperitoneal, mesenteric, pelvic, or inguinal lymph nodes.     No acute abdominal wall soft tissue abnormality. Bilateral  fat-containing inguinal hernias. No acute osseous finding.       Impression:         1.  No acute abdominopelvic findings. No bowel obstruction or evidence  of active bowel inflammation. Mild sigmoid diverticulosis without  evidence of diverticulitis. Mild to moderate volume stool in the colon.     2.  Bilateral renal cortical thinning/atrophy. Multiple bilateral renal  cysts.     3.  Enlarged prostate.  This report was finalized on 11/17/2022 11:53 by Dr. Wil Beckett MD.    XR Abdomen KUB [[id number]] Collected: 11/17/22 0952     Updated: 11/17/22 0956    Narrative:      EXAMINATION: KUB 11/17/2022     HISTORY: Constipation.     FINDINGS: KUB radiograph demonstrates mild constipation within the right  colon. There is no obstruction or free air. Postoperative changes are  noted within the right abdomen. There is arthritic change in the mid and  lower lumbar spine.  There are multiple phleboliths within the pelvis.       Impression:      1.. Mild constipation. Otherwise normal bowel gas pattern.  This report was finalized on 11/17/2022 09:52 by Dr. Alfred Coates MD.        LAB RESULTS:      Lab 11/19/22  0411 11/18/22  1558 11/18/22  0543 11/18/22  0107 11/17/22  1600 11/17/22  0937   WBC 8.19  --   --  6.78  --  4.03   HEMOGLOBIN 9.7* 9.3* 7.4* 6.9* 6.6* 4.5*   HEMATOCRIT 31.2* 30.2* 24.1* 23.2* 25.1* 15.6*   PLATELETS 185  --   --  181  --  165   NEUTROS ABS  --   --   --   --   --  2.73   IMMATURE GRANS (ABS)  --   --   --   --   --  0.01   LYMPHS ABS  --   --   --   --   --  0.80   MONOS ABS  --   --   --   --   --  0.44   EOS ABS  --   --   --   --   --  0.04   MCV 81.7  --   --  81.4  --  80.4   PROCALCITONIN  --   --  0.10  --   --   --          Lab 11/19/22  0411 11/18/22  0543 11/17/22  0847   SODIUM 139 139 140   POTASSIUM 4.6 4.4 4.8   CHLORIDE 106 108* 106   CO2 21.0* 19.0* 21.0*   ANION GAP 12.0 12.0 13.0   BUN 25* 28* 31*   CREATININE 2.75* 3.12* 3.28*   EGFR 21.9* 18.8* 17.7*   GLUCOSE 107* 88 111*   CALCIUM 9.5 8.7 9.2         Lab 11/19/22  0411 11/18/22  0543   TOTAL PROTEIN 7.0 6.0   ALBUMIN 4.30 3.70   GLOBULIN 2.7 2.3   ALT (SGPT) 9 8   AST (SGOT) 20 15   BILIRUBIN 1.1 1.1   ALK PHOS 73 58                 Lab 11/17/22  0937 11/17/22  0847   IRON  --  8*   IRON SATURATION  --  2*   TIBC  --  522   TRANSFERRIN  --  350   FERRITIN  --  10.68*   ABO TYPING A  --    RH TYPING Negative  --    ANTIBODY SCREEN Negative  --          Brief Urine Lab Results  (Last result in the past 365 days)      Color   Clarity   Blood   Leuk Est   Nitrite   Protein   CREAT   Urine HCG        01/03/22 1048 YELLOW   Clear   TRACE   Negative   Negative                 Microbiology Results (last 10 days)     ** No results found for the last 240 hours. **          Hospital Course:   Patient was admitted on 11/17 by Dr. García and GIANNI Berman.  Patient was admitted for  "significant weakness.  Patient was found to have a hemoglobin of 4.5.  Patient is on anticoagulation and antiplatelets chronically.  Patient denies any occult signs of bleeding.  Patient had fecal occult was reportedly negative in the emergency department.     Patient has been transfused 4 units total.     Hold the patient's aspirin and Xarelto.  I will likely hold the patient's Xarelto long-term, it is for atrial fibrillation, given his age, and he has a history of a GI bleed on Pradaxa, and presented here with a hemoglobin of 4.5.  Feel as though the Xarelto risk outweighs the benefit.  Will likely resume the patient aspirin, but will likely hold it for 1 week.     Patient could benefit possibly from EGD as an outpatient, however given his advanced age, they would like to avoid this if possible.    Patient was extensively constipated upon arrival.  When he finally had a bowel movements with suppository and bowel regimen it was noted to be black.  Suspect patient had some bleeding but likely had stopped on its own as he was not having frequent stools as often seen with an active GI bleed.    My recommendation is to stop ASA and xarelto for 1 week and subsequently resume ASA after 1 week given my suspicion that he recently had a GI bleed.  Reaching out to patients primary cardiologist Dr. Toledo and Zena ARCHER as the patient happens to have an appointment with Zena on 11/22.  Will see if he would be a candidate for watchman procedure as I currently believe the risk of bleeding outweighs the benefit of xarelto at the present time.       PT/OT     SCDs for VTE prophylaxis      Physical Exam on Discharge:  /78 (BP Location: Left arm, Patient Position: Sitting)   Pulse 81   Temp 97.8 °F (36.6 °C) (Oral)   Resp 16   Ht 175.3 cm (69\")   Wt 88.5 kg (195 lb)   SpO2 97%   BMI 28.80 kg/m²   Physical Exam  Vitals and nursing note reviewed.   Constitutional:       Appearance: Normal appearance.   HENT:      Head: " Normocephalic and atraumatic.      Mouth/Throat:      Mouth: Mucous membranes are dry.      Pharynx: Oropharynx is clear.   Eyes:      General: No scleral icterus.     Conjunctiva/sclera: Conjunctivae normal.   Cardiovascular:      Rate and Rhythm: Normal rate and regular rhythm.   Pulmonary:      Effort: Pulmonary effort is normal. No respiratory distress.      Breath sounds: Normal breath sounds. No stridor.   Abdominal:      General: Abdomen is flat. Bowel sounds are normal. There is no distension.      Palpations: Abdomen is soft. There is no mass.      Tenderness: There is no abdominal tenderness.      Hernia: No hernia is present.   Skin:     General: Skin is warm and dry.      Coloration: Skin is pale. Skin is not jaundiced.   Neurological:      General: No focal deficit present.      Mental Status: He is alert and oriented to person, place, and time.   Psychiatric:         Mood and Affect: Mood normal.         Behavior: Behavior normal.     Condition on Discharge: Stable    Discharge Disposition:  Home or Self Care    Discharge Medications:     Discharge Medications      New Medications      Instructions Start Date   amLODIPine 5 MG tablet  Commonly known as: NORVASC   5 mg, Oral, Every 24 Hours Scheduled      ferrous gluconate 324 (37.5 Fe) MG tablet tablet   324 mg, Oral, Daily With Breakfast      pantoprazole 40 MG EC tablet  Commonly known as: PROTONIX   40 mg, Oral, Daily      polyethylene glycol 17 g packet  Commonly known as: MIRALAX   17 g, Oral, Daily      sennosides-docusate 8.6-50 MG per tablet  Commonly known as: PERICOLACE   1 tablet, Oral, Daily PRN         Continue These Medications      Instructions Start Date   atorvastatin 80 MG tablet  Commonly known as: LIPITOR   80 mg, Oral, Daily      cloNIDine 0.1 MG tablet  Commonly known as: CATAPRES   0.1 mg, Oral, Every 8 Hours PRN      coenzyme Q10 100 MG capsule   100 mg, Oral, Daily      fish oil 1000 MG capsule capsule   1,000 mg, Oral, Daily  With Breakfast      irbesartan 300 MG tablet  Commonly known as: AVAPRO   300 mg, Oral, Nightly      nitroglycerin 0.4 MG SL tablet  Commonly known as: NITROSTAT   0.4 mg, Sublingual, As Needed      ranolazine 500 MG 12 hr tablet  Commonly known as: Ranexa   500 mg, Oral, 2 Times Daily      sodium bicarbonate 650 MG tablet   650 mg, Oral, 2 Times Daily      vitamin D3 125 MCG (5000 UT) capsule capsule   5,000 Units, Oral, Daily         Stop These Medications    aspirin 81 MG EC tablet     rivaroxaban 15 MG tablet  Commonly known as: XARELTO            Discharge Diet:   Diet Instructions     Diet: Regular; Thin      Discharge Diet: Regular    Fluid Consistency: Thin          Activity at Discharge:   Activity Instructions     Activity as Tolerated            Follow-up Appointments:   Future Appointments   Date Time Provider Department Center   11/22/2022  2:30 PM Zena Sykes APRN [facility] CD PAD PAD   1/30/2023 10:30 AM Mona Alan MD [facility] N PAD PAD   2/14/2023 11:15 AM [facility] GROUP PAD DEVICE CHECK [facility] CD PAD PAD   4/12/2023  9:00 AM PAD US NIVAS CART 2 BH PAD US PAD   4/12/2023 10:00 AM Berna Recinos APRN [facility] VS PAD PAD     11/23/2022 - Ricky at [facility] 8:30 AM     Test Results Pending at Discharge: None    Electronically signed by Radha Barry MD, 11/19/22, 08:13 CST.    Time: 35 minutes.         Electronically signed by Radha Barry MD at 11/19/22 1417       Discharge Order (From admission, onward)     Start     Ordered    11/19/22 0753  Discharge patient  Once        Expected Discharge Date: 11/19/22    Discharge Disposition: Home or Self Care    Physician of Record for Attribution - Please select from Treatment Team: RADHA BARRY [[id number]]    Review needed by CMO to determine Physician of Record: No       Question Answer Comment   Physician of Record for Attribution - Please select from Treatment Team RADHA BARRY    Review needed by CMO to determine  Physician of Record No        11/19/22 0881

## 2022-11-21 NOTE — TELEPHONE ENCOUNTER
Nilam 45 Transitions Initial Follow Up Call    Outreach made within 2 business days of discharge: Yes    Patient: Montana Calabrese   Patient : 1935 MRN: [id number]    Reason for Admission: Constipation for a week    Discharge Date: 2022      Discharge Diagnoses: Active Hospital Problems   Diagnosis    **Symptomatic anemia    Iron deficiency    Acute renal failure superimposed on stage 4 chronic kidney disease (HCC)    Constipation    Chronic anticoagulation      Spoke with: Veronika    Discharge department/facility: [facility]    TCM Interactive Patient Contact:  Was patient able to fill all prescriptions: Yes  Was patient instructed to bring all medications to the follow-up visit: Yes  Is patient taking all medications as directed in the discharge summary? Yes  Does patient understand their discharge instructions: Yes  Does patient have questions or concerns that need addressed prior to 7-14 day follow up office visit: no    Pt is having Bms now and is not having any abdominal pain. He is eating and drinking a normal diet. Pt does not want to schedule a follow up with Dr Aron Ramires he want to follow up with a doctor from [facility] waygum had me cancel his follow up in January. She will let us know if they change their minds.     Scheduled appointment with PCP within 7-14 days    Follow Up  Future Appointments   Date Time Provider Adalberto Ricardo   2023  9:15 AM MD ROBBY Green [facility]       Mary Olsen, [address] [place] [address] Selina Roy

## 2022-11-22 ENCOUNTER — OFFICE VISIT (OUTPATIENT)
Dept: CARDIOLOGY | Facility: CLINIC | Age: 87
End: 2022-11-22

## 2022-11-22 ENCOUNTER — READMISSION MANAGEMENT (OUTPATIENT)
Dept: CALL CENTER | Facility: HOSPITAL | Age: 87
End: 2022-11-22

## 2022-11-22 VITALS
OXYGEN SATURATION: 99 % | HEIGHT: 69 IN | BODY MASS INDEX: 28.38 KG/M2 | WEIGHT: 191.6 LBS | HEART RATE: 77 BPM | SYSTOLIC BLOOD PRESSURE: 156 MMHG | DIASTOLIC BLOOD PRESSURE: 88 MMHG

## 2022-11-22 DIAGNOSIS — Z95.2 S/P TAVR (TRANSCATHETER AORTIC VALVE REPLACEMENT): Primary | ICD-10-CM

## 2022-11-22 DIAGNOSIS — I48.0 PAF (PAROXYSMAL ATRIAL FIBRILLATION): ICD-10-CM

## 2022-11-22 DIAGNOSIS — I48.92 ATRIAL FLUTTER WITH CONTROLLED RESPONSE: ICD-10-CM

## 2022-11-22 PROCEDURE — 99214 OFFICE O/P EST MOD 30 MIN: CPT | Performed by: NURSE PRACTITIONER

## 2022-11-22 NOTE — OUTREACH NOTE
Medical Week 1 Survey    Flowsheet Row Responses   [facility] patient discharged from? [place]   Does the patient have one of the following disease processes/diagnoses(primary or secondary)? Other   Week 1 attempt successful? Yes   Call start time 1508   Call end time 1511   Person spoke with today (if not patient) and relationship Dahlia, leticia Ladd reviewed with patient/caregiver? Yes   Is the patient having any side effects they believe may be caused by any medication additions or changes? No   Does the patient have all medications ordered at discharge? Yes   Is the patient taking all medications as directed (includes completed medication regime)? Yes   Does the patient have a primary care provider?  Yes   Does the patient have an appointment with their PCP within 7 days of discharge? Yes   Comments regarding PCP will be seeing new primary, Dr García   Has the patient kept scheduled appointments due by today? Yes   Has home health visited the patient within 72 hours of discharge? N/A   Psychosocial issues? No   Did the patient receive a copy of their discharge instructions? Yes   Nursing interventions Reviewed instructions with patient   What is the patient's perception of their health status since discharge? Improving   Is the patient/caregiver able to teach back signs and symptoms related to disease process for when to call PCP? Yes   Is the patient/caregiver able to teach back signs and symptoms related to disease process for when to call 911? Yes   Is the patient/caregiver able to teach back the hierarchy of who to call/visit for symptoms/problems? PCP, Specialist, Home health nurse, Urgent Care, ED, 911 Yes   Week 1 call completed? Yes          ASHLEY REBOLLEDO - Registered Nurse

## 2022-11-22 NOTE — PROGRESS NOTES
Enter Query Response Below      Query Response:   Patient has FRANCO supported by the increase of Cr > 0.3 suspected within a 48 hour period, it also resolved within a short time period.  This falls within the KDIGO definition of FRANCO    Electronically signed by Shadi García MD, 22, 2:38 AM CST.           If applicable, please update the problem list.        Patient: Mireille Magallanes        : 1935  Account: [id number]           Admit Date: 2022        How to Respond to this query:       a. Click New Note     b. Answer query within the yellow box.                c. Update the Problem List, if applicable.      If you have any questions about this query contact me at: [email or web address]     ,     86 y/o noted with symptomatic Anemia, Iron deficiency, Acute Renal Failure superimposed on Chronic Kidney Disease stage 4, Constipation and Chronic Anticoagulation. On admit, creatinine 3.28 and improved to 2.75. H&P noted baseline 2.7. Patient treated with monitoring and IV fluids.     After study, is the Acute Renal Failure supported:     >>No  >>Yes, please provide supporting clinical indicators:___________  >>Other (please specify):_____________  >>Unable to determine     By submitting this query, we are merely seeking further clarification of documentation to accurately reflect all conditions that you are monitoring, evaluating, treating or that extend the hospitalization or utilize additional resources of care. Please utilize your independent clinical judgment when addressing the question(s) above.     This query and your response, once completed, will be entered into the legal medical record.    Sincerely,  Sara Yuen RN CCDS   Clinical Documentation Integrity Program

## 2022-11-22 NOTE — PROGRESS NOTES
Enter Query Response Below      Query Response:     Anemia due to iron deficiency and CKD, also likely chronic blood loss due to anticoagulation    Electronically signed by Shadi García MD, 22, 2:37 AM CST.           If applicable, please update the problem list.        Patient: Mireille Magallanes        : 1935  Account: [id number]           Admit Date: 2022        How to Respond to this query:       a. Click New Note     b. Answer query within the yellow box.                c. Update the Problem List, if applicable.      If you have any questions about this query contact me at: [email or web address]     Dr. García,     88 y/o noted with symptomatic Anemia, Iron deficiency, Acute Renal Failure superimposed on Chronic Kidney Disease stage 4, Constipation and Chronic Anticoagulation. On admit, H&H 4.5 and 15.6.  Iron profile with: iron 8mcg/dl, iron saturation 2%, ferritin 10.68 ng/ml. Patient treated with monitoring, Ferrlecit and blood transfusion.     After study can the Anemia be specified as:     >>Anemia due to iron deficiency   >>Anemia due to CKD  >>Anemia due to iron deficiency and CKD   >>Other (please specify):_________  >>Unable to determine     By submitting this query, we are merely seeking further clarification of documentation to accurately reflect all conditions that you are monitoring, evaluating, treating or that extend the hospitalization or utilize additional resources of care. Please utilize your independent clinical judgment when addressing the question(s) above.     This query and your response, once completed, will be entered into the legal medical record.    Sincerely,  Sara Yuen RN CCDS   Clinical Documentation Integrity Program

## 2022-11-23 ENCOUNTER — PATIENT ROUNDING (BHMG ONLY) (OUTPATIENT)
Dept: INTERNAL MEDICINE | Facility: CLINIC | Age: 87
End: 2022-11-23

## 2022-11-23 ENCOUNTER — OFFICE VISIT (OUTPATIENT)
Dept: INTERNAL MEDICINE | Facility: CLINIC | Age: 87
End: 2022-11-23

## 2022-11-23 VITALS
DIASTOLIC BLOOD PRESSURE: 72 MMHG | TEMPERATURE: 97.9 F | SYSTOLIC BLOOD PRESSURE: 140 MMHG | HEIGHT: 69 IN | BODY MASS INDEX: 28.44 KG/M2 | HEART RATE: 80 BPM | OXYGEN SATURATION: 100 % | WEIGHT: 192 LBS

## 2022-11-23 DIAGNOSIS — N18.4 CHRONIC RENAL FAILURE, STAGE 4 (SEVERE): ICD-10-CM

## 2022-11-23 DIAGNOSIS — Z95.2 S/P TAVR (TRANSCATHETER AORTIC VALVE REPLACEMENT): Primary | ICD-10-CM

## 2022-11-23 DIAGNOSIS — K92.1 MELENA: ICD-10-CM

## 2022-11-23 DIAGNOSIS — I25.700 CORONARY ARTERY DISEASE INVOLVING CORONARY BYPASS GRAFT OF NATIVE HEART WITH UNSTABLE ANGINA PECTORIS: ICD-10-CM

## 2022-11-23 DIAGNOSIS — E87.5 HYPERKALEMIA: ICD-10-CM

## 2022-11-23 DIAGNOSIS — I48.92 ATRIAL FLUTTER WITH CONTROLLED RESPONSE: ICD-10-CM

## 2022-11-23 DIAGNOSIS — D64.9 SYMPTOMATIC ANEMIA: ICD-10-CM

## 2022-11-23 DIAGNOSIS — E61.1 IRON DEFICIENCY: ICD-10-CM

## 2022-11-23 PROCEDURE — 99204 OFFICE O/P NEW MOD 45 MIN: CPT | Performed by: INTERNAL MEDICINE

## 2022-11-23 RX ORDER — ASPIRIN 81 MG/1
81 TABLET ORAL DAILY
Start: 2022-11-23

## 2022-11-23 NOTE — PROGRESS NOTES
Chief Complaint  Establish Care (TCM: discharged from [facility] on 11/19/2022)    Subjective        Mireille Mgaallanes presents to [facility] MEDICAL [facility] PRIMARY CARE    HPI  Mr. Magallanes is an 87-year-old gentleman with a past medical history of TAVR, coronary artery disease, carotid disease status post endarterectomy bilaterally, and atrial flutter.  Patient also has chronic kidney disease.  Patient was recently hospitalized under my care on 11/17 through 11/19.  Patient presented with significant constipation as well as weakness and difficulty getting around.  Patient was found to have a hemoglobin of 4.5.  Patient reportedly had a hematoma and significant ecchymosis status post his carotid endarterectomy in September, patient has not had a repeat hemoglobin since then.  Patient also reported that he had some dark stools at one point.  Patient was subsequently constipated for 1 week so we did not think that he was having any active GI bleeding at that time.  Patient upon admission was on aspirin and Xarelto.  At the time of discharge we held both the aspirin and the Xarelto with plans of resuming the aspirin within 1 week and continue holding Xarelto for a long duration if not indefinitely.  The plan was to have him see cardiology at an 11/22 visit for consideration of watchman.  I have been in discussion with Dr. Toledo as well as Zena ARCHER in regards to this.  The plan at present will be to have the patient referred to GI for an upper endoscopy and in transition forward work-up for watchman procedure.  We will resume aspirin.    Patient has been having normal stools, indicates they are a little bit dark, but are not black or tarry.  Patient tolerating the p.o. iron.    Patient is no longer symptomatic from his anemia, energy is returning to normal.    Review of Systems   Constitutional: Negative for activity change, chills, fatigue and fever.   Respiratory: Negative for cough and shortness of  "breath.    Cardiovascular: Negative for chest pain and palpitations.   Gastrointestinal: Negative for abdominal distention, abdominal pain, diarrhea, nausea and vomiting.   Musculoskeletal: Negative for arthralgias and myalgias.       Objective   Vital Signs:  /72 (BP Location: Left arm, Patient Position: Sitting, Cuff Size: Adult)   Pulse 80   Temp 97.9 °F (36.6 °C) (Temporal)   Ht 175.3 cm (69\")   Wt 87.1 kg (192 lb)   SpO2 100%   BMI 28.35 kg/m²   Estimated body mass index is 28.35 kg/m² as calculated from the following:    Height as of this encounter: 175.3 cm (69\").    Weight as of this encounter: 87.1 kg (192 lb).      Physical Exam  Vitals and nursing note reviewed.   Constitutional:       Appearance: Normal appearance.   HENT:      Head: Normocephalic and atraumatic.      Mouth/Throat:      Mouth: Mucous membranes are dry.      Pharynx: Oropharynx is clear.   Eyes:      General: No scleral icterus.     Conjunctiva/sclera: Conjunctivae normal.   Cardiovascular:      Rate and Rhythm: Normal rate and regular rhythm.   Abdominal:      General: Abdomen is flat. Bowel sounds are normal. There is no distension.      Palpations: Abdomen is soft. There is no mass.      Tenderness: There is no abdominal tenderness.      Hernia: No hernia is present.   Skin:     General: Skin is warm and dry.      Coloration: Skin is not jaundiced or pale.      Findings: No bruising.   Neurological:      General: No focal deficit present.      Mental Status: He is alert and oriented to person, place, and time.   Psychiatric:         Mood and Affect: Mood normal.         Behavior: Behavior normal.                     Assessment and Plan   Diagnoses and all orders for this visit:    1. S/P TAVR (transcatheter aortic valve replacement) (Primary)  -     aspirin 81 MG EC tablet; Take 1 tablet by mouth Daily.    2. Atrial flutter with controlled response (HCC)  -     aspirin 81 MG EC tablet; Take 1 tablet by mouth Daily.    3. " Coronary artery disease involving coronary bypass graft of native heart with unstable angina pectoris (HCC)  -     aspirin 81 MG EC tablet; Take 1 tablet by mouth Daily.    4. Chronic renal failure, stage 4 (severe) (HCC)  -     Basic metabolic panel; Future    5. Symptomatic anemia  -     CBC (No Diff); Future  -     Ambulatory Referral to Gastroenterology  -     CBC (No Diff); Future  -     Iron Profile; Future  -     Ferritin; Future    6. Iron deficiency  -     CBC (No Diff); Future  -     Iron Profile; Future  -     Ferritin; Future    7. Melena  -     Ambulatory Referral to Gastroenterology      We will repeat labs in 2 weeks to make sure that he is tolerating resumption of aspirin.  Referral to GI.  Have reached out to Aury Moya with Dr. Ding office with the plan      Result Review :  The following data was reviewed by: Shadi García MD on 11/23/2022:  CMP    CMP 11/17/22 11/18/22 11/19/22   Glucose 111 (A) 88 107 (A)   BUN 31 (A) 28 (A) 25 (A)   Creatinine 3.28 (A) 3.12 (A) 2.75 (A)   Sodium 140 139 139   Potassium 4.8 4.4 4.6   Chloride 106 108 (A) 106   Calcium 9.2 8.7 9.5   Albumin  3.70 4.30   Total Bilirubin  1.1 1.1   Alkaline Phosphatase  58 73   AST (SGOT)  15 20   ALT (SGPT)  8 9   (A) Abnormal value       Comments are available for some flowsheets but are not being displayed.           CBC    CBC 11/17/22 11/17/22 11/18/22 11/18/22 11/18/22 11/18/22 11/19/22    0937 1600 0023 0107 0543 1558    WBC 4.03   6.78   8.19   RBC 1.94 (A)   2.85 (A)   3.82 (A)   Hemoglobin 4.5 (A) 6.6 (A)  (C) 6.9 (A) 7.4 (A) 9.3 (A) 9.7 (A)   Hematocrit 15.6 (A) 25.1 (A)  (C) 23.2 (A) 24.1 (A) 30.2 (A) 31.2 (A)   MCV 80.4   81.4   81.7   MCH 23.2 (A)   24.6 (A)   25.4 (A)   MCHC 28.8 (A)   30.2 (A)   31.1 (A)   RDW 16.7 (A)   16.5 (A)   16.8 (A)   Platelets 165   181   185   (A) Abnormal value (C) Corrected value       Comments are available for some flowsheets but are not being displayed.           CBC w/diff     CBC w/Diff 11/17/22 11/17/22 11/18/22 11/18/22 11/18/22 11/18/22 11/19/22    0937 1600 0023 0107 0543 1558    WBC 4.03   6.78   8.19   RBC 1.94 (A)   2.85 (A)   3.82 (A)   Hemoglobin 4.5 (A) 6.6 (A)  (C) 6.9 (A) 7.4 (A) 9.3 (A) 9.7 (A)   Hematocrit 15.6 (A) 25.1 (A)  (C) 23.2 (A) 24.1 (A) 30.2 (A) 31.2 (A)   MCV 80.4   81.4   81.7   MCH 23.2 (A)   24.6 (A)   25.4 (A)   MCHC 28.8 (A)   30.2 (A)   31.1 (A)   RDW 16.7 (A)   16.5 (A)   16.8 (A)   Platelets 165   181   185   Neutrophil Rel % 67.8         Immature Granulocyte Rel % 0.2         Lymphocyte Rel % 19.9         Monocyte Rel % 10.9         Eosinophil Rel % 1.0         Basophil Rel % 0.2         (A) Abnormal value (C) Corrected value       Comments are available for some flowsheets but are not being displayed.                         Follow Up   Return in about 8 weeks (around 1/16/2023), or if symptoms worsen or fail to improve.  Patient was given instructions and counseling regarding his condition or for health maintenance advice. Please see specific information pulled into the AVS if appropriate.       YULIA García MD, [facility]       Electronically signed by Shadi García MD, 11/23/22, 9:07 AM CST.

## 2022-11-23 NOTE — PROGRESS NOTES
A My-Chart message has been sent to the patient for PATIENT ROUNDING with [facility].

## 2022-11-24 LAB
BUN SERPL-MCNC: 26 MG/DL (ref 8–23)
BUN/CREAT SERPL: 8.7 (ref 7–25)
CALCIUM SERPL-MCNC: 9.7 MG/DL (ref 8.6–10.5)
CHLORIDE SERPL-SCNC: 105 MMOL/L (ref 98–107)
CO2 SERPL-SCNC: 22 MMOL/L (ref 22–29)
CREAT SERPL-MCNC: 3 MG/DL (ref 0.76–1.27)
EGFRCR SERPLBLD CKD-EPI 2021: 19.7 ML/MIN/1.73
ERYTHROCYTE [DISTWIDTH] IN BLOOD BY AUTOMATED COUNT: 17.5 % (ref 12.3–15.4)
GLUCOSE SERPL-MCNC: 92 MG/DL (ref 65–99)
HCT VFR BLD AUTO: 31.4 % (ref 37.5–51)
HGB BLD-MCNC: 9.7 G/DL (ref 13–17.7)
MCH RBC QN AUTO: 26.1 PG (ref 26.6–33)
MCHC RBC AUTO-ENTMCNC: 30.9 G/DL (ref 31.5–35.7)
MCV RBC AUTO: 84.6 FL (ref 79–97)
PLATELET # BLD AUTO: 166 10*3/MM3 (ref 140–450)
POTASSIUM SERPL-SCNC: 5.5 MMOL/L (ref 3.5–5.2)
RBC # BLD AUTO: 3.71 10*6/MM3 (ref 4.14–5.8)
SODIUM SERPL-SCNC: 139 MMOL/L (ref 136–145)
WBC # BLD AUTO: 5.96 10*3/MM3 (ref 3.4–10.8)

## 2022-11-29 ENCOUNTER — OFFICE VISIT (OUTPATIENT)
Dept: GASTROENTEROLOGY | Facility: CLINIC | Age: 87
End: 2022-11-29

## 2022-11-29 VITALS
WEIGHT: 194 LBS | DIASTOLIC BLOOD PRESSURE: 84 MMHG | TEMPERATURE: 96.8 F | HEIGHT: 69 IN | HEART RATE: 80 BPM | BODY MASS INDEX: 28.73 KG/M2 | OXYGEN SATURATION: 98 % | SYSTOLIC BLOOD PRESSURE: 156 MMHG

## 2022-11-29 DIAGNOSIS — D50.9 IRON DEFICIENCY ANEMIA, UNSPECIFIED IRON DEFICIENCY ANEMIA TYPE: Primary | ICD-10-CM

## 2022-11-29 DIAGNOSIS — R13.19 ESOPHAGEAL DYSPHAGIA: ICD-10-CM

## 2022-11-29 DIAGNOSIS — K92.1 MELENA: ICD-10-CM

## 2022-11-29 PROCEDURE — 99214 OFFICE O/P EST MOD 30 MIN: CPT | Performed by: NURSE PRACTITIONER

## 2022-11-29 NOTE — PROGRESS NOTES
[facility] GASTROENTEROLOGY - OFFICE NOTE    11/29/2022    Mireille Magallanes   1935    Primary Physician: Shadi García MD      HISTORY OF PRESENT ILLNESS:     Mireille Magallanes is a 87 y.o. male presents for eval of melena and anemia. He was hospitalized recently 11/17 to 11/19. He presented to the hospital with constipation and weakness. Hgb was 4.5 on admission. Upon admission he was on xarelto and aspirin. He is to have Watchman procedure in the near future.  He is taking Protonix daily.  He has not noted any further melena.  No abdominal pain.  No nausea or vomiting.      Takes protonix daily. He was on xarelto prior to recent hospitalization.       Labs reviewed in Epic. 11-23-22 hgb 9.7, mcv normal, bun 26, and creat 3.0.   He had carotid endarterectomy 9/2022 with hematoma.       Dysphagia   This started 1 year ago. He points to the neck area where solid food will hang. Has occasional problems with liquids. He has history of cva.         ENDOSCOPY, INT (09/06/2016 00:00)normal.       SCANNED - COLONOSCOPY (09/06/2016 00:00)  Converted Surgical Pathology (09/06/2016 10:58) tubular adenomatous     Past Medical History:   Diagnosis Date   • Acute viral hepatitis A    • Aortic valve stenosis    • Carotid artery stenosis     Right   • CKD (chronic kidney disease)    • Coagulopathy (HCC)     Plavix   • Colon polyps    • Coronary arteriosclerosis     stent ,? 4/2104, on plavix now.   • Diarrhea    • Dysphagia     Unspecified   • Family history of colon cancer    • Hematochezia     differential diagnosis discussed   • Hemorrhoids     Unspecified   • History of cardiac pacemaker 01/08/2018    [employer]   • History of colonic polyps    • Hyperlipidemia    • Hypertension    • Left ventricular hypertrophy    • Melanoma of back (HCC)    • Obesity    • Osteoarthritis    • Pacemaker    • Peripheral neuropathy    • Stroke (HCC)    • TIA (transient ischemic attack)    • Tobacco non-user        Past  Surgical History:   Procedure Laterality Date   • AORTIC VALVE REPAIR/REPLACEMENT Bilateral 04/20/2021    Procedure: Transfemoral Transcatheter Aortic Valve Replacement;  Surgeon: Kaushik Toledo MD;  Location: [facility] HYBRID OR 12;  Service: Cardiovascular;  Laterality: Bilateral;   • AORTIC VALVE REPAIR/REPLACEMENT Bilateral 04/20/2021    Procedure: TRANSFEMORAL TRANSCATHETER AORTIC VALVE REPLACEMENT;  Surgeon: Clayton Wilcox MD;  Location: [facility] HYBRID OR 12;  Service: Cardiothoracic;  Laterality: Bilateral;   • APPENDECTOMY     • CARDIAC CATHETERIZATION     • CARDIAC CATHETERIZATION N/A 03/18/2019    Procedure: Left Heart Cath;  Surgeon: Kaushik Toledo MD;  Location:  [facility] CATH INVASIVE LOCATION;  Service: Cardiology   • CARDIAC CATHETERIZATION N/A 04/14/2021    Procedure: Left Heart Cath;  Surgeon: Kaushik Toledo MD;  Location:  [facility] CATH INVASIVE LOCATION;  Service: Cardiology;  Laterality: N/A;   • CARDIAC ELECTROPHYSIOLOGY PROCEDURE N/A 01/05/2018    Procedure: Temporary Pacemaker;  Surgeon: Luis E Courtney MD;  Location:  [facility] CATH INVASIVE LOCATION;  Service:    • CARDIAC ELECTROPHYSIOLOGY PROCEDURE N/A 01/08/2018    Procedure: Device Implant;  Surgeon: Kaushik Toledo MD;  Location:  [facility] CATH INVASIVE LOCATION;  Service:    • CAROTID ENDARTERECTOMY Right 02/14/2020    Procedure: RIGHT CAROTID ENDARTERECTOMY WITH EEG;  Surgeon: Martín Woo DO;  Location: [facility] HYBRID OR 12;  Service: Vascular;  Laterality: Right;   • CAROTID ENDARTERECTOMY Left 09/19/2022   • CAROTID STENT     • COLON SURGERY  1999    for Ischemic Colitis   • COLONOSCOPY  05/07/2008    tubular adenoma ascending colon polyp - 3 yr   • COLONOSCOPY  08/19/2003    colon polyp @ 24 cm-see path - 5 yr   • COLONOSCOPY  08/11/2000    small interanl hemorrhoid - 3-5 yr   • COLONOSCOPY  06/22/1998    (8)small colon polyps removed-see path, internal anal skin tag - 2-3 yr   • COLONOSCOPY  06/13/2011    2 polyps,  adenomatous, hyperplastic, internal hemorrhoids   • CORONARY ARTERY BYPASS GRAFT     • CORONARY STENT PLACEMENT      x1  dr crane    • INSERT / REPLACE / REMOVE PACEMAKER     • OTHER SURGICAL HISTORY      Lithotripsy   • OTHER SURGICAL HISTORY  2011    recall 3 yr.    • PACEMAKER IMPLANTATION  2018   • VASECTOMY         Outpatient Medications Marked as Taking for the 22 encounter (Office Visit) with uAry Moya APRN   Medication Sig Dispense Refill   • amLODIPine (NORVASC) 5 MG tablet Take 1 tablet by mouth Daily. 30 tablet 2   • aspirin 81 MG EC tablet Take 1 tablet by mouth Daily.     • atorvastatin (LIPITOR) 80 MG tablet Take 1 tablet by mouth Daily.     • coenzyme Q10 100 MG capsule Take 100 mg by mouth Daily.     • ferrous gluconate 324 (37.5 Fe) MG tablet tablet Take 1 tablet by mouth Daily With Breakfast. 56 tablet 0   • irbesartan (AVAPRO) 300 MG tablet Take 300 mg by mouth Every Night.     • nitroglycerin (NITROSTAT) 0.4 MG SL tablet PLACE 1 TABLET UNDER THE TONGUE AS NEEDED FOR CHEST PAIN 30 tablet 11   • Omega-3 Fatty Acids (fish oil) 1000 MG capsule capsule Take 1,000 mg by mouth Daily With Breakfast.     • pantoprazole (PROTONIX) 40 MG EC tablet Take 1 tablet by mouth Daily. 30 tablet 2   • polyethylene glycol 17 g packet Take 17 g by mouth Daily.     • sennosides-docusate (PERICOLACE) 8.6-50 MG per tablet Take 1 tablet by mouth Daily As Needed for Constipation.     • sodium bicarbonate 650 MG tablet Take 650 mg by mouth 2 (Two) Times a Day.         Allergies   Allergen Reactions   • Pradaxa [Dabigatran Etexilate Mesylate] GI Bleeding   • Sulfa Antibiotics Rash     Sulfa Drugs       Social History     Socioeconomic History   • Marital status:    Tobacco Use   • Smoking status: Former     Types: Cigarettes     Quit date:      Years since quittin.9   • Smokeless tobacco: Never   Vaping Use   • Vaping Use: Never used   Substance and Sexual Activity   • Alcohol use: No  "  • Drug use: No   • Sexual activity: Defer       Family History   Problem Relation Age of Onset   • Colon cancer Father         Diagnosed at 72 YO   • Other Sister         Polyps/Colon   • Other Brother         Polyps/Colon   • Stroke Mother        Review of Systems   Constitutional: Negative for chills, fever and unexpected weight change.   Respiratory: Negative for shortness of breath.    Cardiovascular: Negative for chest pain.   Gastrointestinal: Negative for abdominal distention, abdominal pain, anal bleeding, blood in stool, constipation, diarrhea, nausea and vomiting.        Vitals:    11/29/22 0957   BP: 156/84   Pulse: 80   Temp: 96.8 °F (36 °C)   SpO2: 98%   Weight: 88 kg (194 lb)   Height: 175.3 cm (69\")      Body mass index is 28.65 kg/m².    Physical Exam  Vitals reviewed.   Constitutional:       General: He is not in acute distress.  Cardiovascular:      Rate and Rhythm: Normal rate and regular rhythm.      Heart sounds: Normal heart sounds.   Pulmonary:      Effort: Pulmonary effort is normal.      Breath sounds: Normal breath sounds.   Abdominal:      General: Bowel sounds are normal. There is no distension.      Palpations: Abdomen is soft.      Tenderness: There is no abdominal tenderness.   Skin:     General: Skin is warm and dry.   Neurological:      Mental Status: He is alert.         Results for orders placed or performed in visit on 11/23/22   Basic metabolic panel    Specimen: Blood   Result Value Ref Range    Glucose 92 65 - 99 mg/dL    BUN 26 (H) 8 - 23 mg/dL    Creatinine 3.00 (H) 0.76 - 1.27 mg/dL    EGFR Result 19.7 (L) >60.0 mL/min/1.73    BUN/Creatinine Ratio 8.7 7.0 - 25.0    Sodium 139 136 - 145 mmol/L    Potassium 5.5 (H) 3.5 - 5.2 mmol/L    Chloride 105 98 - 107 mmol/L    Total CO2 22.0 22.0 - 29.0 mmol/L    Calcium 9.7 8.6 - 10.5 mg/dL   CBC (No Diff)    Specimen: Blood   Result Value Ref Range    WBC 5.96 3.40 - 10.80 10*3/mm3    RBC 3.71 (L) 4.14 - 5.80 10*6/mm3    Hemoglobin " 9.7 (L) 13.0 - 17.7 g/dL    Hematocrit 31.4 (L) 37.5 - 51.0 %    MCV 84.6 79.0 - 97.0 fL    MCH 26.1 (L) 26.6 - 33.0 pg    MCHC 30.9 (L) 31.5 - 35.7 g/dL    RDW 17.5 (H) 12.3 - 15.4 %    Platelets 166 140 - 450 10*3/mm3           ASSESSMENT AND PLAN    Assessment & Plan     Diagnoses and all orders for this visit:    1. Iron deficiency anemia, unspecified iron deficiency anemia type (Primary)  -     Case Request; Standing  -     Case Request    2. Melena  -     Case Request; Standing  -     Case Request    3. Esophageal dysphagia    Other orders  -     Implement Anesthesia Orders Day of Procedure; Standing  -     Obtain Informed Consent; Standing          Plan for EGD.   I recommend continue pantoprazole daily.  I recommend ER if has any signs of active bleeding.      In regards to dysphagia, differential diagnosis discussed.  I recommend cut food in small pieces and chew well.  I recommend upper endoscopy for further evaluation and the patient is agreeable.      Of note, he is to have a watchman procedure in the near future.      ESOPHAGOGASTRODUODENOSCOPY WITH ANESTHESIA (N/A)  Risk, benefits, and alternatives of endoscopy were explained in full.  They understand that there is a risk of bleeding, perforation, and infection.  The risk of perforation goes up with esophageal dilation.  Other options to evaluate UGI complaints could involve barium swallow or UGI series, but these would be diagnostic tests only.  Patient was given time to ask questions.  I answered them to their satisfaction and they are agreeable to proceeding         No follow-ups on file.        There are no Patient Instructions on file for this visit.      GIANNI Bledsoe

## 2022-11-30 ENCOUNTER — READMISSION MANAGEMENT (OUTPATIENT)
Dept: CALL CENTER | Facility: HOSPITAL | Age: 87
End: 2022-11-30

## 2022-11-30 NOTE — OUTREACH NOTE
Medical Week 2 Survey    Flowsheet Row Responses   [facility] patient discharged from? [place]   Does the patient have one of the following disease processes/diagnoses(primary or secondary)? Other   Week 2 attempt successful? Yes   Call start time 1214   Call end time 1220   Person spoke with today (if not patient) and relationship Wife   Medication alerts for this patient Has restarted ASA but not taking Xarelto   Meds reviewed with patient/caregiver? Yes   Is the patient having any side effects they believe may be caused by any medication additions or changes? No   Does the patient have all medications ordered at discharge? Yes   Is the patient taking all medications as directed (includes completed medication regime)? Yes   Does the patient have a primary care provider?  Yes   Does the patient have an appointment with their PCP within 7 days of discharge? Yes   Comments regarding PCP New PCP f/u completed   Has the patient kept scheduled appointments due by today? Yes   Comments Patient has an EGD scheduled for 12/2/22   Has home health visited the patient within 72 hours of discharge? N/A   Psychosocial issues? No   Did the patient receive a copy of their discharge instructions? Yes   Nursing interventions Reviewed instructions with patient   What is the patient's perception of their health status since discharge? Improving  [wife reports pt is doing much better--reports his breathing greatly improved, said he was unable to ambulate w/o OLMEDO prior to transfusion but family unaware of issue.  Denies any obvious s/s bleeding noted at present, EGD scheduled.]   Is the patient/caregiver able to teach back signs and symptoms related to disease process for when to call PCP? Yes   Is the patient/caregiver able to teach back signs and symptoms related to disease process for when to call 911? Yes   Week 2 Call Completed? Yes          AIDEL ORTEGA - Registered Nurse

## 2022-12-02 ENCOUNTER — HOSPITAL ENCOUNTER (OUTPATIENT)
Facility: HOSPITAL | Age: 87
Setting detail: HOSPITAL OUTPATIENT SURGERY
Discharge: HOME | End: 2022-12-02
Attending: INTERNAL MEDICINE | Admitting: INTERNAL MEDICINE

## 2022-12-02 ENCOUNTER — ANESTHESIA (OUTPATIENT)
Dept: GASTROENTEROLOGY | Facility: HOSPITAL | Age: 87
End: 2022-12-02

## 2022-12-02 ENCOUNTER — ANESTHESIA EVENT (OUTPATIENT)
Dept: GASTROENTEROLOGY | Facility: HOSPITAL | Age: 87
End: 2022-12-02

## 2022-12-02 VITALS
OXYGEN SATURATION: 100 % | HEIGHT: 69 IN | TEMPERATURE: 97 F | DIASTOLIC BLOOD PRESSURE: 88 MMHG | HEART RATE: 73 BPM | SYSTOLIC BLOOD PRESSURE: 140 MMHG | WEIGHT: 192 LBS | RESPIRATION RATE: 18 BRPM | BODY MASS INDEX: 28.44 KG/M2

## 2022-12-02 PROCEDURE — 43248 EGD GUIDE WIRE INSERTION: CPT | Performed by: INTERNAL MEDICINE

## 2022-12-02 PROCEDURE — 25010000002 PROPOFOL 10 MG/ML EMULSION: Performed by: NURSE ANESTHETIST, CERTIFIED REGISTERED

## 2022-12-02 RX ORDER — LIDOCAINE HYDROCHLORIDE 20 MG/ML
INJECTION, SOLUTION EPIDURAL; INFILTRATION; INTRACAUDAL; PERINEURAL AS NEEDED
Status: DISCONTINUED | OUTPATIENT
Start: 2022-12-02 | End: 2022-12-02 | Stop reason: SURG

## 2022-12-02 RX ORDER — PROPOFOL 10 MG/ML
VIAL (ML) INTRAVENOUS AS NEEDED
Status: DISCONTINUED | OUTPATIENT
Start: 2022-12-02 | End: 2022-12-02 | Stop reason: SURG

## 2022-12-02 RX ORDER — SODIUM CHLORIDE 0.9 % (FLUSH) 0.9 %
10 SYRINGE (ML) INJECTION AS NEEDED
Status: DISCONTINUED | OUTPATIENT
Start: 2022-12-02 | End: 2022-12-02 | Stop reason: HOSPADM

## 2022-12-02 RX ORDER — SODIUM CHLORIDE 9 MG/ML
1000 INJECTION, SOLUTION INTRAVENOUS CONTINUOUS
Status: DISCONTINUED | OUTPATIENT
Start: 2022-12-02 | End: 2022-12-02 | Stop reason: HOSPADM

## 2022-12-02 RX ORDER — LIDOCAINE HYDROCHLORIDE 10 MG/ML
0.5 INJECTION, SOLUTION EPIDURAL; INFILTRATION; INTRACAUDAL; PERINEURAL ONCE AS NEEDED
Status: DISCONTINUED | OUTPATIENT
Start: 2022-12-02 | End: 2022-12-02 | Stop reason: HOSPADM

## 2022-12-02 RX ORDER — SODIUM CHLORIDE 9 MG/ML
500 INJECTION, SOLUTION INTRAVENOUS CONTINUOUS PRN
Status: DISCONTINUED | OUTPATIENT
Start: 2022-12-02 | End: 2022-12-02 | Stop reason: HOSPADM

## 2022-12-02 RX ORDER — ONDANSETRON 2 MG/ML
4 INJECTION INTRAMUSCULAR; INTRAVENOUS ONCE AS NEEDED
Status: DISCONTINUED | OUTPATIENT
Start: 2022-12-02 | End: 2022-12-02 | Stop reason: HOSPADM

## 2022-12-02 RX ADMIN — PROPOFOL 230 MG: 10 INJECTION, EMULSION INTRAVENOUS at 10:44

## 2022-12-02 RX ADMIN — LIDOCAINE HYDROCHLORIDE 160 MG: 20 INJECTION, SOLUTION EPIDURAL; INFILTRATION; INTRACAUDAL; PERINEURAL at 10:44

## 2022-12-02 RX ADMIN — SODIUM CHLORIDE 500 ML: 9 INJECTION, SOLUTION INTRAVENOUS at 09:17

## 2022-12-02 RX ADMIN — GLYCOPYRROLATE 0.1 MG: 0.2 INJECTION INTRAMUSCULAR; INTRAVENOUS at 10:27

## 2022-12-02 NOTE — ANESTHESIA POSTPROCEDURE EVALUATION
Patient: Mireille Magallanes    Procedure Summary     Date: 12/02/22 Room / Location: [facility] ENDOSCOPY 2 / [facility] ENDOSCOPY    Anesthesia Start: 1026 Anesthesia Stop: 1059    Procedure: ESOPHAGOGASTRODUODENOSCOPY WITH ANESTHESIA Diagnosis:       Iron deficiency anemia, unspecified iron deficiency anemia type      Melena      (Iron deficiency anemia, unspecified iron deficiency anemia type [D50.9])      (Melena [K92.1])    Surgeons: Saravanan Floyd MD Provider: Sahil Hurst CRNA    Anesthesia Type: MAC ASA Status: 4          Anesthesia Type: MAC    Vitals  Vitals Value Taken Time   /83 12/02/22 1106   Temp     Pulse 70 12/02/22 1110   Resp 14 12/02/22 1101   SpO2 98 % 12/02/22 1110   Vitals shown include unvalidated device data.        Post Anesthesia Care and Evaluation    Patient location during evaluation: PHASE II  Patient participation: complete - patient participated  Level of consciousness: awake and alert  Pain score: 0  Pain management: adequate    Airway patency: patent  Anesthetic complications: No anesthetic complications  PONV Status: none  Cardiovascular status: acceptable  Respiratory status: acceptable

## 2022-12-02 NOTE — ANESTHESIA PREPROCEDURE EVALUATION
Anesthesia Evaluation     Patient summary reviewed and Nursing notes reviewed   no history of anesthetic complications:  NPO Solid Status: > 8 hours  NPO Liquid Status: > 8 hours           Airway   Mallampati: I  TM distance: >3 FB  Neck ROM: full  No difficulty expected  Dental    (+) edentulous    Pulmonary    (+) a smoker Former, shortness of breath,   Cardiovascular   Exercise tolerance: poor (<4 METS)    ECG reviewed    (+) pacemaker pacemaker interrogated 1-3 months ago, hypertension, valvular problems/murmurs (s/p AVR) AS, CAD, CABG >6 Months, cardiac stents more than 12 months ago OLMEDO, hyperlipidemia,  carotid artery disease (<50% bilat) carotid bilateral    ROS comment: 4/6/21 Cath  Impression:  1.    No change in native coronary or bypass graft vessels compared to last catheterization in 2019  -- proximal left main stenosis appears visually unchanged (and was assessed by FFR at last catheterization and was negative at 0.87), patent stent in the mid LAD with an occluded diagonal branch, chronic total occlusions of OM1 and OM 2 (supplied via SVG), and mild-moderate disease of the RCA.    2.  Saphenous vein grafts to OM1->OM2, and to diagonal branch, are widely patent.      Echo 3/7/21  · Left ventricular ejection fraction appears to be 61 - 65%. Left ventricular systolic function is normal.  · Severe aortic valve stenosis is present. Aortic valve area is 0.97 cm2 and dimensionless index 0.25.  · Left ventricular wall thickness is consistent with mild septal asymmetric hypertrophy.  · Left ventricular diastolic function is consistent with (grade II w/high LAP) pseudonormalization.  · Mild mitral valve stenosis is present due to severe calcification of the posterior mitral annulus.  · Left atrial volume is severely increased.  · Normal size and function of right ventricle.  · Estimated right ventricular systolic pressure from tricuspid regurgitation is mildly elevated (35-45 mmHg).  · Compared to most recent  exam from 2019, aortic stenosis has progressed such that it is now classified as a severe.     [employer] scientific pacemaker- % RV paced  Adequate battery    Interpretation Summary 5/26/22    · Left ventricular ejection fraction appears to be 61 - 65%. Left ventricular systolic function is normal.  · Left ventricular wall thickness is consistent with mild concentric hypertrophy.  · Left ventricular diastolic dysfunction is noted.  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  · Mild to moderate mitral valve stenosis is present.  · Estimated right ventricular systolic pressure from tricuspid regurgitation is normal (<35 mmHg).  · There is no evidence of right to left shunt on bubble study.  · There is no evidence of intracardiac mass or thrombus.  · Compared to previous echo from 4/26/2022, there are no significant changes.         Neuro/Psych  (+) TIA, CVA,    GI/Hepatic/Renal/Endo    (+) obesity,   hepatitis A, liver disease, renal disease CRI,   (-) diabetes    ROS Comment: Recent hospital admission for severe anemia, presents for egd to eval    Musculoskeletal     Abdominal    Substance History      OB/GYN          Other   arthritis,                        Anesthesia Plan    ASA 4     MAC     intravenous induction     Anesthetic plan, risks, benefits, and alternatives have been provided, discussed and informed consent has been obtained with: patient.

## 2022-12-03 PROBLEM — I48.0 PAF (PAROXYSMAL ATRIAL FIBRILLATION) (HCC): Status: ACTIVE | Noted: 2022-12-03

## 2022-12-03 PROCEDURE — 93000 ELECTROCARDIOGRAM COMPLETE: CPT | Performed by: NURSE PRACTITIONER

## 2022-12-03 NOTE — PROGRESS NOTES
Subjective:     Encounter Date: 11/22/2022       Patient ID: Mireille Magallanes is a 87 y.o. male.    Chief Complaint: Shortness of breath, and lightheadedness    History of Present Illness    Mr. 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    Dr. Toledo saw him in April 2022 and he was complaining of exertional dyspnea.  He was euvolemic and in normal sinus rhythm.  Recent echo did not show any evidence of any issue with his TAVR.  Therefore, he ordered a Lexiscan to see if his symptoms may be an anginal equivalent.  However, this was never performed.  He was having problems with significant blood pressure fluctuations.  He ended up having carotid surgery in September, but had another TIA since then.  He was continuing to struggle with fatigue and exertional dyspnea    He came to see Dr. Toledo on 10/28 and reported he had a stroke on 5/25/2022.  He reported near syncopal episodes following that.  He reported pressure in his chest with radiation to his head when walking long distances.  He had significant exertional dyspnea and lightheadedness as well.  He described presyncope.  He denied orthopnea.  He did not recall ever taking Ranexa.  He reported having problems following carotid surgery.  He reported his systolic blood pressure was 240 when he went into surgery, and this eventually improved with treatment.  At that visit, given his symptoms Dr. Toledo ordered a Lexiscan again for ischemic evaluation and also started Ranexa to see if he would have symptomatic improvement.  His Lexiscan was low risk.  He did not have any improvement with Ranexa.    Following that he was admitted for couple of days in mid November with severe symptomatic anemia with a hemoglobin of 4.5.  He did require blood transfusion and hemoglobin improved to 9.7.  He  reportedly had a negative OB stool in the ER.  He did report some dark stools leading up to that admission but had been constipated without a bowel movement for about a week right before that admission.  Per Dr. García, it is also noted that he had a large hematoma following carotid surgery and CBC was never checked postoperatively.  It was also felt this could be a source of his severe symptomatic anemia.  He is iron deficient.  His symptoms improved dramatically with blood transfusion and he was discharged home in stable condition.  It appears amlodipine was added to his medical therapy for his blood pressure.    Today he presents for follow-up with his daughter, hoping to discuss the possibility of watchman device implantation as an alternative to anticoagulation given his recent severe symptomatic anemia.  His Xarelto is now being held as well as his aspirin.  He states he feels very well overall and his previous fatigue, lightheadedness, presyncope and dyspnea on exertion have essentially resolved.  He also denies chest pain, edema, orthopnea, PND.  Weight is stable.  Blood pressure is elevated today but he reports good control after recent medication changes per home readings.    The following portions of the patient's history were reviewed and updated as appropriate: allergies, current medications, past family history, past medical history, past social history, past surgical history and problem list.    Review of Systems   Constitutional: Negative for malaise/fatigue.   Cardiovascular: Negative for chest pain, claudication, dyspnea on exertion, leg swelling, near-syncope, orthopnea, palpitations, paroxysmal nocturnal dyspnea and syncope.   Respiratory: Negative for cough and shortness of breath.    Hematologic/Lymphatic: Does not bruise/bleed easily.   Musculoskeletal: Negative for falls.   Gastrointestinal: Negative for bloating.   Neurological: Negative for dizziness, light-headedness and weakness.        Current Outpatient Medications:   •  amLODIPine (NORVASC) 5 MG tablet, Take 1 tablet by mouth Daily., Disp: 30 tablet, Rfl: 2  •  atorvastatin (LIPITOR) 80 MG tablet, Take 1 tablet by mouth Daily., Disp: , Rfl:   •  coenzyme Q10 100 MG capsule, Take 100 mg by mouth Daily., Disp: , Rfl:   •  ferrous gluconate 324 (37.5 Fe) MG tablet tablet, Take 1 tablet by mouth Daily With Breakfast., Disp: 56 tablet, Rfl: 0  •  irbesartan (AVAPRO) 300 MG tablet, Take 300 mg by mouth Every Night., Disp: , Rfl:   •  nitroglycerin (NITROSTAT) 0.4 MG SL tablet, PLACE 1 TABLET UNDER THE TONGUE AS NEEDED FOR CHEST PAIN, Disp: 30 tablet, Rfl: 11  •  Omega-3 Fatty Acids (fish oil) 1000 MG capsule capsule, Take 1,000 mg by mouth Daily With Breakfast., Disp: , Rfl:   •  pantoprazole (PROTONIX) 40 MG EC tablet, Take 1 tablet by mouth Daily., Disp: 30 tablet, Rfl: 2  •  polyethylene glycol 17 g packet, Take 17 g by mouth Daily., Disp: , Rfl:   •  sennosides-docusate (PERICOLACE) 8.6-50 MG per tablet, Take 1 tablet by mouth Daily As Needed for Constipation., Disp: , Rfl:   •  sodium bicarbonate 650 MG tablet, Take 650 mg by mouth 2 (Two) Times a Day., Disp: , Rfl:   •  aspirin 81 MG EC tablet, Take 1 tablet by mouth Daily., Disp: , Rfl:        Objective:      Vitals:    11/22/22 1420   BP: 156/88   Pulse: 77   SpO2: 99%   weight - 191 lbs     Vitals and nursing note reviewed.   Constitutional:       General: Not in acute distress.     Appearance: Not in distress.   Neck:      Vascular: No JVD or JVR. JVD normal.   Pulmonary:      Effort: Pulmonary effort is normal.      Breath sounds: Normal breath sounds.   Cardiovascular:      Normal rate. Regular rhythm.      Murmurs: There is a grade 1/6 systolic murmur.   Edema:     Peripheral edema absent.   Skin:     General: Skin is warm and dry.   Neurological:      Mental Status: Alert, oriented to person, place, and time and oriented to person, place and time.         Lab Review:   Lab Results    Component Value Date    CHOL 113 05/26/2022    CHLPL 125 (L) 06/29/2021    TRIG 110 05/26/2022    HDL 31 (L) 05/26/2022    LDL 62 05/26/2022     Lab Results   Component Value Date    HGBA1C 5.50 05/26/2022     Lab Results   Component Value Date    WBC 5.96 11/23/2022    HGB 9.7 (L) 11/23/2022    HCT 31.4 (L) 11/23/2022    MCV 84.6 11/23/2022     11/23/2022       Lab Results   Component Value Date    GLUCOSE 92 11/23/2022    BUN 26 (H) 11/23/2022    CREATININE 3.00 (H) 11/23/2022    EGFRIFNONA 26 (A) 01/03/2022    EGFRIFAFRI 31 (L) 01/03/2022    BCR 8.7 11/23/2022    K 5.5 (H) 11/23/2022    CO2 22.0 11/23/2022    CALCIUM 9.7 11/23/2022    ALBUMIN 4.30 11/19/2022    AST 20 11/19/2022    ALT 9 11/19/2022         Results for orders placed during the hospital encounter of 05/25/22    Adult Transthoracic Echo Complete W/ Cont if Necessary Per Protocol (With Agitated Saline)    Interpretation Summary  · Left ventricular ejection fraction appears to be 61 - 65%. Left ventricular systolic function is normal.  · Left ventricular wall thickness is consistent with mild concentric hypertrophy.  · Left ventricular diastolic dysfunction is noted.  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  · Mild to moderate mitral valve stenosis is present.  · Estimated right ventricular systolic pressure from tricuspid regurgitation is normal (<35 mmHg).  · There is no evidence of right to left shunt on bubble study.  · There is no evidence of intracardiac mass or thrombus.  · Compared to previous echo from 4/26/2022, there are no significant changes.        ECG 12 Lead    Date/Time: 12/3/2022 2:32 PM  Performed by: Zena Sykes APRN  Authorized by: Zena Sykes APRN   Comparison: compared with previous ECG from 10/28/2022  Similar to previous ECG  Comparison to previous ECG: possible limb lead reversal , V-paced  Rhythm: sinus rhythm and paced  BPM:  71            Assessment/Plan:     Problem List Items Addressed This Visit (all established problems)       Cardiac and Vasculature    HTN (hypertension): elevated today but notes improvement after recent med changes     S/P TAVR (transcatheter aortic valve replacement): stable    Overview     29mm S3 valve 4/20/21         Atrial flutter with controlled response and paroxysmal atrial fibrillation (HCC): stable, in NSR today    Relevant Medications    [id number]-CMEo is 6    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 (HCC) - Primary- no angina, stable, recent low risk nuclear stress         History of stroke    Recent severe symptomatic anemia , iron deficiency, reports of melena, reports of significant postoperative hematoma following carotid surgery in September    Continues iron supplementation    Other Relevant Orders                 Recommendations/plans:    1.  Paroxysmal atrial fibrillation and flutter: Maintaining normal sinus rhythm per EKG today.  He denies palpitations.  Xarelto has been held given recent severe symptomatic anemia requiring hospitalization and blood transfusion (hemoglobin was 4.5 on presentation).     -His [id number] is 6.  Given his recent severe symptomatic anemia requiring blood transfusion (?GIB with reports of melena vs  postoperative anemia after hematoma developed following carotid surgery in September), did discuss watchman device implantation as an alternative to long-term anticoagulation for stroke prophylaxis.  I provided him and his daughter with educational materials.  After discussing the case with Dr. Toledo and Dr. García, he is going to be referred to gastroenterology for further evaluation into the source of his symptomatic anemia.  Pending this, if gastroenterology feels he could tolerate dual antiplatelet therapy uninterrupted (aspirin and Plavix) for 6 months followed by aspirin indefinitely, we may get him scheduled for a JAMILA  to assess for anatomic suitability versus structural clinic visit with Dr. Toledo to discuss this further.  The patient expresses interest in moving forward with watchman device implantation, if felt to be appropriate per Dr. Toledo    2.  Coronary artery disease: Established problem, stable.  No angina.  Resume aspirin 81 mg daily as soon as possible and continue high intensity statin.  Recent low risk nuclear stress.  Ultimately his symptoms were discovered to be due to symptomatic anemia and not coronary disease, therefore may discontinue Ranexa as he did not have any improvement with this.  3.  Status post TAVR: Stable.  Will need repeat 2D echo in May 2023 for continued surveillance  4.  Hypertension: Elevated today but he reports good control per home readings after amlodipine was recently added.  Continue to follow close with PCP.  Continue amlodipine and ARB.  5.  Complete heart block with dual-chamber pacemaker in place-continue routine device interrogation    Zena Knees, APRN     Follow-up to be determined pending GI evaluation    Addendum: I do see that he had an endoscopy and findings were unremarkable.  Dr. Floyd suspects he may have AVM disease.  He may also need colonoscopy.  It appears this has not yet been scheduled.

## 2022-12-09 ENCOUNTER — READMISSION MANAGEMENT (OUTPATIENT)
Dept: CALL CENTER | Facility: HOSPITAL | Age: 87
End: 2022-12-09

## 2022-12-09 NOTE — OUTREACH NOTE
Medical Week 3 Survey    Flowsheet Row Responses   [facility] patient discharged from? [place]   Does the patient have one of the following disease processes/diagnoses(primary or secondary)? Other   Week 3 attempt successful? Yes   Call start time 1623   Call end time 1625   Discharge diagnosis **Symptomatic anemia   Is patient permission given to speak with other caregiver? Yes   List who call center can speak with Spouse   Person spoke with today (if not patient) and relationship Spouse   Meds reviewed with patient/caregiver? Yes   Is the patient having any side effects they believe may be caused by any medication additions or changes? No   Does the patient have all medications ordered at discharge? N/A   Is the patient taking all medications as directed (includes completed medication regime)? Yes   Has the patient kept scheduled appointments due by today? Yes   Has home health visited the patient within 72 hours of discharge? N/A   Psychosocial issues? No   What is the patient's perception of their health status since discharge? Improving   Is the patient/caregiver able to teach back signs and symptoms related to disease process for when to call PCP? Yes   Is the patient/caregiver able to teach back signs and symptoms related to disease process for when to call 911? Yes   Is the patient/caregiver able to teach back the hierarchy of who to call/visit for symptoms/problems? PCP, Specialist, Home health nurse, Urgent Care, ED, 911 Yes   Week 3 Call Completed? Yes   Is the patient interested in additional calls from an ambulatory ?  NOTE:  applies to high risk patients requiring additional follow-up. No   Wrap up additional comments wife reports patient patient well, EGD completed.          KRIS SUH - Registered Nurse

## 2022-12-15 ENCOUNTER — TELEPHONE (OUTPATIENT)
Dept: INTERNAL MEDICINE | Facility: CLINIC | Age: 87
End: 2022-12-15

## 2022-12-15 NOTE — TELEPHONE ENCOUNTER
Caller: SUSAN MÁRQUEZ     Relationship: DAUGHTER     Best call back number: [phone] (Mobile)    Caller requesting test results: DAUGHTER     What test was performed: LAB DRAW     When was the test performed: [place] 12/8/22     Where was the test performed: LABS     Additional notes: NONE

## 2022-12-20 ENCOUNTER — READMISSION MANAGEMENT (OUTPATIENT)
Dept: CALL CENTER | Facility: HOSPITAL | Age: 87
End: 2022-12-20

## 2022-12-20 NOTE — OUTREACH NOTE
Medical Week 4 Survey    Flowsheet Row Responses   [facility] patient discharged from? [place]   Does the patient have one of the following disease processes/diagnoses(primary or secondary)? Other   Week 4 attempt successful? Yes   Call start time 1740   Call end time 1744   Discharge diagnosis **Symptomatic anemia   Person spoke with today (if not patient) and relationship Dahlia, leticia   Meds reviewed with patient/caregiver? Yes   Is the patient having any side effects they believe may be caused by any medication additions or changes? No   Is the patient taking all medications as directed (includes completed medication regime)? Yes   Has the patient kept scheduled appointments due by today? Yes   Is the patient still receiving Home Health Services? N/A   Psychosocial issues? No   What is the patient's perception of their health status since discharge? Improving   Is the patient/caregiver able to teach back signs and symptoms related to disease process for when to call PCP? Yes   Is the patient/caregiver able to teach back signs and symptoms related to disease process for when to call 911? Yes   Is the patient/caregiver able to teach back the hierarchy of who to call/visit for symptoms/problems? PCP, Specialist, Home health nurse, Urgent Care, ED, 911 Yes   Additional teach back comments daughter states physically pt is great, good appetite, constipation resolved   Week 4 Call Completed? Yes   Would the patient like one additional call? No   Graduated Yes   Is the patient interested in additional calls from an ambulatory ?  NOTE:  applies to high risk patients requiring additional follow-up. No   Did the patient feel the follow up calls were helpful during their recovery period? Yes          ASHLEY REBOLLEDO - Registered Nurse

## 2023-01-10 ENCOUNTER — TELEPHONE (OUTPATIENT)
Dept: INTERNAL MEDICINE | Facility: CLINIC | Age: 88
End: 2023-01-10
Payer: MEDICARE

## 2023-01-10 RX ORDER — DOXYCYCLINE HYCLATE 50 MG/1
CAPSULE, GELATIN COATED ORAL
Qty: 56 TABLET | Refills: 0 | Status: SHIPPED | OUTPATIENT
Start: 2023-01-10

## 2023-01-19 ENCOUNTER — OFFICE VISIT (OUTPATIENT)
Dept: INTERNAL MEDICINE | Facility: CLINIC | Age: 88
End: 2023-01-19
Payer: MEDICARE

## 2023-01-19 VITALS
DIASTOLIC BLOOD PRESSURE: 82 MMHG | OXYGEN SATURATION: 98 % | HEIGHT: 69 IN | HEART RATE: 69 BPM | WEIGHT: 191 LBS | TEMPERATURE: 97.9 F | SYSTOLIC BLOOD PRESSURE: 148 MMHG | BODY MASS INDEX: 28.29 KG/M2

## 2023-01-19 DIAGNOSIS — N18.4 CHRONIC RENAL FAILURE, STAGE 4 (SEVERE): Primary | Chronic | ICD-10-CM

## 2023-01-19 DIAGNOSIS — I10 PRIMARY HYPERTENSION: ICD-10-CM

## 2023-01-19 DIAGNOSIS — D64.9 SYMPTOMATIC ANEMIA: ICD-10-CM

## 2023-01-19 DIAGNOSIS — I48.0 PAF (PAROXYSMAL ATRIAL FIBRILLATION): Chronic | ICD-10-CM

## 2023-01-19 DIAGNOSIS — E78.2 MIXED HYPERLIPIDEMIA: Chronic | ICD-10-CM

## 2023-01-19 PROBLEM — N17.9 ACUTE RENAL FAILURE SUPERIMPOSED ON STAGE 4 CHRONIC KIDNEY DISEASE: Status: RESOLVED | Noted: 2022-11-17 | Resolved: 2023-01-19

## 2023-01-19 PROBLEM — D50.9 IRON DEFICIENCY ANEMIA: Chronic | Status: ACTIVE | Noted: 2022-11-29

## 2023-01-19 PROBLEM — I63.9 CEREBROVASCULAR ACCIDENT (CVA): Status: RESOLVED | Noted: 2022-05-25 | Resolved: 2023-01-19

## 2023-01-19 PROBLEM — E78.5 HYPERLIPIDEMIA: Chronic | Status: ACTIVE | Noted: 2017-05-16

## 2023-01-19 PROBLEM — I25.700 CORONARY ARTERY DISEASE INVOLVING CORONARY BYPASS GRAFT WITH UNSTABLE ANGINA PECTORIS (HCC): Status: RESOLVED | Noted: 2017-12-28 | Resolved: 2023-01-19

## 2023-01-19 PROBLEM — K59.00 CONSTIPATION: Status: RESOLVED | Noted: 2022-11-17 | Resolved: 2023-01-19

## 2023-01-19 PROBLEM — K92.1 MELENA: Status: RESOLVED | Noted: 2022-11-23 | Resolved: 2023-01-19

## 2023-01-19 PROBLEM — I63.9 ACUTE CEREBROVASCULAR ACCIDENT (CVA) OF CEREBELLUM: Status: RESOLVED | Noted: 2019-08-09 | Resolved: 2023-01-19

## 2023-01-19 PROCEDURE — 99214 OFFICE O/P EST MOD 30 MIN: CPT | Performed by: INTERNAL MEDICINE

## 2023-01-19 RX ORDER — AMLODIPINE BESYLATE 10 MG/1
10 TABLET ORAL DAILY
Qty: 90 TABLET | Refills: 2 | Status: SHIPPED | OUTPATIENT
Start: 2023-01-19

## 2023-01-19 NOTE — ASSESSMENT & PLAN NOTE
BP a little elevated today.  Readings have been all over the place at home on the ones I reviewed, had been 110s to 170s.  Most of the numbers appeared to be up.  I checked BP and was 148/82.      Increase amlodipine to 10 mg daily  
Iron levels had rebounded.  Having fatigue, will check blood count  
Iron levels had rebounded.  Having fatigue, will check blood count    
Patient and family would like to move forward with Watchman.  Reached out to GI as patient will need colonoscopy.  Requested they call and try to set up colonoscopy with GI group.    
Tolerating atorvastatin 80 mg without issues.  Given advanced age, could consider lowering intensity  
What Type Of Note Output Would You Prefer (Optional)?: Standard Output
How Severe Is Your Skin Lesion?: mild
Has Your Skin Lesion Been Treated?: not been treated
Is This A New Presentation, Or A Follow-Up?: Skin Lesion

## 2023-01-19 NOTE — PROGRESS NOTES
"      Chief Complaint  Follow-up (Follow up from 11/23/22 apt/), Hypertension, Fatigue (Pt states he feels slower and just not quite himself. Noticed a couple of weeks ago ), and discuss watchman procedure     Subjective        Mireille Magallanes presents to [facility]    HPI  Patient seen for above problems.    Fatigue been for last 2 weeks.  Patient overall stable though.  A little bertter today.  No signs of GI bleeding.    Will check hemoglobin to make sure still doing okay.     Iron levels were better.     Increase activity    BP has been very fluctuant.  BP log reviewed.       Objective   Vital Signs:  /82   Pulse 69   Temp 97.9 °F (36.6 °C) (Temporal)   Ht 175.3 cm (69\")   Wt 86.6 kg (191 lb)   SpO2 98%   BMI 28.21 kg/m²   Estimated body mass index is 28.21 kg/m² as calculated from the following:    Height as of this encounter: 175.3 cm (69\").    Weight as of this encounter: 86.6 kg (191 lb).      Physical Exam  Vitals reviewed.   Constitutional:       Appearance: He is not ill-appearing.   HENT:      Head: Normocephalic and atraumatic.   Eyes:      General: No scleral icterus.     Conjunctiva/sclera: Conjunctivae normal.   Cardiovascular:      Rate and Rhythm: Normal rate. Rhythm irregular.      Heart sounds: Murmur heard.   Pulmonary:      Effort: Pulmonary effort is normal.      Breath sounds: Normal breath sounds.   Skin:     General: Skin is warm and dry.      Coloration: Skin is pale.   Neurological:      General: No focal deficit present.      Mental Status: He is alert and oriented to person, place, and time.                   Assessment and Plan   Diagnoses and all orders for this visit:    1. Chronic renal failure, stage 4 (severe) (HCC) (Primary)  -     CBC w AUTO Differential  -     Comprehensive metabolic panel    2. Symptomatic anemia  Assessment & Plan:  Iron levels had rebounded.  Having fatigue, will check blood count      Orders:  -     CBC w AUTO " Differential  -     Comprehensive metabolic panel    3. Primary hypertension  Assessment & Plan:  BP a little elevated today.  Readings have been all over the place at home on the ones I reviewed, had been 110s to 170s.  Most of the numbers appeared to be up.  I checked BP and was 148/82.      Increase amlodipine to 10 mg daily    Orders:  -     CBC w AUTO Differential  -     Comprehensive metabolic panel  -     amLODIPine (NORVASC) 10 MG tablet; Take 1 tablet by mouth Daily.  Dispense: 90 tablet; Refill: 2    4. PAF (paroxysmal atrial fibrillation) (HCC)  Assessment & Plan:  Patient and family would like to move forward with Watchman.  Reached out to GI as patient will need colonoscopy.  Requested they call and try to set up colonoscopy with GI group.        5. Mixed hyperlipidemia  Assessment & Plan:  Tolerating atorvastatin 80 mg without issues.  Given advanced age, could consider lowering intensity          Result Review :  The following data was reviewed by: Shadi García MD on 01/19/2023:  CMP    CMP 11/19/22 11/23/22 12/7/22   Glucose 107 (A) 92 83   BUN 25 (A) 26 (A) 27 (A)   Creatinine 2.75 (A) 3.00 (A) 2.47 (A)   eGFR 21.9 (A)     Sodium 139 139 140   Potassium 4.6 5.5 (A) 5.3 (A)   Chloride 106 105 106   Calcium 9.5 9.7 9.2   Total Protein 7.0     Albumin 4.30     Globulin 2.7     Total Bilirubin 1.1     Alkaline Phosphatase 73     AST (SGOT) 20     ALT (SGPT) 9     Albumin/Globulin Ratio 1.6     BUN/Creatinine Ratio 9.1 8.7 10.9   Anion Gap 12.0     (A) Abnormal value       Comments are available for some flowsheets but are not being displayed.           CBC    CBC 11/19/22 11/23/22 12/7/22   WBC 8.19 5.96 5.69   RBC 3.82 (A) 3.71 (A) 4.03 (A)   Hemoglobin 9.7 (A) 9.7 (A) 10.8 (A)   Hematocrit 31.2 (A) 31.4 (A) 35.4 (A)   MCV 81.7 84.6 87.8   MCH 25.4 (A) 26.1 (A) 26.8   MCHC 31.1 (A) 30.9 (A) 30.5 (A)   RDW 16.8 (A) 17.5 (A) 18.8 (A)   Platelets 185 166 207   (A) Abnormal value            TSH     TSH 5/26/22   TSH 2.910               Data reviewed: GI studies EGD reviewed.                Follow Up   Return in about 3 months (around 4/19/2023), or if symptoms worsen or fail to improve, for Recheck.  Patient was given instructions and counseling regarding his condition or for health maintenance advice. Please see specific information pulled into the AVS if appropriate.       YULIA García MD, FACP, FH      Electronically signed by Shadi García MD, 01/19/23, 8:02 AM CST.

## 2023-01-20 LAB
ALBUMIN SERPL-MCNC: 4.2 G/DL (ref 3.5–5.2)
ALBUMIN/GLOB SERPL: 1.6 G/DL
ALP SERPL-CCNC: 95 U/L (ref 39–117)
ALT SERPL-CCNC: 11 U/L (ref 1–41)
AST SERPL-CCNC: 17 U/L (ref 1–40)
BASOPHILS # BLD AUTO: 0.04 10*3/MM3 (ref 0–0.2)
BASOPHILS NFR BLD AUTO: 0.8 % (ref 0–1.5)
BILIRUB SERPL-MCNC: 0.4 MG/DL (ref 0–1.2)
BUN SERPL-MCNC: 39 MG/DL (ref 8–23)
BUN/CREAT SERPL: 13.4 (ref 7–25)
CALCIUM SERPL-MCNC: 9.1 MG/DL (ref 8.6–10.5)
CHLORIDE SERPL-SCNC: 106 MMOL/L (ref 98–107)
CO2 SERPL-SCNC: 22.1 MMOL/L (ref 22–29)
CREAT SERPL-MCNC: 2.91 MG/DL (ref 0.76–1.27)
EGFRCR SERPLBLD CKD-EPI 2021: 20.5 ML/MIN/1.73
EOSINOPHIL # BLD AUTO: 0.16 10*3/MM3 (ref 0–0.4)
EOSINOPHIL NFR BLD AUTO: 3.1 % (ref 0.3–6.2)
ERYTHROCYTE [DISTWIDTH] IN BLOOD BY AUTOMATED COUNT: 17.2 % (ref 12.3–15.4)
GLOBULIN SER CALC-MCNC: 2.7 GM/DL
GLUCOSE SERPL-MCNC: 88 MG/DL (ref 65–99)
HCT VFR BLD AUTO: 37.8 % (ref 37.5–51)
HGB BLD-MCNC: 12.1 G/DL (ref 13–17.7)
IMM GRANULOCYTES # BLD AUTO: 0.02 10*3/MM3 (ref 0–0.05)
IMM GRANULOCYTES NFR BLD AUTO: 0.4 % (ref 0–0.5)
LYMPHOCYTES # BLD AUTO: 1.53 10*3/MM3 (ref 0.7–3.1)
LYMPHOCYTES NFR BLD AUTO: 29.7 % (ref 19.6–45.3)
MCH RBC QN AUTO: 27.3 PG (ref 26.6–33)
MCHC RBC AUTO-ENTMCNC: 32 G/DL (ref 31.5–35.7)
MCV RBC AUTO: 85.3 FL (ref 79–97)
MONOCYTES # BLD AUTO: 0.51 10*3/MM3 (ref 0.1–0.9)
MONOCYTES NFR BLD AUTO: 9.9 % (ref 5–12)
NEUTROPHILS # BLD AUTO: 2.9 10*3/MM3 (ref 1.7–7)
NEUTROPHILS NFR BLD AUTO: 56.1 % (ref 42.7–76)
NRBC BLD AUTO-RTO: 0 /100 WBC (ref 0–0.2)
PLATELET # BLD AUTO: 140 10*3/MM3 (ref 140–450)
POTASSIUM SERPL-SCNC: 5.2 MMOL/L (ref 3.5–5.2)
PROT SERPL-MCNC: 6.9 G/DL (ref 6–8.5)
RBC # BLD AUTO: 4.43 10*6/MM3 (ref 4.14–5.8)
SODIUM SERPL-SCNC: 138 MMOL/L (ref 136–145)
WBC # BLD AUTO: 5.16 10*3/MM3 (ref 3.4–10.8)

## 2023-01-24 NOTE — ASSESSMENT & PLAN NOTE
Goal LDL less than 70; continue high potency statin and if goal cannot be achieved on this dose, consider addition of PCSK 9 inhibitor   denies tobacco use, illicit substances, or heavy alcohol use.

## 2023-01-27 ENCOUNTER — TELEPHONE (OUTPATIENT)
Dept: GASTROENTEROLOGY | Facility: CLINIC | Age: 88
End: 2023-01-27
Payer: MEDICARE

## 2023-01-31 ENCOUNTER — PREP FOR SURGERY (OUTPATIENT)
Dept: OTHER | Facility: HOSPITAL | Age: 88
End: 2023-01-31
Payer: MEDICARE

## 2023-01-31 DIAGNOSIS — E61.1 IRON DEFICIENCY: Primary | ICD-10-CM

## 2023-01-31 DIAGNOSIS — D50.9 IRON DEFICIENCY ANEMIA, UNSPECIFIED IRON DEFICIENCY ANEMIA TYPE: ICD-10-CM

## 2023-02-01 RX ORDER — POLYETHYLENE GLYCOL 3350, SODIUM SULFATE ANHYDROUS, SODIUM BICARBONATE, SODIUM CHLORIDE, POTASSIUM CHLORIDE 236; 22.74; 6.74; 5.86; 2.97 G/4L; G/4L; G/4L; G/4L; G/4L
4 POWDER, FOR SOLUTION ORAL ONCE
Qty: 4000 ML | Refills: 0 | Status: SHIPPED | OUTPATIENT
Start: 2023-02-01 | End: 2023-02-01

## 2023-02-06 NOTE — OUTREACH NOTE
Last saw SN in 2018.   Will need visit.     [facility]   Stroke Week 2 Survey    Flowsheet Row Responses   [facility] patient discharged from? [place]   Does the patient have one of the following disease processes/diagnoses(primary or secondary)? Stroke (TIA)   Week 2 attempt successful? Yes   Call start time 1527   Call end time 1534   Discharge diagnosis Cerebrovascular accident Chronic renal failure, stage 4    List who call center can speak with wife or dtr   Person spoke with today (if not patient) and relationship wife   Meds reviewed with patient/caregiver? Yes   Is the patient having any side effects they believe may be caused by any medication additions or changes? No   Does the patient have all medications ordered at discharge? No   What is keeping the patient from filling the prescriptions? Pre-authorization in progress   Nursing Interventions No intervention needed   Prescription comments working with pharmacy for Pradaxa coverage. Paid alot out of pocket, $592 for the prescription.   Is the patient taking all medications as directed (includes completed medication regime)? Yes   Has the patient kept scheduled appointments due by today? Yes   Home health comments Speech Therapy starting  outpatient    Psychosocial issues? No   Does the patient require any assistance with activities of daily living such as eating, bathing, dressing, walking, etc.? No   Does the patient have any residual symptoms from stroke/TIA? Yes   Residual symptoms comments Pt unable to read since stroke. word find is difficult. Vision issue is resolved.    Does the patient understand the diet ordered at discharge? Yes   Comments flucuating BP per wife, 127/?- 150's/?.   What is the patient's perception of their health status since discharge? Same   Is the patient able to teach back FAST for Stroke? Yes   Is the patient/caregiver able to teach back the risk factors for a stroke? High blood pressure-goal below 120/80   Is the patient/caregiver able to teach back the hierarchy of who to  call/visit for symptoms/problems? PCP, Specialist, Home health nurse, Urgent Care, ED, 911 Yes   Week 2 call completed? Yes          TANIA JULIO - Registered Nurse

## 2023-02-10 RX ORDER — PANTOPRAZOLE SODIUM 40 MG/1
TABLET, DELAYED RELEASE ORAL
Qty: 90 TABLET | Refills: 3 | Status: SHIPPED | OUTPATIENT
Start: 2023-02-10

## 2023-02-13 RX ORDER — AMLODIPINE BESYLATE 5 MG/1
TABLET ORAL
Qty: 90 TABLET | Refills: 0 | OUTPATIENT
Start: 2023-02-13

## 2023-02-14 ENCOUNTER — CLINICAL SUPPORT NO REQUIREMENTS (OUTPATIENT)
Dept: CARDIOLOGY | Facility: CLINIC | Age: 88
End: 2023-02-14
Payer: MEDICARE

## 2023-02-14 DIAGNOSIS — Z95.0 PACEMAKER: Primary | ICD-10-CM

## 2023-02-14 DIAGNOSIS — I44.2 CHB (COMPLETE HEART BLOCK): ICD-10-CM

## 2023-02-14 PROCEDURE — 93280 PM DEVICE PROGR EVAL DUAL: CPT | Performed by: INTERNAL MEDICINE

## 2023-02-22 ENCOUNTER — TELEPHONE (OUTPATIENT)
Dept: CARDIOLOGY | Facility: CLINIC | Age: 88
End: 2023-02-22
Payer: MEDICARE

## 2023-02-22 NOTE — TELEPHONE ENCOUNTER
Patient was in office for routine pacemaker check on 2/14/23 and reported a recent episode of chest pain that lasted 1-2 days.  He describes the pain as intense.  Pain did not radiate up neck or down arms.  He took tums with no relief.  Denies associated dyspnea.  Patient did not take SL nitroglycerin.  RN advised patient to take SL nitroglycerin the next time this occurred and to seek care at ED if two doses of SL nitroglycerin did not resolve chest pain.  No arrhythmias noted on device that would cause such an extended episode of chest pain.  RN will notify Dr. Toledo and will contact patient with any additional recommendations.  Of note, patient did not have follow up scheduled with a provider, so RN made appointment for April with GIANNI Rush.

## 2023-03-14 ENCOUNTER — HOSPITAL ENCOUNTER (OUTPATIENT)
Facility: HOSPITAL | Age: 88
Setting detail: HOSPITAL OUTPATIENT SURGERY
Discharge: HOME | End: 2023-03-14
Attending: INTERNAL MEDICINE | Admitting: INTERNAL MEDICINE
Payer: MEDICARE

## 2023-03-14 ENCOUNTER — ANESTHESIA EVENT (OUTPATIENT)
Dept: GASTROENTEROLOGY | Facility: HOSPITAL | Age: 88
End: 2023-03-14
Payer: MEDICARE

## 2023-03-14 ENCOUNTER — ANESTHESIA (OUTPATIENT)
Dept: GASTROENTEROLOGY | Facility: HOSPITAL | Age: 88
End: 2023-03-14
Payer: MEDICARE

## 2023-03-14 VITALS
RESPIRATION RATE: 16 BRPM | HEIGHT: 69 IN | TEMPERATURE: 97.5 F | BODY MASS INDEX: 27.55 KG/M2 | WEIGHT: 186 LBS | SYSTOLIC BLOOD PRESSURE: 126 MMHG | OXYGEN SATURATION: 98 % | HEART RATE: 61 BPM | DIASTOLIC BLOOD PRESSURE: 79 MMHG

## 2023-03-14 PROCEDURE — 25010000002 PROPOFOL 10 MG/ML EMULSION: Performed by: NURSE ANESTHETIST, CERTIFIED REGISTERED

## 2023-03-14 PROCEDURE — 45378 DIAGNOSTIC COLONOSCOPY: CPT | Performed by: INTERNAL MEDICINE

## 2023-03-14 RX ORDER — SODIUM CHLORIDE 9 MG/ML
500 INJECTION, SOLUTION INTRAVENOUS CONTINUOUS PRN
Status: DISCONTINUED | OUTPATIENT
Start: 2023-03-14 | End: 2023-03-14 | Stop reason: HOSPADM

## 2023-03-14 RX ORDER — SODIUM CHLORIDE 0.9 % (FLUSH) 0.9 %
10 SYRINGE (ML) INJECTION AS NEEDED
Status: DISCONTINUED | OUTPATIENT
Start: 2023-03-14 | End: 2023-03-14 | Stop reason: HOSPADM

## 2023-03-14 RX ORDER — PROPOFOL 10 MG/ML
VIAL (ML) INTRAVENOUS AS NEEDED
Status: DISCONTINUED | OUTPATIENT
Start: 2023-03-14 | End: 2023-03-14 | Stop reason: SURG

## 2023-03-14 RX ORDER — LIDOCAINE HYDROCHLORIDE 20 MG/ML
INJECTION, SOLUTION EPIDURAL; INFILTRATION; INTRACAUDAL; PERINEURAL AS NEEDED
Status: DISCONTINUED | OUTPATIENT
Start: 2023-03-14 | End: 2023-03-14 | Stop reason: SURG

## 2023-03-14 RX ORDER — ONDANSETRON 2 MG/ML
4 INJECTION INTRAMUSCULAR; INTRAVENOUS ONCE AS NEEDED
Status: DISCONTINUED | OUTPATIENT
Start: 2023-03-14 | End: 2023-03-14 | Stop reason: HOSPADM

## 2023-03-14 RX ADMIN — PROPOFOL INJECTABLE EMULSION 30 MG: 10 INJECTION, EMULSION INTRAVENOUS at 09:15

## 2023-03-14 RX ADMIN — PROPOFOL INJECTABLE EMULSION 30 MG: 10 INJECTION, EMULSION INTRAVENOUS at 09:05

## 2023-03-14 RX ADMIN — PROPOFOL INJECTABLE EMULSION 50 MG: 10 INJECTION, EMULSION INTRAVENOUS at 09:03

## 2023-03-14 RX ADMIN — LIDOCAINE HYDROCHLORIDE 80 MG: 20 INJECTION, SOLUTION EPIDURAL; INFILTRATION; INTRACAUDAL; PERINEURAL at 09:03

## 2023-03-14 RX ADMIN — PROPOFOL INJECTABLE EMULSION 30 MG: 10 INJECTION, EMULSION INTRAVENOUS at 09:10

## 2023-03-14 RX ADMIN — SODIUM CHLORIDE 500 ML: 9 INJECTION, SOLUTION INTRAVENOUS at 07:43

## 2023-03-14 NOTE — ANESTHESIA POSTPROCEDURE EVALUATION
Patient: Mireille Magallanes    Procedure Summary     Date: 03/14/23 Room / Location: [facility] ENDOSCOPY 4 / [facility]    Anesthesia Start: 0901 Anesthesia Stop: 0921    Procedure: COLONOSCOPY WITH ANESTHESIA Diagnosis:       Iron deficiency anemia, unspecified iron deficiency anemia type      (Iron deficiency anemia, unspecified iron deficiency anemia type [D50.9])    Surgeons: Saravanan Floyd MD Provider: Hesham Funk CRNA    Anesthesia Type: MAC ASA Status: 4          Anesthesia Type: MAC    Vitals  Vitals Value Taken Time   /79 03/14/23 0936   Temp     Pulse 65 03/14/23 0936   Resp 12 03/14/23 0930   SpO2 98 % 03/14/23 0936   Vitals shown include unvalidated device data.        Post Anesthesia Care and Evaluation    Patient location during evaluation: PHASE II  Patient participation: complete - patient participated  Level of consciousness: awake  Pain score: 0  Pain management: adequate    Airway patency: patent  Anesthetic complications: No anesthetic complications  PONV Status: none  Cardiovascular status: acceptable and stable  Respiratory status: acceptable and room air  Hydration status: acceptable

## 2023-03-14 NOTE — H&P
[facility]  Pre Procedure History & Physical    Chief Complaint:   History of profound anemia    Subjective     HPI:   Back in November he was hospitalized and found to have a hemoglobin 4.5.  He was having melena that time.  Had outpatient endoscopy exam in December which was unremarkable.  He presents now for colonoscopy exam.  Denies any further melena or bright red blood per rectum.  His last hemoglobin was 12.1    Past Medical History:   Past Medical History:   Diagnosis Date   • Acute viral hepatitis A    • Aortic valve stenosis    • Carotid artery stenosis     Right   • CKD (chronic kidney disease)    • Coagulopathy (HCC)     Plavix   • Colon polyps    • Coronary arteriosclerosis     stent ,? 4/2104, on plavix now.   • Diarrhea    • Dysphagia     Unspecified   • Family history of colon cancer    • Hematochezia     differential diagnosis discussed   • Hemorrhoids     Unspecified   • History of cardiac pacemaker 01/08/2018    [employer]   • History of colonic polyps    • Hyperlipidemia    • Hypertension    • Left ventricular hypertrophy    • Melanoma of back (HCC)    • Obesity    • Osteoarthritis    • Pacemaker    • Peripheral neuropathy    • Stroke (HCC)    • TIA (transient ischemic attack)    • Tobacco non-user        Past Surgical History:  Past Surgical History:   Procedure Laterality Date   • AORTIC VALVE REPAIR/REPLACEMENT Bilateral 04/20/2021    Procedure: Transfemoral Transcatheter Aortic Valve Replacement;  Surgeon: Kaushik Toledo MD;  Location: [facility] HYBRID OR 12;  Service: Cardiovascular;  Laterality: Bilateral;   • AORTIC VALVE REPAIR/REPLACEMENT Bilateral 04/20/2021    Procedure: TRANSFEMORAL TRANSCATHETER AORTIC VALVE REPLACEMENT;  Surgeon: Clayton Wilcox MD;  Location: [facility] HYBRID OR 12;  Service: Cardiothoracic;  Laterality: Bilateral;   • APPENDECTOMY     • CARDIAC CATHETERIZATION     • CARDIAC CATHETERIZATION N/A 03/18/2019    Procedure: Left Heart Cath;   Surgeon: Kaushik Toledo MD;  Location:  [facility] CATH INVASIVE LOCATION;  Service: Cardiology   • CARDIAC CATHETERIZATION N/A 04/14/2021    Procedure: Left Heart Cath;  Surgeon: Kaushik Toledo MD;  Location:  [facility] CATH INVASIVE [facility];  Service: Cardiology;  Laterality: N/A;   • CARDIAC ELECTROPHYSIOLOGY PROCEDURE N/A 01/05/2018    Procedure: Temporary Pacemaker;  Surgeon: Luis E Courtney MD;  Location:  [facility] CATH INVASIVE LOC[facility];  Service:    • CARDIAC ELECTROPHYSIOLOGY PROCEDURE N/A 01/08/2018    Procedure: Device Implant;  Surgeon: Kaushik Toledo MD;  Location:  [facility] CATH INVAS[facility];  Service:    • CAROTID ENDARTERECTOMY Right 02/14/2020    Procedure: RIGHT CAROTID ENDARTERECTOMY WITH EEG;  Surgeon: Martín Woo DO;  Location: [facility] HYBRID OR 12;  Service: Vascular;  Laterality: Right;   • CAROTID ENDARTERECTOMY Left 09/19/2022   • CAROTID STENT     • COLON SURGERY  1999    for Ischemic Colitis   • COLONOSCOPY  05/07/2008    tubular adenoma ascending colon polyp - 3 yr   • COLONOSCOPY  08/19/2003    colon polyp @ 24 cm-see path - 5 yr   • COLONOSCOPY  08/11/2000    small interanl hemorrhoid - 3-5 yr   • COLONOSCOPY  06/22/1998    (8)small colon polyps removed-see path, internal anal skin tag - 2-3 yr   • COLONOSCOPY  06/13/2011    2 polyps, adenomatous, hyperplastic, internal hemorrhoids   • CORONARY ARTERY BYPASS GRAFT     • CORONARY STENT PLACEMENT      x1 2014 dr crane    • ENDOSCOPY N/A 12/2/2022    Procedure: ESOPHAGOGASTRODUODENOSCOPY WITH ANESTHESIA;  Surgeon: Saravanan Floyd MD;  Location: [facility] ENDOSCOPY;  Service: Gastroenterology;  Laterality: N/A;  pre screen  post normal  Dr. García   • INSERT / REPLACE / REMOVE PACEMAKER     • OTHER SURGICAL HISTORY      Lithotripsy   • OTHER SURGICAL HISTORY  06/2011    recall 3 yr.    • PACEMAKER IMPLANTATION  01/08/2018   • VASECTOMY          Family History:  Family History   Problem Relation Age of Onset   • Colon cancer  Father         Diagnosed at 74 YO   • Other Sister         Polyps/Colon   • Other Brother         Polyps/Colon   • Stroke Mother        Social History:   reports that he quit smoking about 24 years ago. His smoking use included cigarettes. He has never used smokeless tobacco. He reports that he does not drink alcohol and does not use drugs.    Medications:   Prior to Admission medications    Medication Sig Start Date End Date Taking? Authorizing Provider   amLODIPine (NORVASC) 10 MG tablet Take 1 tablet by mouth Daily. 1/19/23  Yes Shadi García MD   aspirin 81 MG EC tablet Take 1 tablet by mouth Daily. 11/23/22  Yes Shadi García MD   atorvastatin (LIPITOR) 80 MG tablet Take 1 tablet by mouth Daily.   Yes Terell Diehl MD   coenzyme Q10 100 MG capsule Take 1 capsule by mouth Daily.   Yes Terell Diehl MD   irbesartan (AVAPRO) 300 MG tablet Take 1 tablet by mouth Every Night.   Yes Terell Diehl MD   Omega-3 Fatty Acids (fish oil) 1000 MG capsule capsule Take 1 capsule by mouth Daily With Breakfast.   Yes Terell Diehl MD   pantoprazole (PROTONIX) 40 MG EC tablet Take 1 tablet by mouth once daily 2/10/23  Yes Shadi García MD   polyethylene glycol 17 g packet Take 17 g by mouth Daily. 11/19/22  Yes Shadi García MD   sodium bicarbonate 650 MG tablet Take 1 tablet by mouth 2 (Two) Times a Day. 4/6/22  Yes Terell Diehl MD   ferrous gluconate (FERGON) 324 MG tablet Take 1 tablet by mouth once daily with breakfast 1/10/23   Shadi García MD   nitroglycerin (NITROSTAT) 0.4 MG SL tablet PLACE 1 TABLET UNDER THE TONGUE AS NEEDED FOR CHEST PAIN 8/29/22   Kaushik Toledo MD   sennosides-docusate (PERICOLACE) 8.6-50 MG per tablet Take 1 tablet by mouth Daily As Needed for Constipation. 11/19/22   Shadi García MD       Allergies:  Pradaxa [dabigatran etexilate mesylate] and Sulfa antibiotics    ROS:    General: Weight stable  Respiratory: No  "SOA  Cardiovascular: No CP    Objective     Blood pressure 163/84, pulse 60, temperature 97.5 °F (36.4 °C), temperature source Temporal, resp. rate 20, height 175.3 cm (69\"), weight 84.4 kg (186 lb), SpO2 96 %.    Physical Exam   Constitutional: Pt is oriented to person, place, and in no distress.   Cardiovascular: Normal rate, regular rhythm.    Pulmonary/Chest: Effort normal. No respiratory distress.    Abdominal: Nondistended.  Psychiatric: Mood, memory, affect and judgment appear normal.     Assessment & Plan     Diagnosis:  History of melena    Anticipated Surgical Procedure:  Colonoscopy    The risks, benefits, and alternatives of this procedure have been discussed with the patient or the responsible party- the patient understands and agrees to proceed.    EMR Dragon/transcription disclaimer:  Much of this encounter note is electronic transcription/translation of spoken language to printed text.  The electronic translation of spoken language may be erroneous, or at times, nonsensical words or phrases may be inadvertently transcribed.  Although I have reviewed the note for such errors, some may still exist.  "

## 2023-03-14 NOTE — ANESTHESIA PREPROCEDURE EVALUATION
Anesthesia Evaluation     Patient summary reviewed and Nursing notes reviewed   no history of anesthetic complications:  NPO Solid Status: > 8 hours  NPO Liquid Status: > 8 hours           Airway   Mallampati: I  TM distance: >3 FB  Neck ROM: full  No difficulty expected  Dental    (+) edentulous    Pulmonary    (+) a smoker Former, shortness of breath,   Cardiovascular   Exercise tolerance: poor (<4 METS)    ECG reviewed    (+) pacemaker pacemaker interrogated 1-3 months ago, hypertension, valvular problems/murmurs (s/p AVR) AS, CAD, CABG >6 Months, cardiac stents more than 12 months ago OLMEDO, hyperlipidemia,  carotid artery disease (<50% bilat) carotid bilateral    ROS comment: 4/6/21 Cath  Impression:  1.    No change in native coronary or bypass graft vessels compared to last catheterization in 2019  -- proximal left main stenosis appears visually unchanged (and was assessed by FFR at last catheterization and was negative at 0.87), patent stent in the mid LAD with an occluded diagonal branch, chronic total occlusions of OM1 and OM 2 (supplied via SVG), and mild-moderate disease of the RCA.    2.  Saphenous vein grafts to OM1->OM2, and to diagonal branch, are widely patent.      Echo 3/7/21  · Left ventricular ejection fraction appears to be 61 - 65%. Left ventricular systolic function is normal.  · Severe aortic valve stenosis is present. Aortic valve area is 0.97 cm2 and dimensionless index 0.25.  · Left ventricular wall thickness is consistent with mild septal asymmetric hypertrophy.  · Left ventricular diastolic function is consistent with (grade II w/high LAP) pseudonormalization.  · Mild mitral valve stenosis is present due to severe calcification of the posterior mitral annulus.  · Left atrial volume is severely increased.  · Normal size and function of right ventricle.  · Estimated right ventricular systolic pressure from tricuspid regurgitation is mildly elevated (35-45 mmHg).  · Compared to most recent  exam from 2019, aortic stenosis has progressed such that it is now classified as a severe.     [employer] pacemaker- % RV paced  Adequate battery    Interpretation Summary 5/26/22    · Left ventricular ejection fraction appears to be 61 - 65%. Left ventricular systolic function is normal.  · Left ventricular wall thickness is consistent with mild concentric hypertrophy.  · Left ventricular diastolic dysfunction is noted.  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  · Mild to moderate mitral valve stenosis is present.  · Estimated right ventricular systolic pressure from tricuspid regurgitation is normal (<35 mmHg).  · There is no evidence of right to left shunt on bubble study.  · There is no evidence of intracardiac mass or thrombus.  · Compared to previous echo from 4/26/2022, there are no significant changes.         Neuro/Psych  (+) TIA, CVA,    GI/Hepatic/Renal/Endo    (+) obesity,   hepatitis A, liver disease, renal disease CRI,   (-) diabetes    ROS Comment: Recent hospital admission for severe anemia, presents for egd to eval    Musculoskeletal     Abdominal    Substance History      OB/GYN          Other   arthritis,                        Anesthesia Plan    ASA 4     MAC     intravenous induction     Anesthetic plan, risks, benefits, and alternatives have been provided, discussed and informed consent has been obtained with: patient.

## 2023-03-16 ENCOUNTER — TELEPHONE (OUTPATIENT)
Dept: CARDIOLOGY | Facility: CLINIC | Age: 88
End: 2023-03-16
Payer: MEDICARE

## 2023-03-16 NOTE — TELEPHONE ENCOUNTER
Call to pt as Dr. Toledo requested and his daughter answered. I did mention Watchman to her and she reports that her dad is not sure he wants to move forward with Watchman at this time. He is still thinking about it and would like to discuss it further with Zena Sykes at his next appt. Dr. Toledo made aware.

## 2023-04-06 ENCOUNTER — OFFICE VISIT (OUTPATIENT)
Dept: CARDIOLOGY | Facility: CLINIC | Age: 88
End: 2023-04-06
Payer: MEDICARE

## 2023-04-06 VITALS
WEIGHT: 196.8 LBS | OXYGEN SATURATION: 99 % | DIASTOLIC BLOOD PRESSURE: 78 MMHG | BODY MASS INDEX: 29.15 KG/M2 | HEIGHT: 69 IN | SYSTOLIC BLOOD PRESSURE: 126 MMHG | HEART RATE: 81 BPM

## 2023-04-06 DIAGNOSIS — I25.10 CORONARY ARTERY DISEASE INVOLVING NATIVE CORONARY ARTERY OF NATIVE HEART WITHOUT ANGINA PECTORIS: ICD-10-CM

## 2023-04-06 DIAGNOSIS — I48.0 PAF (PAROXYSMAL ATRIAL FIBRILLATION): ICD-10-CM

## 2023-04-06 DIAGNOSIS — Z95.2 S/P TAVR (TRANSCATHETER AORTIC VALVE REPLACEMENT): Primary | ICD-10-CM

## 2023-04-06 PROCEDURE — 1160F RVW MEDS BY RX/DR IN RCRD: CPT | Performed by: NURSE PRACTITIONER

## 2023-04-06 PROCEDURE — 99215 OFFICE O/P EST HI 40 MIN: CPT | Performed by: NURSE PRACTITIONER

## 2023-04-06 PROCEDURE — 1159F MED LIST DOCD IN RCRD: CPT | Performed by: NURSE PRACTITIONER

## 2023-04-06 PROCEDURE — 3078F DIAST BP <80 MM HG: CPT | Performed by: NURSE PRACTITIONER

## 2023-04-06 PROCEDURE — 3074F SYST BP LT 130 MM HG: CPT | Performed by: NURSE PRACTITIONER

## 2023-04-06 PROCEDURE — 93000 ELECTROCARDIOGRAM COMPLETE: CPT | Performed by: NURSE PRACTITIONER

## 2023-04-06 RX ORDER — ATORVASTATIN CALCIUM 80 MG/1
80 TABLET, FILM COATED ORAL NIGHTLY
Qty: 90 TABLET | Refills: 3 | Status: SHIPPED | OUTPATIENT
Start: 2023-04-06

## 2023-04-07 NOTE — PROGRESS NOTES
Subjective:     Encounter Date: 4/6/2023      Patient ID: Mireille Magallanes is a 87 y.o. male.    Chief Complaint: Follow-up A-fib, atrial flutter, TAVR, CAD    History of Present Illness    Mr. 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    Dr. Toledo saw him in April 2022 and he was complaining of exertional dyspnea.  He was euvolemic and in normal sinus rhythm.  Recent echo did not show any evidence of any issue with his TAVR.  Therefore, he ordered a Lexiscan to see if his symptoms may be an anginal equivalent.  However, this was never performed.  He was having problems with significant blood pressure fluctuations.  He ended up having carotid surgery in September, but had another TIA since then.  He was continuing to struggle with fatigue and exertional dyspnea.    He came to see Dr. Toledo on 10/28 and reported he had a stroke on 5/25/2022.  He reported near syncopal episodes following that.  He reported pressure in his chest with radiation to his head when walking long distances.  He had significant exertional dyspnea and lightheadedness as well.  He described presyncope.  He denied orthopnea.  He did not recall ever taking Ranexa.  He reported having problems following carotid surgery.  He reported his systolic blood pressure was 240 when he went into surgery, and this eventually improved with treatment.  At that visit, given his symptoms Dr. Toledo ordered a Lexiscan again for ischemic evaluation and also started Ranexa to see if he would have symptomatic improvement.  His Lexiscan was low risk.  He did not have any improvement with Ranexa.    Following that he was admitted for couple of days in mid November with severe symptomatic anemia with a hemoglobin of 4.5.  He did require blood transfusion and hemoglobin improved to 9.7.  He  reportedly had a negative OB stool in the ER.  He did report some dark stools leading up to that admission but had been constipated without a bowel movement for about a week right before that admission.  Per Dr. García, it is also noted that he had a large hematoma following carotid surgery and CBC was never checked postoperatively.  It was also felt this could be a source of his severe symptomatic anemia.  He is iron deficient.  His symptoms improved dramatically with blood transfusion and he was discharged home in stable condition.  It appears amlodipine was added to his medical therapy for his blood pressure.    I followed up with the patient later November 2022.  Given the recent severe anemia, he was off of Xarelto, but taking aspirin.  His previous fatigue, lightheadedness, presyncope and exertional dyspnea resolved with treatment of his anemia.  He also denied any chest pain, orthopnea, PND.  At that visit, given the recent severe anemia Watchman device implantation was discussed as an alternative to long-term anticoagulation.  After discussion with Dr. García and Dr. Toledo, I did refer him to gastroenterology for further investigation into the source of his anemia.  Ultimately, would need to gastroenterology's input regarding the safety of being able to tolerate uninterrupted DAPT after watchman placement.    He ended up having an endoscopy that was unremarkable.  Dr. Floyd's note indicated there was some suspicion for AVM disease.  More recently, the patient had a colonoscopy on 3/14 with evidence of diverticulosis and internal hemorrhoids.    By way of review, he reported to Lois Landry RN that he had an intense episode of chest pain that lasted 1 to 2 days in mid February, that was not relieved with Tums.    He presents today for follow-up with his spouse and daughter.  He reports his primary concern right now is his kidney disease.  He tells me his GFR is nearing 15 and he might need dialysis.  He  is going back to see Dr. Martínez in approximately 1 more month.  He states he is not interested in pursuing Watchman device implantation or other procedures until he knows what is going to happen with his kidneys.  He remains on aspirin only.  He denies bleeding problems.  He denies any shortness of breath, rapid weight gain, chest pain, orthopnea, PND, palpitations.  He admits lower extremity edema.    The following portions of the patient's history were reviewed and updated as appropriate: allergies, current medications, past family history, past medical history, past social history, past surgical history and problem list.    Review of Systems   Constitutional: Negative for malaise/fatigue.   Cardiovascular: Positive for leg swelling. Negative for chest pain, claudication, dyspnea on exertion, near-syncope, orthopnea, palpitations, paroxysmal nocturnal dyspnea and syncope.   Respiratory: Negative for cough and shortness of breath.    Hematologic/Lymphatic: Does not bruise/bleed easily.   Musculoskeletal: Negative for falls.   Gastrointestinal: Negative for bloating.   Neurological: Negative for dizziness, light-headedness and weakness.       Current Outpatient Medications:   •  amLODIPine (NORVASC) 10 MG tablet, Take 1 tablet by mouth Daily., Disp: 90 tablet, Rfl: 2  •  aspirin 81 MG EC tablet, Take 1 tablet by mouth Daily., Disp: , Rfl:   •  atorvastatin (LIPITOR) 80 MG tablet, Take 1 tablet by mouth Every Night., Disp: 90 tablet, Rfl: 3  •  coenzyme Q10 100 MG capsule, Take 1 capsule by mouth Daily., Disp: , Rfl:   •  ferrous gluconate (FERGON) 324 MG tablet, Take 1 tablet by mouth once daily with breakfast, Disp: 56 tablet, Rfl: 0  •  irbesartan (AVAPRO) 300 MG tablet, Take 1 tablet by mouth Every Night., Disp: , Rfl:   •  nitroglycerin (NITROSTAT) 0.4 MG SL tablet, PLACE 1 TABLET UNDER THE TONGUE AS NEEDED FOR CHEST PAIN, Disp: 30 tablet, Rfl: 11  •  Omega-3 Fatty Acids (fish oil) 1000 MG capsule capsule,  Take 1 capsule by mouth Daily With Breakfast., Disp: , Rfl:   •  pantoprazole (PROTONIX) 40 MG EC tablet, Take 1 tablet by mouth once daily, Disp: 90 tablet, Rfl: 3  •  polyethylene glycol 17 g packet, Take 17 g by mouth Daily., Disp: , Rfl:   •  sennosides-docusate (PERICOLACE) 8.6-50 MG per tablet, Take 1 tablet by mouth Daily As Needed for Constipation., Disp: , Rfl:   •  sodium bicarbonate 650 MG tablet, Take 1 tablet by mouth 2 (Two) Times a Day., Disp: , Rfl:        Objective:      Vitals:    04/06/23 1043   BP: 126/78   Pulse: 81   SpO2: 99%   weight - 196 lbs     Vitals and nursing note reviewed.   Constitutional:       General: Not in acute distress.     Appearance: Not in distress.   Neck:      Vascular: No JVD or JVR. JVD normal.   Pulmonary:      Effort: Pulmonary effort is normal.      Breath sounds: Normal breath sounds.   Cardiovascular:      Normal rate. Regular rhythm.      Murmurs: There is a grade 1/6 systolic murmur.   Edema:     Peripheral edema (3-4+ BLE edema ) present.  Skin:     General: Skin is warm and dry.   Neurological:      Mental Status: Alert, oriented to person, place, and time and oriented to person, place and time.         Lab Review:   Lab Results   Component Value Date    CHOL 113 05/26/2022    CHLPL 125 (L) 06/29/2021    TRIG 110 05/26/2022    HDL 31 (L) 05/26/2022    LDL 62 05/26/2022     Lab Results   Component Value Date    HGBA1C 5.50 05/26/2022     Lab Results   Component Value Date    WBC 5.16 01/19/2023    HGB 12.1 (L) 01/19/2023    HCT 37.8 01/19/2023    MCV 85.3 01/19/2023     01/19/2023       Lab Results   Component Value Date    GLUCOSE 88 01/19/2023    BUN 39 (H) 01/19/2023    CREATININE 2.91 (H) 01/19/2023    EGFRIFNONA 26 (A) 01/03/2022    EGFRIFAFRI 31 (L) 01/03/2022    BCR 13.4 01/19/2023    K 5.2 01/19/2023    CO2 22.1 01/19/2023    CALCIUM 9.1 01/19/2023    PROTENTOTREF 6.9 01/19/2023    ALBUMIN 4.2 01/19/2023    LABIL2 1.6 01/19/2023    AST 17  01/19/2023    ALT 11 01/19/2023         Results for orders placed during the hospital encounter of 05/25/22    Adult Transthoracic Echo Complete W/ Cont if Necessary Per Protocol (With Agitated Saline)    Interpretation Summary  · Left ventricular ejection fraction appears to be 61 - 65%. Left ventricular systolic function is normal.  · Left ventricular wall thickness is consistent with mild concentric hypertrophy.  · Left ventricular diastolic dysfunction is noted.  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  · Mild to moderate mitral valve stenosis is present.  · Estimated right ventricular systolic pressure from tricuspid regurgitation is normal (<35 mmHg).  · There is no evidence of right to left shunt on bubble study.  · There is no evidence of intracardiac mass or thrombus.  · Compared to previous echo from 4/26/2022, there are no significant changes.        ECG 12 Lead    Date/Time: 4/6/2023 4:31 PM  Performed by: Zena Sykes APRN  Authorized by: Zena Sykes APRN   Comparison: compared with previous ECG from 11/22/2022  Similar to previous ECG  Rhythm: sinus rhythm  BPM: 73  Conduction: left bundle branch block        Dual Chamber Pacemaker Interrogation Report  IN OFFICE     February 14, 2023     Primary Cardiologist: Tre  : [employer] Model: [employer] MRI EL L131  Implant date: 1/8/2018     Reason for evaluation: Routine  Indication for pacemaker: Complete Heart Block     Measurements  Atrial sensing - P wave: 2.1 mV  Atrial threshold: 0.7V@ 0.4ms  Atrial lead impedance: 687 ohms  Ventricular sensing - R wave: No R wave at VVI 30  Ventricular threshold: 0.7 V @ 0.4 ms  Ventricular lead impedance:   558 ohms      Manual sensing and threshold testing performed:  Yes     Diagnostic Data  Atrial paced: 21 %  Ventricular paced: 100 %     Episodes/Alerts: P-AFL, burden <1%, longest 18 seconds, ventricular rates controlled, not  anticoagulated s/t symptomatic anemia.  NS-VT x 2, longest 6 seconds, rates 178-297 bpm.     Battery status: Satisfactory , estimated 11.5 years remaining        Final Parameters  Mode:  DDDR  Lower rate: 60 bpm   Upper rate: 130 bpm  AV Delay: paced- 120-180 ms  Ilhraj-003-052 ms  Atrial - Amplitude: 2 V   Pulse width: 0.4 ms   Sensitivity: 0.25 mV     Ventricular - Amplitude: 1.2 V  Pulse width: 0.4 ms  Sensitivity: 1.5 mV       Changes made: Normal ana atrial and ventricular outputs changed to AUTO     Conclusions: Normal Pacemaker Function, Stable Pacing and Sensing Thresholds and Adequate Battery Reserve     Remote Monitor:  Connected.     Follow up: Every 3 months via latitude, annually in office.      Assessment/Plan:     Problem List Items Addressed This Visit (all established problems)       Cardiac and Vasculature    HTN (hypertension): now well controlled     S/P TAVR (transcatheter aortic valve replacement): stable    Overview     29mm S3 valve 4/20/21         Atrial flutter with controlled response and paroxysmal atrial fibrillation (HCC): stable, in NSR today    Relevant Medications    [id number] is 6    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 (HCC) - Primary- no angina, stable, 11/2022 low risk nuclear stress         History of stroke    Recent severe symptomatic anemia , iron deficiency, reports of melena, reports of significant postoperative hematoma following carotid surgery in September 2022        Other Relevant Orders                 Recommendations/plans:    1.  Paroxysmal atrial fibrillation and flutter: Maintaining normal sinus rhythm per EKG today.  He denies palpitations.  Device interrogation from 2/14 reveals his A-fib/a flutter burden is less than 1% with 1 episode of atrial flutter lasting 18 seconds.  Xarelto remains on hold after his severe symptomatic anemia requiring hospitalization and blood transfusion 11/2022 (hemoglobin was 4.5  on presentation).  His [id number] is 6.    -At this point, he is not interested in moving forward with Watchman device implantation.  As noted above, his primary concern is his worsening kidney function.  We discussed resuming anticoagulation, perhaps with Eliquis 2.5 mg twice daily with close monitoring for bleeding and symptomatic anemia versus remaining off of anticoagulation unless A-fib/a flutter burden increases on device interrogations.  Ultimately, after discussion with the patient, his family and Dr. Toledo we have decided to keep him off anticoagulation for now, though this may need to be resumed if his A-fib/a flutter burden increases.  We will continue to monitor this on device interrogations every 3 months.  Also, he will call sooner if he develops palpitations and we will subsequently check his device.  He will call sooner if he would like to reconsider moving forward with JAMILA for anatomic assessment prior to potential Watchman device implantation.    2.  Coronary artery disease: Established problem, stable.  No angina.  Continue aspirin and high intensity statin.  Nuclear stress test November 2022 was low risk.  LDL is well controlled.  See above.  Ranexa was previously stopped because he did not have improvement with this and it was discovered his prior symptoms were from severe anemia.    3.  Status post TAVR: Stable.  Will need repeat 2D echo in May 2023 for continued surveillance -order placed today.    4.  Hypertension: Now well controlled on current doses of amlodipine and ARB as listed above.    5.  Complete heart block with dual-chamber pacemaker in place-continue routine device interrogations every 3 months.  See most recent interrogation from February above.    GIANNI Rush     Follow-up in 3 to 6 months, call sooner with symptoms or concerns or if becomes interested in pursuing JAMILA prior to Watchman device implantation    I spent 51 minutes caring for Mireille on this date of service. This  time includes time spent by me in the following activities: preparing for the visit, reviewing tests, obtaining and/or reviewing a separately obtained history, performing a medically appropriate examination and/or evaluation, counseling and educating the patient/family/caregiver and documenting information in the medical record

## 2023-04-11 ENCOUNTER — TELEPHONE (OUTPATIENT)
Dept: VASCULAR SURGERY | Facility: CLINIC | Age: 88
End: 2023-04-11
Payer: MEDICARE

## 2023-04-12 ENCOUNTER — OFFICE VISIT (OUTPATIENT)
Dept: VASCULAR SURGERY | Facility: CLINIC | Age: 88
End: 2023-04-12
Payer: MEDICARE

## 2023-04-12 ENCOUNTER — HOSPITAL ENCOUNTER (OUTPATIENT)
Dept: ULTRASOUND IMAGING | Facility: HOSPITAL | Age: 88
Discharge: HOME | End: 2023-04-12
Admitting: NURSE PRACTITIONER
Payer: MEDICARE

## 2023-04-12 VITALS
WEIGHT: 194 LBS | DIASTOLIC BLOOD PRESSURE: 70 MMHG | SYSTOLIC BLOOD PRESSURE: 122 MMHG | OXYGEN SATURATION: 98 % | BODY MASS INDEX: 28.65 KG/M2 | HEART RATE: 78 BPM

## 2023-04-12 DIAGNOSIS — E78.2 MIXED HYPERLIPIDEMIA: ICD-10-CM

## 2023-04-12 DIAGNOSIS — I65.23 BILATERAL CAROTID ARTERY STENOSIS: Primary | ICD-10-CM

## 2023-04-12 DIAGNOSIS — I10 PRIMARY HYPERTENSION: ICD-10-CM

## 2023-04-12 DIAGNOSIS — I65.23 BILATERAL CAROTID ARTERY STENOSIS: ICD-10-CM

## 2023-04-12 PROCEDURE — 93880 EXTRACRANIAL BILAT STUDY: CPT

## 2023-04-12 NOTE — PROGRESS NOTES
4/12/2023    Shadi García MD  [address] [place] [address]        Mireille Magallanes  1935    Chief Complaint   Patient presents with   • Follow-up     6 month f/u with carotid testing.  Last seen in the office on 10/10/22.  Pt denies any stroke type symptoms.       Dear Shadi García MD   I had the pleasure of seeing your patient Mireille Magallanes in the office today.  As you recall, Mireille Magallanes is a 87 y.o.  male who we are following for carotid occlusive disease.  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  He has also had a left hemispheric stroke.  He is maintained on aspirin and Xarelto.  He did undergo a right carotid endarterectomy on 2/14/2020.   He underwent a left transcarotid artery revascularization on 9/19/2022.  Overall he is doing well and denies any strokelike symptoms.  He is maintained on aspirin and Lipitor.  He was taken off Xarelto in November after needing 4 units of blood.  He did have noninvasive testing performed today, which I did review in office.    Review of Systems   Constitutional: Negative.    HENT: Negative.    Eyes: Negative.    Respiratory: Positive for shortness of breath (with exertion).    Cardiovascular: Negative.    Gastrointestinal: Negative.    Endocrine: Negative.    Genitourinary: Negative.    Musculoskeletal: Negative.    Skin: Negative.    Allergic/Immunologic: Negative.    Neurological: Negative.    Hematological: Negative.    Psychiatric/Behavioral: Negative.    All other systems reviewed and are negative.        /70   Pulse 78   Wt 88 kg (194 lb)   SpO2 98%   BMI 28.65 kg/m²   Physical Exam  Vitals and nursing note reviewed.   Constitutional:       Appearance: Normal appearance. He is well-developed.   HENT:      Head: Normocephalic and atraumatic.   Eyes:      General: No scleral icterus.     Pupils: Pupils are equal, round, and reactive to light.   Neck:      Thyroid: No  thyromegaly.   Cardiovascular:      Rate and Rhythm: Normal rate and regular rhythm.      Heart sounds: Murmur heard.   Pulmonary:      Effort: Pulmonary effort is normal.      Breath sounds: Normal breath sounds.   Abdominal:      General: Bowel sounds are normal.      Palpations: Abdomen is soft.   Musculoskeletal:         General: Normal range of motion.      Cervical back: Normal range of motion and neck supple.   Skin:     General: Skin is warm and dry.   Neurological:      General: No focal deficit present.      Mental Status: He is alert and oriented to person, place, and time.   Psychiatric:         Mood and Affect: Mood normal.         Behavior: Behavior normal.         Thought Content: Thought content normal.         Judgment: Judgment normal.           Diagnostic data:  Noninvasive testing including a carotid duplex shows less than 50% carotid stenosis bilaterally with bilateral antegrade vertebral flow.    Patient Active Problem List   Diagnosis   • HTN (hypertension)   • Hyperlipidemia   • Coronary artery disease involving coronary bypass graft of native heart without angina pectoris   • Pacemaker   • Other chest pain   • Overweight (BMI 25.0-29.9)   • TIA (transient ischemic attack)   • Carotid artery stenosis   • Chronic anticoagulation   • Stenosis of right carotid artery   • Bilateral carotid artery stenosis   • Carotid stenosis   • History of stroke   • S/P TAVR (transcatheter aortic valve replacement)   • Atrial flutter with controlled response (HCC)   • Chronic renal failure, stage 4 (severe)   • Murmur, cardiac   • Primary osteoarthritis of both knees   • Pure hypercholesterolemia   • Preop testing   • Carotid artery stenosis, symptomatic, left   • Symptomatic anemia   • Acute anemia   • Iron deficiency   • Iron deficiency anemia   • PAF (paroxysmal atrial fibrillation)        Diagnosis Plan   1. Bilateral carotid artery stenosis  US Carotid Bilateral      2. Primary hypertension        3. Mixed  hyperlipidemia            Plan: After thoroughly evaluating Mireille Magallanes, I believe the best course of action is to remain conservative from vascular surgery standpoint.  Currently he is doing well and denies any strokelike symptoms.  He is mostly concerned with his kidney function that he reports is down to 17%.  He will follow-up with nephrology in about a month for updated labs.  I did explain if needed that we would be who creates access for dialysis.  We did discuss water intake.  We will see him back in 6 months with repeat noninvasive testing for continued surveillance, including a carotid duplex. I did discuss vascular risk factors as they pertain to the progression of vascular disease including controlling his hypertension and hyperlipidemia.  His blood pressure stable on his current medications.   He should continue taking his aspirin 81 mg daily as well as Lipitor 80 mg nightly and fish oil 1000 mg daily.  The patient is to continue taking their medications as previously discussed.   This was all discussed in full with complete understanding.  Thank you for allowing me to participate in the care of your patient.  Please do not hesitate to call with any questions or concerns.  We will keep you aware of any further encounters with Mireille Magallanes.        Sincerely yours,         GIANNI Pollack John Eric, MD

## 2023-04-12 NOTE — LETTER
April 12, 2023       No Recipients    Patient: Mireille Magallanes   YOB: 1935   Date of Visit: 4/12/2023       Dear Dr. Yan Recipients:    Thank you for referring Mireille Magallanes to me for evaluation. Below are the relevant portions of my assessment and plan of care.    If you have questions, please do not hesitate to call me. I look forward to following Mireille along with you.         Sincerely,        GIANNI Pollack        CC:   No Recipients    Berna Recinos APRN  04/12/23 1032  Signed  4/12/2023    Shadi García MD  [address] [place] [address]        Mireille Magallanes  1935    Chief Complaint   Patient presents with   • Follow-up     6 month f/u with carotid testing.  Last seen in the office on 10/10/22.  Pt denies any stroke type symptoms.       Dear Shadi García MD   I had the pleasure of seeing your patient Mireille Magallanes in the office today.  As you recall, Mireille Magallanes is a 87 y.o.  male who we are following for carotid occlusive disease.  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  He has also had a left hemispheric stroke.  He is maintained on aspirin and Xarelto.  He did undergo a right carotid endarterectomy on 2/14/2020.   He underwent a left transcarotid artery revascularization on 9/19/2022.  Overall he is doing well and denies any strokelike symptoms.  He is maintained on aspirin and Lipitor.  He was taken off Xarelto in November after needing 4 units of blood.  He did have noninvasive testing performed today, which I did review in office.    Review of Systems   Constitutional: Negative.    HENT: Negative.    Eyes: Negative.    Respiratory: Positive for shortness of breath (with exertion).    Cardiovascular: Negative.    Gastrointestinal: Negative.    Endocrine: Negative.    Genitourinary: Negative.    Musculoskeletal: Negative.    Skin: Negative.    Allergic/Immunologic: Negative.     Neurological: Negative.    Hematological: Negative.    Psychiatric/Behavioral: Negative.    All other systems reviewed and are negative.        /70   Pulse 78   Wt 88 kg (194 lb)   SpO2 98%   BMI 28.65 kg/m²   Physical Exam  Vitals and nursing note reviewed.   Constitutional:       Appearance: Normal appearance. He is well-developed.   HENT:      Head: Normocephalic and atraumatic.   Eyes:      General: No scleral icterus.     Pupils: Pupils are equal, round, and reactive to light.   Neck:      Thyroid: No thyromegaly.   Cardiovascular:      Rate and Rhythm: Normal rate and regular rhythm.      Heart sounds: Murmur heard.   Pulmonary:      Effort: Pulmonary effort is normal.      Breath sounds: Normal breath sounds.   Abdominal:      General: Bowel sounds are normal.      Palpations: Abdomen is soft.   Musculoskeletal:         General: Normal range of motion.      Cervical back: Normal range of motion and neck supple.   Skin:     General: Skin is warm and dry.   Neurological:      General: No focal deficit present.      Mental Status: He is alert and oriented to person, place, and time.   Psychiatric:         Mood and Affect: Mood normal.         Behavior: Behavior normal.         Thought Content: Thought content normal.         Judgment: Judgment normal.           Diagnostic data:  Noninvasive testing including a carotid duplex shows less than 50% carotid stenosis bilaterally with bilateral antegrade vertebral flow.    Patient Active Problem List   Diagnosis   • HTN (hypertension)   • Hyperlipidemia   • Coronary artery disease involving coronary bypass graft of native heart without angina pectoris   • Pacemaker   • Other chest pain   • Overweight (BMI 25.0-29.9)   • TIA (transient ischemic attack)   • Carotid artery stenosis   • Chronic anticoagulation   • Stenosis of right carotid artery   • Bilateral carotid artery stenosis   • Carotid stenosis   • History of stroke   • S/P TAVR (transcatheter aortic  valve replacement)   • Atrial flutter with controlled response (HCC)   • Chronic renal failure, stage 4 (severe)   • Murmur, cardiac   • Primary osteoarthritis of both knees   • Pure hypercholesterolemia   • Preop testing   • Carotid artery stenosis, symptomatic, left   • Symptomatic anemia   • Acute anemia   • Iron deficiency   • Iron deficiency anemia   • PAF (paroxysmal atrial fibrillation)        Diagnosis Plan   1. Bilateral carotid artery stenosis  US Carotid Bilateral      2. Primary hypertension        3. Mixed hyperlipidemia            Plan: After thoroughly evaluating Mireille Magallanes, I believe the best course of action is to remain conservative from vascular surgery standpoint.  Currently he is doing well and denies any strokelike symptoms.  He is mostly concerned with his kidney function that he reports is down to 17%.  He will follow-up with nephrology in about a month for updated labs.  I did explain if needed that we would be who creates access for dialysis.  We did discuss water intake.  We will see him back in 6 months with repeat noninvasive testing for continued surveillance, including a carotid duplex. I did discuss vascular risk factors as they pertain to the progression of vascular disease including controlling his hypertension and hyperlipidemia.  His blood pressure stable on his current medications.   He should continue taking his aspirin 81 mg daily as well as Lipitor 80 mg nightly and fish oil 1000 mg daily.  The patient is to continue taking their medications as previously discussed.   This was all discussed in full with complete understanding.  Thank you for allowing me to participate in the care of your patient.  Please do not hesitate to call with any questions or concerns.  We will keep you aware of any further encounters with Mireille Magallanes.        Sincerely yours,         GIANNI Pollack, Shadi Gamez MD

## 2023-04-18 ENCOUNTER — OFFICE VISIT (OUTPATIENT)
Dept: INTERNAL MEDICINE | Facility: CLINIC | Age: 88
End: 2023-04-18
Payer: MEDICARE

## 2023-04-18 VITALS
OXYGEN SATURATION: 99 % | DIASTOLIC BLOOD PRESSURE: 70 MMHG | WEIGHT: 198 LBS | HEART RATE: 76 BPM | HEIGHT: 69 IN | SYSTOLIC BLOOD PRESSURE: 132 MMHG | TEMPERATURE: 97.3 F | BODY MASS INDEX: 29.33 KG/M2

## 2023-04-18 DIAGNOSIS — I10 PRIMARY HYPERTENSION: Primary | Chronic | ICD-10-CM

## 2023-04-18 DIAGNOSIS — Z95.2 S/P TAVR (TRANSCATHETER AORTIC VALVE REPLACEMENT): ICD-10-CM

## 2023-04-18 DIAGNOSIS — I48.0 PAF (PAROXYSMAL ATRIAL FIBRILLATION): Chronic | ICD-10-CM

## 2023-04-18 DIAGNOSIS — N18.4 STAGE 4 CHRONIC KIDNEY DISEASE: ICD-10-CM

## 2023-04-18 DIAGNOSIS — D50.0 IRON DEFICIENCY ANEMIA DUE TO CHRONIC BLOOD LOSS: Chronic | ICD-10-CM

## 2023-04-18 NOTE — PROGRESS NOTES
Patient presents for follow-up. Blood pressure is overall doing fantastic. Patient underwent endoscopy and colonoscopy for further evaluation of the Watchman procedure. Patient is elected not to proceed forward with the Watchman procedure at this time, as his main concern and main focus at the present is his chronic kidney disease. His GFR was down to 17 when he last saw Dr. Martínez last month. Patient has had no significant changes in medications except for change in bicarbonate. We will check labs today including blood count, and iron profile.    Patient remaining very active. Patient continues to be improved.    Patient did have some episodes intermittently of chest pain, that sounds more gastrointestinal in nature in my opinion, I did indicate to him that he can take the Protonix twice a day if needed.

## 2023-04-18 NOTE — PROGRESS NOTES
"      Chief Complaint  Hypertension (3 month follow up ) and Chest Pain (Off and on pt scheduled for eco on 05/09/23)    Subjective        Mireille Magallanes presents to [facility]    HPI  Patient presents for follow-up.  Blood pressure is overall doing fantastic.  Patient underwent endoscopy and colonoscopy for further evaluation of the Watchman procedure.  Patient is elected not to proceed forward with the Watchman procedure at this time, as his main concern and main focus at the present is his chronic kidney disease.  His GFR was down to 17 when he last saw Dr. Martínez last month.  Patient has had no significant changes in medications except for change in bicarbonate.  We will check labs today including blood count, and iron profile.    Patient remaining very active.  Patient continues to be improved.    Patient did have some episodes intermittently of chest pain, that sounds more gastrointestinal in nature in my opinion, I did indicate to him that he can take the Protonix twice a day if needed.    Review of Systems    Objective   Vital Signs:  /70 (BP Location: Left arm, Patient Position: Sitting, Cuff Size: Adult)   Pulse 76   Temp 97.3 °F (36.3 °C) (Temporal)   Ht 175.3 cm (69\")   Wt 89.8 kg (198 lb)   SpO2 99%   BMI 29.24 kg/m²   Estimated body mass index is 29.24 kg/m² as calculated from the following:    Height as of this encounter: 175.3 cm (69\").    Weight as of this encounter: 89.8 kg (198 lb).      Physical Exam  Vitals reviewed.   Constitutional:       Appearance: He is not ill-appearing.   Cardiovascular:      Rate and Rhythm: Normal rate and regular rhythm.      Heart sounds: Murmur heard.   Pulmonary:      Effort: Pulmonary effort is normal. No respiratory distress.   Skin:     General: Skin is warm and dry.   Neurological:      Mental Status: He is alert.                     Assessment and Plan   Diagnoses and all orders for this visit:    1. Primary " hypertension (Primary)  -     Basic metabolic panel    2. Stage 4 chronic kidney disease  -     Basic metabolic panel    3. Iron deficiency anemia due to chronic blood loss  -     CBC w AUTO Differential  -     Ferritin  -     Iron Profile    4. PAF (paroxysmal atrial fibrillation)    5. S/P TAVR (transcatheter aortic valve replacement)      We will check labs for anemia as well as chemistry function.  Requested that the patient and family fill out a records release form.  This back and discussed his care and received records from the nephrologist.  Work has been done for the patient to get a watchman in the future if he so desires.  He is elected not to do so at the present time.    Patient's last GFR was noted to be around 17 per his recollection, I am unable to see his labs, we have been trying to obtain these from nephrology.  His serum bicarbonate has been increased to 3 tablets total per day.      Result Review :                      Follow Up   Return in about 3 months (around 7/18/2023), or if symptoms worsen or fail to improve, for Medicare Wellness.  Patient was given instructions and counseling regarding his condition or for health maintenance advice. Please see specific information pulled into the AVS if appropriate.       YULIA García MD, FACP, [facility]      Electronically signed by Shadi García MD, 04/18/23, 9:43 AM CDT.

## 2023-04-19 LAB
BASOPHILS # BLD AUTO: 0.04 10*3/MM3 (ref 0–0.2)
BASOPHILS NFR BLD AUTO: 0.6 % (ref 0–1.5)
BUN SERPL-MCNC: 31 MG/DL (ref 8–23)
BUN/CREAT SERPL: 9.7 (ref 7–25)
CALCIUM SERPL-MCNC: 9.8 MG/DL (ref 8.6–10.5)
CHLORIDE SERPL-SCNC: 108 MMOL/L (ref 98–107)
CO2 SERPL-SCNC: 22.9 MMOL/L (ref 22–29)
CREAT SERPL-MCNC: 3.18 MG/DL (ref 0.76–1.27)
EGFRCR SERPLBLD CKD-EPI 2021: 18.4 ML/MIN/1.73
EOSINOPHIL # BLD AUTO: 0.19 10*3/MM3 (ref 0–0.4)
EOSINOPHIL NFR BLD AUTO: 2.8 % (ref 0.3–6.2)
ERYTHROCYTE [DISTWIDTH] IN BLOOD BY AUTOMATED COUNT: 14.1 % (ref 12.3–15.4)
FERRITIN SERPL-MCNC: 33.2 NG/ML (ref 30–400)
GLUCOSE SERPL-MCNC: 90 MG/DL (ref 65–99)
HCT VFR BLD AUTO: 37.8 % (ref 37.5–51)
HGB BLD-MCNC: 12.2 G/DL (ref 13–17.7)
IMM GRANULOCYTES # BLD AUTO: 0.01 10*3/MM3 (ref 0–0.05)
IMM GRANULOCYTES NFR BLD AUTO: 0.1 % (ref 0–0.5)
IRON SATN MFR SERPL: 12 % (ref 20–50)
IRON SERPL-MCNC: 58 MCG/DL (ref 59–158)
LYMPHOCYTES # BLD AUTO: 1.73 10*3/MM3 (ref 0.7–3.1)
LYMPHOCYTES NFR BLD AUTO: 25.6 % (ref 19.6–45.3)
MCH RBC QN AUTO: 28 PG (ref 26.6–33)
MCHC RBC AUTO-ENTMCNC: 32.3 G/DL (ref 31.5–35.7)
MCV RBC AUTO: 86.7 FL (ref 79–97)
MONOCYTES # BLD AUTO: 0.55 10*3/MM3 (ref 0.1–0.9)
MONOCYTES NFR BLD AUTO: 8.1 % (ref 5–12)
NEUTROPHILS # BLD AUTO: 4.23 10*3/MM3 (ref 1.7–7)
NEUTROPHILS NFR BLD AUTO: 62.8 % (ref 42.7–76)
NRBC BLD AUTO-RTO: 0 /100 WBC (ref 0–0.2)
PLATELET # BLD AUTO: 159 10*3/MM3 (ref 140–450)
POTASSIUM SERPL-SCNC: 5.3 MMOL/L (ref 3.5–5.2)
RBC # BLD AUTO: 4.36 10*6/MM3 (ref 4.14–5.8)
SODIUM SERPL-SCNC: 144 MMOL/L (ref 136–145)
TIBC SERPL-MCNC: 486 MCG/DL
UIBC SERPL-MCNC: 428 MCG/DL (ref 112–346)
WBC # BLD AUTO: 6.75 10*3/MM3 (ref 3.4–10.8)

## 2023-05-08 RX ORDER — AMLODIPINE BESYLATE 5 MG/1
TABLET ORAL
Qty: 90 TABLET | Refills: 0 | OUTPATIENT
Start: 2023-05-08

## 2023-05-09 ENCOUNTER — HOSPITAL ENCOUNTER (OUTPATIENT)
Dept: CARDIOLOGY | Facility: HOSPITAL | Age: 88
Discharge: HOME | End: 2023-05-09
Payer: MEDICARE

## 2023-05-09 VITALS
DIASTOLIC BLOOD PRESSURE: 70 MMHG | WEIGHT: 198 LBS | SYSTOLIC BLOOD PRESSURE: 132 MMHG | BODY MASS INDEX: 29.33 KG/M2 | HEIGHT: 69 IN

## 2023-05-09 DIAGNOSIS — Z95.2 S/P TAVR (TRANSCATHETER AORTIC VALVE REPLACEMENT): ICD-10-CM

## 2023-05-09 PROCEDURE — 93306 TTE W/DOPPLER COMPLETE: CPT

## 2023-05-09 PROCEDURE — 93306 TTE W/DOPPLER COMPLETE: CPT | Performed by: INTERNAL MEDICINE

## 2023-05-12 LAB
ASCENDING AORTA: 3.8 CM
BH CV ECHO MEAS - AO MAX PG: 21.7 MMHG
BH CV ECHO MEAS - AO MEAN PG: 10 MMHG
BH CV ECHO MEAS - AO ROOT DIAM: 3.6 CM
BH CV ECHO MEAS - AO V2 MAX: 233 CM/SEC
BH CV ECHO MEAS - AO V2 VTI: 46.3 CM
BH CV ECHO MEAS - AVA(I,D): 2.13 CM2
BH CV ECHO MEAS - EDV(CUBED): 139.8 ML
BH CV ECHO MEAS - EDV(MOD-SP2): 133 ML
BH CV ECHO MEAS - EDV(MOD-SP4): 117 ML
BH CV ECHO MEAS - EF(MOD-BP): 68.3 %
BH CV ECHO MEAS - EF(MOD-SP2): 69.5 %
BH CV ECHO MEAS - EF(MOD-SP4): 67.9 %
BH CV ECHO MEAS - ESV(CUBED): 18.6 ML
BH CV ECHO MEAS - ESV(MOD-SP2): 40.5 ML
BH CV ECHO MEAS - ESV(MOD-SP4): 37.5 ML
BH CV ECHO MEAS - FS: 48.9 %
BH CV ECHO MEAS - IVS/LVPW: 1.13 CM
BH CV ECHO MEAS - IVSD: 1.28 CM
BH CV ECHO MEAS - LA DIMENSION: 4.8 CM
BH CV ECHO MEAS - LAT PEAK E' VEL: 5 CM/SEC
BH CV ECHO MEAS - LV DIASTOLIC VOL/BSA (35-75): 56.9 CM2
BH CV ECHO MEAS - LV MASS(C)D: 249.5 GRAMS
BH CV ECHO MEAS - LV MAX PG: 7.1 MMHG
BH CV ECHO MEAS - LV MEAN PG: 4 MMHG
BH CV ECHO MEAS - LV SYSTOLIC VOL/BSA (12-30): 18.2 CM2
BH CV ECHO MEAS - LV V1 MAX: 133 CM/SEC
BH CV ECHO MEAS - LV V1 VTI: 28.5 CM
BH CV ECHO MEAS - LVIDD: 5.2 CM
BH CV ECHO MEAS - LVIDS: 2.7 CM
BH CV ECHO MEAS - LVOT AREA: 3.5 CM2
BH CV ECHO MEAS - LVOT DIAM: 2.1 CM
BH CV ECHO MEAS - LVPWD: 1.13 CM
BH CV ECHO MEAS - MED PEAK E' VEL: 6.6 CM/SEC
BH CV ECHO MEAS - MV A MAX VEL: 136 CM/SEC
BH CV ECHO MEAS - MV DEC SLOPE: 811 CM/SEC2
BH CV ECHO MEAS - MV DEC TIME: 0.25 MSEC
BH CV ECHO MEAS - MV E MAX VEL: 206 CM/SEC
BH CV ECHO MEAS - MV E/A: 1.51
BH CV ECHO MEAS - MV MAX PG: 22.1 MMHG
BH CV ECHO MEAS - MV MEAN PG: 9 MMHG
BH CV ECHO MEAS - MV V2 VTI: 65.2 CM
BH CV ECHO MEAS - MVA(VTI): 1.51 CM2
BH CV ECHO MEAS - PA V2 MAX: 121 CM/SEC
BH CV ECHO MEAS - PI END-D VEL: 84.2 CM/SEC
BH CV ECHO MEAS - RAP SYSTOLE: 8 MMHG
BH CV ECHO MEAS - RV MAX PG: 3.4 MMHG
BH CV ECHO MEAS - RV V1 MAX: 91.6 CM/SEC
BH CV ECHO MEAS - RV V1 VTI: 20 CM
BH CV ECHO MEAS - RVDD: 4.3 CM
BH CV ECHO MEAS - RVSP: 45 MMHG
BH CV ECHO MEAS - SI(MOD-SP2): 45 ML/M2
BH CV ECHO MEAS - SI(MOD-SP4): 38.7 ML/M2
BH CV ECHO MEAS - SV(LVOT): 98.7 ML
BH CV ECHO MEAS - SV(MOD-SP2): 92.5 ML
BH CV ECHO MEAS - SV(MOD-SP4): 79.5 ML
BH CV ECHO MEAS - TR MAX PG: 37 MMHG
BH CV ECHO MEAS - TR MAX VEL: 304 CM/SEC
BH CV ECHO MEASUREMENTS AVERAGE E/E' RATIO: 35.52
BH CV XLRA - TDI S': 12.4 CM/SEC
LEFT ATRIUM VOLUME INDEX: 72.3 ML/M2
LEFT ATRIUM VOLUME: 149 ML

## 2023-07-20 PROBLEM — I65.21 STENOSIS OF RIGHT CAROTID ARTERY: Status: RESOLVED | Noted: 2019-12-09 | Resolved: 2023-07-20

## 2023-07-20 PROBLEM — I65.29 CAROTID STENOSIS: Status: RESOLVED | Noted: 2020-02-14 | Resolved: 2023-07-20

## 2023-07-20 PROBLEM — G45.9 TIA (TRANSIENT ISCHEMIC ATTACK): Status: RESOLVED | Noted: 2019-08-08 | Resolved: 2023-07-20

## 2023-07-20 PROBLEM — E61.1 IRON DEFICIENCY: Status: RESOLVED | Noted: 2022-11-17 | Resolved: 2023-07-20

## 2023-07-20 PROBLEM — I65.29 CAROTID ARTERY STENOSIS: Status: RESOLVED | Noted: 2019-08-08 | Resolved: 2023-07-20

## 2023-07-20 PROBLEM — I65.22 CAROTID ARTERY STENOSIS, SYMPTOMATIC, LEFT: Status: RESOLVED | Noted: 2022-09-19 | Resolved: 2023-07-20

## 2023-07-20 PROBLEM — D64.9 ACUTE ANEMIA: Status: RESOLVED | Noted: 2022-11-17 | Resolved: 2023-07-20

## 2023-08-16 ENCOUNTER — OFFICE VISIT (OUTPATIENT)
Dept: SURGERY | Facility: CLINIC | Age: 88
End: 2023-08-16
Payer: MEDICARE

## 2023-08-16 VITALS
SYSTOLIC BLOOD PRESSURE: 132 MMHG | BODY MASS INDEX: 28.44 KG/M2 | HEIGHT: 69 IN | WEIGHT: 192 LBS | DIASTOLIC BLOOD PRESSURE: 60 MMHG | OXYGEN SATURATION: 98 % | HEART RATE: 83 BPM

## 2023-08-16 DIAGNOSIS — E66.3 OVERWEIGHT (BMI 25.0-29.9): ICD-10-CM

## 2023-08-16 DIAGNOSIS — K40.90 NON-RECURRENT UNILATERAL INGUINAL HERNIA WITHOUT OBSTRUCTION OR GANGRENE: Primary | ICD-10-CM

## 2023-08-16 RX ORDER — HEPARIN SODIUM 5000 [USP'U]/ML
5000 INJECTION, SOLUTION INTRAVENOUS; SUBCUTANEOUS ONCE
OUTPATIENT
Start: 2023-08-16 | End: 2023-08-16

## 2023-08-16 NOTE — PATIENT INSTRUCTIONS
Surgery is scheduled for September 08, 2023 at 9:00 a.m.  Prework is scheduled for September 01, 2023 at 1:15 p.m.  Nothing to eat or drink after midnight before surgery.  No Aspirin, Vitamins or Blood Thinners 5 days prior to surgery.  Please report to the hospital main registation for check in on both days.     BMI for Adults  What is BMI?  Body mass index (BMI) is a number that is calculated from a person's weight and height. BMI can help estimate how much of a person's weight is composed of fat. BMI does not measure body fat directly. Rather, it is an alternative to procedures that directly measure body fat, which can be difficult and expensive.  BMI can help identify people who may be at higher risk for certain medical problems.  What are BMI measurements used for?  BMI is used as a screening tool to identify possible weight problems. It helps determine whether a person is obese, overweight, a healthy weight, or underweight.  BMI is useful for:  Identifying a weight problem that may be related to a medical condition or may increase the risk for medical problems.  Promoting changes, such as changes in diet and exercise, to help reach a healthy weight. BMI screening can be repeated to see if these changes are working.  How is BMI calculated?  BMI involves measuring your weight in relation to your height. Both height and weight are measured, and the BMI is calculated from those numbers. This can be done either in English ([place] or metric measurements. Note that charts and online BMI calculators are available to help you find your BMI quickly and easily without having to do these calculations yourself.  To calculate your BMI in English ([place] measurements:    Measure your weight in pounds (lb).  Multiply the number of pounds by 703.  For example, for a person who weighs 180 lb, multiply that number by 703, which equals 126,540.  Measure your height in inches. Then multiply that number by itself to get a measurement  "called \"inches squared.\"  For example, for a person who is 70 inches tall, the \"inches squared\" measurement is 70 inches x 70 inches, which equals 4,900 inches squared.  Divide the total from step 2 (number of lb x 703) by the total from step 3 (inches squared): 126,540 ö 4,900 = 25.8. This is your BMI.    To calculate your BMI in metric measurements:  Measure your weight in kilograms (kg).  Measure your height in meters (m). Then multiply that number by itself to get a measurement called \"meters squared.\"  For example, for a person who is 1.75 m tall, the \"meters squared\" measurement is 1.75 m x 1.75 m, which is equal to 3.1 meters squared.  Divide the number of kilograms (your weight) by the meters squared number. In this example: 70 ö 3.1 = 22.6. This is your BMI.  What do the results mean?  BMI charts are used to identify whether you are underweight, normal weight, overweight, or obese. The following guidelines will be used:  Underweight: BMI less than 18.5.  Normal weight: BMI between 18.5 and 24.9.  Overweight: BMI between 25 and 29.9.  Obese: BMI of 30 or above.  Keep these notes in mind:  Weight includes both fat and muscle, so someone with a muscular build, such as an athlete, may have a BMI that is higher than 24.9. In cases like these, BMI is not an accurate measure of body fat.  To determine if excess body fat is the cause of a BMI of 25 or higher, further assessments may need to be done by a health care provider.  BMI is usually interpreted in the same way for men and women.  Where to find more information  For more information about BMI, including tools to quickly calculate your BMI, go to these websites:  Centers for Disease Control and Prevention: www.cdc.gov  American Heart Association: www.heart.org  National Heart, Lung, and Blood [place]: www.nhlbi.nih.gov  Summary  Body mass index (BMI) is a number that is calculated from a person's weight and height.  BMI may help estimate how much of a " person's weight is composed of fat. BMI can help identify those who may be at higher risk for certain medical problems.  BMI can be measured using English measurements or metric measurements.  BMI charts are used to identify whether you are underweight, normal weight, overweight, or obese.  This information is not intended to replace advice given to you by your health care provider. Make sure you discuss any questions you have with your health care provider.  Document Revised: 09/09/2020 Document Reviewed: 07/17/2020  [employer] Patient Education c 2021 [employer]

## 2023-08-16 NOTE — H&P (VIEW-ONLY)
Office New Patient History and Physical:     Referring Provider: No ref. provider found    Chief Complaint   Patient presents with    Hernia     Mr. Magallanes is here for a consult for a hernia.        Subjective .     History of present illness:  Mireille Magallanes is a 87 y.o. male who prestns with a right groin hernia x 1 month. It is enlarging. It is sore, and the soreness is worse with activity. He does endorse a bulge. He has never had a hernia before. He denies constipation, nausea and vomiting.     His BMI is 28. He is not on blood thinners; only 81mg ASA. He is a non-smoker. He has a history of appendectomy and open right colon resection at the time of appendectomy.     History  Past Medical History:   Diagnosis Date    Acute viral hepatitis A     Aortic valve stenosis     Carotid artery stenosis     Right    CKD (chronic kidney disease)     Coagulopathy     Plavix    Colon polyps     Coronary arteriosclerosis     stent ,? 4/2104, on plavix now.    Diarrhea     Dysphagia     Unspecified    Family history of colon cancer     Hematochezia     differential diagnosis discussed    Hemorrhoids     Unspecified    History of cardiac pacemaker 01/08/2018    "[employer]    History of colonic polyps     Hyperlipidemia     Hypertension     Left ventricular hypertrophy     Melanoma of back     Obesity     Osteoarthritis     Pacemaker     Peripheral neuropathy     Stroke     TIA (transient ischemic attack)     Tobacco non-user    ,   Past Surgical History:   Procedure Laterality Date    AORTIC VALVE REPAIR/REPLACEMENT Bilateral 04/20/2021    Procedure: Transfemoral Transcatheter Aortic Valve Replacement;  Surgeon: Kaushik Toledo MD;  Location: [facility] OR ;  Service: Cardiovascular;  Laterality: Bilateral;    AORTIC VALVE REPAIR/REPLACEMENT Bilateral 04/20/2021    Procedure: TRANSFEMORAL TRANSCATHETER AORTIC VALVE REPLACEMENT;  Surgeon: Clayton Wilcox MD;  Location:  [facility] OR 12;  Service:  Cardiothoracic;  Laterality: Bilateral;    APPENDECTOMY      CARDIAC CATHETERIZATION      CARDIAC CATHETERIZATION N/A 03/18/2019    Procedure: Left Heart Cath;  Surgeon: Kaushik Toledo MD;  Location: [facility] CATH INVASIVE LOCATION;  Service: Cardiology    CARDIAC CATHETERIZATION N/A 04/14/2021    Procedure: Left Heart Cath;  Surgeon: Kaushik Toledo MD;  Location:  [facility] CATH INVASIVE LOCATION;  Service: Cardiology;  Laterality: N/A;    CARDIAC ELECTROPHYSIOLOGY PROCEDURE N/A 01/05/2018    Procedure: Temporary Pacemaker;  Surgeon: Luis E Courtney MD;  Location:  [facility] CATH INVASIVE LOCATION;  Service:     CARDIAC ELECTROPHYSIOLOGY PROCEDURE N/A 01/08/2018    Procedure: Device Implant;  Surgeon: Kaushik Toledo MD;  Location:  [facility] CATH INVASIVE LOCATION;  Service:     CAROTID ENDARTERECTOMY Right 02/14/2020    Procedure: RIGHT CAROTID ENDARTERECTOMY WITH EEG;  Surgeon: Martín Woo DO;  Location: [facility] OR 12;  Service: Vascular;  Laterality: Right;    CAROTID ENDARTERECTOMY Left 09/19/2022    CAROTID STENT      COLON SURGERY  1999    for Ischemic Colitis    COLONOSCOPY  05/07/2008    tubular adenoma ascending colon polyp - 3 yr    COLONOSCOPY  08/19/2003    colon polyp @ 24 cm-see path - 5 yr    COLONOSCOPY  08/11/2000    small interanl hemorrhoid - 3-5 yr    COLONOSCOPY  06/22/1998    (8)small colon polyps removed-see path, internal anal skin tag - 2-3 yr    COLONOSCOPY  06/13/2011    2 polyps, adenomatous, hyperplastic, internal hemorrhoids    COLONOSCOPY N/A 3/14/2023    Procedure: COLONOSCOPY WITH ANESTHESIA;  Surgeon: Saravanan Floyd MD;  Location: [facility] ENDOSCOPY;  Service: Gastroenterology;  Laterality: N/A;  Pre: anemia  Post: diverticulosis, hemorrhoids  Shadi García MD    CORONARY ARTERY BYPASS GRAFT      CORONARY STENT PLACEMENT      x1 2014 dr crane     ENDOSCOPY N/A 12/2/2022    Procedure: ESOPHAGOGASTRODUODENOSCOPY WITH ANESTHESIA;  Surgeon: Saravanan Floyd MD;   Location: [facility] ENDOSCOPY;  Service: Gastroenterology;  Laterality: N/A;  pre screen  post normal  Dr. García    INSERT / REPLACE / REMOVE PACEMAKER      OTHER SURGICAL HISTORY      Lithotripsy    OTHER SURGICAL HISTORY  2011    recall 3 yr.     PACEMAKER IMPLANTATION  2018    VASECTOMY     ,   Family History   Problem Relation Age of Onset    Colon cancer Father         Diagnosed at 72 YO    Other Sister         Polyps/Colon    Other Brother         Polyps/Colon    Stroke Mother    ,   Social History     Tobacco Use    Smoking status: Former     Types: Cigarettes     Quit date:      Years since quittin.6    Smokeless tobacco: Never   Vaping Use    Vaping Use: Never used   Substance Use Topics    Alcohol use: No    Drug use: No   , (Not in a hospital admission)   and Allergies:  Pradaxa [dabigatran etexilate mesylate] and Sulfa antibiotics    Current Outpatient Medications:     amLODIPine (NORVASC) 10 MG tablet, Take 1 tablet by mouth Daily., Disp: 90 tablet, Rfl: 2    aspirin 81 MG EC tablet, Take 1 tablet by mouth Daily., Disp: , Rfl:     atorvastatin (LIPITOR) 80 MG tablet, Take 1 tablet by mouth Every Night., Disp: 90 tablet, Rfl: 3    Cholecalciferol (Vitamin D3) 25 MCG (1000 UT) capsule, Take  by mouth., Disp: , Rfl:     coenzyme Q10 100 MG capsule, Take 1 capsule by mouth Daily., Disp: , Rfl:     irbesartan (AVAPRO) 300 MG tablet, Take 1 tablet by mouth Every Night., Disp: , Rfl:     nitroglycerin (NITROSTAT) 0.4 MG SL tablet, PLACE 1 TABLET UNDER THE TONGUE AS NEEDED FOR CHEST PAIN, Disp: 30 tablet, Rfl: 11    Omega-3 Fatty Acids (fish oil) 1000 MG capsule capsule, Take 1 capsule by mouth Daily With Breakfast., Disp: , Rfl:     pantoprazole (PROTONIX) 40 MG EC tablet, Take 1 tablet by mouth once daily, Disp: 90 tablet, Rfl: 3    polyethylene glycol 17 g packet, Take 17 g by mouth Daily., Disp: , Rfl:     sodium bicarbonate 650 MG tablet, Take 1 tablet by mouth 2 (Two) Times a Day., Disp:  ", Rfl:     Objective     Vital Signs   /60 (BP Location: Left arm, Patient Position: Sitting, Cuff Size: Adult)   Pulse 83   Ht 175.3 cm (69.02\")   Wt 87.1 kg (192 lb)   SpO2 98%   BMI 28.34 kg/m²      Physical Exam:  General appearance - alert, well appearing, and in no distress  Mental status - alert, oriented to person, place, and time  Eyes - pupils equal and reactive, extraocular eye movements intact  Neck - supple, no significant adenopathy  Chest - no tachypnea, retractions or cyanosis  Heart - normal rate and regular rhythm  Abdomen - soft, nontender, nondistended, no masses or organomegaly  Large right inguinal hernia on exam, containing bowel.   Neurological - alert, oriented, normal speech, no focal findings or movement disorder noted  Musculoskeletal - no joint tenderness, deformity or swelling    Results Review:    The following data was reviewed by: Marianna Redd MD on 08/16/2023:  Progress Notes by Shadi García MD (07/20/2023 09:15)   CT Abdomen Pelvis Without Contrast (11/17/2022 11:37)     Assessment & Plan       Diagnoses and all orders for this visit:    1. Non-recurrent unilateral inguinal hernia without obstruction or gangrene (Primary)  -     Case Request; Standing  -     MRSA Screen Culture (Outpatient) - Swab, Nares; Future  -     XR chest 1 vw; Future  -     ECG 12 Lead; Future  -     ceFAZolin (ANCEF) 2,000 mg in sodium chloride 0.9 % 100 mL IVPB  -     heparin (porcine) 5000 UNIT/ML injection 5,000 Units  -     CBC & Differential; Future  -     Comprehensive Metabolic Panel; Future  -     Case Request    2. Overweight (BMI 25.0-29.9)    Other orders  -     Follow Anesthesia Guidelines / Protocol; Future  -     Follow Anesthesia Guidelines / Protocol; Standing  -     Verify / Perform Chlorhexidine Skin Prep; Standing  -     Verify / Perform Chlorhexidine Skin Prep if Indicated (If Not Already Completed); Standing  -     Obtain Informed Consent; Future  -     Provide " NPO Instructions to Patient; Future  -     Chlorhexidine Skin Prep; Future  -     Notify physician (specify); Standing  -     Instructions on coughing, deep breathing, and incentive spirometry.; Standing  -     Oxygen Therapy-; Standing         Mireille Magallanes is a 87 y.o. male with a large bowel containing right sided inguinal hernia without obstruction or gangrene. This is significantly limiting the patient's life-style due to discomfort, and as such the patient wishes to proceed with repair. Risks of surgery including bleeding/hematoma, infection, mesh infection requiring removal, damage to surrounding structures (including the arteries, veins and nerves) with potential chronic post-operative pain, and hernia recurrence have been discussed with the patient. I also discussed the different operative approaches available for inguinal hernia repair and their respective risks and benefits including open repair, laparoscopic repair, and robotic repair. After this discussion, the patient and I have decided to proceed with a laparoscopic robotic assisted right inguinal hernia repair with mesh, possible open. We discussed that with his surgical history, if he has too much scar tissue, we will have to convert to open. He understands and agrees.  The patient has an appointment for pre-operative CBC, CMP, EKG, CXR. The patient is currently scheduled for the above procedure on 9/8/23.     This is a chronic problem with progression. I have reviewed the note and CT above.  I have ordered CBC, CMP, CXR and EKG. He is at increased risk of perioperative complications 2/2 his elevated BMI of 28.     I also discussed with the patient post operative pain management including multimodal pain control utilizing Tylenol, ibuprofen, and Roxicodone for breakthrough pain. I will plan to give the patient 15 tabs of 5mg Roxicodone post operatively for breakthrough pain.    BMI is >= 25 and <30. (Overweight) The following options were offered  after discussion;: weight loss educational material (shared in after visit summary)      Marianna Redd MD  08/18/23  18:03 CDT

## 2023-08-16 NOTE — PROGRESS NOTES
Office New Patient History and Physical:     Referring Provider: No ref. provider found    Chief Complaint   Patient presents with    Hernia     Mr. Magallanes is here for a consult for a hernia.        Subjective .     History of present illness:  Mireille Magallanes is a 87 y.o. male who prestns with a right groin hernia x 1 month. It is enlarging. It is sore, and the soreness is worse with activity. He does endorse a bulge. He has never had a hernia before. He denies constipation, nausea and vomiting.     His BMI is 28. He is not on blood thinners; only 81mg ASA. He is a non-smoker. He has a history of appendectomy and open right colon resection at the time of appendectomy.     History  Past Medical History:   Diagnosis Date    Acute viral hepatitis A     Aortic valve stenosis     Carotid artery stenosis     Right    CKD (chronic kidney disease)     Coagulopathy     Plavix    Colon polyps     Coronary arteriosclerosis     stent ,? 4/2104, on plavix now.    Diarrhea     Dysphagia     Unspecified    Family history of colon cancer     Hematochezia     differential diagnosis discussed    Hemorrhoids     Unspecified    History of cardiac pacemaker 01/08/2018    [employer]    History of colonic polyps     Hyperlipidemia     Hypertension     Left ventricular hypertrophy     Melanoma of back     Obesity     Osteoarthritis     Pacemaker     Peripheral neuropathy     Stroke     TIA (transient ischemic attack)     Tobacco non-user    ,   Past Surgical History:   Procedure Laterality Date    AORTIC VALVE REPAIR/REPLACEMENT Bilateral 04/20/2021    Procedure: Transfemoral Transcatheter Aortic Valve Replacement;  Surgeon: Kaushik Toledo MD;  Location: [facility] OR ;  Service: Cardiovascular;  Laterality: Bilateral;    AORTIC VALVE REPAIR/REPLACEMENT Bilateral 04/20/2021    Procedure: TRANSFEMORAL TRANSCATHETER AORTIC VALVE REPLACEMENT;  Surgeon: Clayton Wilcox MD;  Location:  [facility] OR 12;  Service:  Cardiothoracic;  Laterality: Bilateral;    APPENDECTOMY      CARDIAC CATHETERIZATION      CARDIAC CATHETERIZATION N/A 03/18/2019    Procedure: Left Heart Cath;  Surgeon: Kaushik Toledo MD;  Location: [facility] CATH INVASIVE LOCATION;  Service: Cardiology    CARDIAC CATHETERIZATION N/A 04/14/2021    Procedure: Left Heart Cath;  Surgeon: Kaushik Toledo MD;  Location:  [facility] CATH INVASIVE LOCATION;  Service: Cardiology;  Laterality: N/A;    CARDIAC ELECTROPHYSIOLOGY PROCEDURE N/A 01/05/2018    Procedure: Temporary Pacemaker;  Surgeon: Luis E Courtney MD;  Location:  [facility] CATH INVASIVE LOCATION;  Service:     CARDIAC ELECTROPHYSIOLOGY PROCEDURE N/A 01/08/2018    Procedure: Device Implant;  Surgeon: Kaushik Toledo MD;  Location:  [facility] CATH INVASIVE LOCATION;  Service:     CAROTID ENDARTERECTOMY Right 02/14/2020    Procedure: RIGHT CAROTID ENDARTERECTOMY WITH EEG;  Surgeon: Martín Woo DO;  Location: [facility] OR 12;  Service: Vascular;  Laterality: Right;    CAROTID ENDARTERECTOMY Left 09/19/2022    CAROTID STENT      COLON SURGERY  1999    for Ischemic Colitis    COLONOSCOPY  05/07/2008    tubular adenoma ascending colon polyp - 3 yr    COLONOSCOPY  08/19/2003    colon polyp @ 24 cm-see path - 5 yr    COLONOSCOPY  08/11/2000    small interanl hemorrhoid - 3-5 yr    COLONOSCOPY  06/22/1998    (8)small colon polyps removed-see path, internal anal skin tag - 2-3 yr    COLONOSCOPY  06/13/2011    2 polyps, adenomatous, hyperplastic, internal hemorrhoids    COLONOSCOPY N/A 3/14/2023    Procedure: COLONOSCOPY WITH ANESTHESIA;  Surgeon: Saravanan Floyd MD;  Location: [facility] ENDOSCOPY;  Service: Gastroenterology;  Laterality: N/A;  Pre: anemia  Post: diverticulosis, hemorrhoids  Shadi García MD    CORONARY ARTERY BYPASS GRAFT      CORONARY STENT PLACEMENT      x1 2014 dr crane     ENDOSCOPY N/A 12/2/2022    Procedure: ESOPHAGOGASTRODUODENOSCOPY WITH ANESTHESIA;  Surgeon: Saravanan Floyd MD;   Location: [facility] ENDOSCOPY;  Service: Gastroenterology;  Laterality: N/A;  pre screen  post normal  Dr. García    INSERT / REPLACE / REMOVE PACEMAKER      OTHER SURGICAL HISTORY      Lithotripsy    OTHER SURGICAL HISTORY  2011    recall 3 yr.     PACEMAKER IMPLANTATION  2018    VASECTOMY     ,   Family History   Problem Relation Age of Onset    Colon cancer Father         Diagnosed at 74 YO    Other Sister         Polyps/Colon    Other Brother         Polyps/Colon    Stroke Mother    ,   Social History     Tobacco Use    Smoking status: Former     Types: Cigarettes     Quit date:      Years since quittin.6    Smokeless tobacco: Never   Vaping Use    Vaping Use: Never used   Substance Use Topics    Alcohol use: No    Drug use: No   , (Not in a hospital admission)   and Allergies:  Pradaxa [dabigatran etexilate mesylate] and Sulfa antibiotics    Current Outpatient Medications:     amLODIPine (NORVASC) 10 MG tablet, Take 1 tablet by mouth Daily., Disp: 90 tablet, Rfl: 2    aspirin 81 MG EC tablet, Take 1 tablet by mouth Daily., Disp: , Rfl:     atorvastatin (LIPITOR) 80 MG tablet, Take 1 tablet by mouth Every Night., Disp: 90 tablet, Rfl: 3    Cholecalciferol (Vitamin D3) 25 MCG (1000 UT) capsule, Take  by mouth., Disp: , Rfl:     coenzyme Q10 100 MG capsule, Take 1 capsule by mouth Daily., Disp: , Rfl:     irbesartan (AVAPRO) 300 MG tablet, Take 1 tablet by mouth Every Night., Disp: , Rfl:     nitroglycerin (NITROSTAT) 0.4 MG SL tablet, PLACE 1 TABLET UNDER THE TONGUE AS NEEDED FOR CHEST PAIN, Disp: 30 tablet, Rfl: 11    Omega-3 Fatty Acids (fish oil) 1000 MG capsule capsule, Take 1 capsule by mouth Daily With Breakfast., Disp: , Rfl:     pantoprazole (PROTONIX) 40 MG EC tablet, Take 1 tablet by mouth once daily, Disp: 90 tablet, Rfl: 3    polyethylene glycol 17 g packet, Take 17 g by mouth Daily., Disp: , Rfl:     sodium bicarbonate 650 MG tablet, Take 1 tablet by mouth 2 (Two) Times a Day., Disp:  ", Rfl:     Objective     Vital Signs   /60 (BP Location: Left arm, Patient Position: Sitting, Cuff Size: Adult)   Pulse 83   Ht 175.3 cm (69.02\")   Wt 87.1 kg (192 lb)   SpO2 98%   BMI 28.34 kg/mý      Physical Exam:  General appearance - alert, well appearing, and in no distress  Mental status - alert, oriented to person, place, and time  Eyes - pupils equal and reactive, extraocular eye movements intact  Neck - supple, no significant adenopathy  Chest - no tachypnea, retractions or cyanosis  Heart - normal rate and regular rhythm  Abdomen - soft, nontender, nondistended, no masses or organomegaly  Large right inguinal hernia on exam, containing bowel.   Neurological - alert, oriented, normal speech, no focal findings or movement disorder noted  Musculoskeletal - no joint tenderness, deformity or swelling    Results Review:    The following data was reviewed by: Marianna Redd MD on 08/16/2023:  Progress Notes by Shadi García MD (07/20/2023 09:15)   CT Abdomen Pelvis Without Contrast (11/17/2022 11:37)     Assessment & Plan       Diagnoses and all orders for this visit:    1. Non-recurrent unilateral inguinal hernia without obstruction or gangrene (Primary)  -     Case Request; Standing  -     MRSA Screen Culture (Outpatient) - Swab, Nares; Future  -     XR chest 1 vw; Future  -     ECG 12 Lead; Future  -     ceFAZolin (ANCEF) 2,000 mg in sodium chloride 0.9 % 100 mL IVPB  -     heparin (porcine) 5000 UNIT/ML injection 5,000 Units  -     CBC & Differential; Future  -     Comprehensive Metabolic Panel; Future  -     Case Request    2. Overweight (BMI 25.0-29.9)    Other orders  -     Follow Anesthesia Guidelines / Protocol; Future  -     Follow Anesthesia Guidelines / Protocol; Standing  -     Verify / Perform Chlorhexidine Skin Prep; Standing  -     Verify / Perform Chlorhexidine Skin Prep if Indicated (If Not Already Completed); Standing  -     Obtain Informed Consent; Future  -     Provide " NPO Instructions to Patient; Future  -     Chlorhexidine Skin Prep; Future  -     Notify physician (specify); Standing  -     Instructions on coughing, deep breathing, and incentive spirometry.; Standing  -     Oxygen Therapy-; Standing         Mireille Magallanes is a 87 y.o. male with a large bowel containing right sided inguinal hernia without obstruction or gangrene. This is significantly limiting the patient's life-style due to discomfort, and as such the patient wishes to proceed with repair. Risks of surgery including bleeding/hematoma, infection, mesh infection requiring removal, damage to surrounding structures (including the arteries, veins and nerves) with potential chronic post-operative pain, and hernia recurrence have been discussed with the patient. I also discussed the different operative approaches available for inguinal hernia repair and their respective risks and benefits including open repair, laparoscopic repair, and robotic repair. After this discussion, the patient and I have decided to proceed with a laparoscopic robotic assisted right inguinal hernia repair with mesh, possible open. We discussed that with his surgical history, if he has too much scar tissue, we will have to convert to open. He understands and agrees.  The patient has an appointment for pre-operative CBC, CMP, EKG, CXR. The patient is currently scheduled for the above procedure on 9/8/23.     This is a chronic problem with progression. I have reviewed the note and CT above.  I have ordered CBC, CMP, CXR and EKG. He is at increased risk of perioperative complications 2/2 his elevated BMI of 28.     I also discussed with the patient post operative pain management including multimodal pain control utilizing Tylenol, ibuprofen, and Roxicodone for breakthrough pain. I will plan to give the patient 15 tabs of 5mg Roxicodone post operatively for breakthrough pain.    BMI is >= 25 and <30. (Overweight) The following options were offered  after discussion;: weight loss educational material (shared in after visit summary)      Marianna Redd MD  08/18/23  18:03 CDT

## 2023-09-01 ENCOUNTER — PRE-ADMISSION TESTING (OUTPATIENT)
Dept: PREADMISSION TESTING | Facility: HOSPITAL | Age: 88
End: 2023-09-01
Payer: MEDICARE

## 2023-09-01 ENCOUNTER — HOSPITAL ENCOUNTER (OUTPATIENT)
Dept: GENERAL RADIOLOGY | Facility: HOSPITAL | Age: 88
Discharge: HOME | End: 2023-09-01
Payer: MEDICARE

## 2023-09-01 VITALS
BODY MASS INDEX: 30.51 KG/M2 | DIASTOLIC BLOOD PRESSURE: 72 MMHG | RESPIRATION RATE: 16 BRPM | HEIGHT: 68 IN | OXYGEN SATURATION: 98 % | SYSTOLIC BLOOD PRESSURE: 157 MMHG | HEART RATE: 82 BPM | WEIGHT: 201.28 LBS

## 2023-09-01 DIAGNOSIS — K40.90 NON-RECURRENT UNILATERAL INGUINAL HERNIA WITHOUT OBSTRUCTION OR GANGRENE: ICD-10-CM

## 2023-09-01 LAB
ALBUMIN SERPL-MCNC: 4.4 G/DL (ref 3.5–5.2)
ALBUMIN/GLOB SERPL: 1.6 G/DL
ALP SERPL-CCNC: 98 U/L (ref 39–117)
ALT SERPL W P-5'-P-CCNC: 14 U/L (ref 1–41)
ANION GAP SERPL CALCULATED.3IONS-SCNC: 13 MMOL/L (ref 5–15)
AST SERPL-CCNC: 20 U/L (ref 1–40)
BASOPHILS # BLD AUTO: 0.03 10*3/MM3 (ref 0–0.2)
BASOPHILS NFR BLD AUTO: 0.4 % (ref 0–1.5)
BILIRUB SERPL-MCNC: 0.5 MG/DL (ref 0–1.2)
BUN SERPL-MCNC: 38 MG/DL (ref 8–23)
BUN/CREAT SERPL: 12.4 (ref 7–25)
CALCIUM SPEC-SCNC: 9 MG/DL (ref 8.6–10.5)
CHLORIDE SERPL-SCNC: 106 MMOL/L (ref 98–107)
CO2 SERPL-SCNC: 20 MMOL/L (ref 22–29)
CREAT SERPL-MCNC: 3.06 MG/DL (ref 0.76–1.27)
DEPRECATED RDW RBC AUTO: 43.7 FL (ref 37–54)
EGFRCR SERPLBLD CKD-EPI 2021: 19.3 ML/MIN/1.73
EOSINOPHIL # BLD AUTO: 0.16 10*3/MM3 (ref 0–0.4)
EOSINOPHIL NFR BLD AUTO: 2.3 % (ref 0.3–6.2)
ERYTHROCYTE [DISTWIDTH] IN BLOOD BY AUTOMATED COUNT: 13.6 % (ref 12.3–15.4)
GLOBULIN UR ELPH-MCNC: 2.8 GM/DL
GLUCOSE SERPL-MCNC: 92 MG/DL (ref 65–99)
HCT VFR BLD AUTO: 38 % (ref 37.5–51)
HGB BLD-MCNC: 12 G/DL (ref 13–17.7)
IMM GRANULOCYTES # BLD AUTO: 0.03 10*3/MM3 (ref 0–0.05)
IMM GRANULOCYTES NFR BLD AUTO: 0.4 % (ref 0–0.5)
LYMPHOCYTES # BLD AUTO: 1.62 10*3/MM3 (ref 0.7–3.1)
LYMPHOCYTES NFR BLD AUTO: 23.2 % (ref 19.6–45.3)
MCH RBC QN AUTO: 27.5 PG (ref 26.6–33)
MCHC RBC AUTO-ENTMCNC: 31.6 G/DL (ref 31.5–35.7)
MCV RBC AUTO: 87.2 FL (ref 79–97)
MONOCYTES # BLD AUTO: 0.54 10*3/MM3 (ref 0.1–0.9)
MONOCYTES NFR BLD AUTO: 7.7 % (ref 5–12)
NEUTROPHILS NFR BLD AUTO: 4.59 10*3/MM3 (ref 1.7–7)
NEUTROPHILS NFR BLD AUTO: 66 % (ref 42.7–76)
NRBC BLD AUTO-RTO: 0 /100 WBC (ref 0–0.2)
PLATELET # BLD AUTO: 172 10*3/MM3 (ref 140–450)
PMV BLD AUTO: 10.5 FL (ref 6–12)
POTASSIUM SERPL-SCNC: 4.6 MMOL/L (ref 3.5–5.2)
PROT SERPL-MCNC: 7.2 G/DL (ref 6–8.5)
QT INTERVAL: 464 MS
QTC INTERVAL: 497 MS
RBC # BLD AUTO: 4.36 10*6/MM3 (ref 4.14–5.8)
SODIUM SERPL-SCNC: 139 MMOL/L (ref 136–145)
WBC NRBC COR # BLD: 6.97 10*3/MM3 (ref 3.4–10.8)

## 2023-09-01 PROCEDURE — 71045 X-RAY EXAM CHEST 1 VIEW: CPT

## 2023-09-01 PROCEDURE — 36415 COLL VENOUS BLD VENIPUNCTURE: CPT

## 2023-09-01 PROCEDURE — 80053 COMPREHEN METABOLIC PANEL: CPT

## 2023-09-01 PROCEDURE — 85025 COMPLETE CBC W/AUTO DIFF WBC: CPT

## 2023-09-01 PROCEDURE — 87081 CULTURE SCREEN ONLY: CPT

## 2023-09-01 PROCEDURE — 93005 ELECTROCARDIOGRAM TRACING: CPT

## 2023-09-01 RX ORDER — BUMETANIDE 1 MG/1
1 TABLET ORAL DAILY PRN
COMMUNITY

## 2023-09-01 NOTE — DISCHARGE INSTRUCTIONS
Before you come to the hospital        Arrival time: AS DIRECTED BY OFFICE     YOU MAY TAKE THE FOLLOWING MEDICATION(S) THE MORNING OF SURGERY WITH A SIP OF WATER: ***protonix           ALL OTHER HOME MEDICATION CHECK WITH YOUR PHYSICIAN (especially if   you are taking diabetes medicines or blood thinners)    Do not take any Erectile Dysfunction medications (EX: CIALIS, VIAGRA) 24 hours prior to surgery.      If you were given and instructed to use a germ- killing soap, use as directed the night before surgery and again the morning of surgery or as directed by your surgeon. (Use one-half of the bottle with each shower.)   See attached information for How to Use Chlorhexidine for Bathing if applicable.            Eating and drinking restrictions prior to scheduled arrival time    2 Hours before arrival time STOP   Drinking Clear liquids (water, black coffee-NO CREAM,  apple juice-no pulp)      8 Hours before arrival time STOP   All food, full liquids, and dairy products    (It is extremely important that you follow these guidelines to prevent delay or cancelation of your procedure)     Clear Liquids  Water and flavored water                                                                      Clear Fruit juices, such as cranberry juice and apple juice.  Black coffee (NO cream of any kind, including powdered).  Plain tea  Clear bouillon or broth.  Flavored gelatin.  Soda.  Gatorade or Powerade.  Full liquid examples  Juices that have pulp.  Frozen ice pops that contain fruit pieces.  Coffee with creamer  Milk.  Yogurt.                MANAGING PAIN AFTER SURGERY    We know you are probably wondering what your pain will be like after surgery.  Following surgery it is unrealistic to expect you will not have pain.   Pain is how our bodies let us know that something is wrong or cautions us to be careful.  That said, our goal is to make your pain tolerable.    Methods we may use to treat your pain include (oral or IV  medications, PCAs, epidurals, nerve blocks, etc.)   While some procedures require IV pain medications for a short time after surgery, transitioning to pain medications by mouth allows for better management of pain.   Your nurse will encourage you to take oral pain medications whenever possible.  IV medications work almost immediately, but only last a short while.  Taking medications by mouth allows for a more constant level of medication in your blood stream for a longer period of time.      Once your pain is out of control it is harder to get back under control.  It is important you are aware when your next dose of pain medication is due.  If you are admitted, your nurse may write the time of your next dose on the white board in your room to help you remember.      We are interested in your pain and encourage you to inform us about aggravating factors during your visit.   Many times a simple repositioning every few hours can make a big difference.    If your physician says it is okay, do not let your pain prevent you from getting out of bed. Be sure to call your nurse for assistance prior to getting up so you do not fall.      Before surgery, please decide your tolerable pain goal.  These faces help describe the pain ratings we use on a 0-10 scale.   Be prepared to tell us your goal and whether or not you take pain or anxiety medications at home.          Preparing for Surgery  Preparing for surgery is an important part of your care. It can make things go more smoothly and help you avoid complications. The steps leading up to surgery may vary among hospitals. Follow all instructions given to you by your health care providers. Ask questions if you do not understand something. Talk about any concerns that you have.  Here are some questions to consider asking before your surgery:  If my surgery is not an emergency (is elective), when would be the best time to have the surgery?  What arrangements do I need to make for  work, home, or school?  What will my recovery be like? How long will it be before I can return to normal activities?  Will I need to prepare my home? Will I need to arrange care for me or my children?  Should I expect to have pain after surgery? What are my pain management options? Are there nonmedical options that I can try for pain?  Tell a health care provider about:  Any allergies you have.  All medicines you are taking, including vitamins, herbs, eye drops, creams, and over-the-counter medicines.  Any problems you or family members have had with anesthetic medicines.  Any blood disorders you have.  Any surgeries you have had.  Any medical conditions you have.  Whether you are pregnant or may be pregnant.  What are the risks?  The risks and complications of surgery depend on the specific procedure that you have. Discuss all the risks with your health care providers before your surgery. Ask about common surgical complications, which may include:  Infection.  Bleeding or a need for blood replacement (transfusion).  Allergic reactions to medicines.  Damage to surrounding nerves, tissues, or structures.  A blood clot.  Scarring.  Failure of the surgery to correct the problem.  Follow these instructions before the procedure:  Several days or weeks before your procedure  You may have a physical exam by your primary health care provider to make sure it is safe for you to have surgery.  You may have testing. This may include a chest X-ray, blood and urine tests, electrocardiogram (ECG), or other testing.  Ask your health care provider about:  Changing or stopping your regular medicines. This is especially important if you are taking diabetes medicines or blood thinners.  Taking medicines such as aspirin and ibuprofen. These medicines can thin your blood. Do not take these medicines unless your health care provider tells you to take them.  Taking over-the-counter medicines, vitamins, herbs, and supplements.  Do not use  any products that contain nicotine or tobacco, such as cigarettes and e-cigarettes. If you need help quitting, ask your health care provider.  Avoid alcohol.  Ask your health care provider if there are exercises you can do to prepare for surgery.  Eat a healthy diet.   Plan to have someone 18 years of age or older to take you home from the hospital. We will need to verify your ride on the morning of surgery if you are being discharged home on the same day. Tell your ride to be expecting a call from the hospital prior to your procedure.   Plan to have a responsible adult care for you for at least 24 hours after you leave the hospital or clinic. This is important.  The day before your procedure  You may be given antibiotic medicine to take by mouth to help prevent infection. Take it as told by your health care provider.  You may be asked to shower with a germ-killing soap.  Follow instructions from your health care provider about eating and drinking restrictions. This includes gum, mints and hard candy.  Pack comfortable clothes according to your procedure.   The day of your procedure  You may need to take another shower with a germ-killing soap before you leave home in the morning.  With a small sip of water, take only the medicines that you are told to take.  Remove all jewelry including rings.   Leave anything you consider valuable at home except hearing aids if needed.  You do not need to bring your home medications into the hospital.   Do not wear any makeup, nail polish, powder, deodorant, lotion, hair accessories, or anything on your skin or body except your clothes.  If you will be staying in the hospital, bring a case to hold your glasses, contacts, or dentures. You may also want to bring your robe and non-skid footwear.       (Do not use denture adhesives since you will be asked to remove them during  surgery).   If you wear oxygen at home, bring it with you the day of surgery.  If instructed by your health  care provider, bring your sleep apnea device with you on the day of your surgery (if this applies to you).  You may want to leave your suitcase and sleep apnea device in the car until after surgery.   Arrive at the hospital as scheduled.  Bring a friend or family member with you who can help to answer questions and be present while you meet with your health care provider.  At the hospital  When you arrive at the hospital:  Go to registration located at the main entrance of the hospital. You will be registered and given a beeper and a sticker sheet. Take the stickers to the Outpatient nurses desk and place in the black tray. This is to notify staff that you have arrived. Then return to the lobby to wait.   When your beeper lights up and vibrates proceed through the double doors, under the stairs, and a member of the Outpatient Surgery staff will escort you to your preoperative room.  You may have to wear compression sleeves. These help to prevent blood clots and reduce swelling in your legs.  An IV may be inserted into one of your veins.              In the operating room, you may be given one or more of the following:        A medicine to help you relax (sedative).        A medicine to numb the area (local anesthetic).        A medicine to make you fall asleep (general anesthetic).        A medicine that is injected into an area of your body to numb everything below the                      injection site (regional anesthetic).  You may be given an antibiotic through your IV to help prevent infection.  Your surgical site will be marked or identified.    Contact a health care provider if you:  Develop a fever of more than 100.4°F (38°C) or other feelings of illness during the 48 hours before your surgery.  Have symptoms that get worse.  Have questions or concerns about your surgery.  Summary  Preparing for surgery can make the procedure go more smoothly and lower your risk of complications.  Before surgery, make a  list of questions and concerns to discuss with your surgeon. Ask about the risks and possible complications.  In the days or weeks before your surgery, follow all instructions from your health care provider. You may need to stop smoking, avoid alcohol, follow eating restrictions, and change or stop your regular medicines.  Contact your surgeon if you develop a fever or other signs of illness during the few days before your surgery.  This information is not intended to replace advice given to you by your health care provider. Make sure you discuss any questions you have with your health care provider.  Document Revised: 12/21/2018 Document Reviewed: 10/23/2018  Elsevier Patient Education © 2021 [employer]

## 2023-09-02 LAB — MRSA SPEC QL CULT: NORMAL

## 2023-09-05 LAB
QT INTERVAL: 464 MS
QTC INTERVAL: 497 MS

## 2023-09-08 ENCOUNTER — ANESTHESIA EVENT (OUTPATIENT)
Dept: PERIOP | Facility: HOSPITAL | Age: 88
End: 2023-09-08
Payer: MEDICARE

## 2023-09-08 ENCOUNTER — ANESTHESIA (OUTPATIENT)
Dept: PERIOP | Facility: HOSPITAL | Age: 88
End: 2023-09-08
Payer: MEDICARE

## 2023-09-08 ENCOUNTER — HOSPITAL ENCOUNTER (OUTPATIENT)
Facility: HOSPITAL | Age: 88
Setting detail: HOSPITAL OUTPATIENT SURGERY
Discharge: HOME | End: 2023-09-08
Attending: STUDENT IN AN ORGANIZED HEALTH CARE EDUCATION/TRAINING PROGRAM | Admitting: STUDENT IN AN ORGANIZED HEALTH CARE EDUCATION/TRAINING PROGRAM
Payer: MEDICARE

## 2023-09-08 VITALS
TEMPERATURE: 98.2 F | RESPIRATION RATE: 16 BRPM | HEART RATE: 73 BPM | OXYGEN SATURATION: 98 % | SYSTOLIC BLOOD PRESSURE: 140 MMHG | DIASTOLIC BLOOD PRESSURE: 74 MMHG

## 2023-09-08 DIAGNOSIS — K40.90 NON-RECURRENT UNILATERAL INGUINAL HERNIA WITHOUT OBSTRUCTION OR GANGRENE: ICD-10-CM

## 2023-09-08 PROCEDURE — 25010000002 MORPHINE SULFATE (PF) 2 MG/ML SOLUTION 1 ML CARTRIDGE: Performed by: STUDENT IN AN ORGANIZED HEALTH CARE EDUCATION/TRAINING PROGRAM

## 2023-09-08 PROCEDURE — 25010000002 ONDANSETRON PER 1 MG: Performed by: NURSE ANESTHETIST, CERTIFIED REGISTERED

## 2023-09-08 PROCEDURE — 49650 LAP ING HERNIA REPAIR INIT: CPT | Performed by: STUDENT IN AN ORGANIZED HEALTH CARE EDUCATION/TRAINING PROGRAM

## 2023-09-08 PROCEDURE — 25010000002 HEPARIN (PORCINE) PER 1000 UNITS: Performed by: STUDENT IN AN ORGANIZED HEALTH CARE EDUCATION/TRAINING PROGRAM

## 2023-09-08 PROCEDURE — 25010000002 BUPIVACAINE (PF) 0.25 % SOLUTION 30 ML VIAL: Performed by: STUDENT IN AN ORGANIZED HEALTH CARE EDUCATION/TRAINING PROGRAM

## 2023-09-08 PROCEDURE — 25010000002 BUPIVACAINE 0.5 % SOLUTION 50 ML VIAL: Performed by: STUDENT IN AN ORGANIZED HEALTH CARE EDUCATION/TRAINING PROGRAM

## 2023-09-08 PROCEDURE — 0 LIDOCAINE 1 % SOLUTION 20 ML VIAL: Performed by: STUDENT IN AN ORGANIZED HEALTH CARE EDUCATION/TRAINING PROGRAM

## 2023-09-08 PROCEDURE — 25010000002 DEXAMETHASONE PER 1 MG: Performed by: NURSE ANESTHETIST, CERTIFIED REGISTERED

## 2023-09-08 PROCEDURE — 25010000002 FENTANYL CITRATE (PF) 100 MCG/2ML SOLUTION: Performed by: NURSE ANESTHETIST, CERTIFIED REGISTERED

## 2023-09-08 PROCEDURE — 25010000002 SUGAMMADEX 200 MG/2ML SOLUTION: Performed by: NURSE ANESTHETIST, CERTIFIED REGISTERED

## 2023-09-08 PROCEDURE — 25010000002 CEFAZOLIN PER 500 MG: Performed by: STUDENT IN AN ORGANIZED HEALTH CARE EDUCATION/TRAINING PROGRAM

## 2023-09-08 PROCEDURE — 25010000002 PROPOFOL 10 MG/ML EMULSION: Performed by: NURSE ANESTHETIST, CERTIFIED REGISTERED

## 2023-09-08 PROCEDURE — S2900 ROBOTIC SURGICAL SYSTEM: HCPCS | Performed by: STUDENT IN AN ORGANIZED HEALTH CARE EDUCATION/TRAINING PROGRAM

## 2023-09-08 PROCEDURE — 25010000002 DEXAMETHASONE PER 1 MG: Performed by: STUDENT IN AN ORGANIZED HEALTH CARE EDUCATION/TRAINING PROGRAM

## 2023-09-08 PROCEDURE — C1781 MESH (IMPLANTABLE): HCPCS | Performed by: STUDENT IN AN ORGANIZED HEALTH CARE EDUCATION/TRAINING PROGRAM

## 2023-09-08 DEVICE — 3DMAX™ MID ANATOMICAL MESH, XL, RIGHT, 5” X 7”, 12 X 17 CM
Type: IMPLANTABLE DEVICE | Site: ABDOMEN | Status: FUNCTIONAL
Brand: 3DMAX™ MID ANATOMICAL MESH

## 2023-09-08 DEVICE — ABSORBABLE WOUND CLOSURE DEVICE
Type: IMPLANTABLE DEVICE | Site: ABDOMEN | Status: FUNCTIONAL
Brand: V-LOC 90

## 2023-09-08 RX ORDER — ONDANSETRON 4 MG/1
4 TABLET, FILM COATED ORAL EVERY 8 HOURS PRN
Qty: 15 TABLET | Refills: 0 | Status: SHIPPED | OUTPATIENT
Start: 2023-09-08 | End: 2024-09-07

## 2023-09-08 RX ORDER — SODIUM CHLORIDE 9 MG/ML
40 INJECTION, SOLUTION INTRAVENOUS AS NEEDED
Status: DISCONTINUED | OUTPATIENT
Start: 2023-09-08 | End: 2023-09-08 | Stop reason: HOSPADM

## 2023-09-08 RX ORDER — HEPARIN SODIUM 5000 [USP'U]/ML
5000 INJECTION, SOLUTION INTRAVENOUS; SUBCUTANEOUS ONCE
Status: COMPLETED | OUTPATIENT
Start: 2023-09-08 | End: 2023-09-08

## 2023-09-08 RX ORDER — IBUPROFEN 600 MG/1
600 TABLET ORAL ONCE AS NEEDED
Status: DISCONTINUED | OUTPATIENT
Start: 2023-09-08 | End: 2023-09-08 | Stop reason: HOSPADM

## 2023-09-08 RX ORDER — FLUMAZENIL 0.1 MG/ML
0.2 INJECTION INTRAVENOUS AS NEEDED
Status: DISCONTINUED | OUTPATIENT
Start: 2023-09-08 | End: 2023-09-08 | Stop reason: HOSPADM

## 2023-09-08 RX ORDER — SODIUM CHLORIDE 0.9 % (FLUSH) 0.9 %
3 SYRINGE (ML) INJECTION EVERY 12 HOURS SCHEDULED
Status: DISCONTINUED | OUTPATIENT
Start: 2023-09-08 | End: 2023-09-08 | Stop reason: HOSPADM

## 2023-09-08 RX ORDER — DROPERIDOL 2.5 MG/ML
0.62 INJECTION, SOLUTION INTRAMUSCULAR; INTRAVENOUS ONCE AS NEEDED
Status: DISCONTINUED | OUTPATIENT
Start: 2023-09-08 | End: 2023-09-08 | Stop reason: HOSPADM

## 2023-09-08 RX ORDER — SODIUM CHLORIDE 0.9 % (FLUSH) 0.9 %
3 SYRINGE (ML) INJECTION AS NEEDED
Status: DISCONTINUED | OUTPATIENT
Start: 2023-09-08 | End: 2023-09-08 | Stop reason: HOSPADM

## 2023-09-08 RX ORDER — SODIUM CHLORIDE, SODIUM LACTATE, POTASSIUM CHLORIDE, CALCIUM CHLORIDE 600; 310; 30; 20 MG/100ML; MG/100ML; MG/100ML; MG/100ML
100 INJECTION, SOLUTION INTRAVENOUS CONTINUOUS
Status: DISCONTINUED | OUTPATIENT
Start: 2023-09-08 | End: 2023-09-08 | Stop reason: HOSPADM

## 2023-09-08 RX ORDER — NALOXONE HCL 0.4 MG/ML
0.4 VIAL (ML) INJECTION AS NEEDED
Status: DISCONTINUED | OUTPATIENT
Start: 2023-09-08 | End: 2023-09-08 | Stop reason: HOSPADM

## 2023-09-08 RX ORDER — FENTANYL CITRATE 50 UG/ML
INJECTION, SOLUTION INTRAMUSCULAR; INTRAVENOUS AS NEEDED
Status: DISCONTINUED | OUTPATIENT
Start: 2023-09-08 | End: 2023-09-08 | Stop reason: SURG

## 2023-09-08 RX ORDER — TAMSULOSIN HYDROCHLORIDE 0.4 MG/1
1 CAPSULE ORAL DAILY
Qty: 10 CAPSULE | Refills: 0 | Status: SHIPPED | OUTPATIENT
Start: 2023-09-08 | End: 2023-09-18

## 2023-09-08 RX ORDER — ROCURONIUM BROMIDE 10 MG/ML
INJECTION, SOLUTION INTRAVENOUS AS NEEDED
Status: DISCONTINUED | OUTPATIENT
Start: 2023-09-08 | End: 2023-09-08 | Stop reason: SURG

## 2023-09-08 RX ORDER — ONDANSETRON 2 MG/ML
INJECTION INTRAMUSCULAR; INTRAVENOUS AS NEEDED
Status: DISCONTINUED | OUTPATIENT
Start: 2023-09-08 | End: 2023-09-08 | Stop reason: SURG

## 2023-09-08 RX ORDER — HYDROCODONE BITARTRATE AND ACETAMINOPHEN 10; 325 MG/1; MG/1
1 TABLET ORAL EVERY 4 HOURS PRN
Status: DISCONTINUED | OUTPATIENT
Start: 2023-09-08 | End: 2023-09-08 | Stop reason: HOSPADM

## 2023-09-08 RX ORDER — DEXAMETHASONE SODIUM PHOSPHATE 4 MG/ML
INJECTION, SOLUTION INTRA-ARTICULAR; INTRALESIONAL; INTRAMUSCULAR; INTRAVENOUS; SOFT TISSUE AS NEEDED
Status: DISCONTINUED | OUTPATIENT
Start: 2023-09-08 | End: 2023-09-08 | Stop reason: SURG

## 2023-09-08 RX ORDER — PROPOFOL 10 MG/ML
VIAL (ML) INTRAVENOUS AS NEEDED
Status: DISCONTINUED | OUTPATIENT
Start: 2023-09-08 | End: 2023-09-08 | Stop reason: SURG

## 2023-09-08 RX ORDER — FENTANYL CITRATE 50 UG/ML
25 INJECTION, SOLUTION INTRAMUSCULAR; INTRAVENOUS
Status: DISCONTINUED | OUTPATIENT
Start: 2023-09-08 | End: 2023-09-08 | Stop reason: HOSPADM

## 2023-09-08 RX ORDER — ACETAMINOPHEN 325 MG/1
975 TABLET ORAL EVERY 8 HOURS
Qty: 100 TABLET | Refills: 2
Start: 2023-09-08 | End: 2024-09-07

## 2023-09-08 RX ORDER — SODIUM CHLORIDE, SODIUM LACTATE, POTASSIUM CHLORIDE, CALCIUM CHLORIDE 600; 310; 30; 20 MG/100ML; MG/100ML; MG/100ML; MG/100ML
1000 INJECTION, SOLUTION INTRAVENOUS CONTINUOUS
Status: DISCONTINUED | OUTPATIENT
Start: 2023-09-08 | End: 2023-09-08 | Stop reason: HOSPADM

## 2023-09-08 RX ORDER — SODIUM CHLORIDE 0.9 % (FLUSH) 0.9 %
10 SYRINGE (ML) INJECTION AS NEEDED
Status: DISCONTINUED | OUTPATIENT
Start: 2023-09-08 | End: 2023-09-08 | Stop reason: HOSPADM

## 2023-09-08 RX ORDER — LIDOCAINE HYDROCHLORIDE 10 MG/ML
0.5 INJECTION, SOLUTION EPIDURAL; INFILTRATION; INTRACAUDAL; PERINEURAL ONCE AS NEEDED
Status: DISCONTINUED | OUTPATIENT
Start: 2023-09-08 | End: 2023-09-08 | Stop reason: HOSPADM

## 2023-09-08 RX ORDER — SODIUM CHLORIDE 0.9 % (FLUSH) 0.9 %
3-10 SYRINGE (ML) INJECTION AS NEEDED
Status: DISCONTINUED | OUTPATIENT
Start: 2023-09-08 | End: 2023-09-08 | Stop reason: HOSPADM

## 2023-09-08 RX ORDER — ONDANSETRON 2 MG/ML
4 INJECTION INTRAMUSCULAR; INTRAVENOUS ONCE AS NEEDED
Status: DISCONTINUED | OUTPATIENT
Start: 2023-09-08 | End: 2023-09-08 | Stop reason: HOSPADM

## 2023-09-08 RX ORDER — MAGNESIUM HYDROXIDE 1200 MG/15ML
LIQUID ORAL AS NEEDED
Status: DISCONTINUED | OUTPATIENT
Start: 2023-09-08 | End: 2023-09-08 | Stop reason: HOSPADM

## 2023-09-08 RX ORDER — HYDROCODONE BITARTRATE AND ACETAMINOPHEN 5; 325 MG/1; MG/1
1 TABLET ORAL ONCE AS NEEDED
Status: DISCONTINUED | OUTPATIENT
Start: 2023-09-08 | End: 2023-09-08 | Stop reason: HOSPADM

## 2023-09-08 RX ORDER — OXYCODONE HYDROCHLORIDE 5 MG/1
5 TABLET ORAL EVERY 8 HOURS PRN
Qty: 10 TABLET | Refills: 0 | Status: SHIPPED | OUTPATIENT
Start: 2023-09-08 | End: 2024-09-07

## 2023-09-08 RX ORDER — LABETALOL HYDROCHLORIDE 5 MG/ML
5 INJECTION, SOLUTION INTRAVENOUS
Status: DISCONTINUED | OUTPATIENT
Start: 2023-09-08 | End: 2023-09-08 | Stop reason: HOSPADM

## 2023-09-08 RX ORDER — LIDOCAINE HYDROCHLORIDE 20 MG/ML
INJECTION, SOLUTION EPIDURAL; INFILTRATION; INTRACAUDAL; PERINEURAL AS NEEDED
Status: DISCONTINUED | OUTPATIENT
Start: 2023-09-08 | End: 2023-09-08 | Stop reason: SURG

## 2023-09-08 RX ORDER — ACETAMINOPHEN 500 MG
1000 TABLET ORAL ONCE
Status: COMPLETED | OUTPATIENT
Start: 2023-09-08 | End: 2023-09-08

## 2023-09-08 RX ADMIN — DEXAMETHASONE SODIUM PHOSPHATE 4 MG: 4 INJECTION, SOLUTION INTRA-ARTICULAR; INTRALESIONAL; INTRAMUSCULAR; INTRAVENOUS; SOFT TISSUE at 09:20

## 2023-09-08 RX ADMIN — ROCURONIUM BROMIDE 40 MG: 10 SOLUTION INTRAVENOUS at 09:01

## 2023-09-08 RX ADMIN — ROCURONIUM BROMIDE 10 MG: 10 SOLUTION INTRAVENOUS at 09:26

## 2023-09-08 RX ADMIN — LIDOCAINE HYDROCHLORIDE 50 MG: 20 INJECTION, SOLUTION EPIDURAL; INFILTRATION; INTRACAUDAL; PERINEURAL at 09:01

## 2023-09-08 RX ADMIN — FENTANYL CITRATE 25 MCG: 50 INJECTION, SOLUTION INTRAMUSCULAR; INTRAVENOUS at 09:33

## 2023-09-08 RX ADMIN — SUGAMMADEX 200 MG: 100 INJECTION, SOLUTION INTRAVENOUS at 10:08

## 2023-09-08 RX ADMIN — HEPARIN SODIUM 5000 UNITS: 5000 INJECTION INTRAVENOUS; SUBCUTANEOUS at 08:35

## 2023-09-08 RX ADMIN — PROPOFOL 150 MG: 10 INJECTION, EMULSION INTRAVENOUS at 09:01

## 2023-09-08 RX ADMIN — SODIUM CHLORIDE, POTASSIUM CHLORIDE, SODIUM LACTATE AND CALCIUM CHLORIDE 1000 ML: 600; 310; 30; 20 INJECTION, SOLUTION INTRAVENOUS at 08:12

## 2023-09-08 RX ADMIN — FENTANYL CITRATE 50 MCG: 50 INJECTION, SOLUTION INTRAMUSCULAR; INTRAVENOUS at 08:57

## 2023-09-08 RX ADMIN — ROCURONIUM BROMIDE 10 MG: 10 SOLUTION INTRAVENOUS at 09:43

## 2023-09-08 RX ADMIN — ONDANSETRON 4 MG: 2 INJECTION INTRAMUSCULAR; INTRAVENOUS at 09:42

## 2023-09-08 RX ADMIN — CEFAZOLIN 2000 MG: 2 INJECTION, POWDER, FOR SOLUTION INTRAMUSCULAR; INTRAVENOUS at 09:07

## 2023-09-08 RX ADMIN — FENTANYL CITRATE 25 MCG: 50 INJECTION, SOLUTION INTRAMUSCULAR; INTRAVENOUS at 10:09

## 2023-09-08 RX ADMIN — ACETAMINOPHEN 1000 MG: 500 TABLET, FILM COATED ORAL at 08:46

## 2023-09-08 NOTE — OP NOTE
Laparoscopic Robotic-Assisted Right Inguinal Hernia Repair with Mesh:     Patient: Mireille Magallanes  MRN: [id number]    YOB: 1935  Age: 87 y.o.  Sex: male  Unit:  PAD OR Room/Bed: PAD OR/MAIN OR Location: [facility]    Admitting Physician: MARIANNA REDD    Primary Care Physician: Shadi García MD             INDICATIONS: This is a 87 y.o. male who presents with a right sided inguinal hernia. It is symptomatic and limiting the patient's daily activities. After a discussion of risks, benefits and alternatives (see H&P), consent was obtained.     DATE OF OPERATION: 9/8/2023     Surgeon(s) and Role:     * Marianna Redd MD - Primary    ANESTHESIA: General     PREOPERATIVE DIAGNOSIS: Non-recurrent unilateral inguinal hernia without obstruction or gangrene [K40.90]    POSTOPERATIVE DIAGNOSIS: Same, right indirect inguinal hernia     PROCEDURES PERFORMED:    (1) Laparoscopic, robotic-assisted right inguinal hernia repair with mesh     PROCEDURE DETAILS:   After informed consent was obtained, the patient was brought to the operating room. Preoperative antibiotics and subcutaneous heparin were administered. General anesthesia was induced. The abdomen was prepped with ChloraPrep and draped in a sterile fashion. A time-out was performed verifying the correct patient, procedure, and side. An 8mm incision was made along the left subcostal margin. The Veress was inserted and the abdomen was insufflated to 15 mmHg. An 8 mm trocar was then inserted. A laparoscopic TAP block was performed with my deep local.  An 8 mm trocar was placed on the left lateral abdominal wall. Adhesions in the midline and RLQ were lysed with a combination of sharp and blunt dissection. Two 8 mm trocars were placed at the umbilicus and on the right abdominal wall. The patient had a large right inguinal hernia containing bowel. With a non-traumatic grasper I reduced the bowel.  The bed was flexed. We then docked the robot in  the usual fashion from the left side. We identified the large right inguinal hernia. The hook was placed in the right arm, and a ProGrasp was placed in the left arm. The peritoneum was opened high on the abdominal wall.There was a large indirect inguinal hernia. The peritoneum was taken down and the hernia sac was reduced back into the abdomen.  There was no cord lipoma. The cord was identified and preserved. The pubic tubercle was dissected clean as was Matt's ligament on the right side. Once the space was completely dissected out, an extra large right 3D max mesh was placed into the abdomen. Then using a Sung Suture Cut needle , we positioned the mesh in the inguinal space, covering the direct, indirect, and femoral spaces down to the level of the medial take off of the Vas. It fit very nicely in the space. A 2-0 Vicryl suture was placed in the abdomen and it was used to suture the mesh in place - 2 sutures on Matt's ligament, a suture medial to the inferior epigastric vessels, a suture lateral to the inferior epigastric vessels, and one suture to cover the direct space. The peritoneum was closed with a 2-0 V-Loc absorbable suture.  The needles were removed from the abdomen and needle counts were correct. The robot was undocked, the trocars were removed, and the abdomen was desufflated.  The skin of all three incisions was closed with a 4-0 Monocryl suture. Skin glue was placed to all incisions. The patient was transported to PACU in stable condition.      Findings: right indirect inguinal hernia   Estimated Blood Loss:  20 mL  Complications: None apparent            Specimens: None      Disposition: PACU - hemodynamically stable.           Condition: stable    Marianna Redd MD  08/16/2023

## 2023-09-08 NOTE — DISCHARGE INSTRUCTIONS
Wound:   - you have skin glue on your incisions. Okay to shower tomorrow.   - Leave skin glue in place, it should slowly fall off over 2 weeks   - No swimming/soaking/bathing x 2 weeks to allow incisions to heal.     Activity:   - Activity as tolerated. NO heavy lifting x 4 weeks - no more than 25lb at a time.   - No driving or operating machinery on narcotic pain medication.     Pain medication:   - Take 1000mg of tylenol every 8 hours for 3 days. After three days, take it prn.   - You have a prescription for a narcotic. It will be roxicodone 5mg tabs. Take these only as needed after you have taken the tylenol. If you are taking the roxicodone, make sure to take a stool softener (colace) with it as it can cause constipation.   - The narcotic may make you nauseated, you will have a prescription for zofran in case of nausea.     Follow up:   - make an appointment to see me in 2 weeks  - If you have any concerns before then, call me office at [phone]

## 2023-09-08 NOTE — ANESTHESIA PREPROCEDURE EVALUATION
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 Left ventricular ejection fraction appears to be 66 - 70%.  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 Mean transvalvular gradient 10 mmHg, with no evidence of paravalvular leak.  ·  Left ventricular wall thickness is consistent with mild septal asymmetric hypertrophy.  ·  Left ventricular diastolic function is consistent with (grade III w/high LAP) reversible restrictive pattern.  ·  Moderate mitral valve stenosis (mean gradient mmHg, at HR of 72 bpm) is present due to severe calcification of the posterior mitral annulus.  ·  The left atrial cavity is severely dilated.  ·  Estimated right ventricular systolic pressure from tricuspid regurgitation is mildly elevated (35-45 mmHg).  ·  Normal size and function of the right ventricle.  ·  Compared to prior exam from 5/26/2022, no significant change.    [place] [employer] pacemaker, 98% rv paced      Neuro/Psych  (+) TIA, CVA, numbness  GI/Hepatic/Renal/Endo    (+) GERD, renal disease CRI    Musculoskeletal     Abdominal    Substance History      OB/GYN          Other   arthritis,                     Anesthesia Plan    ASA 3     general     intravenous induction     Anesthetic plan, risks, benefits, and alternatives have been provided, discussed and informed consent has been obtained with: patient.    CODE STATUS:

## 2023-09-08 NOTE — ANESTHESIA POSTPROCEDURE EVALUATION
Patient: Mireille Magallanes    Procedure Summary       Date: 09/08/23 Room / Location:  PAD OR 06 /  PAD OR    Anesthesia Start: 0857 Anesthesia Stop: 1023    Procedure: RIGHT INGUINAL HERNIA REPAIR LAPAROSCOPIC WITH DAVINCI ROBOT WITH MESH (Right: Abdomen) Diagnosis:       Non-recurrent unilateral inguinal hernia without obstruction or gangrene      (Non-recurrent unilateral inguinal hernia without obstruction or gangrene [K40.90])    Surgeons: Marianna Redd MD Provider: Marianna Maurer CRNA    Anesthesia Type: general ASA Status: 3            Anesthesia Type: general    Vitals  Vitals Value Taken Time   /76 09/08/23 1100   Temp     Pulse 76 09/08/23 1101   Resp 16 09/08/23 1100   SpO2 94 % 09/08/23 1101   Vitals shown include unvalidated device data.        Post Anesthesia Care and Evaluation    Patient location during evaluation: PACU  Patient participation: complete - patient participated  Level of consciousness: awake and alert  Pain management: adequate    Airway patency: patent  Anesthetic complications: No anesthetic complications    Cardiovascular status: acceptable  Respiratory status: acceptable  Hydration status: acceptable    Comments: Blood pressure 144/74, pulse 78, temperature 98.2 °F (36.8 °C), temperature source Temporal, resp. rate 16, SpO2 95 %.    Pt discharged from PACU based on nica score >8

## 2023-09-08 NOTE — ANESTHESIA PROCEDURE NOTES
Airway  Urgency: elective    Date/Time: 9/8/2023 9:03 AM  End Time:9/8/2023 9:05 AM  Airway not difficult    General Information and Staff    Patient location during procedure: OR  CRNA/CAA: Marianna Maurer CRNA    Indications and Patient Condition  Indications for airway management: airway protection    Preoxygenated: yes  MILS maintained throughout  Mask difficulty assessment: 1 - vent by mask    Final Airway Details  Final airway type: endotracheal airway      Successful airway: ETT  Cuffed: yes   Successful intubation technique: direct laryngoscopy  Facilitating devices/methods: intubating stylet  Endotracheal tube insertion site: oral  Blade: Melina  Blade size: 4  ETT size (mm): 8.0  Cormack-Lehane Classification: grade I - full view of glottis  Placement verified by: chest auscultation and capnometry   Cuff volume (mL): 8  Measured from: lips  ETT/EBT  to lips (cm): 22  Number of attempts at approach: 1  Assessment: lips, teeth, and gum same as pre-op and atraumatic intubation    Additional Comments  atraumatic

## 2023-09-18 ENCOUNTER — TELEPHONE (OUTPATIENT)
Dept: INTERNAL MEDICINE | Facility: CLINIC | Age: 88
End: 2023-09-18
Payer: MEDICARE

## 2023-09-18 NOTE — TELEPHONE ENCOUNTER
Caller: WILLI DAVIS    Relationship: Emergency Contact    Best call back number: [phone]    What orders are you requesting (i.e. lab or imaging): LABS     In what timeframe would the patient need to come in: PREVIOUS SURGERY, LAST FALL (NOVEMBER) PATIENT NEEDED 4 UNITS OF BLOOD     Where will you receive your lab/imaging services: [facility]    Additional notes: LOW BLOOD PRESSURE 100/40, STAGE 4 KIDNEY, SHORTNESS OF BREATH-PATIENT HAD HERNIA SURGERY 9/8/23

## 2023-09-21 ENCOUNTER — HOSPITAL ENCOUNTER (EMERGENCY)
Facility: HOSPITAL | Age: 88
Discharge: HOME | End: 2023-09-21
Attending: EMERGENCY MEDICINE
Payer: MEDICARE

## 2023-09-21 ENCOUNTER — OFFICE VISIT (OUTPATIENT)
Dept: INTERNAL MEDICINE | Facility: CLINIC | Age: 88
End: 2023-09-21
Payer: MEDICARE

## 2023-09-21 ENCOUNTER — TELEPHONE (OUTPATIENT)
Dept: INTERNAL MEDICINE | Facility: CLINIC | Age: 88
End: 2023-09-21

## 2023-09-21 ENCOUNTER — APPOINTMENT (OUTPATIENT)
Dept: CT IMAGING | Facility: HOSPITAL | Age: 88
End: 2023-09-21
Payer: MEDICARE

## 2023-09-21 ENCOUNTER — LAB (OUTPATIENT)
Dept: LAB | Facility: HOSPITAL | Age: 88
End: 2023-09-21

## 2023-09-21 ENCOUNTER — OFFICE VISIT (OUTPATIENT)
Dept: SURGERY | Facility: CLINIC | Age: 88
End: 2023-09-21
Payer: MEDICARE

## 2023-09-21 VITALS
SYSTOLIC BLOOD PRESSURE: 124 MMHG | TEMPERATURE: 97.1 F | WEIGHT: 201.28 LBS | DIASTOLIC BLOOD PRESSURE: 66 MMHG | HEART RATE: 64 BPM | OXYGEN SATURATION: 95 % | BODY MASS INDEX: 30.51 KG/M2 | HEIGHT: 68 IN

## 2023-09-21 VITALS
HEART RATE: 76 BPM | TEMPERATURE: 98 F | DIASTOLIC BLOOD PRESSURE: 71 MMHG | BODY MASS INDEX: 31.55 KG/M2 | SYSTOLIC BLOOD PRESSURE: 132 MMHG | WEIGHT: 201 LBS | HEIGHT: 67 IN | RESPIRATION RATE: 17 BRPM | OXYGEN SATURATION: 100 %

## 2023-09-21 VITALS
DIASTOLIC BLOOD PRESSURE: 63 MMHG | OXYGEN SATURATION: 98 % | HEIGHT: 67 IN | HEART RATE: 94 BPM | WEIGHT: 201 LBS | BODY MASS INDEX: 31.55 KG/M2 | SYSTOLIC BLOOD PRESSURE: 106 MMHG

## 2023-09-21 DIAGNOSIS — D64.9 ANEMIA, UNSPECIFIED TYPE: ICD-10-CM

## 2023-09-21 DIAGNOSIS — N28.9 ACUTE ON CHRONIC RENAL INSUFFICIENCY: ICD-10-CM

## 2023-09-21 DIAGNOSIS — D64.9 SYMPTOMATIC ANEMIA: Chronic | ICD-10-CM

## 2023-09-21 DIAGNOSIS — S20.219A CONTUSION OF CHEST WALL, UNSPECIFIED LATERALITY, INITIAL ENCOUNTER: ICD-10-CM

## 2023-09-21 DIAGNOSIS — R91.8 PULMONARY NODULES/LESIONS, MULTIPLE: ICD-10-CM

## 2023-09-21 DIAGNOSIS — N18.4 CHRONIC RENAL FAILURE, STAGE 4 (SEVERE): ICD-10-CM

## 2023-09-21 DIAGNOSIS — Z98.890 S/P INGUINAL HERNIA REPAIR: Primary | ICD-10-CM

## 2023-09-21 DIAGNOSIS — R06.09 DOE (DYSPNEA ON EXERTION): ICD-10-CM

## 2023-09-21 DIAGNOSIS — I10 PRIMARY HYPERTENSION: Chronic | ICD-10-CM

## 2023-09-21 DIAGNOSIS — R55 SYNCOPE AND COLLAPSE: Primary | ICD-10-CM

## 2023-09-21 DIAGNOSIS — N28.9 RENAL LESION: ICD-10-CM

## 2023-09-21 DIAGNOSIS — N18.9 ACUTE ON CHRONIC RENAL INSUFFICIENCY: ICD-10-CM

## 2023-09-21 DIAGNOSIS — R60.9 PERIPHERAL EDEMA: ICD-10-CM

## 2023-09-21 DIAGNOSIS — R55 SYNCOPE AND COLLAPSE: ICD-10-CM

## 2023-09-21 DIAGNOSIS — Z87.19 S/P INGUINAL HERNIA REPAIR: Primary | ICD-10-CM

## 2023-09-21 DIAGNOSIS — S30.1XXA ABDOMINAL WALL HEMATOMA, INITIAL ENCOUNTER: Primary | ICD-10-CM

## 2023-09-21 DIAGNOSIS — D50.0 IRON DEFICIENCY ANEMIA DUE TO CHRONIC BLOOD LOSS: ICD-10-CM

## 2023-09-21 LAB
ABO GROUP BLD: NORMAL
ALBUMIN SERPL-MCNC: 3.9 G/DL (ref 3.5–5)
ALBUMIN/GLOB SERPL: 1.1 G/DL (ref 1.1–2.5)
ALP SERPL-CCNC: 103 U/L (ref 24–120)
ALT SERPL W P-5'-P-CCNC: 24 U/L (ref 0–50)
ANION GAP SERPL CALCULATED.3IONS-SCNC: 10 MMOL/L (ref 4–13)
AST SERPL-CCNC: 36 U/L (ref 7–45)
AUTO MIXED CELLS #: 0.7 10*3/MM3 (ref 0.1–2.6)
AUTO MIXED CELLS %: 7.6 % (ref 0.1–24)
BILIRUB SERPL-MCNC: 1.2 MG/DL (ref 0.1–1)
BLD GP AB SCN SERPL QL: NEGATIVE
BUN SERPL-MCNC: 50 MG/DL (ref 5–21)
BUN/CREAT SERPL: 14.2
CALCIUM SPEC-SCNC: 9 MG/DL (ref 8.4–10.4)
CHLORIDE SERPL-SCNC: 108 MMOL/L (ref 98–110)
CO2 SERPL-SCNC: 21 MMOL/L (ref 24–31)
CREAT SERPL-MCNC: 3.52 MG/DL (ref 0.5–1.4)
D DIMER PPP FEU-MCNC: 5.02 MCGFEU/ML (ref 0–0.85)
DEVELOPER EXPIRATION DATE: NORMAL
DEVELOPER LOT NUMBER: 241
EGFRCR SERPLBLD CKD-EPI 2021: 16.3 ML/MIN/1.73
ERYTHROCYTE [DISTWIDTH] IN BLOOD BY AUTOMATED COUNT: 14.6 % (ref 12.3–15.4)
EXPIRATION DATE: NORMAL
FECAL OCCULT BLOOD SCREEN, POC: NEGATIVE
GLOBULIN UR ELPH-MCNC: 3.6 GM/DL
GLUCOSE SERPL-MCNC: 100 MG/DL (ref 70–100)
HCT VFR BLD AUTO: 25.2 % (ref 37.5–51)
HGB BLD-MCNC: 7.9 G/DL (ref 13–17.7)
INR PPP: 1.14 (ref 0.91–1.09)
LYMPHOCYTES # BLD AUTO: 1.7 10*3/MM3 (ref 0.7–3.1)
LYMPHOCYTES NFR BLD AUTO: 18.6 % (ref 19.6–45.3)
Lab: 241
MCH RBC QN AUTO: 28.1 PG (ref 26.6–33)
MCHC RBC AUTO-ENTMCNC: 31.3 G/DL (ref 31.5–35.7)
MCV RBC AUTO: 89.7 FL (ref 79–97)
NEGATIVE CONTROL: NEGATIVE
NEUTROPHILS NFR BLD AUTO: 6.7 10*3/MM3 (ref 1.7–7)
NEUTROPHILS NFR BLD AUTO: 73.8 % (ref 42.7–76)
PLATELET # BLD AUTO: 325 10*3/MM3 (ref 140–450)
PMV BLD AUTO: 7.8 FL (ref 6–12)
POSITIVE CONTROL: POSITIVE
POTASSIUM SERPL-SCNC: 5.2 MMOL/L (ref 3.5–5.3)
PROT SERPL-MCNC: 7.5 G/DL (ref 6.3–8.7)
PROTHROMBIN TIME: 14.8 SECONDS (ref 11.8–14.8)
RBC # BLD AUTO: 2.81 10*6/MM3 (ref 4.14–5.8)
RH BLD: NEGATIVE
SODIUM SERPL-SCNC: 139 MMOL/L (ref 135–145)
T&S EXPIRATION DATE: NORMAL
TROPONIN T SERPL HS-MCNC: 109 NG/L
WBC NRBC COR # BLD: 9.1 10*3/MM3 (ref 3.4–10.8)

## 2023-09-21 PROCEDURE — 83540 ASSAY OF IRON: CPT | Performed by: NURSE PRACTITIONER

## 2023-09-21 PROCEDURE — 80053 COMPREHEN METABOLIC PANEL: CPT | Performed by: NURSE PRACTITIONER

## 2023-09-21 PROCEDURE — 93005 ELECTROCARDIOGRAM TRACING: CPT | Performed by: EMERGENCY MEDICINE

## 2023-09-21 PROCEDURE — 70450 CT HEAD/BRAIN W/O DYE: CPT

## 2023-09-21 PROCEDURE — 93010 ELECTROCARDIOGRAM REPORT: CPT | Performed by: HOSPITALIST

## 2023-09-21 PROCEDURE — 99284 EMERGENCY DEPT VISIT MOD MDM: CPT

## 2023-09-21 PROCEDURE — 86901 BLOOD TYPING SEROLOGIC RH(D): CPT | Performed by: EMERGENCY MEDICINE

## 2023-09-21 PROCEDURE — 82270 OCCULT BLOOD FECES: CPT | Performed by: EMERGENCY MEDICINE

## 2023-09-21 PROCEDURE — 85610 PROTHROMBIN TIME: CPT | Performed by: EMERGENCY MEDICINE

## 2023-09-21 PROCEDURE — 84466 ASSAY OF TRANSFERRIN: CPT | Performed by: NURSE PRACTITIONER

## 2023-09-21 PROCEDURE — 71250 CT THORAX DX C-: CPT

## 2023-09-21 PROCEDURE — 74176 CT ABD & PELVIS W/O CONTRAST: CPT

## 2023-09-21 PROCEDURE — 84484 ASSAY OF TROPONIN QUANT: CPT | Performed by: EMERGENCY MEDICINE

## 2023-09-21 PROCEDURE — 85025 COMPLETE CBC W/AUTO DIFF WBC: CPT | Performed by: NURSE PRACTITIONER

## 2023-09-21 PROCEDURE — 86850 RBC ANTIBODY SCREEN: CPT | Performed by: EMERGENCY MEDICINE

## 2023-09-21 PROCEDURE — 86900 BLOOD TYPING SEROLOGIC ABO: CPT | Performed by: EMERGENCY MEDICINE

## 2023-09-21 PROCEDURE — 85379 FIBRIN DEGRADATION QUANT: CPT | Performed by: EMERGENCY MEDICINE

## 2023-09-21 RX ORDER — SODIUM CHLORIDE 0.9 % (FLUSH) 0.9 %
10 SYRINGE (ML) INJECTION AS NEEDED
Status: DISCONTINUED | OUTPATIENT
Start: 2023-09-21 | End: 2023-09-21 | Stop reason: HOSPADM

## 2023-09-21 NOTE — ED PROVIDER NOTES
Subjective   History of Present Illness  Patient is a 87-year-old gentleman who recently had hernia surgery syncopized yesterday and has a large flank ecchymosis upper chest wall ecchymoses on anticoagulation at this time.  No nausea no vomiting associate with this.  Recent inguinal hernia surgery she he was seen by surgery today and ecchymosis in the anterior abdomen because the hernia surgery was getting better this flank ecchymosis and posterior thoracic ecchymosis something new.  After the fall.  The concern is for blood loss because her hemoglobin was low he also has got underlying kidney disease and probably poor platelet function.  CTs will be obtained for further evaluation assessment patient is hemodynamically stable at this time.    Abnormal Lab  Location:  Low hemoglobin  Severity:  Moderate  Onset quality:  Gradual  Timing:  Constant  Chronicity:  Recurrent  Associated symptoms: no abdominal pain, no chest pain, no congestion, no cough, no diarrhea, no fever, no headaches, no loss of consciousness, no nausea, no rhinorrhea, no vomiting and no wheezing    Syncope  Episode history:  Single  Most recent episode:  Yesterday  Progression:  Resolved  Chronicity:  New  Context: normal activity and standing up    Context: not blood draw, not bowel movement and not medication change    Witnessed: yes    Relieved by:  Nothing  Worsened by:  Nothing  Ineffective treatments:  None tried  Associated symptoms: no chest pain, no confusion, no diaphoresis, no difficulty breathing, no fever, no headaches, no malaise/fatigue, no nausea, no seizures, no vomiting and no weakness      Review of Systems   Constitutional: Negative.  Negative for diaphoresis, fever and malaise/fatigue.   HENT: Negative.  Negative for congestion and rhinorrhea.    Respiratory:  Negative for cough and wheezing.    Cardiovascular:  Positive for syncope. Negative for chest pain.   Gastrointestinal: Negative.  Negative for abdominal distention,  abdominal pain, diarrhea, nausea and vomiting.   Endocrine: Negative.    Genitourinary: Negative.    Musculoskeletal: Negative.  Negative for back pain and neck pain.   Skin:  Negative for color change and pallor.   Neurological: Negative.  Negative for seizures, loss of consciousness, syncope, weakness, light-headedness, numbness and headaches.   Hematological: Negative.  Does not bruise/bleed easily.   Psychiatric/Behavioral:  Negative for confusion.    All other systems reviewed and are negative.    Past Medical History:   Diagnosis Date    Acute viral hepatitis A     Aortic valve stenosis     Carotid artery stenosis     Right    CKD (chronic kidney disease)     Coagulopathy     Plavix    Colon polyps     Coronary arteriosclerosis     stent ,? 4/2104, on plavix now.    Diarrhea     Dysphagia     Unspecified    Family history of colon cancer     Hematochezia     differential diagnosis discussed    Hemorrhoids     Unspecified    History of cardiac pacemaker 01/08/2018    [employer]    History of colonic polyps     Hyperlipidemia     Hypertension     Left ventricular hypertrophy     Melanoma of back     Obesity     Osteoarthritis     Pacemaker     Peripheral neuropathy     Stroke     TIA (transient ischemic attack)     Tobacco non-user        Allergies   Allergen Reactions    Pradaxa [Dabigatran Etexilate Mesylate] GI Bleeding    Sulfa Antibiotics Rash     Sulfa Drugs       Past Surgical History:   Procedure Laterality Date    AORTIC VALVE REPAIR/REPLACEMENT Bilateral 04/20/2021    Procedure: Transfemoral Transcatheter Aortic Valve Replacement;  Surgeon: Kaushik Toledo MD;  Location: [facility] OR ;  Service: Cardiovascular;  Laterality: Bilateral;    AORTIC VALVE REPAIR/REPLACEMENT Bilateral 04/20/2021    Procedure: TRANSFEMORAL TRANSCATHETER AORTIC VALVE REPLACEMENT;  Surgeon: Clayton Wilcox MD;  Location: [facility] OR ;  Service: Cardiothoracic;  Laterality: Bilateral;    APPENDECTOMY       CARDIAC CATHETERIZATION      CARDIAC CATHETERIZATION N/A 03/18/2019    Procedure: Left Heart Cath;  Surgeon: Kaushik Toledo MD;  Location: [facility] CATH INVASIVE LOCATION;  Service: Cardiology    CARDIAC CATHETERIZATION N/A 04/14/2021    Procedure: Left Heart Cath;  Surgeon: Kaushik Toledo MD;  Location:  [facility] CATH INVASIVE LOCATION;  Service: Cardiology;  Laterality: N/A;    CARDIAC ELECTROPHYSIOLOGY PROCEDURE N/A 01/05/2018    Procedure: Temporary Pacemaker;  Surgeon: Luis E Courtney MD;  Location:  [facility] CATH INVASIVE LOCATION;  Service:     CARDIAC ELECTROPHYSIOLOGY PROCEDURE N/A 01/08/2018    Procedure: Device Implant;  Surgeon: Kaushik Toledo MD;  Location:  [facility] CATH INVASIVE LOCATION;  Service:     CAROTID ENDARTERECTOMY Right 02/14/2020    Procedure: RIGHT CAROTID ENDARTERECTOMY WITH EEG;  Surgeon: Martín Woo DO;  Location: [facility] OR 12;  Service: Vascular;  Laterality: Right;    CAROTID ENDARTERECTOMY Left 09/19/2022    CAROTID STENT      COLON SURGERY  1999    for Ischemic Colitis    COLONOSCOPY  05/07/2008    tubular adenoma ascending colon polyp - 3 yr    COLONOSCOPY  08/19/2003    colon polyp @ 24 cm-see path - 5 yr    COLONOSCOPY  08/11/2000    small interanl hemorrhoid - 3-5 yr    COLONOSCOPY  06/22/1998    (8)small colon polyps removed-see path, internal anal skin tag - 2-3 yr    COLONOSCOPY  06/13/2011    2 polyps, adenomatous, hyperplastic, internal hemorrhoids    COLONOSCOPY N/A 3/14/2023    Procedure: COLONOSCOPY WITH ANESTHESIA;  Surgeon: Saravanan Floyd MD;  Location: [facility] ENDOSCOPY;  Service: Gastroenterology;  Laterality: N/A;  Pre: anemia  Post: diverticulosis, hemorrhoids  Shadi García MD    CORONARY ARTERY BYPASS GRAFT      CORONARY STENT PLACEMENT      x1 2014 dr crane     ENDOSCOPY N/A 12/2/2022    Procedure: ESOPHAGOGASTRODUODENOSCOPY WITH ANESTHESIA;  Surgeon: Saravanan Floyd MD;  Location: [facility] ENDOSCOPY;  Service: Gastroenterology;   Laterality: N/A;  pre screen  post normal  Dr. García    INGUINAL HERNIA REPAIR Right 2023    Procedure: RIGHT INGUINAL HERNIA REPAIR LAPAROSCOPIC WITH DAVINCI ROBOT WITH MESH;  Surgeon: Marianna Redd MD;  Location: [facility] OR;  Service: Robotics - DaVinci;  Laterality: Right;    INSERT / REPLACE / REMOVE PACEMAKER      OTHER SURGICAL HISTORY      Lithotripsy    OTHER SURGICAL HISTORY  2011    recall 3 yr.     PACEMAKER IMPLANTATION  2018    VASECTOMY         Family History   Problem Relation Age of Onset    Colon cancer Father         Diagnosed at 72 YO    Other Sister         Polyps/Colon    Other Brother         Polyps/Colon    Stroke Mother        Social History     Socioeconomic History    Marital status:    Tobacco Use    Smoking status: Former     Types: Cigarettes     Quit date:      Years since quittin.7    Smokeless tobacco: Never   Vaping Use    Vaping Use: Never used   Substance and Sexual Activity    Alcohol use: No    Drug use: No    Sexual activity: Defer           Objective   Physical Exam  Vitals and nursing note reviewed. Exam conducted with a chaperone present.   Constitutional:       General: He is not in acute distress.     Appearance: Normal appearance. He is not ill-appearing or diaphoretic.   HENT:      Head: Normocephalic and atraumatic.      Nose: Nose normal.      Mouth/Throat:      Mouth: Mucous membranes are moist.      Pharynx: Oropharynx is clear.   Eyes:      General: No visual field deficit or scleral icterus.     Extraocular Movements: Extraocular movements intact.      Conjunctiva/sclera: Conjunctivae normal.      Pupils: Pupils are equal, round, and reactive to light.   Neck:      Vascular: No carotid bruit.   Cardiovascular:      Rate and Rhythm: Normal rate and regular rhythm.      Pulses: Normal pulses.      Heart sounds: No murmur heard.    No friction rub. No S3 or S4 sounds.   Pulmonary:      Effort: Pulmonary effort is normal. No  respiratory distress.      Breath sounds: Normal breath sounds. No stridor.   Abdominal:      General: Abdomen is flat. Bowel sounds are normal. There is no distension.      Palpations: There is no mass.      Tenderness: There is no abdominal tenderness.   Musculoskeletal:         General: No swelling or tenderness. Normal range of motion.      Cervical back: Normal range of motion and neck supple. No rigidity. No muscular tenderness.   Lymphadenopathy:      Cervical: No cervical adenopathy.   Skin:     General: Skin is warm.      Capillary Refill: Capillary refill takes less than 2 seconds.      Coloration: Skin is not jaundiced or pale.      Findings: No bruising or rash.   Neurological:      General: No focal deficit present.      Mental Status: He is alert and oriented to person, place, and time. Mental status is at baseline.      GCS: GCS eye subscore is 4. GCS verbal subscore is 5. GCS motor subscore is 6.      Cranial Nerves: Cranial nerves 2-12 are intact. No cranial nerve deficit, dysarthria or facial asymmetry.      Sensory: Sensation is intact.      Motor: Motor function is intact. No weakness, abnormal muscle tone, seizure activity or pronator drift.      Coordination: Coordination is intact. Finger-Nose-Finger Test and Heel to Shin Test normal.      Deep Tendon Reflexes: Reflexes are normal and symmetric. Babinski sign absent on the right side. Babinski sign absent on the left side.      Reflex Scores:       Bicep reflexes are 2+ on the right side and 2+ on the left side.       Patellar reflexes are 2+ on the right side and 2+ on the left side.  Psychiatric:         Attention and Perception: Attention normal.         Mood and Affect: Mood and affect normal.         Speech: Speech normal.         Behavior: Behavior normal.       Procedures           ED Course  ED Course as of 09/21/23 1717   u Sep 21, 2023   1515 EKG performed this patient on cannot be read it appears like hand-drawn EKG they are going  to repeat that.  Patient not have any chest pain or shortness of breath at this time [TS]   1521 ·  Left ventricular systolic function is normal. Left ventricular ejection fraction appears to be 66 - 70%.  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 Mean transvalvular gradient 10 mmHg, with no evidence of paravalvular leak.  ·  Left ventricular wall thickness is consistent with mild septal asymmetric hypertrophy.  ·  Left ventricular diastolic function is consistent with (grade III w/high LAP) reversible restrictive pattern.  ·  Moderate mitral valve stenosis (mean gradient mmHg, at HR of 72 bpm) is present due to severe calcification of the posterior mitral annulus.  ·  The left atrial cavity is severely dilated.  ·  Estimated right ventricular systolic pressure from tricuspid regurgitation is mildly elevated (35-45 mmHg).  ·  Normal size and function of the right ventricle.  ·  Compared to prior exam from 5/26/2022, no significant change.  This was the patient's last echo [TS]   1527  Bilateral lower lobe groundglass opacities have increased in size  compared to 3/25/2021. Low-grade or indolent neoplasm should be  considered.  2. Cardiomegaly, changes of TAVR, heavily calcified coronary arteries  versus stent material, changes of CABG.  3. Similar RIGHT exophytic renal lesion measuring 2.1 cm today,  previously 1.9 cm on 11/17/2022. This could represent a solid renal  lesion or hemorrhagic/proteinaceous cyst, but demonstrates minimal  interval change compared to a year ago.     This was discussed with patient and family [TS]   1550 [place] syncope score is not a low risk since the patient's hematocrit is less than 30% and EKG shows a paced rhythm pacemaker has been interrogated [TS]   1706 Patient has got [TS]   1707 Discussed this case at length with the patient.  And with the family have informed him about the abnormal CT scan of the  chest findings and CT scan of the abdomen finding the recommendation is to be admitted to the hospital for further evaluation assessment.  His pacemaker interrogation is negative he will not get a transfusion of blood at this time but needs to be monitored very closely the possibility of ongoing hemorrhage cannot be ruled out since this was a noncontrasted study.  And therefore the reason to admitted to the hospital for further evaluation already discussed this case with the hospitalist who agreed to admission.  Went back to talk to the family patient and the family do not want to be admitted and want to go home the possibility of increased morbidity mortality associated incomplete work-up especially syncope with [place] score and the risks involved have been explained to the patient and the family the patient and the family agrees with our recommendation but wants to go home.  And they are going to follow-up with the primary MD with outpatient hemoglobin testing.  I did tell them about his elevated troponin I and his elevated dimer although both the dimer and troponin elevation are probably secondary to renal insufficiency but would require further evaluation assessment.  At this point have decided after me discussing the entire lab work-up and CAT scan reports with them to go home they are going to follow-up with the primary MD.  Did not want to be admitted to the hospital [TS]   1709 The patient and the family has requested to leave the ED and do not want to be admitted nor do they want any further work-up at this time. The patient and the family's reason(s) for leaving include, but are not limited to, the following: Feels better and wants to go home will have his hemoglobin evaluated as an outpatient.. I believe this patient and his family are  of sound mind and competent to refuse medical care. The patient and his family are responding and asking questions appropriately. The patient and the family are  oriented to person, place and time. The patient is not psychotic, delusional, suicidal, homicidal or hallucinating. The patient and his family demonstrates a normal mental capacity to make decisions regarding their healthcare. The patient is clinically sober and does not appear to be under the influence of any illicit drugs at this time. The patient and the family has been advised of the risks, in layman terms, of leaving  which include, but are not limited to death, coma, permanent disability, loss of current lifestyle, delay in diagnosis. Alternatives have been offered - the patient and the family remains steadfast in their wish to leave. The patient has been advised that should they change their mind they are welcome to return to this hospital, or any other,  I have provided appropriate prescriptions, referrals, and discharge instructions.. The above discussion was witnessed by another member of staff.  [TS]      ED Course User Index  [TS] Jermain Min MD                                           Medical Decision Making  Differential Diagnosis:  I considered vasovagal reflex, situational stimulus, cardiac sinus hypersensitivity, vascular etiology, sudden postural change, hypovolemia, vasodilator drugs, autonomic neuropathy, cardiac etiology, arrhythmia, heart block, myocardial infarction, cardiac tamponade, aortic dissection, pulmonary hypertension, CNS etiology, seizure, hypoxia, hypoglycemia and basivertebral TIA as a possible cause of syncope in this patient. This is a partial list of diagnoses considered.             Problems Addressed:  Abdominal wall hematoma, initial encounter: complicated acute illness or injury     Details: Patient got a large hematoma dropped hemoglobin cannot find an acute hemorrhage at this time because of this a noncontrast study secondary patient CKD.  Acute on chronic renal insufficiency: complicated acute illness or injury     Details: Worsening of chronic renal insufficiency in  the face of a large hematoma.  Anemia, unspecified type: complicated acute illness or injury     Details: Large abdominal wall hematoma and some posterior chest wall bruising secondary to fall.  With a drop in hemoglobin.  Contusion of chest wall, unspecified laterality, initial encounter: complicated acute illness or injury  Pulmonary nodules/lesions, multiple: chronic illness or injury     Details: Has these lung lesions bilaterally would need to be further evaluated assess as an outpatient.  The patient and family has been advised about this.  Renal lesion: chronic illness or injury     Details: Has a renal lesion will need to be evaluated in outpatient family and the patient has been advised about this.  Syncope and collapse: complicated acute illness or injury     Details: Complicated syncope significant cardiac disease the abnormal syncope [place] score.  Recommendation was to admit to the hospital do not want to be admitted want to go home.    Amount and/or Complexity of Data Reviewed  Labs: ordered.     Details: Labs reviewed  Radiology: ordered.  ECG/medicine tests: ordered and independent interpretation performed.     Details: Paced rhythm  Discussion of management or test interpretation with external provider(s): Discussed this case with Dr. Marianna Redd and with Dr. Oro    Risk  Prescription drug management.  Risk Details: Patient with syncopal episode wants to go home does not want to be admitted also has got anemia because of abdominal wall hematoma heme-negative from below.  Will recommend to follow with the primary MD and return there for any worsening symptoms.        Final diagnoses:   Abdominal wall hematoma, initial encounter   Anemia, unspecified type   Acute on chronic renal insufficiency   Syncope and collapse   Contusion of chest wall, unspecified laterality, initial encounter   Pulmonary nodules/lesions, multiple   Renal lesion       ED Disposition  ED Disposition       ED  Disposition   Discharge    Condition   Stable    Comment   --               Shadi García MD  [address] [place] [address] [phone]    In 1 day           Medication List      No changes were made to your prescriptions during this visit.            Jermain Min MD  09/21/23 1716       Jermain Min MD  09/21/23 1710

## 2023-09-21 NOTE — ED NOTES
Patient and family have decided that they do not want to stay for admit. At this time patient has repeat troponin due. Patient and family refuse repeat Troponin at this time. Dr. Min notified.

## 2023-09-21 NOTE — PROGRESS NOTES
Subjective     Chief Complaint:  Dizziness. Syncope.    HPI:  The patient presents to the office today with his wife and daughter present. He has recently undergone right inguinal hernia repair on 9/8 with Dr. Redd. He tells me he has generally felt unwell since his surgery. Please see assessment and plan below.     Patient's PMR from outside medical facility reviewed and noted.    Past Medical History:   Past Medical History:   Diagnosis Date    Acute viral hepatitis A     Aortic valve stenosis     Carotid artery stenosis     Right    CKD (chronic kidney disease)     Coagulopathy     Plavix    Colon polyps     Coronary arteriosclerosis     stent ,? 4/2104, on plavix now.    Diarrhea     Dysphagia     Unspecified    Family history of colon cancer     Hematochezia     differential diagnosis discussed    Hemorrhoids     Unspecified    History of cardiac pacemaker 01/08/2018    [employer]    History of colonic polyps     Hyperlipidemia     Hypertension     Left ventricular hypertrophy     Melanoma of back     Obesity     Osteoarthritis     Pacemaker     Peripheral neuropathy     Stroke     TIA (transient ischemic attack)     Tobacco non-user      Past Surgical History:  Past Surgical History:   Procedure Laterality Date    AORTIC VALVE REPAIR/REPLACEMENT Bilateral 04/20/2021    Procedure: Transfemoral Transcatheter Aortic Valve Replacement;  Surgeon: Kaushik Toledo MD;  Location: [facility] HYBRID OR 12;  Service: Cardiovascular;  Laterality: Bilateral;    AORTIC VALVE REPAIR/REPLACEMENT Bilateral 04/20/2021    Procedure: TRANSFEMORAL TRANSCATHETER AORTIC VALVE REPLACEMENT;  Surgeon: Clayton Wilcox MD;  Location: [facility] HYBRID OR 12;  Service: Cardiothoracic;  Laterality: Bilateral;    APPENDECTOMY      CARDIAC CATHETERIZATION      CARDIAC CATHETERIZATION N/A 03/18/2019    Procedure: Left Heart Cath;  Surgeon: Kaushik Toledo MD;  Location: [facility] CATH INVASIVE LOCATION;  Service: Cardiology     CARDIAC CATHETERIZATION N/A 04/14/2021    Procedure: Left Heart Cath;  Surgeon: Kaushik Toledo MD;  Location: [facility] CATH INVASIVE LOCATION;  Service: Cardiology;  Laterality: N/A;    CARDIAC ELECTROPHYSIOLOGY PROCEDURE N/A 01/05/2018    Procedure: Temporary Pacemaker;  Surgeon: Luis E Courtney MD;  Location:  [facility] CATH INVASIVE LOCATION;  Service:     CARDIAC ELECTROPHYSIOLOGY PROCEDURE N/A 01/08/2018    Procedure: Device Implant;  Surgeon: Kaushik Toledo MD;  Location:  [facility] CATH INVASIVE LOCATION;  Service:     CAROTID ENDARTERECTOMY Right 02/14/2020    Procedure: RIGHT CAROTID ENDARTERECTOMY WITH EEG;  Surgeon: Martín Woo DO;  Location: [facility] HYBRID OR 12;  Service: Vascular;  Laterality: Right;    CAROTID ENDARTERECTOMY Left 09/19/2022    CAROTID STENT      COLON SURGERY  1999    for Ischemic Colitis    COLONOSCOPY  05/07/2008    tubular adenoma ascending colon polyp - 3 yr    COLONOSCOPY  08/19/2003    colon polyp @ 24 cm-see path - 5 yr    COLONOSCOPY  08/11/2000    small interanl hemorrhoid - 3-5 yr    COLONOSCOPY  06/22/1998    (8)small colon polyps removed-see path, internal anal skin tag - 2-3 yr    COLONOSCOPY  06/13/2011    2 polyps, adenomatous, hyperplastic, internal hemorrhoids    COLONOSCOPY N/A 3/14/2023    Procedure: COLONOSCOPY WITH ANESTHESIA;  Surgeon: Saravanan Floyd MD;  Location: [facility] ENDOSCOPY;  Service: Gastroenterology;  Laterality: N/A;  Pre: anemia  Post: diverticulosis, hemorrhoids  Shadi García MD    CORONARY ARTERY BYPASS GRAFT      CORONARY STENT PLACEMENT      x1 2014 dr crane     ENDOSCOPY N/A 12/2/2022    Procedure: ESOPHAGOGASTRODUODENOSCOPY WITH ANESTHESIA;  Surgeon: Saravanan Floyd MD;  Location: [facility] ENDOSCOPY;  Service: Gastroenterology;  Laterality: N/A;  pre screen  post normal  Dr. García    INGUINAL HERNIA REPAIR Right 9/8/2023    Procedure: RIGHT INGUINAL HERNIA REPAIR LAPAROSCOPIC WITH DAVINCI ROBOT WITH MESH;  Surgeon: Tramaine  Marianna KIM MD;  Location:  [facility] OR;  Service: Robotics - DaVinci;  Laterality: Right;    INSERT / REPLACE / REMOVE PACEMAKER      OTHER SURGICAL HISTORY      Lithotripsy    OTHER SURGICAL HISTORY  06/2011    recall 3 yr.     PACEMAKER IMPLANTATION  01/08/2018    VASECTOMY       Social History:  reports that he quit smoking about 24 years ago. His smoking use included cigarettes. He has never used smokeless tobacco. He reports that he does not drink alcohol and does not use drugs.    Family History: family history includes Colon cancer in his father; Other in his brother and sister; Stroke in his mother.      Allergies:  Allergies   Allergen Reactions    Pradaxa [Dabigatran Etexilate Mesylate] GI Bleeding    Sulfa Antibiotics Rash     Sulfa Drugs     Medications:  Prior to Admission medications    Medication Sig Start Date End Date Taking? Authorizing Provider   acetaminophen (Tylenol) 325 MG tablet Take 3 tablets by mouth Every 8 (Eight) Hours. Take every 8 hours for 3 days then take prn as needed. 9/8/23 9/7/24 Yes Marianna Redd MD   amLODIPine (NORVASC) 10 MG tablet Take 1 tablet by mouth Daily. 7/20/23  Yes Shadi García MD   aspirin 81 MG EC tablet Take 1 tablet by mouth Daily. 11/23/22  Yes Shadi García MD   atorvastatin (LIPITOR) 80 MG tablet Take 1 tablet by mouth Every Night. 4/6/23  Yes Zena Fischer APRN   bumetanide (BUMEX) 1 MG tablet Take 1 tablet by mouth Daily As Needed.   Yes Terell Diehl MD   Cholecalciferol (Vitamin D3) 25 MCG (1000 UT) capsule Take 1 capsule by mouth Daily.   Yes Terell Diehl MD   coenzyme Q10 100 MG capsule Take 1 capsule by mouth Daily.   Yes Terell Diehl MD   irbesartan (AVAPRO) 300 MG tablet Take 1 tablet by mouth Every Night.   Yes Terell Diehl MD   nitroglycerin (NITROSTAT) 0.4 MG SL tablet PLACE 1 TABLET UNDER THE TONGUE AS NEEDED FOR CHEST PAIN 8/29/22  Yes Kaushik Toledo MD   Omega-3 Fatty Acids (fish oil) 1000  "MG capsule capsule Take 1 capsule by mouth Daily With Breakfast.   Yes ProviderTerell MD   ondansetron (Zofran) 4 MG tablet Take 1 tablet by mouth Every 8 (Eight) Hours As Needed for Nausea or Vomiting. 9/8/23 9/7/24 Yes Marianna Redd MD   oxyCODONE (Roxicodone) 5 MG immediate release tablet Take 1 tablet by mouth Every 8 (Eight) Hours As Needed for Severe Pain. 9/8/23 9/7/24 Yes Marianna Redd MD   pantoprazole (PROTONIX) 40 MG EC tablet Take 1 tablet by mouth once daily 2/10/23  Yes Shadi García MD   polyethylene glycol 17 g packet Take 17 g by mouth Daily. 11/19/22  Yes Shadi García MD   sodium bicarbonate 650 MG tablet Take 1 tablet by mouth 3 (Three) Times a Day. 4/6/22  Yes ProviderTerell MD       Objective     Vital Signs: /66 (BP Location: Left arm, Patient Position: Sitting, Cuff Size: Adult)   Pulse 64   Temp 97.1 °F (36.2 °C) (Infrared)   Ht 172 cm (67.72\")   Wt 91.3 kg (201 lb 4.5 oz)   SpO2 95%   BMI 30.86 kg/m²   Physical Exam  Vitals and nursing note reviewed.   Constitutional:       General: He is not in acute distress.     Appearance: He is obese. He is not ill-appearing or toxic-appearing.   HENT:      Head: Normocephalic and atraumatic.      Ears:      Comments: Hard of hearing     Mouth/Throat:      Mouth: Mucous membranes are moist.      Pharynx: Oropharynx is clear.   Cardiovascular:      Rate and Rhythm: Normal rate and regular rhythm.      Pulses: Normal pulses.      Heart sounds: Murmur heard.   Pulmonary:      Effort: Pulmonary effort is normal.      Breath sounds: No wheezing, rhonchi or rales.   Abdominal:      General: Bowel sounds are normal. There is distension.      Palpations: Abdomen is soft.      Tenderness: There is abdominal tenderness.      Comments: Abdominal laparoscopic incisions BAILEY with dermabond intact. Extensive bruising to the Left flank noted which appears newer than abdominal bruising.    Musculoskeletal:         " "General: No swelling or tenderness. Normal range of motion.      Cervical back: Normal range of motion and neck supple. No tenderness.      Right lower leg: Edema (trace-mild) present.      Left lower leg: Edema (trace-mild) present.   Skin:     General: Skin is warm and dry.      Coloration: Skin is pale.      Findings: Bruising present. No erythema or rash.   Neurological:      General: No focal deficit present.      Mental Status: He is alert and oriented to person, place, and time.   Psychiatric:         Mood and Affect: Mood normal.         Behavior: Behavior normal.         Thought Content: Thought content normal.         Judgment: Judgment normal.     Results Reviewed:  Iron Profile (09/21/2023 12:28)  Comprehensive metabolic panel (09/21/2023 12:28)  CBC & Differential (09/21/2023 12:28)  Comprehensive Metabolic Panel (09/01/2023 13:33)  CBC & Differential (09/01/2023 13:33)    Assessment / Plan     Assessment/Plan:  Diagnoses and all orders for this visit:    1. Chronic renal failure, stage 4 (severe) (Primary)  -     Comprehensive metabolic panel    2. Iron deficiency anemia due to chronic blood loss  -     CBC & Differential  -     Iron Profile    3. OLMEDO (dyspnea on exertion)  -     CBC & Differential  -     Iron Profile    4. Peripheral edema  -     Compression Stockings       The patient presents to the office today with complaints of generally feeling unwell following right inguinal hernia repair on 9/8 with Dr. Redd.  His wife and daughter are present with him today and help provide history.  The patient tells me 2 days ago he had a period of dizziness that lasted for a few minutes and resolved on its own.  Yesterday, he had an episode where he \"blacked out\".  He went to the kitchen to get something to drink on his way back to the living room he suddenly felt dizzy.  He tried to make it back to his chair quickly but states he fell forward and landed on his left side.  He does not believe that he " "hit his head but believes that he was \"out\" for a few minutes.  This fall was unwitnessed and he was home alone at the time.  He indicates that he woke up on his own and was able to crawl to his chair.  He has had some bruising noted to his left side, left forearm and hand since this time.  The patient states that he is sore from falling but not in radial pain.  He has not noticed any blood with urination or in his bowel movements.  He has had episodes of syncope previously, however this was prior to implantation of pacemaker.    The patient has also had dyspnea on exertion worse than baseline following surgery.  He also indicates that his blood pressure has been running lower than normal.  His wife has had a blood pressure log and his systolic blood pressure has been ranging in the 90s to low 100s.  There was an incident in which his systolic blood pressure was 79 several days ago.    The patient does have a previous history of anemia following carotid artery surgery in 2022.  He was admitted to [facility] during this time and had received 4 units of blood with hemoglobin down to 4.5.  At that time, he was taking Pradaxa.  He has had a previous GI bleed on Xarelto as well.  He took oral iron for approximately 1 month following this hospitalization.  He is now only taking a baby aspirin daily.  The patient indicates that he did have some similar symptoms when he was anemic previously.  We did assess labs today to include CBC, CMP and iron profile.  The patient's preoperative hemoglobin was 12.0 and is now down to 7.9.  Patient also with stage IV chronic kidney disease with slight worsening of renal function with GFR down to 16 from 19.  Once the patient's labs were received, we did call his daughter and directed him to the emergency department for further evaluation as he likely needs imaging to assess for possible source of bleeding.  I also communicated this with Dr. Redd as she saw him in the " office today as well.    Return in about 1 week (around 9/28/2023) for Follow up w/. unless patient needs to be seen sooner or acute issues arise.    I have discussed the patient results/orders and and plan/recommendation with them at today's visit.      Ria Roberson, APRN   09/21/2023

## 2023-09-21 NOTE — TELEPHONE ENCOUNTER
Spoke with pt's daughter per Ria Roberson's instructions  Told her that pt's hemoglobin was very low and Mariah would like for him to go the ER. Daughter stated her understanding and had no further questions  She stated that as soon as they were done in his current dr's appt that they would head down to ER

## 2023-09-21 NOTE — ED NOTES
[employer] contacted for Interrogation Report. [employer] personal states that States that everything is functioning as programed.

## 2023-09-21 NOTE — PROGRESS NOTES
Medical assistant discussed with patient's daughter via telephone.  He will be going to the emergency department following office visit with Dr. Redd for transfusion.

## 2023-09-22 LAB
IRON 24H UR-MRATE: 47 MCG/DL (ref 59–158)
IRON SATN MFR SERPL: 12 % (ref 20–50)
QT INTERVAL: 160 MS
QT INTERVAL: 470 MS
QTC INTERVAL: 252 MS
QTC INTERVAL: 528 MS
TIBC SERPL-MCNC: 377 MCG/DL (ref 298–536)
TRANSFERRIN SERPL-MCNC: 253 MG/DL (ref 200–360)

## 2023-09-22 NOTE — PROGRESS NOTES
Iron level is low from recent blood loss. I would advise to start taking over the counter iron 325 mg every other day. It needs to be taken with Vitamin C 250 mg for better absorption.

## 2023-09-24 NOTE — PATIENT INSTRUCTIONS

## 2023-09-24 NOTE — PROGRESS NOTES
"Patient: Mireille Magallanes    YOB: 1935    Date: 09/21/2023    Primary Care Provider: Shadi García MD    Vital Signs:   Vitals:    09/21/23 1305   BP: 106/63   Pulse: 94   SpO2: 98%   Weight: 91.2 kg (201 lb)   Height: 170.2 cm (67\")       He is s/p robotic right inguinal hernia repair with mesh on 9/8/23. He reports he was doing well until two days ago when he had a period of dizziness that lasted a few minutes then resolved. Yesterday he had a syncopal episode. He fell and hit his left side. Today he reports significant bruising along his left side. He denies severe scrotal or inguinal swelling. He does have bruising across his lower abdomen and left flank. Incisions well healed. Hernia repair intact. No fevers, nausea, vomiting or constipation.     Results Review:   Pathology and operative findings reviewed with patient.        Assessment / Plan:  It has been reiterated that the patient should limit strenuous activity during the post op period and limit lifting to <35 pounds for the first four weeks post op then slowly return to normal.  Diagnoses and all orders for this visit:    1. S/P inguinal hernia repair (Primary)      I personally discussed the patient with GIANNI Gee today - his Hgb is down to < 8. She recommended he go to the ER for work up and transfusion. I agree with this plan. Follow up with me in 2-4 weeks depending on findings in the ER.     Electronically signed by Marianna Redd MD  09/24/23  18:50 CDT                  "

## 2023-09-28 ENCOUNTER — OFFICE VISIT (OUTPATIENT)
Dept: INTERNAL MEDICINE | Facility: CLINIC | Age: 88
End: 2023-09-28
Payer: MEDICARE

## 2023-09-28 VITALS
TEMPERATURE: 97.8 F | WEIGHT: 200 LBS | DIASTOLIC BLOOD PRESSURE: 78 MMHG | OXYGEN SATURATION: 99 % | BODY MASS INDEX: 31.39 KG/M2 | HEART RATE: 78 BPM | HEIGHT: 67 IN | SYSTOLIC BLOOD PRESSURE: 152 MMHG

## 2023-09-28 DIAGNOSIS — R91.8 PULMONARY NODULES: ICD-10-CM

## 2023-09-28 DIAGNOSIS — D50.0 IRON DEFICIENCY ANEMIA DUE TO CHRONIC BLOOD LOSS: Primary | Chronic | ICD-10-CM

## 2023-09-28 DIAGNOSIS — I10 PRIMARY HYPERTENSION: Chronic | ICD-10-CM

## 2023-09-28 DIAGNOSIS — N28.9 RENAL LESION: ICD-10-CM

## 2023-09-28 DIAGNOSIS — R55 SYNCOPE, UNSPECIFIED SYNCOPE TYPE: ICD-10-CM

## 2023-09-28 DIAGNOSIS — Z23 NEED FOR INFLUENZA VACCINATION: ICD-10-CM

## 2023-09-28 DIAGNOSIS — S30.1XXD HEMATOMA OF RECTUS SHEATH, SUBSEQUENT ENCOUNTER: ICD-10-CM

## 2023-09-28 RX ORDER — FERROUS SULFATE TAB EC 324 MG (65 MG FE EQUIVALENT) 324 (65 FE) MG
324 TABLET DELAYED RESPONSE ORAL
COMMUNITY

## 2023-09-28 RX ORDER — MULTIVIT-MIN/IRON/FOLIC ACID/K 18-600-40
500 CAPSULE ORAL DAILY
COMMUNITY

## 2023-09-28 NOTE — PROGRESS NOTES
"      Chief Complaint  Follow-up (Er on 9/21/Review CT scans with patient /Taken off amlodipine last Thursday )    Subjective        Mireille Magallanes presents to [facility]    HPI  Patient here for the above problems.  See Assessment and Plan for further HPI components.        Review of Systems    Objective   Vital Signs:  /78 (BP Location: Left arm, Patient Position: Sitting, Cuff Size: Adult)   Pulse 78   Temp 97.8 °F (36.6 °C) (Temporal)   Ht 170.2 cm (67\")   Wt 90.7 kg (200 lb)   SpO2 99%   BMI 31.32 kg/m²   Estimated body mass index is 31.32 kg/m² as calculated from the following:    Height as of this encounter: 170.2 cm (67\").    Weight as of this encounter: 90.7 kg (200 lb).      Physical Exam  Vitals reviewed.   Constitutional:       Appearance: He is obese. He is not ill-appearing.   HENT:      Head: Normocephalic and atraumatic.      Mouth/Throat:      Mouth: Mucous membranes are dry.      Pharynx: Oropharynx is clear.   Eyes:      General: No scleral icterus.     Conjunctiva/sclera: Conjunctivae normal.   Pulmonary:      Effort: Pulmonary effort is normal. No respiratory distress.   Skin:     General: Skin is warm and dry.      Coloration: Skin is not pale.   Neurological:      General: No focal deficit present.      Mental Status: He is alert and oriented to person, place, and time.   Psychiatric:         Mood and Affect: Mood normal.         Behavior: Behavior normal.                       Assessment and Plan   Diagnoses and all orders for this visit:    1. Iron deficiency anemia due to chronic blood loss (Primary)  -     CBC w AUTO Differential  -     Comprehensive metabolic panel    2. Need for influenza vaccination  -     Fluzone High-Dose 65+yrs    3. Hematoma of rectus sheath, subsequent encounter    4. Renal lesion    5. Pulmonary nodules    6. Primary hypertension    7. Syncope, unspecified syncope type      Patient was sent to ER as the patient had a " hemoglobin drop from 12.0 to 7.9 after a fall and developing a rectus sheath hematoma.  The patient did not get blood transfusion while in the ER unfortunately, as I suspect over time he likely has dropped even further, but will check today.  Iron noted to be 47 and iron saturation of 12.  CT Scans reviewed with the patient has a lesion on his kidney that has slightly enlarged from previous scan.  The patient also noted to have some old and new pulmonary nodules.      Patient has declined to follow-up scans for the pulmonary nodules or the renal lesions.  We both discussed and agree if it is something, it likely is not aggressive as they have not changed much over time.  Also given his age and comorbidity, sometimes less is more.  I have notified the lung navigator of these decisions.    Hematoma is improving.  Less pain.    Patients BP on home readings has been stable.  Up a little today.  Recommended continue ambulatory BP monitoring.  Hold amlodipine.  Continue to monitor.    No further episodes of syncope.    Patient has a birth next week, I recommended he get something good for his dinner on his birthday!      Result Review :  The following data was reviewed by: Shadi García MD on 09/28/2023:  CMP          7/13/2023    07:02 9/1/2023    13:33 9/21/2023    12:28   CMP   Glucose 90  92  100    BUN 32  38  50    Creatinine 3.27  3.06  3.52    EGFR  19.3  16.3    Sodium 139  139  139    Potassium 4.9  4.6  5.2    Chloride 107  106  108    Calcium 9.4  9.0  9.0    Total Protein 6.8      Total Protein  7.2  7.5    Albumin 4.3  4.4  3.9    Globulin 2.5      Globulin  2.8  3.6    Total Bilirubin 0.5  0.5  1.2    Alkaline Phosphatase 85  98  103    AST (SGOT) 16  20  36    ALT (SGPT) 13  14  24    Albumin/Globulin Ratio  1.6  1.1    BUN/Creatinine Ratio 9.8  12.4  14.2    Anion Gap  13.0  10.0      CBC w/diff          7/13/2023    07:02 9/1/2023    13:33 9/21/2023    12:28   CBC w/Diff   WBC 5.40  6.97  9.10     RBC 4.21  4.36  2.81    Hemoglobin 11.8  12.0  7.9    Hematocrit 36.0  38.0  25.2    MCV 85.5  87.2  89.7    MCH 28.0  27.5  28.1    MCHC 32.8  31.6  31.3    RDW 12.9  13.6  14.6    Platelets 142  172  325    Neutrophil Rel % 55.6  66.0  73.8    Immature Granulocyte Rel %  0.4     Lymphocyte Rel % 29.6  23.2  18.6    Monocyte Rel % 9.6  7.7     Eosinophil Rel % 4.3  2.3     Basophil Rel % 0.9  0.4       INR          9/21/2023    14:38   Common Labsle   INR 1.14      Data reviewed : Radiologic studies      CT Chest Without Contrast Diagnostic (09/21/2023 15:08)   CT Abdomen Pelvis Without Contrast (09/21/2023 15:08)     CT Abdomen Pelvis Without Contrast (09/21/2023 15:08)        Latest Reference Range & Units 09/21/23 00:00 09/21/23 12:28 09/21/23 14:30 09/21/23 14:38 09/21/23 15:08 09/21/23 15:15 09/21/23 15:40   ABO Type     A      RH type     Negative      Antibody Screen     Negative      T&S Expiration Date     9/24/2023 11:59:59 PM      HS Troponin T <15 ng/L    109 (C)      Sodium 135 - 145 mmol/L  139        Potassium 3.5 - 5.3 mmol/L  5.2        Chloride 98 - 110 mmol/L  108        CO2 24.0 - 31.0 mmol/L  21.0 (L)        Anion Gap 4.0 - 13.0 mmol/L  10.0        BUN 5 - 21 mg/dL  50 (H)        Creatinine 0.50 - 1.40 mg/dL  3.52 (H)        BUN/Creatinine Ratio -   14.2        eGFR >60.0 mL/min/1.73  16.3 (L)        Glucose 70 - 100 mg/dL  100        Calcium 8.4 - 10.4 mg/dL  9.0        Alkaline Phosphatase 24 - 120 U/L  103        Total Protein 6.3 - 8.7 g/dL  7.5        Albumin 3.5 - 5.0 g/dL  3.9        Globulin gm/dL  3.6        A/G Ratio 1.1 - 2.5 g/dL  1.1        AST (SGOT) 7 - 45 U/L  36        ALT (SGPT) 0 - 50 U/L  24        Total Bilirubin 0.1 - 1.0 mg/dL  1.2 (H)        Iron 59 - 158 mcg/dL  47 (L)        Iron Saturation (TSAT) 20 - 50 %  12 (L)        Transferrin 200 - 360 mg/dL  253        TIBC 298 - 536 mcg/dL  377        D-Dimer, Quant 0.00 - 0.85 MCGFEU/mL    5.02 (H)      Protime 11.8 - 14.8  Seconds    14.8      INR 0.91 - 1.09     1.14 (H)      WBC 3.40 - 10.80 10*3/mm3  9.10        RBC 4.14 - 5.80 10*6/mm3  2.81 (L)        Hemoglobin 13.0 - 17.7 g/dL  7.9 (C)        Hematocrit 37.5 - 51.0 %  25.2 (L)        Platelets 140 - 450 10*3/mm3  325        RDW 12.3 - 15.4 %  14.6        MCV 79.0 - 97.0 fL  89.7        MCH 26.6 - 33.0 pg  28.1        MCHC 31.5 - 35.7 g/dL  31.3 (L)        MPV 6.0 - 12.0 fL  7.8        Neutrophil Rel % 42.7 - 76.0 %  73.8        Lymphocyte Rel % 19.6 - 45.3 %  18.6 (L)        Auto Mixed Cells % 0.1 - 24.0 %  7.6        Auto Mixed Cells # 0.10 - 2.60 10*3/mm3  0.70        Neutrophils Absolute 1.70 - 7.00 10*3/mm3  6.70        Lymphocytes Absolute 0.70 - 3.10 10*3/mm3  1.70        Expiration Date        11/30/2023   Fecal Occult Blood        Negative   Lot Number        [id number]   CT ABDOMEN PELVIS WO CONTRAST      Rpt     CT CHEST WO CONTRAST DIAGNOSTIC      Rpt     CT HEAD WO CONTRAST      Rpt     ECG 12-LEAD    Rpt   Rpt    QT Interval ms   160   470    QTC Interval ms   252   528    SCANNED EKG  Rpt         (C): Data is critically high  (L): Data is abnormally low  (H): Data is abnormally high  Rpt: View report in Results Review for more information    BMI is >= 30 and <35. (Class 1 Obesity). The following options were offered after discussion;: none (medical contraindication)            Follow Up   Return in about 1 month (around 10/28/2023), or if symptoms worsen or fail to improve, for follow up for knee injections .  Patient was given instructions and counseling regarding his condition or for health maintenance advice. Please see specific information pulled into the AVS if appropriate.       YULIA García MD, FACP, FHM      Electronically signed by Shadi García MD, 09/28/23, 5:24 PM CDT.

## 2023-09-29 LAB
ALBUMIN SERPL-MCNC: 4.4 G/DL (ref 3.5–5.2)
ALBUMIN/GLOB SERPL: 1.9 G/DL
ALP SERPL-CCNC: 113 U/L (ref 39–117)
ALT SERPL-CCNC: 10 U/L (ref 1–41)
AST SERPL-CCNC: 19 U/L (ref 1–40)
BASOPHILS # BLD AUTO: 0.04 10*3/MM3 (ref 0–0.2)
BASOPHILS NFR BLD AUTO: 0.6 % (ref 0–1.5)
BILIRUB SERPL-MCNC: 0.7 MG/DL (ref 0–1.2)
BUN SERPL-MCNC: 34 MG/DL (ref 8–23)
BUN/CREAT SERPL: 11.9 (ref 7–25)
CALCIUM SERPL-MCNC: 9.5 MG/DL (ref 8.6–10.5)
CHLORIDE SERPL-SCNC: 107 MMOL/L (ref 98–107)
CO2 SERPL-SCNC: 21.8 MMOL/L (ref 22–29)
CREAT SERPL-MCNC: 2.86 MG/DL (ref 0.76–1.27)
EGFRCR SERPLBLD CKD-EPI 2021: 20.9 ML/MIN/1.73
EOSINOPHIL # BLD AUTO: 0.2 10*3/MM3 (ref 0–0.4)
EOSINOPHIL NFR BLD AUTO: 3.1 % (ref 0.3–6.2)
ERYTHROCYTE [DISTWIDTH] IN BLOOD BY AUTOMATED COUNT: 13.8 % (ref 12.3–15.4)
GLOBULIN SER CALC-MCNC: 2.3 GM/DL
GLUCOSE SERPL-MCNC: 97 MG/DL (ref 65–99)
HCT VFR BLD AUTO: 29 % (ref 37.5–51)
HGB BLD-MCNC: 9.4 G/DL (ref 13–17.7)
IMM GRANULOCYTES # BLD AUTO: 0.03 10*3/MM3 (ref 0–0.05)
IMM GRANULOCYTES NFR BLD AUTO: 0.5 % (ref 0–0.5)
LYMPHOCYTES # BLD AUTO: 1.5 10*3/MM3 (ref 0.7–3.1)
LYMPHOCYTES NFR BLD AUTO: 23.1 % (ref 19.6–45.3)
MCH RBC QN AUTO: 29.1 PG (ref 26.6–33)
MCHC RBC AUTO-ENTMCNC: 32.4 G/DL (ref 31.5–35.7)
MCV RBC AUTO: 89.8 FL (ref 79–97)
MONOCYTES # BLD AUTO: 0.6 10*3/MM3 (ref 0.1–0.9)
MONOCYTES NFR BLD AUTO: 9.3 % (ref 5–12)
NEUTROPHILS # BLD AUTO: 4.11 10*3/MM3 (ref 1.7–7)
NEUTROPHILS NFR BLD AUTO: 63.4 % (ref 42.7–76)
NRBC BLD AUTO-RTO: 0 /100 WBC (ref 0–0.2)
PLATELET # BLD AUTO: 337 10*3/MM3 (ref 140–450)
POTASSIUM SERPL-SCNC: 5.7 MMOL/L (ref 3.5–5.2)
PROT SERPL-MCNC: 6.7 G/DL (ref 6–8.5)
RBC # BLD AUTO: 3.23 10*6/MM3 (ref 4.14–5.8)
SODIUM SERPL-SCNC: 140 MMOL/L (ref 136–145)
WBC # BLD AUTO: 6.48 10*3/MM3 (ref 3.4–10.8)

## 2023-11-01 ENCOUNTER — OFFICE VISIT (OUTPATIENT)
Dept: INTERNAL MEDICINE | Facility: CLINIC | Age: 88
End: 2023-11-01
Payer: MEDICARE

## 2023-11-01 VITALS
DIASTOLIC BLOOD PRESSURE: 80 MMHG | HEIGHT: 67 IN | HEART RATE: 60 BPM | WEIGHT: 196 LBS | TEMPERATURE: 97.1 F | SYSTOLIC BLOOD PRESSURE: 144 MMHG | BODY MASS INDEX: 30.76 KG/M2 | OXYGEN SATURATION: 99 %

## 2023-11-01 DIAGNOSIS — N18.4 CHRONIC RENAL FAILURE, STAGE 4 (SEVERE): Chronic | ICD-10-CM

## 2023-11-01 DIAGNOSIS — I10 PRIMARY HYPERTENSION: Primary | Chronic | ICD-10-CM

## 2023-11-01 DIAGNOSIS — D50.0 IRON DEFICIENCY ANEMIA DUE TO CHRONIC BLOOD LOSS: Chronic | ICD-10-CM

## 2023-11-01 DIAGNOSIS — N50.89 SCROTAL SWELLING: ICD-10-CM

## 2023-11-01 DIAGNOSIS — M17.0 PRIMARY OSTEOARTHRITIS OF BOTH KNEES: ICD-10-CM

## 2023-11-01 RX ORDER — METHYLPREDNISOLONE ACETATE 80 MG/ML
80 INJECTION, SUSPENSION INTRA-ARTICULAR; INTRALESIONAL; INTRAMUSCULAR; SOFT TISSUE ONCE
Status: COMPLETED | OUTPATIENT
Start: 2023-11-01 | End: 2023-11-01

## 2023-11-01 RX ORDER — LIDOCAINE HYDROCHLORIDE 10 MG/ML
1 INJECTION, SOLUTION INFILTRATION; PERINEURAL ONCE
Status: COMPLETED | OUTPATIENT
Start: 2023-11-01 | End: 2023-11-01

## 2023-11-01 RX ORDER — AMLODIPINE BESYLATE 5 MG/1
5 TABLET ORAL DAILY
Qty: 90 TABLET | Refills: 2 | Status: SHIPPED | OUTPATIENT
Start: 2023-11-01

## 2023-11-01 RX ORDER — PNV NO.95/FERROUS FUM/FOLIC AC 28MG-0.8MG
325 TABLET ORAL
COMMUNITY

## 2023-11-01 RX ADMIN — LIDOCAINE HYDROCHLORIDE 1 ML: 10 INJECTION, SOLUTION INFILTRATION; PERINEURAL at 14:27

## 2023-11-01 RX ADMIN — METHYLPREDNISOLONE ACETATE 80 MG: 80 INJECTION, SUSPENSION INTRA-ARTICULAR; INTRALESIONAL; INTRAMUSCULAR; SOFT TISSUE at 14:28

## 2023-11-01 RX ADMIN — LIDOCAINE HYDROCHLORIDE 1 ML: 10 INJECTION, SOLUTION INFILTRATION; PERINEURAL at 14:28

## 2023-11-01 RX ADMIN — METHYLPREDNISOLONE ACETATE 80 MG: 80 INJECTION, SUSPENSION INTRA-ARTICULAR; INTRALESIONAL; INTRAMUSCULAR; SOFT TISSUE at 14:29

## 2023-11-04 NOTE — PROGRESS NOTES
"      Chief Complaint  Follow-up (3 month follow up from knee injection ) and discuss medication (Pt daughter states they want to discuss about getting back on amlodipine )    Subjective        Mireille Magallanes presents to [facility]    HPI  Patient here for the above problems.  See Assessment and Plan for further HPI components.        Review of Systems    Objective   Vital Signs:  /80 (BP Location: Left arm, Patient Position: Sitting, Cuff Size: Adult)   Pulse 60   Temp 97.1 °F (36.2 °C) (Temporal)   Ht 170.2 cm (67.01\")   Wt 88.9 kg (196 lb)   SpO2 99%   BMI 30.69 kg/m²   Estimated body mass index is 30.69 kg/m² as calculated from the following:    Height as of this encounter: 170.2 cm (67.01\").    Weight as of this encounter: 88.9 kg (196 lb).      Physical Exam  Vitals reviewed.   Constitutional:       Appearance: He is not ill-appearing.   Eyes:      General: No scleral icterus.     Conjunctiva/sclera: Conjunctivae normal.   Cardiovascular:      Rate and Rhythm: Normal rate.      Heart sounds: Murmur heard.   Pulmonary:      Effort: Pulmonary effort is normal. No respiratory distress.   Musculoskeletal:      Right knee: No effusion. Tenderness present over the medial joint line and lateral joint line.      Left knee: No effusion. Tenderness present over the medial joint line and lateral joint line.   Skin:     General: Skin is warm.      Coloration: Skin is not pale.   Neurological:      General: No focal deficit present.      Mental Status: He is alert and oriented to person, place, and time.   Psychiatric:         Mood and Affect: Mood normal.         Behavior: Behavior normal.                         Assessment and Plan   Diagnoses and all orders for this visit:    1. Primary hypertension (Primary)  -     amLODIPine (NORVASC) 5 MG tablet; Take 1 tablet by mouth Daily.  Dispense: 90 tablet; Refill: 2    2. Iron deficiency anemia due to chronic blood loss  -     CBC w " AUTO Differential; Future  -     Iron Profile; Future  -     Ferritin; Future  -     Comprehensive metabolic panel; Future  -     Magnesium; Future    3. Chronic renal failure, stage 4 (severe)  -     CBC w AUTO Differential; Future  -     Iron Profile; Future  -     Ferritin; Future  -     Comprehensive metabolic panel; Future  -     Magnesium; Future    4. Primary osteoarthritis of both knees  -     lidocaine (XYLOCAINE) 1 % injection 1 mL  -     methylPREDNISolone acetate (DEPO-medrol) injection 80 mg  -     lidocaine (XYLOCAINE) 1 % injection 1 mL  -     methylPREDNISolone acetate (DEPO-medrol) injection 80 mg  -     Arthrocentesis  -     Arthrocentesis    5. Scrotal swelling      Both knees are still hurting, injection helped quite a bit last time and he would like again.  Patient understands risks and benefits.  Will proceed forward with procedure.    BP log reviewed, running up a little bit.  Patient not having as much orthostasis.  Will resume amlodipine but at 5 mg instead of 10 mg.  Continue to perform ambulatory BP monitoring.  Monitor for signs and symptoms of orthostasis.  Continue other antihypertensive medications.    Still having some scrotal swelling after surgery.  Discussed physiology and pathophysiology of this.  Good underwear and scrotal elevation will help.    The labs below were reviewed, but patient had follow-up labs shortly after with nephrology that showed normal potassium and stable Cr.  (See below).  I appreciate nephrology sending these over for my review.    Were going to check patients iron at next visit and hemoglobin to see if he has responded to our treatment.     Result Review :  The following data was reviewed by: Shadi García MD on 11/01/2023:   Latest Reference Range & Units 09/28/23 13:21   Sodium 136 - 145 mmol/L 140   Potassium 3.5 - 5.2 mmol/L 5.7 (H)   Chloride 98 - 107 mmol/L 107   CO2 22.0 - 29.0 mmol/L 21.8 (L)   BUN 8 - 23 mg/dL 34 (H)   Creatinine 0.76 - 1.27  mg/dL 2.86 (H)   BUN/Creatinine Ratio 7.0 - 25.0  11.9   EGFR Result >60.0 mL/min/1.73 20.9 (L)   Glucose 65 - 99 mg/dL 97   Calcium 8.6 - 10.5 mg/dL 9.5   Alkaline Phosphatase 39 - 117 U/L 113   Total Protein 6.0 - 8.5 g/dL 6.7   Albumin 3.5 - 5.2 g/dL 4.4   A/G Ratio g/dL 1.9   AST (SGOT) 1 - 40 U/L 19   ALT (SGPT) 1 - 41 U/L 10   Total Bilirubin 0.0 - 1.2 mg/dL 0.7   Globulin gm/dL 2.3   WBC 3.40 - 10.80 10*3/mm3 6.48   RBC 4.14 - 5.80 10*6/mm3 3.23 (L)   Hemoglobin 13.0 - 17.7 g/dL 9.4 (L)   Hematocrit 37.5 - 51.0 % 29.0 (L)   Platelets 140 - 450 10*3/mm3 337   RDW 12.3 - 15.4 % 13.8   MCV 79.0 - 97.0 fL 89.8   MCH 26.6 - 33.0 pg 29.1   MCHC 31.5 - 35.7 g/dL 32.4   Neutrophil Rel % 42.7 - 76.0 % 63.4   Lymphocyte Rel % 19.6 - 45.3 % 23.1   Monocyte Rel % 5.0 - 12.0 % 9.3   Eosinophil Rel % 0.3 - 6.2 % 3.1   Basophil Rel % 0.0 - 1.5 % 0.6   Immature Granulocyte Rel % 0.0 - 0.5 % 0.5   Neutrophils Absolute 1.70 - 7.00 10*3/mm3 4.11   Lymphocytes Absolute 0.70 - 3.10 10*3/mm3 1.50   Monocytes Absolute 0.10 - 0.90 10*3/mm3 0.60   Eosinophils Absolute 0.00 - 0.40 10*3/mm3 0.20   Basophils Absolute 0.00 - 0.20 10*3/mm3 0.04   Immature Grans, Absolute 0.00 - 0.05 10*3/mm3 0.03   nRBC 0.0 - 0.2 /100 WBC 0.0   (H): Data is abnormally high  (L): Data is abnormally low                   BMI is >= 30 and <35. (Class 1 Obesity). The following options were offered after discussion;: exercise counseling/recommendations and nutrition counseling/recommendations            Follow Up   Return in about 3 months (around 2/1/2024), or if symptoms worsen or fail to improve, for Recheck.  Patient was given instructions and counseling regarding his condition or for health maintenance advice. Please see specific information pulled into the AVS if appropriate.       YULIA García MD, FACP, FHM      Electronically signed by Shadi García MD, 11/04/23, 2:36 PM CDT.            Left Knee Injection    Date/Time: 11/4/2023 2:36  PM    Performed by: Shadi García MD  Authorized by: Shadi García MD  Indications: pain   Body area: knee  Joint: left knee  Local anesthesia used: no    Anesthesia:  Local anesthesia used: no    Sedation:  Patient sedated: no    Preparation: Patient was prepped and draped in the usual sterile fashion.  Needle gauge: 21g.  Ultrasound guidance: no  Approach: medial  Medication group details: 80 mg depomedrol, 1 mL lidocaine.  Patient tolerance: patient tolerated the procedure well with no immediate complications      Right Knee Injection    Date/Time: 11/4/2023 2:36 PM    Performed by: Shadi García MD  Authorized by: Shadi García MD  Indications: pain   Body area: knee  Joint: right knee  Local anesthesia used: no    Anesthesia:  Local anesthesia used: no  Preparation: Patient was prepped and draped in the usual sterile fashion.  Needle gauge: 21g.  Ultrasound guidance: no  Approach: medial  Medication group details: 80 mg depomedrol, 1 mL lidocaine.  Patient tolerance: patient tolerated the procedure well with no immediate complications

## 2023-11-21 ENCOUNTER — OFFICE VISIT (OUTPATIENT)
Dept: CARDIOLOGY | Facility: CLINIC | Age: 88
End: 2023-11-21
Payer: MEDICARE

## 2023-11-21 VITALS
OXYGEN SATURATION: 97 % | HEIGHT: 67 IN | HEART RATE: 55 BPM | DIASTOLIC BLOOD PRESSURE: 88 MMHG | BODY MASS INDEX: 29.7 KG/M2 | WEIGHT: 189.2 LBS | SYSTOLIC BLOOD PRESSURE: 132 MMHG

## 2023-11-21 DIAGNOSIS — Z86.73 HISTORY OF STROKE: ICD-10-CM

## 2023-11-21 DIAGNOSIS — I10 PRIMARY HYPERTENSION: Chronic | ICD-10-CM

## 2023-11-21 DIAGNOSIS — Z95.2 S/P TAVR (TRANSCATHETER AORTIC VALVE REPLACEMENT): Primary | ICD-10-CM

## 2023-11-21 DIAGNOSIS — I48.92 ATRIAL FLUTTER WITH CONTROLLED RESPONSE: ICD-10-CM

## 2023-11-21 DIAGNOSIS — D50.0 IRON DEFICIENCY ANEMIA DUE TO CHRONIC BLOOD LOSS: Chronic | ICD-10-CM

## 2023-11-21 DIAGNOSIS — N18.4 CHRONIC RENAL FAILURE, STAGE 4 (SEVERE): Chronic | ICD-10-CM

## 2023-11-21 DIAGNOSIS — I25.810 CORONARY ARTERY DISEASE INVOLVING CORONARY BYPASS GRAFT OF NATIVE HEART WITHOUT ANGINA PECTORIS: ICD-10-CM

## 2023-11-21 NOTE — PROGRESS NOTES
Subjective:     Encounter Date: 11/21/2023      Patient ID: Mireille Magallanes is a 88 y.o. male.    Chief Complaint: Follow-up A-fib, atrial flutter, TAVR, CAD    History of Present Illness    Mr. 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    Dr. Toledo saw him in April 2022 and he was complaining of exertional dyspnea.  He was euvolemic and in normal sinus rhythm.  Recent echo did not show any evidence of any issue with his TAVR.  Therefore, he ordered a Lexiscan to see if his symptoms may be an anginal equivalent.  However, this was never performed.  He was having problems with significant blood pressure fluctuations.  He ended up having carotid surgery in September, but had another TIA since then.  He was continuing to struggle with fatigue and exertional dyspnea.    He came to see Dr. Toledo on 10/28 and reported he had a stroke on 5/25/2022.  He reported near syncopal episodes following that.  He reported pressure in his chest with radiation to his head when walking long distances.  He had significant exertional dyspnea and lightheadedness as well.  He described presyncope.  He denied orthopnea.  He did not recall ever taking Ranexa.  He reported having problems following carotid surgery.  He reported his systolic blood pressure was 240 when he went into surgery, and this eventually improved with treatment.  At that visit, given his symptoms Dr. Toledo ordered a Lexiscan again for ischemic evaluation and also started Ranexa to see if he would have symptomatic improvement.  His Lexiscan was low risk.  He did not have any improvement with Ranexa.    Following that he was admitted for couple of days in mid November with severe symptomatic anemia with a hemoglobin of 4.5.  He did require blood transfusion and hemoglobin improved to 9.7.  He  reportedly had a negative OB stool in the ER.  He did report some dark stools leading up to that admission but had been constipated without a bowel movement for about a week right before that admission.  Per Dr. García, it is also noted that he had a large hematoma following carotid surgery and CBC was never checked postoperatively.  It was also felt this could be a source of his severe symptomatic anemia.  He is iron deficient.  His symptoms improved dramatically with blood transfusion and he was discharged home in stable condition.  It appears amlodipine was added to his medical therapy for his blood pressure.    I followed up with the patient later November 2022.  Given the recent severe anemia, he was off of Xarelto, but taking aspirin.  His previous fatigue, lightheadedness, presyncope and exertional dyspnea resolved with treatment of his anemia.  He also denied any chest pain, orthopnea, PND.  At that visit, given the recent severe anemia Watchman device implantation was discussed as an alternative to long-term anticoagulation.  After discussion with Dr. García and Dr. Toledo, I did refer him to gastroenterology for further investigation into the source of his anemia.  Ultimately, would need to gastroenterology's input regarding the safety of being able to tolerate uninterrupted DAPT after watchman placement.    He ended up having an endoscopy that was unremarkable.  Dr. Floyd's note indicated there was some suspicion for AVM disease.  More recently, the patient had a colonoscopy on 3/14 with evidence of diverticulosis and internal hemorrhoids.    By way of review, he reported to Lois Landry RN that he had an intense episode of chest pain that lasted 1 to 2 days in mid February, that was not relieved with Tums.    I followed up with him in April and his primary concern was his kidney disease as his GFR was nearing 15.  He was concerned he might need dialysis.  He follows with Dr. Martínez.  He otherwise  denied any shortness of breath, chest discomfort, bleeding issues.  He declined pursuing Watchman device implantation or other procedures at that time.  I discussed the case with Dr. Toledo as well.  Ultimately, we decided we would continue to monitor his atrial fibrillation/atrial flutter burden closely on his device and we would leave him off of anticoagulation at that time.    Today the patient presents for follow-up and has been doing well.  He had hernia repair in early September.  A couple of weeks later on 9/21 he states he got up to get water in the kitchen and a couple of minutes later he was walking back to the other room when he became very dizzy and lightheaded and had a syncopal episode.  He had a significant hematoma around his abdominal area and was anemic with a hemoglobin of 7.9.  This later improved.  Device interrogation in the ER that day was normal.  He was sent home from the ER.  He denies any further syncopal or presyncopal spells since that day.  He has been feeling well.  Blood pressure is normal.  He denies chest pain, shortness of breath, palpitations, orthopnea, PND.  He denies bleeding issues.  He is pleased that his renal function looks slightly better than it had previously.  He has lost approximately 7 pounds since his last visit.  Of note, he is now on a lower dose of amlodipine due to low blood pressures when on the higher dose. Has PRN Bumex- has not been requiring.    The following portions of the patient's history were reviewed and updated as appropriate: allergies, current medications, past family history, past medical history, past social history, past surgical history and problem list.    Review of Systems   Constitutional: Negative for malaise/fatigue.   Cardiovascular:  Positive for leg swelling (chronic, stable) and syncope (x1 on 9/21/2023). Negative for chest pain, claudication, dyspnea on exertion, near-syncope, orthopnea, palpitations and paroxysmal nocturnal dyspnea.    Respiratory:  Negative for cough and shortness of breath.    Hematologic/Lymphatic: Does not bruise/bleed easily.   Musculoskeletal:  Negative for falls.   Gastrointestinal:  Negative for bloating.   Neurological:  Negative for dizziness, light-headedness and weakness.       Current Outpatient Medications:     amLODIPine (NORVASC) 5 MG tablet, Take 1 tablet by mouth Daily., Disp: 90 tablet, Rfl: 2    Ascorbic Acid (Vitamin C) 500 MG capsule, Take 500 mg by mouth Daily., Disp: , Rfl:     aspirin 81 MG EC tablet, Take 1 tablet by mouth Daily., Disp: , Rfl:     atorvastatin (LIPITOR) 80 MG tablet, Take 1 tablet by mouth Every Night., Disp: 90 tablet, Rfl: 3    bumetanide (BUMEX) 1 MG tablet, Take 1 tablet by mouth Daily As Needed., Disp: , Rfl:     Cholecalciferol (Vitamin D3) 25 MCG (1000 UT) capsule, Take 1 capsule by mouth Daily., Disp: , Rfl:     coenzyme Q10 100 MG capsule, Take 1 capsule by mouth Daily., Disp: , Rfl:     ferrous sulfate 325 (65 Fe) MG tablet, Take 1 tablet by mouth Daily With Breakfast., Disp: , Rfl:     irbesartan (AVAPRO) 300 MG tablet, Take 1 tablet by mouth Every Night., Disp: , Rfl:     nitroglycerin (NITROSTAT) 0.4 MG SL tablet, PLACE 1 TABLET UNDER THE TONGUE AS NEEDED FOR CHEST PAIN, Disp: 30 tablet, Rfl: 11    Omega-3 Fatty Acids (fish oil) 1000 MG capsule capsule, Take 1 capsule by mouth Daily With Breakfast., Disp: , Rfl:     pantoprazole (PROTONIX) 40 MG EC tablet, Take 1 tablet by mouth once daily, Disp: 90 tablet, Rfl: 3    polyethylene glycol 17 g packet, Take 17 g by mouth Daily., Disp: , Rfl:     sodium bicarbonate 650 MG tablet, Take 1 tablet by mouth 3 (Three) Times a Day., Disp: , Rfl:        Objective:      Vitals:    11/21/23 1318   BP: 132/88   Pulse: 55   SpO2: 97%   weight - 189 lbs     Vitals and nursing note reviewed.   Constitutional:       General: Not in acute distress.     Appearance: Well-developed and not in distress. Not diaphoretic.   Neck:      Vascular: No  JVD or JVR. JVD normal.   Pulmonary:      Effort: Pulmonary effort is normal. No respiratory distress.      Breath sounds: Normal breath sounds.   Cardiovascular:      Normal rate. Regular rhythm.      Murmurs: There is a grade 2/6 systolic murmur.   Edema:     Peripheral edema (3+ BLE edema) present.  Abdominal:      Tenderness: There is no abdominal tenderness.   Skin:     General: Skin is warm and dry.   Neurological:      Mental Status: Alert, oriented to person, place, and time and oriented to person, place and time.         Lab Review:   Lab Results   Component Value Date    CHOL 113 05/26/2022    CHLPL 123 07/13/2023    TRIG 84 07/13/2023    HDL 41 07/13/2023    LDL 65 07/13/2023     Lab Results   Component Value Date    HGBA1C 5.50 05/26/2022     Lab Results   Component Value Date    WBC 6.48 09/28/2023    HGB 9.4 (L) 09/28/2023    HCT 29.0 (L) 09/28/2023    MCV 89.8 09/28/2023     09/28/2023       Lab Results   Component Value Date    GLUCOSE 97 09/28/2023    BUN 34 (H) 09/28/2023    CREATININE 2.86 (H) 09/28/2023    EGFRIFNONA 26 (A) 01/03/2022    EGFRIFAFRI 31 (L) 01/03/2022    BCR 11.9 09/28/2023    K 5.7 (H) 09/28/2023    CO2 21.8 (L) 09/28/2023    CALCIUM 9.5 09/28/2023    PROTENTOTREF 6.7 09/28/2023    ALBUMIN 4.4 09/28/2023    LABIL2 1.9 09/28/2023    AST 19 09/28/2023    ALT 10 09/28/2023         Results for orders placed during the hospital encounter of 05/09/23    Adult Transthoracic Echo Complete w/ Color, Spectral and Contrast if necessary per protocol    Interpretation Summary    Left ventricular systolic function is normal. Left ventricular ejection fraction appears to be 66 - 70%.    There is a TAVR valve present - Gutierrez S3 29 mm TAVR valve has normal appearance and is in excellent position. Mean transvalvular gradient 10 mmHg, with no evidence of paravalvular leak.    Left ventricular wall thickness is consistent with mild septal asymmetric hypertrophy.    Left ventricular diastolic  function is consistent with (grade III w/high LAP) reversible restrictive pattern.    Moderate mitral valve stenosis (mean gradient mmHg, at HR of 72 bpm) is present due to severe calcification of the posterior mitral annulus.    The left atrial cavity is severely dilated.    Estimated right ventricular systolic pressure from tricuspid regurgitation is mildly elevated (35-45 mmHg).    Normal size and function of the right ventricle.    Compared to prior exam from 5/26/2022, no significant change.        ECG 12 Lead    Date/Time: 11/21/2023 2:03 PM  Performed by: Zena Fischer APRN    Authorized by: Zena Fischer APRN  Comparison: compared with previous ECG from 9/21/2023  Similar to previous ECG  Comparison to previous ECG: V-paced   Rhythm: sinus rhythm and paced  BPM: 68  Conduction: 1st degree AV block           Assessment/Plan:     Problem List Items Addressed This Visit (all established problems)         Cardiac and Vasculature    HTN (hypertension): now well controlled     S/P TAVR (transcatheter aortic valve replacement): stable    Overview     29mm S3 valve 4/20/21         Atrial flutter with controlled response and paroxysmal atrial fibrillation (HCC): stable, in NSR today    Relevant Medications    [id number] is 6    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 (HCC) - Primary- no angina, stable, 11/2022 low risk nuclear stress         History of stroke    History of severe symptomatic anemia , iron deficiency, reports of melena, reports of significant postoperative hematoma following carotid surgery in September 2022- stable         Other Relevant Orders                     Recommendations/plans:    1.  Paroxysmal atrial fibrillation and flutter: Maintaining normal sinus rhythm per EKG today.  He denies palpitations. Xarelto was discontinued after his severe symptomatic anemia requiring hospitalization and blood transfusion 11/2022 (hemoglobin was 4.5 on  presentation).  His QAV3PM3-NBSh is 6.    -At this point, he is not interested in moving forward with Watchman device implantation.  Ultimately, after discussion with the patient, his family and Dr. Toledo at last visit we have decided to keep him off anticoagulation for now, though this may need to be resumed if his A-fib/a flutter burden increases.  We will continue to monitor this on device interrogations every 3 months.  Also, he will call sooner if he develops palpitations and we will subsequently check his device.  He will call sooner if he would like to reconsider moving forward with JAMILA for anatomic assessment prior to potential Watchman device implantation.    2.  Coronary artery disease: Established problem, stable.  No angina.  Continue aspirin and high intensity statin.  Nuclear stress test November 2022 was low risk.  LDL is well controlled.  See above.  Ranexa was previously stopped because he did not have improvement with this and it was discovered his prior symptoms were from severe anemia.    3.  Status post TAVR: Stable.  Repeat echocardiogram ordered for April 2023.    4.  Hypertension: Well-controlled.  Continue current medications as listed above.    5.  Complete heart block with dual-chamber pacemaker in place-no arrhythmia episodes on the date of syncope on 9/21.  Device interrogation on 11/15 revealed 98% ventricular pacing, 17% atrial pacing, AT/AF burden of less than 1% with the longest episode of atrial flutter lasting under 2 minutes.  Normal device function, satisfactory battery life.    GIANNI Harkins     Follow-up in 6 months, call sooner with symptoms or concerns

## 2024-01-25 ENCOUNTER — TELEPHONE (OUTPATIENT)
Dept: VASCULAR SURGERY | Facility: CLINIC | Age: 89
End: 2024-01-25
Payer: MEDICARE

## 2024-01-26 ENCOUNTER — OFFICE VISIT (OUTPATIENT)
Dept: INTERNAL MEDICINE | Facility: CLINIC | Age: 89
End: 2024-01-26
Payer: MEDICARE

## 2024-01-26 VITALS
DIASTOLIC BLOOD PRESSURE: 90 MMHG | HEIGHT: 67 IN | OXYGEN SATURATION: 98 % | BODY MASS INDEX: 30.76 KG/M2 | TEMPERATURE: 97.8 F | HEART RATE: 76 BPM | WEIGHT: 196 LBS | SYSTOLIC BLOOD PRESSURE: 152 MMHG | RESPIRATION RATE: 18 BRPM

## 2024-01-26 DIAGNOSIS — I10 PRIMARY HYPERTENSION: Chronic | ICD-10-CM

## 2024-01-26 DIAGNOSIS — E78.2 MIXED HYPERLIPIDEMIA: Chronic | ICD-10-CM

## 2024-01-26 DIAGNOSIS — K40.90 NON-RECURRENT UNILATERAL INGUINAL HERNIA WITHOUT OBSTRUCTION OR GANGRENE: ICD-10-CM

## 2024-01-26 DIAGNOSIS — N18.4 CHRONIC RENAL FAILURE, STAGE 4 (SEVERE): Chronic | ICD-10-CM

## 2024-01-26 DIAGNOSIS — D50.0 IRON DEFICIENCY ANEMIA DUE TO CHRONIC BLOOD LOSS: Chronic | ICD-10-CM

## 2024-01-26 DIAGNOSIS — M17.0 PRIMARY OSTEOARTHRITIS OF BOTH KNEES: ICD-10-CM

## 2024-01-26 RX ADMIN — METHYLPREDNISOLONE ACETATE 80 MG: 80 INJECTION, SUSPENSION INTRA-ARTICULAR; INTRALESIONAL; INTRAMUSCULAR; SOFT TISSUE at 08:34

## 2024-01-26 RX ADMIN — LIDOCAINE HYDROCHLORIDE 1 ML: 10 INJECTION, SOLUTION INFILTRATION; PERINEURAL at 08:33

## 2024-01-26 RX ADMIN — METHYLPREDNISOLONE ACETATE 80 MG: 80 INJECTION, SUSPENSION INTRA-ARTICULAR; INTRALESIONAL; INTRAMUSCULAR; SOFT TISSUE at 08:35

## 2024-01-26 RX ADMIN — LIDOCAINE HYDROCHLORIDE 1 ML: 10 INJECTION, SOLUTION INFILTRATION; PERINEURAL at 08:32

## 2024-01-26 NOTE — PROGRESS NOTES
"      Chief Complaint  Hypertension (6 month follow-up), Hyperlipidemia, Pelvic Pain (Right low pelvic pain  near site of laparoscopic surgery. ), Stool Color Change (Patient has dark stools, but is taking iron now.), and Knee Pain (Bilateral knee pain and would like to get injections since it has been 3 months since last injections.)    Subjective        Mireille Magallanes presents to [facility]    HPI    Patient here for the above problems.  See Assessment and Plan for further HPI components.      Review of Systems    Objective   Vital Signs:  /90 (BP Location: Left arm, Patient Position: Sitting, Cuff Size: Adult)   Pulse 76   Temp 97.8 °F (36.6 °C) (Infrared)   Resp 18   Ht 170.2 cm (67\")   Wt 88.9 kg (196 lb)   SpO2 98%   BMI 30.70 kg/m²   Estimated body mass index is 30.7 kg/m² as calculated from the following:    Height as of this encounter: 170.2 cm (67\").    Weight as of this encounter: 88.9 kg (196 lb).      Physical Exam  Vitals reviewed.   Constitutional:       Appearance: He is obese. He is not ill-appearing.   Eyes:      General: No scleral icterus.     Conjunctiva/sclera: Conjunctivae normal.   Cardiovascular:      Rate and Rhythm: Normal rate and regular rhythm.      Heart sounds: Murmur heard.   Pulmonary:      Effort: Pulmonary effort is normal. No respiratory distress.   Musculoskeletal:      Right lower leg: No edema.      Left lower leg: No edema.   Skin:     General: Skin is warm.      Coloration: Skin is not pale.   Neurological:      General: No focal deficit present.      Mental Status: He is alert and oriented to person, place, and time.   Psychiatric:         Mood and Affect: Mood normal.         Behavior: Behavior normal.                       Assessment and Plan   Diagnoses and all orders for this visit:    1. Primary hypertension (Primary)    2. Mixed hyperlipidemia    3. Chronic renal failure, stage 4 (severe)    4. Iron deficiency anemia due to " chronic blood loss    5. Primary osteoarthritis of both knees    6. Non-recurrent unilateral inguinal hernia without obstruction or gangrene    Other orders  -     Arthrocentesis  -     Arthrocentesis        Patient has dark stools, but formed.  Low suspicion for bleeding as they are formed not frequent.  He had his hernia repair, intermittent RLL pain.  Suspect related to hard BMS.  Recommended increasing fiber.  Low suspicion of pain coming from     Patient has hyperlipidemia.  Patient will continue atorvastatin.  Check lipids at annual visit.    Will check iron profile and CMP today.  Patient has a history of iron deficiency anemia.  Patient on iron every other day.  Will check levels.  Continue.  Will check CBC.    Patient appears euvolemic.  Patient taking Bumex PRN.    Patient has hypertension.  BP is not at goal  The patient's BP goal is < 130/80.  Recommend ambulatory BP monitoring after patient has taken medication.  Recommend varying the times of the blood pressure readings.  Patient is currently on amlodipine, bumex PRN, irbsartan..  Recommend we  no changes at present.  Patient BP well controlled at home per patient.  Typically runs 120-130s.      Arthrocentesis performed.  Patient knees have good recovery with injections but they unfortunately do not last 3 months.      Follows with nephrology.  Continue to monitor.  Renal function stable.       Result Review :                   BMI is >= 30 and <35. (Class 1 Obesity). The following options were offered after discussion;: exercise counseling/recommendations and nutrition counseling/recommendations            Follow Up   Return in about 6 months (around 7/26/2024), or if symptoms worsen or fail to improve, for Medicare Wellness, Recheck.  Patient was given instructions and counseling regarding his condition or for health maintenance advice. Please see specific information pulled into the AVS if appropriate.       YULIA García MD, FACP,  M      Electronically signed by Shadi García MD, 01/26/24, 11:22 AM CST.            Right Knee Arthrocentesis    Date/Time: 1/26/2024 11:42 AM    Performed by: Shadi García MD  Authorized by: Shadi García MD  Indications: pain   Body area: knee  Joint: right knee  Local anesthesia used: no    Anesthesia:  Local anesthesia used: no    Sedation:  Patient sedated: no    Preparation: Patient was prepped and draped in the usual sterile fashion.  Needle gauge: 21g.  Ultrasound guidance: no  Approach: medial  Patient tolerance: patient tolerated the procedure well with no immediate complications  Comments: Patient prepped with iodine.  Site marked prior.  21g needle and syringe with 80 mg depomedrol and 1 mL lidocinae was utilized.    Medial was chosen as it is the most open area to access his joint specifically.        Left Knee Arthrocentesis    Date/Time: 1/26/2024 11:43 AM    Performed by: Shadi García MD  Authorized by: Shadi García MD  Indications: pain   Body area: knee  Joint: left knee  Local anesthesia used: no    Anesthesia:  Local anesthesia used: no    Sedation:  Patient sedated: no    Preparation: Patient was prepped and draped in the usual sterile fashion.  Needle gauge: 21g.  Ultrasound guidance: no  Approach: medial  Patient tolerance: patient tolerated the procedure well with no immediate complications  Comments: Patient prepped with iodine.  Site marked prior.  21g needle and syringe with 80 mg depomedrol and 1 mL lidocinae was utilized.    Medial was chosen as it is the most open area to access his joint specifically.

## 2024-01-27 LAB
ALBUMIN SERPL-MCNC: 4.3 G/DL (ref 3.5–5.2)
ALBUMIN/GLOB SERPL: 2.2 G/DL
ALP SERPL-CCNC: 88 U/L (ref 39–117)
ALT SERPL-CCNC: 19 U/L (ref 1–41)
AST SERPL-CCNC: 20 U/L (ref 1–40)
BASOPHILS # BLD AUTO: 0.04 10*3/MM3 (ref 0–0.2)
BASOPHILS NFR BLD AUTO: 0.6 % (ref 0–1.5)
BILIRUB SERPL-MCNC: 0.7 MG/DL (ref 0–1.2)
BUN SERPL-MCNC: 33 MG/DL (ref 8–23)
BUN/CREAT SERPL: 10.6 (ref 7–25)
CALCIUM SERPL-MCNC: 9 MG/DL (ref 8.6–10.5)
CHLORIDE SERPL-SCNC: 107 MMOL/L (ref 98–107)
CO2 SERPL-SCNC: 21.6 MMOL/L (ref 22–29)
CREAT SERPL-MCNC: 3.11 MG/DL (ref 0.76–1.27)
EGFRCR SERPLBLD CKD-EPI 2021: 18.8 ML/MIN/1.73
EOSINOPHIL # BLD AUTO: 0.12 10*3/MM3 (ref 0–0.4)
EOSINOPHIL NFR BLD AUTO: 1.9 % (ref 0.3–6.2)
ERYTHROCYTE [DISTWIDTH] IN BLOOD BY AUTOMATED COUNT: 14 % (ref 12.3–15.4)
FERRITIN SERPL-MCNC: 74.9 NG/ML (ref 30–400)
GLOBULIN SER CALC-MCNC: 2 GM/DL
GLUCOSE SERPL-MCNC: 78 MG/DL (ref 65–99)
HCT VFR BLD AUTO: 41.2 % (ref 37.5–51)
HGB BLD-MCNC: 13.1 G/DL (ref 13–17.7)
IMM GRANULOCYTES # BLD AUTO: 0.02 10*3/MM3 (ref 0–0.05)
IMM GRANULOCYTES NFR BLD AUTO: 0.3 % (ref 0–0.5)
IRON SATN MFR SERPL: 46 % (ref 20–50)
IRON SERPL-MCNC: 179 MCG/DL (ref 59–158)
LYMPHOCYTES # BLD AUTO: 1.74 10*3/MM3 (ref 0.7–3.1)
LYMPHOCYTES NFR BLD AUTO: 27.2 % (ref 19.6–45.3)
MAGNESIUM SERPL-MCNC: 1.9 MG/DL (ref 1.6–2.4)
MCH RBC QN AUTO: 27.4 PG (ref 26.6–33)
MCHC RBC AUTO-ENTMCNC: 31.8 G/DL (ref 31.5–35.7)
MCV RBC AUTO: 86.2 FL (ref 79–97)
MONOCYTES # BLD AUTO: 0.54 10*3/MM3 (ref 0.1–0.9)
MONOCYTES NFR BLD AUTO: 8.5 % (ref 5–12)
NEUTROPHILS # BLD AUTO: 3.93 10*3/MM3 (ref 1.7–7)
NEUTROPHILS NFR BLD AUTO: 61.5 % (ref 42.7–76)
NRBC BLD AUTO-RTO: 0.2 /100 WBC (ref 0–0.2)
PLATELET # BLD AUTO: 153 10*3/MM3 (ref 140–450)
POTASSIUM SERPL-SCNC: 4.7 MMOL/L (ref 3.5–5.2)
PROT SERPL-MCNC: 6.3 G/DL (ref 6–8.5)
RBC # BLD AUTO: 4.78 10*6/MM3 (ref 4.14–5.8)
SODIUM SERPL-SCNC: 142 MMOL/L (ref 136–145)
TIBC SERPL-MCNC: 390 MCG/DL
UIBC SERPL-MCNC: 211 MCG/DL (ref 112–346)
WBC # BLD AUTO: 6.39 10*3/MM3 (ref 3.4–10.8)

## 2024-01-29 RX ORDER — METHYLPREDNISOLONE ACETATE 80 MG/ML
80 INJECTION, SUSPENSION INTRA-ARTICULAR; INTRALESIONAL; INTRAMUSCULAR; SOFT TISSUE ONCE
Status: COMPLETED | OUTPATIENT
Start: 2024-01-26 | End: 2024-01-26

## 2024-01-29 RX ORDER — LIDOCAINE HYDROCHLORIDE 10 MG/ML
1 INJECTION, SOLUTION INFILTRATION; PERINEURAL ONCE
Status: COMPLETED | OUTPATIENT
Start: 2024-01-26 | End: 2024-01-26

## 2024-01-31 RX ORDER — PANTOPRAZOLE SODIUM 40 MG/1
TABLET, DELAYED RELEASE ORAL
Qty: 90 TABLET | Refills: 3 | Status: SHIPPED | OUTPATIENT
Start: 2024-01-31

## 2024-03-27 ENCOUNTER — TELEPHONE (OUTPATIENT)
Dept: INTERNAL MEDICINE | Facility: CLINIC | Age: 89
End: 2024-03-27

## 2024-03-27 NOTE — TELEPHONE ENCOUNTER
Caller: WILLI DAVIS    Relationship: Emergency Contact    Best call back number: [phone]    What medication are you requesting:     PCP RECOMMENDATION     QUESTIONING IF HE CAN TAKE MUCINEX AND THERA FLU OR ANY OTC    What are your current symptoms:     WIFE WAS SICK, SHE GOT ANTIBIOTIC AND CLEARED HER UP, PATIENT DOES NOT HAVE A TEMP YET ALTHOUGH HE DOES NOT FEEL GOOD, CLEARING THROAT, A LITTLE COUGH, DID NOT EAT AT LUNCH.    WANTING TO CATCH IT BEFORE IT TURNS INTO SOMETHING.    How long have you been experiencing symptoms:     03.26.24    Have you had these symptoms before:    [] Yes  [] No    Have you been treated for these symptoms before:   [] Yes  [] No    If a prescription is needed, what is your preferred pharmacy and phone number:      [facility] [address] [place] [address] [phone] [facility] [phone] [facility]        Additional notes:  PLEASE CALL AND ADVISE

## 2024-03-28 NOTE — TELEPHONE ENCOUNTER
Dgt informed Mucinex is ok to take.  She says pt only has mild sx's presently, but wife has been sick for over a week.  Advised that are office is open tomorrow, should his sx's progress, and would be glad to see him.  Advised we will not call in Ab's over the phone, so would need to be seen, just so she is aware.

## 2024-04-23 ENCOUNTER — HOSPITAL ENCOUNTER (OUTPATIENT)
Dept: CARDIOLOGY | Facility: HOSPITAL | Age: 89
Discharge: HOME | End: 2024-04-23
Admitting: NURSE PRACTITIONER
Payer: MEDICARE

## 2024-04-23 VITALS — DIASTOLIC BLOOD PRESSURE: 90 MMHG | SYSTOLIC BLOOD PRESSURE: 152 MMHG

## 2024-04-23 DIAGNOSIS — Z95.2 S/P TAVR (TRANSCATHETER AORTIC VALVE REPLACEMENT): ICD-10-CM

## 2024-04-23 PROCEDURE — 93306 TTE W/DOPPLER COMPLETE: CPT

## 2024-04-23 PROCEDURE — 93306 TTE W/DOPPLER COMPLETE: CPT | Performed by: INTERNAL MEDICINE

## 2024-04-24 LAB
BH CV ECHO MEAS - AO MAX PG: 17.1 MMHG
BH CV ECHO MEAS - AO MEAN PG: 8 MMHG
BH CV ECHO MEAS - AO ROOT DIAM: 3.6 CM
BH CV ECHO MEAS - AO V2 MAX: 207 CM/SEC
BH CV ECHO MEAS - AO V2 VTI: 40.4 CM
BH CV ECHO MEAS - AVA(I,D): 1.92 CM2
BH CV ECHO MEAS - EDV(CUBED): 185.2 ML
BH CV ECHO MEAS - EDV(MOD-SP4): 88 ML
BH CV ECHO MEAS - EF(MOD-SP4): 56.8 %
BH CV ECHO MEAS - ESV(CUBED): 54.9 ML
BH CV ECHO MEAS - ESV(MOD-SP4): 38 ML
BH CV ECHO MEAS - FS: 33.3 %
BH CV ECHO MEAS - IVS/LVPW: 1.2 CM
BH CV ECHO MEAS - IVSD: 1.2 CM
BH CV ECHO MEAS - LA DIMENSION: 4.6 CM
BH CV ECHO MEAS - LV DIASTOLIC VOL/BSA (35-75): 43.9 CM2
BH CV ECHO MEAS - LV MASS(C)D: 256.7 GRAMS
BH CV ECHO MEAS - LV MAX PG: 4.2 MMHG
BH CV ECHO MEAS - LV MEAN PG: 2 MMHG
BH CV ECHO MEAS - LV SYSTOLIC VOL/BSA (12-30): 19 CM2
BH CV ECHO MEAS - LV V1 MAX: 103 CM/SEC
BH CV ECHO MEAS - LV V1 VTI: 22.4 CM
BH CV ECHO MEAS - LVIDD: 5.7 CM
BH CV ECHO MEAS - LVIDS: 3.8 CM
BH CV ECHO MEAS - LVOT AREA: 3.5 CM2
BH CV ECHO MEAS - LVOT DIAM: 2.1 CM
BH CV ECHO MEAS - LVPWD: 1 CM
BH CV ECHO MEAS - MR MAX PG: 122.8 MMHG
BH CV ECHO MEAS - MR MAX VEL: 554 CM/SEC
BH CV ECHO MEAS - MR MEAN PG: 76 MMHG
BH CV ECHO MEAS - MR MEAN VEL: 412 CM/SEC
BH CV ECHO MEAS - MR VTI: 178 CM
BH CV ECHO MEAS - MV A MAX VEL: 137 CM/SEC
BH CV ECHO MEAS - MV DEC SLOPE: 983 CM/SEC2
BH CV ECHO MEAS - MV DEC TIME: 0.2 SEC
BH CV ECHO MEAS - MV E MAX VEL: 190 CM/SEC
BH CV ECHO MEAS - MV E/A: 1.39
BH CV ECHO MEAS - MV MAX PG: 14.3 MMHG
BH CV ECHO MEAS - MV MEAN PG: 7 MMHG
BH CV ECHO MEAS - MV P1/2T: 51.8 MSEC
BH CV ECHO MEAS - MV V2 VTI: 48 CM
BH CV ECHO MEAS - MVA(P1/2T): 4.2 CM2
BH CV ECHO MEAS - MVA(VTI): 1.62 CM2
BH CV ECHO MEAS - PA V2 MAX: 106 CM/SEC
BH CV ECHO MEAS - PI END-D VEL: 129 CM/SEC
BH CV ECHO MEAS - RAP SYSTOLE: 5 MMHG
BH CV ECHO MEAS - RV MAX PG: 3.1 MMHG
BH CV ECHO MEAS - RV V1 MAX: 88.7 CM/SEC
BH CV ECHO MEAS - RV V1 VTI: 20.5 CM
BH CV ECHO MEAS - RVSP: 44.4 MMHG
BH CV ECHO MEAS - SV(LVOT): 77.6 ML
BH CV ECHO MEAS - SV(MOD-SP4): 50 ML
BH CV ECHO MEAS - SVI(LVOT): 38.7 ML/M2
BH CV ECHO MEAS - SVI(MOD-SP4): 24.9 ML/M2
BH CV ECHO MEAS - TR MAX PG: 39.4 MMHG
BH CV ECHO MEAS - TR MAX VEL: 314 CM/SEC
LEFT ATRIUM VOLUME INDEX: 80 ML/M2

## 2024-04-29 ENCOUNTER — OFFICE VISIT (OUTPATIENT)
Dept: INTERNAL MEDICINE | Facility: CLINIC | Age: 89
End: 2024-04-29
Payer: MEDICARE

## 2024-04-29 VITALS
DIASTOLIC BLOOD PRESSURE: 74 MMHG | HEART RATE: 78 BPM | WEIGHT: 196 LBS | TEMPERATURE: 97.8 F | OXYGEN SATURATION: 99 % | SYSTOLIC BLOOD PRESSURE: 142 MMHG | HEIGHT: 67 IN | BODY MASS INDEX: 30.76 KG/M2

## 2024-04-29 DIAGNOSIS — D50.0 IRON DEFICIENCY ANEMIA DUE TO CHRONIC BLOOD LOSS: Chronic | ICD-10-CM

## 2024-04-29 DIAGNOSIS — M17.0 PRIMARY OSTEOARTHRITIS OF BOTH KNEES: ICD-10-CM

## 2024-04-29 DIAGNOSIS — E78.2 MIXED HYPERLIPIDEMIA: Primary | Chronic | ICD-10-CM

## 2024-04-29 DIAGNOSIS — N18.4 CHRONIC RENAL FAILURE, STAGE 4 (SEVERE): Chronic | ICD-10-CM

## 2024-04-29 DIAGNOSIS — Z95.2 S/P TAVR (TRANSCATHETER AORTIC VALVE REPLACEMENT): ICD-10-CM

## 2024-04-29 DIAGNOSIS — I87.2 VENOUS INSUFFICIENCY OF BOTH LOWER EXTREMITIES: ICD-10-CM

## 2024-04-29 DIAGNOSIS — I10 PRIMARY HYPERTENSION: Chronic | ICD-10-CM

## 2024-04-29 PROBLEM — I48.0 PAF (PAROXYSMAL ATRIAL FIBRILLATION): Chronic | Status: RESOLVED | Noted: 2022-12-03 | Resolved: 2024-04-29

## 2024-04-29 PROBLEM — I48.92 ATRIAL FLUTTER WITH CONTROLLED RESPONSE: Status: RESOLVED | Noted: 2021-07-26 | Resolved: 2024-04-29

## 2024-04-29 PROCEDURE — 99214 OFFICE O/P EST MOD 30 MIN: CPT | Performed by: INTERNAL MEDICINE

## 2024-04-29 PROCEDURE — 20610 DRAIN/INJ JOINT/BURSA W/O US: CPT | Performed by: INTERNAL MEDICINE

## 2024-04-29 RX ORDER — METHYLPREDNISOLONE ACETATE 80 MG/ML
80 INJECTION, SUSPENSION INTRA-ARTICULAR; INTRALESIONAL; INTRAMUSCULAR; SOFT TISSUE ONCE
Status: COMPLETED | OUTPATIENT
Start: 2024-04-29 | End: 2024-04-29

## 2024-04-29 RX ORDER — LIDOCAINE HYDROCHLORIDE 10 MG/ML
1 INJECTION, SOLUTION INFILTRATION; PERINEURAL ONCE
Status: COMPLETED | OUTPATIENT
Start: 2024-04-29 | End: 2024-04-29

## 2024-04-29 RX ADMIN — LIDOCAINE HYDROCHLORIDE 1 ML: 10 INJECTION, SOLUTION INFILTRATION; PERINEURAL at 13:33

## 2024-04-29 RX ADMIN — METHYLPREDNISOLONE ACETATE 80 MG: 80 INJECTION, SUSPENSION INTRA-ARTICULAR; INTRALESIONAL; INTRAMUSCULAR; SOFT TISSUE at 13:35

## 2024-04-29 RX ADMIN — METHYLPREDNISOLONE ACETATE 80 MG: 80 INJECTION, SUSPENSION INTRA-ARTICULAR; INTRALESIONAL; INTRAMUSCULAR; SOFT TISSUE at 13:34

## 2024-04-29 RX ADMIN — LIDOCAINE HYDROCHLORIDE 1 ML: 10 INJECTION, SOLUTION INFILTRATION; PERINEURAL at 13:32

## 2024-04-29 NOTE — PROGRESS NOTES
"      Chief Complaint  Follow-up (Right and left- pt states injection work for about 2 months and then start bothering him again /Left pain level 3/Right pain level 6 /Pt is scheduled for MWV in July just needs lab appointment )    Subjective        Mireille Magallanes presents to [facility]    HPI    Patient here for the above problems.  See Assessment and Plan for further HPI components.      Review of Systems    Objective   Vital Signs:  /74 (BP Location: Left arm, Patient Position: Sitting, Cuff Size: Adult)   Pulse 78   Temp 97.8 °F (36.6 °C) (Temporal)   Ht 170.2 cm (67\")   Wt 88.9 kg (196 lb)   SpO2 99%   BMI 30.70 kg/m²   Estimated body mass index is 30.7 kg/m² as calculated from the following:    Height as of this encounter: 170.2 cm (67\").    Weight as of this encounter: 88.9 kg (196 lb).      Physical Exam  Vitals and nursing note reviewed.   Constitutional:       Appearance: He is not ill-appearing.   Eyes:      General: No scleral icterus.     Conjunctiva/sclera: Conjunctivae normal.   Pulmonary:      Effort: Pulmonary effort is normal. No respiratory distress.   Musculoskeletal:      Right knee: No deformity or effusion. Tenderness present.      Left knee: No deformity or effusion. Tenderness present.      Right lower leg: Edema present.      Left lower leg: Edema present.   Neurological:      General: No focal deficit present.      Mental Status: He is alert and oriented to person, place, and time.   Psychiatric:         Mood and Affect: Mood normal.         Behavior: Behavior normal.                       Assessment and Plan   Diagnoses and all orders for this visit:    1. Mixed hyperlipidemia (Primary)  -     Lipid Panel; Future  -     TSH Rfx On Abnormal To Free T4; Future    2. Primary hypertension  -     CBC & Differential; Future  -     Comprehensive Metabolic Panel; Future  -     Urinalysis With Microscopic - Urine, Clean Catch; Future    3. Iron " deficiency anemia due to chronic blood loss  -     CBC & Differential; Future    4. Primary osteoarthritis of both knees  -     methylPREDNISolone acetate (DEPO-medrol) injection 80 mg  -     lidocaine (XYLOCAINE) 1 % injection 1 mL  -     lidocaine (XYLOCAINE) 1 % injection 1 mL  -     methylPREDNISolone acetate (DEPO-medrol) injection 80 mg  -     Arthrocentesis Left Knee  -     Arthrocentesis Right Knee    5. S/P TAVR (transcatheter aortic valve replacement)    6. Venous insufficiency of both lower extremities    7. Chronic renal failure, stage 4 (severe)      Labs for next visit as above.    Bilateral knee pain.  Gets improvement with injections, typically lasts 2-2.5 months.  Patient would like injections today.  Bilateral injections performed without issue.    Patient had a bump in his Cr in 1/2024.  Recheck in February at the kidney doctor (labs reviewed) showed improved back to baseline.  Patient encouraged to stay hydrated.    Patient has history of hypertension.  Recommend ambulatory blood pressure monitoring.  The patient's blood pressure is little bit elevated today.  Recommend he focus on lowering salt content.      Patient has venous insufficiency.  This is gravity dependent.  Worse at the end of the day.  Better in AM.  Recommend low salt diet.  Recommend elevation of lower extremities.  Recommend compression stockings as tolerated.  If persists, then we may need to consider secondary causes or other treatment.  May be related to sodium bicarbonate and amlodipine.  Monitor for now.  Upcoming visit with nephrology.    Result Review :  The following data was reviewed by: Sahdi García MD on 04/29/2024:                                                 Results for orders placed during the hospital encounter of 04/23/24    Adult Transthoracic Echo Complete w/ Color, Spectral and Contrast if necessary per protocol    Interpretation Summary    Left ventricular systolic function is normal. Left  ventricular ejection fraction appears to be 61 - 65%.    There is a TAVR valve present - Gutierrez S3 29 mm TAVR valve has normal appearance and is in excellent position. Mean transvalvular gradient 8 mmHg, with no evidence of paravalvular leak.    Left ventricular diastolic function is consistent with (grade II w/high LAP) pseudonormalization.    Moderate mitral valve stenosis is present with functional MAC (mean gradient of 7mmHg with HR of 72bpm).    The left atrial cavity is severely dilated.    Estimated right ventricular systolic pressure from tricuspid regurgitation is mildly elevated (35-45 mmHg).    Normal size and function of the right ventricle.    Compared to prior exam from 5/29/23, no significant change noted.                      Follow Up   Return in about 3 months (around 7/29/2024), or if symptoms worsen or fail to improve, for Medicare Wellness - Labs prior to visit, follow up for above problems, longitudinal care.  Patient was given instructions and counseling regarding his condition or for health maintenance advice. Please see specific information pulled into the AVS if appropriate.       YULIA García MD, [facility], [facility]      Electronically signed by Shadi García MD, 04/29/24, 12:35 PM CDT.            Arthrocentesis Left Knee    Date/Time: 4/29/2024 1:04 PM    Performed by: Shadi García MD  Authorized by: Shadi García MD  Body area: knee  Joint: left knee  Local anesthesia used: no    Anesthesia:  Local anesthesia used: no  Preparation: Patient was prepped and draped in the usual sterile fashion.  Needle gauge: 21g.  Ultrasound guidance: no  Approach: medial  Patient tolerance: patient tolerated the procedure well with no immediate complications  Comments: Tolerated well       Arthrocentesis Right Knee    Date/Time: 4/29/2024 1:04 PM    Performed by: Shadi García MD  Authorized by: Shadi García MD  Indications: pain   Body area: knee  Joint: right knee  Local  anesthesia used: no    Anesthesia:  Local anesthesia used: no    Sedation:  Patient sedated: no    Preparation: Patient was prepped and draped in the usual sterile fashion.  Needle gauge: 21g.  Approach: medial  Patient tolerance: patient tolerated the procedure well with no immediate complications

## 2024-06-14 ENCOUNTER — OFFICE VISIT (OUTPATIENT)
Dept: CARDIOLOGY | Facility: CLINIC | Age: 89
End: 2024-06-14
Payer: MEDICARE

## 2024-06-14 VITALS
BODY MASS INDEX: 30.45 KG/M2 | OXYGEN SATURATION: 98 % | WEIGHT: 194 LBS | SYSTOLIC BLOOD PRESSURE: 140 MMHG | HEART RATE: 68 BPM | HEIGHT: 67 IN | DIASTOLIC BLOOD PRESSURE: 80 MMHG

## 2024-06-14 DIAGNOSIS — Z95.0 PACEMAKER: ICD-10-CM

## 2024-06-14 DIAGNOSIS — I25.810 CORONARY ARTERY DISEASE INVOLVING CORONARY BYPASS GRAFT OF NATIVE HEART WITHOUT ANGINA PECTORIS: Primary | ICD-10-CM

## 2024-06-14 DIAGNOSIS — I10 PRIMARY HYPERTENSION: Chronic | ICD-10-CM

## 2024-06-14 DIAGNOSIS — E78.2 MIXED HYPERLIPIDEMIA: Chronic | ICD-10-CM

## 2024-06-14 DIAGNOSIS — Z95.2 S/P TAVR (TRANSCATHETER AORTIC VALVE REPLACEMENT): ICD-10-CM

## 2024-06-14 DIAGNOSIS — I48.0 PAF (PAROXYSMAL ATRIAL FIBRILLATION): Chronic | ICD-10-CM

## 2024-06-14 PROBLEM — R07.89 OTHER CHEST PAIN: Status: RESOLVED | Noted: 2019-03-06 | Resolved: 2024-06-14

## 2024-06-14 PROBLEM — R01.1 MURMUR, CARDIAC: Status: RESOLVED | Noted: 2017-05-16 | Resolved: 2024-06-14

## 2024-06-14 NOTE — PROGRESS NOTES
Subjective:     Encounter Date:06/14/2024      Patient ID: Mireille Magallanes is a 88 y.o. male.    Chief Complaint: Follow-up CAD, valvular disease status post TAVR, atrial fibrillation, heart block status post pacemaker  History of Present Illness  The patient presents for follow-up of coronary disease, valvular disease, atrial fibrillation, status post pacemaker for heart block. The patient was last seen here in 11/2023 and was doing well. No changes were made that day. Since then, he has undergone an echocardiogram in 04/2024 that shows preserved LVEF with TAVR having normal appearance including a transvalvular gradient of 8, which is normal and no evidence of paravalvular leak. He does still have moderate mitral stenosis with functional mitral annular calcification. His pacemaker continues to be followed without any issues noted. He is accompanied by his wife and daughter.    The patient reports no cardiac concerns beyond his usual state. He experiences intermittent mild chest discomfort, predominantly on the right side, which lasts for approximately 4 to 5 minutes or longer. This discomfort is unpredictable and does not occur frequently, but it does cause him discomfort on occasion. He denies any associated headaches. He carries nitroglycerin for use during episodes of chest pain, but prefers to observe the effects before resorting to its use. His most recent stroke occurred on 05/25/2023. He was previously on Xarelto for stroke prevention, which was discontinued due to significant bleeding.  He had also previously been taken off Pradaxa for a similar occurrence.           The following portions of the patient's history were reviewed and updated as appropriate: allergies, current medications, past family history, past medical history, past social history, past surgical history, and problem list.    Review of Systems   Constitutional: Negative for malaise/fatigue.   Cardiovascular:  Positive for chest pain  (As per HPI) and dyspnea on exertion (chronic, stable). Negative for claudication, leg swelling, near-syncope, orthopnea, palpitations, paroxysmal nocturnal dyspnea and syncope.   Respiratory:  Negative for shortness of breath.    Hematologic/Lymphatic: Does not bruise/bleed easily.   Musculoskeletal:  Positive for joint pain.   Psychiatric/Behavioral:  Positive for memory loss.            Current Outpatient Medications:     amLODIPine (NORVASC) 5 MG tablet, Take 1 tablet by mouth Daily., Disp: 90 tablet, Rfl: 2    Ascorbic Acid (Vitamin C) 500 MG capsule, Take 500 mg by mouth Daily., Disp: , Rfl:     aspirin 81 MG EC tablet, Take 1 tablet by mouth Daily., Disp: , Rfl:     atorvastatin (LIPITOR) 80 MG tablet, Take 1 tablet by mouth Every Night., Disp: 90 tablet, Rfl: 3    bumetanide (BUMEX) 1 MG tablet, Take 1 tablet by mouth Daily As Needed., Disp: , Rfl:     Cholecalciferol (Vitamin D3) 25 MCG (1000 UT) capsule, Take 1 capsule by mouth Daily., Disp: , Rfl:     coenzyme Q10 100 MG capsule, Take 1 capsule by mouth Daily., Disp: , Rfl:     ferrous sulfate 325 (65 Fe) MG tablet, Take 1 tablet by mouth Every Other Day., Disp: , Rfl:     irbesartan (AVAPRO) 300 MG tablet, Take 1 tablet by mouth Every Night., Disp: , Rfl:     nitroglycerin (NITROSTAT) 0.4 MG SL tablet, PLACE 1 TABLET UNDER THE TONGUE AS NEEDED FOR CHEST PAIN, Disp: 30 tablet, Rfl: 11    Omega-3 Fatty Acids (fish oil) 1000 MG capsule capsule, Take 1 capsule by mouth Daily With Breakfast., Disp: , Rfl:     pantoprazole (PROTONIX) 40 MG EC tablet, Take 1 tablet by mouth once daily, Disp: 90 tablet, Rfl: 3    polyethylene glycol 17 g packet, Take 17 g by mouth Daily., Disp: , Rfl:     sodium bicarbonate 650 MG tablet, Take 1 tablet by mouth 3 (Three) Times a Day., Disp: , Rfl:        Objective:      Vitals:    06/14/24 0929   BP: 140/80   Pulse: 68   SpO2: 98%     Vitals and nursing note reviewed.   Constitutional:       General: Not in acute distress.      Appearance: Not in distress.   Neck:      Vascular: No JVD or JVR. JVD normal.   Pulmonary:      Effort: Pulmonary effort is normal.      Breath sounds: Normal breath sounds.   Cardiovascular:      Normal rate. Regular rhythm.      Murmurs: There is no murmur.      No gallop.  No rub.   Pulses:     Intact distal pulses.   Edema:     Peripheral edema absent.   Skin:     General: Skin is warm and dry.   Neurological:      Mental Status: Alert, oriented to person, place, and time and oriented to person, place and time.       Physical Exam      Lab Review:       Procedures  Results  Imaging  Echocardiogram shows preserved LVEF with TAVR having normal appearance including a transvalvular gradient of eight which is normal and no evidence of paravalvular leak. Moderate mitral stenosis with functional mitral annular calcification. Pacemaker check shows a less than 1% burden of atrial fibrillation.    Testing  ECG (will not be billed) shows normal sinus rhythm with left bundle branch block which is unchanged compared to previous from November.      Results for orders placed during the hospital encounter of 04/23/24    Adult Transthoracic Echo Complete w/ Color, Spectral and Contrast if necessary per protocol    Interpretation Summary    Left ventricular systolic function is normal. Left ventricular ejection fraction appears to be 61 - 65%.    There is a TAVR valve present - Gutierrez S3 29 mm TAVR valve has normal appearance and is in excellent position. Mean transvalvular gradient 8 mmHg, with no evidence of paravalvular leak.    Left ventricular diastolic function is consistent with (grade II w/high LAP) pseudonormalization.    Moderate mitral valve stenosis is present with functional MAC (mean gradient of 7mmHg with HR of 72bpm).    The left atrial cavity is severely dilated.    Estimated right ventricular systolic pressure from tricuspid regurgitation is mildly elevated (35-45 mmHg).    Normal size and function of the  right ventricle.    Compared to prior exam from 5/29/23, no significant change noted.      Records reviewed including hospitalizations for stroke despite being anticoagulated in May 2022, as well as another hospitalization November of that year for significant bleeding with severe anemia, prompting cessation of anticoagulation at that time.  Assessment/Plan:     Problem List Items Addressed This Visit          Cardiac and Vasculature    HTN (hypertension) (Chronic)    Hyperlipidemia (Chronic)    PAF (paroxysmal atrial fibrillation) (Chronic)    Overview              Coronary artery disease involving coronary bypass graft of native heart without angina pectoris - Primary    S/P TAVR (transcatheter aortic valve replacement)    Overview     29mm S3 valve 4/20/21          Assessment & Plan  1.  Paroxysmal atrial fibrillation and flutter: stable; maintaining normal sinus rhythm per EKG today.  I did review the last 3 pacemaker interrogations, all of which show a burden of atrial fibrillation less than 1%.  He denies palpitations. Xarelto was discontinued after his severe symptomatic anemia requiring hospitalization and blood transfusion 11/2022 (hemoglobin was 4.5 on presentation).  His [id number] is 7 (see above)-.  A comprehensive discussion was held regarding stroke protection.  We discussed in detail the notion of left atrial appendage occlusion with a Watchman procedure.. However, considering his CKD stage IV, with a GFR hovering around 17, the risk of the procedure with contrast administered is not to be underestimated.  However, in light of the serial pacemaker interrogations reviewed today, demonstrating a very very low burden of atrial fibrillation, I have advised that he simply continue antiplatelet therapy for now.  Of the CHADS2-VASc score would suggest that 11.2% annualized risk of stroke, he has not demonstrated any episode of atrial fibrillation in the last 6+ months that would have been long enough for  thrombus to form.  Given the risks previously alluded to for this particular patient with Carlos, this point we all agreed to a strategy of continued close surveillance, by use of serial pacemaker interrogations. He is agreeable to move forward with Nylaiesha, acknowledging the risks of contrast administered causing further renal deterioration, if his burden of atrial fibrillation on serial pacemaker interrogation starts to increase.      2.  Coronary artery disease: Established problem, stable.  No angina.  Continue aspirin and high intensity statin.  Nuclear stress test November 2022 was low risk.  LDL is well controlled.    Ranexa was previously stopped because he did not have improvement with this and it was discovered his prior symptoms were from severe anemia.     3.  Status post TAVR: Stable.  Valve looked excellent on recent echocardiogram.  -Continue aspirin for life  -SBE prophylaxis prior to dental procedures for left    4.  Hypertension: Well-controlled.  Continue current medications as listed above.     5.  Complete heart block with dual-chamber pacemaker in place-stable.  Device interrogations reviewed as described above.        43 minutes spent on day of service, excluding time interpreting ECG (which will not be billed).  This service continues longitudinal relation with this patient for complex care of numerous chronic and serious conditions (CAD, atrial arrhythmias and stroke risk, prior life-threatening anemia when anticoagulated to protect against stroke risk, valvular disease status post TAVR, and complete heart block requiring dual-chamber pacemaker)    Follow-up in 6 months, or sooner with any new or worsening symptoms.  As noted above, we will contact the patient if he notices burden of atrial fibrillation starts to increase.    Kaushik Toledo MD  06/14/2024  17:05 CDT    Transcribed from ambient dictation for Kaushik Toledo MD by Kaushik Toledo MD.  06/14/24   17:05 CDT    Patient or  patient representative verbalized consent to the visit recording.

## 2024-06-17 DIAGNOSIS — I10 PRIMARY HYPERTENSION: Chronic | ICD-10-CM

## 2024-06-17 RX ORDER — ATORVASTATIN CALCIUM 80 MG/1
80 TABLET, FILM COATED ORAL NIGHTLY
Qty: 90 TABLET | Refills: 0 | Status: SHIPPED | OUTPATIENT
Start: 2024-06-17

## 2024-06-17 RX ORDER — AMLODIPINE BESYLATE 5 MG/1
5 TABLET ORAL DAILY
Qty: 90 TABLET | Refills: 3 | Status: SHIPPED | OUTPATIENT
Start: 2024-06-17

## 2024-06-17 RX ORDER — ATORVASTATIN CALCIUM 80 MG/1
80 TABLET, FILM COATED ORAL NIGHTLY
Refills: 0 | OUTPATIENT
Start: 2024-06-17

## 2024-06-17 NOTE — TELEPHONE ENCOUNTER
Caller: WILLI DAVIS    Relationship: Emergency Contact    Best call back number: [phone]    Requested Prescriptions:   Requested Prescriptions     Pending Prescriptions Disp Refills    amLODIPine (NORVASC) 5 MG tablet 90 tablet 2     Sig: Take 1 tablet by mouth Daily.        Pharmacy where request should be sent: [facility] - [facility] [address] - [phone]  - [phone] [facility]     Last office visit with prescribing clinician: 4/29/2024   Last telemedicine visit with prescribing clinician: Visit date not found   Next office visit with prescribing clinician: 8/5/2024     Additional details provided by patient: PATIENT'S DAUGHTER STATED PATIENT HAS ABOUT A WEEK AND A HALF LEFT OF THIS MEDICATION.    Does the patient have less than a 3 day supply:  [] Yes  [x] No    Yves Irby Rep   06/17/24 13:30 CDT

## 2024-08-05 ENCOUNTER — OFFICE VISIT (OUTPATIENT)
Dept: INTERNAL MEDICINE | Facility: CLINIC | Age: 89
End: 2024-08-05
Payer: MEDICARE

## 2024-08-05 VITALS
DIASTOLIC BLOOD PRESSURE: 82 MMHG | HEIGHT: 67 IN | WEIGHT: 196 LBS | BODY MASS INDEX: 30.76 KG/M2 | TEMPERATURE: 97.6 F | SYSTOLIC BLOOD PRESSURE: 152 MMHG | HEART RATE: 68 BPM | OXYGEN SATURATION: 98 %

## 2024-08-05 DIAGNOSIS — E87.5 HYPERKALEMIA: Primary | ICD-10-CM

## 2024-08-05 DIAGNOSIS — I87.2 VENOUS INSUFFICIENCY OF BOTH LOWER EXTREMITIES: ICD-10-CM

## 2024-08-05 DIAGNOSIS — M17.0 PRIMARY OSTEOARTHRITIS OF BOTH KNEES: ICD-10-CM

## 2024-08-05 DIAGNOSIS — N18.4 CHRONIC RENAL FAILURE, STAGE 4 (SEVERE): Chronic | ICD-10-CM

## 2024-08-05 DIAGNOSIS — I48.0 PAF (PAROXYSMAL ATRIAL FIBRILLATION): Chronic | ICD-10-CM

## 2024-08-05 DIAGNOSIS — Z95.2 S/P TAVR (TRANSCATHETER AORTIC VALVE REPLACEMENT): ICD-10-CM

## 2024-08-05 DIAGNOSIS — I10 PRIMARY HYPERTENSION: Chronic | ICD-10-CM

## 2024-08-05 DIAGNOSIS — E78.2 MIXED HYPERLIPIDEMIA: Chronic | ICD-10-CM

## 2024-08-05 PROCEDURE — 1170F FXNL STATUS ASSESSED: CPT | Performed by: INTERNAL MEDICINE

## 2024-08-05 PROCEDURE — 1126F AMNT PAIN NOTED NONE PRSNT: CPT | Performed by: INTERNAL MEDICINE

## 2024-08-05 PROCEDURE — 20610 DRAIN/INJ JOINT/BURSA W/O US: CPT | Performed by: INTERNAL MEDICINE

## 2024-08-05 PROCEDURE — G0439 PPPS, SUBSEQ VISIT: HCPCS | Performed by: INTERNAL MEDICINE

## 2024-08-05 RX ORDER — METHYLPREDNISOLONE ACETATE 80 MG/ML
80 INJECTION, SUSPENSION INTRA-ARTICULAR; INTRALESIONAL; INTRAMUSCULAR; SOFT TISSUE ONCE
Status: COMPLETED | OUTPATIENT
Start: 2024-08-05 | End: 2024-08-05

## 2024-08-05 RX ORDER — LIDOCAINE HYDROCHLORIDE 10 MG/ML
1 INJECTION, SOLUTION INFILTRATION; PERINEURAL ONCE
Status: COMPLETED | OUTPATIENT
Start: 2024-08-05 | End: 2024-08-05

## 2024-08-05 RX ADMIN — METHYLPREDNISOLONE ACETATE 80 MG: 80 INJECTION, SUSPENSION INTRA-ARTICULAR; INTRALESIONAL; INTRAMUSCULAR; SOFT TISSUE at 14:36

## 2024-08-05 RX ADMIN — LIDOCAINE HYDROCHLORIDE 1 ML: 10 INJECTION, SOLUTION INFILTRATION; PERINEURAL at 14:34

## 2024-08-05 RX ADMIN — METHYLPREDNISOLONE ACETATE 80 MG: 80 INJECTION, SUSPENSION INTRA-ARTICULAR; INTRALESIONAL; INTRAMUSCULAR; SOFT TISSUE at 14:34

## 2024-08-05 RX ADMIN — LIDOCAINE HYDROCHLORIDE 1 ML: 10 INJECTION, SOLUTION INFILTRATION; PERINEURAL at 14:35

## 2024-08-05 NOTE — PROGRESS NOTES
Subjective   The ABCs of the Annual Wellness Visit  Medicare Wellness Visit      Mireille Magallanes is a 88 y.o. patient who presents for a Medicare Wellness Visit.    The following portions of the patient's history were reviewed and   updated as appropriate: allergies, current medications, past family history, past medical history, past social history, past surgical history, and problem list.    Compared to one year ago, the patient's physical   health is better.  Compared to one year ago, the patient's mental   health is the same.    Recent Hospitalizations:  He was not admitted to the hospital during the last year.     Current Medical Providers:  Patient Care Team:  Shadi García MD as PCP - General (Internal Medicine)  Mat, Shadi Cifuentes MD as Consulting Physician (Otolaryngology)  Zena Fischer APRN as Nurse Practitioner (Family Medicine)  Marianna Redd MD as Consulting Physician (General Surgery)  Darrin Martínez MD as Consulting Physician (Nephrology)    Outpatient Medications Prior to Visit   Medication Sig Dispense Refill    amLODIPine (NORVASC) 5 MG tablet Take 1 tablet by mouth Daily. 90 tablet 3    Ascorbic Acid (Vitamin C) 500 MG capsule Take 500 mg by mouth Daily.      aspirin 81 MG EC tablet Take 1 tablet by mouth Daily.      atorvastatin (LIPITOR) 80 MG tablet TAKE 1 TABLET BY MOUTH ONCE DAILY AT NIGHT 90 tablet 0    bumetanide (BUMEX) 1 MG tablet Take 1 tablet by mouth Daily As Needed.      Cholecalciferol (Vitamin D3) 25 MCG (1000 UT) capsule Take 1 capsule by mouth Daily.      coenzyme Q10 100 MG capsule Take 1 capsule by mouth Daily.      ferrous sulfate 325 (65 Fe) MG tablet Take 1 tablet by mouth Every Other Day.      irbesartan (AVAPRO) 300 MG tablet Take 1 tablet by mouth Every Night.      nitroglycerin (NITROSTAT) 0.4 MG SL tablet PLACE 1 TABLET UNDER THE TONGUE AS NEEDED FOR CHEST PAIN 30 tablet 11    Omega-3 Fatty Acids (fish oil) 1000 MG capsule capsule  Take 1 capsule by mouth Daily With Breakfast.      pantoprazole (PROTONIX) 40 MG EC tablet Take 1 tablet by mouth once daily 90 tablet 3    polyethylene glycol 17 g packet Take 17 g by mouth Daily.      sodium bicarbonate 650 MG tablet Take 1 tablet by mouth 3 (Three) Times a Day.       No facility-administered medications prior to visit.     No opioid medication identified on active medication list. I have reviewed chart for other potential  high risk medication/s and harmful drug interactions in the elderly.      Aspirin is on active medication list. Aspirin use is indicated based on review of current medical condition/s. Pros and cons of this therapy have been discussed today. Benefits of this medication outweigh potential harm.  Patient has been encouraged to continue taking this medication.  .      Patient Active Problem List   Diagnosis    HTN (hypertension)    Hyperlipidemia    Syncope    Coronary artery disease involving coronary bypass graft of native heart without angina pectoris    Pacemaker    Overweight (BMI 25.0-29.9)    Chronic anticoagulation    Bilateral carotid artery stenosis    History of stroke    S/P TAVR (transcatheter aortic valve replacement)    Chronic renal failure, stage 4 (severe)    Primary osteoarthritis of both knees    Pure hypercholesterolemia    Preop testing    Symptomatic anemia    Iron deficiency anemia    PAF (paroxysmal atrial fibrillation)    Non-recurrent unilateral inguinal hernia without obstruction or gangrene    Renal lesion    Venous insufficiency of both lower extremities     Advance Care Planning (Click this link to access ACP Navigator)  Advance Directive is on file.  ACP discussion was held with the patient during this visit. Patient has an advance directive in EMR which is still valid.         Objective   Vitals:    08/05/24 1318   BP: 152/82   BP Location: Left arm   Patient Position: Sitting   Cuff Size: Adult   Pulse: 68   Temp: 97.6 °F (36.4 °C)   TempSrc:  "Temporal   SpO2: 98%   Weight: 88.9 kg (196 lb)   Height: 170.2 cm (67.01\")   PainSc: 0-No pain       Estimated body mass index is 30.69 kg/m² as calculated from the following:    Height as of this encounter: 170.2 cm (67.01\").    Weight as of this encounter: 88.9 kg (196 lb).             Does the patient have evidence of cognitive impairment? No  Lab Results   Component Value Date    CHLPL 126 2024    TRIG 104 2024    HDL 39 (L) 2024    LDL 68 2024    VLDL 19 2024     Arthrocentesis Right Knee    Date/Time: 2024 4:58 PM    Performed by: Shadi García MD  Authorized by: Shadi García MD  Indications: pain   Body area: knee  Joint: right knee  Local anesthesia used: no    Anesthesia:  Local anesthesia used: no  Preparation: Patient was prepped and draped in the usual sterile fashion.  Needle gauge: 21g.  Ultrasound guidance: no  Approach: medial  Patient tolerance: patient tolerated the procedure well with no immediate complications  Comments: Tolerated well.   Cleaned with iodine        Arthrocentesis of Left Knee    Date/Time: 2024 4:59 PM    Performed by: Shadi García MD  Authorized by: Shadi García MD  Indications: pain   Body area: knee  Joint: left knee  Local anesthesia used: no    Anesthesia:  Local anesthesia used: no    Sedation:  Patient sedated: no    Preparation: Patient was prepped and draped in the usual sterile fashion.  Needle gauge: 21g.  Approach: medial  Patient tolerance: patient tolerated the procedure well with no immediate complications  Comments: Left smaller space  Cleaned with iodine  Tolerated well, had to readjust needle x 1                                                                                                Health  Risk Assessment    Smoking Status:  Social History     Tobacco Use   Smoking Status Former    Current packs/day: 0.00    Types: Cigarettes    Quit date:     Years since quittin.6   Smokeless " Tobacco Never     Alcohol Consumption:  Social History     Substance and Sexual Activity   Alcohol Use No     Fall Risk Screen:  [place] Fall Risk Assessment was completed, and patient is at MODERATE risk for falls. Assessment completed on:2024    Depression Screenin/5/2024     1:26 PM   PHQ-2/PHQ-9 Depression Screening   Little Interest or Pleasure in Doing Things 0-->not at all   Feeling Down, Depressed or Hopeless 0-->not at all   PHQ-9: Brief Depression Severity Measure Score 0     Health Habits and Functional and Cognitive Screenin/5/2024     1:24 PM   Functional & Cognitive Status   Do you have difficulty preparing food and eating? No   Do you have difficulty bathing yourself, getting dressed or grooming yourself? No   Do you have difficulty using the toilet? No   Do you have difficulty moving around from place to place? No   Do you have trouble with steps or getting out of a bed or a chair? No   Current Diet Well Balanced Diet   Dental Exam Up to date   Eye Exam Up to date   Exercise (times per week) 0 times per week   Current Exercises Include No Regular Exercise   Do you need help using the phone?  No   Are you deaf or do you have serious difficulty hearing?  No   Do you need help to go to places out of walking distance? No   Do you need help shopping? No   Do you need help preparing meals?  No   Do you need help with housework?  No   Do you need help with laundry? No   Do you need help taking your medications? Yes   Do you need help managing money? No   Do you ever drive or ride in a car without wearing a seat belt? No   Have you felt unusual stress, anger or loneliness in the last month? No   Who do you live with? Spouse   If you need help, do you have trouble finding someone available to you? No   Have you been bothered in the last four weeks by sexual problems? No   Do you have difficulty concentrating, remembering or making decisions? No             Age-appropriate Screening  Schedule:  Refer to the list below for future screening recommendations based on patient's age, sex and/or medical conditions. Orders for these recommended tests are listed in the plan section. The patient has been provided with a written plan.    Health Maintenance List  Health Maintenance   Topic Date Due    ZOSTER VACCINE (1 of 2) Never done    RSV Vaccine - Adults (1 - 1-dose 60+ series) Never done    TDAP/TD VACCINES (2 - Td or Tdap) 08/05/2024    INFLUENZA VACCINE  08/01/2024    COVID-19 Vaccine (5 - 2023-24 season) 11/03/2025 (Originally 9/1/2023)    BMI FOLLOWUP  01/26/2025    LIPID PANEL  07/29/2025    ANNUAL WELLNESS VISIT  08/05/2025    Pneumococcal Vaccine 65+  Completed                                                                                                                                                 CMS Preventative Services Quick Reference  Risk Factors Identified During Encounter  Immunizations Discussed/Encouraged: Influenza, Shingrix, and RSV (Respiratory Syncytial Virus)  Dental Screening Recommended  Vision Screening Recommended    The above risks/problems have been discussed with the patient.  Pertinent information has been shared with the patient in the After Visit Summary.  An After Visit Summary and PPPS were made available to the patient.    Follow Up:   Next Medicare Wellness visit to be scheduled in 1 year.        Diagnoses and all orders for this visit:    1. Hyperkalemia (Primary)  -     Basic metabolic panel    2. Primary osteoarthritis of both knees  -     methylPREDNISolone acetate (DEPO-medrol) injection 80 mg  -     lidocaine (XYLOCAINE) 1 % injection 1 mL  -     methylPREDNISolone acetate (DEPO-medrol) injection 80 mg  -     lidocaine (XYLOCAINE) 1 % injection 1 mL    3. Venous insufficiency of both lower extremities    4. S/P TAVR (transcatheter aortic valve replacement)    5. PAF (paroxysmal atrial fibrillation)    6. Primary hypertension    7. Mixed  hyperlipidemia    8. Chronic renal failure, stage 4 (severe)      Recommend at least annual dental and vision screening.  Recommend annual influenza vaccination  Recommend a varied diet and appropriate portion sizes.   CDC recommendations for physical activity:  At least 150 minutes a week (for example, 30 minutes a day, 5 days a week) of moderate-intensity activity such as brisk walking. Or can consider 75 minutes a week of vigorous-intensity activity such as hiking, jogging, or running.  At least 2 days a week of activities that strengthen muscles.  Plus activities to improve balance.    Patient has hyperkalemia, repeat BMP today.        Result Review :  The following data was reviewed by: Shadi García MD on 08/05/2024:  CMP          9/28/2023    13:21 1/26/2024    09:28 7/29/2024    08:26   CMP   Glucose 97  78  87    BUN 34  33  39    Creatinine 2.86  3.11  3.18    Sodium 140  142  140    Potassium 5.7  4.7  5.5    Chloride 107  107  105    Calcium 9.5  9.0  9.3    Total Protein 6.7  6.3  6.7    Albumin 4.4  4.3  4.1    Globulin 2.3  2.0  2.6    Total Bilirubin 0.7  0.7  0.6    Alkaline Phosphatase 113  88  93    AST (SGOT) 19  20  20    ALT (SGPT) 10  19  15    BUN/Creatinine Ratio 11.9  10.6  12.3      CBC w/diff          9/28/2023    13:21 1/26/2024    09:28 7/29/2024    08:26   CBC w/Diff   WBC 6.48  6.39  6.07    RBC 3.23  4.78  3.88    Hemoglobin 9.4  13.1  11.6    Hematocrit 29.0  41.2  36.6    MCV 89.8  86.2  94.3    MCH 29.1  27.4  29.9    MCHC 32.4  31.8  31.7    RDW 13.8  14.0  13.6    Platelets 337  153  136    Neutrophil Rel % 63.4  61.5  61.8    Lymphocyte Rel % 23.1  27.2  25.9    Monocyte Rel % 9.3  8.5  9.6    Eosinophil Rel % 3.1  1.9  2.1    Basophil Rel % 0.6  0.6  0.3      Lipid Panel          7/29/2024    08:26   Lipid Panel   Total Cholesterol 126    Triglycerides 104    HDL Cholesterol 39    VLDL Cholesterol 19    LDL Cholesterol  68      TSH          7/29/2024    08:26   TSH   TSH  2.890          UA          7/29/2024    08:26   Urinalysis   Blood, UA Negative    Leukocytes, UA Negative    Nitrite, UA Negative    RBC, UA 0-2    Bacteria, UA Comment           Follow Up:   Return in about 4 months (around 12/5/2024), or if symptoms worsen or fail to improve, for follow up for above problems. Longitudinal care., Next scheduled follow up.     An After Visit Summary and PPPS were made available to the patient.                   YULIA García MD, FACP, [facility]      Electronically signed by Shadi García MD, 08/05/24, 1:33 PM CDT.

## 2024-08-06 DIAGNOSIS — N18.4 CHRONIC RENAL FAILURE, STAGE 4 (SEVERE): ICD-10-CM

## 2024-08-06 DIAGNOSIS — E87.5 HYPERKALEMIA: Primary | ICD-10-CM

## 2024-08-06 LAB
BUN SERPL-MCNC: 45 MG/DL (ref 8–23)
BUN/CREAT SERPL: 14.1 (ref 7–25)
CALCIUM SERPL-MCNC: 9.1 MG/DL (ref 8.6–10.5)
CHLORIDE SERPL-SCNC: 105 MMOL/L (ref 98–107)
CO2 SERPL-SCNC: 21.4 MMOL/L (ref 22–29)
CREAT SERPL-MCNC: 3.19 MG/DL (ref 0.76–1.27)
EGFRCR SERPLBLD CKD-EPI 2021: 18.2 ML/MIN/1.73
GLUCOSE SERPL-MCNC: 86 MG/DL (ref 65–99)
POTASSIUM SERPL-SCNC: 5.6 MMOL/L (ref 3.5–5.2)
SODIUM SERPL-SCNC: 140 MMOL/L (ref 136–145)

## 2024-08-06 RX ORDER — NITROGLYCERIN 0.4 MG/1
0.4 TABLET SUBLINGUAL AS NEEDED
Qty: 30 TABLET | Refills: 11 | Status: SHIPPED | OUTPATIENT
Start: 2024-08-06

## 2024-08-06 NOTE — PROGRESS NOTES
Spoke with dgt and informed to go by Dr. Martínez's office today to  samples of Veltessa (sp?) to lower his K+ and will need repeat lab in 1 week here.  Lab results faxed to renal office as well.

## 2024-09-12 RX ORDER — ATORVASTATIN CALCIUM 80 MG/1
80 TABLET, FILM COATED ORAL NIGHTLY
Qty: 90 TABLET | Refills: 3 | Status: SHIPPED | OUTPATIENT
Start: 2024-09-12

## 2024-11-07 ENCOUNTER — OFFICE VISIT (OUTPATIENT)
Dept: INTERNAL MEDICINE | Facility: CLINIC | Age: 89
End: 2024-11-07
Payer: MEDICARE

## 2024-11-07 VITALS
HEART RATE: 72 BPM | SYSTOLIC BLOOD PRESSURE: 150 MMHG | HEIGHT: 67 IN | DIASTOLIC BLOOD PRESSURE: 80 MMHG | OXYGEN SATURATION: 97 % | BODY MASS INDEX: 30.45 KG/M2 | TEMPERATURE: 99.5 F | WEIGHT: 194 LBS

## 2024-11-07 DIAGNOSIS — M17.0 PRIMARY OSTEOARTHRITIS OF BOTH KNEES: Primary | ICD-10-CM

## 2024-11-07 RX ORDER — LIDOCAINE HYDROCHLORIDE 10 MG/ML
1 INJECTION, SOLUTION INFILTRATION; PERINEURAL ONCE
Status: COMPLETED | OUTPATIENT
Start: 2024-11-07 | End: 2024-11-07

## 2024-11-07 RX ORDER — METHYLPREDNISOLONE ACETATE 80 MG/ML
80 INJECTION, SUSPENSION INTRA-ARTICULAR; INTRALESIONAL; INTRAMUSCULAR; SOFT TISSUE ONCE
Status: COMPLETED | OUTPATIENT
Start: 2024-11-07 | End: 2024-11-07

## 2024-11-07 RX ADMIN — METHYLPREDNISOLONE ACETATE 80 MG: 80 INJECTION, SUSPENSION INTRA-ARTICULAR; INTRALESIONAL; INTRAMUSCULAR; SOFT TISSUE at 15:30

## 2024-11-07 RX ADMIN — LIDOCAINE HYDROCHLORIDE 1 ML: 10 INJECTION, SOLUTION INFILTRATION; PERINEURAL at 15:30

## 2024-11-07 NOTE — PROGRESS NOTES
Arthrocentesis of Right Knee    Date/Time: 11/7/2024 4:18 PM    Performed by: Shadi García MD  Authorized by: Shadi García MD  Indications: pain   Body area: knee  Joint: right knee  Local anesthesia used: no    Anesthesia:  Local anesthesia used: no  Preparation: Patient was prepped and draped in the usual sterile fashion.  Needle gauge: 21g.  Ultrasound guidance: no  Approach: lateral  Patient tolerance: patient tolerated the procedure well with no immediate complications      Arthrocentesis of Left Knee    Date/Time: 11/7/2024 4:18 PM    Performed by: Shadi García MD  Authorized by: Shadi García MD  Indications: pain   Body area: knee  Joint: left knee  Local anesthesia used: no    Anesthesia:  Local anesthesia used: no    Sedation:  Patient sedated: no    Preparation: Patient was prepped and draped in the usual sterile fashion.  Needle gauge: 21g.  Ultrasound guidance: no  Approach: lateral  Patient tolerance: patient tolerated the procedure well with no immediate complications

## 2024-11-11 ENCOUNTER — TELEPHONE (OUTPATIENT)
Dept: INTERNAL MEDICINE | Facility: CLINIC | Age: 89
End: 2024-11-11
Payer: MEDICARE

## 2024-11-11 DIAGNOSIS — I10 PRIMARY HYPERTENSION: Primary | Chronic | ICD-10-CM

## 2024-11-11 DIAGNOSIS — D50.0 IRON DEFICIENCY ANEMIA DUE TO CHRONIC BLOOD LOSS: Chronic | ICD-10-CM

## 2024-11-11 NOTE — TELEPHONE ENCOUNTER
Caller: WILLI DAVIS    Relationship: Emergency Contact    Best call back number: [phone]    Who are you requesting to speak with (clinical staff, provider,  specific staff member):     CLINICAL STAFF    Do you know the name of the person who called:     WILLI    What was the call regarding:     IS THERE BLOOD WORK THAT NEEDS TO BE DONE FOR HIS APPOINTMENT ON 12.05.24    Is it okay if the provider responds through MyChart: NO    PLEASE CALL AND ADVISE

## 2024-11-27 PROCEDURE — 93294 REM INTERROG EVL PM/LDLS PM: CPT | Performed by: INTERNAL MEDICINE

## 2024-11-27 PROCEDURE — 93296 REM INTERROG EVL PM/IDS: CPT | Performed by: INTERNAL MEDICINE

## 2024-12-05 ENCOUNTER — OFFICE VISIT (OUTPATIENT)
Dept: INTERNAL MEDICINE | Facility: CLINIC | Age: 89
End: 2024-12-05
Payer: MEDICARE

## 2024-12-05 VITALS
HEIGHT: 67 IN | RESPIRATION RATE: 18 BRPM | BODY MASS INDEX: 30.76 KG/M2 | WEIGHT: 196 LBS | TEMPERATURE: 99.1 F | HEART RATE: 71 BPM | OXYGEN SATURATION: 100 % | DIASTOLIC BLOOD PRESSURE: 84 MMHG | SYSTOLIC BLOOD PRESSURE: 150 MMHG

## 2024-12-05 DIAGNOSIS — I10 PRIMARY HYPERTENSION: ICD-10-CM

## 2024-12-05 DIAGNOSIS — E87.5 HYPERKALEMIA: ICD-10-CM

## 2024-12-05 DIAGNOSIS — I87.2 VENOUS INSUFFICIENCY OF BOTH LOWER EXTREMITIES: ICD-10-CM

## 2024-12-05 DIAGNOSIS — N18.4 CHRONIC RENAL FAILURE, STAGE 4 (SEVERE): Primary | ICD-10-CM

## 2024-12-05 DIAGNOSIS — D50.0 IRON DEFICIENCY ANEMIA DUE TO CHRONIC BLOOD LOSS: ICD-10-CM

## 2024-12-05 PROCEDURE — G2211 COMPLEX E/M VISIT ADD ON: HCPCS | Performed by: INTERNAL MEDICINE

## 2024-12-05 PROCEDURE — 99214 OFFICE O/P EST MOD 30 MIN: CPT | Performed by: INTERNAL MEDICINE

## 2024-12-05 PROCEDURE — 1126F AMNT PAIN NOTED NONE PRSNT: CPT | Performed by: INTERNAL MEDICINE

## 2024-12-09 ENCOUNTER — TRANSCRIBE ORDERS (OUTPATIENT)
Dept: ADMINISTRATIVE | Facility: HOSPITAL | Age: 89
End: 2024-12-09
Payer: MEDICARE

## 2024-12-09 ENCOUNTER — OFFICE VISIT (OUTPATIENT)
Dept: CARDIOLOGY | Facility: CLINIC | Age: 89
End: 2024-12-09
Payer: MEDICARE

## 2024-12-09 VITALS
DIASTOLIC BLOOD PRESSURE: 86 MMHG | BODY MASS INDEX: 30.76 KG/M2 | SYSTOLIC BLOOD PRESSURE: 114 MMHG | HEART RATE: 75 BPM | HEIGHT: 67 IN | OXYGEN SATURATION: 99 % | WEIGHT: 196 LBS

## 2024-12-09 DIAGNOSIS — I25.810 CORONARY ARTERY DISEASE INVOLVING CORONARY BYPASS GRAFT OF NATIVE HEART WITHOUT ANGINA PECTORIS: ICD-10-CM

## 2024-12-09 DIAGNOSIS — I48.0 PAF (PAROXYSMAL ATRIAL FIBRILLATION): Chronic | ICD-10-CM

## 2024-12-09 DIAGNOSIS — Z95.0 PACEMAKER: ICD-10-CM

## 2024-12-09 DIAGNOSIS — Z95.2 S/P TAVR (TRANSCATHETER AORTIC VALVE REPLACEMENT): Primary | ICD-10-CM

## 2024-12-09 RX ORDER — SODIUM ZIRCONIUM CYCLOSILICATE 5 G/5G
5 POWDER, FOR SUSPENSION ORAL 3 TIMES WEEKLY
COMMUNITY

## 2024-12-09 NOTE — PROGRESS NOTES
Subjective:     Encounter Date: 12/9/2024      Patient ID: Mireille Magallanes is a 89 y.o. male.    Chief Complaint: Follow-up CAD, valvular disease status post TAVR, atrial fibrillation, heart block status post pacemaker  History of Present Illness  The patient presents for follow-up of coronary disease, valvular disease, atrial fibrillation, status post pacemaker for heart block.     Echocardiogram in 04/2024 that shows preserved LVEF with TAVR having normal appearance including a transvalvular gradient of 8, which is normal and no evidence of paravalvular leak. He does still have moderate mitral stenosis with functional mitral annular calcification. His pacemaker continues to be followed without any issues noted.     He followed up June 2024 and his chronic symptoms were stable.  He does have mild intermittent chest discomfort predominantly on the right side that might last 4 to 5 minutes at a time.  He was not requiring nitroglycerin.  Symptoms would improve with rest.  Exertional dyspnea was stable.  It is noted that he is not anticoagulated due to prior significant bleeding on both Xarelto and Pradaxa.    His most recent stroke was in May 2023.    Watchman device implantation has been discussed with him before, though this has not yet been pursued given his advanced kidney disease and very low burden of atrial fibrillation on device interrogations.  Dr. Toledo had a thorough discussion with him regarding the possibility of Watchman device implantation if his atrial fibrillation on device interrogation were to increase.    The patient follows with Dr. Martínez for his kidney disease which is stage IV.  He is now on Lokelma for hyperkalemia.  He admits some chronic leg swelling and a gradual 15 pound weight gain.  He does have shortness of breath with moderate exertion and he does not necessarily appreciate worsening in this since his last visit.  He does not describe orthopnea or PND.  He denies palpitations.   He states he may have rare episodes of chest pain that last 2 to 3 seconds at a time.  His blood pressure is well-controlled.  He tells me he does not take Bumex.          The following portions of the patient's history were reviewed and updated as appropriate: allergies, current medications, past family history, past medical history, past social history, past surgical history, and problem list.    Review of Systems   Constitutional: Positive for weight gain. Negative for malaise/fatigue.   Cardiovascular:  Positive for chest pain, dyspnea on exertion (chronic, stable.) and leg swelling. Negative for claudication, near-syncope, orthopnea, palpitations, paroxysmal nocturnal dyspnea and syncope.   Respiratory:  Positive for shortness of breath. Negative for cough.    Hematologic/Lymphatic: Does not bruise/bleed easily.   Musculoskeletal:  Negative for falls.   Gastrointestinal:  Negative for bloating.   Neurological:  Negative for dizziness, light-headedness and weakness.           Current Outpatient Medications:     amLODIPine (NORVASC) 5 MG tablet, Take 1 tablet by mouth Daily., Disp: 90 tablet, Rfl: 3    Ascorbic Acid (Vitamin C) 500 MG capsule, Take 500 mg by mouth Daily., Disp: , Rfl:     aspirin 81 MG EC tablet, Take 1 tablet by mouth Daily., Disp: , Rfl:     atorvastatin (LIPITOR) 80 MG tablet, TAKE 1 TABLET BY MOUTH ONCE DAILY AT NIGHT, Disp: 90 tablet, Rfl: 3    bumetanide (BUMEX) 1 MG tablet, Take 1 tablet by mouth Daily As Needed., Disp: , Rfl:     Cholecalciferol (Vitamin D3) 25 MCG (1000 UT) capsule, Take 1 capsule by mouth Daily., Disp: , Rfl:     coenzyme Q10 100 MG capsule, Take 1 capsule by mouth Daily., Disp: , Rfl:     ferrous sulfate 325 (65 Fe) MG tablet, Take 1 tablet by mouth Every Other Day., Disp: , Rfl:     irbesartan (AVAPRO) 300 MG tablet, Take 1 tablet by mouth Every Night., Disp: , Rfl:     nitroglycerin (NITROSTAT) 0.4 MG SL tablet, Place 1 tablet under the tongue As Needed for Chest  Pain., Disp: 30 tablet, Rfl: 11    Omega-3 Fatty Acids (fish oil) 1000 MG capsule capsule, Take 1 capsule by mouth Daily With Breakfast., Disp: , Rfl:     pantoprazole (PROTONIX) 40 MG EC tablet, Take 1 tablet by mouth once daily, Disp: 90 tablet, Rfl: 3    polyethylene glycol 17 g packet, Take 17 g by mouth Daily., Disp: , Rfl:     sodium bicarbonate 650 MG tablet, Take 1 tablet by mouth 3 (Three) Times a Day., Disp: , Rfl:     sodium zirconium cyclosilicate (Lokelma) 5 g packet, Take 5 g by mouth 3 (Three) Times a Week. Empty entire contents of the packet(s) into a glass with at least 3 tablespoons (45 mL) of water. Stir well and drink immediately; if powder remains in the glass, add water, stir and drink immediately; repeat until no powder remains. Administer other oral medications at least 2 hours before or 2 hours after dose., Disp: , Rfl:        Objective:      Vitals:    12/09/24 1320   BP: 114/86   Pulse: 75   SpO2: 99%   Weight - 196 lbs    Vitals and nursing note reviewed.   Constitutional:       General: Not in acute distress.     Appearance: Well-developed and not in distress. Not diaphoretic.   Neck:      Vascular: No JVD or JVR. JVD normal.   Pulmonary:      Effort: Pulmonary effort is normal. No respiratory distress.      Breath sounds: Normal breath sounds.   Cardiovascular:      Normal rate. Regular rhythm.      Murmurs: There is no murmur.   Edema:     Peripheral edema (1-2+ BLE edema) present.  Abdominal:      Tenderness: There is no abdominal tenderness.   Skin:     General: Skin is warm and dry.   Neurological:      Mental Status: Alert, oriented to person, place, and time and oriented to person, place and time.       Physical Exam      Lab Review:   Lab Results   Component Value Date    GLUCOSE 87 12/02/2024    BUN 39 (H) 12/02/2024    CREATININE 3.68 (H) 12/02/2024     12/02/2024    K 5.8 (H) 12/02/2024     (H) 12/02/2024    CALCIUM 8.6 12/02/2024    PROTEINTOT 7.5 09/21/2023     ALBUMIN 3.9 12/02/2024    ALT 13 12/02/2024    AST 17 12/02/2024    ALKPHOS 99 12/02/2024    BILITOT 0.5 12/02/2024    GLOB 3.6 09/21/2023    AGRATIO 1.1 09/21/2023    BCR 10.6 12/02/2024    ANIONGAP 10.0 09/21/2023    EGFR 16.3 (L) 09/21/2023        Lab Results   Component Value Date    CHOL 113 05/26/2022    CHLPL 126 07/29/2024    TRIG 104 07/29/2024    HDL 39 (L) 07/29/2024    LDL 68 07/29/2024        Lab Results   Component Value Date    HGBA1C 5.50 05/26/2022        Lab Results   Component Value Date    WBC 5.87 12/02/2024    HGB 11.6 (L) 12/02/2024    HCT 35.4 (L) 12/02/2024    MCV 93.9 12/02/2024     12/02/2024            ECG 12 Lead    Date/Time: 12/9/2024 2:29 PM  Performed by: Zena Fischer APRN    Authorized by: Zena Fischer APRN  Comparison: compared with previous ECG from 11/21/2024  Similar to previous ECG  Comparison to previous ECG: V paced   Rhythm: sinus rhythm and paced  Ectopy: atrial premature contractions  BPM: 75    Clinical impression: abnormal EKG        Results        Results for orders placed during the hospital encounter of 04/23/24    Adult Transthoracic Echo Complete w/ Color, Spectral and Contrast if necessary per protocol    Interpretation Summary    Left ventricular systolic function is normal. Left ventricular ejection fraction appears to be 61 - 65%.    There is a TAVR valve present - Gutierrez S3 29 mm TAVR valve has normal appearance and is in excellent position. Mean transvalvular gradient 8 mmHg, with no evidence of paravalvular leak.    Left ventricular diastolic function is consistent with (grade II w/high LAP) pseudonormalization.    Moderate mitral valve stenosis is present with functional MAC (mean gradient of 7mmHg with HR of 72bpm).    The left atrial cavity is severely dilated.    Estimated right ventricular systolic pressure from tricuspid regurgitation is mildly elevated (35-45 mmHg).    Normal size and function of the right ventricle.    Compared to prior  "exam from 5/29/23, no significant change noted.      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  Paroxysmal atrial fibrillation and flutter: stable; maintaining normal sinus rhythm per EKG today.  I did review the last 5 pacemaker interrogations, all of which show a burden of atrial fibrillation less than 1%.  Longest episode 2 to 3 minutes.  He denies palpitations. Xarelto was discontinued after his severe symptomatic anemia requiring hospitalization and blood transfusion 11/2022 (hemoglobin was 4.5 on presentation).  His [id number] is 7 (see above)-.      As previously outlined by Dr. Toledo at last visit:  \"A comprehensive discussion was held regarding stroke protection.  We discussed in detail the notion of left atrial appendage occlusion with a Watchman procedure.. However, considering his CKD stage IV, with a GFR hovering around 17 (now 15), the risk of the procedure with contrast administered is not to be underestimated.  However, in light of the serial pacemaker interrogations reviewed today, demonstrating a very very low burden of atrial fibrillation, I have advised that he simply continue antiplatelet therapy for now.  Of the CHADS2-VASc score would suggest that 11.2% annualized risk of stroke, he has not demonstrated any episode of atrial fibrillation in the last 6+ months " "that would have been long enough for thrombus to form.  Given the risks previously alluded to for this particular patient with Carlos, this point we all agreed to a strategy of continued close surveillance, by use of serial pacemaker interrogations. He is agreeable to move forward with Nylaiesha, acknowledging the risks of contrast administered causing further renal deterioration, if his burden of atrial fibrillation on serial pacemaker interrogation starts to increase.\"    2.  Coronary artery disease: Established problem, stable.  No angina.  Continue aspirin and high intensity statin.  Nuclear stress test November 2022 was low risk.  LDL is well controlled.    Ranexa was previously stopped because he did not have improvement with this and it was discovered his prior symptoms were from severe anemia.     3.  Status post TAVR: Stable.  Due for echo April 2025-order placed.  -Continue aspirin for life  -SBE prophylaxis prior to dental procedures for left    4.  Hypertension: Well-controlled.  Continue current medications as listed above.     5.  Complete heart block with dual-chamber pacemaker in place-stable.  Device interrogations reviewed as described above.  Normal device function and satisfactory battery life.    Last interrogation 11/27/2024: Normal device function, satisfactory battery life, 14% atrial pacing, 99% ventricular pacing, 1 AMS episode lasting 3 seconds.    Follow-up in 6 months with Dr. Toledo, call sooner with symptoms or concerns  "

## 2024-12-14 NOTE — PROGRESS NOTES
"      Chief Complaint  Hypertension (4 month follow-up.)    Subjective        Mireille Magallanes presents to [facility]    HPI    Patient here for the above problems.  See Assessment and Plan for further HPI components.      Review of Systems    Objective   Vital Signs:  /84 (BP Location: Left arm, Patient Position: Sitting, Cuff Size: Adult)   Pulse 71   Temp 99.1 °F (37.3 °C) (Infrared)   Resp 18   Ht 170.2 cm (67\")   Wt 88.9 kg (196 lb)   SpO2 100%   BMI 30.70 kg/m²   Estimated body mass index is 30.7 kg/m² as calculated from the following:    Height as of this encounter: 170.2 cm (67\").    Weight as of this encounter: 88.9 kg (196 lb).      Physical Exam  Vitals and nursing note reviewed.   Constitutional:       Appearance: He is obese. He is not ill-appearing.   Eyes:      General: No scleral icterus.     Conjunctiva/sclera: Conjunctivae normal.   Pulmonary:      Effort: Pulmonary effort is normal. No respiratory distress.   Neurological:      General: No focal deficit present.      Mental Status: He is alert and oriented to person, place, and time.   Psychiatric:         Mood and Affect: Mood normal.         Behavior: Behavior normal.                       Assessment and Plan   Diagnoses and all orders for this visit:    1. Chronic renal failure, stage 4 (severe) (Primary)    2. Hyperkalemia    3. Primary hypertension    4. Venous insufficiency of both lower extremities    5. Iron deficiency anemia due to chronic blood loss        History of Present Illness  The patient is an 89-year-old male who presents for follow-up. He is accompanied by an adult female.    He reports feeling slightly low today, but overall, he has been in good health. He does not currently see a hematologist. His blood pressure readings at home have been consistently in the 120s and 130s. He admits to not drinking enough water and consuming a lot of coffee and tea. He is able to walk around his " house without difficulty. His hearing has improved, and he uses hearing aids. He also wears glasses and has dentures.      Assessment & Plan  1. Hyperkalemia.  His potassium level is elevated at 5.8. With his kidney function worsening, there is a concern that the potassium level may continue to rise. He will be seeing Dr. Martínez today, who may prescribe Veltassa or Lokelma to manage the potassium levels. He is advised to avoid high-potassium foods like tomato soup and potatoes. If Dr. Martínez prescribes medication, it may be used routinely to manage the potassium levels.    2. Chronic Kidney Disease.  He reports feeling down due to the low kidney function numbers. His blood count is slightly down, likely related to his kidney disease, but his iron levels are normal. He does not currently see a hematologist. Dr. Martínez may refer him to a hematologist to manage medications that help with blood count. He is advised to drink more water and maintain hydration to support kidney function.     3. Hypertension.  His blood pressure is slightly elevated during the visit, but he reports that it is usually in the 120s at home. He is advised to continue monitoring his blood pressure daily and maintain a record. He should keep an eye on his blood pressure and report any significant changes.    4. Anemia.  His blood count is slightly down, likely related to his kidney disease. He does not currently see a hematologist. Dr. Martínez may refer him to a hematologist to manage medications that help with blood count.    5. Venous insufficiency    Patient has venous insufficiency.  This is gravity dependent.  Worse at the end of the day.  Better in AM.  Recommend low salt diet.  Recommend elevation of lower extremities.  Recommend compression stockings as tolerated.  If persists, then we may need to consider secondary causes or other treatment.          Discussed case with Edwina ARCHER and Dr. Martínez with nephrology.  They  are working the patient in today.  He has some lokelma still present.      Result Review :                   BMI is >= 30 and <35. (Class 1 Obesity). The following options were offered after discussion;: none (medical contraindication)            Follow Up   Return in about 6 months (around 6/5/2025), or if symptoms worsen or fail to improve, for Medicare Wellness - Labs prior to visit, follow up for above problems. Longitudinal care..  Patient was given instructions and counseling regarding his condition or for health maintenance advice. Please see specific information pulled into the AVS if appropriate.       YULIA García MD, FACP, [facility]      Electronically signed by Shadi García MD, 12/14/24, 5:58 PM CST.    Patient or patient representative verbalized consent for the use of Ambient Listening during the visit with  Shadi García MD for chart documentation. 12/14/2024  18:00 CST

## 2024-12-16 ENCOUNTER — TELEPHONE (OUTPATIENT)
Dept: CARDIOLOGY | Facility: CLINIC | Age: 89
End: 2024-12-16
Payer: MEDICARE

## 2024-12-16 NOTE — TELEPHONE ENCOUNTER
Dahlia Varma returned my call. She stated Mr Magallanes has not reported any symptoms to her in the recent days. No mention of chest pain, palpitations, SOA, or increase in fatigue. Dahlia stated she is away from town today, but will be going by Mr Magallanes's house later in the day and will ask him about symptoms at that time. She will also attempt a manual transmission from his home monitor.     I advised she call my direct office number if she has any questions or concerns, and she verbalized understanding.

## 2024-12-16 NOTE — TELEPHONE ENCOUNTER
Called and left  for daughter, Dahlia Varma, regarding alert received via [employer] home monitor. Alert shows ongoing AF with onset of 12/10/24. Overall controlled ventricular rates. Pt is not listed as being on anticoagulation due to bleeding problems in the past.   I will update this note if I receive a call back from Dahlia for symptom check.  Below are screenshots from device transmission.

## 2024-12-16 NOTE — TELEPHONE ENCOUNTER
Kaushik Toledo MD  You55 minutes ago (2:34 PM)       Please advise he does not need to take aspirin while on eliquis.      He may also want to use the one-time free 30-day supply of eliquis card for Medicare patients if you can help him get one of those (his daughter may be able to access it on-line and use that without having to come to our office for a physical card).        Kaushik Toledo MD  You; Afia Denise, RN58 minutes ago (2:31 PM)       Please notify patient/daughter that, regardless of symptoms, this ongoing persistent episode means we need to do something to protect him from stroke.  I know he's had significant bleeding in the past on Xarelto, which is why I had discussed watchman as an alternative to medications with him in the past.  However, since his burden of atrial fibrillation has remained so low, we have not pushed this issue.  However, now that it is back and persistent, we should at least get him back on anticoagulation, but since the prior bleeding issues on Xarelto, I would favor Eliquis this time.  Also, if this occurrence (and now recurrent need for stroke protection) makes him change his mind and would like to think about Watchman again, I would be happy to see him back and discuss this.    I'll send Eliquis rx to his pharmacy now       Notified patient's daughter. She is in town, so she will stop by and  the Eliquis card. Pricilla King MA

## 2024-12-17 ENCOUNTER — TELEPHONE (OUTPATIENT)
Dept: CARDIOLOGY | Facility: CLINIC | Age: 89
End: 2024-12-17
Payer: MEDICARE

## 2024-12-17 NOTE — TELEPHONE ENCOUNTER
"Patient's daughter, Dahlia, states her dad had already used the Eliquis card in the past for a 30 day free trial, so it did not work. His cost is $540, which he cannot afford. She asks if this will be something he will have to take permanently, and if so, could he try Xarelto. Per Dr Toledo' message, \"now that his atrial fibrillation is back and persistent, we should at least get him back on anticoagulation, but since the prior bleeding issues on Xarelto, I would favor Eliquis this time.\" I informed Dahlia that there are some patient assistance options if she thinks her dad would qualify. I also told Dahlia that if her dad would want to discuss Watchman this would be an alternative to taking an anticoagulant, and Dr Toledo could discuss this with him in the office. She will go ahead and fill the Eliquis prescription for now and speak with her parents about any alternatives, then call us back with a decision. Pricilla King MA    "

## 2025-01-21 ENCOUNTER — TELEPHONE (OUTPATIENT)
Dept: CARDIOLOGY | Facility: CLINIC | Age: 89
End: 2025-01-21

## 2025-01-21 NOTE — TELEPHONE ENCOUNTER
Patient's daughter is notified that [facility] has denied assistance for Eliquis since the patient has not spent 3% of household income on out of pocket expenses for presciptions. She will resubmit an application around April when he has met this requirement.Pricilla King MA

## 2025-01-27 RX ORDER — PANTOPRAZOLE SODIUM 40 MG/1
TABLET, DELAYED RELEASE ORAL
Qty: 90 TABLET | Refills: 3 | Status: SHIPPED | OUTPATIENT
Start: 2025-01-27

## 2025-02-06 ENCOUNTER — OFFICE VISIT (OUTPATIENT)
Dept: INTERNAL MEDICINE | Facility: CLINIC | Age: 89
End: 2025-02-06
Payer: MEDICARE

## 2025-02-06 VITALS
OXYGEN SATURATION: 97 % | DIASTOLIC BLOOD PRESSURE: 60 MMHG | TEMPERATURE: 97.4 F | WEIGHT: 198 LBS | HEART RATE: 80 BPM | HEIGHT: 67 IN | BODY MASS INDEX: 31.08 KG/M2 | SYSTOLIC BLOOD PRESSURE: 124 MMHG

## 2025-02-06 DIAGNOSIS — M17.0 PRIMARY OSTEOARTHRITIS OF BOTH KNEES: Primary | ICD-10-CM

## 2025-02-06 PROCEDURE — 20610 DRAIN/INJ JOINT/BURSA W/O US: CPT | Performed by: INTERNAL MEDICINE

## 2025-02-06 PROCEDURE — 1125F AMNT PAIN NOTED PAIN PRSNT: CPT | Performed by: INTERNAL MEDICINE

## 2025-02-06 PROCEDURE — 99213 OFFICE O/P EST LOW 20 MIN: CPT | Performed by: INTERNAL MEDICINE

## 2025-02-06 RX ORDER — LIDOCAINE HYDROCHLORIDE 10 MG/ML
1 INJECTION, SOLUTION INFILTRATION; PERINEURAL ONCE
Status: COMPLETED | OUTPATIENT
Start: 2025-02-06 | End: 2025-02-06

## 2025-02-06 RX ORDER — METHYLPREDNISOLONE ACETATE 80 MG/ML
80 INJECTION, SUSPENSION INTRA-ARTICULAR; INTRALESIONAL; INTRAMUSCULAR; SOFT TISSUE ONCE
Status: COMPLETED | OUTPATIENT
Start: 2025-02-06 | End: 2025-02-06

## 2025-02-06 RX ADMIN — LIDOCAINE HYDROCHLORIDE 1 ML: 10 INJECTION, SOLUTION INFILTRATION; PERINEURAL at 10:45

## 2025-02-06 RX ADMIN — METHYLPREDNISOLONE ACETATE 80 MG: 80 INJECTION, SUSPENSION INTRA-ARTICULAR; INTRALESIONAL; INTRAMUSCULAR; SOFT TISSUE at 10:45

## 2025-02-06 NOTE — PROGRESS NOTES
"      Chief Complaint  Knee Pain (Bilateral knee injections )    Subjective        Mireille Magallanes presents to [facility]    HPI    Patient here for the above problems.  See Assessment and Plan for further HPI components.      Review of Systems    Objective   Vital Signs:  /60 (BP Location: Left arm, Patient Position: Sitting, Cuff Size: Adult)   Pulse 80   Temp 97.4 °F (36.3 °C) (Temporal)   Ht 170.2 cm (67\")   Wt 89.8 kg (198 lb)   SpO2 97%   BMI 31.01 kg/m²   Estimated body mass index is 31.01 kg/m² as calculated from the following:    Height as of this encounter: 170.2 cm (67\").    Weight as of this encounter: 89.8 kg (198 lb).      Physical Exam  Vitals and nursing note reviewed.   Constitutional:       Appearance: He is not ill-appearing.   Eyes:      General: No scleral icterus.     Conjunctiva/sclera: Conjunctivae normal.   Pulmonary:      Effort: Pulmonary effort is normal. No respiratory distress.   Musculoskeletal:      Right knee: No deformity. Tenderness present.      Left knee: No deformity. Tenderness present.   Neurological:      General: No focal deficit present.      Mental Status: He is alert and oriented to person, place, and time.   Psychiatric:         Mood and Affect: Mood normal.         Behavior: Behavior normal.                       Assessment and Plan   Diagnoses and all orders for this visit:    1. Primary osteoarthritis of both knees (Primary)  -     Unable to find; Diclofenac 4%, Bupivacaine 2%, Gabapentin 10%, Ibuprofen 3%, Cyclobenzaprine 2%, with DMSO  Apply 1-2 grams (1-2 pumps) to affected area 3-4 times daily  Compounded thru [facility]  Dispense: 90 g; Refill: 2  -     methylPREDNISolone acetate (DEPO-medrol) injection 80 mg  -     lidocaine (XYLOCAINE) 1 % injection 1 mL  -     methylPREDNISolone acetate (DEPO-medrol) injection 80 mg  -     lidocaine (XYLOCAINE) 1 % injection 1 mL  -     Arthrocentesis  -     " Arthrocentesis      Injections still helping but duration is not as long and maximum benefit is less.  Will send in compounded pain cream to [facility].  If not improved this time, would recommend we refer to sports medicine to see if would be a candidate for PRP or gel injections.  I can make referral without patient being seen again for this between visits if he just wants to call or send me a message.        Arthrocentesis of Left Knee    Date/Time: 2/6/2025 12:18 PM    Performed by: Shadi García MD  Authorized by: Shadi García MD  Indications: pain   Body area: knee  Local anesthesia used: no    Anesthesia:  Local anesthesia used: no    Sedation:  Patient sedated: no    Preparation: Patient was prepped and draped in the usual sterile fashion.  Needle gauge: 21g.  Ultrasound guidance: no  Approach: medial  Patient tolerance: patient tolerated the procedure well with no immediate complications  Comments: 1mL lidocaine  80 mg kenelog        Arthrocentesis of Right Knee    Date/Time: 2/6/2025 12:19 PM    Performed by: Shadi García MD  Authorized by: Shadi García MD  Indications: pain   Body area: knee  Joint: right knee  Local anesthesia used: no    Anesthesia:  Local anesthesia used: no  Preparation: Patient was prepped and draped in the usual sterile fashion.  Needle gauge: 21g.  Approach: medial  Patient tolerance: patient tolerated the procedure well with no immediate complications  Comments: 1mL lidcoaine  80 mg depomedrol            Result Review :                             Follow Up   Return in about 3 months (around 5/6/2025), or if symptoms worsen or fail to improve, for follow up for above problems. Longitudinal care..  Patient was given instructions and counseling regarding his condition or for health maintenance advice. Please see specific information pulled into the AVS if appropriate.       YULIA García MD, FACP, [facility]      Electronically signed by Shadi Gamez  MD Ricky, 02/06/25, 12:18 PM CST.

## 2025-03-06 ENCOUNTER — PREP FOR SURGERY (OUTPATIENT)
Dept: OTHER | Facility: HOSPITAL | Age: 89
End: 2025-03-06
Payer: MEDICARE

## 2025-03-06 ENCOUNTER — CLINICAL SUPPORT NO REQUIREMENTS (OUTPATIENT)
Dept: CARDIOLOGY | Facility: CLINIC | Age: 89
End: 2025-03-06
Payer: MEDICARE

## 2025-03-06 DIAGNOSIS — R55 SYNCOPE, UNSPECIFIED SYNCOPE TYPE: ICD-10-CM

## 2025-03-06 DIAGNOSIS — I48.19 ATRIAL FIBRILLATION, PERSISTENT: Primary | ICD-10-CM

## 2025-03-06 DIAGNOSIS — I44.1 MOBITZ TYPE 2 SECOND DEGREE ATRIOVENTRICULAR BLOCK: ICD-10-CM

## 2025-03-06 DIAGNOSIS — Z95.0 PACEMAKER: Primary | ICD-10-CM

## 2025-03-06 RX ORDER — SODIUM CHLORIDE 9 MG/ML
40 INJECTION, SOLUTION INTRAVENOUS AS NEEDED
OUTPATIENT
Start: 2025-03-06

## 2025-03-06 RX ORDER — SODIUM CHLORIDE 0.9 % (FLUSH) 0.9 %
10 SYRINGE (ML) INJECTION AS NEEDED
OUTPATIENT
Start: 2025-03-06

## 2025-03-06 RX ORDER — SODIUM CHLORIDE 0.9 % (FLUSH) 0.9 %
10 SYRINGE (ML) INJECTION EVERY 12 HOURS SCHEDULED
OUTPATIENT
Start: 2025-03-06

## 2025-03-06 NOTE — PROGRESS NOTES
Dual Chamber Pacemaker Interrogation Report  IN OFFICE    March 6, 2025    Primary Cardiologist: Tre  : [employer] Model: [employer] MRI EL L131  Implant date: 1.8.2018    Reason for evaluation: Routine  Indication for pacemaker: Mobitz 2 heart block with resultant presyncope/syncope     Interrogation performed by:  Lois Landry RN    Measurements  Atrial sensing - P wave: 2.4 mV mV  Atrial threshold: N/P - AF ongoing  Atrial lead impedance: 702 ohms  Ventricular sensing - R wave: PACED mV  Ventricular threshold: 0.9 V @ 0.4 ms  Ventricular lead impedance:   562 ohms     Manual sensing and threshold testing performed:  Yes    Diagnostic Data  Atrial paced: 6 %  Ventricular paced: 99 %    Episodes/Alerts: AF burden 100% since 12.10.2024, average ventricular rates controlled, anticoagulated with Eliquis, denies any missed doses since initiation on 12.19.2024.  One episode of AF with RVR noted, rate 160 bpm, duration 18 seconds.  Patient reports 3 episodes of near syncope since December.  Reports hypotension since that time as well (100/50 mmHg with normal 120/70-80 mmHg).      Battery status: Satisfactory , estimated 6.5 years remaining      Final Parameters  Mode:  DDDR  Lower rate: 60 bpm   Upper rate: 130 bpm  AV Delay: paced- 120-180 ms  Jkusic-626-530 ms  Atrial - Amplitude: 5 V   Pulse width: 0.4 ms   Sensitivity: 0.25 mV - High output mode because in AF and can't measure atrial threshold.     Ventricular - Amplitude: 1.4 V  Pulse width: 0.4 ms  Sensitivity: 1.5 mV      Changes made: None.    Conclusions: Normal Pacemaker Function, Stable Pacing and Sensing Thresholds, Adequate Battery Reserve, and Atrial Fibrillation is New Since Last Evaluation    Remote Monitor:  Yes, connected.    Follow up: Pending discussion with Dr. Toledo.

## 2025-03-19 ENCOUNTER — HOSPITAL ENCOUNTER (OUTPATIENT)
Dept: CARDIOLOGY | Facility: HOSPITAL | Age: 89
Discharge: HOME | End: 2025-03-19
Payer: MEDICARE

## 2025-03-19 ENCOUNTER — ANESTHESIA (OUTPATIENT)
Dept: CARDIOLOGY | Facility: HOSPITAL | Age: 89
End: 2025-03-19
Payer: MEDICARE

## 2025-03-19 ENCOUNTER — ANESTHESIA EVENT (OUTPATIENT)
Dept: CARDIOLOGY | Facility: HOSPITAL | Age: 89
End: 2025-03-19
Payer: MEDICARE

## 2025-03-19 VITALS
TEMPERATURE: 97.6 F | DIASTOLIC BLOOD PRESSURE: 95 MMHG | HEART RATE: 68 BPM | SYSTOLIC BLOOD PRESSURE: 176 MMHG | HEIGHT: 69 IN | BODY MASS INDEX: 28.58 KG/M2 | OXYGEN SATURATION: 98 % | RESPIRATION RATE: 12 BRPM | WEIGHT: 193 LBS

## 2025-03-19 DIAGNOSIS — I48.19 ATRIAL FIBRILLATION, PERSISTENT: ICD-10-CM

## 2025-03-19 PROCEDURE — 93005 ELECTROCARDIOGRAM TRACING: CPT | Performed by: INTERNAL MEDICINE

## 2025-03-19 PROCEDURE — 25010000002 PROPOFOL 10 MG/ML EMULSION: Performed by: NURSE ANESTHETIST, CERTIFIED REGISTERED

## 2025-03-19 PROCEDURE — 92960 CARDIOVERSION ELECTRIC EXT: CPT

## 2025-03-19 PROCEDURE — 25010000002 LIDOCAINE PF 2% 2 % SOLUTION: Performed by: NURSE ANESTHETIST, CERTIFIED REGISTERED

## 2025-03-19 RX ORDER — SODIUM CHLORIDE 0.9 % (FLUSH) 0.9 %
10 SYRINGE (ML) INJECTION EVERY 12 HOURS SCHEDULED
Status: DISCONTINUED | OUTPATIENT
Start: 2025-03-19 | End: 2025-03-20 | Stop reason: HOSPADM

## 2025-03-19 RX ORDER — SODIUM CHLORIDE 9 MG/ML
40 INJECTION, SOLUTION INTRAVENOUS AS NEEDED
Status: DISCONTINUED | OUTPATIENT
Start: 2025-03-19 | End: 2025-03-20 | Stop reason: HOSPADM

## 2025-03-19 RX ORDER — LIDOCAINE HYDROCHLORIDE 20 MG/ML
INJECTION, SOLUTION EPIDURAL; INFILTRATION; INTRACAUDAL; PERINEURAL AS NEEDED
Status: DISCONTINUED | OUTPATIENT
Start: 2025-03-19 | End: 2025-03-19 | Stop reason: SURG

## 2025-03-19 RX ORDER — PROPOFOL 10 MG/ML
VIAL (ML) INTRAVENOUS AS NEEDED
Status: DISCONTINUED | OUTPATIENT
Start: 2025-03-19 | End: 2025-03-19 | Stop reason: SURG

## 2025-03-19 RX ORDER — SODIUM CHLORIDE 0.9 % (FLUSH) 0.9 %
10 SYRINGE (ML) INJECTION AS NEEDED
Status: DISCONTINUED | OUTPATIENT
Start: 2025-03-19 | End: 2025-03-20 | Stop reason: HOSPADM

## 2025-03-19 RX ADMIN — LIDOCAINE HYDROCHLORIDE 100 MG: 20 INJECTION, SOLUTION EPIDURAL; INFILTRATION; INTRACAUDAL; PERINEURAL at 10:29

## 2025-03-19 RX ADMIN — PROPOFOL 50 MG: 10 INJECTION, EMULSION INTRAVENOUS at 10:29

## 2025-03-19 NOTE — ANESTHESIA PREPROCEDURE EVALUATION
Anesthesia Evaluation     Patient summary reviewed and Nursing notes reviewed   no history of anesthetic complications:   NPO Solid Status: > 8 hours  NPO Liquid Status: > 8 hours           Airway   Mallampati: I  TM distance: >3 FB  Neck ROM: full  No difficulty expected  Dental      Pulmonary - negative pulmonary ROS   Cardiovascular   Exercise tolerance: poor (<4 METS)    (+) pacemaker pacemaker, hypertension, valvular problems/murmurs (s/p tavr) AS, CAD, CABG >6 Months, dysrhythmias Atrial Fib, Atrial Flutter, hyperlipidemia,  carotid artery disease    ROS comment: Echo  ·  Left ventricular systolic function is normal. Left ventricular ejection fraction appears to be 66 - 70%.  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 Mean transvalvular gradient 10 mmHg, with no evidence of paravalvular leak.  ·  Left ventricular wall thickness is consistent with mild septal asymmetric hypertrophy.  ·  Left ventricular diastolic function is consistent with (grade III w/high LAP) reversible restrictive pattern.  ·  Moderate mitral valve stenosis (mean gradient mmHg, at HR of 72 bpm) is present due to severe calcification of the posterior mitral annulus.  ·  The left atrial cavity is severely dilated.  ·  Estimated right ventricular systolic pressure from tricuspid regurgitation is mildly elevated (35-45 mmHg).  ·  Normal size and function of the right ventricle.  ·  Compared to prior exam from 5/26/2022, no significant change.    [employer] pacemaker, 98% rv paced      Neuro/Psych  (+) TIA, CVA, numbness  GI/Hepatic/Renal/Endo    (+) GERD, renal disease- CRI    Musculoskeletal     Abdominal    Substance History      OB/GYN          Other   arthritis,                   Anesthesia Plan    ASA 3     MAC     intravenous induction     Anesthetic plan, risks, benefits, and alternatives have been provided, discussed and informed consent has been obtained  with: patient.    CODE STATUS:

## 2025-03-19 NOTE — ANESTHESIA POSTPROCEDURE EVALUATION
"Patient: Mireille Magallanes    Procedure Summary       Date: 03/19/25 Room / Location: [facility] CATH LAB    Anesthesia Start: 1015 Anesthesia Stop: 1039    Procedure: CARDIOVERSION EXTERNAL IN CARDIOLOGY DEPARTMENT Diagnosis:       Atrial fibrillation, persistent      (symptomatic afib)    Scheduled Providers: Kaushik Toledo MD Provider: Marianna Maurer CRNA    Anesthesia Type: MAC ASA Status: 3            Anesthesia Type: MAC    Vitals  Vitals Value Taken Time   /95 03/19/25 12:01   Temp     Pulse 68 03/19/25 12:00   Resp 12 03/19/25 12:00   SpO2 98 % 03/19/25 12:00           Post Anesthesia Care and Evaluation    Patient location during evaluation: PHASE II  Patient participation: complete - patient participated  Level of consciousness: awake and awake and alert  Pain score: 0  Pain management: adequate    Airway patency: patent  Anesthetic complications: No anesthetic complications  PONV Status: none  Cardiovascular status: acceptable  Respiratory status: acceptable  Hydration status: acceptable    Comments: Patient discharged according to acceptable Aleyda score per RN assessment. See nursing records for further information.     Blood pressure 176/95, pulse 68, temperature 97.6 °F (36.4 °C), temperature source Temporal, resp. rate 12, height 175.3 cm (69\"), weight 87.5 kg (193 lb), SpO2 98%.      "

## 2025-03-20 LAB
QT INTERVAL: 484 MS
QT INTERVAL: 508 MS
QTC INTERVAL: 522 MS
QTC INTERVAL: 524 MS

## 2025-04-16 ENCOUNTER — HOSPITAL ENCOUNTER (OUTPATIENT)
Dept: CARDIOLOGY | Facility: HOSPITAL | Age: 89
Discharge: HOME | End: 2025-04-16
Admitting: NURSE PRACTITIONER
Payer: MEDICARE

## 2025-04-16 VITALS
SYSTOLIC BLOOD PRESSURE: 176 MMHG | BODY MASS INDEX: 28.58 KG/M2 | WEIGHT: 193 LBS | DIASTOLIC BLOOD PRESSURE: 95 MMHG | HEIGHT: 69 IN

## 2025-04-16 DIAGNOSIS — Z95.2 S/P TAVR (TRANSCATHETER AORTIC VALVE REPLACEMENT): ICD-10-CM

## 2025-04-16 PROCEDURE — 93306 TTE W/DOPPLER COMPLETE: CPT

## 2025-04-20 LAB
AORTIC DIMENSIONLESS INDEX: 0.52 (DI)
AV MEAN PRESS GRAD SYS DOP V1V2: 11.5 MMHG
AV VMAX SYS DOP: 213 CM/SEC
BH CV ECHO MEAS - AO MAX PG: 18.1 MMHG
BH CV ECHO MEAS - AO V2 VTI: 49.3 CM
BH CV ECHO MEAS - AVA(I,D): 1.97 CM2
BH CV ECHO MEAS - EDV(CUBED): 179.4 ML
BH CV ECHO MEAS - EDV(MOD-SP2): 106 ML
BH CV ECHO MEAS - EDV(MOD-SP4): 92.3 ML
BH CV ECHO MEAS - EF(MOD-SP2): 71.1 %
BH CV ECHO MEAS - EF(MOD-SP4): 60.3 %
BH CV ECHO MEAS - ESV(CUBED): 49.8 ML
BH CV ECHO MEAS - ESV(MOD-SP2): 30.6 ML
BH CV ECHO MEAS - ESV(MOD-SP4): 36.6 ML
BH CV ECHO MEAS - FS: 34.8 %
BH CV ECHO MEAS - IVS/LVPW: 0.92 CM
BH CV ECHO MEAS - IVSD: 1.43 CM
BH CV ECHO MEAS - LA DIMENSION: 4.9 CM
BH CV ECHO MEAS - LAT PEAK E' VEL: 7.4 CM/SEC
BH CV ECHO MEAS - LV DIASTOLIC VOL/BSA (35-75): 45.4 CM2
BH CV ECHO MEAS - LV MASS(C)D: 386.1 GRAMS
BH CV ECHO MEAS - LV MAX PG: 5.1 MMHG
BH CV ECHO MEAS - LV MEAN PG: 3 MMHG
BH CV ECHO MEAS - LV SYSTOLIC VOL/BSA (12-30): 18 CM2
BH CV ECHO MEAS - LV V1 MAX: 113 CM/SEC
BH CV ECHO MEAS - LV V1 VTI: 25.6 CM
BH CV ECHO MEAS - LVIDD: 5.6 CM
BH CV ECHO MEAS - LVIDS: 3.7 CM
BH CV ECHO MEAS - LVOT AREA: 3.8 CM2
BH CV ECHO MEAS - LVOT DIAM: 2.2 CM
BH CV ECHO MEAS - LVPWD: 1.56 CM
BH CV ECHO MEAS - MED PEAK E' VEL: 5.9 CM/SEC
BH CV ECHO MEAS - MR MAX PG: 129.2 MMHG
BH CV ECHO MEAS - MR MAX VEL: 568.3 CM/SEC
BH CV ECHO MEAS - MV A MAX VEL: 143 CM/SEC
BH CV ECHO MEAS - MV DEC SLOPE: 907.5 CM/SEC2
BH CV ECHO MEAS - MV DEC TIME: 0.22 SEC
BH CV ECHO MEAS - MV E MAX VEL: 200 CM/SEC
BH CV ECHO MEAS - MV E/A: 1.4
BH CV ECHO MEAS - MV MAX PG: 17.8 MMHG
BH CV ECHO MEAS - MV MEAN PG: 8 MMHG
BH CV ECHO MEAS - MV P1/2T: 69.1 MSEC
BH CV ECHO MEAS - MV V2 VTI: 51.8 CM
BH CV ECHO MEAS - MVA(P1/2T): 3.2 CM2
BH CV ECHO MEAS - MVA(VTI): 1.88 CM2
BH CV ECHO MEAS - PA V2 MAX: 97.8 CM/SEC
BH CV ECHO MEAS - RAP SYSTOLE: 3 MMHG
BH CV ECHO MEAS - RV MAX PG: 1.46 MMHG
BH CV ECHO MEAS - RV V1 MAX: 60.4 CM/SEC
BH CV ECHO MEAS - RVSP: 50.3 MMHG
BH CV ECHO MEAS - SV(LVOT): 97.3 ML
BH CV ECHO MEAS - SV(MOD-SP2): 75.4 ML
BH CV ECHO MEAS - SV(MOD-SP4): 55.7 ML
BH CV ECHO MEAS - SVI(LVOT): 47.8 ML/M2
BH CV ECHO MEAS - SVI(MOD-SP2): 37.1 ML/M2
BH CV ECHO MEAS - SVI(MOD-SP4): 27.4 ML/M2
BH CV ECHO MEAS - TAPSE (>1.6): 1.74 CM
BH CV ECHO MEAS - TR MAX PG: 47.3 MMHG
BH CV ECHO MEAS - TR MAX VEL: 344 CM/SEC
BH CV ECHO MEASUREMENTS AVERAGE E/E' RATIO: 30.08
BH CV XLRA - RV BASE: 4.2 CM
BH CV XLRA - RV MID: 2.16 CM
BH CV XLRA - TDI S': 9.4 CM/SEC
LEFT ATRIUM VOLUME INDEX: 73.4 ML/M2
LEFT ATRIUM VOLUME: 149 ML
LV EF BIPLANE MOD: 67.1 %

## 2025-04-25 DIAGNOSIS — I10 PRIMARY HYPERTENSION: Chronic | ICD-10-CM

## 2025-04-25 RX ORDER — AMLODIPINE BESYLATE 5 MG/1
TABLET ORAL
Qty: 90 TABLET | Refills: 3 | Status: SHIPPED | OUTPATIENT
Start: 2025-04-25

## 2025-05-15 ENCOUNTER — OFFICE VISIT (OUTPATIENT)
Dept: INTERNAL MEDICINE | Facility: CLINIC | Age: 89
End: 2025-05-15
Payer: MEDICARE

## 2025-05-15 VITALS
TEMPERATURE: 97.5 F | OXYGEN SATURATION: 97 % | SYSTOLIC BLOOD PRESSURE: 132 MMHG | HEART RATE: 67 BPM | WEIGHT: 192 LBS | BODY MASS INDEX: 28.44 KG/M2 | DIASTOLIC BLOOD PRESSURE: 74 MMHG | HEIGHT: 69 IN

## 2025-05-15 DIAGNOSIS — M17.0 PRIMARY OSTEOARTHRITIS OF BOTH KNEES: Primary | ICD-10-CM

## 2025-05-15 DIAGNOSIS — D50.9 IRON DEFICIENCY ANEMIA, UNSPECIFIED IRON DEFICIENCY ANEMIA TYPE: Chronic | ICD-10-CM

## 2025-05-15 DIAGNOSIS — I48.0 PAF (PAROXYSMAL ATRIAL FIBRILLATION): Chronic | ICD-10-CM

## 2025-05-15 DIAGNOSIS — N18.4 CHRONIC RENAL FAILURE, STAGE 4 (SEVERE): Chronic | ICD-10-CM

## 2025-05-15 DIAGNOSIS — I10 PRIMARY HYPERTENSION: Chronic | ICD-10-CM

## 2025-05-15 RX ORDER — ERGOCALCIFEROL 1.25 MG/1
50000 CAPSULE ORAL WEEKLY
COMMUNITY
Start: 2025-02-27

## 2025-05-19 ENCOUNTER — HOSPITAL ENCOUNTER (OUTPATIENT)
Dept: GENERAL RADIOLOGY | Facility: HOSPITAL | Age: 89
Discharge: HOME | End: 2025-05-19
Payer: MEDICARE

## 2025-05-19 ENCOUNTER — OFFICE VISIT (OUTPATIENT)
Age: 89
End: 2025-05-19
Payer: MEDICARE

## 2025-05-19 VITALS — HEIGHT: 69 IN | BODY MASS INDEX: 28.44 KG/M2 | WEIGHT: 192 LBS | RESPIRATION RATE: 18 BRPM

## 2025-05-19 DIAGNOSIS — Z79.01 CHRONIC ANTICOAGULATION: ICD-10-CM

## 2025-05-19 DIAGNOSIS — M25.562 PAIN IN BOTH KNEES, UNSPECIFIED CHRONICITY: ICD-10-CM

## 2025-05-19 DIAGNOSIS — M25.561 PAIN IN BOTH KNEES, UNSPECIFIED CHRONICITY: ICD-10-CM

## 2025-05-19 DIAGNOSIS — M17.0 PRIMARY OSTEOARTHRITIS OF BOTH KNEES: Primary | ICD-10-CM

## 2025-05-19 DIAGNOSIS — N18.4 CHRONIC RENAL FAILURE, STAGE 4 (SEVERE): Chronic | ICD-10-CM

## 2025-05-19 PROCEDURE — 73562 X-RAY EXAM OF KNEE 3: CPT

## 2025-05-19 PROCEDURE — 73565 X-RAY EXAM OF KNEES: CPT

## 2025-05-19 RX ORDER — LIDOCAINE HYDROCHLORIDE 10 MG/ML
2 INJECTION, SOLUTION INFILTRATION; PERINEURAL ONCE
Status: COMPLETED | OUTPATIENT
Start: 2025-05-19 | End: 2025-05-19

## 2025-05-19 RX ORDER — TRIAMCINOLONE ACETONIDE 40 MG/ML
40 INJECTION, SUSPENSION INTRA-ARTICULAR; INTRAMUSCULAR ONCE
Status: COMPLETED | OUTPATIENT
Start: 2025-05-19 | End: 2025-05-19

## 2025-05-19 RX ADMIN — TRIAMCINOLONE ACETONIDE 40 MG: 40 INJECTION, SUSPENSION INTRA-ARTICULAR; INTRAMUSCULAR at 10:39

## 2025-05-19 RX ADMIN — LIDOCAINE HYDROCHLORIDE 2 ML: 10 INJECTION, SOLUTION INFILTRATION; PERINEURAL at 10:39

## 2025-05-19 RX ADMIN — LIDOCAINE HYDROCHLORIDE 2 ML: 10 INJECTION, SOLUTION INFILTRATION; PERINEURAL at 10:38

## 2025-05-19 NOTE — PROGRESS NOTES
[facility]  Gopal Donovan MD, PhD  Robert Donovan PA-C    CHIEF COMPLAINT  Bilateral Knee (Patient presents today for Bilateral knee. Patient is requesting to try gel injection in the future. Patient hasn't had an injection in 3 months. Left worse than right. )       HISTORY OF PRESENT ILLNESS    History of Present Illness  The patient is an 89-year-old male, new patient, who presents for evaluation of bilateral knee pain. He has known osteoarthritis of the bilateral knees and has been receiving steroid injections from his primary care physician, Dr. García.    He reports experiencing more severe pain in his left knee compared to the right, although both knees are currently causing discomfort. This bilateral knee pain has been a recurring issue for several years. He has received three sessions of steroid injections from Dr. García, which provided some relief. However, after a period of several years without any injections, the pain returned. His last steroid injections were administered 3 months ago. He also mentions that his knees are particularly stiff upon waking in the morning, requiring him to perform some exercises before he can walk. Prolonged car rides also result in knee stiffness, necessitating a few minutes of exercise to regain mobility. He has previously tried hyaluronic acid injections but did not find them beneficial.    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 He has had heart surgery. He has borderline renal failure with a GFR of 15.    PAST SURGICAL HISTORY:  Heart surgery.    SOCIAL HISTORY  Marital Status:   Education Level: Eighth grade       HISTORY    Current Outpatient Medications   Medication Instructions    amLODIPine (NORVASC) 5 MG tablet TAKE 1 TABLET EVERY DAY (DOSE CHANGE)    apixaban (ELIQUIS) 2.5 mg, Oral, 2 Times Daily    atorvastatin (LIPITOR) 80 mg, Oral, Nightly     bumetanide (BUMEX) 1 mg, Daily PRN    coenzyme Q10 100 mg, Daily    ergocalciferol (ERGOCALCIFEROL) 50,000 Units, Weekly    ferrous sulfate 325 mg, Every Other Day    fish oil 1,000 mg, Daily With Breakfast    irbesartan (AVAPRO) 300 mg, Nightly    Lokelma 5 g, 3 Times Weekly    nitroglycerin (NITROSTAT) 0.4 mg, Sublingual, As Needed    pantoprazole (PROTONIX) 40 MG EC tablet Take 1 tablet by mouth once daily    polyethylene glycol (MIRALAX) 17 g, Oral, Daily    sodium bicarbonate 650 mg, 3 Times Daily    Unable to find Diclofenac 4%, Bupivacaine 2%, Gabapentin 10%, Ibuprofen 3%, Cyclobenzaprine 2%, with DMSO  Apply 1-2 grams (1-2 pumps) to affected area 3-4 times daily  Compounded thru [facility] [place]    Vitamin C 500 mg, Daily    Vitamin D3 1,000 Units, Daily         reports that he has quit smoking. His smoking use included cigarettes. He started smoking about 26 years ago. He has a 26.4 pack-year smoking history. He has never used smokeless tobacco. He reports that he does not drink alcohol and does not use drugs.    Past Medical History:   Diagnosis Date    Acute viral hepatitis A     Aortic valve stenosis     Carotid artery stenosis     Right    CKD (chronic kidney disease)     Coagulopathy     Plavix    Colon polyps     Coronary arteriosclerosis     stent ,? 4/2104, on plavix now.    Diarrhea     Dysphagia     Unspecified    Family history of colon cancer     Hematochezia     differential diagnosis discussed    Hemorrhoids     Unspecified    History of cardiac pacemaker 01/08/2018    [employer]    History of colonic polyps     Hyperlipidemia     Hypertension     Left ventricular hypertrophy     Melanoma of back     Obesity     Osteoarthritis     Other chest pain 03/06/2019    Pacemaker     Peripheral neuropathy     Stroke     TIA (transient ischemic attack)     Tobacco non-user         Past Surgical History:   Procedure Laterality Date    AORTIC VALVE REPAIR/REPLACEMENT Bilateral  04/20/2021    Procedure: Transfemoral Transcatheter Aortic Valve Replacement;  Surgeon: Kaushik Toledo MD;  Location:  [facility] HYBRID OR 12;  Service: Cardiovascular;  Laterality: Bilateral;    AORTIC VALVE REPAIR/REPLACEMENT Bilateral 04/20/2021    Procedure: TRANSFEMORAL TRANSCATHETER AORTIC VALVE REPLACEMENT;  Surgeon: Clayton Wilcox MD;  Location:  [facility] HYBRID OR 12;  Service: Cardiothoracic;  Laterality: Bilateral;    APPENDECTOMY      CARDIAC CATHETERIZATION      CARDIAC CATHETERIZATION N/A 03/18/2019    Procedure: Left Heart Cath;  Surgeon: Kaushik Toledo MD;  Location:  [facility] CATH INVASIVE LOCATION;  Service: Cardiology    CARDIAC CATHETERIZATION N/A 04/14/2021    Procedure: Left Heart Cath;  Surgeon: Kaushik Toledo MD;  Location:  [facility] CATH INVASIVE LOCATION;  Service: Cardiology;  Laterality: N/A;    CARDIAC ELECTROPHYSIOLOGY PROCEDURE N/A 01/05/2018    Procedure: Temporary Pacemaker;  Surgeon: Luis E Courtney MD;  Location:  [facility] CATH INVASIVE LOCATION;  Service:     CARDIAC ELECTROPHYSIOLOGY PROCEDURE N/A 01/08/2018    Procedure: Device Implant;  Surgeon: Kaushik Toledo MD;  Location:  [facility] CATH INVASIVE LOCATION;  Service:     CAROTID ENDARTERECTOMY Right 02/14/2020    Procedure: RIGHT CAROTID ENDARTERECTOMY WITH EEG;  Surgeon: Martín Woo DO;  Location: [facility] HYBRID OR 12;  Service: Vascular;  Laterality: Right;    CAROTID ENDARTERECTOMY Left 09/19/2022    CAROTID STENT      COLON SURGERY  1999    for Ischemic Colitis    COLONOSCOPY  05/07/2008    tubular adenoma ascending colon polyp - 3 yr    COLONOSCOPY  08/19/2003    colon polyp @ 24 cm-see path - 5 yr    COLONOSCOPY  08/11/2000    small interanl hemorrhoid - 3-5 yr    COLONOSCOPY  06/22/1998    (8)small colon polyps removed-see path, internal anal skin tag - 2-3 yr    COLONOSCOPY  06/13/2011    2 polyps, adenomatous, hyperplastic, internal hemorrhoids    COLONOSCOPY N/A 3/14/2023    Procedure: COLONOSCOPY WITH  ANESTHESIA;  Surgeon: Saravanan Floyd MD;  Location:  [facility] ENDOSCOPY;  Service: Gastroenterology;  Laterality: N/A;  Pre: anemia  Post: diverticulosis, hemorrhoids  Shadi García MD    CORONARY ARTERY BYPASS GRAFT      CORONARY STENT PLACEMENT      x1 2014 dr crane     ENDOSCOPY N/A 12/2/2022    Procedure: ESOPHAGOGASTRODUODENOSCOPY WITH ANESTHESIA;  Surgeon: Saravanan Floyd MD;  Location:  [facility] ENDOSCOPY;  Service: Gastroenterology;  Laterality: N/A;  pre screen  post normal  Dr. García    INGUINAL HERNIA REPAIR Right 9/8/2023    Procedure: RIGHT INGUINAL HERNIA REPAIR LAPAROSCOPIC WITH DAVINCI ROBOT WITH MESH;  Surgeon: Marianna Redd MD;  Location: [facility] OR;  Service: Robotics - DaVinci;  Laterality: Right;    INSERT / REPLACE / REMOVE PACEMAKER      OTHER SURGICAL HISTORY      Lithotripsy    OTHER SURGICAL HISTORY  06/2011    recall 3 yr.     PACEMAKER IMPLANTATION  01/08/2018    VASECTOMY          PHYSICAL EXAM  Constitutional: The patient is in no apparent distress and generally well-appearing. The patient hears me clearly and answers questions appropriately.   Musculoskeletal:  Knee Bilateral:  Antalgic gait   No effusion   No scars, no overlying skin abnormalities.   No redness, warmth, or signs of infection.   TENDERNESS: medial joint line  Nontender lateral joint line   Non-tender to palpation of patella, patellar tendon, tibial tubercle, pes anserine, fibular head, popliteal fossa, gastrocnemius, distal hamstring tendons.   Sensation grossly intact about the knee and lower extremity without allodynia.   Crepitus PRESENT with extension/flexion.   Active ROM extension/flexion: 0-135.  MILD pain on terminal extension or flexion.   Normal patella position.   Strength 5/5 w/ resisted knee extension and flexion.   Lower leg compartments soft, non-erythematous, with no signs of DVT or compartment syndrome.      IMAGING    XR Knee Bilateral AP Standing  Result Date: 5/19/2025  Narrative:  "EXAMINATION: XR KNEE BILATERAL AP STANDING- 5/19/2025 9:08 AM  HISTORY: knee pain; M25.561-Pain in right knee; M25.562-Pain in left knee.  REPORT: An AP view of both knees was obtained with the patient bearing weight.  COMPARISON: There are no correlative imaging studies for comparison.  There is marked narrowing of the medial compartments, slightly greater on the left, the lateral compartments are preserved. No fracture is identified. There is abundant vascular calcification. The soft tissues appear within normal limits.      Impression: Advanced osteoarthritis of the medial compartments of both knees, greater on the left.  This report was signed and finalized on 5/19/2025 9:09 AM by Dr. Nhan Betancourt MD.      XR Knee 3+ View With Alex Right  Result Date: 5/19/2025  Narrative: EXAMINATION: XR KNEE 3+ VW W SUNRISE RIGHT-  5/19/2025 7:41 AM  HISTORY: right knee pain; M25.561-Pain in right knee; M25.562-Pain in left knee  Images are stored in PACS per institutional protocol.  3 view RIGHT knee exam.  Moderate patellofemoral degenerative joint change. Patellar spurring is greatest at the upper pole and along the lateral margin.  Moderate narrowing and spurring of the medial tibiofemoral compartment.  No fracture or dislocation. No joint effusion.  Diffuse arterial calcification noted.  Gleamer BoneView preliminary results of \"no fracture\" noted.      Impression: 1. Moderate degenerative change of the anterior and medial compartment.    This report was signed and finalized on 5/19/2025 9:07 AM by Dr. Franco Christy MD.      XR Knee 3+ View With Sunrise Left  Result Date: 5/19/2025  Narrative: EXAMINATION: XR KNEE 3+ VW W SUNRISE LEFT-  5/19/2025 7:42 AM  HISTORY: left knee pain; M25.561-Pain in right knee; M25.562-Pain in left knee  Images are stored in PACS per institutional protocol.  3 view LEFT knee exam.  Moderate narrowing and spurring of the medial tibial femoral compartment. Mild to moderate patellofemoral " "spurring.  No joint effusion.  Diffuse arterial calcification.  No fracture or dislocation.  Gleamer BoneView preliminary results of \"no fracture\" noted.      Impression: 1. Moderate degenerative change of the patellofemoral and medial tibiofemoral compartment.    This report was signed and finalized on 5/19/2025 9:04 AM by Dr. Franco Christy MD.         Results  Labs   - GFR: 15    Imaging  X-rays above personally reviewed and interpreted.    - X-ray of the knees: Advanced osteoarthritis of the medial compartment of both knees, greater on the left. Calcification in the right artery and blood vessel observed.       ASSESSMENT & PLAN  Diagnoses and all orders for this visit:    1. Primary osteoarthritis of both knees (Primary)  -     Visco Treatment; Future  -     Sodium Hyaluronate injection 60 mg  -     Sodium Hyaluronate injection 60 mg  -     lidocaine (XYLOCAINE) 1 % injection 2 mL  -     triamcinolone acetonide (KENALOG-40) injection 40 mg  -     lidocaine (XYLOCAINE) 1 % injection 2 mL  -     triamcinolone acetonide (KENALOG-40) injection 40 mg    2. Chronic renal failure, stage 4 (severe)    3. Chronic anticoagulation    4. Pain in both knees, unspecified chronicity  -     XR Knee 3+ View With Sunrise Left; Future  -     XR Knee Bilateral AP Standing; Future  -     XR Knee 3+ View With Sunrise Right; Future    Patient has known osteoarthritis of the bilateral knees, fairly severe.  However with his chronic kidney disease he cannot take NSAIDs.  He has been getting intermittent corticosteroid injections and it has been right at 3 months and he would like to repeat those.  However he is also looking at other options.  We will check on gel injections.    Patient has had symptoms for years which interfere with activities of daily living such as sleeping, walking, standing. Osteoarthritis of the knee has been confirmed on imaging (x-ray). There is no indication based on my assessment that this pain is coming from " "other causes such as inflammatory arthritis, referred pain, or bursitis. Patient has tried at least 3 months of non-pharmacologic therapy including home exercise program, education, weight loss (if applicable), bracing, and/or physical therapy. Patient has tried and failed simple analgesics such as acetaminophen and NSAIDs (or these medications are otherwise contraindicated). They have had a trial of a corticosteroid injection into the knee joint.    Patient had knee steroid injections on 2/6/2025 with his PCP.  He has been prescribed Rx alternatives topical pain cream as well.  With patient's history of renal failure and chronic anticoagulation he should avoid NSAIDs.  Between the patient's age and his medical comorbidities I do not think elective surgical intervention would be the best route for him.    General treatment of knee osteoarthritis:  NSAIDs if no contraindications (OTC ibuprofen/Advil or naproxen/Aleve, or Rx meloxicam/Mobic, celecoxib/Celebrex)  a. Consider addition of PPI if increased risk of Gl side effects   2. Topical NSAID (e.g. diclofenac 1% gel/Voltaren) - likely no additional benefit if using PO NSAIDs, but can be used an an alternative if CKD, GI side effects, etc.   a. Prescription compounded topical analgesics also an option (e.g. [facility])  3. Exercise   4. Physical therapy for strengthening around joint, improving balance and gait, and to optimize effectiveness of exercise program   5. Weight management - goal of 5-10% body weight loss   a. Drug therapy (e.g. Ozempic) considered for BMI >30 or BMI 27-29.9 w/ weight-related comorbidity  b. Bariatric surgery candidates = BMI >40 or BMI 35-39.9 w/ at least 1 serious comorbidity, who have not met weight loss goals otherwise   6. Bracing   a. Custom fit  braces may be an option but require specific indications and may not be covered by insurance  7. Intraarticular injection of steroid (\"cortisone\" shot) for acute pain relief - " "no more often than once every 3 months, preferably less  8. Viscosupplementation (\"gel\") injections    Other options lacking efficacy or of uncertain benefit:   1.Glucosamine/chondroitin   2. Acetaminophen   3.Curcumin   5.Intraarticular PRP   6.TENS   7.Acupuncture       Bilateral knee steroid injections today  Check on visco  Follow up: pending visco approval    Knee Injection Procedure Note    Bilateral knee injection was discussed with the patient in detail, including indication, risks, benefits, and alternatives.  Risks include but are not limited to: incomplete symptom resolution, injection site pain, local irritation, bleeding, infection, allergic reaction, elevated blood pressure and blood sugar.  Verbal consent was given for the procedure.  Injection site was identified by physical examination and cleaned with Chloraprep and alcohol swabs. Prior to needle insertion, ethyl chloride spray was used for surface anesthesia.  A 22-gauge, 1.5\" needle was directed to the joint from a(n) lateral and inferior approach. Injectate was passed into the joint space without difficulty. The needle was removed and a simple bandage was applied. The procedure was tolerated well without difficulty.    Injection mixture:  1% plain lidocaine: 2 mL  40 mg/mL triamcinolone acetonide: 1 mL      Patient or patient representative verbalized consent for the use of Ambient Listening during the visit with  Gopal Donovan MD for chart documentation. 5/19/2025  09:29 CDT      This document has been signed by Gopal Donovan MD on May 19, 2025 17:24 CDT    "

## 2025-05-23 NOTE — PROGRESS NOTES
"      Chief Complaint  Hypertension and knee injections (Bilateral knee injections/Discuss going to see other provided that had previously been discussed )    Subjective        Mireille Magallanes presents to [facility]    HPI    Patient here for the above problems.  See Assessment and Plan for further HPI components.      Review of Systems    Objective   Vital Signs:  /74 (BP Location: Left arm, Patient Position: Sitting, Cuff Size: Adult)   Pulse 67   Temp 97.5 °F (36.4 °C) (Temporal)   Ht 175.3 cm (69\")   Wt 87.1 kg (192 lb)   SpO2 97%   BMI 28.35 kg/m²   Estimated body mass index is 28.35 kg/m² as calculated from the following:    Height as of this encounter: 175.3 cm (69\").    Weight as of this encounter: 87.1 kg (192 lb).      Physical Exam  Vitals and nursing note reviewed.   Constitutional:       Appearance: He is not ill-appearing.   Eyes:      General: No scleral icterus.     Conjunctiva/sclera: Conjunctivae normal.   Pulmonary:      Effort: Pulmonary effort is normal. No respiratory distress.   Neurological:      General: No focal deficit present.      Mental Status: He is alert and oriented to person, place, and time.   Psychiatric:         Mood and Affect: Mood normal.         Behavior: Behavior normal.                     Assessment and Plan   Diagnoses and all orders for this visit:    1. Primary osteoarthritis of both knees (Primary)  -     Ambulatory Referral to Sports Medicine    2. Chronic renal failure, stage 4 (severe)    3. Primary hypertension    4. PAF (paroxysmal atrial fibrillation)        History of Present Illness  The patient presents today for a follow-up visit.    He reports experiencing pain in both knees following his last injection, which was a new occurrence. The pain began around 6:00 PM on the day of the injection and persisted throughout the night. By the following morning, the pain had slightly subsided. He also mentions that his knees are " sore today, but the discomfort is not severe. He has a long-standing history of knee issues.    He has a scheduled appointment with his nephrologist in 2 weeks. He had a consultation with Dr. Toledo on 03/19/2025, during which his heart rhythm was successfully restored. No modifications were made to his medication regimen at that time.      Assessment & Plan  Bilateral knee pain.  Reports that the injections are no longer effective and caused significant pain after the last administration. Blood pressure readings are within the normal range today. Referral to Dr. Donovan will be initiated for further evaluation and potential treatment options, including gel injections or ultrasound-guided needle placement to enhance medication delivery. Advised to see Dr. Donovan for further management of knee pain.    Patient has CKD stage 4.  Patient follows with nephrology.  Patient on lokelma for hyperklalemia.  Patient on sodium bicarbonate.  Monitor BP and keep BP controlled to avoid worsening of renal failure.  Avoid nephrotoxins.  Avoid NSAIDs.    Patient has hypertension.  Continue amlodipine 5 mg, irbesartan 400 mg nightly.  Continue this regimen.  Monitor ambulatory BP.     Patient has atrial fibrillation, paroxysmal.  Recently had a DCCV with Dr. Toledo on 3/19/2025.  The patient on eliquis 2.5 mg (dose reduction due to age and Cr >1.5).      Health maintenance.  Blood pressure readings are within the normal range today. Advised to receive the Tdap vaccine at his local pharmacy and to record the date of administration for future reference. Encouraged to consider the shingles vaccine, although it is of lower priority compared to the Tdap vaccine. Reminded to inform his nephrologist to forward his lab results to our office following his upcoming appointment in 2 weeks.    Follow-up  The patient will follow up in 6 months.        Result Review :           BMI is >= 25 and <30. (Overweight) The following options were offered after  discussion;: none (medical contraindication)                  Follow Up   Return in about 3 months (around 8/15/2025), or if symptoms worsen or fail to improve, for follow up for above problems. [facility] care., Medicare Wellness - Labs prior to visit.  Patient was given instructions and counseling regarding his condition or for health maintenance advice. Please see specific information pulled into the AVS if appropriate.       YULIA García MD, FACP, [facility]      Electronically signed by Shadi García MD, 05/23/25, 12:59 PM CDT.    Patient or patient representative verbalized consent for the use of Ambient Listening during the visit with  Shadi García MD for chart documentation. 5/23/2025  13:08 CDT

## 2025-07-09 ENCOUNTER — OFFICE VISIT (OUTPATIENT)
Dept: CARDIOLOGY | Facility: CLINIC | Age: 89
End: 2025-07-09
Payer: MEDICARE

## 2025-07-09 VITALS
OXYGEN SATURATION: 98 % | BODY MASS INDEX: 28.29 KG/M2 | WEIGHT: 191 LBS | HEART RATE: 68 BPM | HEIGHT: 69 IN | DIASTOLIC BLOOD PRESSURE: 82 MMHG | SYSTOLIC BLOOD PRESSURE: 134 MMHG

## 2025-07-09 DIAGNOSIS — I48.0 PAF (PAROXYSMAL ATRIAL FIBRILLATION): Chronic | ICD-10-CM

## 2025-07-09 DIAGNOSIS — Z95.0 PACEMAKER: ICD-10-CM

## 2025-07-09 DIAGNOSIS — I25.810 CORONARY ARTERY DISEASE INVOLVING CORONARY BYPASS GRAFT OF NATIVE HEART WITHOUT ANGINA PECTORIS: Primary | ICD-10-CM

## 2025-07-09 DIAGNOSIS — Z95.2 S/P TAVR (TRANSCATHETER AORTIC VALVE REPLACEMENT): ICD-10-CM

## 2025-07-09 PROCEDURE — 1160F RVW MEDS BY RX/DR IN RCRD: CPT | Performed by: NURSE PRACTITIONER

## 2025-07-09 PROCEDURE — 1159F MED LIST DOCD IN RCRD: CPT | Performed by: NURSE PRACTITIONER

## 2025-07-09 PROCEDURE — 99214 OFFICE O/P EST MOD 30 MIN: CPT | Performed by: NURSE PRACTITIONER

## 2025-07-09 PROCEDURE — 93000 ELECTROCARDIOGRAM COMPLETE: CPT | Performed by: NURSE PRACTITIONER

## 2025-07-09 RX ORDER — PATIROMER 8.4 G/1
8.4 POWDER, FOR SUSPENSION ORAL ONCE
COMMUNITY

## 2025-07-09 NOTE — PROGRESS NOTES
Subjective:     Encounter Date: 7/9/2025      Patient ID: Mireille Magallanes is a 89 y.o. male.    Chief Complaint: Follow-up CAD, valvular disease status post TAVR, atrial fibrillation, heart block status post pacemaker  History of Present Illness  The patient presents for follow-up of coronary disease, valvular disease, atrial fibrillation, status post pacemaker for heart block.     Echocardiogram in 04/2024 that shows preserved LVEF with TAVR having normal appearance including a transvalvular gradient of 8, which is normal and no evidence of paravalvular leak. He does still have moderate mitral stenosis with functional mitral annular calcification. His pacemaker continues to be followed without any issues noted.     He followed up June 2024 and his chronic symptoms were stable.  He does have mild intermittent chest discomfort predominantly on the right side that might last 4 to 5 minutes at a time.  He was not requiring nitroglycerin.  Symptoms would improve with rest.  Exertional dyspnea was stable.  It is noted that he is not anticoagulated due to prior significant bleeding on both Xarelto and Pradaxa.    His most recent stroke was in May 2023.    Watchman device implantation has been discussed with him before, though this has not yet been pursued given his advanced kidney disease and very low burden of atrial fibrillation on device interrogations.  Dr. Toledo had a thorough discussion with him regarding the possibility of Watchman device implantation if his atrial fibrillation on device interrogation were to increase.    The patient follows with Dr. Martínez for his kidney disease which is stage IV vs ESRD.      I saw the patient December 2024.  He reported a gradual weight gain and chronic leg swelling and chronic exertional dyspnea with moderate exertion.  Symptoms were not worsening compared to the prior visit.  He was not having any significant chest pain.  He was not taking Bumex.  No changes were  made.    He did have his yearly echocardiogram in April with stable findings.  See below.    Device interrogations led to the discovery that the day after the visit with me in December he went into persistent rate controlled atrial fibrillation.  According to phone conversations he was not symptomatic.  He was placed on Eliquis for anticoagulation and aspirin was discontinued and he was successfully cardioverted by Dr. Toledo on 3/19.    Today the patient states he feels well from a cardiac perspective.  He denies any worsening in his breathing.  He denies swelling, chest pain, orthopnea, PND, palpitations, syncope or near syncope.  Blood pressure is well-controlled.  He states he is tolerating the Eliquis (previously had problems on Xarelto) without any bleeding problems.  His primary concern continues to be his renal failure.  He states so far he is having adequate urine output and is not noticing symptoms from this but he has decided he does not want to pursue dialysis.  He continues to follow closely with nephrology.  He states he does not take his Bumex.  He denies any significant change in the way he feels after cardioversion.        The following portions of the patient's history were reviewed and updated as appropriate: allergies, current medications, past family history, past medical history, past social history, past surgical history, and problem list.    Review of Systems   Constitutional: Negative for malaise/fatigue.   Cardiovascular:  Positive for dyspnea on exertion (chronic, stable.). Negative for chest pain, claudication, leg swelling, near-syncope, orthopnea, palpitations, paroxysmal nocturnal dyspnea and syncope.   Respiratory:  Negative for cough.    Hematologic/Lymphatic: Does not bruise/bleed easily.   Musculoskeletal:  Negative for falls.   Gastrointestinal:  Negative for bloating.   Neurological:  Negative for dizziness, light-headedness and weakness.           Current Outpatient Medications:      amLODIPine (NORVASC) 5 MG tablet, TAKE 1 TABLET EVERY DAY (DOSE CHANGE), Disp: 90 tablet, Rfl: 3    apixaban (ELIQUIS) 2.5 MG tablet tablet, Take 1 tablet by mouth 2 (Two) Times a Day., Disp: 60 tablet, Rfl: 11    Ascorbic Acid (Vitamin C) 500 MG capsule, Take 500 mg by mouth Daily., Disp: , Rfl:     atorvastatin (LIPITOR) 80 MG tablet, TAKE 1 TABLET BY MOUTH ONCE DAILY AT NIGHT, Disp: 90 tablet, Rfl: 3    bumetanide (BUMEX) 1 MG tablet, Take 1 tablet by mouth Daily As Needed., Disp: , Rfl:     Cholecalciferol (Vitamin D3) 25 MCG (1000 UT) capsule, Take 1 capsule by mouth Daily., Disp: , Rfl:     coenzyme Q10 100 MG capsule, Take 1 capsule by mouth Daily., Disp: , Rfl:     ergocalciferol (ERGOCALCIFEROL) 1.25 MG (97327 UT) capsule, Take 1 capsule by mouth 1 (One) Time Per Week., Disp: , Rfl:     ferrous sulfate 325 (65 Fe) MG tablet, Take 1 tablet by mouth Every Other Day., Disp: , Rfl:     irbesartan (AVAPRO) 300 MG tablet, Take 1 tablet by mouth Every Night., Disp: , Rfl:     nitroglycerin (NITROSTAT) 0.4 MG SL tablet, Place 1 tablet under the tongue As Needed for Chest Pain., Disp: 30 tablet, Rfl: 11    Omega-3 Fatty Acids (fish oil) 1000 MG capsule capsule, Take 1 capsule by mouth Daily With Breakfast., Disp: , Rfl:     pantoprazole (PROTONIX) 40 MG EC tablet, Take 1 tablet by mouth once daily, Disp: 90 tablet, Rfl: 3    Patiromer Sorbitex Calcium (Veltassa) 8.4 g pack, Take 1 packet by mouth 1 (One) Time., Disp: , Rfl:     polyethylene glycol 17 g packet, Take 17 g by mouth Daily., Disp: , Rfl:     sodium bicarbonate 650 MG tablet, Take 1 tablet by mouth 3 (Three) Times a Day., Disp: , Rfl:     Unable to find, Diclofenac 4%, Bupivacaine 2%, Gabapentin 10%, Ibuprofen 3%, Cyclobenzaprine 2%, with DMSO Apply 1-2 grams (1-2 pumps) to affected area 3-4 times daily Compounded thru [facility] [place], Disp: 90 g, Rfl: 2    sodium zirconium cyclosilicate (Lokelma) 5 g packet, Take 5 g by mouth 3  (Three) Times a Week. Empty entire contents of the packet(s) into a glass with at least 3 tablespoons (45 mL) of water. Stir well and drink immediately; if powder remains in the glass, add water, stir and drink immediately; repeat until no powder remains. Administer other oral medications at least 2 hours before or 2 hours after dose. (Patient not taking: Reported on 7/9/2025), Disp: , Rfl:     Current Facility-Administered Medications:     Sodium Hyaluronate injection 60 mg, 60 mg, Intra-articular, Once, Gopal Donovan MD    Sodium Hyaluronate injection 60 mg, 60 mg, Intra-articular, Once, Gopal Donovan MD       Objective:      Vitals:    07/09/25 0910   BP: 134/82   Pulse: 68   SpO2: 98%   Weight - 191 lbs    Vitals and nursing note reviewed.   Constitutional:       General: Not in acute distress.     Appearance: Well-developed and not in distress. Not diaphoretic.   Neck:      Vascular: No JVD or JVR. JVD normal.   Pulmonary:      Effort: Pulmonary effort is normal. No respiratory distress.      Breath sounds: Normal breath sounds.   Cardiovascular:      Normal rate. Regular rhythm.      Murmurs: There is no murmur.   Edema:     Peripheral edema absent.   Abdominal:      Tenderness: There is no abdominal tenderness.   Skin:     General: Skin is warm and dry.   Neurological:      Mental Status: Alert, oriented to person, place, and time and oriented to person, place and time.       Physical Exam      Lab Review:   Lab Results   Component Value Date    GLUCOSE 87 12/02/2024    BUN 39 (H) 12/02/2024    CREATININE 3.68 (H) 12/02/2024     12/02/2024    K 5.8 (H) 12/02/2024     (H) 12/02/2024    CALCIUM 8.6 12/02/2024    PROTEINTOT 6.5 12/02/2024    ALBUMIN 3.9 12/02/2024    ALT 13 12/02/2024    AST 17 12/02/2024    ALKPHOS 99 12/02/2024    BILITOT 0.5 12/02/2024    GLOB 2.6 12/02/2024    AGRATIO 1.5 12/02/2024    BCR 10.6 12/02/2024    ANIONGAP 10.0 09/21/2023    EGFR 15.3 (L) 12/02/2024         Lab Results   Component Value Date    CHOL 113 05/26/2022    CHLPL 126 07/29/2024    TRIG 104 07/29/2024    HDL 39 (L) 07/29/2024    LDL 68 07/29/2024        Lab Results   Component Value Date    HGBA1C 5.50 05/26/2022        Lab Results   Component Value Date    WBC 5.87 12/02/2024    HGB 11.6 (L) 12/02/2024    HCT 35.4 (L) 12/02/2024    MCV 93.9 12/02/2024     12/02/2024            ECG 12 Lead    Date/Time: 7/9/2025 11:52 AM  Performed by: Zena Fischer APRN    Authorized by: Zena Fischer APRN  Comparison: compared with previous ECG from 3/19/2025  Similar to previous ECG  Comparison to previous ECG: V-paced  Rhythm: sinus rhythm and paced  BPM: 70    Clinical impression: abnormal EKG        Results        Results for orders placed during the hospital encounter of 04/16/25    Adult Transthoracic Echo Complete w/ Color, Spectral and Contrast if necessary per protocol 04/20/2025 10:26 PM    Interpretation Summary    Left ventricular systolic function is normal. Left ventricular ejection fraction appears to be 61 - 65%.    There is a TAVR valve present - Gutierrez S3 29 mm TAVR valve has normal appearance and is in excellent position. Mean transvalvular gradient 12 mmHg (which is acceptable), with no evidence of paravalvular leak.    Left ventricular wall thickness is consistent with moderate concentric hypertrophy.    Moderate mitral valve stenosis is present with functional MAC (mean gradient 8 mmHg, the mitral valve area by pressure half-time is normal).    Left ventricular diastolic function is consistent with (grade II w/high LAP) pseudonormalization.    The left atrial cavity is severely dilated.    Estimated right ventricular systolic pressure from tricuspid regurgitation is moderately elevated (45-55 mmHg).    Normal size and function of the right ventricle.    Compared to previous exam from 4/23/2024, no significant change.      Assessment/Plan:     Problem List Items Addressed This Visit           Cardiac and Vasculature    PAF (paroxysmal atrial fibrillation) (Chronic)    Overview            Coronary artery disease involving coronary bypass graft of native heart without angina pectoris - Primary    Pacemaker    Overview   [employer] DC PPM implanted 1/8/18 for high degree AVB (was 2nd degree, Mobitz II then quickly progressed to 3rd degree with syncope)         S/P TAVR (transcatheter aortic valve replacement)    Overview   29mm S3 valve 4/20/21          Assessment & Plan  1.  Paroxysmal atrial fibrillation and flutter: stable.  Now maintaining sinus rhythm.  He did require cardioversion in March for persistent rate controlled atrial fibrillation.  At this point it is unclear but he does not seem to have had significant symptomatic improvement following restoration of normal sinus rhythm.  He has previously had problems on Xarelto but so far is tolerating Eliquis 2.5 mg twice daily without any bleeding issues.  Per review of device interrogations he has not had any significant episodes of atrial fibrillation since the cardioversion in March.  He continues to decline consideration for Watchman device implantation at this time-see details in last office note.  Dr. Toledo has had thorough discussions with him regarding this option in the past.  He continues to state his primary concern is his kidneys and he does not want to pursue this procedure since he is tolerating Eliquis.    2.  Coronary artery disease: Established problem, stable.  No angina.  Continue high intensity statin.  Nuclear stress test November 2022 was low risk.  LDL is well controlled.    Ranexa was previously stopped because he did not have improvement with this and it was discovered his prior symptoms were from severe anemia.  He is no longer requiring aspirin due to being anticoagulated with Eliquis.  If Eliquis is held for surgeries or procedures he should take aspirin 81 mg daily on the days he is without Eliquis.     3.  Status  post TAVR: Stable.  Stable findings on most recent annual echo from April.  See above.  -As noted above he is no longer on aspirin due to requiring Eliquis.  -SBE prophylaxis prior to dental procedures     4.  Hypertension: Well-controlled.  Continue current medications as listed above.     5.  Complete heart block with dual-chamber pacemaker in place-stable.  Continue routine device interrogations.  Most recent device interrogation from late May reviewed-3% atrial pacing, 99% RV pacing, normal device function, satisfactory battery life, 3 ATR episodes- longest 9 seconds.    Follow-up in 6 months with Dr. Toledo, call sooner with symptoms or concerns

## 2025-08-25 ENCOUNTER — OFFICE VISIT (OUTPATIENT)
Dept: INTERNAL MEDICINE | Facility: CLINIC | Age: 89
End: 2025-08-25
Payer: MEDICARE

## 2025-08-25 VITALS
HEART RATE: 75 BPM | DIASTOLIC BLOOD PRESSURE: 62 MMHG | HEIGHT: 69 IN | WEIGHT: 192 LBS | OXYGEN SATURATION: 98 % | BODY MASS INDEX: 28.44 KG/M2 | TEMPERATURE: 98.1 F | SYSTOLIC BLOOD PRESSURE: 124 MMHG

## 2025-08-25 DIAGNOSIS — E78.2 MIXED HYPERLIPIDEMIA: Chronic | ICD-10-CM

## 2025-08-25 DIAGNOSIS — N18.4 CHRONIC RENAL FAILURE, STAGE 4 (SEVERE): Chronic | ICD-10-CM

## 2025-08-25 DIAGNOSIS — E87.5 HYPERKALEMIA: Primary | ICD-10-CM

## 2025-08-25 DIAGNOSIS — I87.2 VENOUS INSUFFICIENCY OF BOTH LOWER EXTREMITIES: ICD-10-CM

## 2025-08-25 DIAGNOSIS — I10 PRIMARY HYPERTENSION: Chronic | ICD-10-CM

## 2025-08-25 DIAGNOSIS — M17.0 PRIMARY OSTEOARTHRITIS OF BOTH KNEES: ICD-10-CM

## 2025-08-25 DIAGNOSIS — I48.0 PAF (PAROXYSMAL ATRIAL FIBRILLATION): Chronic | ICD-10-CM

## 2025-08-25 PROCEDURE — 1170F FXNL STATUS ASSESSED: CPT | Performed by: INTERNAL MEDICINE

## 2025-08-25 PROCEDURE — 99214 OFFICE O/P EST MOD 30 MIN: CPT | Performed by: INTERNAL MEDICINE

## 2025-08-25 PROCEDURE — 1126F AMNT PAIN NOTED NONE PRSNT: CPT | Performed by: INTERNAL MEDICINE

## 2025-08-25 PROCEDURE — 1160F RVW MEDS BY RX/DR IN RCRD: CPT | Performed by: INTERNAL MEDICINE

## 2025-08-25 PROCEDURE — G2211 COMPLEX E/M VISIT ADD ON: HCPCS | Performed by: INTERNAL MEDICINE

## 2025-08-25 PROCEDURE — G0439 PPPS, SUBSEQ VISIT: HCPCS | Performed by: INTERNAL MEDICINE

## 2025-08-25 PROCEDURE — 1159F MED LIST DOCD IN RCRD: CPT | Performed by: INTERNAL MEDICINE

## 2025-08-25 RX ORDER — PATIROMER 8.4 G/1
16.8 POWDER, FOR SUSPENSION ORAL DAILY
Start: 2025-08-25

## 2025-08-26 LAB
BUN SERPL-MCNC: 32 MG/DL (ref 8–23)
BUN/CREAT SERPL: 8.8 (ref 7–25)
CALCIUM SERPL-MCNC: 8.5 MG/DL (ref 8.6–10.5)
CHLORIDE SERPL-SCNC: 108 MMOL/L (ref 98–107)
CO2 SERPL-SCNC: 18.5 MMOL/L (ref 22–29)
CREAT SERPL-MCNC: 3.64 MG/DL (ref 0.76–1.27)
EGFRCR SERPLBLD CKD-EPI 2021: 15.5 ML/MIN/1.73
GLUCOSE SERPL-MCNC: 86 MG/DL (ref 65–99)
MAGNESIUM SERPL-MCNC: 1.5 MG/DL (ref 1.6–2.4)
POTASSIUM SERPL-SCNC: 4.9 MMOL/L (ref 3.5–5.2)
SODIUM SERPL-SCNC: 142 MMOL/L (ref 136–145)

## 2025-08-27 LAB
MC_CV_MDC_IDC_RATE_1: 160
MC_CV_MDC_IDC_ZONE_ID: 1
MDC_IDC_MSMT_BATTERY_REMAINING_LONGEVITY: 90 MO
MDC_IDC_MSMT_BATTERY_REMAINING_PERCENTAGE: 91 %
MDC_IDC_MSMT_BATTERY_STATUS: NORMAL
MDC_IDC_MSMT_LEADCHNL_RA_DTM: NORMAL
MDC_IDC_MSMT_LEADCHNL_RA_IMPEDANCE_VALUE: 671
MDC_IDC_MSMT_LEADCHNL_RA_PACING_THRESHOLD_AMPLITUDE: 0.7
MDC_IDC_MSMT_LEADCHNL_RA_PACING_THRESHOLD_POLARITY: NORMAL
MDC_IDC_MSMT_LEADCHNL_RA_PACING_THRESHOLD_PULSEWIDTH: 0.4
MDC_IDC_MSMT_LEADCHNL_RA_SENSING_INTR_AMPL: 1.6
MDC_IDC_MSMT_LEADCHNL_RV_DTM: NORMAL
MDC_IDC_MSMT_LEADCHNL_RV_IMPEDANCE_VALUE: 565
MDC_IDC_MSMT_LEADCHNL_RV_PACING_THRESHOLD_AMPLITUDE: 1
MDC_IDC_MSMT_LEADCHNL_RV_PACING_THRESHOLD_POLARITY: NORMAL
MDC_IDC_MSMT_LEADCHNL_RV_PACING_THRESHOLD_PULSEWIDTH: 0.4
MDC_IDC_PG_IMPLANT_DTM: NORMAL
MDC_IDC_PG_MFG: NORMAL
MDC_IDC_PG_MODEL: NORMAL
MDC_IDC_PG_SERIAL: NORMAL
MDC_IDC_PG_TYPE: NORMAL
MDC_IDC_SESS_DTM: NORMAL
MDC_IDC_SESS_TYPE: NORMAL
MDC_IDC_SET_BRADY_AT_MODE_SWITCH_RATE: 170
MDC_IDC_SET_BRADY_LOWRATE: 60
MDC_IDC_SET_BRADY_MAX_SENSOR_RATE: 130
MDC_IDC_SET_BRADY_MAX_TRACKING_RATE: 130
MDC_IDC_SET_BRADY_MODE: NORMAL
MDC_IDC_SET_BRADY_PAV_DELAY: 120
MDC_IDC_SET_BRADY_SAV_DELAY: 120
MDC_IDC_SET_LEADCHNL_RA_PACING_AMPLITUDE: 2
MDC_IDC_SET_LEADCHNL_RA_PACING_POLARITY: NORMAL
MDC_IDC_SET_LEADCHNL_RA_PACING_PULSEWIDTH: 0.4
MDC_IDC_SET_LEADCHNL_RA_SENSING_POLARITY: NORMAL
MDC_IDC_SET_LEADCHNL_RA_SENSING_SENSITIVITY: 0.25
MDC_IDC_SET_LEADCHNL_RV_PACING_AMPLITUDE: 1.6
MDC_IDC_SET_LEADCHNL_RV_PACING_POLARITY: NORMAL
MDC_IDC_SET_LEADCHNL_RV_PACING_PULSEWIDTH: 0.4
MDC_IDC_SET_LEADCHNL_RV_SENSING_POLARITY: NORMAL
MDC_IDC_SET_LEADCHNL_RV_SENSING_SENSITIVITY: 1.5
MDC_IDC_SET_ZONE_STATUS: NORMAL
MDC_IDC_SET_ZONE_TYPE: NORMAL
MDC_IDC_STAT_AT_BURDEN_PERCENT: 7
MDC_IDC_STAT_BRADY_RA_PERCENT_PACED: 4
MDC_IDC_STAT_BRADY_RV_PERCENT_PACED: 99

## (undated) DEVICE — INFLATION DEVICE: Brand: ENCORE™ 26

## (undated) DEVICE — Device: Brand: MEDEX

## (undated) DEVICE — MYNXGRIP 6F/7F: Brand: MYNXGRIP

## (undated) DEVICE — CANN CO2/O2 NASL A/

## (undated) DEVICE — SUT SILK 2/0 SH CR8 18IN CR8 C012D

## (undated) DEVICE — PINNACLE INTRODUCER SHEATH: Brand: PINNACLE

## (undated) DEVICE — GLV SURG SENSICARE W/ALOE PF LF 7.5 STRL

## (undated) DEVICE — Device: Brand: DEFENDO AIR/WATER/SUCTION AND BIOPSY VALVE

## (undated) DEVICE — 6F .070 JL4.5 100CM: Brand: CORDIS

## (undated) DEVICE — GLV SURG SENSICARE W/ALOE PF LF SZ6 STRL

## (undated) DEVICE — DRSNG SURESITE WNDW 4X4.5

## (undated) DEVICE — BAPTIST TURNOVER KIT: Brand: MEDLINE INDUSTRIES, INC.

## (undated) DEVICE — SOL NS 500ML

## (undated) DEVICE — PK CATH CARD 30 CA/4

## (undated) DEVICE — APPL CHLORAPREP W/TINT 26ML ORNG

## (undated) DEVICE — ARM DRAPE

## (undated) DEVICE — DRP SLUSH MACH OM-ORS-320

## (undated) DEVICE — 3M™ IOBAN™ 2 ANTIMICROBIAL INCISE DRAPE 6650EZ: Brand: IOBAN™ 2

## (undated) DEVICE — Device

## (undated) DEVICE — SYR SLP TP 10ML DISP

## (undated) DEVICE — NDL HYPO PRECISIONGLIDE REG 22G 1 1/2

## (undated) DEVICE — GLV SURG TRIUMPH PF LTX 6.5 STRL

## (undated) DEVICE — APPL CHLORAPREP HI/LITE 26ML ORNG

## (undated) DEVICE — PROXIMATE RH ROTATING HEAD SKIN STAPLERS (35 WIDE) CONTAINS 35 STAINLESS STEEL STAPLES: Brand: PROXIMATE

## (undated) DEVICE — YANKAUER,BULB TIP WITH VENT: Brand: ARGYLE

## (undated) DEVICE — CATH ELECTRD PACE TEMP BI NONHEP 5F110CM

## (undated) DEVICE — KT INTRO MIC TROC 4F 21GA 7CM

## (undated) DEVICE — CATH F6INF TL JL 4 100CM: Brand: INFINITI

## (undated) DEVICE — RESERVOIR,SUCTION,100CC,SILICONE: Brand: MEDLINE

## (undated) DEVICE — CUFF,BP,DISP,1 TUBE,ADULT,HP: Brand: MEDLINE

## (undated) DEVICE — ADHS SKIN PREMIERPRO EXOFIN TOPICAL HI/VISC .5ML

## (undated) DEVICE — GW ENROUTE HC .014IN 95CM

## (undated) DEVICE — SUT PROLN 5/0 C1 DA 24IN 8725H

## (undated) DEVICE — GLV SURG BIOGEL LTX PF 7 1/2

## (undated) DEVICE — INTRO TEAR AWAY/LVD W/SD PRT 6F 13CM

## (undated) DEVICE — DRP SURG UTIL W/TPE 2/LAYR 15X26IN DISP

## (undated) DEVICE — GW XCHG AMPLTZ XSTIF PTFE CRV .035 3X260

## (undated) DEVICE — FRCP BIOP ENDO CAPTURAPRO SPK SERR 2.8MM 230CM

## (undated) DEVICE — INTENDED FOR TISSUE SEPARATION, AND OTHER PROCEDURES THAT REQUIRE A SHARP SURGICAL BLADE TO PUNCTURE OR CUT.: Brand: BARD-PARKER ® STAINLESS STEEL BLADES

## (undated) DEVICE — MODEL AT P65, P/N 701554-001KIT CONTENTS: HAND CONTROLLER, 3-WAY HIGH-PRESSURE STOPCOCK WITH ROTATING END AND PREMIUM HIGH-PRESSURE TUBING: Brand: ANGIOTOUCH® KIT

## (undated) DEVICE — KT MINI ACC MAK401

## (undated) DEVICE — PK CATH CARD 30

## (undated) DEVICE — ORGANIZER HLDR TB

## (undated) DEVICE — PERCLOSE PROGLIDE™ SUTURE-MEDIATED CLOSURE SYSTEM: Brand: PERCLOSE PROGLIDE™

## (undated) DEVICE — SENSR O2 OXIMAX FNGR A/ 18IN NONSTR

## (undated) DEVICE — 2, DISPOSABLE SUCTION/IRRIGATOR WITHOUT DISPOSABLE TIP: Brand: STRYKEFLOW

## (undated) DEVICE — INF TL MULIPACK FR6: Brand: INFINITI

## (undated) DEVICE — SUT MNCRYL 4/0 PS2 27IN UD MCP426H

## (undated) DEVICE — SOLIDIFIER LIQUI LOC PLUS 2000CC

## (undated) DEVICE — MONOPOLAR METZENBAUM SCISSOR, MINI BLADE TIP, DISPOSABLE: Brand: MONOPOLAR METZENBAUM SCISSOR, MINI BLADE TIP, DISPOSABLE

## (undated) DEVICE — KT VLV HEMO MAP ACC PLS LG/BORE MTL/INTRO

## (undated) DEVICE — GLV SURG SENSICARE W/ALOE PF LF 6.5 STRL

## (undated) DEVICE — PK PM 30

## (undated) DEVICE — DAVINCI: Brand: MEDLINE INDUSTRIES, INC.

## (undated) DEVICE — SYR TB PRECISIONGLIDE 1CC 26G 3/8IN LF

## (undated) DEVICE — RED RUBBER URETHRAL CATHETER: Brand: DOVER

## (undated) DEVICE — NDL MAYO CATGUT 1/2 CIR 18244D PK/2

## (undated) DEVICE — SYR LL TP 10ML STRL

## (undated) DEVICE — SUT ETHIB 2/0 RB1 DA 30IN WHT MX523

## (undated) DEVICE — PINNACLE R/O II INTRODUCER SHEATH WITH RADIOPAQUE MARKER: Brand: PINNACLE

## (undated) DEVICE — PAD MINOR UNIVERSAL: Brand: MEDLINE INDUSTRIES, INC.

## (undated) DEVICE — PAD MAJOR VASCULAR: Brand: MEDLINE INDUSTRIES, INC.

## (undated) DEVICE — SPNG GZ WOVN 4X4IN 12PLY 10/BX STRL

## (undated) DEVICE — CATH F6INF TL JR 4 100CM: Brand: INFINITI

## (undated) DEVICE — 3M™ STERI-STRIP™ REINFORCED ADHESIVE SKIN CLOSURES, R1547, 1/2 IN X 4 IN (12 MM X 100 MM), 6 STRIPS/ENVELOPE: Brand: 3M™ STERI-STRIP™

## (undated) DEVICE — SOL IRR NACL 0.9PCT BT 1000ML

## (undated) DEVICE — ELECTRD PAD DEFIB A/

## (undated) DEVICE — SPNG GZ STRL 2S 4X4 12PLY

## (undated) DEVICE — DRSNG PRESS SAFEGUARD

## (undated) DEVICE — PK POSTN TRENGUARD450 PROC

## (undated) DEVICE — BARR HEMO VASC/PERIF CLOSUR/PAD 4X4CM

## (undated) DEVICE — GOLDVAC PUSH BUTTON ELECTROSURGICAL SMOKE EVACUATION HANDPIECE: Brand: GOLDVAC

## (undated) DEVICE — DRSNG SURESITE WNDW 2.38X2.75

## (undated) DEVICE — SHEET,DRAPE,53X77,STERILE: Brand: MEDLINE

## (undated) DEVICE — SNAP KOVER: Brand: UNBRANDED

## (undated) DEVICE — KT NDL GUIDE STRL 18GA

## (undated) DEVICE — CANNULA SEAL

## (undated) DEVICE — MODEL BT2000 P/N 700287-012KIT CONTENTS: MANIFOLD WITH SALINE AND CONTRAST PORTS, SALINE TUBING WITH SPIKE AND HAND SYRINGE, TRANSDUCER: Brand: BT2000 AUTOMATED MANIFOLD KIT

## (undated) DEVICE — ELECTRD BLD EZ CLN MOD XLNG 2.75IN

## (undated) DEVICE — GLV SURG TRIUMPH MICRO PF LTX 8.5 STRL

## (undated) DEVICE — 6F .070 JL3.5 100CM: Brand: CORDIS

## (undated) DEVICE — BG BANDED WRUBBER BAND AND TP 36X54IN

## (undated) DEVICE — GLV SURG BIOGEL LTX PF 6 1/2

## (undated) DEVICE — SUT SILK 2/0 FS BLK 18IN 685G

## (undated) DEVICE — SUT SILK 1 LBYRNTH TIES 30IN A307H

## (undated) DEVICE — A2000 MULTI-USE SYRINGE KIT, P/N 701277-003KIT CONTENTS: 100ML CONTRAST RESERVOIR AND TUBING WITH CONTRAST SPIKE AND CLAMP: Brand: A2000 MULTI-USE SYRINGE KIT

## (undated) DEVICE — DRP SPECIAL PROC 4PC W/FC POUCH

## (undated) DEVICE — SUT ETHLN 3/0 FS1 30IN 669H

## (undated) DEVICE — STERNUM BLADE, OFFSET (32.0 X 0.8 X 6.3MM)

## (undated) DEVICE — COVER,MAYO STAND,STERILE: Brand: MEDLINE

## (undated) DEVICE — TRAP FLD MINIVAC MEGADYNE 100ML

## (undated) DEVICE — CATHETER,URETHRAL,REDRUBBER,STRL,16FR: Brand: MEDLINE

## (undated) DEVICE — GOWN,SIRUS,NON REINFRCD,LARGE,SET IN SL: Brand: MEDLINE

## (undated) DEVICE — DISPOSABLE ADAPTER

## (undated) DEVICE — ANTIBACTERIAL UNDYED BRAIDED (POLYGLACTIN 910), SYNTHETIC ABSORBABLE SUTURE: Brand: COATED VICRYL

## (undated) DEVICE — BG OR ZSUT SADDLE 20IN CLR STRL

## (undated) DEVICE — THE CHANNEL CLEANING BRUSH IS A NYLON FLEXI BRUSH ATTACHED TO A FLEXIBLE PLASTIC SHEATH DESIGNED TO SAFELY REMOVE DEBRIS FROM FLEXIBLE ENDOSCOPES.

## (undated) DEVICE — SUT PROLN 7/0 BV1 24IN 4PK M8702

## (undated) DEVICE — NON-ADHERENT PAD: Brand: TELFA

## (undated) DEVICE — GLV SURG BIOGEL M LTX PF 7 1/2

## (undated) DEVICE — CANN VESL ACRN TP 4MM

## (undated) DEVICE — CATH F6INF TL AL I 100CM: Brand: INFINITI

## (undated) DEVICE — DRSNG SURESITE123 6X8IN

## (undated) DEVICE — SKIN AFFIX SURG ADHESIVE 72/CS 0.55ML: Brand: MEDLINE

## (undated) DEVICE — ST TBG AIRSEAL FLTR TRI LUM

## (undated) DEVICE — UNIVERSAL PACK: Brand: CARDINAL HEALTH

## (undated) DEVICE — CONMED SCOPE SAVER BITE BLOCK, 20X27 MM: Brand: SCOPE SAVER

## (undated) DEVICE — DRP SURG UNIV BASIC BILAMINATE 53X77IN DISP

## (undated) DEVICE — CATH F6INF PIG 145 110CM 6SH: Brand: INFINITI

## (undated) DEVICE — WIPE MEROCEL 3.625X3IN

## (undated) DEVICE — BIOPATCH™ ANTIMICROBIAL DRESSING WITH CHLORHEXIDINE GLUCONATE IS A HYDROPHILLIC POLYURETHANE ABSORPTIVE FOAM WITH CHLORHEXIDINE GLUCONATE (CHG) WHICH INHIBITS BACTERIAL GROWTH UNDER THE DRESSING. THE DRESSING IS INTENDED TO BE USED TO ABSORB EXUDATE, COVER A WOUND CAUSED BY VASCULAR AND NONVASCULAR PERCUTANEOUS MEDICAL DEVICES DURING SURGERY, AS WELL AS REDUCE LOCAL INFECTION AND COLONIZATION OF MICROORGANISMS.: Brand: BIOPATCH

## (undated) DEVICE — SUT ETHIB 2/0 SH SH 36IN X523H

## (undated) DEVICE — GAUZE,SPONGE,4"X4",16PLY,XRAY,STRL,LF: Brand: MEDLINE

## (undated) DEVICE — MASK,OXYGEN,MED CONC,ADLT,7' TUB, UC: Brand: PENDING

## (undated) DEVICE — SUT PROLN 6/0 4/24IN BV1 MON BL M8805

## (undated) DEVICE — KT CLN CLEANOR SCPE

## (undated) DEVICE — PK TURNOVER RM ADV

## (undated) DEVICE — STERILE (14X122CM) TELESCOPICALLY-FOLDED COVER: Brand: CIV-CLEAR™ TRANSDUCER COVER

## (undated) DEVICE — 6F .070 JL4 100CM: Brand: CORDIS

## (undated) DEVICE — SLV REPOSTNG W CATHLK HUB 60CM

## (undated) DEVICE — VIATRAC 14 PLUS PERIPHERAL DILATATION CATHETER 6.0 MM X 30 MM X 80 CM: Brand: VIATRAC

## (undated) DEVICE — DISPOSABLE SURGICAL CABLE

## (undated) DEVICE — 4-PORT MANIFOLD: Brand: NEPTUNE 2

## (undated) DEVICE — STERILE (10.2 X 147CM) TELESCOPICALLY-FOLDED COVER: Brand: CIV-FLEX™ TRANSDUCER COVER

## (undated) DEVICE — GW PRESSUREWIRE X WIRELESS FFR 175CM

## (undated) DEVICE — TOWEL,OR,DSP,ST,BLUE,STD,4/PK,20PK/CS: Brand: MEDLINE

## (undated) DEVICE — FR5 INFINITI MULTIPAC: Brand: INFINITI

## (undated) DEVICE — ST MIC/INTRO ACC SHRP/NDL TUNG/TP NITNL 5F 45CM 7CM

## (undated) DEVICE — BLADELESS OBTURATOR: Brand: WECK VISTA

## (undated) DEVICE — ANGLED-TIP ARTERIAL SHEATH CONFIGURATION: Brand: ENROUTE TRANSCAROTID NEUROPROTECTION SYSTEM